# Patient Record
Sex: MALE | Race: WHITE | NOT HISPANIC OR LATINO | Employment: FULL TIME | URBAN - METROPOLITAN AREA
[De-identification: names, ages, dates, MRNs, and addresses within clinical notes are randomized per-mention and may not be internally consistent; named-entity substitution may affect disease eponyms.]

---

## 2020-05-21 ENCOUNTER — TRANSFERRED RECORDS (OUTPATIENT)
Dept: HEALTH INFORMATION MANAGEMENT | Facility: CLINIC | Age: 55
End: 2020-05-21

## 2020-07-01 ENCOUNTER — MEDICAL CORRESPONDENCE (OUTPATIENT)
Dept: HEALTH INFORMATION MANAGEMENT | Facility: CLINIC | Age: 55
End: 2020-07-01

## 2020-07-09 ENCOUNTER — REFERRAL (OUTPATIENT)
Dept: TRANSPLANT | Facility: CLINIC | Age: 55
End: 2020-07-09
Payer: COMMERCIAL

## 2020-07-09 DIAGNOSIS — I10 ESSENTIAL HYPERTENSION: ICD-10-CM

## 2020-07-09 DIAGNOSIS — N18.4 CHRONIC RENAL FAILURE, STAGE 4 (SEVERE) (H): ICD-10-CM

## 2020-07-09 DIAGNOSIS — N18.4 CHRONIC KIDNEY DISEASE, STAGE 4, SEVERELY DECREASED GFR (H): Primary | ICD-10-CM

## 2020-07-09 DIAGNOSIS — Z76.82 ORGAN TRANSPLANT CANDIDATE: ICD-10-CM

## 2020-07-09 DIAGNOSIS — N02.B9 IGA NEPHROPATHY: ICD-10-CM

## 2020-07-09 DIAGNOSIS — Z01.818 PRE-TRANSPLANT EVALUATION FOR KIDNEY TRANSPLANT: ICD-10-CM

## 2020-07-09 NOTE — LETTER
Request for Records from Referring Providers Office for Patients Referred to [facility] Solid Organ Transplant Program    July 28, 2020    Re: Dnoald Blanco   [address] [facility]                [place] [address]   1965    Requested Records     Task Due Responsible    Abdominal CT       No attached procedure          Abdominal MRI       No attached procedure          Abdominal Ultrasound       No attached procedure          Bilateral iliac artery ultrasound          Carotid duplex          Chest XR       No attached procedure          Chest/Abdomen/Pelvis CT       No attached procedure          Colonoscopy       No attached procedure          Dental          EKG       No attached procedure          Echo stress test       No attached procedure          Echocardiogram       No attached procedure          Gastric emptying study       No attached procedure          Heart Cath       No attached procedure          PFT       No attached procedure          Peripheral duplex/Lower extremity venous US       No attached procedure          Renal Ultrasound          Vaccinations up-to-date               Renal Biopsy Results        In addition to the above records, please include all lab results from the last 12 months.        Requested imaging may be electronically sent to the [employer] system via L[employer]HNOV DICOM connection. When unable to send imaging electronically, an exported DICOM CD may be sent. Please indicate when imaging has been sent electronically on your return cover sheet.    Requested pathology slides should be accompanied by the appropriate report from your institution.    When the patient is hand carrying requested records or the requested records are not at your facility, please indicate this information on a return cover sheet.    Please fax requested paper records to [phone] within 3-5 business days.     Please send all scans/slides to:   [facility]  Organ Transplant Office  [address] [facility], [place], [address] [address]    Please call our office at [phone] if you have any questions or concerns.

## 2020-07-09 NOTE — LETTER
August 12, 2020    Donald Blanco  [address] [place] [id number]    Dear Donald,    Thank you for your interest in the Transplant Center at [facility], [facility]. We look forward to being a part of your care team and assisting you through the transplant process.    As we discussed, your transplant coordinator is Renee Hampton (Kidney).  You may call your coordinator at any time with questions or concerns call [phone].    Please complete the following.    1. Fill out and return the enclosed forms    Authorization for Electronic Communication    Authorization to Discuss Protected Health Information    Authorization for Release of Protected Health Information    Authorization for Care Everywhere Release of Information    2. Sign up for:    SHERPANDIPITYt, access to your electronic medical record (see enclosed pamphlet)    [employer]FrameBlast, a transplant education website    You can use these tools to learn more about your transplant, communicate with your care team, and track your medical details      Sincerely,  Solid Organ Transplant  [facility], [facility]    cc: Sonu Russo (PCP); Chin Cornejo

## 2020-07-16 ENCOUNTER — TELEPHONE (OUTPATIENT)
Dept: TRANSPLANT | Facility: CLINIC | Age: 55
End: 2020-07-16

## 2020-07-16 NOTE — TELEPHONE ENCOUNTER
Provider Call: Transplant Provider  Facility Name: [facility]  Reason for Call: URIAH Haque calling to confirm that the referral sent on 7/8/2020 was received as patient has not gotten a call. Please call [facility] back at [phone] option 3 to speak with one of the RNs.   Callback needed? Yes    Return Call Needed  Same as documented in contacts section

## 2020-07-20 NOTE — TELEPHONE ENCOUNTER
Received a voicemail from URIAH Haque with patient's dialysis unit requesting an update on the referral.  Shabnam also left a message last week.  I returned her call and left her a message that we received the referral and have called the patient.  I let her know on the message we will call the patient again this week to start the referral process.  Will route to intake team

## 2020-07-21 NOTE — TELEPHONE ENCOUNTER
Patient Call: Voicemail  Date/Time: 7/21/20 at 9:44 am  Reason for call: returning call for intake

## 2020-07-22 NOTE — TELEPHONE ENCOUNTER
Patient Call: Voicemail  Date/Time: 7/22/2020 @ 11:37am  Reason for call: Patient left voicemail he was returning Sofy's call re: referral.

## 2020-07-24 NOTE — TELEPHONE ENCOUNTER
Patient Call: Jose  Date/Time: 7/24/2020-12pm  Reason for call: pt is looking for a return call from intake team please connect

## 2020-07-27 NOTE — TELEPHONE ENCOUNTER
Patient Call: Voicemail  Date/Time: 7/27/2020 @ 11:39am  Reason for call: Patient left voicemail he is trying to get in touch with someone about his referral. He and his referring provider have left several messages  id

## 2020-07-28 VITALS — BODY MASS INDEX: 39.99 KG/M2 | HEIGHT: 69 IN | WEIGHT: 270 LBS

## 2020-07-28 PROBLEM — M10.9 GOUT: Status: ACTIVE | Noted: 2020-05-21

## 2020-07-28 PROBLEM — N18.4 STAGE 4 CHRONIC KIDNEY DISEASE (H): Status: ACTIVE | Noted: 2020-05-21

## 2020-07-28 PROBLEM — E78.5 HYPERLIPIDEMIA: Status: ACTIVE | Noted: 2020-05-21

## 2020-07-28 PROBLEM — N02.B9 IGA NEPHROPATHY: Status: ACTIVE | Noted: 2020-05-21

## 2020-07-28 PROBLEM — I10 ESSENTIAL HYPERTENSION: Status: ACTIVE | Noted: 2020-05-21

## 2020-07-28 PROBLEM — R60.9 EDEMA: Status: ACTIVE | Noted: 2020-05-21

## 2020-07-28 PROBLEM — M72.2 PLANTAR FASCIITIS, LEFT: Status: ACTIVE | Noted: 2017-09-06

## 2020-07-28 PROBLEM — N25.81 HYPERPARATHYROIDISM DUE TO RENAL INSUFFICIENCY (H): Status: ACTIVE | Noted: 2020-05-21

## 2020-07-28 PROBLEM — M20.10 HALLUX VALGUS: Status: ACTIVE | Noted: 2017-09-06

## 2020-07-28 SDOH — HEALTH STABILITY: MENTAL HEALTH: HOW OFTEN DO YOU HAVE A DRINK CONTAINING ALCOHOL?: 2-3 TIMES A WEEK

## 2020-07-28 ASSESSMENT — MIFFLIN-ST. JEOR: SCORE: 2040.09

## 2020-07-28 NOTE — TELEPHONE ENCOUNTER
PCP: Dr. Sonu Russo    Referring Provider: Dr. Chin Cornejo   Referring Diagnosis: CKD Stage 4     Is patient under the age of 65? Yes  Is patient diabetic? No  Is patient on insulin? No  Was patient offered a pancreas transplant referral? No    Is patient in a group home/assisted living? No   Does patient have a guardian? No    Referral intake process completed.  Patient is aware that after financial approval is received, medical records will be requested.   Patient confirmed for a callback from transplant coordinator on 8/11/2020. (within 2 weeks)  Tentative evaluation date 9/3/2020. (within 4 weeks)    Confirmed coordinator will discuss evaluation process in more detail at the time of their call.   Patient is aware of the need to arrange age appropriate cancer screening, vaccinations, and dental care.  Reminded patient to complete questionnaire, complete medical records release, and review packet prior to evaluation visit .  Assessed patient for special needs (ie--wheelchair, assistance, guardian, and ):  No  Patient instructed to call [phone] with questions.     FRACISCO Adams, LPN   Solid Organ Transplant

## 2020-08-11 NOTE — TELEPHONE ENCOUNTER
Reviewed chart for purpose of pre kidney transplant evaluation planning. Pt has CKD from biopsy proven IgA nephropathy - need to request record. Other medical diagnoses include hypertension and gout. No mention of heart or lung related conditions. Last colonoscopy reportedly about 10 years ago. BMI is 39.87. All okay for proceeding with kidney transplant.     Contacted patient and introduced myself as their Transplant Coordinator, also introduced the role of the Transplant Coordinator in the transplant process. Patient's wife Sana was on the line as well during the entire call today.  Explained the purpose of this call including reviewing next steps and answering questions.  Patient and wife were both in agreement with proceeding with pre kidney transplant evaluation. Patient stating he has followed with Dr. Chin Cornejo for about 10 years because of his kidney disease. Pt stating he does not have any heart or lung problems but has worn CPAP every night x last 15 years. I reviewed that his current BMI of 39.87 is over our upper limit of 35 and he will likely be recommended to lose weight at his transplant surgeon appointment. Pt stating he is not currently engaged in any weight loss programs, offered and appointment with our Weight Loss Management Clinic and patient accepted this. Pt stating his last colonoscopy was when he was 38 years old. I instructed him to check with his primary care provider as to when it is recommended he repeat his colonoscopy. I did review that all health maintenance items need to be up to date to meet criteria for kidney transplant: dental work, PSA and colonoscopy.  Pt stating he has many siblings and people who want to donate a kidney to him. I reviewed when he Is seeing transplant nephrology and transplant surgeon he will be advised as to when to start having live donors register.   Confirmed Referring Provider, Dialysis Center, and Primary Care Physician. Notified patient of the  importance of continued communication with referring providers and primary care physicians.    Reviewed components of transplant evaluation process including necessary appointments, tests, and procedures.    Answered questions for patient regarding evaluation, provided my name and contact information and requested they call with any additional questions.    Determined that patient would like additional information regarding transplant by:     Drop Down choices: Mail, Email, MyChart, Phone Call   Encourage MyChart   Notified patients that they will hear from a Transplant  to schedule evaluation.  Sana does intend to attend pre kidney evaluation with patient. Both patient and Sana expressed very good understanding of all and was in good agreement with the plan.     Smart set orders into [employer] today for pre kidney transplant evaluation - routed to .

## 2020-08-18 NOTE — TELEPHONE ENCOUNTER
Called patient to schedule PKE clinic. Patient would prefer in person PKE and has save the date for Thursday 9/3.  Confirmed PKE in person for Thursday 9/3/20 and validated email on file. Encouraged patient to sign up for [employer]. Informed patient we would send information about appointments via MyChart or email and Transplant coordinator will call to review education.

## 2020-08-20 ENCOUNTER — TELEPHONE (OUTPATIENT)
Dept: TRANSPLANT | Facility: CLINIC | Age: 55
End: 2020-08-20

## 2020-08-20 NOTE — TELEPHONE ENCOUNTER
Patient Call: Voicemail  Date/Time: 8/20/20 / 8:59 am  Reason for call: Patient would like a call back from Renee Hampton

## 2020-08-20 NOTE — TELEPHONE ENCOUNTER
Spoke with pt who explained his PSA yesterday was 12.4 ( available in CE ) and he was recommended to see a urologist by his physician assistant, he is wondering about option of seeing a urologist here. I explained to patient I will check with urologists and get back to him.     Staff message to Dr. Price Ko and Dr. Henry Michael about seeing this patient.

## 2020-08-20 NOTE — TELEPHONE ENCOUNTER
Patient Call: General    Reason for call: Donald is trying to reach Renee.    Call back needed? Yes    Return Call Needed  Same as documented in contacts section  When to return call?: Greater than one day: Route standard priority

## 2020-08-25 NOTE — TELEPHONE ENCOUNTER
MEDICAL RECORDS REQUEST   [facility]  Urology Clinic  [address] [place] [address]  PHONE: [phone]  Fax: [phone]        FUTURE VISIT INFORMATION                                                   Donald Blanco, : 1965 scheduled for future visit at [facility]    APPOINTMENT INFORMATION:    Date: 20 7AM    Provider:  Nabil Vásquez MD    Reason for Visit/Diagnosis: Elevated PSA    REFERRAL INFORMATION:    Referring provider:  N/A    Specialty: N/A    Referring providers clinic:      Clinic contact number:  N/A    RECORDS REQUESTED FOR VISIT                                                     NOTES  STATUS/DETAILS   OFFICE NOTE from referring provider  yes   OFFICE NOTE from other specialist  no   DISCHARGE SUMMARY from hospital  no   DISCHARGE REPORT from the ER  no   OPERATIVE REPORT  no   MEDICATION LIST  no   LABS     URINALYSIS (UA)/ PSA  yes     PRE-VISIT CHECKLIST      Record collection complete Yes- [facility] recs in CE   Appointment appropriately scheduled           (right time/right provider) Yes   MyChart activation If no, please explain: In process   Questionnaire complete If no, please explain: In process     Completed by: Anna Meyer

## 2020-09-01 ENCOUNTER — PRE VISIT (OUTPATIENT)
Dept: UROLOGY | Facility: CLINIC | Age: 55
End: 2020-09-01

## 2020-09-01 NOTE — TELEPHONE ENCOUNTER
Chief Complaint : Consult     Hx/Sx: Elevated PSA    Records/Orders: PSA available    Pt Contacted: N/a    At Rooming: Video, patient has MyChart [phone]      Irving Ortega, EMT

## 2020-09-02 ENCOUNTER — TELEPHONE (OUTPATIENT)
Dept: TRANSPLANT | Facility: CLINIC | Age: 55
End: 2020-09-02

## 2020-09-02 NOTE — TELEPHONE ENCOUNTER
Called patient today and LM on his VM I was calling to see if he has any last minute questions about his eval tomorrow and to see if he has watched [facility] education.

## 2020-09-02 NOTE — PROGRESS NOTES
TRANSPLANT NEPHROLOGY RECIPIENT EVALUATION NOTE    Assessment and Plan:  # Kidney Transplant Evaluation: Patient is a good candidate overall. Benefits of a living donor transplant were discussed.    # CKD from IgA nephropathy: he has had a slow progression of his kidney disease over the past 10+ years. Most recent eGFR 18 ml/min. When ready, he would likely benefit from a kidney transplant.    # Cardiac Risk: he will need risk assessment.    # Elevated PSA: 12.4 in August 2020. Repeat PSA pending. Patient has already been referred to local urology.     # Obesity: current BMI 37. Discussed importance of weight loss for overall general health and to help reduce postoperative wound complications. Appreciate surgery input. He has intentionally lost approximately 22 lbs in the last 3 weeks with diet and exercise.     # Normal Hemoglobin: will get renal US to rule out mass.    # PAD screen: no imaging at this time per surgery.    # Health Maintenance: Colonoscopy: Not up to date and Dental: Up to date    Discussed the risks and benefits of a transplant, including the risk of surgery and immunosuppression medications.  Patient's overall evaluation will be discussed in the Transplant Program's regular meeting with a final recommendation on the patients suitability for transplant to be made at that time.  Patient was seen in conjunction with Dr. Woodrow Cr as part of a shared visit.    PHYSICIAN ATTESTATION AND EVALUATION  Patient was seen by myself, Dr. Woodrow Cr, in conjunction with Darleen Turner PA-C as part of a shared visit.    I personally reviewed the patient's past medical and surgical history, vital signs, medications and pertinent laboratory results and radiology findings.  Present and past medical history, along with significant physical exam findings were all reviewed with TOM.    Key findings:  Donald Blanco is a 55 year old year old male with CKD from IgA nephropathy, who presents for kidney  transplant evaluation.  Patient reports feeling good overall with some medical complaints.    Key management decisions made by me and discussed with TOM include the following, with full Assessment and Plan noted above by TOM:  # Kidney Transplant Evaluation: Patient is a good candidate overall. Benefits of a living donor transplant were discussed.    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  Preferably, a preemptive living donor kidney transplant.    # Cardiac Risk: Patient will require Cardiology evaluation prior to transplant.    # Elevated PSA: Recent PSA at over 12.  Patient has been referred to local Urologist.    # Obesity: Patient is above BMI guidelines and recommend weight loss.  Will defer to Transplant Surgery on weight loss goal.    # Relative Erythrocytosis: Patient with normal hemoglobin, which is unexpected with his degree of kidney dysfunction.  Will obtain a bilateral native kidney ultrasound to rule out renal mass.    Woodrow Cr MD    Evaluation:  Donald Blanco was seen in consultation at the request of Dr. Stuart Rust for evaluation as a potential kidney transplant recipient.    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    History of Present Illness:           Kidney Disease Hx: Was referred to nephrology 10+ years ago after found to have microscopic hematuria and proteinuria. IgA nephropathy diagnosed on kidney biopsy in 2009. He has had a very slow progression over the years and is not yet on dialysis. Most recent eGFR was 18 ml/min. No uremic symptoms.        Kidney Disease Dx: IgA nephropathy       Biopsy Proven: Yes         On Dialysis: No       Primary Nephrologist: Dr. Cornejo       H/o Kidney Stones: No       H/o Recurrent/Frequent UTI: No         Diabetic  Hx: None           Cardiac/Vascular Disease Risk Factors:        - No known history of cardiac disease or events         Viral Serology Status       CMV IgG Antibody: Unknown       EBV IgG Antibody: Unknown         Volume Status/Weight:        Volume status: Mildly hypervolemic       Weight:  BMI above guidelines, but appropriateness for transplant per surgeon       BMI: Body mass index is 37.13 kg/m .         Functional Capacity/Frailty:        He rides his bike 3.5 miles daily and goes on frequent walks with his wife. He is still working full time. No chest pain, SOB, or claudication symptoms.      Fatigue/Decreased Energy: [x] No [] Yes    Chest Pain or SOB with Exertion: [x] No [] Yes    Significant Weight Change: [x] No [] Yes    Nausea, Vomiting or Diarrhea: [x] No [] Yes    Fever, Sweats or Chills:  [x] No [] Yes    Leg Swelling [x] No [] Yes         History of Cancer: None    Other Significant Medical Issues:   - HTN, gout, SORAIDA on CPAP    Review of Systems:  A comprehensive review of systems was obtained and negative, except as noted in the HPI or PMH.    Past Medical History:   Medical record was reviewed and PMH was discussed with patient and noted below.  Past Medical History:   Diagnosis Date     CKD (chronic kidney disease) stage 4, GFR 15-29 ml/min (H)      Elevated PSA      Essential hypertension 5/21/2020     IgA nephropathy      Obesity        Past Social History:   Past Surgical History:   Procedure Laterality Date     BACK SURGERY      Back Surgey 20+ Years Ago      BIOPSY      [facility] 2010     COLONOSCOPY      10+ Years ago at [facility]      Personal history of bleeding or anesthesia problems: No    Family History:  Family History   Problem Relation Age of Onset     Kidney Disease Father      Hypertension Father        Personal History:   Social History     Socioeconomic History     Marital status:      Spouse name: Not on file     Number of children: Not on file      Years of education: Not on file     Highest education level: Not on file   Occupational History     Not on file   Social Needs     Financial resource strain: Not on file     Food insecurity     Worry: Not on file     Inability: Not on file     Transportation needs     Medical: Not on file     Non-medical: Not on file   Tobacco Use     Smoking status: Never Smoker     Smokeless tobacco: Never Used   Substance and Sexual Activity     Alcohol use: Yes     Frequency: 2-3 times a week     Drug use: Never     Sexual activity: Not on file   Lifestyle     Physical activity     Days per week: Not on file     Minutes per session: Not on file     Stress: Not on file   Relationships     Social connections     Talks on phone: Not on file     Gets together: Not on file     Attends Islam service: Not on file     Active member of club or organization: Not on file     Attends meetings of clubs or organizations: Not on file     Relationship status: Not on file     Intimate partner violence     Fear of current or ex partner: Not on file     Emotionally abused: Not on file     Physically abused: Not on file     Forced sexual activity: Not on file   Other Topics Concern     Parent/sibling w/ CABG, MI or angioplasty before 65F 55M? Not Asked   Social History Narrative     Not on file       Allergies:  No Known Allergies    Medications:  Current Outpatient Medications   Medication Sig     allopurinol (ZYLOPRIM) 100 MG tablet TAKE 2 TABLETS BY MOUTH EVERY DAY     amLODIPine (NORVASC) 10 MG tablet Take 10 mg by mouth     colchicine (COLCYRS) 0.6 MG tablet Take 0.6 mg by mouth daily prn     furosemide (LASIX) 20 MG tablet TAKE 1 TABLET BY MOUTH EVERY DAY     simvastatin (ZOCOR) 40 MG tablet Take 40 mg by mouth     aspirin (ASA) 81 MG EC tablet Take 81 mg by mouth     Vitamin D3 (VITAMIN D3) 25 mcg (1000 units) tablet Take 1 capsule by mouth     No current facility-administered medications for this visit.        Vitals:  BP (!) 153/88    "Pulse 97   Ht 1.778 m (5' 10\")   Wt 117.4 kg (258 lb 12.8 oz)   SpO2 94%   BMI 37.13 kg/m      Exam:  GENERAL APPEARANCE: alert and no distress  HENT: mouth without ulcers or lesions  LYMPHATICS: no cervical or supraclavicular nodes  RESP: lungs clear to auscultation - no rales, rhonchi or wheezes  CV: regular rhythm, normal rate, no rub, no murmur  EDEMA: 2+ LE edema bilaterally  ABDOMEN: soft, nondistended, nontender, bowel sounds normal  MS: extremities normal - no gross deformities noted, no evidence of inflammation in joints, no muscle tenderness  SKIN: no rash    Results:   No results found for this or any previous visit (from the past 336 hour(s)).    "

## 2020-09-03 ENCOUNTER — OFFICE VISIT (OUTPATIENT)
Dept: TRANSPLANT | Facility: CLINIC | Age: 55
End: 2020-09-03
Attending: INTERNAL MEDICINE
Payer: COMMERCIAL

## 2020-09-03 ENCOUNTER — RESULTS ONLY (OUTPATIENT)
Dept: OTHER | Facility: CLINIC | Age: 55
End: 2020-09-03

## 2020-09-03 ENCOUNTER — DOCUMENTATION ONLY (OUTPATIENT)
Dept: TRANSPLANT | Facility: CLINIC | Age: 55
End: 2020-09-03

## 2020-09-03 VITALS
DIASTOLIC BLOOD PRESSURE: 88 MMHG | WEIGHT: 258.8 LBS | SYSTOLIC BLOOD PRESSURE: 153 MMHG | BODY MASS INDEX: 37.05 KG/M2 | OXYGEN SATURATION: 94 % | HEIGHT: 70 IN | HEART RATE: 97 BPM

## 2020-09-03 DIAGNOSIS — N02.B9 IGA NEPHROPATHY: ICD-10-CM

## 2020-09-03 DIAGNOSIS — Z76.82 ORGAN TRANSPLANT CANDIDATE: ICD-10-CM

## 2020-09-03 DIAGNOSIS — I10 ESSENTIAL HYPERTENSION: ICD-10-CM

## 2020-09-03 DIAGNOSIS — Z01.818 PRE-TRANSPLANT EVALUATION FOR KIDNEY TRANSPLANT: ICD-10-CM

## 2020-09-03 DIAGNOSIS — N18.4 CHRONIC KIDNEY DISEASE, STAGE 4, SEVERELY DECREASED GFR (H): ICD-10-CM

## 2020-09-03 DIAGNOSIS — N18.4 CHRONIC RENAL FAILURE, STAGE 4 (SEVERE) (H): ICD-10-CM

## 2020-09-03 DIAGNOSIS — R97.20 ELEVATED PSA: ICD-10-CM

## 2020-09-03 DIAGNOSIS — N18.4 CKD (CHRONIC KIDNEY DISEASE) STAGE 4, GFR 15-29 ML/MIN (H): ICD-10-CM

## 2020-09-03 PROBLEM — R60.9 EDEMA: Status: RESOLVED | Noted: 2020-05-21 | Resolved: 2020-09-03

## 2020-09-03 PROBLEM — M20.10 HALLUX VALGUS: Status: RESOLVED | Noted: 2017-09-06 | Resolved: 2020-09-03

## 2020-09-03 PROBLEM — M72.2 PLANTAR FASCIITIS, LEFT: Status: RESOLVED | Noted: 2017-09-06 | Resolved: 2020-09-03

## 2020-09-03 LAB
ABO + RH BLD: NORMAL
ALBUMIN SERPL-MCNC: 3.6 G/DL (ref 3.4–5)
ALBUMIN UR-MCNC: >499 MG/DL
ALP SERPL-CCNC: 79 U/L (ref 40–150)
ALT SERPL W P-5'-P-CCNC: 24 U/L (ref 0–70)
AMORPH CRY #/AREA URNS HPF: ABNORMAL /HPF
ANION GAP SERPL CALCULATED.3IONS-SCNC: 9 MMOL/L (ref 3–14)
APPEARANCE UR: CLEAR
APTT PPP: 33 SEC (ref 22–37)
AST SERPL W P-5'-P-CCNC: 15 U/L (ref 0–45)
BACTERIA #/AREA URNS HPF: ABNORMAL /HPF
BASOPHILS # BLD AUTO: 0 10E9/L (ref 0–0.2)
BASOPHILS NFR BLD AUTO: 0.5 %
BILIRUB SERPL-MCNC: 0.5 MG/DL (ref 0.2–1.3)
BILIRUB UR QL STRIP: NEGATIVE
BLD GP AB SCN SERPL QL: NORMAL
BLOOD BANK CMNT PATIENT-IMP: NORMAL
BUN SERPL-MCNC: 65 MG/DL (ref 7–30)
CALCIUM SERPL-MCNC: 9.8 MG/DL (ref 8.5–10.1)
CHLORIDE SERPL-SCNC: 106 MMOL/L (ref 94–109)
CO2 SERPL-SCNC: 24 MMOL/L (ref 20–32)
COLOR UR AUTO: ABNORMAL
CREAT SERPL-MCNC: 6.13 MG/DL (ref 0.66–1.25)
DIFFERENTIAL METHOD BLD: NORMAL
EOSINOPHIL # BLD AUTO: 0.1 10E9/L (ref 0–0.7)
EOSINOPHIL NFR BLD AUTO: 1.3 %
ERYTHROCYTE [DISTWIDTH] IN BLOOD BY AUTOMATED COUNT: 13.3 % (ref 10–15)
GFR SERPL CREATININE-BSD FRML MDRD: 9 ML/MIN/{1.73_M2}
GLUCOSE SERPL-MCNC: 97 MG/DL (ref 70–99)
GLUCOSE UR STRIP-MCNC: NEGATIVE MG/DL
HCT VFR BLD AUTO: 44.2 % (ref 40–53)
HGB BLD-MCNC: 14.3 G/DL (ref 13.3–17.7)
HGB UR QL STRIP: ABNORMAL
IMM GRANULOCYTES # BLD: 0 10E9/L (ref 0–0.4)
IMM GRANULOCYTES NFR BLD: 0.4 %
INR PPP: 1.03 (ref 0.86–1.14)
KETONES UR STRIP-MCNC: NEGATIVE MG/DL
LEUKOCYTE ESTERASE UR QL STRIP: NEGATIVE
LYMPHOCYTES # BLD AUTO: 1.9 10E9/L (ref 0.8–5.3)
LYMPHOCYTES NFR BLD AUTO: 23.4 %
MCH RBC QN AUTO: 29.9 PG (ref 26.5–33)
MCHC RBC AUTO-ENTMCNC: 32.4 G/DL (ref 31.5–36.5)
MCV RBC AUTO: 92 FL (ref 78–100)
MONOCYTES # BLD AUTO: 0.4 10E9/L (ref 0–1.3)
MONOCYTES NFR BLD AUTO: 5.2 %
MUCOUS THREADS #/AREA URNS LPF: PRESENT /LPF
NEUTROPHILS # BLD AUTO: 5.5 10E9/L (ref 1.6–8.3)
NEUTROPHILS NFR BLD AUTO: 69.2 %
NITRATE UR QL: NEGATIVE
NRBC # BLD AUTO: 0 10*3/UL
NRBC BLD AUTO-RTO: 0 /100
PH UR STRIP: 5 PH (ref 5–7)
PLATELET # BLD AUTO: 224 10E9/L (ref 150–450)
POTASSIUM SERPL-SCNC: 3.7 MMOL/L (ref 3.4–5.3)
PROT SERPL-MCNC: 8.2 G/DL (ref 6.8–8.8)
PSA SERPL-ACNC: 13.5 UG/L (ref 0–4)
RBC # BLD AUTO: 4.79 10E12/L (ref 4.4–5.9)
RBC #/AREA URNS AUTO: 1 /HPF (ref 0–2)
SODIUM SERPL-SCNC: 139 MMOL/L (ref 133–144)
SOURCE: ABNORMAL
SP GR UR STRIP: 1.01 (ref 1–1.03)
SPECIMEN EXP DATE BLD: NORMAL
SPECIMEN EXP DATE BLD: NORMAL
SQUAMOUS #/AREA URNS AUTO: <1 /HPF (ref 0–1)
T PALLIDUM AB SER QL: NONREACTIVE
THROMBIN TIME: 18.4 SEC (ref 13–19)
UROBILINOGEN UR STRIP-MCNC: 0 MG/DL (ref 0–2)
WBC # BLD AUTO: 7.9 10E9/L (ref 4–11)
WBC #/AREA URNS AUTO: 2 /HPF (ref 0–5)

## 2020-09-03 PROCEDURE — 86644 CMV ANTIBODY: CPT | Performed by: PHYSICIAN ASSISTANT

## 2020-09-03 PROCEDURE — 86787 VARICELLA-ZOSTER ANTIBODY: CPT | Performed by: PHYSICIAN ASSISTANT

## 2020-09-03 PROCEDURE — 85730 THROMBOPLASTIN TIME PARTIAL: CPT | Performed by: PHYSICIAN ASSISTANT

## 2020-09-03 PROCEDURE — 81241 F5 GENE: CPT | Performed by: PHYSICIAN ASSISTANT

## 2020-09-03 PROCEDURE — 36415 COLL VENOUS BLD VENIPUNCTURE: CPT | Performed by: PHYSICIAN ASSISTANT

## 2020-09-03 PROCEDURE — 86706 HEP B SURFACE ANTIBODY: CPT | Performed by: PHYSICIAN ASSISTANT

## 2020-09-03 PROCEDURE — 85025 COMPLETE CBC W/AUTO DIFF WBC: CPT | Performed by: PHYSICIAN ASSISTANT

## 2020-09-03 PROCEDURE — 40000866 ZZHCL STATISTIC HIV 1/2 ANTIGEN/ANTIBODY PRETRANSPLANT ONLY: Performed by: PHYSICIAN ASSISTANT

## 2020-09-03 PROCEDURE — 85732 THROMBOPLASTIN TIME PARTIAL: CPT | Performed by: PHYSICIAN ASSISTANT

## 2020-09-03 PROCEDURE — 86780 TREPONEMA PALLIDUM: CPT | Performed by: PHYSICIAN ASSISTANT

## 2020-09-03 PROCEDURE — 86147 CARDIOLIPIN ANTIBODY EA IG: CPT | Performed by: PHYSICIAN ASSISTANT

## 2020-09-03 PROCEDURE — 81240 F2 GENE: CPT | Performed by: PHYSICIAN ASSISTANT

## 2020-09-03 PROCEDURE — 86900 BLOOD TYPING SEROLOGIC ABO: CPT | Performed by: PHYSICIAN ASSISTANT

## 2020-09-03 PROCEDURE — 86901 BLOOD TYPING SEROLOGIC RH(D): CPT | Performed by: PHYSICIAN ASSISTANT

## 2020-09-03 PROCEDURE — 85597 PHOSPHOLIPID PLTLT NEUTRALIZ: CPT | Performed by: PHYSICIAN ASSISTANT

## 2020-09-03 PROCEDURE — 86665 EPSTEIN-BARR CAPSID VCA: CPT | Performed by: PHYSICIAN ASSISTANT

## 2020-09-03 PROCEDURE — 86886 COOMBS TEST INDIRECT TITER: CPT | Performed by: PHYSICIAN ASSISTANT

## 2020-09-03 PROCEDURE — 86850 RBC ANTIBODY SCREEN: CPT | Performed by: PHYSICIAN ASSISTANT

## 2020-09-03 PROCEDURE — 86803 HEPATITIS C AB TEST: CPT | Performed by: PHYSICIAN ASSISTANT

## 2020-09-03 PROCEDURE — 86481 TB AG RESPONSE T-CELL SUSP: CPT | Performed by: PHYSICIAN ASSISTANT

## 2020-09-03 PROCEDURE — 85613 RUSSELL VIPER VENOM DILUTED: CPT | Performed by: PHYSICIAN ASSISTANT

## 2020-09-03 PROCEDURE — 86704 HEP B CORE ANTIBODY TOTAL: CPT | Performed by: PHYSICIAN ASSISTANT

## 2020-09-03 PROCEDURE — G0103 PSA SCREENING: HCPCS | Performed by: PHYSICIAN ASSISTANT

## 2020-09-03 PROCEDURE — 80053 COMPREHEN METABOLIC PANEL: CPT | Performed by: PHYSICIAN ASSISTANT

## 2020-09-03 PROCEDURE — 85670 THROMBIN TIME PLASMA: CPT | Performed by: PHYSICIAN ASSISTANT

## 2020-09-03 PROCEDURE — 81001 URINALYSIS AUTO W/SCOPE: CPT | Performed by: PHYSICIAN ASSISTANT

## 2020-09-03 PROCEDURE — 86905 BLOOD TYPING RBC ANTIGENS: CPT | Performed by: PHYSICIAN ASSISTANT

## 2020-09-03 PROCEDURE — 85610 PROTHROMBIN TIME: CPT | Performed by: PHYSICIAN ASSISTANT

## 2020-09-03 PROCEDURE — 87340 HEPATITIS B SURFACE AG IA: CPT | Performed by: PHYSICIAN ASSISTANT

## 2020-09-03 RX ORDER — AMLODIPINE BESYLATE 10 MG/1
10 TABLET ORAL EVERY MORNING
COMMUNITY
Start: 2020-05-21 | End: 2022-05-09

## 2020-09-03 RX ORDER — SIMVASTATIN 40 MG
40 TABLET ORAL EVERY MORNING
COMMUNITY
Start: 2020-05-05 | End: 2022-05-27

## 2020-09-03 RX ORDER — ALLOPURINOL 100 MG/1
200 TABLET ORAL DAILY
Status: ON HOLD | COMMUNITY
Start: 2010-09-02 | End: 2022-05-27

## 2020-09-03 RX ORDER — COLCHICINE 0.6 MG/1
0.6 TABLET ORAL DAILY
COMMUNITY
End: 2020-10-06

## 2020-09-03 RX ORDER — FUROSEMIDE 20 MG
20 TABLET ORAL EVERY MORNING
COMMUNITY
Start: 2010-09-02 | End: 2022-05-27

## 2020-09-03 RX ORDER — VITAMIN B COMPLEX
1 TABLET ORAL EVERY MORNING
COMMUNITY
End: 2023-03-20

## 2020-09-03 ASSESSMENT — MIFFLIN-ST. JEOR: SCORE: 2005.16

## 2020-09-03 NOTE — PROGRESS NOTES
"Kidney Transplant Referral - 7/9/2020  Donald Blanco attended the pre-transplant patient Evaluation clinic in person and met providers.   He and his wife aSna had watched  The [facility] website pre-transplant modules at home.   The following items were reviewed after the in patient appointment over the phone.    Content reviewed:    Living Donation and how to access that program    Paired exchange    Kidney Donor Profile Index (KDPI)    Waiting list issues (right to decline without penalty, high PHS risk donors, what to expect when called with an offer)    Hospital experience,  length of stay , need to stay locally post-discharge (2-4 weeks)    Surgical options (with pictures)                             Post-surgery lifting and driving restrictions    Post-transplant routines, frequency of lab work and clinic visits    Need to stay locally post-discharge (2-4 weeks)    Role of Transplant Coordinator    Participants were informed of the benefits of transplant as well as potential risks such as infection, cancer, and death.  The need for total adherence with immunosuppression medications and following transplant regimens was stressed.  The overall evaluation/approval/listing process was reviewed.   The patient was provided with the following documents:  What You Need to Know About a Kidney Transplant  Adult Kidney Transplant - A Guide for Patients  SRTR Data Sheet - Kidney  Brochure - Kidney Allocation  Brochure - Multiple Listing and Waiting Time Transfer  What Every Patient Needs to Know (UNOS)  UNOS Facts and Figures  Finding a Donor  [facility] - Quick Start Guide  KDPI Consent  Receipt of Information form    Donald Blanco knows about the KDPI and JOSLYN forms to sign with [employer]   Receipt of Information for Organ Transplant Recipient.\" He has Renee Hampton's phone number instructed to call with additional questions.      Summary I reviewed Darleen Turner's note with Kuldeep over the " phone in clinic.   Team s concerns/comments: IgA nephropathy may recur; Meets criteria for wait list with eGFR;     Candidacy category: Green     Action/Plan:    1. Donors may register with [facility] they got the web address today   2. May need cardiology appointment   3. Long discussion about elevated PSA and appts scheduled in Epic Plan 9/9 appt keep virtually with [facility].   He will keep his local urology appt on 9/14 and discuss if he needs to keep that after he sees Dr. Vásquez urologist here.   4. Recommended he not gain weight BMI 37. He understands, reports having lost 22#   5. He will arrange colonoscopy with his PCP    Expected Selection Meeting Discussion: 9/9/2020

## 2020-09-03 NOTE — PROGRESS NOTES
Outpatient MNT: Kidney Transplant Evaluation    Current BMI: 37.1 (HT 70 in,  lbs/117 kg)  BMI is outside recommendation of <35 for kidney transplant  Goal weight for kidney transplant <243 lbs (15 lb loss)    Frailty Test completed 9/3/20 (view flowsheets-->frailty score for further scoring breakdown)  Not Frail      Time Spent: 15 minutes  Visit Type: Initial  Referring Physician: Finger  Pt accompanied by: his wife, Sana     Medical dx associated with RD referral  - CKD     History of previous txp: none   Dialysis: no     Nutrition Assessment  Pt follows a lower sodium diet. He has reduced his portion sizes and has been more active the past few months to lose weight for transplant. Of note, wife looked at insurance ([employer]) re: weight loss programs and it did not seem to be covered with pt having kidney disease. Pt feels he will be able to achieve weight loss on his own, yet encouraged them if they would like to pursue weight loss clinic, to call insurance and see if there is any way for coverage.     Vitamins, Supplements, Pertinent Meds: vit D   Herbal Medicines/Supplements: none     Diet Recall  Breakfast None; long habit of his    Lunch LS ham or roast beef s/w with a small bag of veggie chips and fruit (apple or watermelon)    Dinner Taco salad with ground beef or turkey (low/no sodium homemade seasoning); grilled meat/fish/chicken with cauliflower mash/cheese for flavoring (seldom rice or pasta in summer), grilled green beans or salad    Snacks Occasional sweet, more often will have unsalted cashews    Beverages 40 oz apple or grape juice/day (eliminated milk and now drinks juice instead), water, arnold molina canned beverage    Alcohol 6-7 light beer/week    Dining out 2x/month      Physical Activity  Bikes most morning 3.5 miles (20 min)  Walks most days for 20 min      Anthropometrics  Height:   70 in   BMI:    37.1    Weight Status:Obesity Grade II BMI 35-39.9   Weight:  258 lbs            IBW  (lb): 166  % IBW: 155    Wt Hx: Pt reports + ARETHA, which he correlates with medication change. He is down ~12 lbs x 1 month.     Adj/dosing BW: 189 lbs/86 kg       Labs  K 4.2 (6/16)   PHOSPHORUS: no recent level on file     Malnutrition  % Intake: Decreased intake does not meet criteria for malnutrition - intentional  % Weight Loss: Weight loss does not meet criteria for malnutrition - intentional  Subcutaneous Fat Loss: Does not meet criteria   Muscle Loss: None  Fluid Accumulation/Edema: Moderate   Malnutrition Diagnosis: Patient does not meet two of the above criteria necessary for diagnosing malnutrition     Estimated Nutrition Needs  Energy  1720     (20 kcal/kg dosing BW for desired wt loss)       Protein  52-69    (0.6-0.8 g/kg for CKD)           Fluid  1 ml/kcal or per MD   Micronutrient   Na+: <2000 mg/day  K+: 8435-5369 mg/day  Phos: 800-1000 mg/day            Nutrition Diagnosis  Food and nutrition related knowledge deficit r/t pre kidney transplant eval AEB pt verbalized not hearing pre/post transplant diet guidelines.    Obesity r/t excessive energy intake and inadequate physical activity AEB BMI >30.    Nutrition Intervention  Nutrition education provided:  Discussed strategies for weight loss. Pt feels confident he will be able to lose weight for transplant. We discussed reducing juice intake and/or diluting juice with water or flavored water. Likely getting 500 kris/day in juice alone. Reviewed some strategies for healthful eating during fall and winter months and comfort foods.     Discussed sodium intake (low sodium foods and drinks, seasoning food without salt and tips for low sodium diet). Reviewed K level, which is wnl.     Reviewed post txp diet guidelines in brief (will review in further detail post txp):  (1) Review of proper food safety measures d/t immunosuppressant therapy post-op and increased risk for food-borne illness    (2) Avoid the following post txp d/t risk for rejection, unknown  effects on the organs, and/or potential interactions with immunosuppressants:  - Herbal, Chinese, holistic, chiropractic, natural, alternative medicines and supplements  - Detoxes and cleanses  - Weight loss pills  - Protein powders or other products with extracts or herbs (ie green tea extract)    (3) Med regimen and possible side effects    Patient Understanding: Pt verbalized understanding of education provided.  Expected Compliance: Good  Follow-Up Plans: PRN     Nutrition Goals  1. Weight loss to goal wt: < 243 lbs (BMI <35 or per MD)   2. Limit Na+ <2000mg/day  3. Pt to verbalize understanding of 3 aspects of post txp education provided    Provided pt with contact info.   Rossy Gaming, RD, LD, CCTD  Pgr [phone]

## 2020-09-03 NOTE — PROGRESS NOTES
Psychosocial Assessment For Kidney Transplantation  Patient Name/ Age: Donald Blanco 55 year old   Medical Record #: [id number]  Duration of Interview: 30 min  Process:   Face-to-Face Interview                (counseling < 50%)   Present at Appointment: Jayla and his wife Sana        : CLAUDIO Salinas Date:  September 3, 2020        Type of transplant: Kidney    Donor type:      Cadaver and siblings   Prior Transplants:    No Status of Transplant: N/A           Current Living Situation    Location:   [address] [place], [place] [id number]  With Whom: lives with their spouse Sana       Family/ Social Support:    Pat has two adult children: Sonu (Deloit) and Mayra (Amanda). He has 8 siblings, most live local. His parents are .    available, helpful   Committed Relationship: Jayla is  to Sana.     Stable/Supportive   Other Supports: Friends   available, helpful       Activities/ Functional Ability    Current Level: ambulatory, visually impaired (wears contacts) and independent with ADL's     Transportation drives self       Vocational/Employment/Financial     Employment   full time   Job Description   Pat is employed full time in . His wife is employed full time as well.      Income   Salary/wages and Spouse's Income   Insurance      At this time, patient can afford medication costs:  Yes  Private Insurance- [employer] through spouse employer       Medical Status    Current Mode of Treatment for ESRD None   Complications None       Behavioral    Tobacco Use No Chemical Dependency No   Pat denied tobacco use.  Pat reported he may drink 5-6 beers a week. Pat denied any current or history of substance use or chemical dependency treatment.      Psychiatric Impairment No  Pat denied any current or history of anxiety, depression, or other mental health concerns.     Reading Ability: Good  Education Level: Some College Recent Legal History No      Coping Style/Strategies: Have  a beer, get outside       Ability to Adhere to Complex Medical Regime: Yes     Adherence History: Pat reported he follows his physician's recommendations, takes his medications as prescribed, and attends his appointments as scheduled.         Education  _X_ Medicare  _X_ Rehabilitation  _X_ Donor issues  _X_ Community resources  _X_ Post discharge housing  _X_ Financial resources  _X_ Medical insurance options  _X_ Psych adjustment  _X_ Family adjustment  _X_ Health Care Directive Provided Education and Declined Completing    Psychosocial Risks of Transplant Reviewed and Discussed:  _X_ Increased stress related to emotional,            family, social, employment or financial           situation  _X_ Effect on work and/or disability benefits  _X_ Effect on future health and life           insurance  _X_ Transplant outcome expectations may           not be met  _X_ Mental Health Risks: anxiety,           depression, PTSD, guilt, grief and           chronic fatigue     Notable Items:   None noted.       Final Evaluation/Assessment   Patient seemed to process information well. Appeared well informed, motivated and able to follow post transplant requirements. Behavior was appropriate during interview. Has adequate income and insurance coverage. Adequate social support. No major contraindications noted for transplant.  At this time patient appears to understand the risks and benefits of transplant.      Recommendation  Acceptable    Selection Criteria Met:  Plan for support Yes   Chemical Dependence Yes   Smoking Yes   Mental Health Yes   Adequate Finances Yes    Signature: CLAUDIO Salinas [facility]   Title: Clinical

## 2020-09-03 NOTE — LETTER
9/3/2020         RE: Donald Blanco  [address] [facility]  [place] [address]        Dear Colleague,    Thank you for referring your patient, Donald Blanco, to the [facility] SOLID [facility]GAN TRANSPLANT. Please see a copy of my visit note below.    TRANSPLANT NEPHROLOGY RECIPIENT EVALUATION NOTE    Assessment and Plan:  # Kidney Transplant Evaluation: Patient is a good candidate overall. Benefits of a living donor transplant were discussed.    # CKD from IgA nephropathy: he has had a slow progression of his kidney disease over the past 10+ years. Most recent eGFR 18 ml/min. When ready, he would likely benefit from a kidney transplant.    # Cardiac Risk: he will need risk assessment.    # Elevated PSA: 12.4 in August 2020. Repeat PSA pending. Patient has already been referred to local urology.     # Obesity: current BMI 37. Discussed importance of weight loss for overall general health and to help reduce postoperative wound complications. Appreciate surgery input. He has intentionally lost approximately 22 lbs in the last 3 weeks with diet and exercise.     # Normal Hemoglobin: will get renal US to rule out mass.    # PAD screen: no imaging at this time per surgery.    # Health Maintenance: Colonoscopy: Not up to date and Dental: Up to date    Discussed the risks and benefits of a transplant, including the risk of surgery and immunosuppression medications.  Patient's overall evaluation will be discussed in the Transplant Program's regular meeting with a final recommendation on the patients suitability for transplant to be made at that time.  Patient was seen in conjunction with Dr. Woodrow Cr as part of a shared visit.    PHYSICIAN ATTESTATION AND EVALUATION  Patient was seen by myself, Dr. Woodrow Cr, in conjunction with Darleen Turner PA-C as part of a shared visit.    I personally reviewed the patient's past medical and surgical history, vital signs, medications and pertinent laboratory results and radiology  findings.  Present and past medical history, along with significant physical exam findings were all reviewed with TOM.    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  Patient reports feeling good overall with some medical complaints.    Key management decisions made by me and discussed with TOM include the following, with full Assessment and Plan noted above by TOM:  # Kidney Transplant Evaluation: Patient is a good candidate overall. Benefits of a living donor transplant were discussed.    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  Preferably, a preemptive living donor kidney transplant.    # Cardiac Risk: Patient will require Cardiology evaluation prior to transplant.    # Elevated PSA: Recent PSA at over 12.  Patient has been referred to local Urologist.    # Obesity: Patient is above BMI guidelines and recommend weight loss.  Will defer to Transplant Surgery on weight loss goal.    # Relative Erythrocytosis: Patient with normal hemoglobin, which is unexpected with his degree of kidney dysfunction.  Will obtain a bilateral native kidney ultrasound to rule out renal mass.    Woodrow Cr MD    Evaluation:  Donald Blanco was seen in consultation at the request of Dr. Stuart Rust for evaluation as a potential kidney transplant recipient.    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    History of Present Illness:           Kidney Disease Hx: Was referred to nephrology 10+ years ago after found to have microscopic hematuria and proteinuria. IgA nephropathy diagnosed on kidney biopsy in 2009. He has had a very slow progression over the years and is not yet on dialysis. Most recent eGFR was 18  ml/min. No uremic symptoms.        Kidney Disease Dx: IgA nephropathy       Biopsy Proven: Yes         On Dialysis: No       Primary Nephrologist: Dr. Cornejo       H/o Kidney Stones: No       H/o Recurrent/Frequent UTI: No         Diabetic Hx: None           Cardiac/Vascular Disease Risk Factors:        - No known history of cardiac disease or events         Viral Serology Status       CMV IgG Antibody: Unknown       EBV IgG Antibody: Unknown         Volume Status/Weight:        Volume status: Mildly hypervolemic       Weight:  BMI above guidelines, but appropriateness for transplant per surgeon       BMI: Body mass index is 37.13 kg/m .         Functional Capacity/Frailty:        He rides his bike 3.5 miles daily and goes on frequent walks with his wife. He is still working full time. No chest pain, SOB, or claudication symptoms.      Fatigue/Decreased Energy: [x] No [] Yes    Chest Pain or SOB with Exertion: [x] No [] Yes    Significant Weight Change: [x] No [] Yes    Nausea, Vomiting or Diarrhea: [x] No [] Yes    Fever, Sweats or Chills:  [x] No [] Yes    Leg Swelling [x] No [] Yes         History of Cancer: None    Other Significant Medical Issues:   - HTN, gout, SORAIDA on CPAP    Review of Systems:  A comprehensive review of systems was obtained and negative, except as noted in the HPI or PMH.    Past Medical History:   Medical record was reviewed and PMH was discussed with patient and noted below.  Past Medical History:   Diagnosis Date     CKD (chronic kidney disease) stage 4, GFR 15-29 ml/min (H)      Elevated PSA      Essential hypertension 5/21/2020     IgA nephropathy      Obesity        Past Social History:   Past Surgical History:   Procedure Laterality Date     BACK SURGERY      Back Surgey 20+ Years Ago      BIOPSY      [facility] 2010     COLONOSCOPY      10+ Years ago at [facility]      Personal history of bleeding or anesthesia problems: No    Family History:  Family History   Problem  Relation Age of Onset     Kidney Disease Father      Hypertension Father        Personal History:   Social History     Socioeconomic History     Marital status:      Spouse name: Not on file     Number of children: Not on file     Years of education: Not on file     Highest education level: Not on file   Occupational History     Not on file   Social Needs     Financial resource strain: Not on file     Food insecurity     Worry: Not on file     Inability: Not on file     Transportation needs     Medical: Not on file     Non-medical: Not on file   Tobacco Use     Smoking status: Never Smoker     Smokeless tobacco: Never Used   Substance and Sexual Activity     Alcohol use: Yes     Frequency: 2-3 times a week     Drug use: Never     Sexual activity: Not on file   Lifestyle     Physical activity     Days per week: Not on file     Minutes per session: Not on file     Stress: Not on file   Relationships     Social connections     Talks on phone: Not on file     Gets together: Not on file     Attends Cheondoism service: Not on file     Active member of club or organization: Not on file     Attends meetings of clubs or organizations: Not on file     Relationship status: Not on file     Intimate partner violence     Fear of current or ex partner: Not on file     Emotionally abused: Not on file     Physically abused: Not on file     Forced sexual activity: Not on file   Other Topics Concern     Parent/sibling w/ CABG, MI or angioplasty before 65F 55M? Not Asked   Social History Narrative     Not on file       Allergies:  No Known Allergies    Medications:  Current Outpatient Medications   Medication Sig     allopurinol (ZYLOPRIM) 100 MG tablet TAKE 2 TABLETS BY MOUTH EVERY DAY     amLODIPine (NORVASC) 10 MG tablet Take 10 mg by mouth     colchicine (COLCYRS) 0.6 MG tablet Take 0.6 mg by mouth daily prn     furosemide (LASIX) 20 MG tablet TAKE 1 TABLET BY MOUTH EVERY DAY     simvastatin (ZOCOR) 40 MG tablet Take 40 mg by  "mouth     aspirin (ASA) 81 MG EC tablet Take 81 mg by mouth     Vitamin D3 (VITAMIN D3) 25 mcg (1000 units) tablet Take 1 capsule by mouth     No current facility-administered medications for this visit.        Vitals:  BP (!) 153/88   Pulse 97   Ht 1.778 m (5' 10\")   Wt 117.4 kg (258 lb 12.8 oz)   SpO2 94%   BMI 37.13 kg/m      Exam:  GENERAL APPEARANCE: alert and no distress  HENT: mouth without ulcers or lesions  LYMPHATICS: no cervical or supraclavicular nodes  RESP: lungs clear to auscultation - no rales, rhonchi or wheezes  CV: regular rhythm, normal rate, no rub, no murmur  EDEMA: 2+ LE edema bilaterally  ABDOMEN: soft, nondistended, nontender, bowel sounds normal  MS: extremities normal - no gross deformities noted, no evidence of inflammation in joints, no muscle tenderness  SKIN: no rash    Results:   No results found for this or any previous visit (from the past 336 hour(s)).      Again, thank you for allowing me to participate in the care of your patient.        Sincerely,      Woodrow Cr MD    "

## 2020-09-04 ENCOUNTER — TELEPHONE (OUTPATIENT)
Dept: TRANSPLANT | Facility: CLINIC | Age: 55
End: 2020-09-04

## 2020-09-04 LAB
CARDIOLIPIN ANTIBODY IGG: <1.6 GPL-U/ML (ref 0–19.9)
CARDIOLIPIN ANTIBODY IGM: 2.3 MPL-U/ML (ref 0–19.9)
CMV IGG SERPL QL IA: 0.4 AI (ref 0–0.8)
EBV VCA IGG SER QL IA: >8 AI (ref 0–0.8)
GAMMA INTERFERON BACKGROUND BLD IA-ACNC: 0.02 IU/ML
HBV CORE AB SERPL QL IA: NONREACTIVE
HBV SURFACE AB SERPL IA-ACNC: 0.92 M[IU]/ML
HBV SURFACE AG SERPL QL IA: NONREACTIVE
HCV AB SERPL QL IA: NONREACTIVE
HIV 1+2 AB+HIV1 P24 AG SERPL QL IA: NONREACTIVE
LA PPP-IMP: NEGATIVE
M TB IFN-G CD4+ BCKGRND COR BLD-ACNC: 9.98 IU/ML
M TB TUBERC IFN-G BLD QL: NEGATIVE
MITOGEN IGNF BCKGRD COR BLD-ACNC: 0.01 IU/ML
MITOGEN IGNF BCKGRD COR BLD-ACNC: 0.02 IU/ML
VZV IGG SER QL IA: 3.6 AI (ref 0–0.8)

## 2020-09-04 NOTE — TELEPHONE ENCOUNTER
Spoke with nurse from Dr. Cornejo's Office just now who confirmed she received my message and can view lab results in Care Everywhere. Nurse plans to call patient and talk to him about how he is feeling and then she will speak with Dr. Cornejo this afternoon.  I also pointed out patient has an elevated PSA and is scheduled at our [facility] 09- with a urologist for an initial visit. Nurse expressed very good understanding of all and was in good agreement with the plan.

## 2020-09-04 NOTE — TELEPHONE ENCOUNTER
Returned a call today to patient who wanted to review his GFR and creatinine results from yesterday ( patient is active on My Chart ). Reviewed that his creatinine is 6.13 and GFR is 9.  Pt wanted me to compare these to his labs from 06/16/2020 which I did and confirmed with pt that his creatinine then was 3.48 and GFR was 18.  Pt wishing Dr. Chin Cornejo to be informed - I explained I will call Dr. Cornejo today with this. Pt also wanting to review his consents that he received in an email - reviewed with patient and will plan to sign with me over the phone when I call him after the Selection Committee next week. I explained to patient that I am calling Dr. Cornejo's clinic right now and instructed pt to call as well in about 1 hour from now to see if there are any recommendations.  Pt expressed very good understanding of all and was in good agreement with the plan.     Called Dr. Chin [facility]nejo[facility] today and reached voicemail for nurse, left a detailed message regarding about lab results.

## 2020-09-07 LAB — BLD GP AB SCN TITR SERPL: NORMAL {TITER}

## 2020-09-08 ENCOUNTER — TRANSFERRED RECORDS (OUTPATIENT)
Dept: HEALTH INFORMATION MANAGEMENT | Facility: CLINIC | Age: 55
End: 2020-09-08

## 2020-09-08 LAB
A* LOCUS: NORMAL
A*: NORMAL
ABTEST METHOD: NORMAL
B* LOCUS: NORMAL
B*: NORMAL
BW-1: NORMAL
C* LOCUS: NORMAL
C*: NORMAL
COPATH REPORT: NORMAL
DPA1* LOCUS NMDP: NORMAL
DPA1* NMDP: NORMAL
DPA1*: NORMAL
DPA1*LOCUS: NORMAL
DPB1* LOCUS NMDP: NORMAL
DPB1* NMDP: NORMAL
DPB1*: NORMAL
DPB1*LOCUS: NORMAL
DQA1*: NORMAL
DQA1*LOCUS: NORMAL
DQB1* LOCUS: NORMAL
DQB1*: NORMAL
DRB1* LOCUS: NORMAL
DRB3* LOCUS: NORMAL
DRSSO TEST METHOD: NORMAL

## 2020-09-09 ENCOUNTER — DOCUMENTATION ONLY (OUTPATIENT)
Dept: CARE COORDINATION | Facility: CLINIC | Age: 55
End: 2020-09-09

## 2020-09-09 ENCOUNTER — VIRTUAL VISIT (OUTPATIENT)
Dept: UROLOGY | Facility: CLINIC | Age: 55
End: 2020-09-09
Payer: COMMERCIAL

## 2020-09-09 DIAGNOSIS — N18.4 CKD (CHRONIC KIDNEY DISEASE) STAGE 4, GFR 15-29 ML/MIN (H): ICD-10-CM

## 2020-09-09 DIAGNOSIS — R97.20 ELEVATED PROSTATE SPECIFIC ANTIGEN (PSA): Primary | ICD-10-CM

## 2020-09-09 DIAGNOSIS — N02.B9 IGA NEPHROPATHY: ICD-10-CM

## 2020-09-09 NOTE — LETTER
"9/9/2020      RE: Donald Blanco  [address] [place] [id number]       Donald Blanco is a 55 year old male who is being evaluated via a billable video visit.      The patient has been notified of following:     \"This video visit will be conducted via a call between you and your physician/provider. We have found that certain health care needs can be provided without the need for an in-person physical exam.  This service lets us provide the care you need with a video conversation.  If a prescription is necessary we can send it directly to your pharmacy.  If lab work is needed we can place an order for that and you can then stop by our lab to have the test done at a later time.    Video visits are billed at different rates depending on your insurance coverage.  Please reach out to your insurance provider with any questions.    If during the course of the call the physician/provider feels a video visit is not appropriate, you will not be charged for this service.\"    Patient has given verbal consent for Video visit? Yes  How would you like to obtain your AVS? MyChart  If you are dropped from the video visit, the video invite should be resent to: Send to e-mail at: [email or web address]  Will anyone else be joining your video visit? No          Chief Complaint:    Elevated PSA (Prostate Specific Antigen)         Consult or Referral:     Mr. Donald Blanco is a 55 year old male evaluated at the request of Dr. Sewell.         History of Present Illness:    Donald Blanco is a very pleasant 55 year old male who presents with a history of Elevated PSA (Prostate Specific Antigen). Undergoing workup for kidney transplant. He has IgA nephropathy first noted 2009. Slow progression and not yet on dialysis. Most recent Cr 6.13. Reportedly had renal ultrasound done yesterday which is not available for review today.    He notes no significant urinary symptoms. No gross hematuria or dysuria. No family history of " prostate cancer.    PSA  9/3/2020 - 13.50  8/19/2020 - 12.40         Past Medical History:     Past Medical History:   Diagnosis Date     CKD (chronic kidney disease) stage 4, GFR 15-29 ml/min (H)      Elevated PSA      Essential hypertension 5/21/2020     IgA nephropathy      Obesity           Past Surgical History:     Past Surgical History:   Procedure Laterality Date     BACK SURGERY      Back Surgey 20+ Years Ago      BIOPSY      [facility] 2010     COLONOSCOPY      10+ Years ago at [facility]    Umbilical hernia repari         Medications     Current Outpatient Medications   Medication     allopurinol (ZYLOPRIM) 100 MG tablet     amLODIPine (NORVASC) 10 MG tablet     aspirin (ASA) 81 MG EC tablet     colchicine (COLCYRS) 0.6 MG tablet     furosemide (LASIX) 20 MG tablet     simvastatin (ZOCOR) 40 MG tablet     Vitamin D3 (VITAMIN D3) 25 mcg (1000 units) tablet     No current facility-administered medications for this visit.           Family History:     Family History   Problem Relation Age of Onset     Kidney Disease Father      Hypertension Father      No known family history of  malignancy.         Social History:     Social History     Socioeconomic History     Marital status:      Spouse name: Not on file     Number of children: Not on file     Years of education: Not on file     Highest education level: Not on file   Occupational History     Not on file   Social Needs     Financial resource strain: Not on file     Food insecurity     Worry: Not on file     Inability: Not on file     Transportation needs     Medical: Not on file     Non-medical: Not on file   Tobacco Use     Smoking status: Never Smoker     Smokeless tobacco: Never Used   Substance and Sexual Activity     Alcohol use: Yes     Frequency: 2-3 times a week     Drug use: Never     Sexual activity: Not on file   Lifestyle     Physical activity     Days per week: Not on file     Minutes per session: Not on file      Stress: Not on file   Relationships     Social connections     Talks on phone: Not on file     Gets together: Not on file     Attends Gnosticism service: Not on file     Active member of club or organization: Not on file     Attends meetings of clubs or organizations: Not on file     Relationship status: Not on file     Intimate partner violence     Fear of current or ex partner: Not on file     Emotionally abused: Not on file     Physically abused: Not on file     Forced sexual activity: Not on file   Other Topics Concern     Parent/sibling w/ CABG, MI or angioplasty before 65F 55M? Not Asked   Social History Narrative     Not on file     Works in [employer]         Allergies:   Patient has no known allergies.         Review of Systems:  From intake questionnaire     Skin: negative  Eyes: negative  Ears/Nose/Throat: negative  Respiratory: No shortness of breath, dyspnea on exertion, cough, or hemoptysis  Cardiovascular: No chest pain or palpitations  Gastrointestinal: negative; no nausea/vomiting, constipation or diarrhea  Genitourinary: as per HPI  Musculoskeletal: negative  Neurologic: negative  Psychiatric: negative  Hematologic/Lymphatic/Immunologic: negative  Endocrine: negative         Physical Exam:     Patient is a 55 year old  male   Vitals: There were no vitals taken for this visit.  Constitutional: There is no height or weight on file to calculate BMI.  Alert, no acute distress, oriented, conversant  Eyes: no scleral icterus; extraocular muscles intact  Respiratory: no respiratory distress, or pursed lip breathing  Musculoskeletal: normal range of motion  Skin: no notable lesions visible  Neuro: Alert, oriented, speech and mentation normal  Psych: affect and mood normal, alert and oriented to person, place and time      Labs and Pathology:    I reviewed all applicable laboratory and pathology data and went over findings with patient  Significant for   Lab Results   Component Value Date    CR 6.13 09/03/2020      PSA   Date Value Ref Range Status   09/03/2020 13.50 (H) 0 - 4 ug/L Final     Comment:     Assay Method:  Chemiluminescence using Siemens Vista analyzer       Outside and Past Medical records:    I spent 10 minutes reviewing outside and past medical records.         Assessment and Plan:     Assessment: 55 year old male undergoing workup for kidney transplant, secondary to IgA nephropathy. Found on workup to have elevated PSA to 12.4, and 13.5 on recheck. We had a long discussion about the utility of PSA screening.  We talked about the Pros and Cons.  We discussed the findings of the PLCO and EORTC studies.  We discussed the results of the [place] trial of treatment vs. observation in clinically detected prostate cancer as well as the results of the PIVOT trial in the context of screen-detected cancer.  We discussed the recommendations by the AUA, and USPSTF. We also discussed the significant (2-6%) and rising risk of biopsy associated sepsis.    We also discussed the emerging role of MRI in the diagnosis of prostate cancer and the potential for performing MRI-Ultrasound Fusion biopsy if suspicious lesions are noted.    At this point, given his rather high PSA and pending transplant workup, will plan for MRI and subsequent fusion biopsy. Risks and possible side effects of the prostate biopsy were discussed today.  Will need to adjust our typical antibiotic prophylaxis to account for his CKD.    Plan:  Schedule for MRI and Fusion TRUS biopsy, next available  Will need to adjust antibiotic dose for prophylaxis given CKD.    Orders  Orders Placed This Encounter   Procedures     MR Prostate wo & w Contrast     Video-Visit Details    Type of service:  Video Visit    Video Start Time: 8:05 AM  Video End Time: 8:34 AM    Originating Location (pt. Location): Home    Distant Location (provider location):  [facility] UROLOGY AND [facility] FOR PROSTATE AND UROLOGIC CANCERS    Platform used for Video Visit: Thierno Ferrer  MD Fahad  Urology  [facility]

## 2020-09-09 NOTE — PROGRESS NOTES
"Donald Blanco is a 55 year old male who is being evaluated via a billable video visit.      The patient has been notified of following:     \"This video visit will be conducted via a call between you and your physician/provider. We have found that certain health care needs can be provided without the need for an in-person physical exam.  This service lets us provide the care you need with a video conversation.  If a prescription is necessary we can send it directly to your pharmacy.  If lab work is needed we can place an order for that and you can then stop by our lab to have the test done at a later time.    Video visits are billed at different rates depending on your insurance coverage.  Please reach out to your insurance provider with any questions.    If during the course of the call the physician/provider feels a video visit is not appropriate, you will not be charged for this service.\"    Patient has given verbal consent for Video visit? Yes  How would you like to obtain your AVS? MyChart  If you are dropped from the video visit, the video invite should be resent to: Send to e-mail at: [email or web address]  Will anyone else be joining your video visit? No          Chief Complaint:    Elevated PSA (Prostate Specific Antigen)         Consult or Referral:     Mr. Donald Blanco is a 55 year old male evaluated at the request of Dr. Sewell.         History of Present Illness:    Donald Blanco is a very pleasant 55 year old male who presents with a history of Elevated PSA (Prostate Specific Antigen). Undergoing workup for kidney transplant. He has IgA nephropathy first noted 2009. Slow progression and not yet on dialysis. Most recent Cr 6.13. Reportedly had renal ultrasound done yesterday which is not available for review today.    He notes no significant urinary symptoms. No gross hematuria or dysuria. No family history of prostate cancer.    PSA  9/3/2020 - 13.50  8/19/2020 - 12.40         Past Medical " History:     Past Medical History:   Diagnosis Date     CKD (chronic kidney disease) stage 4, GFR 15-29 ml/min (H)      Elevated PSA      Essential hypertension 5/21/2020     IgA nephropathy      Obesity           Past Surgical History:     Past Surgical History:   Procedure Laterality Date     BACK SURGERY      Back Surgey 20+ Years Ago      BIOPSY      [facility] 2010     COLONOSCOPY      10+ Years ago at [facility]    Umbilical hernia repari         Medications     Current Outpatient Medications   Medication     allopurinol (ZYLOPRIM) 100 MG tablet     amLODIPine (NORVASC) 10 MG tablet     aspirin (ASA) 81 MG EC tablet     colchicine (COLCYRS) 0.6 MG tablet     furosemide (LASIX) 20 MG tablet     simvastatin (ZOCOR) 40 MG tablet     Vitamin D3 (VITAMIN D3) 25 mcg (1000 units) tablet     No current facility-administered medications for this visit.           Family History:     Family History   Problem Relation Age of Onset     Kidney Disease Father      Hypertension Father      No known family history of  malignancy.         Social History:     Social History     Socioeconomic History     Marital status:      Spouse name: Not on file     Number of children: Not on file     Years of education: Not on file     Highest education level: Not on file   Occupational History     Not on file   Social Needs     Financial resource strain: Not on file     Food insecurity     Worry: Not on file     Inability: Not on file     Transportation needs     Medical: Not on file     Non-medical: Not on file   Tobacco Use     Smoking status: Never Smoker     Smokeless tobacco: Never Used   Substance and Sexual Activity     Alcohol use: Yes     Frequency: 2-3 times a week     Drug use: Never     Sexual activity: Not on file   Lifestyle     Physical activity     Days per week: Not on file     Minutes per session: Not on file     Stress: Not on file   Relationships     Social connections     Talks on phone: Not on  file     Gets together: Not on file     Attends Zoroastrian service: Not on file     Active member of club or organization: Not on file     Attends meetings of clubs or organizations: Not on file     Relationship status: Not on file     Intimate partner violence     Fear of current or ex partner: Not on file     Emotionally abused: Not on file     Physically abused: Not on file     Forced sexual activity: Not on file   Other Topics Concern     Parent/sibling w/ CABG, MI or angioplasty before 65F 55M? Not Asked   Social History Narrative     Not on file     Works in sales         Allergies:   Patient has no known allergies.         Review of Systems:  From intake questionnaire     Skin: negative  Eyes: negative  Ears/Nose/Throat: negative  Respiratory: No shortness of breath, dyspnea on exertion, cough, or hemoptysis  Cardiovascular: No chest pain or palpitations  Gastrointestinal: negative; no nausea/vomiting, constipation or diarrhea  Genitourinary: as per HPI  Musculoskeletal: negative  Neurologic: negative  Psychiatric: negative  Hematologic/Lymphatic/Immunologic: negative  Endocrine: negative         Physical Exam:     Patient is a 55 year old  male   Vitals: There were no vitals taken for this visit.  Constitutional: There is no height or weight on file to calculate BMI.  Alert, no acute distress, oriented, conversant  Eyes: no scleral icterus; extraocular muscles intact  Respiratory: no respiratory distress, or pursed lip breathing  Musculoskeletal: normal range of motion  Skin: no notable lesions visible  Neuro: Alert, oriented, speech and mentation normal  Psych: affect and mood normal, alert and oriented to person, place and time      Labs and Pathology:    I reviewed all applicable laboratory and pathology data and went over findings with patient  Significant for   Lab Results   Component Value Date    CR 6.13 09/03/2020     PSA   Date Value Ref Range Status   09/03/2020 13.50 (H) 0 - 4 ug/L Final      Comment:     Assay Method:  Chemiluminescence using Siemens Vista analyzer       Outside and Past Medical records:    I spent 10 minutes reviewing outside and past medical records.         Assessment and Plan:     Assessment: 55 year old male undergoing workup for kidney transplant, secondary to IgA nephropathy. Found on workup to have elevated PSA to 12.4, and 13.5 on recheck. We had a long discussion about the utility of PSA screening.  We talked about the Pros and Cons.  We discussed the findings of the PLCO and EORTC studies.  We discussed the results of the [place] trial of treatment vs. observation in clinically detected prostate cancer as well as the results of the PIVOT trial in the context of screen-detected cancer.  We discussed the recommendations by the AUA, and USPSTF. We also discussed the significant (2-6%) and rising risk of biopsy associated sepsis.    We also discussed the emerging role of MRI in the diagnosis of prostate cancer and the potential for performing MRI-Ultrasound Fusion biopsy if suspicious lesions are noted.    At this point, given his rather high PSA and pending transplant workup, will plan for MRI and subsequent fusion biopsy. Risks and possible side effects of the prostate biopsy were discussed today.  Will need to adjust our typical antibiotic prophylaxis to account for his CKD.    Plan:  Schedule for MRI and Fusion TRUS biopsy, next available  Will need to adjust antibiotic dose for prophylaxis given CKD.    Orders  Orders Placed This Encounter   Procedures     MR Prostate wo & w Contrast     Video-Visit Details    Type of service:  Video Visit    Video Start Time: 8:05 AM  Video End Time: 8:34 AM    Originating Location (pt. Location): Home    Distant Location (provider location):  [facility] UROLOGY AND [facility] FOR PROSTATE AND UROLOGIC CANCERS    Platform used for Video Visit: Thierno Vásquez MD  Urology  [facility]

## 2020-09-10 ENCOUNTER — DOCUMENTATION ONLY (OUTPATIENT)
Dept: TRANSPLANT | Facility: CLINIC | Age: 55
End: 2020-09-10

## 2020-09-10 ENCOUNTER — TELEPHONE (OUTPATIENT)
Dept: TRANSPLANT | Facility: CLINIC | Age: 55
End: 2020-09-10

## 2020-09-10 NOTE — LETTER
09/10/20        Donald Blanco  [address] [facility]  [place] [address]        Dear Donald,    It was a pleasure to see you recently for consideration of kidney transplantation. Your pre-transplant evaluation results were reviewed at our Multidisciplinary Selection Committee on 09/10/2020. The Committee is requesting the following items are completed before determining your candidacy:    1. ***    For any questions, please contact the Transplant Office at [phone].      Sincerely,      Solid Organ Transplant  [facility]

## 2020-09-10 NOTE — PROGRESS NOTES
"Kidney Transplant Evaluation - 9/4/2020  Spoke with Donald Blanco who confirmed he has viewed the pre-transplant patient education on [facility].   Content reviewed:    Living Donation and how to access that program    Paired exchange     Kidney Donor Profile Index (KDPI)      Waiting list issues (right to decline without penalty, high PHS risk donors, what to expect when called with an offer)    Hospital experience,  length of stay , need to stay locally post-discharge (2-4 weeks)    Surgical options (with pictures)                             Post-surgery lifting and driving restrictions    Post-transplant routines, frequency of lab work and clinic visits    Need to stay locally post-discharge (2-4 weeks)    Role of Transplant Coordinator    Participants were informed of the benefits of transplant as well as potential risks such as infection, cancer, and death.  The need for total adherence with immunosuppression medications and following transplant regimens was stressed.  The overall evaluation/approval/listing process was reviewed.        The patient was provided with the following documents:  What You Need to Know About a Kidney Transplant  Adult Kidney Transplant - A Guide for Patients  SRTR Data Sheet - Kidney  Brochure - Kidney Allocation  Brochure - Multiple Listing and Waiting Time Transfer  What Every Patient Needs to Know (UNOS)  UNOS Facts and Figures  Finding a Donor  [facility] - Quick Start Guide  KDPI Consent  Receipt of Information form    Donald Blanco signed the  Receipt of Information for Organ Transplant Recipient and KDPI > 85% with a no response.\"         "

## 2020-09-10 NOTE — LETTER
09/10/20        Donald Blanco  [address] [place] [address]        Dear Donald,    It was a pleasure to see you recently for consideration of kidney transplantation. Your pre-transplant evaluation results were reviewed at our Multidisciplinary Selection Committee on 09/19/2020. The Committee is requesting the following items are completed before determining your candidacy:    1. EKG, echocardiogram, chest x-ray. Call [facility] to schedule these appointments.   2. Cardiologist appointment on 10/06/2020 at 7:30 am with Dr. Gee.   3. Continue to follow with urologist for evaluation of elevated PSA.   4. Continue weight loss to goal weight of <243 lbs (15 lb loss) to achieve BMI           ( Body Mass Index ) of 35. Please notify your coordinator when achieved so then an in person transplant surgeon appointment for approval to proceed with kidney transplant surgery.    5. Please arrange to have your renal ultrasound 09/09/2020 result faxed to our Office at [phone].   6. Health maintenance items need to remain up to date: colonoscopy and dental. Please attend your already scheduled colonoscopy and arrange to have results faxed to our Office at [phone] when available. Please make an appointment for a dental check up if you have not had one in the last 12 months.   7. Hepatitis B and pneumovax vaccinations need to be up to date. Please work with your primary care provider for this.     Upon successful completion of all the above items, your results will be reviewed at the Multidisciplinary Selection Committee for approval.  When approved, you will be eligible to change to ACTIVE status on the kidney transplant wait list or to proceed with a live donor kidney transplant in the event of an approved live donor.     Please have any potential live donors register now online with our Program to initiate their evaluations at [email or web address].  You will be notified by our Office in the  event of an approved live donor.     You will be eligible to be added onto the kidney transplant wait list on INACTIVE status as soon as the Receipt of Information and KDPI > 85% consents you have signed today are scanned into your patient chart here. I will call you as soon as they are scanned to notify you of listing on INACTIVE status.       For any questions, please contact myself at the Transplant Office at [phone].      Sincerely,      Renee Hampton RN BSN Transplant Coordinator   Solid Organ Transplant  [facility]    CC's: Chelsie CALDERON, Dr. Chin Cornejo

## 2020-09-11 ENCOUNTER — TELEPHONE (OUTPATIENT)
Dept: UROLOGY | Facility: CLINIC | Age: 55
End: 2020-09-11

## 2020-09-11 DIAGNOSIS — R97.20 ELEVATED PROSTATE SPECIFIC ANTIGEN (PSA): Primary | ICD-10-CM

## 2020-09-11 LAB
PROTOCOL CUTOFF: NORMAL
SA1 CELL: NORMAL
SA1 COMMENTS: NORMAL
SA1 HI RISK ABY: NORMAL
SA1 MOD RISK ABY: NORMAL
SA1 TEST METHOD: NORMAL
SA2 CELL: NORMAL
SA2 COMMENTS: NORMAL
SA2 HI RISK ABY UA: NORMAL
SA2 MOD RISK ABY: NORMAL
SA2 TEST METHOD: NORMAL
UNACCEPTABLE ANTIGEN: NORMAL
UNOS CPRA: 19

## 2020-09-11 NOTE — TELEPHONE ENCOUNTER
Hi all,      Please call patient to schedule his MRI guided biopsy.    Thanks, Rubin Mclaughlin MA

## 2020-09-11 NOTE — TELEPHONE ENCOUNTER
[facility] Call Center    Phone Message    May a detailed message be left on voicemail: yes     Reason for Call: Other: Per call from PT was informed by radiology scheduling to reach out to Dr Vásquez to update the MRI order with no dye. Please reach out to Radiology at [phone] if any questions about the orders. PT requested a call back to discuss prostate biopsy order as well and he can be reached at [phone].      Action Taken: Message routed to:  [facility] Urology    Travel Screening: Not Applicable

## 2020-09-11 NOTE — TELEPHONE ENCOUNTER
Radiology and the patient was notified that Dr. Nabil Vásquez placed the correct MRI order.     The Rad tech states that it will say the previous MRI due to the appointment being scheduled but she changed it in the patient's chart. Patient was notified.     Rubin Mclaughlin MA

## 2020-09-11 NOTE — TELEPHONE ENCOUNTER
Contacted patient to review outcome of selection committee meeting (See selection committee encounter).   Explained to patient that he/she needs to complete all components of the evaluation to be eligible for active status on the waiting list or to proceed with a live donor kidney transplant.   Reviewed next steps based on outcomes: Patient to continue to follow with urologist for elevated PSA, patient to have renal ultrasound 09/09/2020 result at [facility] faxed to our Office, pt to schedule cxr, ekg, echocardiogram at [facility], patient to attend appt with Dr. Gee on 10/06/2020, pt to attend colonoscopy appointment scheduled at [facility] in [place], continue weight loss - declined option for appt with Weight Loss Management at this time. Pt to have live donors register now with our Program to initiate their evaluations. Reviewed [facility] patient education today and patient signed consents as well for Receipt of Information and KDPI> 85% so can list INACTIVE when consents are scanned into Epic chart.   Patient will not be listed (evaluation is not complete), patient will receive:    - An Evaluation Summary Letter indicating what is needed to complete evaluation-discussed with patient if they would like to have testing done with [facility] or locally  Confirmed with patient that on successful completion of outstanding components, patient is eligible for active status and they will receive a follow-up call.   Confirmed that patient has contact information for additional questions or concerns. Pt expressed very good understanding of all and was in good agreement with the plan.   Generated Transplant Evaluation Summary Letter - electronically routed. Also mailed to patient regular mail per patient request.

## 2020-09-14 NOTE — TELEPHONE ENCOUNTER
RECORDS RECEIVED FROM:Internal   DATE RECEIVED: 10.6.20   NOTES STATUS DETAILS   OFFICE NOTE from referring provider    Internal 9.3.20 GORDON Turner   OFFICE NOTE from other cardiologist    N/A    DISCHARGE SUMMARY from hospital    N/A    DISCHARGE REPORT from the ER   N/A    OPERATIVE REPORT    N/A    MEDICATION LIST   Internal    LABS     BMP   Care Everywhere 5.16.20   CBC   Internal 9.3.20   CMP   Internal 9.3.20   Lipids   Care Everywhere 5.12.20   TSH   N/A    DIAGNOSTIC PROCEDURES     EKG   N/A    Monitor Reports   N/A    IMAGING (DISC & REPORT)      Echo   N/A    Stress Tests   N/A    Cath   N/A    MRI/MRA   N/A    CT/CTA   N/A

## 2020-09-15 NOTE — TELEPHONE ENCOUNTER
Spoke with patient and set up prostate bx on 10/7 at 7:30am. [facility]

## 2020-09-15 NOTE — TELEPHONE ENCOUNTER
[facility] Call Center    Phone Message    May a detailed message be left on voicemail: yes     Reason for Call: Other: Donald calling to schedule his MRI guided prostate biopsy.  He has his MRI scheduled for 9/24 and Dr. Vásquez told him that his Bx was going to be shortly after that.  First available biopsy appointment that the call center can schedule isn't until 11/18.  Donald stated that date was too far out.  Please call Donald back to discuss scheduling options    Action Taken: Message routed to:  [facility] ([facility]  Urology    Travel Screening: Not Applicable

## 2020-09-21 ENCOUNTER — ANCILLARY PROCEDURE (OUTPATIENT)
Dept: GENERAL RADIOLOGY | Facility: CLINIC | Age: 55
End: 2020-09-21
Attending: PHYSICIAN ASSISTANT
Payer: COMMERCIAL

## 2020-09-21 ENCOUNTER — ANCILLARY PROCEDURE (OUTPATIENT)
Dept: CARDIOLOGY | Facility: CLINIC | Age: 55
End: 2020-09-21
Attending: PHYSICIAN ASSISTANT
Payer: COMMERCIAL

## 2020-09-21 DIAGNOSIS — I10 ESSENTIAL HYPERTENSION: ICD-10-CM

## 2020-09-21 DIAGNOSIS — Z76.82 ORGAN TRANSPLANT CANDIDATE: ICD-10-CM

## 2020-09-21 DIAGNOSIS — N18.4 CHRONIC KIDNEY DISEASE, STAGE 4, SEVERELY DECREASED GFR (H): ICD-10-CM

## 2020-09-21 DIAGNOSIS — Z01.818 PRE-TRANSPLANT EVALUATION FOR KIDNEY TRANSPLANT: ICD-10-CM

## 2020-09-21 DIAGNOSIS — N02.B9 IGA NEPHROPATHY: ICD-10-CM

## 2020-09-21 DIAGNOSIS — N18.4 CHRONIC RENAL FAILURE, STAGE 4 (SEVERE) (H): ICD-10-CM

## 2020-09-21 PROCEDURE — 71046 X-RAY EXAM CHEST 2 VIEWS: CPT | Performed by: RADIOLOGY

## 2020-09-21 PROCEDURE — 93306 TTE W/DOPPLER COMPLETE: CPT | Performed by: INTERNAL MEDICINE

## 2020-09-24 ENCOUNTER — ANCILLARY PROCEDURE (OUTPATIENT)
Dept: MRI IMAGING | Facility: CLINIC | Age: 55
End: 2020-09-24
Attending: UROLOGY
Payer: COMMERCIAL

## 2020-09-24 ENCOUNTER — PRE VISIT (OUTPATIENT)
Dept: UROLOGY | Facility: CLINIC | Age: 55
End: 2020-09-24

## 2020-09-24 DIAGNOSIS — R97.20 ELEVATED PROSTATE SPECIFIC ANTIGEN (PSA): ICD-10-CM

## 2020-09-29 ENCOUNTER — TELEPHONE (OUTPATIENT)
Dept: UROLOGY | Facility: CLINIC | Age: 55
End: 2020-09-29

## 2020-09-29 ENCOUNTER — PRE VISIT (OUTPATIENT)
Dept: UROLOGY | Facility: CLINIC | Age: 55
End: 2020-09-29

## 2020-09-29 DIAGNOSIS — R97.20 ELEVATED PROSTATE SPECIFIC ANTIGEN (PSA): Primary | ICD-10-CM

## 2020-09-29 RX ORDER — CIPROFLOXACIN 500 MG/1
500 TABLET, FILM COATED ORAL 2 TIMES DAILY
Qty: 6 TABLET | Refills: 0 | Status: SHIPPED | OUTPATIENT
Start: 2020-10-06 | End: 2020-10-08

## 2020-09-29 RX ORDER — CIPROFLOXACIN 500 MG/1
500 TABLET, FILM COATED ORAL 2 TIMES DAILY
Qty: 6 TABLET | Refills: 0 | Status: SHIPPED | OUTPATIENT
Start: 2020-09-29 | End: 2020-09-29

## 2020-09-29 NOTE — TELEPHONE ENCOUNTER
Called, discussed Bx prep including discontinuing blood thinning medications, taking cipro 500mg BID starting the day before Bx, using a fleet enema 2 hours before Bx, and eating/drinking normally the day of. All of the patient's questions were answered and the patient stated their understanding. A [employer] message was sent to patient with written instructions.

## 2020-09-29 NOTE — TELEPHONE ENCOUNTER
Chief Complaint : Prostate Bx    Hx/Sx: Elevated PSA    Records/Orders: MRI Available    Pt Contacted: Called, discussed Bx prep including discontinuing blood thinning medications, taking cipro 500mg BID starting the day before Bx, using a fleet enema 2 hours before Bx, and eating/drinking normally the day of. All of the patient's questions were answered and the patient stated their understanding. A [employer] message was sent to patient with written instructions.        At Rooming: Tracey Leal, Targets : 2, Discuss Colonoscopy with Dr. Vásquez, Pt needs f/u appt    Irving Ortega, EMT

## 2020-10-02 ENCOUNTER — TELEPHONE (OUTPATIENT)
Dept: UROLOGY | Facility: CLINIC | Age: 55
End: 2020-10-02

## 2020-10-02 NOTE — TELEPHONE ENCOUNTER
Chief Complaint   Patient presents with     Medication Question     Cipro 500 mg   [facility] pharmacy in [place] was notified that they should dispense 6 tablets of Cipro 500 mg for patient to take one tablet BID starting the before,or and after his procedure. Pharmacist agreed with the plan.      Rubin Mclaughlin MA

## 2020-10-06 ENCOUNTER — PRE VISIT (OUTPATIENT)
Dept: CARDIOLOGY | Facility: CLINIC | Age: 55
End: 2020-10-06

## 2020-10-06 ENCOUNTER — OFFICE VISIT (OUTPATIENT)
Dept: CARDIOLOGY | Facility: CLINIC | Age: 55
End: 2020-10-06
Attending: INTERNAL MEDICINE
Payer: COMMERCIAL

## 2020-10-06 VITALS
OXYGEN SATURATION: 96 % | WEIGHT: 253 LBS | SYSTOLIC BLOOD PRESSURE: 136 MMHG | DIASTOLIC BLOOD PRESSURE: 80 MMHG | BODY MASS INDEX: 36.22 KG/M2 | HEART RATE: 96 BPM | HEIGHT: 70 IN

## 2020-10-06 DIAGNOSIS — Z01.810 PRE-OPERATIVE CARDIOVASCULAR EXAMINATION: ICD-10-CM

## 2020-10-06 DIAGNOSIS — E78.2 MIXED HYPERLIPIDEMIA: Primary | ICD-10-CM

## 2020-10-06 DIAGNOSIS — E66.01 CLASS 2 SEVERE OBESITY DUE TO EXCESS CALORIES WITH SERIOUS COMORBIDITY AND BODY MASS INDEX (BMI) OF 36.0 TO 36.9 IN ADULT (H): ICD-10-CM

## 2020-10-06 DIAGNOSIS — E66.812 CLASS 2 SEVERE OBESITY DUE TO EXCESS CALORIES WITH SERIOUS COMORBIDITY AND BODY MASS INDEX (BMI) OF 36.0 TO 36.9 IN ADULT (H): ICD-10-CM

## 2020-10-06 DIAGNOSIS — N18.4 CKD (CHRONIC KIDNEY DISEASE) STAGE 4, GFR 15-29 ML/MIN (H): ICD-10-CM

## 2020-10-06 DIAGNOSIS — I10 ESSENTIAL HYPERTENSION: ICD-10-CM

## 2020-10-06 PROCEDURE — 99204 OFFICE O/P NEW MOD 45 MIN: CPT | Performed by: INTERNAL MEDICINE

## 2020-10-06 ASSESSMENT — MIFFLIN-ST. JEOR: SCORE: 1978.85

## 2020-10-06 ASSESSMENT — PAIN SCALES - GENERAL: PAINLEVEL: NO PAIN (0)

## 2020-10-06 NOTE — LETTER
10/6/2020      RE: Donald Blanco  [address] [facility] [address]       Dear Colleague,    Thank you for the opportunity to participate in the care of your patient, Donald Blanco, at the [facility] HEART [facility]. Please see a copy of my visit note below.    I am delighted to see Donald Blanco in consultation.The primary encounter diagnosis was Mixed hyperlipidemia. Diagnoses of Essential hypertension, CKD (chronic kidney disease) stage 4, GFR 15-29 ml/min (H), Class 2 severe obesity due to excess calories with serious comorbidity and body mass index (BMI) of 36.0 to 36.9 in adult (H), and Pre-operative cardiovascular examination were also pertinent to this visit.   As you know, the patient is a 55 year old  male. He   has a past medical history of CKD (chronic kidney disease) stage 4, GFR 15-29 ml/min (H), Elevated PSA, Essential hypertension (5/21/2020), Hyperlipidemia, IgA nephropathy, and Obesity..    On this visit, the patient states that he has no chest pain and exercises regularly.  The patient denies chest pressure/discomfort, irregular heart beats, near-syncope, syncope, orthopnea, paroxysmal nocturnal dyspnea, lower extermity edema and shortness of breath.    The patient's cardiovascular risk factors include hypertension, high cholesterol, renal disease and positive family history.    The following portions of the patient's history were reviewed and updated as appropriate: allergies, current medications, past family history, past medical history, past social history, past surgical history, and the problem list.    PMH: The patient's past medical history includes:    Past Medical History:   Diagnosis Date     CKD (chronic kidney disease) stage 4, GFR 15-29 ml/min (H)      Elevated PSA      Essential hypertension 5/21/2020     Hyperlipidemia      IgA nephropathy      Obesity       Past Surgical History:   Procedure  Laterality Date     BACK SURGERY      Back Surgey 20+ Years Ago      BIOPSY      [facility] 2010     COLONOSCOPY      10+ Years ago at [facility]        The patient's medications as of the current encounter are:     Current Outpatient Medications   Medication Sig Dispense Refill     allopurinol (ZYLOPRIM) 100 MG tablet TAKE 2 TABLETS BY MOUTH EVERY DAY       amLODIPine (NORVASC) 10 MG tablet Take 10 mg by mouth       aspirin (ASA) 81 MG EC tablet Take 81 mg by mouth       ciprofloxacin (CIPRO) 500 MG tablet Take 1 tablet (500 mg) by mouth 2 times daily for 2 days 6 tablet 0     furosemide (LASIX) 20 MG tablet TAKE 1 TABLET BY MOUTH EVERY DAY       simvastatin (ZOCOR) 40 MG tablet Take 40 mg by mouth       Vitamin D3 (VITAMIN D3) 25 mcg (1000 units) tablet Take 1 capsule by mouth         Labs:     Orders Only on 09/03/2020   Component Date Value Ref Range Status     PSA 09/03/2020 13.50* 0 - 4 ug/L Final    Assay Method:  Chemiluminescence using Siemens Vista analyzer     Color Urine 09/03/2020 Straw   Final     Appearance Urine 09/03/2020 Clear   Final     Glucose Urine 09/03/2020 Negative  NEG^Negative mg/dL Final     Bilirubin Urine 09/03/2020 Negative  NEG^Negative Final     Ketones Urine 09/03/2020 Negative  NEG^Negative mg/dL Final     Specific Gravity Urine 09/03/2020 1.011  1.003 - 1.035 Final     Blood Urine 09/03/2020 Small* NEG^Negative Final     pH Urine 09/03/2020 5.0  5.0 - 7.0 pH Final     Protein Albumin Urine 09/03/2020 >499* NEG^Negative mg/dL Final     Urobilinogen mg/dL 09/03/2020 0.0  0.0 - 2.0 mg/dL Final     Nitrite Urine 09/03/2020 Negative  NEG^Negative Final     Leukocyte Esterase Urine 09/03/2020 Negative  NEG^Negative Final     Source 09/03/2020 Midstream Urine   Final     WBC Urine 09/03/2020 2  0 - 5 /HPF Final     RBC Urine 09/03/2020 1  0 - 2 /HPF Final     Bacteria Urine 09/03/2020 Few* NEG^Negative /HPF Final     Squamous Epithelial /HPF Urine 09/03/2020 <1  0 - 1 /HPF  Final     Mucous Urine 09/03/2020 Present* NEG^Negative /LPF Final     Amorphous Crystals 09/03/2020 Few* NEG^Negative /HPF Final     Treponema Antibodies 09/03/2020 Nonreactive  NR^Nonreactive Final    Comment: Methodology Change: Test performed on the [employer] by Treponema   pallidum Total Antibodies Assay as of 3.17.2020.       Varicella Zoster Virus Antibody IgG 09/03/2020 3.6* 0.0 - 0.8 AI Final    Comment: Positive, suggests prev. exposure and probable immunity  Antibody index (AI) values reflect qualitative changes in antibody   concentration that cannot be directly associated with clinical condition or   disease state.       HIV Antigen Antibody Combo Pretran* 09/03/2020 Nonreactive  NR^Nonreactive Final    HIV-1 p24 Ag & HIV-1/HIV-2 Ab Not Detected     Hepatitis C Antibody 09/03/2020 Nonreactive  NR^Nonreactive Final    Comment: Assay performance characteristics have not been established for newborns,   infants, and children       Hep B Surface Agn 09/03/2020 Nonreactive  NR^Nonreactive Final     Hepatitis B Surface Antibody 09/03/2020 0.92  <8.00 m[IU]/mL Final    Nonreactive, No antibody detected when the value is less than 8.00 m[IU]/mL.     Hepatitis B Core Tiffany 09/03/2020 Nonreactive  NR^Nonreactive Final     EBV Capsid Antibody IgG 09/03/2020 >8.0* 0.0 - 0.8 AI Final    Comment: Positive, suggests recent or past exposure  Antibody index (AI) values reflect qualitative changes in antibody   concentration that cannot be directly associated with clinical condition or   disease state.       CMV Antibody IgG 09/03/2020 0.4  0.0 - 0.8 AI Final    Comment: Negative  Antibody index (AI) values reflect qualitative changes in antibody   concentration that cannot be directly associated with clinical condition or   disease state.       SA1 Test Method 09/03/2020 SA FCS   Final     SA1 Cell 09/03/2020 Class I   Final     SA1 Hi Risk Tiffany 09/03/2020 None   Final     SA1 Mod Risk Tiffany 09/03/2020 None   Final      SA1 Comments 09/03/2020    Final                    Value: Test performed by modified procedure. Serum heat inactivated and tested   by a modified ([place]) protocol including fetal calf serum addition.   High-risk, mfi >3,000. Mod-risk, mfi 500-3,000.       SA2 Test Method 09/03/2020 SA FCS   Final     SA2 Cell 09/03/2020 Class II   Final     SA2 Hi Risk Tiffany 09/03/2020 None   Final     SA2 Mod Risk Tiffany 09/03/2020 DR:1   Final     SA2 Comments 09/03/2020    Final                    Value: Test performed by modified procedure. Serum heat inactivated and tested   by a modified ([place]) protocol including fetal calf serum addition.   High-risk, mfi >3,000. Mod-risk, mfi 500-3,000.       Protocol Cutoff 09/03/2020 Plan A, 500 mfi cumulative    Final     UNOS cPRA 09/03/2020 19   Final     Unacceptable Antigen 09/03/2020 DR:1   Final     ABTest Method 09/03/2020 SSOP   Final     A* locus 09/03/2020 A*24   Final     A* 09/03/2020 A*25   Final     B* locus 09/03/2020 B*08   Final     B* 09/03/2020 B*35   Final     C* locus 09/03/2020 C*04   Final     C* 09/03/2020 C*07   Final     Bw-1 09/03/2020 Bw*6   Final     Drsso Test Method 09/03/2020 SSOP   Final     DRB1* locus 09/03/2020 DRB1*13   Final     DRB3* locus 09/03/2020 DRB3*01   Final     DQB1* locus 09/03/2020 DQB1*03(07)   Final     DQB1* 09/03/2020 DQB1*06   Final     DQA1*locus 09/03/2020 DQA1*01   Final     DQA1* 09/03/2020 DQA1*05   Final     DPB1* 09/03/2020 DPB1*04:01   Final     DPB1* NMDP 09/03/2020 CDSJA   Final     DPB1*locus 09/03/2020 DPB1*11:01   Final     DPB1* locus NMDP 09/03/2020 CDSJB   Final     DPA1* 09/03/2020 DPA1*01:03   Final     DPA1* NMDP 09/03/2020 CCTKA   Final     DPA1*locus 09/03/2020 DPA1*02:01   Final     DPA1* locus NMDP 09/03/2020 CCTKB   Final     Thrombin Time 09/03/2020 18.4  13.0 - 19.0 sec Final     PTT 09/03/2020 33  22 - 37 sec Final     INR 09/03/2020 1.03  0.86 - 1.14 Final     Lupus Result 09/03/2020 Negative  NEG^Negative  Final    Comment: (Note)  COMMENTS:  The INR is normal.  APTT ratio is elevated.  Platelet Neutralization is negative.  APTT 1:2 Mix is normal.  DRVVT Screen ratio is normal.  Thrombin time is normal.  NEGATIVE TEST; A LUPUS ANTICOAGULANT WAS NOT DETECTED IN THIS  SPECIMEN WITHIN THE LIMITS OF THE TESTING REPERTOIRE.  If the clinical picture is strongly suggestive of an antiphospholipid   syndrome, recommend anticardiolipin and beta-2-glycoprotein (IgG and  IgM) antibody tests.  Platelet Neutralization and APTT 1:2 Mix are suggestive of factor  deficiency.   Recommend factors 8, 9, 11 and 12 (factors VIII, IX, XI, and XII)  levels if clinically indicated.  Shabnam Uribe M.D.  [phone]  9/4/2020                  APTT TEST:                 APTT Ratio =     1.25       Normal is less than 1.21       1:2 Mix  =      39       Reference Range: 31-45 sec                       Platelet Neutralization (seconds):                 PTT Buffer-PTT Platelet Lysate =          -4       Reference:                                              Negative: Less than or equal to 0                 Positive: Greater than or equal to 1                                  DILUTE RIAZ VIPER VENOM TEST:                 Screen Ratio =     0.98       Normal is less than 1.21            Cardiolipin Antibody IgG 09/03/2020 <1.6  0.0 - 19.9 GPL-U/mL Final    Negative     Cardiolipin Antibody IgM 09/03/2020 2.3  0.0 - 19.9 MPL-U/mL Final    Negative     Copath Report 09/03/2020    Final                    Value:Patient Name: JF ZULETA  MR#: [id number]  Specimen #: [id number]  Collected: 9/3/2020 11:31  Received: 9/4/2020 09:09  Reported: 9/8/2020 14:56  Ordering Phy(s): PAUL JONES    For improved result formatting, select 'View Enhanced Report Format' under   Linked Documents section.  _________________________________________    TEST(S) REQUESTED:  Factor 5 Leiden and Factor 2 by PCR    SPECIMEN  DESCRIPTION:  Blood    METHODOLOGY:   The regions of genomic DNA containing the J5908T Factor V   Leiden gene mutation (Factor V  Leiden) and the Factor 2(Prothrombin I08197M) gene mutation were   simultaneously amplified using the polymerase  chain reaction.  The amplified products were digested with restriction   endonuclease TaqI and products were  analyzed by gel electrophoresis.    RESULTS:    Factor V 1691G>A (Leiden)  RESULTS:  Mutation analyzed:     1691G>A  Factor V 1691G>A (Leiden)  Interpretation:      ABSENT  Factor V 1691G>A (Leiden) mutation  genotype:      G/G    FACTOR 2                          /PROTHROMBIN RESULTS:  Mutation analyzed:     77256B>A  Factor 2 Mutation Interpretation:      ABSENT  Factor 2 Mutation genotype:      G/G    INTERPRETATION:  The patient is negative for the Factor V 1691G>A (Leiden) and negative for   the Factor 2 mutation.    COMMENTS:  If a patient is the recipient of an allogeneic bone marrow transplant,   this test must be done on a  pre-transplant sample or buccal swab.  A previous allogeneic bone marrow   transplant will interfere with test  results.  Call the [facility]([phone]) for   instructions on sample collection for these  patients.    This test was developed and its performance characteristics determined by   [facility]. It has not been cleared or approved by the FDA.   The laboratory is regulated under CLIA  as qualified to perform high-complexity testing. This test is used for   clinical purposes. It should not be  regarded as investigational or for research.    A resident/                          fellow in an accredited training program was involved in the   selection of testing, review of  laboratory data, and/or interpretation of this case.  I, as the senior   physician, attest that I: (i) confirmed  appropriate testing, (ii) examined the relevant raw data for the   specimen(s); and (iii)  rendered or confirmed  the interpretation(s).    Electronically Signed Out By:  Nghia Espinoza M.D., PhD  [facility]    CPT Codes:  A: 02050-X7FLEE, 94560-L2REPY, -AANWQO(2)    TESTING LAB LOCATION:  [address] [address] [phone]    COLLECTION SITE:  Client[facility]  [facility]  [facility]       WBC 09/03/2020 7.9  4.0 - 11.0 10e9/L Final     RBC Count 09/03/2020 4.79  4.4 - 5.9 10e12/L Final     Hemoglobin 09/03/2020 14.3  13.3 - 17.7 g/dL Final     Hematocrit 09/03/2020 44.2  40.0 - 53.0 % Final     MCV 09/03/2020 92  78 - 100 fl Final     MCH 09/03/2020 29.9  26.5 - 33.0 pg Final     MCHC 09/03/2020 32.4  31.5 - 36.5 g/dL Final     RDW 09/03/2020 13.3  10.0 - 15.0 % Final     Platelet Count 09/03/2020 224  150 - 450 10e9/L Final     Diff Method 09/03/2020 Automated Method   Final     % Neutrophils 09/03/2020 69.2  % Final     % Lymphocytes 09/03/2020 23.4  % Final     % Monocytes 09/03/2020 5.2  % Final     % Eosinophils 09/03/2020 1.3  % Final     % Basophils 09/03/2020 0.5  % Final     % Immature Granulocytes 09/03/2020 0.4  % Final     Nucleated RBCs 09/03/2020 0  0 /100 Final     Absolute Neutrophil 09/03/2020 5.5  1.6 - 8.3 10e9/L Final     Absolute Lymphocytes 09/03/2020 1.9  0.8 - 5.3 10e9/L Final     Absolute Monocytes 09/03/2020 0.4  0.0 - 1.3 10e9/L Final     Absolute Eosinophils 09/03/2020 0.1  0.0 - 0.7 10e9/L Final     Absolute Basophils 09/03/2020 0.0  0.0 - 0.2 10e9/L Final     Abs Immature Granulocytes 09/03/2020 0.0  0 - 0.4 10e9/L Final     Absolute Nucleated RBC 09/03/2020 0.0   Final     MTB Quantiferon Result 09/03/2020 Negative  NEG^Negative Final    Comment: No interferon gamma response to M.tuberculosis antigens was detected.   Infection with M.tuberculosis is unlikely, however a single negative result   does not exclude infection. In patients at  high risk for infection, a second   test should be considered       TB1 Ag minus Nil 09/03/2020 0.02  IU/mL Final     TB2 Ag minus Nil 09/03/2020 0.01  IU/mL Final     Mitogen minus Nil 09/03/2020 9.98  IU/mL Final     NIL Result 09/03/2020 0.02  IU/mL Final     Sodium 09/03/2020 139  133 - 144 mmol/L Final     Potassium 09/03/2020 3.7  3.4 - 5.3 mmol/L Final     Chloride 09/03/2020 106  94 - 109 mmol/L Final     Carbon Dioxide 09/03/2020 24  20 - 32 mmol/L Final     Anion Gap 09/03/2020 9  3 - 14 mmol/L Final     Glucose 09/03/2020 97  70 - 99 mg/dL Final     Urea Nitrogen 09/03/2020 65* 7 - 30 mg/dL Final     Creatinine 09/03/2020 6.13* 0.66 - 1.25 mg/dL Final     GFR Estimate 09/03/2020 9* >60 mL/min/[1.73_m2] Final    Comment: Non  GFR Calc  Starting 12/18/2018, serum creatinine based estimated GFR (eGFR) will be   calculated using the Chronic Kidney Disease Epidemiology Collaboration   (CKD-EPI) equation.       GFR Estimate If Black 09/03/2020 11* >60 mL/min/[1.73_m2] Final    Comment:  GFR Calc  Starting 12/18/2018, serum creatinine based estimated GFR (eGFR) will be   calculated using the Chronic Kidney Disease Epidemiology Collaboration   (CKD-EPI) equation.       Calcium 09/03/2020 9.8  8.5 - 10.1 mg/dL Final     Bilirubin Total 09/03/2020 0.5  0.2 - 1.3 mg/dL Final     Albumin 09/03/2020 3.6  3.4 - 5.0 g/dL Final     Protein Total 09/03/2020 8.2  6.8 - 8.8 g/dL Final     Alkaline Phosphatase 09/03/2020 79  40 - 150 U/L Final     ALT 09/03/2020 24  0 - 70 U/L Final     AST 09/03/2020 15  0 - 45 U/L Final     Antigen Type 09/03/2020 A1 Negative    Final     Blood Bank Comment 09/03/2020    Final                    Value:Serologic tests for A1 subtype were negative, indicating the patient is a subgroup of A,   such as A2.  For clinical relevance of this finding, please page the Transfusion MD on   call.       Antibody Titer 09/03/2020 Anti B: IgM 4, IgG 4   Final     ABO  09/03/2020 A   Final     RH(D) 09/03/2020 Pos   Final     Antibody Screen 09/03/2020 Neg   Final     Test Valid Only At 09/03/2020 [facility]      Final     Specimen Expires 09/03/2020 09/06/2020   Final     ABO 09/03/2020 A   Final     RH(D) 09/03/2020 Pos   Final     Specimen Expires 09/03/2020 09/06/2020   Final       Allergies:  No Known Allergies    Family History:   Family History   Problem Relation Age of Onset     Kidney Disease Father      Hypertension Father      Cancer Mother      No Known Problems Brother      No Known Problems Sister      No Known Problems Son      No Known Problems Daughter      No Known Problems Brother      No Known Problems Brother      No Known Problems Brother      No Known Problems Sister      No Known Problems Sister      No Known Problems Sister        Psychosocial history:  reports that he has never smoked. He has never used smokeless tobacco. He reports current alcohol use. He reports that he does not use drugs.    Review of systems: negative for, palpitations, chest pain, exertional chest pain or pressure, paroxysmal nocturnal dyspnea, dyspnea on exertion, orthopnea, lower extremity edema, syncope or near-syncope and claudication    In addition,   General: No change in weight, sleep or appetite.  Normal energy.  No fever or chills  Eyes: Negative for vision changes or eye problems  ENT: No problems with ears, nose or throat.  No difficulty swallowing.  Resp: No coughing, wheezing or shortness of breath  GI: No nausea, vomiting,  heartburn, abdominal pain, diarrhea, constipation or change in bowel habits  : No urinary frequency or dysuria. Not on dialysis  Musculoskeletal: No significant muscle or joint pains  Neurologic: No headaches, numbness, tingling, weakness, problems with balance or coordination  Psychiatric: No problems with anxiety, depression or mental health  Heme/immune/allergy: No history of bleeding or clotting  "problems or anemia.    Endocrine: No history of thyroid disease, diabetes or other endocrine disorders  Skin: No rashes,worrisome lesions or skin problems  Vascular:  No claudication, lifestyle limiting or otherwise; no ischemic rest pain; no non-healing ulcers. No weakness, No loss of sensation          Physical examination  Vitals: /80 (BP Location: Right arm, Patient Position: Chair)   Pulse 96   Ht 1.778 m (5' 10\")   Wt 114.8 kg (253 lb)   SpO2 96%   BMI 36.30 kg/m    BMI= Body mass index is 36.3 kg/m .    In general, the patient is a pleasant male in no apparent distress.    HEENT: Normiocephalic and atraumatic.  PERRLA.  EOMI.  Sclerae white, not injected.  Nares clear.  Pharynx without erythema or exudate.  Dentition intact.    Neck: No adenopathy.  No thyromegaly. Carotids +2/2 bilaterally without bruits.  No jugular venous distension.   Heart:  The PMI is in the 5th ICS in the midclavicular line. There is no heave. Regular rate and rhythm. Normal S1, S2 splits physiologically. No murmur, rub, click, or gallop.    Lungs: Clear to asculation.  No ronchi, wheezes, rales.  No dullness to percussion.   Abdomen: Soft, nontender, nondistended. No organomegaly. No AAA.  No bruits.   Extremities: No clubbing, cyanosis, or edema. The pulses were intact bilaterally.   Neurological: The neurological examination reveal a patient who was oriented to person, place, and time.  The remainder of the examination was nonfocal.    Cardiac tests include:    Echo: no structural heart diease, EF normal  ECG: normal sinus, normal ECG    Assessment and Plan    1. HTN - on amlodpine  2. Hyperlipidemia - on statin  3. Renal disease - on lasix  4. Preop - will get dobutamine echo since back pain prevents stress echo    The patient is to return  PRN. The patient understood the treatment plan as outlined above.  There were no barriers to learning. Patient accompanied by wife.      Brandt Gee MD     Answers for HPI/ROS " submitted by the patient on 10/1/2020   General Symptoms: No  Skin Symptoms: No  HENT Symptoms: No  EYE SYMPTOMS: No  HEART SYMPTOMS: No  LUNG SYMPTOMS: No  INTESTINAL SYMPTOMS: No  URINARY SYMPTOMS: No  REPRODUCTIVE SYMPTOMS: No  SKELETAL SYMPTOMS: No  BLOOD SYMPTOMS: No  NERVOUS SYSTEM SYMPTOMS: No  MENTAL HEALTH SYMPTOMS: No        Please do not hesitate to contact me if you have any questions/concerns.     Sincerely,     Brandt Gee MD

## 2020-10-06 NOTE — PATIENT INSTRUCTIONS
"You were seen today in the Cardiovascular Clinic at the [facility].     Cardiology Providers you saw during your visit: Cmapos Gee MD     Diagnosis:   Encounter Diagnoses   Name Primary?     Mixed hyperlipidemia Yes     Essential hypertension      CKD (chronic kidney disease) stage 4, GFR 15-29 ml/min (H)      Class 2 severe obesity due to excess calories with serious comorbidity and body mass index (BMI) of 36.0 to 36.9 in adult (H)      Pre-operative cardiovascular examination         Results: Discussed with you today EKG      Orders:   Orders Placed This Encounter   Procedures     Dobutamine Stress Echocardiogram       Current Medication List  Current Outpatient Medications   Medication Sig Dispense Refill     allopurinol (ZYLOPRIM) 100 MG tablet TAKE 2 TABLETS BY MOUTH EVERY DAY       amLODIPine (NORVASC) 10 MG tablet Take 10 mg by mouth       aspirin (ASA) 81 MG EC tablet Take 81 mg by mouth       ciprofloxacin (CIPRO) 500 MG tablet Take 1 tablet (500 mg) by mouth 2 times daily for 2 days 6 tablet 0     furosemide (LASIX) 20 MG tablet TAKE 1 TABLET BY MOUTH EVERY DAY       simvastatin (ZOCOR) 40 MG tablet Take 40 mg by mouth       Vitamin D3 (VITAMIN D3) 25 mcg (1000 units) tablet Take 1 capsule by mouth           Medications Discontinued:  Medications Discontinued During This Encounter   Medication Reason     colchicine (COLCYRS) 0.6 MG tablet Stopped by Patient         Recommendations:   1. Patient to follow up PRN    2. Patient to have dobutamine echocardiogram     Please feel free to call me with any questions or concerns.       Monet Rice LPN     Questions: [phone].   First press #1 for [employer] for \"Medical Questions\" to reach us Cardiology Nurses.     [phone].   First press #1 for [employer] and then press #1     On Call Cardiologist for after hours or on weekends: [phone]   option #4 and ask to speak to the on-call Cardiologist.          If you " need a medication refill please contact your pharmacy.  Please allow 3 business days for your refill to be completed.  ________________________________________________________________________________________________________________________________    INSTRUCTIONS FOR DOBUTAMINE STRESS ECHO:     Nothing to eat or drink for 3 hours before test except for water.   No caffeine, tobacco, or alcohol for 12 hrs before test.   Stop your beta blocker two days before the test.        If you have further questions, please utilize [employer] to contact us.   If your question concerns the above instructions, contact:  Monet Rice LPN  Nurse Care Coordinator- [facility]  [phone]    If your question concerns the schedule/appointment times, contact:  [phone]

## 2020-10-06 NOTE — PROGRESS NOTES
I am delighted to see Donald Blanco in consultation.The primary encounter diagnosis was Mixed hyperlipidemia. Diagnoses of Essential hypertension, CKD (chronic kidney disease) stage 4, GFR 15-29 ml/min (H), Class 2 severe obesity due to excess calories with serious comorbidity and body mass index (BMI) of 36.0 to 36.9 in adult (H), and Pre-operative cardiovascular examination were also pertinent to this visit.   As you know, the patient is a 55 year old  male. He   has a past medical history of CKD (chronic kidney disease) stage 4, GFR 15-29 ml/min (H), Elevated PSA, Essential hypertension (5/21/2020), Hyperlipidemia, IgA nephropathy, and Obesity..    On this visit, the patient states that he has no chest pain and exercises regularly.  The patient denies chest pressure/discomfort, irregular heart beats, near-syncope, syncope, orthopnea, paroxysmal nocturnal dyspnea, lower extermity edema and shortness of breath.    The patient's cardiovascular risk factors include hypertension, high cholesterol, renal disease and positive family history.    The following portions of the patient's history were reviewed and updated as appropriate: allergies, current medications, past family history, past medical history, past social history, past surgical history, and the problem list.    PMH: The patient's past medical history includes:    Past Medical History:   Diagnosis Date     CKD (chronic kidney disease) stage 4, GFR 15-29 ml/min (H)      Elevated PSA      Essential hypertension 5/21/2020     Hyperlipidemia      IgA nephropathy      Obesity       Past Surgical History:   Procedure Laterality Date     BACK SURGERY      Back Surgey 20+ Years Ago      BIOPSY      [facility] 2010     COLONOSCOPY      10+ Years ago at [facility]        The patient's medications as of the current encounter are:     Current Outpatient Medications   Medication Sig Dispense Refill     allopurinol (ZYLOPRIM) 100 MG tablet TAKE 2  TABLETS BY MOUTH EVERY DAY       amLODIPine (NORVASC) 10 MG tablet Take 10 mg by mouth       aspirin (ASA) 81 MG EC tablet Take 81 mg by mouth       ciprofloxacin (CIPRO) 500 MG tablet Take 1 tablet (500 mg) by mouth 2 times daily for 2 days 6 tablet 0     furosemide (LASIX) 20 MG tablet TAKE 1 TABLET BY MOUTH EVERY DAY       simvastatin (ZOCOR) 40 MG tablet Take 40 mg by mouth       Vitamin D3 (VITAMIN D3) 25 mcg (1000 units) tablet Take 1 capsule by mouth         Labs:     Orders Only on 09/03/2020   Component Date Value Ref Range Status     PSA 09/03/2020 13.50* 0 - 4 ug/L Final    Assay Method:  Chemiluminescence using Siemens Vista analyzer     Color Urine 09/03/2020 Straw   Final     Appearance Urine 09/03/2020 Clear   Final     Glucose Urine 09/03/2020 Negative  NEG^Negative mg/dL Final     Bilirubin Urine 09/03/2020 Negative  NEG^Negative Final     Ketones Urine 09/03/2020 Negative  NEG^Negative mg/dL Final     Specific Gravity Urine 09/03/2020 1.011  1.003 - 1.035 Final     Blood Urine 09/03/2020 Small* NEG^Negative Final     pH Urine 09/03/2020 5.0  5.0 - 7.0 pH Final     Protein Albumin Urine 09/03/2020 >499* NEG^Negative mg/dL Final     Urobilinogen mg/dL 09/03/2020 0.0  0.0 - 2.0 mg/dL Final     Nitrite Urine 09/03/2020 Negative  NEG^Negative Final     Leukocyte Esterase Urine 09/03/2020 Negative  NEG^Negative Final     Source 09/03/2020 Midstream Urine   Final     WBC Urine 09/03/2020 2  0 - 5 /HPF Final     RBC Urine 09/03/2020 1  0 - 2 /HPF Final     Bacteria Urine 09/03/2020 Few* NEG^Negative /HPF Final     Squamous Epithelial /HPF Urine 09/03/2020 <1  0 - 1 /HPF Final     Mucous Urine 09/03/2020 Present* NEG^Negative /LPF Final     Amorphous Crystals 09/03/2020 Few* NEG^Negative /HPF Final     Treponema Antibodies 09/03/2020 Nonreactive  NR^Nonreactive Final    Comment: Methodology Change: Test performed on the [employer] XL by Treponema   pallidum Total Antibodies Assay as of 3.17.2020.        Varicella Zoster Virus Antibody IgG 09/03/2020 3.6* 0.0 - 0.8 AI Final    Comment: Positive, suggests prev. exposure and probable immunity  Antibody index (AI) values reflect qualitative changes in antibody   concentration that cannot be directly associated with clinical condition or   disease state.       HIV Antigen Antibody Combo Pretran* 09/03/2020 Nonreactive  NR^Nonreactive Final    HIV-1 p24 Ag & HIV-1/HIV-2 Ab Not Detected     Hepatitis C Antibody 09/03/2020 Nonreactive  NR^Nonreactive Final    Comment: Assay performance characteristics have not been established for newborns,   infants, and children       Hep B Surface Agn 09/03/2020 Nonreactive  NR^Nonreactive Final     Hepatitis B Surface Antibody 09/03/2020 0.92  <8.00 m[IU]/mL Final    Nonreactive, No antibody detected when the value is less than 8.00 m[IU]/mL.     Hepatitis B Core Tiffany 09/03/2020 Nonreactive  NR^Nonreactive Final     EBV Capsid Antibody IgG 09/03/2020 >8.0* 0.0 - 0.8 AI Final    Comment: Positive, suggests recent or past exposure  Antibody index (AI) values reflect qualitative changes in antibody   concentration that cannot be directly associated with clinical condition or   disease state.       CMV Antibody IgG 09/03/2020 0.4  0.0 - 0.8 AI Final    Comment: Negative  Antibody index (AI) values reflect qualitative changes in antibody   concentration that cannot be directly associated with clinical condition or   disease state.       SA1 Test Method 09/03/2020 SA FCS   Final     SA1 Cell 09/03/2020 Class I   Final     SA1 Hi Risk Tiffany 09/03/2020 None   Final     SA1 Mod Risk Tiffany 09/03/2020 None   Final     SA1 Comments 09/03/2020    Final                    Value: Test performed by modified procedure. Serum heat inactivated and tested   by a modified ([facility]) protocol including fetal calf serum addition.   High-risk, mfi >3,000. Mod-risk, mfi 500-3,000.       SA2 Test Method 09/03/2020 SA FCS   Final     SA2 Cell 09/03/2020 Class II    Final     SA2 Hi Risk Tiffany 09/03/2020 None   Final     SA2 Mod Risk Tiffany 09/03/2020 DR:1   Final     SA2 Comments 09/03/2020    Final                    Value: Test performed by modified procedure. Serum heat inactivated and tested   by a modified ([facility]) protocol including fetal calf serum addition.   High-risk, mfi >3,000. Mod-risk, mfi 500-3,000.       Protocol Cutoff 09/03/2020 Plan A, 500 mfi cumulative    Final     UNOS cPRA 09/03/2020 19   Final     Unacceptable Antigen 09/03/2020 DR:1   Final     ABTest Method 09/03/2020 SSOP   Final     A* locus 09/03/2020 A*24   Final     A* 09/03/2020 A*25   Final     B* locus 09/03/2020 B*08   Final     B* 09/03/2020 B*35   Final     C* locus 09/03/2020 C*04   Final     C* 09/03/2020 C*07   Final     Bw-1 09/03/2020 Bw*6   Final     Drsso Test Method 09/03/2020 SSOP   Final     DRB1* locus 09/03/2020 DRB1*13   Final     DRB3* locus 09/03/2020 DRB3*01   Final     DQB1* locus 09/03/2020 DQB1*03(07)   Final     DQB1* 09/03/2020 DQB1*06   Final     DQA1*locus 09/03/2020 DQA1*01   Final     DQA1* 09/03/2020 DQA1*05   Final     DPB1* 09/03/2020 DPB1*04:01   Final     DPB1* NMDP 09/03/2020 CDSJA   Final     DPB1*locus 09/03/2020 DPB1*11:01   Final     DPB1* locus NMDP 09/03/2020 CDSJB   Final     DPA1* 09/03/2020 DPA1*01:03   Final     DPA1* NMDP 09/03/2020 CCTKA   Final     DPA1*locus 09/03/2020 DPA1*02:01   Final     DPA1* locus NMDP 09/03/2020 CCTKB   Final     Thrombin Time 09/03/2020 18.4  13.0 - 19.0 sec Final     PTT 09/03/2020 33  22 - 37 sec Final     INR 09/03/2020 1.03  0.86 - 1.14 Final     Lupus Result 09/03/2020 Negative  NEG^Negative Final    Comment: (Note)  COMMENTS:  The INR is normal.  APTT ratio is elevated.  Platelet Neutralization is negative.  APTT 1:2 Mix is normal.  DRVVT Screen ratio is normal.  Thrombin time is normal.  NEGATIVE TEST; A LUPUS ANTICOAGULANT WAS NOT DETECTED IN THIS  SPECIMEN WITHIN THE LIMITS OF THE TESTING REPERTOIRE.  If the clinical  picture is strongly suggestive of an antiphospholipid   syndrome, recommend anticardiolipin and beta-2-glycoprotein (IgG and  IgM) antibody tests.  Platelet Neutralization and APTT 1:2 Mix are suggestive of factor  deficiency.   Recommend factors 8, 9, 11 and 12 (factors VIII, IX, XI, and XII)  levels if clinically indicated.  Shabnam Uribe M.D.  [phone]  9/4/2020                  APTT TEST:                 APTT Ratio =     1.25       Normal is less than 1.21       1:2 Mix  =      39       Reference Range: 31-45 sec                       Platelet Neutralization (seconds):                 PTT Buffer-PTT Platelet Lysate =          -4       Reference:                                              Negative: Less than or equal to 0                 Positive: Greater than or equal to 1                                  DILUTE RIAZ VIPER VENOM TEST:                 Screen Ratio =     0.98       Normal is less than 1.21            Cardiolipin Antibody IgG 09/03/2020 <1.6  0.0 - 19.9 GPL-U/mL Final    Negative     Cardiolipin Antibody IgM 09/03/2020 2.3  0.0 - 19.9 MPL-U/mL Final    Negative     Copath Report 09/03/2020    Final                    Value:Patient Name: JF ZULETA  MR#: [id number]  Specimen #: [id number]  Collected: 9/3/2020 11:31  Received: 9/4/2020 09:09  Reported: 9/8/2020 14:56  Ordering Phy(s): PAUL JONES    For improved result formatting, select 'View Enhanced Report Format' under   Linked Documents section.  _________________________________________    TEST(S) REQUESTED:  Factor 5 Leiden and Factor 2 by PCR    SPECIMEN DESCRIPTION:  Blood    METHODOLOGY:   The regions of genomic DNA containing the [id number] Factor V   Leiden gene mutation (Factor V  Leiden) and the Factor 2(Prothrombin [id number]) gene mutation were   simultaneously amplified using the polymerase  chain reaction.  The amplified products were digested with restriction   endonuclease TaqI and products were  analyzed by  gel electrophoresis.    RESULTS:    Factor V 1691G>A (Leiden)  RESULTS:  Mutation analyzed:     1691G>A  Factor V 1691G>A (Leiden)  Interpretation:      ABSENT  Factor V 1691G>A (Leiden) mutation  genotype:      G/G    FACTOR 2                          /PROTHROMBIN RESULTS:  Mutation analyzed:     23463W>A  Factor 2 Mutation Interpretation:      ABSENT  Factor 2 Mutation genotype:      G/G    INTERPRETATION:  The patient is negative for the Factor V 1691G>A (Leiden) and negative for   the Factor 2 mutation.    COMMENTS:  If a patient is the recipient of an allogeneic bone marrow transplant,   this test must be done on a  pre-transplant sample or buccal swab.  A previous allogeneic bone marrow   transplant will interfere with test  results.  Call the [facility]([phone]) for   instructions on sample collection for these  patients.    This test was developed and its performance characteristics determined by   [facility]. It has not been cleared or approved by the FDA.   The laboratory is regulated under CLIA  as qualified to perform high-complexity testing. This test is used for   clinical purposes. It should not be  regarded as investigational or for research.    A resident/                          fellow in an accredited training program was involved in the   selection of testing, review of  laboratory data, and/or interpretation of this case.  I, as the senior   physician, attest that I: (i) confirmed  appropriate testing, (ii) examined the relevant raw data for the   specimen(s); and (iii) rendered or confirmed  the interpretation(s).    Electronically Signed Out By:  Nghia Espinoza M.D., PhD  [facility]    CPT Codes:  A: 28182-L0FZZQ, 72520-V6LUQC, -TXGXNS(2)    TESTING LAB LOCATION:  [place] [id number] [facility] [address]  [address] [place] [id number] [phone]    COLLECTION SITE:  Client:   [facility]  Location:  [facility] (B)       WBC 09/03/2020 7.9  4.0 - 11.0 10e9/L Final     RBC Count 09/03/2020 4.79  4.4 - 5.9 10e12/L Final     Hemoglobin 09/03/2020 14.3  13.3 - 17.7 g/dL Final     Hematocrit 09/03/2020 44.2  40.0 - 53.0 % Final     MCV 09/03/2020 92  78 - 100 fl Final     MCH 09/03/2020 29.9  26.5 - 33.0 pg Final     MCHC 09/03/2020 32.4  31.5 - 36.5 g/dL Final     RDW 09/03/2020 13.3  10.0 - 15.0 % Final     Platelet Count 09/03/2020 224  150 - 450 10e9/L Final     Diff Method 09/03/2020 Automated Method   Final     % Neutrophils 09/03/2020 69.2  % Final     % Lymphocytes 09/03/2020 23.4  % Final     % Monocytes 09/03/2020 5.2  % Final     % Eosinophils 09/03/2020 1.3  % Final     % Basophils 09/03/2020 0.5  % Final     % Immature Granulocytes 09/03/2020 0.4  % Final     Nucleated RBCs 09/03/2020 0  0 /100 Final     Absolute Neutrophil 09/03/2020 5.5  1.6 - 8.3 10e9/L Final     Absolute Lymphocytes 09/03/2020 1.9  0.8 - 5.3 10e9/L Final     Absolute Monocytes 09/03/2020 0.4  0.0 - 1.3 10e9/L Final     Absolute Eosinophils 09/03/2020 0.1  0.0 - 0.7 10e9/L Final     Absolute Basophils 09/03/2020 0.0  0.0 - 0.2 10e9/L Final     Abs Immature Granulocytes 09/03/2020 0.0  0 - 0.4 10e9/L Final     Absolute Nucleated RBC 09/03/2020 0.0   Final     MTB Quantiferon Result 09/03/2020 Negative  NEG^Negative Final    Comment: No interferon gamma response to M.tuberculosis antigens was detected.   Infection with M.tuberculosis is unlikely, however a single negative result   does not exclude infection. In patients at high risk for infection, a second   test should be considered       TB1 Ag minus Nil 09/03/2020 0.02  IU/mL Final     TB2 Ag minus Nil 09/03/2020 0.01  IU/mL Final     Mitogen minus Nil 09/03/2020 9.98  IU/mL Final     NIL Result 09/03/2020 0.02  IU/mL Final     Sodium 09/03/2020 139  133 - 144 mmol/L Final     Potassium 09/03/2020 3.7  3.4 - 5.3 mmol/L Final      Chloride 09/03/2020 106  94 - 109 mmol/L Final     Carbon Dioxide 09/03/2020 24  20 - 32 mmol/L Final     Anion Gap 09/03/2020 9  3 - 14 mmol/L Final     Glucose 09/03/2020 97  70 - 99 mg/dL Final     Urea Nitrogen 09/03/2020 65* 7 - 30 mg/dL Final     Creatinine 09/03/2020 6.13* 0.66 - 1.25 mg/dL Final     GFR Estimate 09/03/2020 9* >60 mL/min/[1.73_m2] Final    Comment: Non  GFR Calc  Starting 12/18/2018, serum creatinine based estimated GFR (eGFR) will be   calculated using the Chronic Kidney Disease Epidemiology Collaboration   (CKD-EPI) equation.       GFR Estimate If Black 09/03/2020 11* >60 mL/min/[1.73_m2] Final    Comment:  GFR Calc  Starting 12/18/2018, serum creatinine based estimated GFR (eGFR) will be   calculated using the Chronic Kidney Disease Epidemiology Collaboration   (CKD-EPI) equation.       Calcium 09/03/2020 9.8  8.5 - 10.1 mg/dL Final     Bilirubin Total 09/03/2020 0.5  0.2 - 1.3 mg/dL Final     Albumin 09/03/2020 3.6  3.4 - 5.0 g/dL Final     Protein Total 09/03/2020 8.2  6.8 - 8.8 g/dL Final     Alkaline Phosphatase 09/03/2020 79  40 - 150 U/L Final     ALT 09/03/2020 24  0 - 70 U/L Final     AST 09/03/2020 15  0 - 45 U/L Final     Antigen Type 09/03/2020 A1 Negative    Final     Blood Bank Comment 09/03/2020    Final                    Value:Serologic tests for A1 subtype were negative, indicating the patient is a subgroup of A,   such as A2.  For clinical relevance of this finding, please page the Transfusion MD on   call.       Antibody Titer 09/03/2020 Anti B: IgM 4, IgG 4   Final     ABO 09/03/2020 A   Final     RH(D) 09/03/2020 Pos   Final     Antibody Screen 09/03/2020 Neg   Final     Test Valid Only At 09/03/2020 [facility]      Final     Specimen Expires 09/03/2020 09/06/2020   Final     ABO 09/03/2020 A   Final     RH(D) 09/03/2020 Pos   Final     Specimen Expires 09/03/2020 09/06/2020   Final        Allergies:  No Known Allergies    Family History:   Family History   Problem Relation Age of Onset     Kidney Disease Father      Hypertension Father      Cancer Mother      No Known Problems Brother      No Known Problems Sister      No Known Problems Son      No Known Problems Daughter      No Known Problems Brother      No Known Problems Brother      No Known Problems Brother      No Known Problems Sister      No Known Problems Sister      No Known Problems Sister        Psychosocial history:  reports that he has never smoked. He has never used smokeless tobacco. He reports current alcohol use. He reports that he does not use drugs.    Review of systems: negative for, palpitations, chest pain, exertional chest pain or pressure, paroxysmal nocturnal dyspnea, dyspnea on exertion, orthopnea, lower extremity edema, syncope or near-syncope and claudication    In addition,   General: No change in weight, sleep or appetite.  Normal energy.  No fever or chills  Eyes: Negative for vision changes or eye problems  ENT: No problems with ears, nose or throat.  No difficulty swallowing.  Resp: No coughing, wheezing or shortness of breath  GI: No nausea, vomiting,  heartburn, abdominal pain, diarrhea, constipation or change in bowel habits  : No urinary frequency or dysuria. Not on dialysis  Musculoskeletal: No significant muscle or joint pains  Neurologic: No headaches, numbness, tingling, weakness, problems with balance or coordination  Psychiatric: No problems with anxiety, depression or mental health  Heme/immune/allergy: No history of bleeding or clotting problems or anemia.    Endocrine: No history of thyroid disease, diabetes or other endocrine disorders  Skin: No rashes,worrisome lesions or skin problems  Vascular:  No claudication, lifestyle limiting or otherwise; no ischemic rest pain; no non-healing ulcers. No weakness, No loss of sensation          Physical examination  Vitals: /80 (BP Location: Right  "arm, Patient Position: Chair)   Pulse 96   Ht 1.778 m (5' 10\")   Wt 114.8 kg (253 lb)   SpO2 96%   BMI 36.30 kg/m    BMI= Body mass index is 36.3 kg/m .    In general, the patient is a pleasant male in no apparent distress.    HEENT: Normiocephalic and atraumatic.  PERRLA.  EOMI.  Sclerae white, not injected.  Nares clear.  Pharynx without erythema or exudate.  Dentition intact.    Neck: No adenopathy.  No thyromegaly. Carotids +2/2 bilaterally without bruits.  No jugular venous distension.   Heart:  The PMI is in the 5th ICS in the midclavicular line. There is no heave. Regular rate and rhythm. Normal S1, S2 splits physiologically. No murmur, rub, click, or gallop.    Lungs: Clear to asculation.  No ronchi, wheezes, rales.  No dullness to percussion.   Abdomen: Soft, nontender, nondistended. No organomegaly. No AAA.  No bruits.   Extremities: No clubbing, cyanosis, or edema. The pulses were intact bilaterally.   Neurological: The neurological examination reveal a patient who was oriented to person, place, and time.  The remainder of the examination was nonfocal.    Cardiac tests include:    Echo: no structural heart diease, EF normal  ECG: normal sinus, normal ECG    Assessment and Plan    1. HTN - on amlodpine  2. Hyperlipidemia - on statin  3. Renal disease - on lasix  4. Preop - will get dobutamine echo since back pain prevents stress echo    The patient is to return  PRN. The patient understood the treatment plan as outlined above.  There were no barriers to learning. Patient accompanied by wife.      Brandt Gee MD     Answers for HPI/ROS submitted by the patient on 10/1/2020   General Symptoms: No  Skin Symptoms: No  HENT Symptoms: No  EYE SYMPTOMS: No  HEART SYMPTOMS: No  LUNG SYMPTOMS: No  INTESTINAL SYMPTOMS: No  URINARY SYMPTOMS: No  REPRODUCTIVE SYMPTOMS: No  SKELETAL SYMPTOMS: No  BLOOD SYMPTOMS: No  NERVOUS SYSTEM SYMPTOMS: No  MENTAL HEALTH SYMPTOMS: No    Patient stress test shows normal " LV function and no evidence of ischemia.  Therefore, this patient falls into a low risk group for CV complications during surgery.

## 2020-10-07 ENCOUNTER — OFFICE VISIT (OUTPATIENT)
Dept: UROLOGY | Facility: CLINIC | Age: 55
End: 2020-10-07
Payer: COMMERCIAL

## 2020-10-07 DIAGNOSIS — R97.20 ELEVATED PROSTATE SPECIFIC ANTIGEN (PSA): Primary | ICD-10-CM

## 2020-10-07 PROCEDURE — 76872 US TRANSRECTAL: CPT | Performed by: UROLOGY

## 2020-10-07 PROCEDURE — 88305 TISSUE EXAM BY PATHOLOGIST: CPT | Mod: 26 | Performed by: PATHOLOGY

## 2020-10-07 PROCEDURE — 76999 ECHO EXAMINATION PROCEDURE: CPT | Performed by: UROLOGY

## 2020-10-07 PROCEDURE — 55700 PR BIOPSY OF PROSTATE,NEEDLE/PUNCH: CPT | Performed by: UROLOGY

## 2020-10-07 RX ORDER — GENTAMICIN 40 MG/ML
80 INJECTION, SOLUTION INTRAMUSCULAR; INTRAVENOUS ONCE
Status: COMPLETED | OUTPATIENT
Start: 2020-10-07 | End: 2020-10-07

## 2020-10-07 RX ORDER — LIDOCAINE HYDROCHLORIDE 10 MG/ML
10 INJECTION, SOLUTION EPIDURAL; INFILTRATION; INTRACAUDAL; PERINEURAL ONCE
Status: COMPLETED | OUTPATIENT
Start: 2020-10-07 | End: 2020-10-07

## 2020-10-07 RX ADMIN — GENTAMICIN 80 MG: 40 INJECTION, SOLUTION INTRAMUSCULAR; INTRAVENOUS at 08:42

## 2020-10-07 RX ADMIN — LIDOCAINE HYDROCHLORIDE 10 ML: 10 INJECTION, SOLUTION EPIDURAL; INFILTRATION; INTRACAUDAL; PERINEURAL at 08:42

## 2020-10-07 ASSESSMENT — PAIN SCALES - GENERAL: PAINLEVEL: MODERATE PAIN (5)

## 2020-10-07 NOTE — PATIENT INSTRUCTIONS
Please schedule a virtual appointment in one or two weeks with Dr. Vásquez to discuss results of your prostate biopsy.     It was a pleasure meeting with you today.  Thank you for allowing me and my team the privilege of caring for you today.  YOU are the reason we are here, and I truly hope we provided you with the excellent service you deserve.  Please let us know if there is anything else we can do for you so that we can be sure you are leaving completely satisfied with your care experience.          Please see the attached instructions regarding your Post Biopsy Care, and make sure you have scheduled or will schedule your Biopsy Follow Up.    It was a pleasure meeting with you today.  Thank you for allowing me and my team the privilege of caring for you today. YOU are the reason we are here, and I truly hope we provided you with the excellent service you deserve.  Please let us know if there is anything else we can do for you so that we can be sure you are leaving completely satisfied with your care experience.                [facility]  Precautions Following a Prostate Biopsy    There are four conditions that you should watch for after a prostate biopsy:    1. Excessive pain  2. Bleeding irregularities (passing numerous  dime sized  clots or if your urine looks like cranberry juice)  3. Fever of 100 degrees or more  4. If you are unable to urinate        If any of these occur, call the Urology Clinic during normal business hours (M-F, 8:00-4:30) at [phone].  If you experience a problem after normal business hours, call our 24-hour phone number at [phone] and ask for the Urology Resident on call to be paged.      If you experience any discomfort following the biopsy, you may take Tylenol.  DO NOT TAKE ASPIRIN unless specified by your physician.   If the discomfort becomes severe or uncontrolled by medication, contact the Urology Clinic or Urology Resident (after normal  business hours).      Do not be alarmed if you have some blood in your stool, in your urine, or ejaculate (semen).  This occurrence is normal and may last up to three (3) or four (4) days, usually intermittently.  Blood in the ejaculate (semen) may last several weeks, up to about a dozen ejaculations.  The blood in your ejaculate may appear as brown streaks, blood tinged, and immediately following a biopsy, it may appear bright red.      If you run a fever above 100 degrees, call the Urology Clinic or Urology Resident (after normal business hours) immediately.  If you are unable to reach your physician or the Resident on call, go to the nearest emergency room.  Explain that you have had a transrectal biopsy of your prostate and what problems you are experiencing.        You should attempt to urinate following your biopsy before you leave the clinic.  If you are unable to urinate four (4) to six (6) hours after you leave the clinic, you will need to contact the Urology Clinic or the Resident on call.  If you are unable to reach your physician or the Resident on call, go to the nearest emergency room.      If you have any questions or concerns after your biopsy, feel free to contact the Urology Clinic at [phone] during M-F, 8:00-5pm business hours.  If you need to speak with someone after normal business hours, call [phone] and ask for the Resident on call to be paged.

## 2020-10-07 NOTE — PROGRESS NOTES
PREPROCEDURE DIAGNOSIS: Elevated PSA  POSTPROCEDURE DIAGNOSIS: Elevated PSA.   PROCEDURE: Transrectal ultrasound sizing and transrectal ultrasound guided prostatic needle biopsy, including MRI fusion targeting  for Donald Blanco who is a 55 year old male. PSA 13.50.  SURGEON: Nabli Vásquez  ANESTHESIA: 5 mL of 1% periprostatic block bilaterally   We previously obtained an MRI of the prostate and identified all PIRADS 3-5 lesions for targeting    Target Lesion #1 PIRADS 5 - left mid gland peripheral zone  Target Lesion #2 PIRADS 5-  Left apex    DESCRIPTION OF PROCEDURE: The procedure, the outcome, the anesthesia, and the risks were discussed with the patient. Informed consent was obtained and signed and a timeout was completed prior to the procedure. Digital rectal examination was performed with the below findings noted. Anesthesia was administered as noted above and the transrectal ultrasound probe was inserted, sizing was performed, and the below findings were noted. 2 core biopsies were taken from each of the MRI targets, and 12 core biopsies were taken as described below. The probe was then withdrawn. Patient tolerated the procedure well.   FINDINGS: Digital rectal exam reveals normal prostate. Total volume is 34 mL. Hypoechoic lesion bilaterally. US images were obtained and then fused with the MRI images.  The fused images were then used to guide the biopsy of the targeted lesions with 2 cores taken of each lesion.  We then performed random biopsies.  12 cores were taken with 6 on each side, base, mid and apex.  PLAN: Will follow-up next week to review results.  Nabil Vásquez MD  Urology  [facility]

## 2020-10-07 NOTE — NURSING NOTE
Patient Active Problem List   Diagnosis     Essential hypertension     Gout     Hyperlipidemia     Hyperparathyroidism due to renal insufficiency (H)     IgA nephropathy     Obesity     Umbilical hernia     CKD (chronic kidney disease) stage 4, GFR 15-29 ml/min (H)     Elevated prostate specific antigen (PSA)     Pre-operative cardiovascular examination       No Known Allergies    Current Outpatient Medications   Medication Sig Dispense Refill     allopurinol (ZYLOPRIM) 100 MG tablet TAKE 2 TABLETS BY MOUTH EVERY DAY       amLODIPine (NORVASC) 10 MG tablet Take 10 mg by mouth       aspirin (ASA) 81 MG EC tablet Take 81 mg by mouth       ciprofloxacin (CIPRO) 500 MG tablet Take 1 tablet (500 mg) by mouth 2 times daily for 2 days 6 tablet 0     furosemide (LASIX) 20 MG tablet TAKE 1 TABLET BY MOUTH EVERY DAY       simvastatin (ZOCOR) 40 MG tablet Take 40 mg by mouth       Vitamin D3 (VITAMIN D3) 25 mcg (1000 units) tablet Take 1 capsule by mouth         Social History     Tobacco Use     Smoking status: Never Smoker     Smokeless tobacco: Never Used   Substance Use Topics     Alcohol use: Yes     Frequency: 2-3 times a week     Drug use: Never       Patient states he did all biopsy prep accordingly, including taking the antibiotics as directed, stopping blood thinners, and completing the enema shortly prior to his appointment.    Invasive Procedure Safety Checklist:    Procedure: Prostate Biopsy    Action: Complete sections and checkboxes as appropriate.    Pre-procedure:  1. Patient ID Verified with 2 identifiers (Jeanette and  or MRN) : YES    2. Procedure and site verified with patient/designee (when able) : YES    3. Accurate consent documentation in medical record : YES    4. H&P (or appropriate assessment) documented in medical record : N/A  H&P must be up to 30 days prior to procedure an updated within 24 hours of                 Procedure as applicable.     5. Relevant diagnostic and radiology test results  appropriately labeled and displayed as applicable : YES    6. Blood products, implants, devices, and/or special equipment available for the procedure as applicable : YES    7. Procedure site(s) marked with provider initials [Exclusions: none] : NO    8. Marking not required. Reason : Yes  Procedure does not require site marking    Time Out:     Time-Out performed immediately prior to starting procedure, including verbal and active participation of all team members addressing: YES    1. Correct patient identity.  2. Confirmed that the correct side and site are marked.  3. An accurate procedure to be done.  4. Agreement on the procedure to be done.  5. Correct patient position.  6. Relevant images and results are properly labeled and appropriately displayed.  7. The need to administer antibiotics or fluids for irrigation purposes during the procedure as applicable.  8. Safety precautions based on patient history or medication use.    During Procedure: Verification of correct person, site, and procedure occurs any time the responsibility for care of the patient is transferred to another member of the care team.    The following medication was given:     MEDICATION:  Lidocaine 1%  ROUTE: Provider Administered  SITE: Provider Administered  DOSE: 10mL  LOT #: [id number]  :Epoq  EXPIRATION DATE: 03/24  NDC#: [address]-56  Was there drug waste? Yes  Amount of drug waste (10mL): 20mL.  Reason for waste:  As per MD    Prior to injection, verified patient identity using patient's name and date of birth.  Due to injection administration, patient instructed to remain in clinic for 15 minutes  afterwards, and to report any adverse reaction to me immediately.    The following medication was given:     MEDICATION:  Gentamicin    ROUTE: IM  SITE: RUQ - Gluteus  DOSE: 80mg  LOT #: [id number]  : Epoq  EXPIRATION DATE: 08/21  NDC#: [address]-79  Was there drug waste? No    Prior to injection,  verified patient identity using patient's name and date of birth.  Due to injection administration, patient instructed to remain in clinic for 15 minutes  afterwards, and to report any adverse reaction to me immediately.    Drug Amount Wasted:  None.  Vial/Syringe: Single dose vial    Irving Ortega, EMT  October 7, 2020

## 2020-10-07 NOTE — LETTER
10/7/2020       RE: Donald Blanco  [address] [facility] [address]     Dear Colleague,    Thank you for referring your patient, Donald Blanco, to the [facility] [place] at [facility]. Please see a copy of my visit note below.    PREPROCEDURE DIAGNOSIS: Elevated PSA  POSTPROCEDURE DIAGNOSIS: Elevated PSA.   PROCEDURE: Transrectal ultrasound sizing and transrectal ultrasound guided prostatic needle biopsy, including MRI fusion targeting  for Donald Blanco who is a 55 year old male. PSA 13.50.  SURGEON: Nabil Vásquez  ANESTHESIA: 5 mL of 1% periprostatic block bilaterally   We previously obtained an MRI of the prostate and identified all PIRADS 3-5 lesions for targeting    Target Lesion #1 PIRADS 5 - left mid gland peripheral zone  Target Lesion #2 PIRADS 5-  Left apex    DESCRIPTION OF PROCEDURE: The procedure, the outcome, the anesthesia, and the risks were discussed with the patient. Informed consent was obtained and signed and a timeout was completed prior to the procedure. Digital rectal examination was performed with the below findings noted. Anesthesia was administered as noted above and the transrectal ultrasound probe was inserted, sizing was performed, and the below findings were noted. 2 core biopsies were taken from each of the MRI targets, and 12 core biopsies were taken as described below. The probe was then withdrawn. Patient tolerated the procedure well.   FINDINGS: Digital rectal exam reveals normal prostate. Total volume is 34 mL. Hypoechoic lesion bilaterally. US images were obtained and then fused with the MRI images.  The fused images were then used to guide the biopsy of the targeted lesions with 2 cores taken of each lesion.  We then performed random biopsies.  12 cores were taken with 6 on each side, base, mid and apex.  PLAN: Will follow-up next week to review results.  Nabil Vásquez MD  Urology  [facility]  [facility]

## 2020-10-09 ENCOUNTER — HOSPITAL ENCOUNTER (OUTPATIENT)
Dept: CARDIOLOGY | Facility: CLINIC | Age: 55
Discharge: HOME | End: 2020-10-09
Attending: INTERNAL MEDICINE | Admitting: INTERNAL MEDICINE
Payer: COMMERCIAL

## 2020-10-09 DIAGNOSIS — Z01.810 PRE-OPERATIVE CARDIOVASCULAR EXAMINATION: ICD-10-CM

## 2020-10-09 DIAGNOSIS — I10 ESSENTIAL HYPERTENSION: ICD-10-CM

## 2020-10-09 DIAGNOSIS — E78.2 MIXED HYPERLIPIDEMIA: ICD-10-CM

## 2020-10-09 DIAGNOSIS — E66.01 CLASS 2 SEVERE OBESITY DUE TO EXCESS CALORIES WITH SERIOUS COMORBIDITY AND BODY MASS INDEX (BMI) OF 36.0 TO 36.9 IN ADULT (H): ICD-10-CM

## 2020-10-09 DIAGNOSIS — N18.4 CKD (CHRONIC KIDNEY DISEASE) STAGE 4, GFR 15-29 ML/MIN (H): ICD-10-CM

## 2020-10-09 DIAGNOSIS — E66.812 CLASS 2 SEVERE OBESITY DUE TO EXCESS CALORIES WITH SERIOUS COMORBIDITY AND BODY MASS INDEX (BMI) OF 36.0 TO 36.9 IN ADULT (H): ICD-10-CM

## 2020-10-09 LAB — COPATH REPORT: NORMAL

## 2020-10-09 PROCEDURE — 250N000009 HC RX 250: Performed by: STUDENT IN AN ORGANIZED HEALTH CARE EDUCATION/TRAINING PROGRAM

## 2020-10-09 PROCEDURE — 255N000002 HC RX 255 OP 636: Performed by: STUDENT IN AN ORGANIZED HEALTH CARE EDUCATION/TRAINING PROGRAM

## 2020-10-09 PROCEDURE — 250N000011 HC RX IP 250 OP 636: Performed by: STUDENT IN AN ORGANIZED HEALTH CARE EDUCATION/TRAINING PROGRAM

## 2020-10-09 PROCEDURE — 93350 STRESS TTE ONLY: CPT | Mod: 26 | Performed by: STUDENT IN AN ORGANIZED HEALTH CARE EDUCATION/TRAINING PROGRAM

## 2020-10-09 PROCEDURE — 93325 DOPPLER ECHO COLOR FLOW MAPG: CPT | Mod: 26 | Performed by: STUDENT IN AN ORGANIZED HEALTH CARE EDUCATION/TRAINING PROGRAM

## 2020-10-09 PROCEDURE — 93018 CV STRESS TEST I&R ONLY: CPT | Mod: GC | Performed by: STUDENT IN AN ORGANIZED HEALTH CARE EDUCATION/TRAINING PROGRAM

## 2020-10-09 PROCEDURE — 93016 CV STRESS TEST SUPVJ ONLY: CPT | Mod: GC | Performed by: STUDENT IN AN ORGANIZED HEALTH CARE EDUCATION/TRAINING PROGRAM

## 2020-10-09 PROCEDURE — 93321 DOPPLER ECHO F-UP/LMTD STD: CPT | Mod: 26 | Performed by: STUDENT IN AN ORGANIZED HEALTH CARE EDUCATION/TRAINING PROGRAM

## 2020-10-09 RX ORDER — SODIUM CHLORIDE 9 MG/ML
INJECTION, SOLUTION INTRAVENOUS CONTINUOUS
Status: ACTIVE | OUTPATIENT
Start: 2020-10-09 | End: 2020-10-09

## 2020-10-09 RX ORDER — ATROPINE SULFATE 0.4 MG/ML
.2-2 AMPUL (ML) INJECTION
Status: DISCONTINUED | OUTPATIENT
Start: 2020-10-09 | End: 2020-10-10 | Stop reason: HOSPADM

## 2020-10-09 RX ORDER — DOBUTAMINE HYDROCHLORIDE 200 MG/100ML
10-50 INJECTION INTRAVENOUS CONTINUOUS
Status: ACTIVE | OUTPATIENT
Start: 2020-10-09 | End: 2020-10-09

## 2020-10-09 RX ORDER — METOPROLOL TARTRATE 1 MG/ML
1-20 INJECTION, SOLUTION INTRAVENOUS
Status: ACTIVE | OUTPATIENT
Start: 2020-10-09 | End: 2020-10-09

## 2020-10-09 RX ADMIN — METOPROLOL TARTRATE 4 MG: 5 INJECTION INTRAVENOUS at 08:36

## 2020-10-09 RX ADMIN — DOBUTAMINE HYDROCHLORIDE 10 MCG/KG/MIN: 200 INJECTION INTRAVENOUS at 08:26

## 2020-10-09 RX ADMIN — PERFLUTREN 6 ML: 6.52 INJECTION, SUSPENSION INTRAVENOUS at 08:37

## 2020-10-09 NOTE — PROGRESS NOTES
Pt here for dobutamine stress test. Test, meds and side effects reviewed with patient. Achieved target HR at 30 mcg/kg/min Dobutamine and given a total of 4mg IV Metoprolol to bring HR back to baseline. Post monitoring complete and VSS. Pt escorted out to the gold waiting room.

## 2020-10-13 ENCOUNTER — PRE VISIT (OUTPATIENT)
Dept: UROLOGY | Facility: CLINIC | Age: 55
End: 2020-10-13

## 2020-10-15 ENCOUNTER — VIRTUAL VISIT (OUTPATIENT)
Dept: UROLOGY | Facility: CLINIC | Age: 55
End: 2020-10-15
Payer: COMMERCIAL

## 2020-10-15 DIAGNOSIS — R97.20 ELEVATED PROSTATE SPECIFIC ANTIGEN (PSA): ICD-10-CM

## 2020-10-15 DIAGNOSIS — C61 PROSTATE CANCER (H): Primary | ICD-10-CM

## 2020-10-15 PROCEDURE — 99214 OFFICE O/P EST MOD 30 MIN: CPT | Mod: 95 | Performed by: UROLOGY

## 2020-10-15 RX ORDER — CEFAZOLIN SODIUM 2 G/50ML
2 SOLUTION INTRAVENOUS
Status: CANCELLED | OUTPATIENT
Start: 2020-10-15

## 2020-10-15 RX ORDER — HEPARIN SODIUM 5000 [USP'U]/.5ML
5000 INJECTION, SOLUTION INTRAVENOUS; SUBCUTANEOUS
Status: CANCELLED | OUTPATIENT
Start: 2020-10-15

## 2020-10-15 RX ORDER — CEFAZOLIN SODIUM 1 G/50ML
1 INJECTION, SOLUTION INTRAVENOUS SEE ADMIN INSTRUCTIONS
Status: CANCELLED | OUTPATIENT
Start: 2020-10-15

## 2020-10-15 ASSESSMENT — PAIN SCALES - GENERAL: PAINLEVEL: NO PAIN (0)

## 2020-10-15 NOTE — PROGRESS NOTES
"Video Visit Technology for this patient: [employer] Video Visit- Patient was left in waiting room   Donald Blanco is a 55 year old male who is being evaluated via a billable video visit.       The patient has been notified of following:      \"This video visit will be conducted via a call between you and your physician/provider. We have found that certain health care needs can be provided without the need for an in-person physical exam.  This service lets us provide the care you need with a video conversation.  If a prescription is necessary we can send it directly to your pharmacy.  If lab work is needed we can place an order for that and you can then stop by our lab to have the test done at a later time.     Video visits are billed at different rates depending on your insurance coverage.  Please reach out to your insurance provider with any questions.     If during the course of the call the physician/provider feels a video visit is not appropriate, you will not be charged for this service.\"     Patient has given verbal consent for Video visit? Yes  How would you like to obtain your AVS? MyChart  If you are dropped from the video visit, the video invite should be resent to: Send to e-mail at: [email or web address]    Will anyone else be joining your video visit? No      CHIEF COMPLAINT   It was my pleasure to see Donald Blanco who is a 55 year old male for follow-up of Prostate Cancer.      HPI   Donald Blanco is a very pleasant 55 year old male who presents with a history of Prostate Cancer. PSA identified while undergoing workup for kidney transplant. He has IgA nephropathy first noted 2009. Slow progression and not yet on dialysis.      He notes no significant urinary symptoms. No gross hematuria or dysuria. No family history of prostate cancer.     PSA  9/3/2020 - 13.50  8/19/2020 - 12.40    TRUS 10/7/2020  FINAL DIAGNOSIS:   A. Prostate biopsy, right base:   - Benign prostate tissue     B. Prostate " biopsy, right mid:   - Prostatic adenocarcinoma, acinar type   - [place] score 8 (4+4)   - Grade Group 4   - Extent: Involves 1/2 cores (1 mm, 5%)     C. Prostate biopsy, right apex:   - Benign prostate tissue     D. Prostate biopsy, left base:   - Prostatic adenocarcinoma, acinar type   - Clark score 7 (4+3)   - Grade Group 3   - Extent: Involves 2/2 cores (18 mm, 95% and 1 mm, 5%)     E. Prostate biopsy, left mid:   - Prostatic adenocarcinoma, acinar type   - Clark score 7 (4+3)   - Grade Group 3   - Extent: Involves 2/2 cores (12 mm, 85% and 12 mm, 75%)     F. Prostate biopsy, left apex:   - Prostatic adenocarcinoma, acinar type   - [place] score 7 (4+3)   - Grade Group 3   - Extent: Involves 2/2 cores (8 mm, 65% and 5 mm, 45%)     G. Prostate needle biopsy, target 1:   - Prostatic adenocarcinoma, acinar type   - Clark score 7 (4+3)   - Grade Group 3   - Extent: Involves 2/2 cores (8 mm, 85% and 12 mm, 90%)     H. Prostate needle biopsy, target 2:   - Prostatic adenocarcinoma, acinar type   - [place] score 8 (4+4)   - Grade Group 4   - Extent: Involves 2/2 cores (12 mm, 95% and 13 mm, 85%)     MRI Prostate   IMPRESSION:  1. Based on the most suspicious abnormality, this exam is characterized as PIRADS 5 - Clinically significant cancer is highly likely to be present.  The most suspicious abnormality is located at the left mid gland peripheral zone at the 4:00 position and there is  long segment capsular abutment indicating moderate suspicion of minimal extraprostatic extension.  2. No suspicious adenopathy or evidence of pelvic metastases.         Allergies:   Patient has no known allergies.         Review of Systems:  From intake questionnaire     Skin: negative  Eyes: negative  Ears/Nose/Throat: negative  Respiratory: No shortness of breath, dyspnea on exertion, cough, or hemoptysis  Cardiovascular: No chest pain or palpitations  Gastrointestinal: negative; no nausea/vomiting, constipation or  diarrhea  Genitourinary: as per HPI  Musculoskeletal: negative  Neurologic: negative  Psychiatric: negative  Hematologic/Lymphatic/Immunologic: negative  Endocrine: negative         Physical Exam:     Patient is a 55 year old  male   Vitals: There were no vitals taken for this visit.  Constitutional: There is no height or weight on file to calculate BMI.  Alert, no acute distress, oriented, conversant  Eyes: no scleral icterus; extraocular muscles intact  Respiratory: no respiratory distress, or pursed lip breathing  Musculoskeletal: normal range of motion  Skin: no notable lesions visible  Neuro: Alert, oriented, speech and mentation normal  Psych: affect and mood normal, alert and oriented to person, place and time         Assessment and Plan:     Assessment: 55 year old male with elevated to 13, now with finding of [place] 4+4=8 prostate cancer. We had an extensive discussion about the significance of localized, prostate cancer. We discussed the options for treatment of a localized prostate cancer including active surveillance, brachytherapy, external beam radiation therapy, and surgical removal. We discussed that based on his Clark score he would not be an ideal candidate for active surveillance. Furthermore, given his desire for transplant, active treatment of his malignancy is indicated.      We discussed the relative merits of robotic assisted radical prostatectomy. We also discussed the advantages and disadvantages and roles of open surgery vs. laparoscopic (and Da Arielle assisted) surgery. The anticipated post-operative course was explained, including an anticipated 1-2 day hospital stay. Catheter will remain in place 10-14 days.      We discussed that there was no clear evidence of advantage between surgery and radiation with regard to risk of recurrence. Regarding radiation, we discussed we discussed risks including but not limited to cancer recurrence, exacerbation of voiding symptoms or hematuria,  urinary retention, incontinence, stricture of the urinary tract, induction of second malignancy, and risks of rectal symptoms or bleeding.  We discussed fact that rectal symptoms may be more common with radiation than with other treatment modalities. With radiation therapy, we also discussed the difficulties in diagnosing recurrent disease at an early stage due to variability in PSA levels and the fact that most patients are not candidates for local salvage therapy when biochemical recurrence is declared.  We also discussed the significant morbidity of post-radiation local salvage therapy in terms of perioperative complications, erectile dysfunction and urinary incontinence. With surgery, we also discussed the potential advantage over radiation therapy in that biochemical recurrence can be detected at a relatively earlier stage and that salvage radiotherapy is successful in controlling recurrent disease in a substantial proportion of patients.  We also discussed hat salvage radiotherapy was associated with a considerably more favorable morbidity profile compared to local salvage therapies for recurrent disease post-radiation.     We discussed option for further discussion with radiation oncology, but patient is interested in surgery and would prefer to defer at this time.      The risks, benefits, alternatives, and personnel involved with robotic prostatectomy were discussed in detail. Specifically, we discussed that risks include but are not limited to anesthetic complications including stroke, MI, DVT/PE, as well as risk of bleeding requiring transfusion, bowel injury, infection, lymphocele, ureteral injury, nerve injury,urine leak and other potentially unforseen complications. Also discussed the risk of bladder neck contracture and other urinary symptoms after procedure. In addition, we discussed risk long-term of urinary incontinence and erectile dysfunction following the procedure. Finally, we discussed risk  for adverse pathologic features, including positive surgical margins and potential for post-surgical radiation, hormone ablation and long-term need for PSA monitoring.  All questions were answered in detail.  A written informed consent will be finalized on the morning of the procedure.    He states he would like to proceed with prostatectomy. Will start the scheduling process, but will also need CT and bone scan prior to surgery to rule out metastasis. We discussed this treatment process in the context of his kidney transplant workup and will keep his transplant coordinators updated as well.     Plan:  - CT, bone scan, contact transplant team  - Schedule robotic prostatectomy with bilateral pelvic lymphadenectomy    Orders  Orders Placed This Encounter   Procedures     CT Abdomen pelvis w/o contrast     NM Bone Scan Whole Body     PAC Visit Referral (For [facility] Only)     Video-Visit Details    Type of service:  Video Visit    Video Start Time: 12:50 PM  Video End Time: 1:20 PM    Originating Location (pt. Location): Home    Distant Location (provider location):  [facility] UROLOGY AND [facility] FOR PROSTATE AND UROLOGIC CANCERS    Platform used for Video Visit: Josh Vásquez MD  Urology  [facility]

## 2020-10-15 NOTE — LETTER
"10/15/2020      RE: Donald Blanco  [address] [place] [address]       Video Visit Technology for this patient: [employer] Mobile Video Visit- Patient was left in waiting room   Donald Blanco is a 55 year old male who is being evaluated via a billable video visit.       The patient has been notified of following:      \"This video visit will be conducted via a call between you and your physician/provider. We have found that certain health care needs can be provided without the need for an in-person physical exam.  This service lets us provide the care you need with a video conversation.  If a prescription is necessary we can send it directly to your pharmacy.  If lab work is needed we can place an order for that and you can then stop by our lab to have the test done at a later time.     Video visits are billed at different rates depending on your insurance coverage.  Please reach out to your insurance provider with any questions.     If during the course of the call the physician/provider feels a video visit is not appropriate, you will not be charged for this service.\"     Patient has given verbal consent for Video visit? Yes  How would you like to obtain your AVS? MyChart  If you are dropped from the video visit, the video invite should be resent to: Send to e-mail at: [email or web address]    Will anyone else be joining your video visit? No      CHIEF COMPLAINT   It was my pleasure to see Donald Blanco who is a 55 year old male for follow-up of Prostate Cancer.      HPI   Donald Blanco is a very pleasant 55 year old male who presents with a history of Prostate Cancer. PSA identified while undergoing workup for kidney transplant. He has IgA nephropathy first noted 2009. Slow progression and not yet on dialysis.      He notes no significant urinary symptoms. No gross hematuria or dysuria. No family history of prostate cancer.     PSA  9/3/2020 - 13.50  8/19/2020 - 12.40    TRUS 10/7/2020  FINAL DIAGNOSIS: "   A. Prostate biopsy, right base:   - Benign prostate tissue     B. Prostate biopsy, right mid:   - Prostatic adenocarcinoma, acinar type   - Haledon score 8 (4+4)   - Grade Group 4   - Extent: Involves 1/2 cores (1 mm, 5%)     C. Prostate biopsy, right apex:   - Benign prostate tissue     D. Prostate biopsy, left base:   - Prostatic adenocarcinoma, acinar type   - Haledon score 7 (4+3)   - Grade Group 3   - Extent: Involves 2/2 cores (18 mm, 95% and 1 mm, 5%)     E. Prostate biopsy, left mid:   - Prostatic adenocarcinoma, acinar type   - Haledon score 7 (4+3)   - Grade Group 3   - Extent: Involves 2/2 cores (12 mm, 85% and 12 mm, 75%)     F. Prostate biopsy, left apex:   - Prostatic adenocarcinoma, acinar type   - Haledon score 7 (4+3)   - Grade Group 3   - Extent: Involves 2/2 cores (8 mm, 65% and 5 mm, 45%)     G. Prostate needle biopsy, target 1:   - Prostatic adenocarcinoma, acinar type   - Haledon score 7 (4+3)   - Grade Group 3   - Extent: Involves 2/2 cores (8 mm, 85% and 12 mm, 90%)     H. Prostate needle biopsy, target 2:   - Prostatic adenocarcinoma, acinar type   - Clark score 8 (4+4)   - Grade Group 4   - Extent: Involves 2/2 cores (12 mm, 95% and 13 mm, 85%)     MRI Prostate   IMPRESSION:  1. Based on the most suspicious abnormality, this exam is characterized as PIRADS 5 - Clinically significant cancer is highly likely to be present.  The most suspicious abnormality is located at the left mid gland peripheral zone at the 4:00 position and there is  long segment capsular abutment indicating moderate suspicion of minimal extraprostatic extension.  2. No suspicious adenopathy or evidence of pelvic metastases.         Allergies:   Patient has no known allergies.         Review of Systems:  From intake questionnaire     Skin: negative  Eyes: negative  Ears/Nose/Throat: negative  Respiratory: No shortness of breath, dyspnea on exertion, cough, or hemoptysis  Cardiovascular: No chest pain or  palpitations  Gastrointestinal: negative; no nausea/vomiting, constipation or diarrhea  Genitourinary: as per HPI  Musculoskeletal: negative  Neurologic: negative  Psychiatric: negative  Hematologic/Lymphatic/Immunologic: negative  Endocrine: negative         Physical Exam:     Patient is a 55 year old  male   Vitals: There were no vitals taken for this visit.  Constitutional: There is no height or weight on file to calculate BMI.  Alert, no acute distress, oriented, conversant  Eyes: no scleral icterus; extraocular muscles intact  Respiratory: no respiratory distress, or pursed lip breathing  Musculoskeletal: normal range of motion  Skin: no notable lesions visible  Neuro: Alert, oriented, speech and mentation normal  Psych: affect and mood normal, alert and oriented to person, place and time         Assessment and Plan:     Assessment: 55 year old male with elevated to 13, now with finding of Pawnee 4+4=8 prostate cancer. We had an extensive discussion about the significance of localized, prostate cancer. We discussed the options for treatment of a localized prostate cancer including active surveillance, brachytherapy, external beam radiation therapy, and surgical removal. We discussed that based on his Pawnee score he would not be an ideal candidate for active surveillance. Furthermore, given his desire for transplant, active treatment of his malignancy is indicated.      We discussed the relative merits of robotic assisted radical prostatectomy. We also discussed the advantages and disadvantages and roles of open surgery vs. laparoscopic (and Da Arielle assisted) surgery. The anticipated post-operative course was explained, including an anticipated 1-2 day hospital stay. Catheter will remain in place 10-14 days.      We discussed that there was no clear evidence of advantage between surgery and radiation with regard to risk of recurrence. Regarding radiation, we discussed we discussed risks including but not  limited to cancer recurrence, exacerbation of voiding symptoms or hematuria, urinary retention, incontinence, stricture of the urinary tract, induction of second malignancy, and risks of rectal symptoms or bleeding.  We discussed fact that rectal symptoms may be more common with radiation than with other treatment modalities. With radiation therapy, we also discussed the difficulties in diagnosing recurrent disease at an early stage due to variability in PSA levels and the fact that most patients are not candidates for local salvage therapy when biochemical recurrence is declared.  We also discussed the significant morbidity of post-radiation local salvage therapy in terms of perioperative complications, erectile dysfunction and urinary incontinence. With surgery, we also discussed the potential advantage over radiation therapy in that biochemical recurrence can be detected at a relatively earlier stage and that salvage radiotherapy is successful in controlling recurrent disease in a substantial proportion of patients.  We also discussed hat salvage radiotherapy was associated with a considerably more favorable morbidity profile compared to local salvage therapies for recurrent disease post-radiation.     We discussed option for further discussion with radiation oncology, but patient is interested in surgery and would prefer to defer at this time.      The risks, benefits, alternatives, and personnel involved with robotic prostatectomy were discussed in detail. Specifically, we discussed that risks include but are not limited to anesthetic complications including stroke, MI, DVT/PE, as well as risk of bleeding requiring transfusion, bowel injury, infection, lymphocele, ureteral injury, nerve injury,urine leak and other potentially unforseen complications. Also discussed the risk of bladder neck contracture and other urinary symptoms after procedure. In addition, we discussed risk long-term of urinary incontinence  and erectile dysfunction following the procedure. Finally, we discussed risk for adverse pathologic features, including positive surgical margins and potential for post-surgical radiation, hormone ablation and long-term need for PSA monitoring.  All questions were answered in detail.  A written informed consent will be finalized on the morning of the procedure.    He states he would like to proceed with prostatectomy. Will start the scheduling process, but will also need CT and bone scan prior to surgery to rule out metastasis. We discussed this treatment process in the context of his kidney transplant workup and will keep his transplant coordinators updated as well.     Plan:  - CT, bone scan, contact transplant team  - Schedule robotic prostatectomy with bilateral pelvic lymphadenectomy    Orders  Orders Placed This Encounter   Procedures     CT Abdomen pelvis w/o contrast     NM Bone Scan Whole Body     PAC Visit Referral (For [facility] Only)     Video-Visit Details    Type of service:  Video Visit    Video Start Time: 12:50 PM  Video End Time: 1:20 PM    Originating Location (pt. Location): Home    Distant Location (provider location):  [facility] UROLOGY AND [facility] FOR PROSTATE AND UROLOGIC CANCERS    Platform used for Video Visit: Josh Vásquez MD  Urology  [facility]

## 2020-10-15 NOTE — PATIENT INSTRUCTIONS
Please get imaging and schedule surgery      It was a pleasure meeting with you today.  Thank you for allowing me and my team the privilege of caring for you today.  YOU are the reason we are here, and I truly hope we provided you with the excellent service you deserve.  Please let us know if there is anything else we can do for you so that we can be sure you are leaving completely satisfied with your care experience.

## 2020-10-25 DIAGNOSIS — Z11.59 ENCOUNTER FOR SCREENING FOR OTHER VIRAL DISEASES: Primary | ICD-10-CM

## 2020-10-26 ENCOUNTER — MYC MEDICAL ADVICE (OUTPATIENT)
Dept: UROLOGY | Facility: CLINIC | Age: 55
End: 2020-10-26

## 2020-10-27 ENCOUNTER — HOSPITAL ENCOUNTER (OUTPATIENT)
Dept: CT IMAGING | Facility: CLINIC | Age: 55
Discharge: HOME | End: 2020-10-27
Attending: UROLOGY | Admitting: UROLOGY
Payer: COMMERCIAL

## 2020-10-27 ENCOUNTER — HOSPITAL ENCOUNTER (OUTPATIENT)
Dept: NUCLEAR MEDICINE | Facility: CLINIC | Age: 55
Setting detail: NUCLEAR MEDICINE
End: 2020-10-27
Attending: UROLOGY
Payer: COMMERCIAL

## 2020-10-27 DIAGNOSIS — C61 PROSTATE CANCER (H): ICD-10-CM

## 2020-10-27 PROCEDURE — 78306 BONE IMAGING WHOLE BODY: CPT | Mod: 26

## 2020-10-27 PROCEDURE — 74176 CT ABD & PELVIS W/O CONTRAST: CPT | Mod: 26 | Performed by: RADIOLOGY

## 2020-10-27 PROCEDURE — 74176 CT ABD & PELVIS W/O CONTRAST: CPT

## 2020-10-27 PROCEDURE — 78306 BONE IMAGING WHOLE BODY: CPT

## 2020-10-27 PROCEDURE — 343N000001 HC RX 343: Performed by: UROLOGY

## 2020-10-27 PROCEDURE — A9503 TC99M MEDRONATE: HCPCS | Performed by: UROLOGY

## 2020-10-27 RX ORDER — TC 99M MEDRONATE 20 MG/10ML
25 INJECTION, POWDER, LYOPHILIZED, FOR SOLUTION INTRAVENOUS ONCE
Status: COMPLETED | OUTPATIENT
Start: 2020-10-27 | End: 2020-10-27

## 2020-10-27 RX ADMIN — TC 99M MEDRONATE 26.48 MCI.: 20 INJECTION, POWDER, LYOPHILIZED, FOR SOLUTION INTRAVENOUS at 08:20

## 2020-10-28 ENCOUNTER — PATIENT OUTREACH (OUTPATIENT)
Dept: UROLOGY | Facility: CLINIC | Age: 55
End: 2020-10-28

## 2020-10-29 ENCOUNTER — TELEPHONE (OUTPATIENT)
Dept: TRANSPLANT | Facility: CLINIC | Age: 55
End: 2020-10-29

## 2020-10-29 NOTE — TELEPHONE ENCOUNTER
Contacted patient to review outcome of selection committee meeting (See selection committee encounter).   Explained to patient that he/she needs to complete all components of the evaluation to be eligible for active status on the waiting list or to proceed with a live donor kidney transplant.   Reviewed next steps based on outcomes: will plan to add patient onto wait list INACTIVE status as soon as KDPI > 85% consent form is signed/surgeon consult note is available. Will plan to review outcome of prostatectomy surgery 12/14/2020 at Selection Committee to determine timing of proceeding with transplant. Will plan to have live donors continue their process at this time.   Patient will not be listed (patient is on dialysis and evaluation is not complete), patient will receive:    - An Evaluation Summary Letter indicating what is needed to complete evaluation-discussed with patient if they would like to have testing done with [facility] or locally  Confirmed with patient that on successful completion of outstanding components, patient is eligible for active status and they will receive a follow-up call.   Confirmed that patient has contact information for additional questions or concerns. Pt expressed very good understanding of all and was in good agreement with the plan.   Generated Transplant Evaluation Summary Letter today in Epic - electronically sent.

## 2020-10-29 NOTE — LETTER
10/29/20        Donald Blanco  [address] [place] [address]        Dear Donald,    It was a pleasure to see you recently for consideration of kidney transplantation. Your pre-transplant evaluation results were reviewed at our Multidisciplinary Selection Committee. The Committee is requesting the following items are completed before determining your candidacy:    1. Follow urology plan to treat your prostate cancer with prostatectomy 12/14/20.  2. Cardiology evaluation which you have already completed.   3. Weight Loss to Body Mass Index ( BMI ) of 35. Your goal weight is 243 pounds to equal a BMI of 35.   4. Renal ultrasound because of your elevated hemoglobin. I will let you know for sure if you still need to do this as you have completed both an abdominal MRI and CT scan already for urology.   5. Hepatitis B and pneumovax vaccinations need to be kept up to date.   6. Dental work needs to be kept up to date.     Upon completion of your prostatectomy, your results will be reviewed at the Multidisciplinary Selection Committee for determination of next steps in your pre kidney transplant evaluation.    It is okay to continue to have live donors in donor evaluation process at this time. Please provider this online registration site for them to complete which initiates the donor process - [email or web address]. You will be notified in the event of an approved live donor by your coordinator. You will be able to proceed with a live donor kidney transplant when you are approved to proceed by the Selection Committee.     For any questions, please contact myself at the Transplant Office at [phone] or at my Direct Number at [phone].      Sincerely,      Renee Hampton RN BSN Transplant Coordinator   Solid Organ Transplant  [facility], [facility]    CC's: Evelyn Farnsworth

## 2020-10-30 DIAGNOSIS — N18.6 ESRD (END STAGE RENAL DISEASE) (H): Primary | ICD-10-CM

## 2020-10-30 DIAGNOSIS — Z00.5 TRANSPLANT DONOR EVALUATION: Primary | ICD-10-CM

## 2020-10-30 NOTE — TELEPHONE ENCOUNTER
FUTURE VISIT INFORMATION      SURGERY INFORMATION:    Date: 20    Location: uu or    Surgeon:  Nabil Vásquez MD    Anesthesia Type:  general    Procedure: PROSTATECTOMY, ROBOT-ASSISTED, WITH PELVIC LYMPHADENECTOMY    Consult: virtual visit 10/15    RECORDS REQUESTED FROM:       Primary Care Provider: Evelyn Sewell PA-C- [facility]    Pertinent Medical History: Hypertension    Most recent EKG+ Tracin20    Most recent ECHO: 20    Most recent Cardiac Stress Test: 10/9/20

## 2020-11-04 DIAGNOSIS — N18.6 ESRD (END STAGE RENAL DISEASE) (H): Primary | ICD-10-CM

## 2020-11-05 ENCOUNTER — RESULTS ONLY (OUTPATIENT)
Dept: OTHER | Facility: CLINIC | Age: 55
End: 2020-11-05

## 2020-11-05 DIAGNOSIS — N18.6 ESRD (END STAGE RENAL DISEASE) (H): ICD-10-CM

## 2020-11-05 DIAGNOSIS — Z00.5 TRANSPLANT DONOR EVALUATION: ICD-10-CM

## 2020-11-06 LAB
COMMENT VXMB1: NORMAL
COMMENT VXMB1: NORMAL
COMMENT VXMT1: NORMAL
COMMENT VXMT1: NORMAL
CROSSMATCHDATEVXM: NORMAL
CROSSMATCHDATEVXM: NORMAL
DONOR VXM: NORMAL
DONOR VXM: NORMAL
DSA VXM B1: NORMAL
DSA VXM B1: NORMAL
DSA VXMT1: NORMAL
DSA VXMT1: NORMAL
RESULT VXM B1: NORMAL
RESULT VXM B1: NORMAL
RESULT VXM T1: NORMAL
RESULT VXM T1: NORMAL
SERUM DATE VXM B1: NORMAL
SERUM DATE VXM B1: NORMAL
SERUM DATE VXM T1: NORMAL
SERUM DATE VXM T1: NORMAL

## 2020-11-09 ENCOUNTER — RESULTS ONLY (OUTPATIENT)
Dept: OTHER | Facility: CLINIC | Age: 55
End: 2020-11-09

## 2020-11-09 DIAGNOSIS — N18.6 ESRD (END STAGE RENAL DISEASE) (H): ICD-10-CM

## 2020-11-10 LAB
COMMENT VXMB1: NORMAL
COMMENT VXMT1: NORMAL
CROSSMATCHDATEVXM: NORMAL
DONOR VXM: NORMAL
DSA VXM B1: NORMAL
DSA VXMT1: NORMAL
RESULT VXM B1: NORMAL
RESULT VXM T1: NORMAL
SERUM DATE VXM B1: NORMAL
SERUM DATE VXM T1: NORMAL

## 2020-11-19 ENCOUNTER — TELEPHONE (OUTPATIENT)
Dept: TRANSPLANT | Facility: CLINIC | Age: 55
End: 2020-11-19

## 2020-12-08 NOTE — TELEPHONE ENCOUNTER
Spoke with pt and his wife - answered questions about live donor evaluation and that the outcome of his prostetectomy will need review at the Selection Committee for candidacy. Both patient and wife expressed very good understanding of all and was in good agreement with the plan.

## 2020-12-10 ENCOUNTER — ANESTHESIA EVENT (OUTPATIENT)
Dept: SURGERY | Facility: CLINIC | Age: 55
End: 2020-12-10

## 2020-12-10 ENCOUNTER — OFFICE VISIT (OUTPATIENT)
Dept: SURGERY | Facility: CLINIC | Age: 55
End: 2020-12-10
Payer: COMMERCIAL

## 2020-12-10 ENCOUNTER — PRE VISIT (OUTPATIENT)
Dept: SURGERY | Facility: CLINIC | Age: 55
End: 2020-12-10

## 2020-12-10 ENCOUNTER — PATIENT OUTREACH (OUTPATIENT)
Dept: UROLOGY | Facility: CLINIC | Age: 55
End: 2020-12-10

## 2020-12-10 VITALS
SYSTOLIC BLOOD PRESSURE: 135 MMHG | HEART RATE: 80 BPM | RESPIRATION RATE: 16 BRPM | OXYGEN SATURATION: 97 % | TEMPERATURE: 97.9 F | HEIGHT: 70 IN | BODY MASS INDEX: 35.07 KG/M2 | DIASTOLIC BLOOD PRESSURE: 84 MMHG | WEIGHT: 245 LBS

## 2020-12-10 DIAGNOSIS — Z01.818 PREOP EXAMINATION: ICD-10-CM

## 2020-12-10 DIAGNOSIS — C61 PROSTATE CANCER (H): ICD-10-CM

## 2020-12-10 DIAGNOSIS — Z11.59 ENCOUNTER FOR SCREENING FOR OTHER VIRAL DISEASES: ICD-10-CM

## 2020-12-10 DIAGNOSIS — Z01.818 PREOP EXAMINATION: Primary | ICD-10-CM

## 2020-12-10 DIAGNOSIS — C61 PROSTATE CANCER (H): Primary | ICD-10-CM

## 2020-12-10 LAB
ALBUMIN UR-MCNC: >499 MG/DL
ANION GAP SERPL CALCULATED.3IONS-SCNC: 7 MMOL/L (ref 3–14)
APPEARANCE UR: CLEAR
BILIRUB UR QL STRIP: NEGATIVE
BUN SERPL-MCNC: 84 MG/DL (ref 7–30)
CALCIUM SERPL-MCNC: 9.6 MG/DL (ref 8.5–10.1)
CHLORIDE SERPL-SCNC: 110 MMOL/L (ref 94–109)
CO2 SERPL-SCNC: 23 MMOL/L (ref 20–32)
COLOR UR AUTO: ABNORMAL
CREAT SERPL-MCNC: 6.64 MG/DL (ref 0.66–1.25)
ERYTHROCYTE [DISTWIDTH] IN BLOOD BY AUTOMATED COUNT: 14 % (ref 10–15)
GFR SERPL CREATININE-BSD FRML MDRD: 8 ML/MIN/{1.73_M2}
GLUCOSE SERPL-MCNC: 94 MG/DL (ref 70–99)
GLUCOSE UR STRIP-MCNC: NEGATIVE MG/DL
HCT VFR BLD AUTO: 37 % (ref 40–53)
HGB BLD-MCNC: 11.7 G/DL (ref 13.3–17.7)
HGB UR QL STRIP: NEGATIVE
KETONES UR STRIP-MCNC: NEGATIVE MG/DL
LEUKOCYTE ESTERASE UR QL STRIP: NEGATIVE
MCH RBC QN AUTO: 29.6 PG (ref 26.5–33)
MCHC RBC AUTO-ENTMCNC: 31.6 G/DL (ref 31.5–36.5)
MCV RBC AUTO: 94 FL (ref 78–100)
MUCOUS THREADS #/AREA URNS LPF: PRESENT /LPF
NITRATE UR QL: NEGATIVE
PH UR STRIP: 5 PH (ref 5–7)
PLATELET # BLD AUTO: 197 10E9/L (ref 150–450)
POTASSIUM SERPL-SCNC: 4.2 MMOL/L (ref 3.4–5.3)
RBC # BLD AUTO: 3.95 10E12/L (ref 4.4–5.9)
RBC #/AREA URNS AUTO: 1 /HPF (ref 0–2)
SODIUM SERPL-SCNC: 140 MMOL/L (ref 133–144)
SOURCE: ABNORMAL
SP GR UR STRIP: 1.01 (ref 1–1.03)
UROBILINOGEN UR STRIP-MCNC: 0 MG/DL (ref 0–2)
WBC # BLD AUTO: 8.9 10E9/L (ref 4–11)
WBC #/AREA URNS AUTO: 2 /HPF (ref 0–5)

## 2020-12-10 PROCEDURE — 36415 COLL VENOUS BLD VENIPUNCTURE: CPT | Performed by: PATHOLOGY

## 2020-12-10 PROCEDURE — 86900 BLOOD TYPING SEROLOGIC ABO: CPT | Mod: 90 | Performed by: PATHOLOGY

## 2020-12-10 PROCEDURE — 80048 BASIC METABOLIC PNL TOTAL CA: CPT | Performed by: PATHOLOGY

## 2020-12-10 PROCEDURE — 86850 RBC ANTIBODY SCREEN: CPT | Mod: 90 | Performed by: PATHOLOGY

## 2020-12-10 PROCEDURE — 81001 URINALYSIS AUTO W/SCOPE: CPT | Performed by: PATHOLOGY

## 2020-12-10 PROCEDURE — 99203 OFFICE O/P NEW LOW 30 MIN: CPT | Performed by: PHYSICIAN ASSISTANT

## 2020-12-10 PROCEDURE — 86901 BLOOD TYPING SEROLOGIC RH(D): CPT | Mod: 90 | Performed by: PATHOLOGY

## 2020-12-10 PROCEDURE — U0003 INFECTIOUS AGENT DETECTION BY NUCLEIC ACID (DNA OR RNA); SEVERE ACUTE RESPIRATORY SYNDROME CORONAVIRUS 2 (SARS-COV-2) (CORONAVIRUS DISEASE [COVID-19]), AMPLIFIED PROBE TECHNIQUE, MAKING USE OF HIGH THROUGHPUT TECHNOLOGIES AS DESCRIBED BY CMS-2020-01-R: HCPCS | Mod: 90 | Performed by: PATHOLOGY

## 2020-12-10 PROCEDURE — 85027 COMPLETE CBC AUTOMATED: CPT | Performed by: PATHOLOGY

## 2020-12-10 PROCEDURE — 99000 SPECIMEN HANDLING OFFICE-LAB: CPT | Performed by: PATHOLOGY

## 2020-12-10 ASSESSMENT — LIFESTYLE VARIABLES: TOBACCO_USE: 0

## 2020-12-10 ASSESSMENT — MIFFLIN-ST. JEOR: SCORE: 1942.56

## 2020-12-10 ASSESSMENT — PAIN SCALES - GENERAL: PAINLEVEL: NO PAIN (0)

## 2020-12-10 NOTE — ANESTHESIA PREPROCEDURE EVALUATION
"Anesthesia Pre-Procedure Evaluation    Patient: Donald Blanco   MRN:     [id number] Gender:   male   Age:    55 year old :      1965        Preoperative Diagnosis: * No surgery found *        LABS:  CBC:   Lab Results   Component Value Date    WBC 7.9 2020    HGB 14.3 2020    HCT 44.2 2020     2020     BMP:   Lab Results   Component Value Date     2020    POTASSIUM 3.7 2020    CHLORIDE 106 2020    CO2 24 2020    BUN 65 (H) 2020    CR 6.13 (H) 2020    GLC 97 2020     COAGS:   Lab Results   Component Value Date    PTT 33 2020    INR 1.03 2020     POC: No results found for: BGM, HCG, HCGS  OTHER:   Lab Results   Component Value Date    YEE 9.8 2020    ALBUMIN 3.6 2020    PROTTOTAL 8.2 2020    ALT 24 2020    AST 15 2020    ALKPHOS 79 2020    BILITOTAL 0.5 2020        Preop Vitals    BP Readings from Last 3 Encounters:   10/06/20 136/80   20 (!) 153/88    Pulse Readings from Last 3 Encounters:   10/06/20 96   20 97      Resp Readings from Last 3 Encounters:   No data found for Resp    SpO2 Readings from Last 3 Encounters:   10/06/20 96%   20 94%      Temp Readings from Last 1 Encounters:   No data found for Temp    Ht Readings from Last 1 Encounters:   10/06/20 1.778 m (5' 10\")      Wt Readings from Last 1 Encounters:   10/06/20 114.8 kg (253 lb)    Estimated body mass index is 36.3 kg/m  as calculated from the following:    Height as of 10/6/20: 1.778 m (5' 10\").    Weight as of 10/6/20: 114.8 kg (253 lb).     LDA:        Past Medical History:   Diagnosis Date     CKD (chronic kidney disease) stage 4, GFR 15-29 ml/min (H)      Elevated PSA      Essential hypertension 2020     Hyperlipidemia      IgA nephropathy      Obesity      Prostate cancer (H) 10/15/2020      Past Surgical History:   Procedure Laterality Date     BACK SURGERY      Back Surgey 20+ " Years Ago      BIOPSY      [facility] 2010     COLONOSCOPY      10+ Years ago at [facility]       No Known Allergies     Anesthesia Evaluation     . Pt has had prior anesthetic.     No history of anesthetic complications          ROS/MED HX    ENT/Pulmonary:     (+)sleep apnea, uses CPAP , . .   (-) tobacco use and asthma   Neurologic:  - neg neurologic ROS     Cardiovascular:     (+) Dyslipidemia, hypertension----. : . . . :. . Previous cardiac testing Echodate:9/21/2020results:Interpretation Summary     Global and regional left ventricular function is normal with an EF of 60-65%.  Right ventricular function, chamber size, wall motion, and thickness are  normal.  The inferior vena cava is normal.  No pericardial effusion is present.  Previous study not available for comparison.Stress Testdate:10/9/2020 results:Interpretation Summary  Normal, low-risk dobutamine echocardiogram without evidence of ischemia.  87% of maximal predicted heart rate (MPHR) was achieved.  Normal biventricular size, thickness, and global systolic function at  baseline.  With low-dose dobutamine, LVEF augmented and LV cavity size decreased  appropriately.  With peak dobutamine, LVEF increased further to >70% and LV cavity size  decreased appropriately. Blood pressure was unremarkable.  No regional wall motion abnormalities at rest or with dobutamine.  The test was terminated due to the achievement of target heart rate.  No angina was elicited.  No ECG evidence of ischemia. Normal heart rate and blood pressure response to  dobutamine.  No significant valvular abnormalities are noted on screening Doppler exam.  The aortic root and visualized ascending aorta are normal.  No prior stress test for comparison.ECG reviewed date:9/21/2020 results:SR date: results:          METS/Exercise Tolerance: Comment: Does elliptical 20 minutes daily >4 METS   Hematologic:  - neg hematologic  ROS       Musculoskeletal: Comment: gout  (+) arthritis,   -       GI/Hepatic:  - neg GI/hepatic ROS       Renal/Genitourinary:     (+) chronic renal disease, type: ESRD, Pt does not require dialysis, Pt has no history of transplant, Other Renal/ Genitourinary, IgA nephropathy      Endo:     (+) Obesity, .   (-) Type I DM, Type II DM and thyroid disease   Psychiatric:  - neg psychiatric ROS       Infectious Disease:         Malignancy:   (+) Malignancy History of Prostate  Prostate CA Active status post,         Other:    (+) no H/O Chronic Pain,                       PHYSICAL EXAM:   Mental Status/Neuro: A/A/O; Age Appropriate   Airway: Facies: Thick Neck  Mallampati: I  Mouth/Opening: Full  TM distance: > 6 cm  Neck ROM: Full   Respiratory: Auscultation: CTAB     Resp. Rate: Normal     Resp. Effort: Normal      CV: Rhythm: Regular  Rate: Age appropriate  Heart: Normal Sounds  Edema: None   Comments:      Dental: Normal Dentition                JZG FV AN PLAN NO PONV RULE       PAC Discussion and Assessment    ASA Classification: 3  Case is suitable for: [place]  Anesthetic techniques and relevant risks discussed: GA  Invasive monitoring and risk discussed: No  Types:   Possibility and Risk of blood transfusion discussed: Yes  NPO instructions given:   Additional anesthetic preparation and risks discussed:   Needs early admission to pre-op area:   Other:     PAC Resident/NP Anesthesia Assessment:  Donald Blanco is a 55 year old male scheduled for PROSTATECTOMY, ROBOT-ASSISTED, WITH PELVIC LYMPHADENECTOMY on 12/14/2020 by Dr. Vásquez in treatment of prostate cancer, elevated.  PAC referral for risk assessment and optimization for anesthesia with comorbid conditions of dyslipidemia, hypertension, SORAIDA on CPAP, obesity, chronic renal insufficiency due to IgA nephropathy, gout:    Pre-operative considerations:  1.  Cardiac:  Functional status- METS >4.  Patient exercises on his home elliptical for 20 minutes daily.  Intermediate risk surgery with 6.6% (RCRI #) risk of major  adverse cardiac event.   --Hypertension, will take amlodipine day of surgery will hold Lasix.  --Dyslipidemia will take Zocor day of surgery.  Patient on ASA 81 mg.  Took his last dose today 12/10/2020.  He will hold his aspirin until after surgery.  2.  Pulm:  Airway feasible.  SORAIDA risk: Patient has known SORAIDA, on CPAP.  Never smoker. denies pulmonary symptoms.  3.  GI:  Risk of PONV score = 2.  If > 2, anti-emetic intervention recommended.  4.  Renal: History of IgA nephropathy diagnosed in 2009 with slow progression. Not on dialysis.  Now under work-up for kidney transplant and elevated PSA and prostate cancer found.  Procedure as above.  Creatinine of 6.10, 9/22/20, GFR 9, hemoglobin 13.2.  Recheck today.    VTE risk: 3%    Patient is optimized and is acceptable candidate for the proposed procedure.  No further diagnostic evaluation is needed.         **For further details of assessment, testing, and physical exam please see H and P completed on same date.          Shruthi Maier PA-C, [facility]      Reviewed and Signed by PAC Mid-Level Provider/Resident  Mid-Level Provider/Resident: Shruthi Maier  Date: 12/10/2020  Time:     Attending Anesthesiologist Anesthesia Assessment:        Anesthesiologist:   Date:   Time:   Pass/Fail:   Disposition:     PAC Pharmacist Assessment:        Pharmacist:   Date:   Time:    Shruthi Maier PA-C

## 2020-12-10 NOTE — PATIENT INSTRUCTIONS
Preparing for Your Surgery      Name:  Donald Blanco   MRN:  [id number]   :  1965   Today's Date:  12/10/2020       Arriving for surgery:  Surgery date:  20  Arrival time:  5:30 am    Restrictions due to COVID 19:  No visitors are allowed at this time.    Please come to:       [facility] [address]  [address] [place]  [address]       -    Please proceed to the Surgery Lounge on the 3rd floor. [phone]?     - ?If you are in need of directions, wheelchair or escort please stop at the Information Desk in the lobby.  Inform the information person that you are here for surgery; a wheelchair and escort will be provided to the Surgery Lounge .?     What can I eat or drink?  -  You may eat and drink normally for up to 8 hours before your surgery. (Until 11:30 am)  -  You may have clear liquids until 2 hours before surgery. (Until 5:30 am)    Examples of clear liquids:  Water  Clear broth  Juices (apple, white grape, white cranberry  and cider) without pulp  Noncarbonated, powder based beverages  (lemonade and Chad-Aid)  Sodas (Sprite, 7-Up, ginger ale and seltzer)  Coffee or tea (without milk or cream)  Gatorade    -  No Alcohol for at least 24 hours before surgery     Which medicines can I take?    **Hold Aspirin for 7 days before surgery - start holding today.**   Hold Multivitamins for 7 days before surgery.  Hold Supplements for 7 days before surgery.  Hold Ibuprofen (Advil, Motrin) for 1 day before surgery--unless otherwise directed by surgeon.  Hold Naproxen (Aleve) for 4 days before surgery.    -  DO NOT take these medications the day of surgery:  Furosemide (Lasix)    -  PLEASE TAKE these medications the day of surgery:  Allopurinol (Zyloprim)  Amlodipine (Norvasc)  Simvastatin (Zocor)      How do I prepare myself?  - Please take 2 showers before surgery using Scrubcare or Hibiclens soap.    Use this soap only from the neck to your toes.     Leave the  soap on your skin for one minute--then rinse thoroughly.      You may use your own shampoo and conditioner; no other hair products.   - Please remove all jewelry and body piercings.  - No lotions, deodorants or fragrance.  - Bring your ID and insurance card.    - All patients are required to have a Covid-19 test within 4 days of surgery/procedure.      -Patients will be contacted by the [facility] scheduling team within 1 week of surgery to make an appointment.      - Patients may call the Scheduling team at [phone] if they have not been scheduled within 4 days of  surgery.      ALL PATIENTS GOING HOME THE SAME DAY OF SURGERY ARE REQUIRED TO HAVE A RESPONSIBLE ADULT TO DRIVE AND BE IN ATTENDANCE WITH THEM FOR 24 HOURS FOLLOWING SURGERY     Questions or Concerns:    - For any questions regarding the day of surgery or your hospital stay, please contact the Pre Admission Nursing Office at [phone].       - If you have health changes between today and your surgery please call your surgeon.       For questions after surgery please call your surgeons office.

## 2020-12-10 NOTE — H&P
Pre-Operative H & P     CC:  Preoperative exam to assess for increased cardiopulmonary risk while undergoing surgery and anesthesia.    Date of Encounter: 12/10/2020  Primary Care Physician:  Evelyn Sewell  Associated diagnosis: Prostate cancer, elevated PSA    HPI  Donald Blanco is a 55 year old male who presents for pre-operative H & P in preparation for prostatectomy, robot-assisted, with pelvic lymphadenectomy with Dr. Vásquez on 12/14/2020 at [facility].  General anesthesia.    This is a 55-year-old male patient with past medical history significant for hypertension, dyslipidemia, SORAIDA on CPAP, obesity, chronic renal insufficiency due to IgA nephropathy, and gout.  Patient diagnosed with IgA nephropathy in 2009.  He has had slow progression and not needed dialysis.  He is under evaluation for renal transplant when elevated PSA and subsequent prostate cancer was found.  Above procedure is now planned.  Patient denies any known cardiac disease.  He denies any complications from prior anesthesia events.    History is obtained from the patient and chart review.    Past Medical History  Past Medical History:   Diagnosis Date     CKD (chronic kidney disease) stage 4, GFR 15-29 ml/min (H)      Elevated PSA      Essential hypertension 5/21/2020     Hyperlipidemia      IgA nephropathy      Obesity      Prostate cancer (H) 10/15/2020       Past Surgical History  Past Surgical History:   Procedure Laterality Date     BACK SURGERY      Back Surgey 20+ Years Ago      BIOPSY      [facility] 2010     COLONOSCOPY      10+ Years ago at [facility]        Hx of Blood transfusions/reactions: Denies    Hx of abnormal bleeding or anti-platelet use: ASA 81 mg    Menstrual history: No LMP for male patient.:     Steroid use in the last year: Denies    Personal or FH with difficulty with Anesthesia: Denies    Prior to Admission Medications  Current Outpatient  Medications   Medication Sig Dispense Refill     allopurinol (ZYLOPRIM) 100 MG tablet every morning        amLODIPine (NORVASC) 10 MG tablet Take 10 mg by mouth every morning        aspirin (ASA) 81 MG EC tablet Take 81 mg by mouth every morning        furosemide (LASIX) 20 MG tablet every morning        simvastatin (ZOCOR) 40 MG tablet Take 40 mg by mouth every morning        Vitamin D3 (VITAMIN D3) 25 mcg (1000 units) tablet Take 1 capsule by mouth every morning          Allergies  No Known Allergies    Social History  Social History     Socioeconomic History     Marital status:      Spouse name: Not on file     Number of children: Not on file     Years of education: Not on file     Highest education level: Not on file   Occupational History     Not on file   Social Needs     Financial resource strain: Not on file     Food insecurity     Worry: Not on file     Inability: Not on file     Transportation needs     Medical: Not on file     Non-medical: Not on file   Tobacco Use     Smoking status: Never Smoker     Smokeless tobacco: Never Used   Substance and Sexual Activity     Alcohol use: Yes     Frequency: 2-3 times a week     Drug use: Never     Sexual activity: Not on file   Lifestyle     Physical activity     Days per week: Not on file     Minutes per session: Not on file     Stress: Not on file   Relationships     Social connections     Talks on phone: Not on file     Gets together: Not on file     Attends Confucianism service: Not on file     Active member of club or organization: Not on file     Attends meetings of clubs or organizations: Not on file     Relationship status: Not on file     Intimate partner violence     Fear of current or ex partner: Not on file     Emotionally abused: Not on file     Physically abused: Not on file     Forced sexual activity: Not on file   Other Topics Concern     Parent/sibling w/ CABG, MI or angioplasty before 65F 55M? Not Asked   Social History Narrative     Not on  file       Family History  Family History   Problem Relation Age of Onset     Kidney Disease Father      Hypertension Father      Cancer Mother      No Known Problems Brother      No Known Problems Sister      No Known Problems Son      No Known Problems Daughter      No Known Problems Brother      No Known Problems Brother      No Known Problems Brother      No Known Problems Sister      No Known Problems Sister      No Known Problems Sister            Anesthesia Evaluation     . Pt has had prior anesthetic.     No history of anesthetic complications          ROS/MED HX    ENT/Pulmonary:     (+)sleep apnea, uses CPAP , . .   (-) tobacco use and asthma   Neurologic:  - neg neurologic ROS     Cardiovascular:     (+) Dyslipidemia, hypertension----. : . . . :. . Previous cardiac testing Echodate:9/21/2020results:Interpretation Summary     Global and regional left ventricular function is normal with an EF of 60-65%.  Right ventricular function, chamber size, wall motion, and thickness are  normal.  The inferior vena cava is normal.  No pericardial effusion is present.  Previous study not available for comparison.Stress Testdate:10/9/2020 results:Interpretation Summary  Normal, low-risk dobutamine echocardiogram without evidence of ischemia.  87% of maximal predicted heart rate (MPHR) was achieved.  Normal biventricular size, thickness, and global systolic function at  baseline.  With low-dose dobutamine, LVEF augmented and LV cavity size decreased  appropriately.  With peak dobutamine, LVEF increased further to >70% and LV cavity size  decreased appropriately. Blood pressure was unremarkable.  No regional wall motion abnormalities at rest or with dobutamine.  The test was terminated due to the achievement of target heart rate.  No angina was elicited.  No ECG evidence of ischemia. Normal heart rate and blood pressure response to  dobutamine.  No significant valvular abnormalities are noted on screening Doppler  "exam.  The aortic root and visualized ascending aorta are normal.  No prior stress test for comparison.ECG reviewed date:9/21/2020 results:SR date: results:          METS/Exercise Tolerance: Comment: Does elliptical 20 minutes daily >4 METS   Hematologic:  - neg hematologic  ROS       Musculoskeletal: Comment: gout  (+) arthritis,  -       GI/Hepatic:  - neg GI/hepatic ROS       Renal/Genitourinary:     (+) chronic renal disease, type: ESRD, Pt does not require dialysis, Pt has no history of transplant, Other Renal/ Genitourinary, IgA nephropathy      Endo:     (+) Obesity, .   (-) Type I DM, Type II DM and thyroid disease   Psychiatric:  - neg psychiatric ROS       Infectious Disease:         Malignancy:   (+) Malignancy History of Prostate  Prostate CA Active status post,         Other:    (+) no H/O Chronic Pain,           PHYSICAL EXAM:   Mental Status/Neuro: A/A/O; Age Appropriate   Airway: Facies: Thick Neck  Mallampati: I  Mouth/Opening: Full  TM distance: > 6 cm  Neck ROM: Full   Respiratory: Auscultation: CTAB     Resp. Rate: Normal     Resp. Effort: Normal      CV: Rhythm: Regular  Rate: Age appropriate  Heart: Normal Sounds  Edema: None   Comments:      Dental: Normal Dentition            The complete review of systems is negative other than noted in the HPI or here.   Temp: 97.9  F (36.6  C) Temp src: Oral BP: 135/84 Pulse: 80   Resp: 16 SpO2: 97 %         245 lbs 0 oz  5' 10\"[pt reported[   Body mass index is 35.15 kg/m .       Physical Exam  Constitutional: Awake, alert, cooperative, no apparent distress, and appears stated age.  Eyes: Pupils equal, round and reactive to light, extra ocular muscles intact, sclera clear, conjunctiva normal.  HENT: Normocephalic, oral pharynx with moist mucus membranes, good dentition. No goiter appreciated.   Respiratory: Clear to auscultation bilaterally, no crackles or wheezing.  Cardiovascular: Regular rate and rhythm, normal S1 and S2, and no murmur noted.  Carotids " +2, no bruits. No edema. Palpable pulses to radial  DP and PT arteries.   GI: Normal bowel sounds, soft, obese abdomen  Lymph/Hematologic: No cervical lymphadenopathy and no supraclavicular lymphadenopathy.  Genitourinary:  deferred  Skin: Warm and dry.  No rashes at anticipated surgical site.   Musculoskeletal: Full ROM of neck. There is no redness, warmth, or swelling of the joints. Gross motor strength is normal.    Neurologic: Awake, alert, oriented to name, place and time. Cranial nerves II-XII are grossly intact. Gait is normal.   Neuropsychiatric: Calm, cooperative. Normal affect.     Labs: (personally reviewed)  Component      Latest Ref Rng & Units 12/10/2020   WBC      4.0 - 11.0 10e9/L 8.9   RBC Count      4.4 - 5.9 10e12/L 3.95 (L)   Hemoglobin      13.3 - 17.7 g/dL 11.7 (L)   Hematocrit      40.0 - 53.0 % 37.0 (L)   MCV      78 - 100 fl 94   MCH      26.5 - 33.0 pg 29.6   MCHC      31.5 - 36.5 g/dL 31.6   RDW      10.0 - 15.0 % 14.0   Platelet Count      150 - 450 10e9/L 197     Component      Latest Ref Rng & Units 12/10/2020   Sodium      133 - 144 mmol/L 140   Potassium      3.4 - 5.3 mmol/L 4.2   Chloride      94 - 109 mmol/L 110 (H)   Carbon Dioxide      20 - 32 mmol/L 23   Anion Gap      3 - 14 mmol/L 7   Glucose      70 - 99 mg/dL 94   Urea Nitrogen      7 - 30 mg/dL 84 (H)   Creatinine      0.66 - 1.25 mg/dL 6.64 (H)   GFR Estimate      >60 mL/min/1.73:m2 8 (L)   GFR Estimate If Black      >60 mL/min/1.73:m2 10 (L)   Calcium      8.5 - 10.1 mg/dL 9.6       EKG: Personally reviewed: 9/21/20  SR    Cardiac echo: 9/21/20  Interpretation Summary     Global and regional left ventricular function is normal with an EF of 60-65%.  Right ventricular function, chamber size, wall motion, and thickness are  normal.  The inferior vena cava is normal.  No pericardial effusion is present.  Previous study not available for comparison    Stress test: 10/9/2020  Interpretation Summary  Normal, low-risk dobutamine  echocardiogram without evidence of ischemia.  87% of maximal predicted heart rate (MPHR) was achieved.  Normal biventricular size, thickness, and global systolic function at  baseline.  With low-dose dobutamine, LVEF augmented and LV cavity size decreased  appropriately.  With peak dobutamine, LVEF increased further to >70% and LV cavity size  decreased appropriately. Blood pressure was unremarkable.  No regional wall motion abnormalities at rest or with dobutamine.  The test was terminated due to the achievement of target heart rate.  No angina was elicited.  No ECG evidence of ischemia. Normal heart rate and blood pressure response to  dobutamine.  No significant valvular abnormalities are noted on screening Doppler exam.  The aortic root and visualized ascending aorta are normal.  No prior stress test for comparison        Outside records reviewed from: care everywhere    ASSESSMENT and PLAN  Donald Blanco is a 55 year old male scheduled for PROSTATECTOMY, ROBOT-ASSISTED, WITH PELVIC LYMPHADENECTOMY on 12/14/2020 by Dr. Vásuqez in treatment of prostate cancer, elevated.  PAC referral for risk assessment and optimization for anesthesia with comorbid conditions of dyslipidemia, hypertension, SORAIDA on CPAP, obesity, chronic renal insufficiency due to IgA nephropathy, gout:    Pre-operative considerations:  1.  Cardiac:  Functional status- METS >4.  Patient exercises on his home elliptical for 20 minutes daily.  Intermediate risk surgery with 6.6% (RCRI #) risk of major adverse cardiac event.   --Hypertension, will take amlodipine day of surgery will hold Lasix.  --Dyslipidemia will take Zocor day of surgery.  Patient on ASA 81 mg.  Took his last dose today 12/10/2020.  He will hold his aspirin until after surgery.  2.  Pulm:  Airway feasible.  SORAIDA risk: Patient has known SORAIDA, on CPAP.  Never smoker. denies pulmonary symptoms.  3.  GI:  Risk of PONV score = 2.  If > 2, anti-emetic intervention recommended.  4.  Renal:  History of IgA nephropathy diagnosed in 2009 with slow progression. Not on dialysis.  Now under work-up for kidney transplant and elevated PSA and prostate cancer found.  Procedure as above.  Creatinine of 6.10, 9/22/20, GFR 9, hemoglobin 13.2.  Recheck today.    VTE risk: 3%    Patient is optimized and is acceptable candidate for the proposed procedure.  No further diagnostic evaluation is needed.       Shruthi Maier PA-C  Preoperative Assessment Center  [facility]  Clinic and Surgery Center  Phone: [phone]  Fax: [phone]

## 2020-12-11 ENCOUNTER — ANESTHESIA EVENT (OUTPATIENT)
Dept: SURGERY | Facility: CLINIC | Age: 55
End: 2020-12-11
Payer: COMMERCIAL

## 2020-12-11 LAB
SARS-COV-2 RNA SPEC QL NAA+PROBE: NOT DETECTED
SPECIMEN SOURCE: NORMAL

## 2020-12-11 NOTE — ANESTHESIA PREPROCEDURE EVALUATION
"Anesthesia Pre-Procedure Evaluation    Patient: Donald Blanco   MRN:     [id number] Gender:   male   Age:    55 year old :      1965        Preoperative Diagnosis: Prostate cancer (H) [C61]  Elevated prostate specific antigen (PSA) [R97.20]   Procedure(s):  PROSTATECTOMY, ROBOT-ASSISTED, WITH PELVIC LYMPHADENECTOMY     LABS:  CBC:   Lab Results   Component Value Date    WBC 8.9 12/10/2020    WBC 7.9 2020    HGB 11.7 (L) 12/10/2020    HGB 14.3 2020    HCT 37.0 (L) 12/10/2020    HCT 44.2 2020     12/10/2020     2020     BMP:   Lab Results   Component Value Date     12/10/2020     2020    POTASSIUM 4.2 12/10/2020    POTASSIUM 3.7 2020    CHLORIDE 110 (H) 12/10/2020    CHLORIDE 106 2020    CO2 23 12/10/2020    CO2 24 2020    BUN 84 (H) 12/10/2020    BUN 65 (H) 2020    CR 6.64 (H) 12/10/2020    CR 6.13 (H) 2020    GLC 94 12/10/2020    GLC 97 2020     COAGS:   Lab Results   Component Value Date    PTT 33 2020    INR 1.03 2020     POC: No results found for: BGM, HCG, HCGS  OTHER:   Lab Results   Component Value Date    YEE 9.6 12/10/2020    ALBUMIN 3.6 2020    PROTTOTAL 8.2 2020    ALT 24 2020    AST 15 2020    ALKPHOS 79 2020    BILITOTAL 0.5 2020        Preop Vitals    BP Readings from Last 3 Encounters:   12/10/20 135/84   10/06/20 136/80   20 (!) 153/88    Pulse Readings from Last 3 Encounters:   12/10/20 80   10/06/20 96   20 97      Resp Readings from Last 3 Encounters:   12/10/20 16    SpO2 Readings from Last 3 Encounters:   12/10/20 97%   10/06/20 96%   20 94%      Temp Readings from Last 1 Encounters:   12/10/20 36.6  C (97.9  F) (Oral)    Ht Readings from Last 1 Encounters:   12/10/20 1.778 m (5' 10\")      Wt Readings from Last 1 Encounters:   12/10/20 111.1 kg (245 lb)    Estimated body mass index is 35.15 kg/m  as calculated from the " "following:    Height as of 12/10/20: 1.778 m (5' 10\").    Weight as of 12/10/20: 111.1 kg (245 lb).     LDA:        Past Medical History:   Diagnosis Date     CKD (chronic kidney disease) stage 4, GFR 15-29 ml/min (H)      Elevated PSA      Essential hypertension 5/21/2020     Hyperlipidemia      IgA nephropathy      Obesity      Prostate cancer (H) 10/15/2020      Past Surgical History:   Procedure Laterality Date     BACK SURGERY      Back Surgey 20+ Years Ago      BIOPSY      [facility] 2010     COLONOSCOPY      10+ Years ago at [facility]       No Known Allergies     Current Outpatient Medications   Medication Sig Dispense Refill     allopurinol (ZYLOPRIM) 100 MG tablet every morning        amLODIPine (NORVASC) 10 MG tablet Take 10 mg by mouth every morning        aspirin (ASA) 81 MG EC tablet Take 81 mg by mouth every morning        furosemide (LASIX) 20 MG tablet every morning        simvastatin (ZOCOR) 40 MG tablet Take 40 mg by mouth every morning        Vitamin D3 (VITAMIN D3) 25 mcg (1000 units) tablet Take 1 capsule by mouth every morning           Anesthesia Evaluation     . Pt has had prior anesthetic.            ROS/MED HX    ENT/Pulmonary:     (+)sleep apnea, uses CPAP , . .    Neurologic:  - neg neurologic ROS     Cardiovascular:     (+) Dyslipidemia, hypertension----. : . . . :. . Previous cardiac testing Echodate:9/21/2020results:Interpretation Summary     Global and regional left ventricular function is normal with an EF of 60-65%.  Right ventricular function, chamber size, wall motion, and thickness are  normal.  The inferior vena cava is normal.  No pericardial effusion is present.  Previous study not available for comparison.Stress Testdate:10/9/2020 results:Interpretation Summary  Normal, low-risk dobutamine echocardiogram without evidence of ischemia.  87% of maximal predicted heart rate (MPHR) was achieved.  Normal biventricular size, thickness, and global systolic function " at  baseline.  With low-dose dobutamine, LVEF augmented and LV cavity size decreased  appropriately.  With peak dobutamine, LVEF increased further to >70% and LV cavity size  decreased appropriately. Blood pressure was unremarkable.  No regional wall motion abnormalities at rest or with dobutamine.  The test was terminated due to the achievement of target heart rate.  No angina was elicited.  No ECG evidence of ischemia. Normal heart rate and blood pressure response to  dobutamine.  No significant valvular abnormalities are noted on screening Doppler exam.  The aortic root and visualized ascending aorta are normal.  No prior stress test for comparison.ECG reviewed date:9/21/2020 results:SR date: results:          METS/Exercise Tolerance: Comment: Does elliptical 20 minutes daily >4 METS   Hematologic:  - neg hematologic  ROS       Musculoskeletal: Comment: gout  (+) arthritis,  -       GI/Hepatic:  - neg GI/hepatic ROS       Renal/Genitourinary:     (+) chronic renal disease, type: ESRD, Pt does not require dialysis, Pt has no history of transplant, Other Renal/ Genitourinary, IgA nephropathy      Endo:     (+) Obesity, .      Psychiatric:  - neg psychiatric ROS       Infectious Disease:         Malignancy:   (+) Malignancy History of Prostate  Prostate CA Active status post,         Other:    (+) no H/O Chronic Pain,                      [facility] AN PHYSICAL EXAM    Assessment:   ASA SCORE: 2    H&P: History and physical reviewed and following examination; no interval change.   Smoking Status:  Non-Smoker/Unknown   NPO Status: NPO Appropriate     Plan:   Anes. Type:  General   Pre-Medication: None   Induction:  IV (Standard)   Airway: ETT; Oral   Access/Monitoring: PIV; 2nd PIV   Maintenance: Balanced     Postop Plan:   Postop Pain: Opioids  Postop Sedation/Airway: Not planned  Disposition: Outpatient     PONV Management:   Adult Risk Factors:, Non-Smoker, Postop Opioids   Prevention: Ondansetron       Comments for  Plan/Consent:  55 year old male, severe CKD (stage 5), HTN, with prostate cancer presenting for robotic prostatectomy.  - GETA  - 2 IVs  - type and screen (starting Hgb ~11)                 Praneeth Oliveros III, MD

## 2020-12-13 ENCOUNTER — HEALTH MAINTENANCE LETTER (OUTPATIENT)
Age: 55
End: 2020-12-13

## 2020-12-14 ENCOUNTER — ANESTHESIA (OUTPATIENT)
Dept: SURGERY | Facility: CLINIC | Age: 55
End: 2020-12-14
Payer: COMMERCIAL

## 2020-12-14 ENCOUNTER — HOSPITAL ENCOUNTER (OUTPATIENT)
Facility: CLINIC | Age: 55
Discharge: HOME | End: 2020-12-15
Attending: UROLOGY | Admitting: UROLOGY
Payer: COMMERCIAL

## 2020-12-14 DIAGNOSIS — C61 PROSTATE CANCER (H): ICD-10-CM

## 2020-12-14 DIAGNOSIS — R97.20 ELEVATED PROSTATE SPECIFIC ANTIGEN (PSA): ICD-10-CM

## 2020-12-14 LAB
ABO + RH BLD: NORMAL
ABO + RH BLD: NORMAL
ANION GAP SERPL CALCULATED.3IONS-SCNC: 8 MMOL/L (ref 3–14)
BLD GP AB SCN SERPL QL: NORMAL
BLOOD BANK CMNT PATIENT-IMP: NORMAL
BLOOD BANK CMNT PATIENT-IMP: NORMAL
BUN SERPL-MCNC: 75 MG/DL (ref 7–30)
CALCIUM SERPL-MCNC: 9.1 MG/DL (ref 8.5–10.1)
CHLORIDE SERPL-SCNC: 108 MMOL/L (ref 94–109)
CO2 SERPL-SCNC: 22 MMOL/L (ref 20–32)
CREAT SERPL-MCNC: 6.27 MG/DL (ref 0.66–1.25)
ERYTHROCYTE [DISTWIDTH] IN BLOOD BY AUTOMATED COUNT: 14.1 % (ref 10–15)
GFR SERPL CREATININE-BSD FRML MDRD: 9 ML/MIN/{1.73_M2}
GLUCOSE BLDC GLUCOMTR-MCNC: 93 MG/DL (ref 70–99)
GLUCOSE SERPL-MCNC: 160 MG/DL (ref 70–99)
HCT VFR BLD AUTO: 32.3 % (ref 40–53)
HGB BLD-MCNC: 10.6 G/DL (ref 13.3–17.7)
MCH RBC QN AUTO: 30.4 PG (ref 26.5–33)
MCHC RBC AUTO-ENTMCNC: 32.8 G/DL (ref 31.5–36.5)
MCV RBC AUTO: 93 FL (ref 78–100)
PLATELET # BLD AUTO: 220 10E9/L (ref 150–450)
POTASSIUM SERPL-SCNC: 4.2 MMOL/L (ref 3.4–5.3)
RBC # BLD AUTO: 3.49 10E12/L (ref 4.4–5.9)
SODIUM SERPL-SCNC: 140 MMOL/L (ref 133–144)
SPECIMEN EXP DATE BLD: NORMAL
WBC # BLD AUTO: 22.8 10E9/L (ref 4–11)

## 2020-12-14 PROCEDURE — 250N000013 HC RX MED GY IP 250 OP 250 PS 637: Performed by: STUDENT IN AN ORGANIZED HEALTH CARE EDUCATION/TRAINING PROGRAM

## 2020-12-14 PROCEDURE — 250N000003 HC SEVOFLURANE, EA 15 MIN: Performed by: UROLOGY

## 2020-12-14 PROCEDURE — 370N000001 HC ANESTHESIA TECHNICAL FEE, 1ST 30 MIN: Performed by: UROLOGY

## 2020-12-14 PROCEDURE — 88307 TISSUE EXAM BY PATHOLOGIST: CPT | Mod: 26 | Performed by: PATHOLOGY

## 2020-12-14 PROCEDURE — 761N000004 HC RECOVERY PHASE 1 LEVEL 2 EA ADDTL HR: Performed by: UROLOGY

## 2020-12-14 PROCEDURE — 258N000003 HC RX IP 258 OP 636: Performed by: STUDENT IN AN ORGANIZED HEALTH CARE EDUCATION/TRAINING PROGRAM

## 2020-12-14 PROCEDURE — 85027 COMPLETE CBC AUTOMATED: CPT | Performed by: STUDENT IN AN ORGANIZED HEALTH CARE EDUCATION/TRAINING PROGRAM

## 2020-12-14 PROCEDURE — 250N000011 HC RX IP 250 OP 636: Performed by: UROLOGY

## 2020-12-14 PROCEDURE — 250N000011 HC RX IP 250 OP 636: Performed by: STUDENT IN AN ORGANIZED HEALTH CARE EDUCATION/TRAINING PROGRAM

## 2020-12-14 PROCEDURE — 360N000070 HC SURGERY LEVEL 8 1ST 30 MIN - UMMC: Performed by: UROLOGY

## 2020-12-14 PROCEDURE — 88309 TISSUE EXAM BY PATHOLOGIST: CPT | Mod: TC | Performed by: UROLOGY

## 2020-12-14 PROCEDURE — 761N000003 HC RECOVERY PHASE 1 LEVEL 2 FIRST HR: Performed by: UROLOGY

## 2020-12-14 PROCEDURE — 999N001017 HC STATISTIC GLUCOSE BY METER IP

## 2020-12-14 PROCEDURE — 999N000138 HC STATISTIC PRE-PROCEDURE ASSESSMENT I: Performed by: UROLOGY

## 2020-12-14 PROCEDURE — 370N000002 HC ANESTHESIA TECHNICAL FEE, EACH ADDTL 15 MIN: Performed by: UROLOGY

## 2020-12-14 PROCEDURE — 272N000001 HC OR GENERAL SUPPLY STERILE: Performed by: UROLOGY

## 2020-12-14 PROCEDURE — 88309 TISSUE EXAM BY PATHOLOGIST: CPT | Mod: 26 | Performed by: PATHOLOGY

## 2020-12-14 PROCEDURE — 250N000011 HC RX IP 250 OP 636: Performed by: ANESTHESIOLOGY

## 2020-12-14 PROCEDURE — 88307 TISSUE EXAM BY PATHOLOGIST: CPT | Mod: TC | Performed by: UROLOGY

## 2020-12-14 PROCEDURE — 360N000071 HC SURGERY LEVEL 8 EA 15 ADDTL MIN UMMC: Performed by: UROLOGY

## 2020-12-14 PROCEDURE — 250N000013 HC RX MED GY IP 250 OP 250 PS 637: Performed by: ANESTHESIOLOGY

## 2020-12-14 PROCEDURE — 80048 BASIC METABOLIC PNL TOTAL CA: CPT | Performed by: STUDENT IN AN ORGANIZED HEALTH CARE EDUCATION/TRAINING PROGRAM

## 2020-12-14 PROCEDURE — 99222 1ST HOSP IP/OBS MODERATE 55: CPT | Mod: GC | Performed by: INTERNAL MEDICINE

## 2020-12-14 PROCEDURE — 250N000009 HC RX 250: Performed by: STUDENT IN AN ORGANIZED HEALTH CARE EDUCATION/TRAINING PROGRAM

## 2020-12-14 PROCEDURE — 36415 COLL VENOUS BLD VENIPUNCTURE: CPT | Performed by: STUDENT IN AN ORGANIZED HEALTH CARE EDUCATION/TRAINING PROGRAM

## 2020-12-14 RX ORDER — BISACODYL 10 MG
10 SUPPOSITORY, RECTAL RECTAL DAILY PRN
Status: DISCONTINUED | OUTPATIENT
Start: 2020-12-14 | End: 2020-12-15

## 2020-12-14 RX ORDER — ALBUTEROL SULFATE 0.83 MG/ML
2.5 SOLUTION RESPIRATORY (INHALATION) EVERY 4 HOURS PRN
Status: DISCONTINUED | OUTPATIENT
Start: 2020-12-14 | End: 2020-12-14 | Stop reason: HOSPADM

## 2020-12-14 RX ORDER — CEFAZOLIN SODIUM 2 G/100ML
2 INJECTION, SOLUTION INTRAVENOUS
Status: COMPLETED | OUTPATIENT
Start: 2020-12-14 | End: 2020-12-14

## 2020-12-14 RX ORDER — ACETAMINOPHEN 325 MG/1
975 TABLET ORAL ONCE
Status: DISCONTINUED | OUTPATIENT
Start: 2020-12-14 | End: 2020-12-14 | Stop reason: HOSPADM

## 2020-12-14 RX ORDER — SODIUM CHLORIDE, SODIUM LACTATE, POTASSIUM CHLORIDE, CALCIUM CHLORIDE 600; 310; 30; 20 MG/100ML; MG/100ML; MG/100ML; MG/100ML
INJECTION, SOLUTION INTRAVENOUS CONTINUOUS PRN
Status: DISCONTINUED | OUTPATIENT
Start: 2020-12-14 | End: 2020-12-14

## 2020-12-14 RX ORDER — GABAPENTIN 300 MG/1
300 CAPSULE ORAL ONCE
Status: COMPLETED | OUTPATIENT
Start: 2020-12-14 | End: 2020-12-14

## 2020-12-14 RX ORDER — ONDANSETRON 2 MG/ML
INJECTION INTRAMUSCULAR; INTRAVENOUS PRN
Status: DISCONTINUED | OUTPATIENT
Start: 2020-12-14 | End: 2020-12-14

## 2020-12-14 RX ORDER — NALOXONE HYDROCHLORIDE 0.4 MG/ML
0.4 INJECTION, SOLUTION INTRAMUSCULAR; INTRAVENOUS; SUBCUTANEOUS
Status: ACTIVE | OUTPATIENT
Start: 2020-12-14 | End: 2020-12-15

## 2020-12-14 RX ORDER — POLYETHYLENE GLYCOL 3350 17 G/17G
17 POWDER, FOR SOLUTION ORAL DAILY PRN
Status: DISCONTINUED | OUTPATIENT
Start: 2020-12-15 | End: 2020-12-15 | Stop reason: HOSPADM

## 2020-12-14 RX ORDER — SODIUM CHLORIDE 9 MG/ML
INJECTION, SOLUTION INTRAVENOUS CONTINUOUS
Status: DISCONTINUED | OUTPATIENT
Start: 2020-12-14 | End: 2020-12-15

## 2020-12-14 RX ORDER — ONDANSETRON 4 MG/1
4 TABLET, ORALLY DISINTEGRATING ORAL EVERY 30 MIN PRN
Status: DISCONTINUED | OUTPATIENT
Start: 2020-12-14 | End: 2020-12-14 | Stop reason: HOSPADM

## 2020-12-14 RX ORDER — CEFAZOLIN SODIUM 1 G/3ML
1 INJECTION, POWDER, FOR SOLUTION INTRAMUSCULAR; INTRAVENOUS SEE ADMIN INSTRUCTIONS
Status: DISCONTINUED | OUTPATIENT
Start: 2020-12-14 | End: 2020-12-14 | Stop reason: HOSPADM

## 2020-12-14 RX ORDER — ONDANSETRON 4 MG/1
4 TABLET, ORALLY DISINTEGRATING ORAL EVERY 6 HOURS PRN
Status: DISCONTINUED | OUTPATIENT
Start: 2020-12-14 | End: 2020-12-15 | Stop reason: HOSPADM

## 2020-12-14 RX ORDER — ASPIRIN 81 MG/1
81 TABLET, CHEWABLE ORAL EVERY MORNING
Status: DISCONTINUED | OUTPATIENT
Start: 2020-12-15 | End: 2020-12-15 | Stop reason: HOSPADM

## 2020-12-14 RX ORDER — NALOXONE HYDROCHLORIDE 0.4 MG/ML
0.2 INJECTION, SOLUTION INTRAMUSCULAR; INTRAVENOUS; SUBCUTANEOUS
Status: ACTIVE | OUTPATIENT
Start: 2020-12-14 | End: 2020-12-15

## 2020-12-14 RX ORDER — PROPOFOL 10 MG/ML
INJECTION, EMULSION INTRAVENOUS PRN
Status: DISCONTINUED | OUTPATIENT
Start: 2020-12-14 | End: 2020-12-14

## 2020-12-14 RX ORDER — FENTANYL CITRATE 50 UG/ML
INJECTION, SOLUTION INTRAMUSCULAR; INTRAVENOUS PRN
Status: DISCONTINUED | OUTPATIENT
Start: 2020-12-14 | End: 2020-12-14

## 2020-12-14 RX ORDER — OXYCODONE HYDROCHLORIDE 5 MG/1
5 TABLET ORAL EVERY 4 HOURS PRN
Status: DISCONTINUED | OUTPATIENT
Start: 2020-12-14 | End: 2020-12-14

## 2020-12-14 RX ORDER — ACETAMINOPHEN 325 MG/1
975 TABLET ORAL EVERY 6 HOURS
Status: DISCONTINUED | OUTPATIENT
Start: 2020-12-14 | End: 2020-12-15 | Stop reason: HOSPADM

## 2020-12-14 RX ORDER — SENNOSIDES 8.6 MG
8.6 TABLET ORAL 2 TIMES DAILY PRN
Status: DISCONTINUED | OUTPATIENT
Start: 2020-12-14 | End: 2020-12-15 | Stop reason: HOSPADM

## 2020-12-14 RX ORDER — HYDROMORPHONE HYDROCHLORIDE 1 MG/ML
.3-.5 INJECTION, SOLUTION INTRAMUSCULAR; INTRAVENOUS; SUBCUTANEOUS EVERY 5 MIN PRN
Status: DISCONTINUED | OUTPATIENT
Start: 2020-12-14 | End: 2020-12-14 | Stop reason: HOSPADM

## 2020-12-14 RX ORDER — ONDANSETRON 2 MG/ML
4 INJECTION INTRAMUSCULAR; INTRAVENOUS EVERY 30 MIN PRN
Status: DISCONTINUED | OUTPATIENT
Start: 2020-12-14 | End: 2020-12-14 | Stop reason: HOSPADM

## 2020-12-14 RX ORDER — LIDOCAINE 40 MG/G
CREAM TOPICAL
Status: DISCONTINUED | OUTPATIENT
Start: 2020-12-14 | End: 2020-12-15 | Stop reason: HOSPADM

## 2020-12-14 RX ORDER — SODIUM CHLORIDE 9 MG/ML
INJECTION, SOLUTION INTRAVENOUS CONTINUOUS
Status: CANCELLED | OUTPATIENT
Start: 2020-12-14

## 2020-12-14 RX ORDER — ACETAMINOPHEN 500 MG
1000 TABLET ORAL ONCE
Status: COMPLETED | OUTPATIENT
Start: 2020-12-14 | End: 2020-12-14

## 2020-12-14 RX ORDER — HEPARIN SODIUM 5000 [USP'U]/.5ML
5000 INJECTION, SOLUTION INTRAVENOUS; SUBCUTANEOUS
Status: COMPLETED | OUTPATIENT
Start: 2020-12-14 | End: 2020-12-14

## 2020-12-14 RX ORDER — HYDRALAZINE HYDROCHLORIDE 20 MG/ML
2.5-5 INJECTION INTRAMUSCULAR; INTRAVENOUS EVERY 10 MIN PRN
Status: DISCONTINUED | OUTPATIENT
Start: 2020-12-14 | End: 2020-12-14 | Stop reason: HOSPADM

## 2020-12-14 RX ORDER — LABETALOL HYDROCHLORIDE 5 MG/ML
10 INJECTION, SOLUTION INTRAVENOUS
Status: DISCONTINUED | OUTPATIENT
Start: 2020-12-14 | End: 2020-12-14 | Stop reason: HOSPADM

## 2020-12-14 RX ORDER — OXYCODONE HYDROCHLORIDE 5 MG/1
5-10 TABLET ORAL
Status: DISCONTINUED | OUTPATIENT
Start: 2020-12-14 | End: 2020-12-15 | Stop reason: HOSPADM

## 2020-12-14 RX ORDER — ONDANSETRON 2 MG/ML
4 INJECTION INTRAMUSCULAR; INTRAVENOUS EVERY 6 HOURS PRN
Status: DISCONTINUED | OUTPATIENT
Start: 2020-12-14 | End: 2020-12-15 | Stop reason: HOSPADM

## 2020-12-14 RX ORDER — LIDOCAINE 40 MG/G
CREAM TOPICAL
Status: CANCELLED | OUTPATIENT
Start: 2020-12-14

## 2020-12-14 RX ORDER — SODIUM CHLORIDE, SODIUM LACTATE, POTASSIUM CHLORIDE, CALCIUM CHLORIDE 600; 310; 30; 20 MG/100ML; MG/100ML; MG/100ML; MG/100ML
INJECTION, SOLUTION INTRAVENOUS CONTINUOUS
Status: DISCONTINUED | OUTPATIENT
Start: 2020-12-14 | End: 2020-12-14 | Stop reason: HOSPADM

## 2020-12-14 RX ORDER — KETAMINE HYDROCHLORIDE 10 MG/ML
INJECTION INTRAMUSCULAR; INTRAVENOUS PRN
Status: DISCONTINUED | OUTPATIENT
Start: 2020-12-14 | End: 2020-12-14

## 2020-12-14 RX ORDER — PROCHLORPERAZINE MALEATE 5 MG
10 TABLET ORAL EVERY 6 HOURS PRN
Status: DISCONTINUED | OUTPATIENT
Start: 2020-12-14 | End: 2020-12-15 | Stop reason: HOSPADM

## 2020-12-14 RX ORDER — FENTANYL CITRATE 50 UG/ML
25-50 INJECTION, SOLUTION INTRAMUSCULAR; INTRAVENOUS
Status: DISCONTINUED | OUTPATIENT
Start: 2020-12-14 | End: 2020-12-14 | Stop reason: HOSPADM

## 2020-12-14 RX ADMIN — SUGAMMADEX 200 MG: 100 INJECTION, SOLUTION INTRAVENOUS at 12:03

## 2020-12-14 RX ADMIN — Medication 1 G: at 12:00

## 2020-12-14 RX ADMIN — GABAPENTIN 300 MG: 300 CAPSULE ORAL at 07:01

## 2020-12-14 RX ADMIN — Medication 30 MG: at 10:58

## 2020-12-14 RX ADMIN — FENTANYL CITRATE 100 MCG: 50 INJECTION, SOLUTION INTRAMUSCULAR; INTRAVENOUS at 07:44

## 2020-12-14 RX ADMIN — SIMETHICONE 40 MG: 63.3; 3.7 SOLUTION/ DROPS ORAL at 22:10

## 2020-12-14 RX ADMIN — ROCURONIUM BROMIDE 70 MG: 10 INJECTION INTRAVENOUS at 07:44

## 2020-12-14 RX ADMIN — ACETAMINOPHEN 1000 MG: 500 TABLET ORAL at 06:59

## 2020-12-14 RX ADMIN — ACETAMINOPHEN 975 MG: 325 TABLET, FILM COATED ORAL at 22:04

## 2020-12-14 RX ADMIN — ROCURONIUM BROMIDE 30 MG: 10 INJECTION INTRAVENOUS at 08:18

## 2020-12-14 RX ADMIN — MIDAZOLAM 2 MG: 1 INJECTION INTRAMUSCULAR; INTRAVENOUS at 07:37

## 2020-12-14 RX ADMIN — FENTANYL CITRATE 50 MCG: 50 INJECTION, SOLUTION INTRAMUSCULAR; INTRAVENOUS at 08:29

## 2020-12-14 RX ADMIN — ACETAMINOPHEN 975 MG: 325 TABLET, FILM COATED ORAL at 15:55

## 2020-12-14 RX ADMIN — HEPARIN SODIUM 5000 UNITS: 5000 INJECTION, SOLUTION INTRAVENOUS; SUBCUTANEOUS at 06:59

## 2020-12-14 RX ADMIN — ONDANSETRON 4 MG: 2 INJECTION INTRAMUSCULAR; INTRAVENOUS at 12:33

## 2020-12-14 RX ADMIN — ONDANSETRON 4 MG: 2 INJECTION INTRAMUSCULAR; INTRAVENOUS at 11:29

## 2020-12-14 RX ADMIN — OXYCODONE HYDROCHLORIDE 10 MG: 5 TABLET ORAL at 19:26

## 2020-12-14 RX ADMIN — Medication 2 G: at 08:00

## 2020-12-14 RX ADMIN — HYDROMORPHONE HYDROCHLORIDE 0.3 MG: 1 INJECTION, SOLUTION INTRAMUSCULAR; INTRAVENOUS; SUBCUTANEOUS at 14:46

## 2020-12-14 RX ADMIN — PHENYLEPHRINE HYDROCHLORIDE 100 MCG: 10 INJECTION INTRAVENOUS at 11:46

## 2020-12-14 RX ADMIN — PROPOFOL 200 MG: 10 INJECTION, EMULSION INTRAVENOUS at 07:44

## 2020-12-14 RX ADMIN — ROCURONIUM BROMIDE 10 MG: 10 INJECTION INTRAVENOUS at 09:49

## 2020-12-14 RX ADMIN — HYDROMORPHONE HYDROCHLORIDE 0.5 MG: 1 INJECTION, SOLUTION INTRAMUSCULAR; INTRAVENOUS; SUBCUTANEOUS at 11:41

## 2020-12-14 RX ADMIN — ROCURONIUM BROMIDE 10 MG: 10 INJECTION INTRAVENOUS at 10:49

## 2020-12-14 RX ADMIN — FENTANYL CITRATE 50 MCG: 50 INJECTION, SOLUTION INTRAMUSCULAR; INTRAVENOUS at 08:32

## 2020-12-14 RX ADMIN — SODIUM CHLORIDE, POTASSIUM CHLORIDE, SODIUM LACTATE AND CALCIUM CHLORIDE: 600; 310; 30; 20 INJECTION, SOLUTION INTRAVENOUS at 07:37

## 2020-12-14 RX ADMIN — OXYCODONE HYDROCHLORIDE 5 MG: 5 TABLET ORAL at 22:50

## 2020-12-14 RX ADMIN — OXYCODONE HYDROCHLORIDE 5 MG: 5 TABLET ORAL at 15:55

## 2020-12-14 RX ADMIN — HYDROMORPHONE HYDROCHLORIDE 0.5 MG: 1 INJECTION, SOLUTION INTRAMUSCULAR; INTRAVENOUS; SUBCUTANEOUS at 08:30

## 2020-12-14 RX ADMIN — SODIUM CHLORIDE: 9 INJECTION, SOLUTION INTRAVENOUS at 14:35

## 2020-12-14 RX ADMIN — FENTANYL CITRATE 100 MCG: 50 INJECTION, SOLUTION INTRAMUSCULAR; INTRAVENOUS at 08:20

## 2020-12-14 ASSESSMENT — MIFFLIN-ST. JEOR
SCORE: 1946.25
SCORE: 1909.25

## 2020-12-14 NOTE — PROGRESS NOTES
Patient arrived to  at 1500 s/p prostatectomy and lymphadenectomy, A&O x4 and ambulated from cart to bed. Patient reported some dizziness but quickly resolved after he laid down in bed. 4 lap sites on abdomen with liquid bandage, gardiner and BAUDILIO intact. Capno on with 1 L O2, patient reports he wears CPAP at night. Waiting for belongings to be sent up from PACU. Report given to on coming nurse(s),

## 2020-12-14 NOTE — ANESTHESIA PROCEDURE NOTES
Airway   Date/Time: 12/14/2020 7:47 AM   Patient location during procedure: OR    Staff -   Anesthesiologist:  Nilson Martines MD  Resident/Fellow: Praneeth Oliveros III, MD  Performed By: resident    Consent for Airway   Urgency: elective    Indications and Patient Condition  Indications for airway management: twin-procedural  Mask difficulty assessment: 1 - vent by mask    Final Airway Details  Final airway type: endotracheal airway  Successful airway:ETT - single  Endotracheal Airway Details   ETT size (mm): 8.0  Cuffed: yes  Successful intubation technique: video laryngoscopy  Grade View of Cords: 1  Adjucts: stylet  Measured from: gums/teeth  Secured at (cm): 24  Secured with: pink tape  Bite block used: None    Post intubation assessment   Placement verified by: capnometry, equal breath sounds and chest rise   Number of attempts at approach: 1  Number of other approaches attempted: 0  Secured with:pink tape  Ease of procedure: easy  Dentition: IntactAdditional Comments  Routine intubation, no DL attempt.  Easy with videoscope

## 2020-12-14 NOTE — ANESTHESIA POSTPROCEDURE EVALUATION
Anesthesia POST Procedure Evaluation    Patient: Donald Blanco   MRN:     [id number] Gender:   male   Age:    55 year old :      1965        Preoperative Diagnosis: Prostate cancer (H) [C61]  Elevated prostate specific antigen (PSA) [R97.20]   Procedure(s):  PROSTATECTOMY, ROBOT-ASSISTED, WITH PELVIC LYMPHADENECTOMY   Postop Comments: No value filed.     Anesthesia Type: General          Postop Pain Control: Uneventful            Sign Out: Well controlled pain   PONV: No   Neuro/Psych: Uneventful            Sign Out: Acceptable/Baseline neuro status   Airway/Respiratory: Uneventful            Sign Out: Acceptable/Baseline resp. status   CV/Hemodynamics: Uneventful            Sign Out: Acceptable CV status   Other NRE: NONE   DID A NON-ROUTINE EVENT OCCUR? No         Last Anesthesia Record Vitals:  CRNA VITALS  2020 1146 - 2020 1246      2020             Resp Rate (observed):  (!) 6          Last PACU Vitals:  Vitals Value Taken Time   /65 20 1244   Temp 36.1  C (96.98  F) 20 1246   Pulse 78 20 1246   Resp 14 20 1230   SpO2 96 % 20 1246   Temp src     NIBP     Pulse     SpO2     Resp     Temp     Ht Rate     Temp 2     Vitals shown include unvalidated device data.      Electronically Signed By: Nilson Martines MD, 2020, 12:48 PM

## 2020-12-14 NOTE — OP NOTE
OPERATIVE REPORT  DATE OF PROCEDURE: 12/14/2020  PRE-PROCEDURE DIAGNOSIS: Prostate cancer.   POST-PROCEDURE DIAGNOSIS: Prostate cancer.   PROCEDURES PERFORMED:   1) Robotic-assisted laparoscopic radical prostatectomy  2) Bilateral pelvic lymphadenectomy  SURGEON: Nabil Vásquez MD - Present and scrubbed for the entire procedure  ASSISTANT: Nabil Chan MD; Kelvin Louie MD  ANESTHESIA: General with endotracheal intubation.   INDICATIONS FOR PROCEDURE: Donald Blanco is a 55 year-old gentleman with a history of [place] 4+4 = 8 adenocarcinoma of the prostate. He has been counseled regarding treatment options and presents to undergo surgery.   Findings: Prostate and seminal vesicles removed without complication. Lymph nodes grossly negative. Water-tight anastomosis.  Specimens:    ID Type Source Tests Collected by Time Destination   A : prostate and seminal vesicles Tissue Prostate SURGICAL PATHOLOGY EXAM Nabil Vásquez MD 12/14/2020 10:57 AM    B : bilateral pelvic lymph nodes Tissue Lymph Node SURGICAL PATHOLOGY EXAM Nabil Vásquez MD 12/14/2020 10:59 AM      DESCRIPTION OF PROCEDURE: After fully informed voluntary consent was obtained, the patient was brought into the operating room, properly identified and placed in a supine position on the table. General anesthesia was induced, endotracheal intubation performed, IV Ancef administered. The patient was then positioned appropriately on the table and all pressure points were padded and he was secured to the table with tape. Once the positioning was performed, we proceeded with shaving, prepping and draping the abdomen in the usual sterile fashion. Flores was placed in the bladder in a sterile manner on the field. A transverse midline incision about 2 cm above the umbilicus was made and a Veress needle introduced into the abdomen through this incision. Once the abdomen was accessed, it was confirmed using a saline drop test and the  abdomen was insufflated. Once the abdomen was insufflated, additional robotic ports, 2 on the left and 1 on the right, were placed. A 12mm right-sided assistant port lateral to the robotic ports. There was some omentum adhered to the anterior abdominal wall near the umbilicus and these adhesions were divided laparoscopically. Robot was then docked and surgery was commenced.   The sigmoid was adherent to the lateral pelvic sidewall and these adhesions were taken down sharply. A transverse incision was made posterior to the seminal vesicles. Denonvilliers' fascia was incised the rectum was mobilized way from the posterior prostate. The vas deferens on both sides was identified and divided with electrocautery and the seminal vesicles were carefully dissected out with the vasculature being controlled using Lapro clips. Once seminal vesicle and vas deferens were dissected all the way down to the prostate, attention was turned to bringing the bladder down off the anterior wall of the abdomen. Incisions were made lateral the medial umbilical ligaments and carried all the way up to the umbilicus and the urachus was divided horizontally. The bladder was mobilized away from the anterior abdominal wall and the the pelvic sidewalls on both sides. Endopelvic fascia was opened on each side and lateral prostate was dissected away from the levator musculature. An 0-Vicryl suture was placed to ligate the dorsal venous complex. The complex was notable for it's large size, limiting mobility of the prostate anteriorly.   The anterior bladder neck was then incised at the base of the prostate and dissection was carried through the anterior urethra to identify the Flores catheter. The Flores was then pulled out through this opening in the urethra to give upward traction on the prostate. Posterior bladder neck was then divided. There was no median lobe. Attention was then turned to the prostatic pedicles. A partial bilateral nerve-sparing  procedure was performed by sharply mobilizing the neurovascular bundles away from the posterior-lateral aspect of the prostate. Given appearance on MRI, dissection was carried wider given suspicion for extracapsular extension. The prostatic pedicles were then divided using clips on both sides all the way to the apex. The dorsal venous complex was then divided with cautery. The anterior urethra and apex of prostate were dissected free. Urethra was divided and prostate was free. It was placed in an endocatch bag.  Prostatic fossa was carefully inspected for hemostasis. Pelvic lymph node dissection was performed removing all fannie tissue between the external iliac vein, the pelvic floor, inguinal ligament up to the bifurcation of the common iliac vessels on both sides. Vesicourethral anastomosis was then performed in a standard fashion using two 3-0 V-lock sutures, the tail ends of which had been tied together. The first suture was taken at the 5 o'clock position through the bladder neck and taken through the posterior urethral plate and up the left side of the urethra and bladder to the 11 o'clock position. We then brought the right sided anastomotic suture through the urethra and up the right side of the urethra and bladder to the midline. The sutures were then tied down. A final Flores catheter was then placed and the bladder irrigated.  The anastomosis was tested with saline and found it to be leak free. An EndoCatch bag was used to retrieve the specimen and a 19 Brien drain was placed in the pelvis. Robot was undocked and ports removed. Midline camera port incision was enlarged in a transverse fashion and the specimen retrieved. The fascia was reapproximated using interrupted figure-of-eight 0 Vicryl sutures on UR-6 needle. The skin of all the incisions was closed using running subcuticular closure with 4-0 Monocryl. Skin glue was placed.      The patient tolerated the procedure well. There were no complications.  Blood loss was 300 mL. All sponge, needle and instrument counts were correct and doubly verified prior to closure. I was present and involved in the entire procedure.      Nabil Vásquez MD  Urology  [facility]

## 2020-12-14 NOTE — BRIEF OP NOTE
[facility]     Brief Operative Note    Pre-operative diagnosis: Prostate cancer (H) [C61]  Elevated prostate specific antigen (PSA) [R97.20]  Post-operative diagnosis Same as pre-operative diagnosis    Procedure: Procedure(s):  PROSTATECTOMY, ROBOT-ASSISTED, WITH PELVIC LYMPHADENECTOMY  Surgeon: Surgeon(s) and Role:     * Nabil Vásquez MD - Primary     * Nabil Chan MD - Resident - Assisting     * Kelvin Louie MD - Resident - Assisting  Anesthesia: General   Estimated blood loss: 200 ml  Drains: 19-Fr BAUDILIO drain, 20-Fr gardiner catheter   Specimens:   ID Type Source Tests Collected by Time Destination   A : prostate and seminal vesicles Tissue Prostate SURGICAL PATHOLOGY EXAM Nabil Vásquez MD 12/14/2020 10:57 AM    B : bilateral pelvic lymph nodes Tissue Lymph Node SURGICAL PATHOLOGY EXAM Nabil Vásquez MD 12/14/2020 10:59 AM      Findings:   See full operative report .  Complications: None.  Implants: * No implants in log *

## 2020-12-14 NOTE — ANESTHESIA CARE TRANSFER NOTE
Patient: Donald Blanco    Procedure(s):  PROSTATECTOMY, ROBOT-ASSISTED, WITH PELVIC LYMPHADENECTOMY    Diagnosis: Prostate cancer (H) [C61]  Elevated prostate specific antigen (PSA) [R97.20]  Diagnosis Additional Information: No value filed.    Anesthesia Type:   General     Note:  Airway :Face Mask  Patient transferred to:PACU  Handoff Report: Identifed the Patient, Identified the Reponsible Provider, Reviewed the pertinent medical history, Discussed the surgical course, Reviewed Intra-OP anesthesia mangement and issues during anesthesia, Set expectations for post-procedure period and Allowed opportunity for questions and acknowledgement of understanding      Vitals: (Last set prior to Anesthesia Care Transfer)    CRNA VITALS  12/14/2020 1146 - 12/14/2020 1220      12/14/2020             Resp Rate (observed):  (!) 6                Electronically Signed By: Praneeth Oliveros III, MD  December 14, 2020  12:20 PM

## 2020-12-14 NOTE — CONSULTS
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  Nephrology team has been consulted for CKD management and also to assist with anemia of chronic disease.    Prostate cancer   S/p robotic assisted laparoscopic radical prostatectomy with bilateral pelvic lymphadenectomy, 12/14/2020.  Management per primary team.      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  Diagnosed to have prostate cancer recently during evaluation for transplant.  Review of his records show that he has list of living donors who have been evaluated.  Hence AV fistula creation was not pursued.  No indication for RRT at this time   Monitor daily labs , I/O, daily weight   HD Access - none       Anemia: Hemoglobin 10.6.  Baseline 11.7.No recent iron panel - will order .  I discussed patient's case with Transplant nephrology team Attending staff Dr Erick Thomas.   Since patient is getting ready for transplant in the future, we should try to avoid blood transfusion if possible to minimize antibody development . Of course if he has life threatening bleeding or rapid  Drop in Hemoglobin , then there is no contraindication for transfusion . Pretransfusion prophylaxis  With cellcept is a consideration but currently risk of infection including that of COVID19  Infection risk in high and   Risks of cellcept use outweighs benefit . Hence we  Do not advise pretransfusion cellcept . This was  discussed with primary team      Volume status:  HTN: Not a candidate for ACE inhibition due to advanced renal disease currently    Electrolytes: No acute issues    Acid-base: No acute issues    BMD  Secondary hyperparathyroidism:  ( 9/20) . Vit D 47 ( 4/2019)     Gout: Continue allopurinol 200 mg daily.    Recommendations were communicated to primary team via note and plan discussed with Primary team   Patient seen and discussed with Dr Del Meza MD, FACP  Nephrology Fellow   [facility]   Pager [phone]      REASON FOR CONSULT: Management of CKD stage V, not on hemodialysis.  Follows with Dr Cornejo , [facility] [place]       HISTORY OF PRESENT ILLNESS:  Admitting provider and nursing notes reviewed  Donald Blanco is a 55 year old male patient with history of CKD stage 4  2/2 to IgA nephropathy ( KIDNEY, RIGHT, NEEDLE BIOPSY October 2009  : IgA nephropathy with sclerosing glomerulonephritis. Marked interstitial fibrosis and moderate arteriolosclerosis) .Other comorbodities include secondary hyperparathyroidism due to renal insufficiency, HTN, anemia of chronic disease, gout.    Patient  is currently undergoing evaluation for kidney transplant, was found to have prostate cancer on evaluation and underwent prostate surgery today.  Nephrology team has been consulted for CKD management and also to assist with anemia of chronic disease.    Postoperatively patient is hemodynamically stable.  Labs show stable electrolytes.  No acute acid-base issues.  Vitals are stable.    Most recent COVID-19 PCR test dated 12/10/2020: Negative.    He denies having any chest pain or shortness of breath.  No abdominal pain.  Still makes urine, no gross hematuria or any voiding difficulty.      PAST MEDICAL HISTORY:  Reviewed with patient on 12/14/2020     Past Medical History:   Diagnosis Date     CKD (chronic kidney disease) stage 4, GFR 15-29 ml/min (H)      Elevated PSA      Essential  hypertension 5/21/2020     Hyperlipidemia      IgA nephropathy      Obesity      Prostate cancer (H) 10/15/2020       Past Surgical History:   Procedure Laterality Date     BACK SURGERY      Back Surgey 20+ Years Ago      BIOPSY      [facility] 2010     COLONOSCOPY      10+ Years ago at [facility]         MEDICATIONS:  PTA Meds  Prior to Admission medications    Medication Sig Last Dose Taking? Auth Provider   allopurinol (ZYLOPRIM) 100 MG tablet every morning  12/14/2020 at 0500 Yes Reported, Patient   amLODIPine (NORVASC) 10 MG tablet Take 10 mg by mouth every morning  12/14/2020 at 0500 Yes Reported, Patient   aspirin (ASA) 81 MG EC tablet Take 81 mg by mouth every morning  Past Week at Unknown time Yes Reported, Patient   furosemide (LASIX) 20 MG tablet every morning  12/13/2020 at 2000 Yes Reported, Patient   simvastatin (ZOCOR) 40 MG tablet Take 40 mg by mouth every morning  12/14/2020 at 0500 Yes Reported, Patient   Vitamin D3 (VITAMIN D3) 25 mcg (1000 units) tablet Take 1 capsule by mouth every morning  12/13/2020 at 2000 Yes Reported, Patient      Current Meds    acetaminophen  975 mg Oral Q6H     [START ON 12/15/2020] aspirin  81 mg Oral QAM     sodium chloride (PF)  3 mL Intracatheter Q8H     Infusion Meds    sodium chloride 75 mL/hr at 12/14/20 1435       ALLERGIES:    No Known Allergies    REVIEW OF SYSTEMS:  A comprehensive of systems was negative except as noted above.    SOCIAL HISTORY:   Social History     Socioeconomic History     Marital status:      Spouse name: Not on file     Number of children: Not on file     Years of education: Not on file     Highest education level: Not on file   Occupational History     Not on file   Social Needs     Financial resource strain: Not on file     Food insecurity     Worry: Not on file     Inability: Not on file     Transportation needs     Medical: Not on file     Non-medical: Not on file   Tobacco Use     Smoking status: Never Smoker      "Smokeless tobacco: Never Used   Substance and Sexual Activity     Alcohol use: Yes     Frequency: 2-3 times a week     Drug use: Never     Sexual activity: Not on file   Lifestyle     Physical activity     Days per week: Not on file     Minutes per session: Not on file     Stress: Not on file   Relationships     Social connections     Talks on phone: Not on file     Gets together: Not on file     Attends Mu-ism service: Not on file     Active member of club or organization: Not on file     Attends meetings of clubs or organizations: Not on file     Relationship status: Not on file     Intimate partner violence     Fear of current or ex partner: Not on file     Emotionally abused: Not on file     Physically abused: Not on file     Forced sexual activity: Not on file   Other Topics Concern     Parent/sibling w/ CABG, MI or angioplasty before 65F 55M? Not Asked   Social History Narrative     Not on file     Reviewed with patient     FAMILY MEDICAL HISTORY:   Family History   Problem Relation Age of Onset     Kidney Disease Father      Hypertension Father      Cancer Mother      No Known Problems Brother      No Known Problems Sister      No Known Problems Son      No Known Problems Daughter      No Known Problems Brother      No Known Problems Brother      No Known Problems Brother      No Known Problems Sister      No Known Problems Sister      No Known Problems Sister      Reviewed with patient     PHYSICAL EXAM:   Temp  Av.4  F (36.3  C)  Min: 97.2  F (36.2  C)  Max: 98.4  F (36.9  C)      Pulse  Av.2  Min: 81  Max: 95 Resp  Av.3  Min: 14  Max: 20  SpO2  Av.1 %  Min: 94 %  Max: 100 %       BP (!) 147/76   Pulse 92   Temp 97.5  F (36.4  C)   Resp 16   Ht 1.778 m (5' 10\")   Wt 107.8 kg (237 lb 10.5 oz)   SpO2 99%   BMI 34.10 kg/m     Date 20 0700 - 12/15/20 0659   Shift 2022-5089 3824-2975 7525-5677 24 Hour Total   INTAKE   I.V. 700   700   Shift Total(mL/kg) 700(6.49)   700(6.49) "   OUTPUT   Urine 350   350   Drains 25   25   Blood 300   300   Shift Total(mL/kg) 675(6.26)   675(6.26)   Weight (kg) 107.8 107.8 107.8 107.8      Admit Weight: 107.8 kg (237 lb 10.5 oz)     GENERAL APPEARANCE:no distress,  awake  EYES: no scleral icterus, pupils equal  Endo: no goiter, no moon facies  Lymphatics: no cervical or supraclavicular LAD  Pulmonary: lungs clear to auscultation with equal breath sounds bilaterally, no clubbing  CV: regular rhythm, normal rate, no rub   - JVD none   - Edema none  GI: soft, nontender, normal bowel sounds  MS: no evidence of inflammation in joints, no muscle tenderness  : has  gardiner  SKIN: no rash, warm, dry, no cyanosis  NEURO: face symmetric,no asterixis     LABS:   CMP  Recent Labs   Lab 12/14/20  1244 12/10/20  1333    140   POTASSIUM 4.2 4.2   CHLORIDE 108 110*   CO2 22 23   ANIONGAP 8 7   * 94   BUN 75* 84*   CR 6.27* 6.64*   GFRESTIMATED 9* 8*   GFRESTBLACK 10* 10*   YEE 9.1 9.6     CBC  Recent Labs   Lab 12/14/20  1244 12/10/20  1333   HGB 10.6* 11.7*   WBC 22.8* 8.9   RBC 3.49* 3.95*   HCT 32.3* 37.0*   MCV 93 94   MCH 30.4 29.6   MCHC 32.8 31.6   RDW 14.1 14.0    197     INRNo lab results found in last 7 days.  ABGNo lab results found in last 7 days.   URINE STUDIES  Recent Labs   Lab Test 12/10/20  1338 09/03/20  1142   COLOR Straw Straw   APPEARANCE Clear Clear   URINEGLC Negative Negative   URINEBILI Negative Negative   URINEKETONE Negative Negative   SG 1.011 1.011   UBLD Negative Small*   URINEPH 5.0 5.0   PROTEIN >499* >499*   NITRITE Negative Negative   LEUKEST Negative Negative   RBCU 1 1   WBCU 2 2     No lab results found.  PTH  No lab results found.  IRON STUDIES  No lab results found.    IMAGING:  All imaging studies reviewed by me.     Derrick Ayala MD

## 2020-12-15 VITALS
DIASTOLIC BLOOD PRESSURE: 74 MMHG | WEIGHT: 245.81 LBS | OXYGEN SATURATION: 92 % | HEIGHT: 70 IN | SYSTOLIC BLOOD PRESSURE: 144 MMHG | HEART RATE: 82 BPM | RESPIRATION RATE: 22 BRPM | BODY MASS INDEX: 35.19 KG/M2 | TEMPERATURE: 97.3 F

## 2020-12-15 LAB
ANION GAP SERPL CALCULATED.3IONS-SCNC: 8 MMOL/L (ref 3–14)
BUN SERPL-MCNC: 70 MG/DL (ref 7–30)
CALCIUM SERPL-MCNC: 8.6 MG/DL (ref 8.5–10.1)
CHLORIDE SERPL-SCNC: 110 MMOL/L (ref 94–109)
CO2 SERPL-SCNC: 22 MMOL/L (ref 20–32)
CREAT FLD-MCNC: 6.3 MG/DL
CREAT SERPL-MCNC: 6.13 MG/DL (ref 0.66–1.25)
ERYTHROCYTE [DISTWIDTH] IN BLOOD BY AUTOMATED COUNT: 14.1 % (ref 10–15)
GFR SERPL CREATININE-BSD FRML MDRD: 9 ML/MIN/{1.73_M2}
GLUCOSE BLDC GLUCOMTR-MCNC: 102 MG/DL (ref 70–99)
GLUCOSE SERPL-MCNC: 106 MG/DL (ref 70–99)
HCT VFR BLD AUTO: 27.6 % (ref 40–53)
HGB BLD-MCNC: 8.9 G/DL (ref 13.3–17.7)
HGB BLD-MCNC: 9.2 G/DL (ref 13.3–17.7)
MCH RBC QN AUTO: 30.2 PG (ref 26.5–33)
MCHC RBC AUTO-ENTMCNC: 32.2 G/DL (ref 31.5–36.5)
MCV RBC AUTO: 94 FL (ref 78–100)
PLATELET # BLD AUTO: 170 10E9/L (ref 150–450)
POTASSIUM SERPL-SCNC: 3.9 MMOL/L (ref 3.4–5.3)
RBC # BLD AUTO: 2.95 10E12/L (ref 4.4–5.9)
SODIUM SERPL-SCNC: 140 MMOL/L (ref 133–144)
SPECIMEN SOURCE FLD: NORMAL
WBC # BLD AUTO: 10.6 10E9/L (ref 4–11)

## 2020-12-15 PROCEDURE — 250N000013 HC RX MED GY IP 250 OP 250 PS 637: Performed by: STUDENT IN AN ORGANIZED HEALTH CARE EDUCATION/TRAINING PROGRAM

## 2020-12-15 PROCEDURE — 85027 COMPLETE CBC AUTOMATED: CPT | Performed by: STUDENT IN AN ORGANIZED HEALTH CARE EDUCATION/TRAINING PROGRAM

## 2020-12-15 PROCEDURE — 80048 BASIC METABOLIC PNL TOTAL CA: CPT | Performed by: STUDENT IN AN ORGANIZED HEALTH CARE EDUCATION/TRAINING PROGRAM

## 2020-12-15 PROCEDURE — 258N000003 HC RX IP 258 OP 636: Performed by: STUDENT IN AN ORGANIZED HEALTH CARE EDUCATION/TRAINING PROGRAM

## 2020-12-15 PROCEDURE — 85018 HEMOGLOBIN: CPT | Performed by: STUDENT IN AN ORGANIZED HEALTH CARE EDUCATION/TRAINING PROGRAM

## 2020-12-15 PROCEDURE — 82570 ASSAY OF URINE CREATININE: CPT | Performed by: STUDENT IN AN ORGANIZED HEALTH CARE EDUCATION/TRAINING PROGRAM

## 2020-12-15 PROCEDURE — 36415 COLL VENOUS BLD VENIPUNCTURE: CPT | Performed by: STUDENT IN AN ORGANIZED HEALTH CARE EDUCATION/TRAINING PROGRAM

## 2020-12-15 PROCEDURE — 999N001017 HC STATISTIC GLUCOSE BY METER IP

## 2020-12-15 RX ORDER — OXYCODONE HYDROCHLORIDE 5 MG/1
5 TABLET ORAL EVERY 6 HOURS PRN
Qty: 12 TABLET | Refills: 0 | Status: SHIPPED | OUTPATIENT
Start: 2020-12-15 | End: 2020-12-15

## 2020-12-15 RX ORDER — ALLOPURINOL 100 MG/1
100 TABLET ORAL DAILY
Status: DISCONTINUED | OUTPATIENT
Start: 2020-12-15 | End: 2020-12-15 | Stop reason: HOSPADM

## 2020-12-15 RX ORDER — SENNOSIDES 8.6 MG
1 TABLET ORAL 2 TIMES DAILY PRN
Qty: 30 TABLET | Refills: 0 | Status: SHIPPED | OUTPATIENT
Start: 2020-12-15 | End: 2020-12-30

## 2020-12-15 RX ORDER — OXYCODONE HYDROCHLORIDE 5 MG/1
5 TABLET ORAL EVERY 6 HOURS PRN
Qty: 12 TABLET | Refills: 0 | Status: SHIPPED | OUTPATIENT
Start: 2020-12-15 | End: 2021-04-16

## 2020-12-15 RX ORDER — CIPROFLOXACIN 500 MG/1
500 TABLET, FILM COATED ORAL
Qty: 1 TABLET | Refills: 0 | Status: SHIPPED | OUTPATIENT
Start: 2020-12-15 | End: 2021-04-16

## 2020-12-15 RX ORDER — BACITRACIN ZINC 500 [USP'U]/G
OINTMENT TOPICAL 2 TIMES DAILY
Qty: 30 G | Refills: 0 | Status: SHIPPED | OUTPATIENT
Start: 2020-12-15 | End: 2021-04-16

## 2020-12-15 RX ORDER — AMLODIPINE BESYLATE 10 MG/1
10 TABLET ORAL EVERY MORNING
Status: DISCONTINUED | OUTPATIENT
Start: 2020-12-15 | End: 2020-12-15 | Stop reason: HOSPADM

## 2020-12-15 RX ORDER — FUROSEMIDE 20 MG
20 TABLET ORAL EVERY MORNING
Status: DISCONTINUED | OUTPATIENT
Start: 2020-12-15 | End: 2020-12-15 | Stop reason: HOSPADM

## 2020-12-15 RX ORDER — SIMVASTATIN 40 MG
40 TABLET ORAL EVERY MORNING
Status: DISCONTINUED | OUTPATIENT
Start: 2020-12-15 | End: 2020-12-15 | Stop reason: HOSPADM

## 2020-12-15 RX ADMIN — OXYCODONE HYDROCHLORIDE 5 MG: 5 TABLET ORAL at 06:25

## 2020-12-15 RX ADMIN — ACETAMINOPHEN 975 MG: 325 TABLET, FILM COATED ORAL at 09:04

## 2020-12-15 RX ADMIN — ASPIRIN 81 MG CHEWABLE TABLET 81 MG: 81 TABLET CHEWABLE at 09:04

## 2020-12-15 RX ADMIN — AMLODIPINE BESYLATE 10 MG: 10 TABLET ORAL at 10:39

## 2020-12-15 RX ADMIN — OXYCODONE HYDROCHLORIDE 5 MG: 5 TABLET ORAL at 09:37

## 2020-12-15 RX ADMIN — SODIUM CHLORIDE: 9 INJECTION, SOLUTION INTRAVENOUS at 02:29

## 2020-12-15 RX ADMIN — ALLOPURINOL 100 MG: 100 TABLET ORAL at 10:38

## 2020-12-15 RX ADMIN — ACETAMINOPHEN 975 MG: 325 TABLET, FILM COATED ORAL at 15:52

## 2020-12-15 RX ADMIN — FUROSEMIDE 20 MG: 20 TABLET ORAL at 10:38

## 2020-12-15 RX ADMIN — SIMVASTATIN 40 MG: 40 TABLET, FILM COATED ORAL at 10:39

## 2020-12-15 RX ADMIN — OXYCODONE HYDROCHLORIDE 5 MG: 5 TABLET ORAL at 02:41

## 2020-12-15 RX ADMIN — OXYCODONE HYDROCHLORIDE 5 MG: 5 TABLET ORAL at 12:44

## 2020-12-15 RX ADMIN — OXYCODONE HYDROCHLORIDE 5 MG: 5 TABLET ORAL at 00:16

## 2020-12-15 RX ADMIN — ACETAMINOPHEN 975 MG: 325 TABLET, FILM COATED ORAL at 04:07

## 2020-12-15 RX ADMIN — OXYCODONE HYDROCHLORIDE 5 MG: 5 TABLET ORAL at 15:52

## 2020-12-15 NOTE — PLAN OF CARE
Activity: SBA, ambulated in hallway with staff.  Neuros: A&O x4, neuros intact. Denies numbness and tingling.  Cardiac: WDL, denies cardiac chest pain.  Respiratory: WDL, stable on RA, denies SOB.  GI/: Flores catheter with adequate urine output, no BM today, reports some flatus.  Diet: Regular diet, appears to be tolerating.  Skin/Incisions: 4 abdominal site, ERLINDA with liquid bandage. No new deficits noted.  Lines/Drains: R PIV SL, BAUDILIO removed this afternoon.  Labs: Reviewed.  Pain/nausea:  Abdominal pain relieved with 5 mg oxycodone q 3 hours and scheduled tylenol. Denies nausea.  New changes this shift:  No acute changes.  Plan: Discharge home this afternoon. Follow POC.

## 2020-12-15 NOTE — PROVIDER NOTIFICATION
"Paged urology MD:   7B Rm 19 Donald Blanco  Pt is restless w/ mild shivers reporting \"fullness\" stating \"I need to have a BM\"       Awaiting a response    Addendum: MD ordered PRN bisacodyl suppository and PRN simethicone liquid.  "

## 2020-12-15 NOTE — PROGRESS NOTES
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  Nephrology team has been consulted for CKD management and also to assist with anemia of chronic disease.    Prostate cancer   S/p robotic assisted laparoscopic radical prostatectomy with bilateral pelvic lymphadenectomy, 12/14/2020.  Management per primary team.      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 Now ~10  Not on hemodialysis  Being evaluated for transplant.  Diagnosed to have prostate cancer recently during evaluation for transplant.  Review of his records show that he has list of living donors who have been evaluated.  Hence AV fistula creation was not pursued.  No flako  HD Access - none     No indication for for RRT at this time   Monitor daily labs , I/O, daily weight   Once patient is discharged , he should be referred back to his primary Nephrologist within 1-2 weeks       Anemia: Hemoglobin 10.6 --> 8.9--> 9.2  Baseline 11.7.No recent iron panel - will order .  I discussed patient's case with Transplant nephrology team Attending staff Dr Erick Thomas on 12/5/20.   Since patient is getting ready for transplant in the future, we should try to avoid blood transfusion if possible to minimize antibody development . Of course if he has life threatening bleeding or rapid  Drop in Hemoglobin , then there is no contraindication for transfusion . Pretransfusion prophylaxis  With cellcept is  a consideration but currently risk of infection including that of COVID19  Infection risk in high and   Risks of cellcept use outweighs benefit . Hence we  Do not advise pretransfusion cellcept . This was discussed with primary team      Volume status: euvoolemic  HTN: Not a candidate for ACE inhibition due to advanced renal disease currently.   On Amlodipine 10 mg daily, Lasix 20 mg daily    Electrolytes: No acute issues    Acid-base: No acute issues    BMD  Secondary hyperparathyroidism:  ( 9/20) . Vit D 47 ( 4/2019)   Gout: Continue allopurinol 200 mg daily.    Recommendations were communicated to primary team via note and plan discussed with Primary team   Patient seen and discussed with Dr Del Meza MD, FACP  Nephrology Fellow   [facility]   Pager [phone]        INTERVAL HISTORY   Adequate UOP  Surgical pain well controlled    Past Medical History:   Diagnosis Date     CKD (chronic kidney disease) stage 4, GFR 15-29 ml/min (H)      Elevated PSA      Essential hypertension 5/21/2020     Hyperlipidemia      IgA nephropathy      Obesity      Prostate cancer (H) 10/15/2020       Past Surgical History:   Procedure Laterality Date     BACK SURGERY      Back Surgey 20+ Years Ago      BIOPSY      [facility] 2010     COLONOSCOPY      10+ Years ago at [facility]      DAVINCI PROSTATECTOMY, LYMPHADENECTOMY N/A 12/14/2020    Procedure: PROSTATECTOMY, ROBOT-ASSISTED, WITH PELVIC LYMPHADENECTOMY;  Surgeon: Nabil Vásquez MD;  Location: [facility]        MEDICATIONS:  PTA Meds  Prior to Admission medications    Medication Sig Last Dose Taking? Auth Provider   allopurinol (ZYLOPRIM) 100 MG tablet every morning  12/14/2020 at 0500 Yes Reported, Patient   amLODIPine (NORVASC) 10 MG tablet Take 10 mg by mouth every morning  12/14/2020 at 0500 Yes Reported, Patient   aspirin (ASA) 81 MG EC tablet Take 81 mg by mouth every morning  Past Week at Unknown time Yes Reported,  Patient   furosemide (LASIX) 20 MG tablet every morning  12/13/2020 at 2000 Yes Reported, Patient   simvastatin (ZOCOR) 40 MG tablet Take 40 mg by mouth every morning  12/14/2020 at 0500 Yes Reported, Patient   Vitamin D3 (VITAMIN D3) 25 mcg (1000 units) tablet Take 1 capsule by mouth every morning  12/13/2020 at 2000 Yes Reported, Patient      Current Meds    acetaminophen  975 mg Oral Q6H     allopurinol  100 mg Oral Daily     amLODIPine  10 mg Oral QAM     aspirin  81 mg Oral QAM     furosemide  20 mg Oral QAM     simvastatin  40 mg Oral QAM     sodium chloride (PF)  3 mL Intracatheter Q8H     Infusion Meds      ALLERGIES:    No Known Allergies    REVIEW OF SYSTEMS:  A comprehensive of systems was negative except as noted above.    SOCIAL HISTORY:   Social History     Socioeconomic History     Marital status:      Spouse name: Not on file     Number of children: Not on file     Years of education: Not on file     Highest education level: Not on file   Occupational History     Not on file   Social Needs     Financial resource strain: Not on file     Food insecurity     Worry: Not on file     Inability: Not on file     Transportation needs     Medical: Not on file     Non-medical: Not on file   Tobacco Use     Smoking status: Never Smoker     Smokeless tobacco: Never Used   Substance and Sexual Activity     Alcohol use: Yes     Frequency: 2-3 times a week     Drug use: Never     Sexual activity: Not on file   Lifestyle     Physical activity     Days per week: Not on file     Minutes per session: Not on file     Stress: Not on file   Relationships     Social connections     Talks on phone: Not on file     Gets together: Not on file     Attends Yazidi service: Not on file     Active member of club or organization: Not on file     Attends meetings of clubs or organizations: Not on file     Relationship status: Not on file     Intimate partner violence     Fear of current or ex partner: Not on file      "Emotionally abused: Not on file     Physically abused: Not on file     Forced sexual activity: Not on file   Other Topics Concern     Parent/sibling w/ CABG, MI or angioplasty before 65F 55M? Not Asked   Social History Narrative     Not on file     Reviewed with patient     FAMILY MEDICAL HISTORY:   Family History   Problem Relation Age of Onset     Kidney Disease Father      Hypertension Father      Cancer Mother      No Known Problems Brother      No Known Problems Sister      No Known Problems Son      No Known Problems Daughter      No Known Problems Brother      No Known Problems Brother      No Known Problems Brother      No Known Problems Sister      No Known Problems Sister      No Known Problems Sister      Reviewed with patient     PHYSICAL EXAM:   Temp  Av.4  F (36.3  C)  Min: 97.2  F (36.2  C)  Max: 98.4  F (36.9  C)      Pulse  Av.2  Min: 81  Max: 95 Resp  Av.3  Min: 14  Max: 20  SpO2  Av.1 %  Min: 94 %  Max: 100 %       BP (!) 144/74   Pulse 82   Temp 97.3  F (36.3  C) (Oral)   Resp 22   Ht 1.778 m (5' 10\")   Wt 111.5 kg (245 lb 13 oz)   SpO2 92%   BMI 35.27 kg/m     Date 20 0700 - 12/15/20 0659   Shift 0974-1732 8261-3071 3020-3631 24 Hour Total   INTAKE   I.V. 700   700   Shift Total(mL/kg) 700(6.49)   700(6.49)   OUTPUT   Urine 350   350   Drains 25   25   Blood 300   300   Shift Total(mL/kg) 675(6.26)   675(6.26)   Weight (kg) 107.8 107.8 107.8 107.8      Admit Weight: 107.8 kg (237 lb 10.5 oz)     GENERAL APPEARANCE:no distress,  awake  EYES: no scleral icterus, pupils equal  Endo: no goiter, no moon facies  Lymphatics: no cervical or supraclavicular LAD  Pulmonary: lungs clear to auscultation with equal breath sounds bilaterally, no clubbing  CV: regular rhythm, normal rate, no rub   - JVD none   - Edema none  GI: soft, nontender, normal bowel sounds  MS: no evidence of inflammation in joints, no muscle tenderness  : has  gardiner  SKIN: no rash, warm, dry, no " cyanosis  NEURO: face symmetric,no asterixis     LABS:   CMP  Recent Labs   Lab 12/15/20  0618 12/14/20  1244 12/10/20  1333    140 140   POTASSIUM 3.9 4.2 4.2   CHLORIDE 110* 108 110*   CO2 22 22 23   ANIONGAP 8 8 7   * 160* 94   BUN 70* 75* 84*   CR 6.13* 6.27* 6.64*   GFRESTIMATED 9* 9* 8*   GFRESTBLACK 11* 10* 10*   YEE 8.6 9.1 9.6     CBC  Recent Labs   Lab 12/15/20  1124 12/15/20  0618 12/14/20  1244 12/10/20  1333   HGB 9.2* 8.9* 10.6* 11.7*   WBC  --  10.6 22.8* 8.9   RBC  --  2.95* 3.49* 3.95*   HCT  --  27.6* 32.3* 37.0*   MCV  --  94 93 94   MCH  --  30.2 30.4 29.6   MCHC  --  32.2 32.8 31.6   RDW  --  14.1 14.1 14.0   PLT  --  170 220 197     INRNo lab results found in last 7 days.  ABGNo lab results found in last 7 days.   URINE STUDIES  Recent Labs   Lab Test 12/10/20  1338 09/03/20  1142   COLOR Straw Straw   APPEARANCE Clear Clear   URINEGLC Negative Negative   URINEBILI Negative Negative   URINEKETONE Negative Negative   SG 1.011 1.011   UBLD Negative Small*   URINEPH 5.0 5.0   PROTEIN >499* >499*   NITRITE Negative Negative   LEUKEST Negative Negative   RBCU 1 1   WBCU 2 2     No lab results found.  PTH  No lab results found.  IRON STUDIES  No lab results found.    IMAGING:  All imaging studies reviewed by me.     Derrick Ayala MD

## 2020-12-15 NOTE — PLAN OF CARE
Admitting Diagnosis: Prostate Cancer  Status: POD 1 s/p prostatectomy   Neuro: A&Ox4, denies numbness and tingling  GI/: Flores cathether in place, adequate output. Pt complains of abdominal discomfort, feeling of fullness. LBM: 12/13/20. +BS and flatus.   Resp: Sating mid 90's on RA, utilizing home CPAP   VS: VSS  Incisions/LDA: Urethral catheter. BAUDILIO to bulb suction, creatinine sample collected and sent. Lap sites x4, ERLINDA.   Skin: No deficits  IV Access: L PIV SL, R PIV infusing MIVF  Labs: Reviewed  Pain: Pain managed with prn oxy and scheduled tylenol  Diet: Fulls  Activity: Repositioning independently, up with stand by assist  Plan:  Continue to monitor and follow plan of care.

## 2020-12-15 NOTE — PROGRESS NOTES
"Urology Daily Progress Note    24 hour events/Subjective:     - No acute events overnight   - Felt dizzy yesterday, now okay   - No chest pain, SOB, fevers, chills   - No nausea or vomiting   - Not yet OOB    O:  Vitals: /75 (BP Location: Left arm)   Pulse 89   Temp 97.8  F (36.6  C) (Oral)   Resp 16   Ht 1.778 m (5' 10\")   Wt 111.5 kg (245 lb 13 oz)   SpO2 96%   BMI 35.27 kg/m      General: Alert, interactive, in NAD  Resp: Non-labored breathing on NC  Abdomen: Soft, appropriately tender, non distended, incisions c/d/i  Ext: Warm and well perfused   Gardiner: Clear yellow   Drain: Serosanguinous    I/O  UOP 1375/775  Drain 140/NR    Labs  Pending this AM    Heme:  Recent Labs   Lab 12/14/20  1244 12/10/20  1333   WBC 22.8* 8.9   HGB 10.6* 11.7*    197     Chem:  Recent Labs   Lab 12/14/20  1244 12/10/20  1333    140   POTASSIUM 4.2 4.2   CR 6.27* 6.64*       Assessment/Plan  55 year old male w/IgA Nephropathy, gout, HTN, CKD now POD#1  s/p RALP w/BPLND     Note - plan changes for the day are in BOLD  NEURO Pain controlled on APAP, PRN Oxy   CV HDS. ASA   PULM Aggressive pulmonary toilet and I/S.   FEN/GI IVF: NS @75  Diet: FLD  Bowel regimen: Dulcolax, Simethicone, Miralax, Senokot    Continue indwelling gardiner catheter.  Transplant nephrology consult, appreciate recs.   HEME Hgb as above. Avoid transfusions as patient being evaluated for transplant.   ID Afebrile, no leukocytosis.    Antibiotics: Completed periop antibiotics    ENDO No issues   ACTIVITY Up as tolerated    PPx SCDs, ambulation   HOME RX ON HOLD Allopurinol, amlodipine, lasix, statin   DISPO Possible home today    PT/OT recommendations: N/A     To be discussed with Dr. Vásquez    --    Kelvin Louie MD  Urology Resident        "

## 2020-12-16 ENCOUNTER — PRE VISIT (OUTPATIENT)
Dept: UROLOGY | Facility: CLINIC | Age: 55
End: 2020-12-16

## 2020-12-16 NOTE — TELEPHONE ENCOUNTER
Chief Complaint : Post op - RALP BLPND    Hx/Sx: Prostate cancer    Records/Orders: Available    Pt Contacted: Called, moved appt up to 12/23    At Rooming: TOV/Flores removal    Irving Ortega, EMT

## 2020-12-16 NOTE — PROGRESS NOTES
Patient was given discharge education by day shift nurse. PIV was removed, belongings were packed and sent with patient. The patient picked up discharge medications from the discharge pharmacy. Patient discharged at 4:15 pm.    Statement Selected

## 2020-12-16 NOTE — DISCHARGE SUMMARY
[facility]    Patient: Donald Blanco    MRN: [id number]   : 1965         Date of Admission:  2020   Date of Discharge::  12/15/2020  4:33 PM  Admitting Physician:  Nabil Vásquez MD  Discharge Physician:  Price Nieto MD MS           Admission Diagnoses:   Prostate cancer (H) [C61]  Elevated prostate specific antigen (PSA) [R97.20]  Past Medical History:   Diagnosis Date     CKD (chronic kidney disease) stage 4, GFR 15-29 ml/min (H)      Elevated PSA      Essential hypertension 2020     Hyperlipidemia      IgA nephropathy      Obesity      Prostate cancer (H) 10/15/2020           Discharge Diagnosis:   Prostate cancer (H) [C61]  Elevated prostate specific antigen (PSA) [R97.20]  Past Medical History:   Diagnosis Date     CKD (chronic kidney disease) stage 4, GFR 15-29 ml/min (H)      Elevated PSA      Essential hypertension 2020     Hyperlipidemia      IgA nephropathy      Obesity      Prostate cancer (H) 10/15/2020          Procedures:   Procedure(s): HC LAPAROSCOPY, SURGICAL; W/ BILAT TOTAL PELVIC LYMPHADENECTOMY [97676] (PROSTATECTOMY, ROBOT-ASSISTED, WITH PELVIC LYMPHADENECTOMY)  C LAPAROSCOPY, SURGICAL PROSTATECTOMY, RETROPUBIC RADICAL, W/NERVE SPARING [35515]               Medications Prior to Admission:     No medications prior to admission.           Discharge Medications:     Discharge Medication List as of 12/15/2020  2:26 PM      START taking these medications    Details   bacitracin 500 UNIT/GM external ointment Apply topically 2 times dailyDisp-30 g, [id number]      ciprofloxacin (CIPRO) 500 MG tablet Take 1 tablet (500 mg) by mouth once as needed (Prior to catheter removal), Disp-1 tablet, R-0, E-Prescribe      sennosides (SENOKOT) 8.6 MG tablet Take 1 tablet by mouth 2 times daily as needed for constipation, Disp-30 tablet, R-0, E-Prescribe         CONTINUE these medications which have CHANGED    Details   oxyCODONE (ROXICODONE) 5 MG  tablet Take 1 tablet (5 mg) by mouth every 6 hours as needed for pain, Disp-12 tablet, R-0, Local Print         CONTINUE these medications which have NOT CHANGED    Details   allopurinol (ZYLOPRIM) 100 MG tablet every morning , Historical      amLODIPine (NORVASC) 10 MG tablet Take 10 mg by mouth every morning , Historical      aspirin (ASA) 81 MG EC tablet Take 81 mg by mouth every morning , Historical      furosemide (LASIX) 20 MG tablet every morning , Historical      simvastatin (ZOCOR) 40 MG tablet Take 40 mg by mouth every morning , Historical      Vitamin D3 (VITAMIN D3) 25 mcg (1000 units) tablet Take 1 capsule by mouth every morning , Historical                 Consultations:   Consultation during this admission received:   NEPHROLOGY GENERAL ADULT IP CONSULT          Brief History of Illness:   Reason for admission requiring a surgical or invasive procedure:   Prostate cancer (H) [C61]  Elevated prostate specific antigen (PSA) [R97.20]   The patient underwent the following procedure(s):   See above   There were no immediate complications during this procedure.    Please refer to the full operative summary for details.           Hospital Course:   The patient's hospital course was unremarkable.  Donald Blanco recovered as anticipated and experienced no post-operative complications. Nephrology was consulted post-operatively for assistance with management of his known CKD V as he is currently undergoing workup for living donor kidney transplant. No interventions were recommended given his stable electrolytes and desire to avoid transfusion to avoid antibody development in preparation for eventual transplant.     On POD #1 he was ambulating without assistance, tolerating the discharge diet, had pain controlled with PO medications to go home with, and was requiring no IV medications or fluids. He was discharged to home with appropriate contact information, follow-up and instructions as seen below in the  discharge paperwork.         Discharge Labs:     Lab Results   Component Value Date    PSA 13.50 09/03/2020     Recent Labs   Lab 12/15/20  1124 12/15/20  0618 12/14/20  1244 12/10/20  1333   WBC  --  10.6 22.8* 8.9   HGB 9.2* 8.9* 10.6* 11.7*   PLT  --  170 220 197       Recent Labs   Lab 12/15/20  0618 12/14/20  1244 12/10/20  1333    140 140   POTASSIUM 3.9 4.2 4.2   CHLORIDE 110* 108 110*   CO2 22 22 23   BUN 70* 75* 84*   CR 6.13* 6.27* 6.64*   * 160* 94   YEE 8.6 9.1 9.6       Recent Labs   Lab 12/10/20  1338   COLOR Straw   APPEARANCE Clear   URINEGLC Negative   URINEBILI Negative   URINEKETONE Negative   SG 1.011   URINEPH 5.0   PROTEIN >499*   NITRITE Negative   LEUKEST Negative   RBCU 1   WBCU 2     No results found for this or any previous visit.    Results for orders placed or performed during the hospital encounter of 12/14/20   Glucose by meter     Status: None   Result Value Ref Range    Glucose 93 70 - 99 mg/dL   CBC with platelets     Status: Abnormal   Result Value Ref Range    WBC 22.8 (H) 4.0 - 11.0 10e9/L    RBC Count 3.49 (L) 4.4 - 5.9 10e12/L    Hemoglobin 10.6 (L) 13.3 - 17.7 g/dL    Hematocrit 32.3 (L) 40.0 - 53.0 %    MCV 93 78 - 100 fl    MCH 30.4 26.5 - 33.0 pg    MCHC 32.8 31.5 - 36.5 g/dL    RDW 14.1 10.0 - 15.0 %    Platelet Count 220 150 - 450 10e9/L   Basic metabolic panel     Status: Abnormal   Result Value Ref Range    Sodium 140 133 - 144 mmol/L    Potassium 4.2 3.4 - 5.3 mmol/L    Chloride 108 94 - 109 mmol/L    Carbon Dioxide 22 20 - 32 mmol/L    Anion Gap 8 3 - 14 mmol/L    Glucose 160 (H) 70 - 99 mg/dL    Urea Nitrogen 75 (H) 7 - 30 mg/dL    Creatinine 6.27 (H) 0.66 - 1.25 mg/dL    GFR Estimate 9 (L) >60 mL/min/[1.73_m2]    GFR Estimate If Black 10 (L) >60 mL/min/[1.73_m2]    Calcium 9.1 8.5 - 10.1 mg/dL   Basic metabolic panel     Status: Abnormal   Result Value Ref Range    Sodium 140 133 - 144 mmol/L    Potassium 3.9 3.4 - 5.3 mmol/L    Chloride 110 (H) 94 -  109 mmol/L    Carbon Dioxide 22 20 - 32 mmol/L    Anion Gap 8 3 - 14 mmol/L    Glucose 106 (H) 70 - 99 mg/dL    Urea Nitrogen 70 (H) 7 - 30 mg/dL    Creatinine 6.13 (H) 0.66 - 1.25 mg/dL    GFR Estimate 9 (L) >60 mL/min/[1.73_m2]    GFR Estimate If Black 11 (L) >60 mL/min/[1.73_m2]    Calcium 8.6 8.5 - 10.1 mg/dL   CBC with platelets     Status: Abnormal   Result Value Ref Range    WBC 10.6 4.0 - 11.0 10e9/L    RBC Count 2.95 (L) 4.4 - 5.9 10e12/L    Hemoglobin 8.9 (L) 13.3 - 17.7 g/dL    Hematocrit 27.6 (L) 40.0 - 53.0 %    MCV 94 78 - 100 fl    MCH 30.2 26.5 - 33.0 pg    MCHC 32.2 31.5 - 36.5 g/dL    RDW 14.1 10.0 - 15.0 %    Platelet Count 170 150 - 450 10e9/L   Glucose by meter     Status: Abnormal   Result Value Ref Range    Glucose 102 (H) 70 - 99 mg/dL   Hemoglobin     Status: Abnormal   Result Value Ref Range    Hemoglobin 9.2 (L) 13.3 - 17.7 g/dL   Nephrology General Adult IP Consult: IgA nephropathy with CKD V, not on dialysis, undergoing transplant eval --> now POD#0 prostatectomy for prostate ca; Consultant may enter orders: Yes; Requesting provider? Attending physician; Name: shahana chavez.     Status: None ()    Narrative    Jamie Meza MD     12/14/2020 10:45 PM    Nephrology Initial Consult  December 14, 2020      Donald Blanco MRN:[id number] YOB: 1965  Date of Admission:12/14/2020  Primary care provider: Evelyn Sewell  Requesting physician: Nabil Chavez*    ASSESSMENT AND RECOMMENDATIONS:     55-year-old male patient with history of CKD stage V secondary to   IgA nephropathy.,  HTN, gout, anemia of chronic disease,   secondary hyperparathyroidism who is currently undergoing   evaluation for kidney transplant, was found to have prostate   cancer on evaluation and underwent prostate surgery today.    Nephrology team has been consulted for CKD management and also to   assist with anemia of chronic disease.    Prostate cancer   S/p robotic assisted  laparoscopic radical prostatectomy with   bilateral pelvic lymphadenectomy, 12/14/2020.  Management per primary team.      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  Diagnosed to have prostate cancer recently during evaluation for   transplant.  Review of his records show that he has list of living donors who   have been evaluated.  Hence AV fistula creation was not pursued.  No indication for RRT at this time   Monitor daily labs , I/O, daily weight   HD Access - none       Anemia: Hemoglobin 10.6.  Baseline 11.7.No recent iron panel -   will order .  I discussed patient's case with Transplant nephrology team   Attending staff Dr Erick Thomas.   Since patient is getting ready for   transplant in the future, we should try to avoid blood   transfusion if possible to minimize antibody development . Of   course if he has life threatening bleeding or rapid  Drop in   Hemoglobin , then there is no contraindication for transfusion .   Pretransfusion prophylaxis  With cellcept is a consideration but   currently risk of infection including that of COVID19  Infection   risk in high and   Risks of cellcept use outweighs benefit .   Hence we  Do not advise pretransfusion cellcept . This was   discussed with primary team      Volume status:  HTN: Not a candidate for ACE inhibition due to advanced renal   disease currently    Electrolytes: No acute issues    Acid-base: No acute issues    BMD  Secondary hyperparathyroidism:  ( 9/20) . Vit D 47 (   4/2019)     Gout: Continue allopurinol 200 mg daily.    Recommendations were communicated to primary team via note and   plan discussed with Primary team   Patient seen and discussed with Dr Del Meza MD, FACP  Nephrology Fellow   [facility]   Pager [phone]      REASON FOR CONSULT: Management of CKD stage V, not on   hemodialysis.  Follows with Dr Cornejo , [facility] [place]       HISTORY  OF PRESENT ILLNESS:  Admitting provider and nursing notes reviewed  Donald Blanco is a 55 year old male patient with history of   CKD stage 4  2/2 to IgA nephropathy ( KIDNEY, RIGHT, NEEDLE   BIOPSY October 2009  : IgA nephropathy with sclerosing   glomerulonephritis. Marked interstitial fibrosis and moderate   arteriolosclerosis) .Other comorbodities include secondary   hyperparathyroidism due to renal insufficiency, HTN, anemia of   chronic disease, gout.    Patient  is currently undergoing evaluation for kidney   transplant, was found to have prostate cancer on evaluation and   underwent prostate surgery today.  Nephrology team has been   consulted for CKD management and also to assist with anemia of   chronic disease.    Postoperatively patient is hemodynamically stable.  Labs show   stable electrolytes.  No acute acid-base issues.  Vitals are   stable.    Most recent COVID-19 PCR test dated 12/10/2020: Negative.    He denies having any chest pain or shortness of breath.  No   abdominal pain.  Still makes urine, no gross hematuria or any   voiding difficulty.      PAST MEDICAL HISTORY:  Reviewed with patient on 12/14/2020     Past Medical History:   Diagnosis Date     CKD (chronic kidney disease) stage 4, GFR 15-29 ml/min (H)      Elevated PSA      Essential hypertension 5/21/2020     Hyperlipidemia      IgA nephropathy      Obesity      Prostate cancer (H) 10/15/2020       Past Surgical History:   Procedure Laterality Date     BACK SURGERY      Back Surgey 20+ Years Ago      BIOPSY      [facility] 2010     COLONOSCOPY      10+ Years ago at [facility]         MEDICATIONS:  PTA Meds  Prior to Admission medications    Medication Sig Last Dose Taking? Auth Provider   allopurinol (ZYLOPRIM) 100 MG tablet every morning  12/14/2020 at   0500 Yes Reported, Patient   amLODIPine (NORVASC) 10 MG tablet Take 10 mg by mouth every   morning  12/14/2020 at 0500 Yes Reported, Patient   aspirin (ASA) 81 MG EC  tablet Take 81 mg by mouth every morning    Past Week at Unknown time Yes Reported, Patient   furosemide (LASIX) 20 MG tablet every morning  12/13/2020 at 2000   Yes Reported, Patient   simvastatin (ZOCOR) 40 MG tablet Take 40 mg by mouth every   morning  12/14/2020 at 0500 Yes Reported, Patient   Vitamin D3 (VITAMIN D3) 25 mcg (1000 units) tablet Take 1 capsule   by mouth every morning  12/13/2020 at 2000 Yes Reported, Patient      Current Meds    acetaminophen  975 mg Oral Q6H     [START ON 12/15/2020] aspirin  81 mg Oral QAM     sodium chloride (PF)  3 mL Intracatheter Q8H     Infusion Meds    sodium chloride 75 mL/hr at 12/14/20 1435       ALLERGIES:    No Known Allergies    REVIEW OF SYSTEMS:  A comprehensive of systems was negative except as noted above.    SOCIAL HISTORY:   Social History     Socioeconomic History     Marital status:      Spouse name: Not on file     Number of children: Not on file     Years of education: Not on file     Highest education level: Not on file   Occupational History     Not on file   Social Needs     Financial resource strain: Not on file     Food insecurity     Worry: Not on file     Inability: Not on file     Transportation needs     Medical: Not on file     Non-medical: Not on file   Tobacco Use     Smoking status: Never Smoker     Smokeless tobacco: Never Used   Substance and Sexual Activity     Alcohol use: Yes     Frequency: 2-3 times a week     Drug use: Never     Sexual activity: Not on file   Lifestyle     Physical activity     Days per week: Not on file     Minutes per session: Not on file     Stress: Not on file   Relationships     Social connections     Talks on phone: Not on file     Gets together: Not on file     Attends [employer] service: Not on file     Active member of club or organization: Not on file     Attends meetings of clubs or organizations: Not on file     Relationship status: Not on file     Intimate partner violence     Fear of current or ex  "partner: Not on file     Emotionally abused: Not on file     Physically abused: Not on file     Forced sexual activity: Not on file   Other Topics Concern     Parent/sibling w/ CABG, MI or angioplasty before 65F 55M? Not   Asked   Social History Narrative     Not on file     Reviewed with patient     FAMILY MEDICAL HISTORY:   Family History   Problem Relation Age of Onset     Kidney Disease Father      Hypertension Father      Cancer Mother      No Known Problems Brother      No Known Problems Sister      No Known Problems Son      No Known Problems Daughter      No Known Problems Brother      No Known Problems Brother      No Known Problems Brother      No Known Problems Sister      No Known Problems Sister      No Known Problems Sister      Reviewed with patient     PHYSICAL EXAM:   Temp  Av.4  F (36.3  C)  Min: 97.2  F (36.2  C)  Max: 98.4    F (36.9  C)      Pulse  Av.2  Min: 81  Max: 95 Resp  Av.3  Min: 14  Max:   20  SpO2  Av.1 %  Min: 94 %  Max: 100 %       BP (!) 147/76   Pulse 92   Temp 97.5  F (36.4  C)   Resp 16     Ht 1.778 m (5' 10\")   Wt 107.8 kg (237 lb 10.5 oz)   SpO2 99%     BMI 34.10 kg/m     Date 20 0700 - 12/15/20 0659   Shift 9257-2637 6045-9855 5764-0610 24 Hour Total   INTAKE   I.V. 700   700   Shift Total(mL/kg) 700(6.49)   700(6.49)   OUTPUT   Urine 350   350   Drains 25   25   Blood 300   300   Shift Total(mL/kg) 675(6.26)   675(6.26)   Weight (kg) 107.8 107.8 107.8 107.8      Admit Weight: 107.8 kg (237 lb 10.5 oz)     GENERAL APPEARANCE:no distress,  awake  EYES: no scleral icterus, pupils equal  Endo: no goiter, no moon facies  Lymphatics: no cervical or supraclavicular LAD  Pulmonary: lungs clear to auscultation with equal breath sounds   bilaterally, no clubbing  CV: regular rhythm, normal rate, no rub   - JVD none   - Edema none  GI: soft, nontender, normal bowel sounds  MS: no evidence of inflammation in joints, no muscle tenderness  : has  " gardiner  SKIN: no rash, warm, dry, no cyanosis  NEURO: face symmetric,no asterixis     LABS:   CMP  Recent Labs   Lab 12/14/20  1244 12/10/20  1333    140   POTASSIUM 4.2 4.2   CHLORIDE 108 110*   CO2 22 23   ANIONGAP 8 7   * 94   BUN 75* 84*   CR 6.27* 6.64*   GFRESTIMATED 9* 8*   GFRESTBLACK 10* 10*   YEE 9.1 9.6     CBC  Recent Labs   Lab 12/14/20  1244 12/10/20  1333   HGB 10.6* 11.7*   WBC 22.8* 8.9   RBC 3.49* 3.95*   HCT 32.3* 37.0*   MCV 93 94   MCH 30.4 29.6   MCHC 32.8 31.6   RDW 14.1 14.0    197     INRNo lab results found in last 7 days.  ABGNo lab results found in last 7 days.   URINE STUDIES  Recent Labs   Lab Test 12/10/20  1338 09/03/20  1142   COLOR Straw Straw   APPEARANCE Clear Clear   URINEGLC Negative Negative   URINEBILI Negative Negative   URINEKETONE Negative Negative   SG 1.011 1.011   UBLD Negative Small*   URINEPH 5.0 5.0   PROTEIN >499* >499*   NITRITE Negative Negative   LEUKEST Negative Negative   RBCU 1 1   WBCU 2 2     No lab results found.  PTH  No lab results found.  IRON STUDIES  No lab results found.    IMAGING:  All imaging studies reviewed by me.     Derrick Ayala MD     Creatinine fluid     Status: None   Result Value Ref Range    Creatinine Fluid Source Abdominal     Creatinine Fluid 6.3 mg/dL            Discharge Instructions and Follow-Up:    Diet:  - Regular    Activity:   - No strenuous exercise for 6 weeks.   - No lifting, pushing, pulling more than 15 pounds for 6 weeks.   - Do not strain with bowel movements.   - Do not drive until you can press the brake pedal quickly and fully without pain.   - Do not operate a motor vehicle while taking narcotic pain medications.     Medications:     1) PAIN: Oxycodone is a narcotic medication that is prescribed for pain.  Narcotics will cause sleepiness and constipation and can become addictive, therefore it is best to stop or reduce them as soon as you can.  Any left over narcotics should be disposed of with an  Authorized  for unneeded medications.  Contact your [facility] s or [facility] s household trash and recycling service to learn about medication disposal options and guidelines for your area.  If you decide to store this medication at home it should be kept in a locked cabinet to prevent access to children or visitors.   - To reduce your narcotic use, take Tylenol (acetaminophen 625mg) every 4-6 hours as directed.  Do not take more than 4,000mg of Tylenol (acetaminophen/ APAP) from all sources in any 24 hour period since this can cause liver damage.  Do not take more than 2400mg of ibuprofen in any 24 hour period since this can cause kidney damage.   - Never drive, operate machinery or drink alcoholic beverages while you are taking narcotic pain medications.      2) CONSTIPATION: Surgery, pain medications and bladder spasm medications can make you constipated.  Take pericolace, or other over the counter solutions such as prune juice, miralax, fiber products, senna, and dulcolax to prevent constipation. If you are taking these but still have not had a bowel movement in 3 days, start over-the-counter Milk of Magnesia taken twice daily until you have a good result.  Call the office with any concerns.     Incisions:   - You may shower and get incisions wet starting 48 hrs after surgery.  - Do not scrub incisions or submerge wounds in a bath, pool, hot tub, etc. for 2 weeks.   - Remove wound dressing 48 hours after surgery.   - If purple dermabond glue was used, avoid applying any lotions or ointments.   - If steri-strips were used, they will fall off on their own.   - Leave incision open to air.  Cover with gauze only if needed for comfort or to protect clothing from drainage.     Tubes / drains:  - Flores catheter to remain in place until 10-14 days after surgery    Follow-Up:   - Schedule an appointment to be seen by your primary care provider within 7-10 days of discharge for a postoperative checkup.   -  "Follow up in urology clinic for gardiner catheter removal in 10-14 days  - Call or return sooner than your regularly scheduled visit if you develop any of the following:  Fever (greater than 101.3F), uncontrolled pain, uncontrolled nausea or vomiting, concerns about bowel function, as well as increased redness, swelling, drainage from your wound or any concerns about urinating or urinary catheter drainage.      Here are some phone numbers where you can reach us:  - Monday through Friday, 8am to 4:30pm - as long as it is not a holiday, IT IS BEST to call the Urology Clinic Triage Line at: [phone] with any non-emergent urology concerns.    - If it is a night, weekend, or holiday call the [facility] number at [phone] and ask the  to page the \"urology resident on call\".  Typically, the on-call provider should return your call within 30 minutes.  Please page the on-call provider again if you haven't been contacted as expected.  Rarely, the on-call provider will be unable to promptly return a call due to a hospital emergency.  If you have paged twice and are still not contacted, ask the hospital  to page the \"urology CHIEF-RESIDENT on call\".  - FOR EMERGENCIES, always call 911 or go to the Emergency Department         Discharge Disposition:   Discharged to home      Attestation: I have reviewed today's vital signs, notes, medications, labs and imaging.    Price Nieto MD MS  Urology Resident, PGY-2  December 16, 2020     Nabil Vásquez MD  Urology  [facility]      "

## 2020-12-18 LAB — COPATH REPORT: NORMAL

## 2020-12-23 ENCOUNTER — OFFICE VISIT (OUTPATIENT)
Dept: UROLOGY | Facility: CLINIC | Age: 55
End: 2020-12-23
Payer: COMMERCIAL

## 2020-12-23 VITALS — SYSTOLIC BLOOD PRESSURE: 150 MMHG | DIASTOLIC BLOOD PRESSURE: 77 MMHG | HEART RATE: 90 BPM

## 2020-12-23 DIAGNOSIS — C61 PROSTATE CANCER (H): Primary | ICD-10-CM

## 2020-12-23 PROCEDURE — 99N1120: Performed by: UROLOGY

## 2020-12-23 PROCEDURE — 99024 POSTOP FOLLOW-UP VISIT: CPT | Performed by: UROLOGY

## 2020-12-23 ASSESSMENT — PAIN SCALES - GENERAL: PAINLEVEL: NO PAIN (0)

## 2020-12-23 NOTE — PROGRESS NOTES
CHIEF COMPLAINT   It was my pleasure to see Donald Blanco who is a 55 year old male for follow-up of Prostate Cancer.      HPI   Donald Blanco is a very pleasant 55 year old male who presents with a history of Prostate Cancer.  [place] 4+3 disease, stage pT3b with RALP 12/14/2020 with focal positive margin at site of NORBERTO. He has recovered without complication.     RALP 12/14/2020  TUMOR     Histologic Type:         - Acinar adenocarcinoma     Histologic Grade       Grade Group and Clark Score:           - Grade group 3 ([place] Score 4 + 3 = 7)         Percentage of Pattern 4: 60%     Extraprostatic Extension (EPE):         - Present, nonfocal       Location of Extraprostatic Extension:           - Left posterior     Urinary Bladder Neck Invasion:         - Not identified     Seminal Vesicle Invasion:         - Present         - Left     Treatment Effect:         - No known presurgical therapy     Lymphovascular Invasion:         - Not Identified     Perineural Invasion:         - Present     MARGINS     Margins:         - Involved by invasive carcinoma         - Limited (< 3 mm)       Linear Length of Positive Margin(s) (Millimeters): 2 mm       Focality:           - Unifocal       Location of Positive Margin(s):           - Left posterior       Margin Positivity in Area of Extraprostatic Extension (EPE):           - Present         Location(s): Left seminal vesicle       Clark Pattern at Positive Margin(s):           - Pattern 4     LYMPH NODES     Number of Lymph Nodes Involved:         - 0     Number of Lymph Nodes Examined:         8     PATHOLOGIC STAGE CLASSIFICATION (PTNM, AJCC 8TH EDITION)     Primary Tumor (pT):         - pT3b     Regional Lymph Nodes (pN):         - pN0     PHYSICAL EXAM  Patient is a 55 year old  male   Vitals: Blood pressure (!) 150/77, pulse 90.  General Appearance Adult: There is no height or weight on file to calculate BMI.  Alert, no acute distress, oriented  Lungs: no  respiratory distress, or pursed lip breathing  Heart: No obvious jugular venous distension present  Abdomen: soft, nontender, no organomegaly or masses; incisions healing well  Back: no CVAT  Musculoskeltal: extremities normal, no peripheral edema  Skin: no suspicious lesions or rashes  Neuro: Alert, oriented, speech and mentation normal  Psych: affect and mood normal  Gait: Normal  : Flores with clear urine    ASSESSMENT and PLAN  55 year old male with stage pT3b, [place] 4+3=7 prostate cancer, s/p RALP completed 12/14/2020. Focal positive margins at site of NORBERTO. SVI noted as well. Recovering well. Flores removed today. Discussed expectations for recovery and ongoing activity restrictions. We discussed his pathology in detail today. We discussed the importance of ongoing PSA surveillance and risk of recurrence. We discussed the potential implications of his pathology in the context of upcoming transplant evaluation. We discussed the potential role for adjuvant vs salvage radiotherapy. We also discussed the role for genomic evaluation of his cancer and will plan for Decipher. Will coordinate with his transplant team, as he is eager to proceed with workup, given his ongoing renal failure.     - Decipher   - Pelvic floor PT referral  - Follow-up 3 months with PSA    Nabil Vásuqez MD  Urology  [facility]

## 2020-12-23 NOTE — NURSING NOTE
Chief Complaint   Patient presents with     RECHECK     Post op - RALP BLPND       Blood pressure (!) 150/77, pulse 90. There is no height or weight on file to calculate BMI.    Patient Active Problem List   Diagnosis     Essential hypertension     Gout     Hyperlipidemia     Hyperparathyroidism due to renal insufficiency (H)     IgA nephropathy     Obesity     Umbilical hernia     CKD (chronic kidney disease) stage 4, GFR 15-29 ml/min (H)     Elevated prostate specific antigen (PSA)     Pre-operative cardiovascular examination     Prostate cancer (H)       No Known Allergies    Current Outpatient Medications   Medication Sig Dispense Refill     allopurinol (ZYLOPRIM) 100 MG tablet every morning        amLODIPine (NORVASC) 10 MG tablet Take 10 mg by mouth every morning        aspirin (ASA) 81 MG EC tablet Take 81 mg by mouth every morning        bacitracin 500 UNIT/GM external ointment Apply topically 2 times daily 30 g 0     ciprofloxacin (CIPRO) 500 MG tablet Take 1 tablet (500 mg) by mouth once as needed (Prior to catheter removal) 1 tablet 0     furosemide (LASIX) 20 MG tablet every morning        oxyCODONE (ROXICODONE) 5 MG tablet Take 1 tablet (5 mg) by mouth every 6 hours as needed for pain 12 tablet 0     sennosides (SENOKOT) 8.6 MG tablet Take 1 tablet by mouth 2 times daily as needed for constipation 30 tablet 0     simvastatin (ZOCOR) 40 MG tablet Take 40 mg by mouth every morning        Vitamin D3 (VITAMIN D3) 25 mcg (1000 units) tablet Take 1 capsule by mouth every morning          Social History     Tobacco Use     Smoking status: Never Smoker     Smokeless tobacco: Never Used   Substance Use Topics     Alcohol use: Yes     Frequency: 2-3 times a week     Drug use: Never         This service provided today was under the direct supervision of Dr. Nabil Vásquez, who was available if needed.    Donald Blanco presented for catheter removal.  Approximately 12 mL water was removed from the balloon before  a 20Fr  catheter was removed without difficulty.    Cipro 500 given per protocol: No, patient had already taken cipro at home.   The following medication was given:       Patient did tolerate procedure well.    Teaching done with patient verbally as where to call or go if pain, fever, or unable to urinate post catheter removal.      Irving Ortega, EMT  12/23/2020  8:32 AM

## 2020-12-23 NOTE — PATIENT INSTRUCTIONS
Please schedule a virtual visit with Dr. Vásquez in 3 months with a PSA prior to appointment. This can be done at a lab convenient to you. Please also schedule an appointment with Physical therapy.     It was a pleasure meeting with you today.  Thank you for allowing me and my team the privilege of caring for you today.  YOU are the reason we are here, and I truly hope we provided you with the excellent service you deserve.  Please let us know if there is anything else we can do for you so that we can be sure you are leaving completely satisfied with your care experience.

## 2020-12-23 NOTE — LETTER
12/23/2020      RE: Donald Blanco  [address] [facility] [address]         CHIEF COMPLAINT   It was my pleasure to see Donald Blanco who is a 55 year old male for follow-up of Prostate Cancer.      HPI   Donald Blanco is a very pleasant 55 year old male who presents with a history of Prostate Cancer.  [place] 4+3 disease, stage pT3b with RALP 12/14/2020 with focal positive margin at site of NORBERTO. He has recovered without complication.     RALP 12/14/2020  TUMOR     Histologic Type:         - Acinar adenocarcinoma     Histologic Grade       Grade Group and Clark Score:           - Grade group 3 ([place] Score 4 + 3 = 7)         Percentage of Pattern 4: 60%     Extraprostatic Extension (EPE):         - Present, nonfocal       Location of Extraprostatic Extension:           - Left posterior     Urinary Bladder Neck Invasion:         - Not identified     Seminal Vesicle Invasion:         - Present         - Left     Treatment Effect:         - No known presurgical therapy     Lymphovascular Invasion:         - Not Identified     Perineural Invasion:         - Present     MARGINS     Margins:         - Involved by invasive carcinoma         - Limited (< 3 mm)       Linear Length of Positive Margin(s) (Millimeters): 2 mm       Focality:           - Unifocal       Location of Positive Margin(s):           - Left posterior       Margin Positivity in Area of Extraprostatic Extension (EPE):           - Present         Location(s): Left seminal vesicle       Madison Pattern at Positive Margin(s):           - Pattern 4     LYMPH NODES     Number of Lymph Nodes Involved:         - 0     Number of Lymph Nodes Examined:         8     PATHOLOGIC STAGE CLASSIFICATION (PTNM, AJCC 8TH EDITION)     Primary Tumor (pT):         - pT3b     Regional Lymph Nodes (pN):         - pN0     PHYSICAL EXAM  Patient is a 55 year old  male   Vitals: Blood pressure (!) 150/77, pulse 90.  General Appearance Adult: There is no height or  weight on file to calculate BMI.  Alert, no acute distress, oriented  Lungs: no respiratory distress, or pursed lip breathing  Heart: No obvious jugular venous distension present  Abdomen: soft, nontender, no organomegaly or masses; incisions healing well  Back: no CVAT  Musculoskeltal: extremities normal, no peripheral edema  Skin: no suspicious lesions or rashes  Neuro: Alert, oriented, speech and mentation normal  Psych: affect and mood normal  Gait: Normal  : Flores with clear urine    ASSESSMENT and PLAN  55 year old male with stage pT3b, [place] 4+3=7 prostate cancer, s/p RALP completed 12/14/2020. Focal positive margins at site of NORBERTO. SVI noted as well. Recovering well. Flores removed today. Discussed expectations for recovery and ongoing activity restrictions. We discussed his pathology in detail today. We discussed the importance of ongoing PSA surveillance and risk of recurrence. We discussed the potential implications of his pathology in the context of upcoming transplant evaluation. We discussed the potential role for adjuvant vs salvage radiotherapy. We also discussed the role for genomic evaluation of his cancer and will plan for Decipher. Will coordinate with his transplant team, as he is eager to proceed with workup, given his ongoing renal failure.     - Decipher   - Pelvic floor PT referral  - Follow-up 3 months with PSA    Nabil Vásquez MD  Urology  [facility]

## 2021-01-04 ENCOUNTER — THERAPY VISIT (OUTPATIENT)
Dept: PHYSICAL THERAPY | Facility: CLINIC | Age: 56
End: 2021-01-04
Attending: UROLOGY
Payer: COMMERCIAL

## 2021-01-04 DIAGNOSIS — M99.05 SOMATIC DYSFUNCTION OF PELVIS REGION: ICD-10-CM

## 2021-01-04 DIAGNOSIS — R32 URINARY INCONTINENCE: ICD-10-CM

## 2021-01-04 DIAGNOSIS — Z90.79 S/P PROSTATECTOMY: ICD-10-CM

## 2021-01-04 PROCEDURE — 97110 THERAPEUTIC EXERCISES: CPT | Mod: GP | Performed by: PHYSICAL THERAPIST

## 2021-01-04 PROCEDURE — 97535 SELF CARE MNGMENT TRAINING: CPT | Mod: GP | Performed by: PHYSICAL THERAPIST

## 2021-01-04 PROCEDURE — 97161 PT EVAL LOW COMPLEX 20 MIN: CPT | Mod: GP | Performed by: PHYSICAL THERAPIST

## 2021-01-04 NOTE — PROGRESS NOTES
[facility] Initial Evaluation  Subjective:  The history is provided by the patient. No  was used.   Patient Health History  Donald Blanco being seen for Physical therapy.     Date of Onset: 12/23/20 MD order for PT.   Problem occurred: Prostatectomy on 12/14/20 with catheter removed on 12/23/20 . He reports incontinence , using 3-4 pads in a 24 hr period. He has an urge to urinate , steady flow, voiding every 2 hrs. He gets up to void 2-3 times per night. Patient has chronic kidney disease and will have a kidney transplant in near future(nothing scheduled at this time)     Pain is reported as 5/10 (5/10 LBP, no pelvic or abdominal pain) on pain scale.  General health as reported by patient is good.  Pertinent medical history includes: cancer, high blood pressure, kidney disease and overweight.   Red flags:  None as reported by patient.     Surgeries include:  Cancer surgery, orthopedic surgery and other (15 yrs ago LB surgery).    Current medications:  High blood pressure medication.    Current occupation is sales.   Primary job tasks include:  Computer work.                  Therapist Generated HPI Evaluation         Type of problem:  Incontinence, pelvic dysfunction and other (prostatectomy).    This is a new condition.  Condition occurred with:  After surgery.    Patient reports pain:  Lower lumbar spine.  Pain is described as aching and is intermittent.  Pain is the same all the time.  Since onset symptoms are gradually improving.  Symptoms are exacerbated by other (bending, sudden mvmts)  and relieved by nothing.                              Objective:  System                                 Pelvic Dysfunction Evaluation:    Bladder/Pelvic Problems:    Storage Problem:  Stress incontinence and nocturia  Emptying Problem:  Postvoid dribbling          Abdominal Wall:  Abdominal wall pelvic: fair TrA activation in supine with verbal cues.      Scar Mobility:  Incisions  healing well, no point tenderness        External Assessment:  External assessment pelvic: strong PFMC observed with 10 sec. hold, repeatable.  Skin Condition:  Normal      Tissue Symmetry:  Normal    Muscle Contraction/Perineal Mobility:  Elevation and urogential triangle descent    Additional History:        Caffeine Consumption:  8 oz                     General     ROS    Assessment/Plan:    Patient is a 55 year old male with pelvic complaints.    Patient has the following significant findings with corresponding treatment plan.                Diagnosis 1:  Urinary incontinence, S/P prostatectomy   Decreased strength - therapeutic exercise, therapeutic activities and home program  Impaired muscle performance - biofeedback, neuro re-education and home program  Decreased function - therapeutic activities and home program  Impaired posture - neuro re-education, therapeutic activities and home program    Therapy Evaluation Codes:   1) History comprised of:   Personal factors that impact the plan of care:      None.    Comorbidity factors that impact the plan of care are:      kidney disease.     Medications impacting care: High blood pressure.  2) Examination of Body Systems comprised of:   Body structures and functions that impact the plan of care:      Pelvis.   Activity limitations that impact the plan of care are:      Frequency and Urinary incontinence.  3) Clinical presentation characteristics are:   Stable/Uncomplicated.  4) Decision-Making    Low complexity using standardized patient assessment instrument and/or measureable assessment of functional outcome.  Cumulative Therapy Evaluation is: Low complexity.    Previous and current functional limitations:  (See Goal Flow Sheet for this information)    Short term and Long term goals: (See Goal Flow Sheet for this information)     Communication ability:  Patient appears to be able to clearly communicate and understand verbal and written communication and follow  directions correctly.  Treatment Explanation - The following has been discussed with the patient:   RX ordered/plan of care  Anticipated outcomes  Possible risks and side effects  This patient would benefit from PT intervention to resume normal activities.   Rehab potential is good.    Frequency:  1 X week, once daily  Duration:  for 2 weeks then 2x month for 2 months  Discharge Plan:  Achieve all LTG.  Independent in home treatment program.  Reach maximal therapeutic benefit.    Please refer to the daily flowsheet for treatment today, total treatment time and time spent performing 1:1 timed codes.

## 2021-01-12 ENCOUNTER — TELEPHONE (OUTPATIENT)
Dept: TRANSPLANT | Facility: CLINIC | Age: 56
End: 2021-01-12

## 2021-01-12 ENCOUNTER — THERAPY VISIT (OUTPATIENT)
Dept: PHYSICAL THERAPY | Facility: CLINIC | Age: 56
End: 2021-01-12
Payer: COMMERCIAL

## 2021-01-12 DIAGNOSIS — M99.05 SOMATIC DYSFUNCTION OF PELVIS REGION: ICD-10-CM

## 2021-01-12 DIAGNOSIS — Z90.79 S/P PROSTATECTOMY: ICD-10-CM

## 2021-01-12 DIAGNOSIS — R32 URINARY INCONTINENCE: ICD-10-CM

## 2021-01-12 DIAGNOSIS — Z76.82 AWAITING ORGAN TRANSPLANT: Primary | ICD-10-CM

## 2021-01-12 PROCEDURE — 97530 THERAPEUTIC ACTIVITIES: CPT | Mod: GP | Performed by: PHYSICAL THERAPIST

## 2021-01-12 PROCEDURE — 97110 THERAPEUTIC EXERCISES: CPT | Mod: GP | Performed by: PHYSICAL THERAPIST

## 2021-01-12 NOTE — LETTER
01/21/21        Donald Blanco  [address] [facility]  [place] [address]        Dear Donald,    Dr. Woodrow Cr is recommending that your kidney transplant evaluation status be reviewed after the below appointment has been completed.     1. Dr. Nabil Vásquez on 04/01/2021 at 7:00 am, check in at 6:45 am.     You are already listed on the kidney transplant waitlist as of 01/12/2021, please see my separate letter regarding the details of this. You have been placed on INACTIVE status due to needing the above item completed. I will fax a COVID wait time modification form to [employer] as well to adjust your time back.     Per our routine Office workflow, you will now be transferred to the kidney waitlist coordinator team. Your new coordinator is Katie KRUSE assisted by Sara CARRANZA. Either can be reached by calling our Main Office Number at [phone].     Please continue to have live donors proceed with registering with our Center to initiate their evaluations at [email or web address].  You will be notified in the event of an approved live donor.     Sincerely,      Renee Hampton RN BSN Transplant Coordinator   Solid Organ Transplant  [facility]    CC's: Evelyn CALDERON, Dr. Chin Cornejo

## 2021-01-12 NOTE — LETTER
2021    Donald GAONA Blanco  [address] [facility]  [place] [address]      Dear Mr. Blanco,    This letter is sent to confirm that you are a candidate in the kidney transplant program at the [facility].  You were placed on the kidney inactive waitlist on 2021.  This means you will accumulate waiting time but not receive  donor calls. You have been placed on INACTIVE status due to an incomplete evaluation, please see my separate letter regarding the details of this.       Items we will need from you:      We will receive approval from your insurance company for the transplant procedure.  It is critical that you notify us if there is any change in your insurance.  It is also important that you familiarize yourself with the details of your specific insurance policy.  Our patient  is available to assist you if you should have any questions regarding your coverage.      An ALA or PRA blood sample may needs to be sent here every 3 months to match you with  donors or any potential living donors when you are on ACTIVE status. You do not need to send in PRA samples now because you are on INACTIVE status. You will be instructed in the future when you should start sending in samples. For your future use in the event you are using an outside lab, special mailing boxes (called mailers) will be sent to you directly from the Outreach Department.  You should take the physician order form and the  to your home laboratory when it is time to for this testing to be done.  Additional mailers can be obtained by calling the Transplant Office and asking to speak to a kidney .      During this waiting period, we may request additional periodic laboratory tests with your primary physician.  It will be your responsibility to remind your physician to forward your results to the Transplant Office.      We need to be kept informed of any  changes in your medical condition such as:    o changes in your medications,   o significant changes in your health  o significant infections (such as pneumonia or abscesses)  o blood transfusions  o any condition which requires hospitalization  o any surgery      Remember to complete any routine cancer screening tests required before your transplant.  This includes colonoscopy; prostrate screening for men, and mammogram and gynecologic testing for women, as well as dental work.  Your primary care clinic can assist you with arranging for these exams.  Remind your caregivers to forward copies of the records and final reports.    We want you to know that our program has physician and surgeon coverage 24 hours a day, 365 days a year. If this coverage changes or there are substantial program changes, you will be notified in writing by letter.     Attached is a letter from the [employer] for Organ [employer]aring (UNOS). It describes the services and information offered to patients by UNOS and the Organ Procurement and Transplantation Network.    We appreciate having had the opportunity to participate in your care.  If you have questions, please feel free to call the Transplant Office at [phone] or [phone].      Sincerely,       Kidney Transplant Program    Enclosures: ALA/PRA Physician Order, Telephone Contact List, Travel Resources, UNOS Letter, Waitlist Information Update and While You Are Waiting  CC:  Evelyn CALDERON, Dr. Chin Cornejo                                 The Organ Procurement and Transplantation Network  Toll-free patient services line:     Your resource for organ transplant information    If you have a question regarding your own medical care, you always should call your transplant hospital first. However, for general organ transplant-related information, you can call the Organ Procurement and Transplantation Network (OPTN) toll-free patient services line at [phone]- [phone]. Anyone, including  potential transplant candidates, candidates, recipients, family members, friends, living donors, and donor family members, can call this number to:          Talk about organ donation, living donation, the transplant process, the donation process, and transplant policies.    Get a free patient information kit with helpful booklets, waiting list and transplant information, and a list of all transplant hospitals.    Ask questions about the OPTN website (https://optn.transplant.hrsa.gov/), the [employer] for Organ Sharing s (UNOS) website (https://unos.org/), or the UNOS website for living donors and transplant recipients. (https://www.transplantliving.org/).    Learn how the OPTN can help you.    Talk about any concerns that you may have with a transplant hospital.    The [facility] transplant system, the OPTN, is managed under federal contract by the [employer] Organ Sharing (UNOS), which is a non-profit charitable organization. The OPTN helps create and define organ sharing policies that make the best use of donated organs. This process continuously evaluating new advances and discoveries so policies can be adapted to best serve patients waiting for transplants. To do so, the OPTN works closely with transplant professionals, transplant patients, transplant candidates, donor families, living donors, and the public. All transplant programs and organ procurement organizations throughout the country are OPTN members and are obligated to follow the policies the OPTN creates for allocating organs.    The OPTN also is responsible for:      Providing educational material for patients, the public, and professionals.    Raising awareness of the need for donated organs and tissue.    Coordinating organ procurement, matching, and placement.    Collecting information about every organ transplant and donation that occurs in the [place].    Remember, you should contact your transplant hospital directly if you have  questions or concerns about your own medical care including medical records, work-up progress, and test results.    We are not your transplant hospital, and our staff will not be able to answer questions about your case, so please keep your transplant hospital s phone number handy.    However, while you research your transplant needs and learn as much as you can about transplantation and donation, we welcome your call to our toll-free patient services line at [phone].          Updated 4/1/2019

## 2021-01-12 NOTE — LETTER
PHYSICIAN ORDER   ALA/PRA BLOOD    DATE & TIME ISSUED: 2021 8:08 PM  PATIENT NAME: Donald Blanco   : 1965     [facility] MR#  [id number]     DIAGNOSIS/ICD-10 CODE: Awaiting Organ transplant [Z76.82}   EXPIRES: (1 YEAR AFTER DATE ISSUED)  EVERY 12 weeks / 3 months   1. Please draw 20ml of blood in red top (plain) tube for Antileukocyte Antibody (ALA or PRA).   2. Label tubes with the patient s name, complete lab slip.         3. Mailers, lab slips with instructions are sent to patient separately.      4. Call the Outreach Lab at [phone] to reorder mailers.       5. Mail blood to (this address is also on the mailers):    IMMUNOLOGY LABORATORY  [facility]  Room [address]  [address]  [address]        Prince Yu MD  Surgical Director, Kidney Transplantation

## 2021-01-13 NOTE — TELEPHONE ENCOUNTER
Contacted patient to review outcome of selection committee meeting (See selection committee encounter).   Explained to patient that he/she needs to complete all components of the evaluation to be eligible for active status on the waiting list or to proceed with a live donor kidney transplant.   Reviewed next steps based on outcomes: will review outcome from Dr. Vásquez when known. Will add patient onto the kidney transplant wait list on INACTIVE status. Continue to work up live donors.   Patient will be listed as inactive (is not on dialysis and evaluation is not complete)-will receive:   -An Evaluation Summary Letter indicating what is needed to complete evaluation-discussed with patient if they would like to have testing done with  [facility] or locally   -A listing letter indicating inactive status   -A PRA Order   -PRA Mailers  Confirmed with patient that on successful completion of outstanding components, patient is eligible for active status and they will receive a follow-up call.   Confirmed that patient has contact information for additional questions or concerns.  Patient expressed very good understanding of all and was in good agreement with the plan.    Listed patient today onto the kidney transplant waitlist on INACTIVE status due to an incomplete evaluation. Pt is not yet on dialysis. Pt does qualify for wait time with a GFR of 9 done on 09/03/2020. Generated listing letter, PRA order and Transplant Eval Summary Letter in Epic - electronically routed to all.

## 2021-01-21 ENCOUNTER — TELEPHONE (OUTPATIENT)
Dept: TRANSPLANT | Facility: CLINIC | Age: 56
End: 2021-01-21

## 2021-01-21 NOTE — TELEPHONE ENCOUNTER
Called patient today and spoke with both himself and his wife. I explained that Dr. Cr recommends we wait until we know the outcome of his RTC appt with Dr. Vásquez on 04/01/2021 before determining the timing of when to proceed with transplant. I reviewed he is listed now on INACTIVE status due to his prostate cancer history. I explained he will now be transferred to [facility] KRUSE on the waitlist coordinator team assisted by Sara CARRANZA. I explained he does not yet have an approved live donor. Pt and wife expressed very good understanding of all and were in good agreement with the plan.

## 2021-01-22 ENCOUNTER — DOCUMENTATION ONLY (OUTPATIENT)
Dept: TRANSPLANT | Facility: CLINIC | Age: 56
End: 2021-01-22

## 2021-01-22 NOTE — PROGRESS NOTES
Covid Modification form, Selection Committee note faxed to [facility] today 1/21/2020 [phone]  COVID modification wait time form to [facility] with Committee date of 09/09/2020 used 9/3/2020 Lab eGFR 9 meets criteria prior to Selection Committee.     ANNY Hampton     MODIFICATION OF KIDNEY WAITING TIME INITIATION FOR NON-DIALYSIS CANDIDATES      CANDIDATE NAME  Donald Blanco  WAITING LIST ORGAN      Kidney  CANDIDATE HIC #/SSN  XXX TRANSPLANT PROGRAM NAME/CODE  MNUM___Requested listing/waiting time start date:  09/09/2020      Brief Explanation for request (include additional documentation as necessary): Patient completed a multi-virtual evaluation utilizing video visiting technology to assess candidacy for transplant and was approved by the selection committee to be listed as soon as it was safe for the candidate to complete on site testing including lab work, imaging, and appropriate consults. Due to the risk of exposure to COVID-19, the team planned to defer on-site testing until it is safe for ESRD patient to enter the health care facility.  At this time we are requesting the candidate s wait time be modified to the date indication above.      To apply for a kidney waiting time modification for candidates affected by the COVID-19 public health emergency, the candidate s transplant program must submit an application to the OPTN with all of the following information:      1. DONE Documentation confirming the transplant program s decision to register the candidate at the requested listing/waiting time start date above.   2. DONE Documentation explaining why the circumstances of the COVID-19 public health emergency prevented the candidate from beginning to accrue waiting time.   3. For candidates registered at age 18 years or older, documentation confirming candidate s measured or calculated creatinine clearance or GFR was less than or equal to 20ml/min.   Date criteria was met (must be prior to the requested  listing/waiting time start date): 9/3/2020 eGFR 9 ml/min   TRANSPLANT PHYSICIAN/SURGEON SIGNATURE DATE     1/21/2021    TRANSPLANT PHYSICIAN/SURGEON NAME (Please print or type)   Stuart Rust MD     Transplant Program Contact Name Renee Wills BSN   Transplant Program Contact Email [email or web address]  Transplant Program Contact Phone Number [phone] option 4   **The OPTN will notify the transplant program involved by secure email of the completed modification of waiting time**     PLEASE FAX TO [employer] AT [phone]

## 2021-01-28 ENCOUNTER — THERAPY VISIT (OUTPATIENT)
Dept: PHYSICAL THERAPY | Facility: CLINIC | Age: 56
End: 2021-01-28
Payer: COMMERCIAL

## 2021-01-28 DIAGNOSIS — Z90.79 S/P PROSTATECTOMY: ICD-10-CM

## 2021-01-28 DIAGNOSIS — R32 URINARY INCONTINENCE: ICD-10-CM

## 2021-01-28 DIAGNOSIS — M99.05 SOMATIC DYSFUNCTION OF PELVIS REGION: ICD-10-CM

## 2021-01-28 LAB — LAB SCANNED RESULT: NORMAL

## 2021-01-28 PROCEDURE — 97110 THERAPEUTIC EXERCISES: CPT | Mod: GP | Performed by: PHYSICAL THERAPIST

## 2021-01-28 PROCEDURE — 97530 THERAPEUTIC ACTIVITIES: CPT | Mod: GP | Performed by: PHYSICAL THERAPIST

## 2021-02-16 ENCOUNTER — THERAPY VISIT (OUTPATIENT)
Dept: PHYSICAL THERAPY | Facility: CLINIC | Age: 56
End: 2021-02-16
Payer: COMMERCIAL

## 2021-02-16 DIAGNOSIS — R32 URINARY INCONTINENCE: ICD-10-CM

## 2021-02-16 DIAGNOSIS — M99.05 SOMATIC DYSFUNCTION OF PELVIS REGION: ICD-10-CM

## 2021-02-16 DIAGNOSIS — Z90.79 S/P PROSTATECTOMY: ICD-10-CM

## 2021-02-16 PROCEDURE — 97530 THERAPEUTIC ACTIVITIES: CPT | Mod: GP | Performed by: PHYSICAL THERAPIST

## 2021-02-16 PROCEDURE — 97110 THERAPEUTIC EXERCISES: CPT | Mod: GP | Performed by: PHYSICAL THERAPIST

## 2021-03-03 ENCOUNTER — TELEPHONE (OUTPATIENT)
Dept: TRANSPLANT | Facility: CLINIC | Age: 56
End: 2021-03-03

## 2021-03-03 NOTE — TELEPHONE ENCOUNTER
Called patient today and LM on VM that his wait time start date on [facility] listing is now on 09/09/2020, call me with questions.

## 2021-03-04 ENCOUNTER — DOCUMENTATION ONLY (OUTPATIENT)
Dept: TRANSPLANT | Facility: CLINIC | Age: 56
End: 2021-03-04

## 2021-03-04 ENCOUNTER — TELEPHONE (OUTPATIENT)
Dept: TRANSPLANT | Facility: CLINIC | Age: 56
End: 2021-03-04

## 2021-03-04 NOTE — TELEPHONE ENCOUNTER
Called pt to introduce myself as WL coordinator. Let him know that we will be following up with urology visit on 4/1. Encouraged pt to stay up on health maintenance and to let us know if insurance changes, contact info updates. Gave pt direct information and encouraged to reach out with any questions/concerns.

## 2021-03-16 ENCOUNTER — PRE VISIT (OUTPATIENT)
Dept: UROLOGY | Facility: CLINIC | Age: 56
End: 2021-03-16

## 2021-03-16 PROBLEM — Z90.79 S/P PROSTATECTOMY: Status: RESOLVED | Noted: 2021-01-04 | Resolved: 2021-03-16

## 2021-03-16 PROBLEM — M99.05 SOMATIC DYSFUNCTION OF PELVIS REGION: Status: RESOLVED | Noted: 2021-01-04 | Resolved: 2021-03-16

## 2021-03-16 PROBLEM — R32 URINARY INCONTINENCE: Status: RESOLVED | Noted: 2021-01-04 | Resolved: 2021-03-16

## 2021-03-16 NOTE — TELEPHONE ENCOUNTER
Visit Type : PSA check     Records/Orders: PSA    Pt Contacted: YES talk to patient about PSA he is getting it closer to home will fax it to clinic     At Rooming: video

## 2021-03-16 NOTE — PROGRESS NOTES
"Subjective:  HPI  Physical Exam                    Objective:  System    Physical Exam    General     ROS    Assessment/Plan:    DISCHARGE REPORT    Progress reporting period is from 1/4/21 to 2/16/21.       SUBJECTIVE  Subjective changes noted by patient:  .  Subjective: I'm using 1 pad in daytime- mild dampness end of day. Voiding every 2 hrs in daytime,1-2 x night.I'm doing kegels at least 5x daily, other exercises about 1x daily. I have a strong urine stream, driking at least 50-60 oz water daily.    Current pain level is NA  .     Previous pain level was  NA  .   Changes in function:  Yes (See Goal flowsheet attached for changes in current functional level)  Adverse reaction to treatment or activity: None    OBJECTIVE  Changes noted in objective findings:  Yes,   Objective: abdominal scars well healed non tender, review HEP with emphasis to kegels (10\" holds, quick flicks) 5x daily, instruct in neutral body mechanics with bending and lifting to avoid leakage , rev of core/glut strengthening ex.     ASSESSMENT/PLAN  Updated problem list and treatment plan: Diagnosis 1:  Urinary incontinence, S/P prostatectomy    STG/LTGs have been met or progress has been made towards goals:  Yes (See Goal flow sheet completed today.)  Assessment of Progress: The patient has not returned to therapy. Current status is unknown.  Self Management Plans:  Patient has been instructed in a home treatment program.  Patient  has been instructed in self management of symptoms.    Donald continues to require the following intervention to meet STG and LTG's:  PT    Recommendations:  DC    Please refer to the daily flowsheet for treatment today, total treatment time and time spent performing 1:1 timed codes.          "

## 2021-03-17 ENCOUNTER — IMMUNIZATION (OUTPATIENT)
Dept: PEDIATRICS | Facility: CLINIC | Age: 56
End: 2021-03-17
Payer: COMMERCIAL

## 2021-03-17 PROCEDURE — 0001A PR COVID VAC PFIZER DIL RECON 30 MCG/0.3 ML IM: CPT

## 2021-03-17 PROCEDURE — 91300 PR COVID VAC PFIZER DIL RECON 30 MCG/0.3 ML IM: CPT

## 2021-04-01 ENCOUNTER — VIRTUAL VISIT (OUTPATIENT)
Dept: UROLOGY | Facility: CLINIC | Age: 56
End: 2021-04-01
Payer: COMMERCIAL

## 2021-04-01 ENCOUNTER — TEAM CONFERENCE (OUTPATIENT)
Dept: TRANSPLANT | Facility: CLINIC | Age: 56
End: 2021-04-01

## 2021-04-01 DIAGNOSIS — C61 PROSTATE CANCER (H): Primary | ICD-10-CM

## 2021-04-01 PROCEDURE — 99214 OFFICE O/P EST MOD 30 MIN: CPT | Mod: 95 | Performed by: UROLOGY

## 2021-04-01 RX ORDER — SEVELAMER CARBONATE 800 MG/1
2400 TABLET, FILM COATED ORAL
Status: ON HOLD | COMMUNITY
Start: 2021-01-19 | End: 2022-05-27

## 2021-04-01 ASSESSMENT — PAIN SCALES - GENERAL: PAINLEVEL: NO PAIN (0)

## 2021-04-01 NOTE — PROGRESS NOTES
Donald Blanco is a 56 year old male who is being evaluated via a billable video visit.      How would you like to obtain your AVS? MyChart  If the video visit is dropped, the invitation should be resent by: Send to e-mail at: [email or web address]  Will anyone else be joining your video visit? No    Video Start Time: 7:09 AM    CHIEF COMPLAINT   It was my pleasure to see Donald Blanco who is a 56 year old male for follow-up of Prostate Cancer.    HPI  Donald Blanco is a very pleasant 56 year old male who presents with a history of Prostate Cancer. [place] 4+3 disease, stage pT3b with RALP 12/14/2020 with focal positive margin at site of NORBERTO. He has recovered without complication. Noting good improvement with regard to his continence, but still using pads.    Decipher score 0.81. High Risk. 30% risk of 5 year metastasis    PSA  3/25/2021 - <0.1     RALP 12/14/2020  TUMOR     Histologic Type:         - Acinar adenocarcinoma     Histologic Grade       Grade Group and Clark Score:           - Grade group 3 ([place]ark Score 4 + 3 = 7)         Percentage of Pattern 4: 60%     Extraprostatic Extension (EPE):         - Present, nonfocal       Location of Extraprostatic Extension:           - Left posterior     Urinary Bladder Neck Invasion:         - Not identified     Seminal Vesicle Invasion:         - Present         - Left     MARGINS     Margins:         - Involved by invasive carcinoma         - Limited (< 3 mm)       Linear Length of Positive Margin(s) (Millimeters): 2 mm       Focality:           - Unifocal       Location of Positive Margin(s):           - Left posterior       Margin Positivity in Area of Extraprostatic Extension (EPE):           - Present         Location(s): Left seminal vesicle       [place] Pattern at Positive Margin(s):           - Pattern 4     LYMPH NODES     Number of Lymph Nodes Involved:         - 0     Number of Lymph Nodes Examined:         8     PATHOLOGIC STAGE CLASSIFICATION  (PTNM, AJCC 8TH EDITION)     Primary Tumor (pT):         - pT3b     Regional Lymph Nodes (pN):         - pN0          Allergies:   Patient has no known allergies.         Review of Systems:  From intake questionnaire     Skin: negative  Eyes: negative  Ears/Nose/Throat: negative  Respiratory: No shortness of breath, dyspnea on exertion, cough, or hemoptysis  Cardiovascular: No chest pain or palpitations  Gastrointestinal: negative; no nausea/vomiting, constipation or diarrhea  Genitourinary: as per HPI  Musculoskeletal: negative  Neurologic: negative  Psychiatric: negative  Hematologic/Lymphatic/Immunologic: negative  Endocrine: negative         Physical Exam:     Vitals:  No vitals were obtained today due to virtual visit.    Physical Exam   GENERAL: Healthy, alert and no distress  EYES: Eyes grossly normal to inspection.  No discharge or erythema, or obvious scleral/conjunctival abnormalities.  RESP: No audible wheeze, cough, or visible cyanosis.  No visible retractions or increased work of breathing.    SKIN: Visible skin clear. No significant rash, abnormal pigmentation or lesions.  NEURO: Cranial nerves grossly intact.  Mentation and speech appropriate for age.  PSYCH: Mentation appears normal, affect normal/bright, judgement and insight intact, normal speech and appearance well-groomed.      Outside and Past Medical records:    Review of the result(s) of each unique test - PSA, pathology, genomic testing         Assessment and Plan:     56 year old male with stage pT3b, [place] 4+3=7 prostate cancer, s/p RALP completed 12/14/2020. Focal positive margins at site of NORBERTO. SVI noted as well. High-risk by decipher. PSA is undetectable, but was done at an outside lab that only goes down to 0.1.  Overall he is making good progress with his continence, but this is still improving. His renal function is such that he has not started dialysis, and he hopes to avoid this. He is eager to proceed with kidney transplant. We  discussed his pathology today, along with his Decipher test and risk of biochemical recurrence. In this context, we discussed the notion of adjuvant and salvage radiotherapy. With the pending trasnplant, coupled with adverse pathologic features and his Decipher score, adjuvant radiation would be reasonable to consider. As such, will plan referral to radiation oncology to discuss further. Given his continence is still recovering, I would advocate waiting longer prior to radiation, but will arrange for consultation.     Will coordinate with his transplant team, as he is eager to proceed with transplant, given his ongoing renal failure.     - radiation oncology referral  - discuss with ransplant team  - Follow-up 3 months with PSA    Orders  Orders Placed This Encounter   Procedures     PSA tumor marker     Basic metabolic panel     Video-Visit Details    Type of service:  Video Visit    Video End Time:7:39 AM    Originating Location (pt. Location): Home    Distant Location (provider location):  [facility] UROLOGY AND [facility] FOR PROSTATE AND UROLOGIC CANCERS    Platform used for Video Visit: [employer]    Greater than 30 minutes spent on the date of the encounter including direct interaction with the patient, performing chart review, history and exam, documentation and further activities as noted above.    Nabil Vásquez MD  Urology  [facility]

## 2021-04-01 NOTE — TELEPHONE ENCOUNTER
ATTENDEES: CELSA COHEN MD, KIET COLEMAN RN, JERSON CONTE RN, SINA REHMAN RN      DISCUSSION:  Reviewed 10/27/2020 CT a/p     DECISION: Dr Cohen determined vessels suitable for transplant

## 2021-04-01 NOTE — LETTER
4/1/2021      RE: Donald Blanco  [id number] [place] [address]       Donald Blanco is a 56 year old male who is being evaluated via a billable video visit.      How would you like to obtain your AVS? MyChart  If the video visit is dropped, the invitation should be resent by: Send to e-mail at: [email or web address]  Will anyone else be joining your video visit? No    Video Start Time: 7:09 AM    CHIEF COMPLAINT   It was my pleasure to see Donald Blanco who is a 56 year old male for follow-up of Prostate Cancer.    HPI  Donald Blanco is a very pleasant 56 year old male who presents with a history of Prostate Cancer. [place] 4+3 disease, stage pT3b with RALP 12/14/2020 with focal positive margin at site of NORBERTO. He has recovered without complication. Noting good improvement with regard to his continence, but still using pads.    Decipher score 0.81. High Risk. 30% risk of 5 year metastasis    PSA  3/25/2021 - <0.1     RALP 12/14/2020  TUMOR     Histologic Type:         - Acinar adenocarcinoma     Histologic Grade       Grade Group and Clark Score:           - Grade group 3 ([place] Score 4 + 3 = 7)         Percentage of Pattern 4: 60%     Extraprostatic Extension (EPE):         - Present, nonfocal       Location of Extraprostatic Extension:           - Left posterior     Urinary Bladder Neck Invasion:         - Not identified     Seminal Vesicle Invasion:         - Present         - Left     MARGINS     Margins:         - Involved by invasive carcinoma         - Limited (< 3 mm)       Linear Length of Positive Margin(s) (Millimeters): 2 mm       Focality:           - Unifocal       Location of Positive Margin(s):           - Left posterior       Margin Positivity in Area of Extraprostatic Extension (EPE):           - Present         Location(s): Left seminal vesicle       Clark Pattern at Positive Margin(s):           - Pattern 4     LYMPH NODES     Number of Lymph Nodes Involved:         - 0      Number of Lymph Nodes Examined:         8     PATHOLOGIC STAGE CLASSIFICATION (PTNM, AJCC 8TH EDITION)     Primary Tumor (pT):         - pT3b     Regional Lymph Nodes (pN):         - pN0          Allergies:   Patient has no known allergies.         Review of Systems:  From intake questionnaire     Skin: negative  Eyes: negative  Ears/Nose/Throat: negative  Respiratory: No shortness of breath, dyspnea on exertion, cough, or hemoptysis  Cardiovascular: No chest pain or palpitations  Gastrointestinal: negative; no nausea/vomiting, constipation or diarrhea  Genitourinary: as per HPI  Musculoskeletal: negative  Neurologic: negative  Psychiatric: negative  Hematologic/Lymphatic/Immunologic: negative  Endocrine: negative         Physical Exam:     Vitals:  No vitals were obtained today due to virtual visit.    Physical Exam   GENERAL: Healthy, alert and no distress  EYES: Eyes grossly normal to inspection.  No discharge or erythema, or obvious scleral/conjunctival abnormalities.  RESP: No audible wheeze, cough, or visible cyanosis.  No visible retractions or increased work of breathing.    SKIN: Visible skin clear. No significant rash, abnormal pigmentation or lesions.  NEURO: Cranial nerves grossly intact.  Mentation and speech appropriate for age.  PSYCH: Mentation appears normal, affect normal/bright, judgement and insight intact, normal speech and appearance well-groomed.      Outside and Past Medical records:    Review of the result(s) of each unique test - PSA, pathology, genomic testing         Assessment and Plan:     56 year old male with stage pT3b, [place] 4+3=7 prostate cancer, s/p RALP completed 12/14/2020. Focal positive margins at site of NORBERTO. SVI noted as well. High-risk by decipher. PSA is undetectable, but was done at an outside lab that only goes down to 0.1.  Overall he is making good progress with his continence, but this is still improving. His renal function is such that he has not started dialysis,  and he hopes to avoid this. He is eager to proceed with kidney transplant. We discussed his pathology today, along with his Decipher test and risk of biochemical recurrence. In this context, we discussed the notion of adjuvant and salvage radiotherapy. With the pending trasnplant, coupled with adverse pathologic features and his Decipher score, adjuvant radiation would be reasonable to consider. As such, will plan referral to radiation oncology to discuss further. Given his continence is still recovering, I would advocate waiting longer prior to radiation, but will arrange for consultation.     Will coordinate with his transplant team, as he is eager to proceed with transplant, given his ongoing renal failure.     - radiation oncology referral  - discuss with ransplant team  - Follow-up 3 months with PSA    Orders  Orders Placed This Encounter   Procedures     PSA tumor marker     Basic metabolic panel     Video-Visit Details    Type of service:  Video Visit    Video End Time:7:39 AM    Originating Location (pt. Location): Home    Distant Location (provider location):  [facility] UROLOGY AND [facility] FOR PROSTATE AND UROLOGIC CANCERS    Platform used for Video Visit: [employer]    Greater than 30 minutes spent on the date of the encounter including direct interaction with the patient, performing chart review, history and exam, documentation and further activities as noted above.    Nabil Vásquez MD  Urology  [facility]

## 2021-04-01 NOTE — PATIENT INSTRUCTIONS
Please follow up in 3 month with a PSA before   Please make appointment with radiation oncology     It was a pleasure meeting with you today.  Thank you for allowing me and my team the privilege of caring for you today.  YOU are the reason we are here, and I truly hope we provided you with the excellent service you deserve.  Please let us know if there is anything else we can do for you so that we can be sure you are leaving completely satisfied with your care experience.

## 2021-04-07 ENCOUNTER — IMMUNIZATION (OUTPATIENT)
Dept: PEDIATRICS | Facility: CLINIC | Age: 56
End: 2021-04-07
Attending: INTERNAL MEDICINE
Payer: COMMERCIAL

## 2021-04-07 PROCEDURE — 91300 PR COVID VAC PFIZER DIL RECON 30 MCG/0.3 ML IM: CPT

## 2021-04-07 PROCEDURE — 0002A PR COVID VAC PFIZER DIL RECON 30 MCG/0.3 ML IM: CPT

## 2021-04-09 NOTE — TELEPHONE ENCOUNTER
10/27/2020 ct a/p reviewed again with Gopi and dr moralez. Determined that the 9mm retrocaval lymph node should be cleared with Dr Vásquez to see if it is something to be concerned about. Will reach out to Dr Vásquez in Urology. It was also determined that pt should continue radiation and cancer treatment prior to pursuing transplant.

## 2021-04-12 NOTE — TELEPHONE ENCOUNTER
"\"No worries about the lymph node. This was among the packet removed during surgery and his nodes were benign. Furthermore, his PSA post-op is undetectable, suggesting this was a benign lymph node.\"  Per Dr Vásquez on 4/12/21.   "

## 2021-04-15 NOTE — PROGRESS NOTES
RADIATION ONCOLOGY CONSULTATION  [facility] PHYSICIANS    DATE:  4/16/2021     PATIENT NAME: Donald Blanco  MEDICAL RECORD NUMBER: [id number]    REFERRING PHYSICIAN:  Dr. Vásquez    REASON FOR CONSULTATION: Consideration of  postoperative adjuvant radiotherapy for management of adenocarcinoma of the prostate.    Staging:  Pathologic  T3b N0 M0 Margin positive Seminal vesicle invasion(12/14/2020)  Clark's score:   Clark's score: 4+3=7  PSA:     Date Value   03/25/2021 <0.1   09/03/2020 13.50      DECIPHER: 0.81    PSA doubling time:       HISTORY OF PRESENT ILLNESS: The patient is a 56 year old  man who was found to have an elevated PSA with a prostate biopsy confirmation of adenocarcinoma of the prostate. The pre-op PSA was 13.5(9/3/2020).    The patient underwent RALP on 12/14/2020. The pathology([id number], [facility] ) showed acinar adenocarcinoma, [place] 4+3=7 with left SV invasion. A 2 mm margin was involved at left posterior location along with NORBERTO. There was 0/8 LN involvement.    The DECIPHER score of the surgical specimen showed 0.81(high risk).    Staging Bone scan and CT scan of ABd/pelvis (10/27/2020) showed no evidence of metastasis    The patient initially required 5 pads per day after surgery.  However there has been a significant improvement in urinary incontinence.  He currently requires a pad every other day and he believes it still improving.  His current AUA symptom score is 6 out of 35 and Keysha score of 1 out of 25.  He has normal bowel habits.    The patient was seen today to discuss possible adjuvant radiotherapy.  Although the patient has undetectable PSA after surgery, radiotherapy is considered in light of future need for renal transplant.    PMH:   Past Medical History:   Diagnosis Date     CKD (chronic kidney disease) stage 4, GFR 15-29 ml/min (H)      Elevated PSA      Essential hypertension 5/21/2020     Hyperlipidemia      IgA nephropathy      Obesity      Prostate  cancer (H) 10/15/2020       PSH:  Past Surgical History:   Procedure Laterality Date     BACK SURGERY      Back Surgey 20+ Years Ago      BIOPSY      [facility] 2010     COLONOSCOPY      10+ Years ago at [facility]      DAVINCI PROSTATECTOMY, LYMPHADENECTOMY N/A 12/14/2020    Procedure: PROSTATECTOMY, ROBOT-ASSISTED, WITH PELVIC LYMPHADENECTOMY;  Surgeon: Nabil Vásquez MD;  Location:  OR       MEDICATIONS:  Current Outpatient Medications   Medication Sig Dispense Refill     allopurinol (ZYLOPRIM) 100 MG tablet every morning        amLODIPine (NORVASC) 10 MG tablet Take 10 mg by mouth every morning        aspirin (ASA) 81 MG EC tablet Take 81 mg by mouth every morning        bacitracin 500 UNIT/GM external ointment Apply topically 2 times daily 30 g 0     ciprofloxacin (CIPRO) 500 MG tablet Take 1 tablet (500 mg) by mouth once as needed (Prior to catheter removal) 1 tablet 0     furosemide (LASIX) 20 MG tablet every morning        oxyCODONE (ROXICODONE) 5 MG tablet Take 1 tablet (5 mg) by mouth every 6 hours as needed for pain 12 tablet 0     sevelamer carbonate (RENVELA) 800 MG tablet        simvastatin (ZOCOR) 40 MG tablet Take 40 mg by mouth every morning        Vitamin D3 (VITAMIN D3) 25 mcg (1000 units) tablet Take 1 capsule by mouth every morning          ALLERGY:  No Known Allergies    FAMILY HISTORY:  Family History   Problem Relation Age of Onset     Kidney Disease Father      Hypertension Father      Cancer Mother      No Known Problems Brother      No Known Problems Sister      No Known Problems Son      No Known Problems Daughter      No Known Problems Brother      No Known Problems Brother      No Known Problems Brother      No Known Problems Sister      No Known Problems Sister      No Known Problems Sister        SOCIAL HISTORY  Social History     Tobacco Use     Smoking status: Never Smoker     Smokeless tobacco: Never Used   Substance Use Topics     Alcohol use: Yes      Frequency: 2-3 times a week        ROS: 10 point ROS reviewed as reported on the nursing assessment note.      PHYSICAL EXAMINATION:  VS: Vitals: BP (!) 154/82 (BP Location: Left arm, Patient Position: Sitting, Cuff Size: Adult Regular)   Pulse 97   Temp 97.9  F (36.6  C) (Oral)   Resp 16   Wt 109.5 kg (241 lb 6.4 oz)   SpO2 96%   BMI 34.64 kg/m      ABDOMEN:  Soft and non tender without mass.        IMPRESSION:  Adenocarcinoma of prostate.  Undetectable PSA after surgery but had pathologic pT3b N0 M0, R1, with evidence of seminal vesicle invasion.  Patient is approximately 4 months post surgery.    RECOMMENDATION: The patient has increasing creatinine level which may require dialysis in the near future.  The patient is expected to be on the renal transplant list.    The patient has a poor risk features including pathologic evidence of involved margins, extracapsular extension, and the seminal vesicle invasion.  In addition, the patient exhibits high decipher score predictive of recurrent prostate cancer.    Although we would advocate awaiting for PSA evidence of recurrence, it is more urgent to consider adjuvant radiotherapy in light of potential kidney transplant.  The rising PSA may preclude transplant and potential salvage radiotherapy after transplant may be problematic due to the location of the transplanted kidney.    Therefore, I recommend a course of adjuvant radiotherapy without androgen deprivation.    I discussed with the patient the unique situation that he is in due to the need for kidney transplant.  I discussed the rationale for adjuvant radiotherapy as well as alternative treatments including waiting for PSA recurrence.    After a lengthy discussion, patient wishes to proceed with adjuvant treatment. I answered all questions. I also discussed alternative therapies.     I explained the benefits of radiotherapy as well as related toxicities. I described the early and late toxicities associated  with radiotherapy. The toxicities are itemized on the consent form for prostate radiotherapy.    The current plan is to schedule a simulation in 2 weeks with the start of the radiotherapy to coincide  with plateauing of the urinary incontinence of recovery.    LI Alexander M.D.  Department of Radiation Oncology  [facility]

## 2021-04-16 ENCOUNTER — OFFICE VISIT (OUTPATIENT)
Dept: RADIATION ONCOLOGY | Facility: CLINIC | Age: 56
End: 2021-04-16
Attending: UROLOGY
Payer: COMMERCIAL

## 2021-04-16 VITALS
RESPIRATION RATE: 16 BRPM | SYSTOLIC BLOOD PRESSURE: 154 MMHG | WEIGHT: 241.4 LBS | OXYGEN SATURATION: 96 % | TEMPERATURE: 97.9 F | DIASTOLIC BLOOD PRESSURE: 82 MMHG | BODY MASS INDEX: 34.64 KG/M2 | HEART RATE: 97 BPM

## 2021-04-16 DIAGNOSIS — C61 MALIGNANT NEOPLASM OF PROSTATE (H): Primary | ICD-10-CM

## 2021-04-16 PROCEDURE — 77263 THER RADIOLOGY TX PLNG CPLX: CPT | Performed by: RADIOLOGY

## 2021-04-16 PROCEDURE — 99205 OFFICE O/P NEW HI 60 MIN: CPT | Performed by: RADIOLOGY

## 2021-04-16 ASSESSMENT — PAIN SCALES - GENERAL: PAINLEVEL: NO PAIN (0)

## 2021-04-16 NOTE — PATIENT INSTRUCTIONS
Please contact [facility] RN with questions or concerns following today's appointment: [phone].    Thank you!

## 2021-04-16 NOTE — LETTER
4/16/2021         RE: Donald Blanco  [address] [facility] [address]        Dear Colleague,    Thank you for referring your patient, Donald Blanco, to the [facility] RADIATION ONCOLOGY [place]. Please see a copy of my visit note below.      RADIATION ONCOLOGY CONSULTATION  [facility] PHYSICIANS    DATE:  4/16/2021     PATIENT NAME: Donald Blanco  MEDICAL RECORD NUMBER: [id number]    REFERRING PHYSICIAN:  Dr. Vásquez    REASON FOR CONSULTATION: Consideration of  postoperative adjuvant radiotherapy for management of adenocarcinoma of the prostate.    Staging:  Pathologic  T3b N0 M0 Margin positive Seminal vesicle invasion(12/14/2020)  Clint's score:   Clint's score: 4+3=7  PSA:     Date Value   03/25/2021 <0.1   09/03/2020 13.50      DECIPHER: 0.81    PSA doubling time:       HISTORY OF PRESENT ILLNESS: The patient is a 56 year old  man who was found to have an elevated PSA with a prostate biopsy confirmation of adenocarcinoma of the prostate. The pre-op PSA was 13.5(9/3/2020).    The patient underwent RALP on 12/14/2020. The pathology([id number], [facility] ) showed acinar adenocarcinoma, [place] 4+3=7 with left SV invasion. A 2 mm margin was involved at left posterior location along with NORBERTO. There was 0/8 LN involvement.    The DECIPHER score of the surgical specimen showed 0.81(high risk).    Staging Bone scan and CT scan of ABd/pelvis (10/27/2020) showed no evidence of metastasis    The patient initially required 5 pads per day after surgery.  However there has been a significant improvement in urinary incontinence.  He currently requires a pad every other day and he believes it still improving.  His current AUA symptom score is 6 out of 35 and Keysha score of 1 out of 25.  He has normal bowel habits.    The patient was seen today to discuss possible adjuvant radiotherapy.  Although the patient has undetectable PSA after surgery, radiotherapy is considered in light of future  need for renal transplant.    PMH:   Past Medical History:   Diagnosis Date     CKD (chronic kidney disease) stage 4, GFR 15-29 ml/min (H)      Elevated PSA      Essential hypertension 5/21/2020     Hyperlipidemia      IgA nephropathy      Obesity      Prostate cancer (H) 10/15/2020       PSH:  Past Surgical History:   Procedure Laterality Date     BACK SURGERY      Back Surgey 20+ Years Ago      BIOPSY      [facility] 2010     COLONOSCOPY      10+ Years ago at [facility]      DAVINCI PROSTATECTOMY, LYMPHADENECTOMY N/A 12/14/2020    Procedure: PROSTATECTOMY, ROBOT-ASSISTED, WITH PELVIC LYMPHADENECTOMY;  Surgeon: Nabil Vásquez MD;  Location:  OR       MEDICATIONS:  Current Outpatient Medications   Medication Sig Dispense Refill     allopurinol (ZYLOPRIM) 100 MG tablet every morning        amLODIPine (NORVASC) 10 MG tablet Take 10 mg by mouth every morning        aspirin (ASA) 81 MG EC tablet Take 81 mg by mouth every morning        bacitracin 500 UNIT/GM external ointment Apply topically 2 times daily 30 g 0     ciprofloxacin (CIPRO) 500 MG tablet Take 1 tablet (500 mg) by mouth once as needed (Prior to catheter removal) 1 tablet 0     furosemide (LASIX) 20 MG tablet every morning        oxyCODONE (ROXICODONE) 5 MG tablet Take 1 tablet (5 mg) by mouth every 6 hours as needed for pain 12 tablet 0     sevelamer carbonate (RENVELA) 800 MG tablet        simvastatin (ZOCOR) 40 MG tablet Take 40 mg by mouth every morning        Vitamin D3 (VITAMIN D3) 25 mcg (1000 units) tablet Take 1 capsule by mouth every morning          ALLERGY:  No Known Allergies    FAMILY HISTORY:  Family History   Problem Relation Age of Onset     Kidney Disease Father      Hypertension Father      Cancer Mother      No Known Problems Brother      No Known Problems Sister      No Known Problems Son      No Known Problems Daughter      No Known Problems Brother      No Known Problems Brother      No Known Problems  Brother      No Known Problems Sister      No Known Problems Sister      No Known Problems Sister        SOCIAL HISTORY  Social History     Tobacco Use     Smoking status: Never Smoker     Smokeless tobacco: Never Used   Substance Use Topics     Alcohol use: Yes     Frequency: 2-3 times a week        ROS: 10 point ROS reviewed as reported on the nursing assessment note.      PHYSICAL EXAMINATION:  VS: Vitals: BP (!) 154/82 (BP Location: Left arm, Patient Position: Sitting, Cuff Size: Adult Regular)   Pulse 97   Temp 97.9  F (36.6  C) (Oral)   Resp 16   Wt 109.5 kg (241 lb 6.4 oz)   SpO2 96%   BMI 34.64 kg/m      ABDOMEN:  Soft and non tender without mass.        IMPRESSION:  Adenocarcinoma of prostate.  Undetectable PSA after surgery but had pathologic pT3b N0 M0, R1, with evidence of seminal vesicle invasion.  Patient is approximately 4 months post surgery.    RECOMMENDATION: The patient has increasing creatinine level which may require dialysis in the near future.  The patient is expected to be on the renal transplant list.    The patient has a poor risk features including pathologic evidence of involved margins, extracapsular extension, and the seminal vesicle invasion.  In addition, the patient exhibits high decipher score predictive of recurrent prostate cancer.    Although we would advocate awaiting for PSA evidence of recurrence, it is more urgent to consider adjuvant radiotherapy in light of potential kidney transplant.  The rising PSA may preclude transplant and potential salvage radiotherapy after transplant may be problematic due to the location of the transplanted kidney.    Therefore, I recommend a course of adjuvant radiotherapy without androgen deprivation.    I discussed with the patient the unique situation that he is in due to the need for kidney transplant.  I discussed the rationale for adjuvant radiotherapy as well as alternative treatments including waiting for PSA recurrence.    After a  lengthy discussion, patient wishes to proceed with adjuvant treatment. I answered all questions. I also discussed alternative therapies.     I explained the benefits of radiotherapy as well as related toxicities. I described the early and late toxicities associated with radiotherapy. The toxicities are itemized on the consent form for prostate radiotherapy.    The current plan is to schedule a simulation in 2 weeks with the start of the radiotherapy to coincide  with plateauing of the urinary incontinence of recovery.    LI Alexander M.D.  Department of Radiation Oncology  [facility]    REASON FOR APPOINTMENT   Type of Cancer: Prostate [place] 3+4=7  Location: Prostate  Date of Symptom Onset: 10/15/2020 elevated PSA found during transplant work up    TREATMENT TO-DATE FOR THIS CANCER  Surgery ? Robotic assisted prostatectomy with pelvic lymphadenectomy   Chemotherapy ? no   Other Treatments for this Cancer ? no    PERSONAL HISTORY OF CANCER   Previous Cancer ? no   Prior Radiation ? no   Prior Chemotherapy ? no   Prior Hormonal Therapy ? no     RECENT IMAGING STUDIES  CT scan (date: 10/27/2020, location: [facility])  Bone scan (date: 10/27/2020, location: [facility])    REFERRALS NEEDED  no    VITALS  BP (!) 154/82 (BP Location: Left arm, Patient Position: Sitting, Cuff Size: Adult Regular)   Pulse 97   Temp 97.9  F (36.6  C) (Oral)   Resp 16   Wt 109.5 kg (241 lb 6.4 oz)   SpO2 96%   BMI 34.64 kg/m      PACEMAKER/IMPLANTED CARDIAC DEVICE no    PAIN  Denies    PSYCHOSOCIAL  Marital Status:   Patient lives in home with spouse.  Number of children: unknown  Working status: full time  Do you feel safe in your home? Yes    REVIEW OF SYSTEMS  Skin: negative  Eyes: positive for contacts  Ears/Nose/Throat: negative  Respiratory: No shortness of breath, dyspnea on exertion, cough, or hemoptysis  Cardiovascular: negative  Gastrointestinal:  negative  Genitourinary: positive for nocturia x 2  Musculoskeletal: negative  Neurologic: negative  Psychiatric: negative  Hematologic/Lymphatic/Immunologic: negative  Endocrine: negative    Radiation Oncology Patient Teaching    Current Concern: Prostate cancer    Person involved with teaching: Patient and Wife  Patient asked Questions: Yes  Patient was cooperative: Yes  Patient was receptive (willing to accept information given): Yes    Education Assessment  Comprehension ability: High  Knowledge level: Medium  Factors affecting teaching: None    Educational Topics Discussed  Side effects    Response To Teaching  Verbalizes understanding    Do you have an advanced directive or living will? no  Are you DNR/DNI? No    Consent to Communicate Complete- 9/24/2020  Fall Risk Assessment Complete- Yes 4/16/2021    Lazara Arroyo RN Rad Onc            Again, thank you for allowing me to participate in the care of your patient.        Sincerely,        Delmar Alexander MD

## 2021-04-16 NOTE — PROGRESS NOTES
REASON FOR APPOINTMENT   Type of Cancer: Prostate [place] 3+4=7  Location: Prostate  Date of Symptom Onset: 10/15/2020 elevated PSA found during transplant work up    TREATMENT TO-DATE FOR THIS CANCER  Surgery ? Robotic assisted prostatectomy with pelvic lymphadenectomy   Chemotherapy ? no   Other Treatments for this Cancer ? no    PERSONAL HISTORY OF CANCER   Previous Cancer ? no   Prior Radiation ? no   Prior Chemotherapy ? no   Prior Hormonal Therapy ? no     RECENT IMAGING STUDIES  CT scan (date: 10/27/2020, location: [facility])  Bone scan (date: 10/27/2020, location: [facility])    REFERRALS NEEDED  no    VITALS  BP (!) 154/82 (BP Location: Left arm, Patient Position: Sitting, Cuff Size: Adult Regular)   Pulse 97   Temp 97.9  F (36.6  C) (Oral)   Resp 16   Wt 109.5 kg (241 lb 6.4 oz)   SpO2 96%   BMI 34.64 kg/m      PACEMAKER/IMPLANTED CARDIAC DEVICE no    PAIN  Denies    PSYCHOSOCIAL  Marital Status:   Patient lives in home with spouse.  Number of children: unknown  Working status: full time  Do you feel safe in your home? Yes    REVIEW OF SYSTEMS  Skin: negative  Eyes: positive for contacts  Ears/Nose/Throat: negative  Respiratory: No shortness of breath, dyspnea on exertion, cough, or hemoptysis  Cardiovascular: negative  Gastrointestinal: negative  Genitourinary: positive for nocturia x 2  Musculoskeletal: negative  Neurologic: negative  Psychiatric: negative  Hematologic/Lymphatic/Immunologic: negative  Endocrine: negative    Radiation Oncology Patient Teaching    Current Concern: Prostate cancer    Person involved with teaching: Patient and Wife  Patient asked Questions: Yes  Patient was cooperative: Yes  Patient was receptive (willing to accept information given): Yes    Education Assessment  Comprehension ability: High  Knowledge level: Medium  Factors affecting teaching: None    Educational Topics Discussed  Side effects    Response To  Teaching  Verbalizes understanding    Do you have an advanced directive or living will? no  Are you DNR/DNI? No    Consent to Communicate Complete- 9/24/2020  Fall Risk Assessment Complete- Yes 4/16/2021    Lazara Arroyo RN Rad Onc

## 2021-04-19 ENCOUNTER — PATIENT OUTREACH (OUTPATIENT)
Dept: RADIATION ONCOLOGY | Facility: CLINIC | Age: 56
End: 2021-04-19

## 2021-04-19 ENCOUNTER — TELEPHONE (OUTPATIENT)
Dept: TRANSPLANT | Facility: CLINIC | Age: 56
End: 2021-04-19

## 2021-04-19 NOTE — TELEPHONE ENCOUNTER
Pt called in with his wife wondering about transplant and what the process would be after his radiation. Informed him that once he finishes radiation and sees Dr Vásquez we will re-assess at committee to determine if he needs waiting time or if he is ok for active status consideration. They are also concerned about how insurance is going to pay for his radiation, will reach out to PFR but also encouraged them to reach out to Dr Alexander's office with questions. Pt states he has a donor that has been through whole eval and ready to go. Explained that once he gets urology clearance we will let the donor team know so they can work up any donors. Pt verbalized understanding of information and has no further questions. Encouraged to reach out if questions arise.

## 2021-04-19 NOTE — TELEPHONE ENCOUNTER
Patient was seen for radiation oncology consultation with Dr. Alexander on Friday, 4/16/2021 at [facility], recommendations for patient to return to clinic in two weeks for CT Simulation for radiation treatment planning, consent signed for treatment on 4/16/2021.  This RN contacted patient, reviewed above information and patient confirmed plan to proceed with radiation treatment.  CT Simulation for radiation treatment planning scheduled for Friday, 4/30/2021 at 1330.  Reviewed emptying bladder at 1200 and then between 1200 and 1230 drinking two to three cups of water to have a comfortably full bladder for radiation planning.  Patient was instructed not to empty bladder after filling until instructed to do so in clinic.  Informed patient that if he were to have a bowel movement or unable to hold bladder and empties bladder, he should restart drinking two to three cups of water as soon as possible.  Patient reports he generally has a daily bowel movement, time of day intermittently inconsistent, reviewed daily bowel movement for empty rectum for radiation treatment planning and daily radiation treatments.  Reviewed use of Metamucil or Citrucel daily either in powder form or capsule starting today, 4/19/2021 and continuing to take one time daily throughout course of radiation treatment.  Patient verbalized understanding of all information and had no questions at this time.  This RN provided direct contact information and patient was encouraged to contact clinic with questions or concerns.    Mariela Coburn, RN BSN OCN CBCN

## 2021-04-30 ENCOUNTER — OFFICE VISIT (OUTPATIENT)
Dept: RADIATION ONCOLOGY | Facility: CLINIC | Age: 56
End: 2021-04-30
Payer: COMMERCIAL

## 2021-04-30 DIAGNOSIS — C61 MALIGNANT NEOPLASM OF PROSTATE (H): Primary | ICD-10-CM

## 2021-04-30 PROCEDURE — 77334 RADIATION TREATMENT AID(S): CPT | Performed by: RADIOLOGY

## 2021-04-30 NOTE — LETTER
4/30/2021         RE: Donald Blanco  [address] [facility]  [place] [address]        Dear Colleague,    Thank you for referring your patient, Donald Blanco, to the [facility] RADIATION ONCOLOGY [place]. Please see a copy of my visit note below.    Simulation Note    Date: 4/30/2021     Patient: Donald Blanco    Diagnosis: Malignant neoplasm of prostate (H)    Type of Simulation:  Adjuvant     Patient Position: Supine    Patient Immobilization: Custom:  Vac-Loc    Simulation Aid(s): Urethrogram    Image Acquisition: Conventional CT simulation without 4D    Total Dose Planned: 6600 cGy      Dose/fraction:200 cGy    Energy of machine:  10 MV           Type of Radiotherapy Technique: IMRT(Intensity Modulated Radiotherapy)      Continuing Physcis/Dosimetry  and Dose calculations are ordered.  Simple simulations will be done prior to new start and changes in fields.  Weekly on treat visit.      LI Alexander M.D.  Department of Radiation Oncology  [facility]

## 2021-04-30 NOTE — PROGRESS NOTES
Simulation Note    Date: 4/30/2021     Patient: Donald Blanco    Diagnosis: Malignant neoplasm of prostate (H)    Type of Simulation:  Adjuvant     Patient Position: Supine    Patient Immobilization: Custom:  Vac-Loc    Simulation Aid(s): Urethrogram    Image Acquisition: Conventional CT simulation without 4D    Total Dose Planned: 6600 cGy      Dose/fraction:200 cGy    Energy of machine:  10 MV           Type of Radiotherapy Technique: IMRT(Intensity Modulated Radiotherapy)      Continuing Physcis/Dosimetry  and Dose calculations are ordered.  Simple simulations will be done prior to new start and changes in fields.  Weekly on treat visit.      LI Alexander M.D.  Department of Radiation Oncology  [facility]

## 2021-05-04 DIAGNOSIS — C61 MALIGNANT NEOPLASM OF PROSTATE (H): ICD-10-CM

## 2021-05-04 DIAGNOSIS — C61 MALIGNANT NEOPLASM OF PROSTATE (H): Primary | ICD-10-CM

## 2021-05-04 LAB
SARS-COV-2 RNA RESP QL NAA+PROBE: NORMAL
SPECIMEN SOURCE: NORMAL

## 2021-05-04 PROCEDURE — U0005 INFEC AGEN DETEC AMPLI PROBE: HCPCS | Performed by: RADIOLOGY

## 2021-05-04 PROCEDURE — U0003 INFECTIOUS AGENT DETECTION BY NUCLEIC ACID (DNA OR RNA); SEVERE ACUTE RESPIRATORY SYNDROME CORONAVIRUS 2 (SARS-COV-2) (CORONAVIRUS DISEASE [COVID-19]), AMPLIFIED PROBE TECHNIQUE, MAKING USE OF HIGH THROUGHPUT TECHNOLOGIES AS DESCRIBED BY CMS-2020-01-R: HCPCS | Performed by: RADIOLOGY

## 2021-05-05 ENCOUNTER — APPOINTMENT (OUTPATIENT)
Dept: RADIATION ONCOLOGY | Facility: CLINIC | Age: 56
End: 2021-05-05
Payer: COMMERCIAL

## 2021-05-05 LAB
LABORATORY COMMENT REPORT: NORMAL
SARS-COV-2 RNA RESP QL NAA+PROBE: NEGATIVE
SPECIMEN SOURCE: NORMAL

## 2021-05-05 PROCEDURE — 77300 RADIATION THERAPY DOSE PLAN: CPT | Performed by: RADIOLOGY

## 2021-05-05 PROCEDURE — 77301 RADIOTHERAPY DOSE PLAN IMRT: CPT | Performed by: RADIOLOGY

## 2021-05-05 PROCEDURE — 77338 DESIGN MLC DEVICE FOR IMRT: CPT | Performed by: RADIOLOGY

## 2021-05-06 ENCOUNTER — OFFICE VISIT (OUTPATIENT)
Dept: RADIATION ONCOLOGY | Facility: CLINIC | Age: 56
End: 2021-05-06
Payer: COMMERCIAL

## 2021-05-06 DIAGNOSIS — C61 PROSTATE CANCER (H): Primary | ICD-10-CM

## 2021-05-06 PROCEDURE — 99207 PR NO CHARGE LOS: CPT | Performed by: RADIOLOGY

## 2021-05-06 PROCEDURE — 77014 PR CT GUIDE FOR PLACEMENT RADIATION THERAPY FIELDS: CPT | Mod: TC | Performed by: RADIOLOGY

## 2021-05-06 NOTE — LETTER
5/6/2021         RE: Donald Blanco  [address] [facility] [address]        Dear Colleague,    Thank you for referring your patient, Donald Blanco, to the [facility] RADIATION ONCOLOGY [place]. Please see a copy of my visit note below.    simulation performed      Again, thank you for allowing me to participate in the care of your patient.        Sincerely,        Sylvia Sotelo MD

## 2021-05-10 ENCOUNTER — APPOINTMENT (OUTPATIENT)
Dept: RADIATION ONCOLOGY | Facility: CLINIC | Age: 56
End: 2021-05-10
Payer: COMMERCIAL

## 2021-05-10 PROCEDURE — 77014 PR CT GUIDE FOR PLACEMENT RADIATION THERAPY FIELDS: CPT | Mod: TC | Performed by: RADIOLOGY

## 2021-05-10 PROCEDURE — 77385 PR IMRT TREATMENT DELIVERY, SIMPLE: CPT | Performed by: RADIOLOGY

## 2021-05-11 ENCOUNTER — APPOINTMENT (OUTPATIENT)
Dept: RADIATION ONCOLOGY | Facility: CLINIC | Age: 56
End: 2021-05-11
Payer: COMMERCIAL

## 2021-05-11 PROCEDURE — 77385 PR IMRT TREATMENT DELIVERY, SIMPLE: CPT | Performed by: RADIOLOGY

## 2021-05-11 PROCEDURE — 77014 PR CT GUIDE FOR PLACEMENT RADIATION THERAPY FIELDS: CPT | Mod: TC | Performed by: RADIOLOGY

## 2021-05-12 ENCOUNTER — APPOINTMENT (OUTPATIENT)
Dept: RADIATION ONCOLOGY | Facility: CLINIC | Age: 56
End: 2021-05-12
Payer: COMMERCIAL

## 2021-05-12 PROCEDURE — 77385 PR IMRT TREATMENT DELIVERY, SIMPLE: CPT | Performed by: RADIOLOGY

## 2021-05-12 PROCEDURE — 77014 PR CT GUIDE FOR PLACEMENT RADIATION THERAPY FIELDS: CPT | Mod: TC | Performed by: RADIOLOGY

## 2021-05-13 ENCOUNTER — APPOINTMENT (OUTPATIENT)
Dept: RADIATION ONCOLOGY | Facility: CLINIC | Age: 56
End: 2021-05-13
Payer: COMMERCIAL

## 2021-05-13 PROCEDURE — 77385 PR IMRT TREATMENT DELIVERY, SIMPLE: CPT | Performed by: RADIOLOGY

## 2021-05-13 PROCEDURE — 77014 PR CT GUIDE FOR PLACEMENT RADIATION THERAPY FIELDS: CPT | Mod: TC | Performed by: RADIOLOGY

## 2021-05-14 ENCOUNTER — APPOINTMENT (OUTPATIENT)
Dept: RADIATION ONCOLOGY | Facility: CLINIC | Age: 56
End: 2021-05-14
Payer: COMMERCIAL

## 2021-05-14 ENCOUNTER — OFFICE VISIT (OUTPATIENT)
Dept: RADIATION ONCOLOGY | Facility: CLINIC | Age: 56
End: 2021-05-14
Payer: COMMERCIAL

## 2021-05-14 VITALS
WEIGHT: 245.6 LBS | TEMPERATURE: 98 F | SYSTOLIC BLOOD PRESSURE: 147 MMHG | DIASTOLIC BLOOD PRESSURE: 78 MMHG | HEART RATE: 87 BPM | BODY MASS INDEX: 35.24 KG/M2 | RESPIRATION RATE: 16 BRPM

## 2021-05-14 DIAGNOSIS — E66.01 MORBID OBESITY (H): ICD-10-CM

## 2021-05-14 DIAGNOSIS — C61 MALIGNANT NEOPLASM OF PROSTATE (H): Primary | ICD-10-CM

## 2021-05-14 PROCEDURE — 77014 PR CT GUIDE FOR PLACEMENT RADIATION THERAPY FIELDS: CPT | Performed by: RADIOLOGY

## 2021-05-14 PROCEDURE — 77385 PR IMRT TREATMENT DELIVERY, SIMPLE: CPT | Performed by: RADIOLOGY

## 2021-05-14 PROCEDURE — 99207 PR DROP WITH A PROCEDURE: CPT | Performed by: RADIOLOGY

## 2021-05-14 PROCEDURE — 77427 RADIATION TX MANAGEMENT X5: CPT | Performed by: RADIOLOGY

## 2021-05-14 PROCEDURE — 77336 RADIATION PHYSICS CONSULT: CPT | Performed by: RADIOLOGY

## 2021-05-14 ASSESSMENT — PAIN SCALES - GENERAL: PAINLEVEL: NO PAIN (0)

## 2021-05-14 NOTE — PROGRESS NOTES
WEEKLY MANAGEMENT NOTE  Radiation Oncology          Patient Name: Donald Blanco  MRN: [id number]       Pelvis   1000/ 6600 cGy   5/33         DAILY DOSE:     200  cGy/ day,  5 times/week      DISEASE UNDER TREATMENT: Adenocarcinoma of Prostate  Stage pT3b N0 M0, [place] score=7(4+3)  , PSA <0.1(3/25/2021 post op), 13.5 pre op. Decipher score 0.81(high risk)    SUBJECTIVE:    CTC V5.0 Toxicity Criteria  Fatigue: Grade 0: No toxicity  Pain Score: 0 /10     :   Urinary Frequency/Urgency: Grade 0: No change from baseline  Urinary Incontinence: Grade 0: No change from baseline  Dysuria:Grade 0: No change from baseline  Nocturia: 2  Comment:    GI:  Diarrhea:Grade 0  No change over baseline  Proctitis: Grade 0 No symptom  Comment:      OBJECTIVE: There were no vitals taken for this visit.  Wt Readings from Last 2 Encounters:   05/14/21 111.4 kg (245 lb 9.6 oz)   04/16/21 109.5 kg (241 lb 6.4 oz)         IMPRESSION: The patient is tolerating the treatment.  The patient set up, dose, and cone beam CT images were reviewed.      PLAN: Continue radiotherapy    PAIN MANAGEMENT PLAN: The patient does not require pain management    LI Alexander M.D.  Department of Radiation Oncology  [facility]

## 2021-05-14 NOTE — PATIENT INSTRUCTIONS
Please contact [facility] RN with questions or concerns following today's appointment: [phone].    Thank you!

## 2021-05-14 NOTE — LETTER
5/14/2021         RE: Donald Blanco  [address] [facility] [address]        Dear Colleague,    Thank you for referring your patient, Donald Blanco, to the [facility] RADIATION ONCOLOGY [place]. Please see a copy of my visit note below.    WEEKLY MANAGEMENT NOTE  Radiation Oncology          Patient Name: Donald Blanco  MRN: [id number]       Pelvis   1000/ 6600 cGy   5/33         DAILY DOSE:     200  cGy/ day,  5 times/week      DISEASE UNDER TREATMENT: Adenocarcinoma of Prostate  Stage pT3b N0 M0, Clark score=7(4+3)  , PSA <0.1(3/25/2021 post op), 13.5 pre op. Decipher score 0.81(high risk)    SUBJECTIVE:    CTC V5.0 Toxicity Criteria  Fatigue: Grade 0: No toxicity  Pain Score: 0 /10     :   Urinary Frequency/Urgency: Grade 0: No change from baseline  Urinary Incontinence: Grade 0: No change from baseline  Dysuria:Grade 0: No change from baseline  Nocturia: 2  Comment:    GI:  Diarrhea:Grade 0  No change over baseline  Proctitis: Grade 0 No symptom  Comment:      OBJECTIVE: There were no vitals taken for this visit.  Wt Readings from Last 2 Encounters:   05/14/21 111.4 kg (245 lb 9.6 oz)   04/16/21 109.5 kg (241 lb 6.4 oz)         IMPRESSION: The patient is tolerating the treatment.  The patient set up, dose, and cone beam CT images were reviewed.      PLAN: Continue radiotherapy    PAIN MANAGEMENT PLAN: The patient does not require pain management    LI Alexander M.D.  Department of Radiation Oncology  [facility]      Again, thank you for allowing me to participate in the care of your patient.        Sincerely,        Delmar Alexander MD

## 2021-05-14 NOTE — LETTER
5/14/2021      RE: Donald Blanco  [address] [facility]  [place] [address]       WEEKLY MANAGEMENT NOTE  Radiation Oncology          Patient Name: Donald Blanco  MRN: [id number]       Pelvis   1000/ 6600 cGy   5/33         DAILY DOSE:     200  cGy/ day,  5 times/week      DISEASE UNDER TREATMENT: Adenocarcinoma of Prostate  Stage pT3b N0 M0, Boss score=7(4+3)  , PSA <0.1(3/25/2021 post op), 13.5 pre op. Decipher score 0.81(high risk)    SUBJECTIVE:    CTC V5.0 Toxicity Criteria  Fatigue: Grade 0: No toxicity  Pain Score: 0 /10     :   Urinary Frequency/Urgency: Grade 0: No change from baseline  Urinary Incontinence: Grade 0: No change from baseline  Dysuria:Grade 0: No change from baseline  Nocturia: 2  Comment:    GI:  Diarrhea:Grade 0  No change over baseline  Proctitis: Grade 0 No symptom  Comment:      OBJECTIVE: There were no vitals taken for this visit.  Wt Readings from Last 2 Encounters:   05/14/21 111.4 kg (245 lb 9.6 oz)   04/16/21 109.5 kg (241 lb 6.4 oz)         IMPRESSION: The patient is tolerating the treatment.  The patient set up, dose, and cone beam CT images were reviewed.      PLAN: Continue radiotherapy    PAIN MANAGEMENT PLAN: The patient does not require pain management    LI Alexander M.D.  Department of Radiation Oncology  [facility]      Delmar Alexander MD

## 2021-05-17 ENCOUNTER — APPOINTMENT (OUTPATIENT)
Dept: RADIATION ONCOLOGY | Facility: CLINIC | Age: 56
End: 2021-05-17
Payer: COMMERCIAL

## 2021-05-17 PROCEDURE — 77385 PR IMRT TREATMENT DELIVERY, SIMPLE: CPT | Performed by: RADIOLOGY

## 2021-05-17 PROCEDURE — 77014 PR CT GUIDE FOR PLACEMENT RADIATION THERAPY FIELDS: CPT | Mod: TC | Performed by: RADIOLOGY

## 2021-05-18 ENCOUNTER — APPOINTMENT (OUTPATIENT)
Dept: RADIATION ONCOLOGY | Facility: CLINIC | Age: 56
End: 2021-05-18
Payer: COMMERCIAL

## 2021-05-18 PROCEDURE — 77014 PR CT GUIDE FOR PLACEMENT RADIATION THERAPY FIELDS: CPT | Mod: TC | Performed by: RADIOLOGY

## 2021-05-18 PROCEDURE — 77385 PR IMRT TREATMENT DELIVERY, SIMPLE: CPT | Performed by: RADIOLOGY

## 2021-05-19 ENCOUNTER — APPOINTMENT (OUTPATIENT)
Dept: RADIATION ONCOLOGY | Facility: CLINIC | Age: 56
End: 2021-05-19
Payer: COMMERCIAL

## 2021-05-19 PROCEDURE — 77385 PR IMRT TREATMENT DELIVERY, SIMPLE: CPT | Performed by: STUDENT IN AN ORGANIZED HEALTH CARE EDUCATION/TRAINING PROGRAM

## 2021-05-19 PROCEDURE — 77014 PR CT GUIDE FOR PLACEMENT RADIATION THERAPY FIELDS: CPT | Performed by: STUDENT IN AN ORGANIZED HEALTH CARE EDUCATION/TRAINING PROGRAM

## 2021-05-20 ENCOUNTER — APPOINTMENT (OUTPATIENT)
Dept: RADIATION ONCOLOGY | Facility: CLINIC | Age: 56
End: 2021-05-20
Payer: COMMERCIAL

## 2021-05-20 PROCEDURE — 77014 PR CT GUIDE FOR PLACEMENT RADIATION THERAPY FIELDS: CPT | Performed by: RADIOLOGY

## 2021-05-20 PROCEDURE — 77385 PR IMRT TREATMENT DELIVERY, SIMPLE: CPT | Performed by: RADIOLOGY

## 2021-05-21 ENCOUNTER — OFFICE VISIT (OUTPATIENT)
Dept: RADIATION ONCOLOGY | Facility: CLINIC | Age: 56
End: 2021-05-21
Payer: COMMERCIAL

## 2021-05-21 ENCOUNTER — APPOINTMENT (OUTPATIENT)
Dept: RADIATION ONCOLOGY | Facility: CLINIC | Age: 56
End: 2021-05-21
Payer: COMMERCIAL

## 2021-05-21 VITALS
BODY MASS INDEX: 34.74 KG/M2 | WEIGHT: 242.1 LBS | OXYGEN SATURATION: 97 % | HEART RATE: 83 BPM | TEMPERATURE: 97.9 F | RESPIRATION RATE: 16 BRPM | SYSTOLIC BLOOD PRESSURE: 146 MMHG | DIASTOLIC BLOOD PRESSURE: 79 MMHG

## 2021-05-21 DIAGNOSIS — C61 MALIGNANT NEOPLASM OF PROSTATE (H): Primary | ICD-10-CM

## 2021-05-21 PROCEDURE — 77336 RADIATION PHYSICS CONSULT: CPT | Performed by: RADIOLOGY

## 2021-05-21 PROCEDURE — 77427 RADIATION TX MANAGEMENT X5: CPT | Performed by: RADIOLOGY

## 2021-05-21 PROCEDURE — 99207 PR DROP WITH A PROCEDURE: CPT | Performed by: RADIOLOGY

## 2021-05-21 PROCEDURE — 77385 PR IMRT TREATMENT DELIVERY, SIMPLE: CPT | Performed by: RADIOLOGY

## 2021-05-21 PROCEDURE — 77014 PR CT GUIDE FOR PLACEMENT RADIATION THERAPY FIELDS: CPT | Performed by: RADIOLOGY

## 2021-05-21 RX ORDER — CALCITRIOL 0.25 UG/1
0.75 CAPSULE, LIQUID FILLED ORAL
COMMUNITY
Start: 2021-04-23 | End: 2022-05-27

## 2021-05-21 ASSESSMENT — PAIN SCALES - GENERAL: PAINLEVEL: NO PAIN (0)

## 2021-05-21 NOTE — PROGRESS NOTES
WEEKLY MANAGEMENT NOTE  [place]  Radiation Oncology  5/21/21           Patient Name: Donald Blanco  MRN: [id number]  DISEASE UNDER TREATMENT: Adenocarcinoma of Prostate  Stage pT3b N0 M0, [place] score=7(4+3)  , PSA <0.1(3/25/2021 post op), 13.5 pre op.   Decipher score 0.81(high risk)     Pelvis   2000/ 6600 cGy   10/33         DAILY DOSE:     200  cGy/ day,  5 times/week    SUBJECTIVE:  Doing well.  No bowel or bladder complaints       CTC V5.0 Toxicity Criteria  Fatigue: Grade 0: No toxicity  Pain Score: 0 /10     :   Urinary Frequency/Urgency: Grade 0: No change from baseline  Urinary Incontinence: Grade 0: No change from baseline  Dysuria:Grade 0: No change from baseline  Nocturia: 2  Comment:    GI:  Diarrhea:Grade 0  No change over baseline  Proctitis: Grade 0 No symptom  Comment:      OBJECTIVE:  BP (!) 146/79 (BP Location: Left arm, Patient Position: Sitting, Cuff Size: Adult Large)   Pulse 83   Temp 97.9  F (36.6  C) (Oral)   Resp 16   Wt 109.8 kg (242 lb 1.6 oz)   SpO2 97%   BMI 34.74 kg/m        IMPRESSION: The patient is tolerating the treatment.    The patient set up, dose, and cone beam CT images were reviewed.      PLAN: Continue radiotherapy    PAIN MANAGEMENT PLAN: The patient does not require pain management    CELSA Sotelo MD    Department of Radiation Oncology  [facility]

## 2021-05-21 NOTE — PATIENT INSTRUCTIONS
Please contact [facility] RN with questions or concerns following today's appointment: [phone].    Thank you!

## 2021-05-21 NOTE — LETTER
5/21/2021         RE: Donald Blanco  [address] [facility] [address]        Dear Colleague,    Thank you for referring your patient, Donald Blanco, to the [facility] RADIATION ONCOLOGY [place]. Please see a copy of my visit note below.    WEEKLY MANAGEMENT NOTE  [place]  Radiation Oncology  5/21/21           Patient Name: Donald Blanco  MRN: [id number]  DISEASE UNDER TREATMENT: Adenocarcinoma of Prostate  Stage pT3b N0 M0, Clark score=7(4+3)  , PSA <0.1(3/25/2021 post op), 13.5 pre op.   Decipher score 0.81(high risk)     Pelvis   2000/ 6600 cGy   10/33         DAILY DOSE:     200  cGy/ day,  5 times/week    SUBJECTIVE:  Doing well.  No bowel or bladder complaints       CTC V5.0 Toxicity Criteria  Fatigue: Grade 0: No toxicity  Pain Score: 0 /10     :   Urinary Frequency/Urgency: Grade 0: No change from baseline  Urinary Incontinence: Grade 0: No change from baseline  Dysuria:Grade 0: No change from baseline  Nocturia: 2  Comment:    GI:  Diarrhea:Grade 0  No change over baseline  Proctitis: Grade 0 No symptom  Comment:      OBJECTIVE:  BP (!) 146/79 (BP Location: Left arm, Patient Position: Sitting, Cuff Size: Adult Large)   Pulse 83   Temp 97.9  F (36.6  C) (Oral)   Resp 16   Wt 109.8 kg (242 lb 1.6 oz)   SpO2 97%   BMI 34.74 kg/m        IMPRESSION: The patient is tolerating the treatment.    The patient set up, dose, and cone beam CT images were reviewed.      PLAN: Continue radiotherapy    PAIN MANAGEMENT PLAN: The patient does not require pain management    CELSA Sotelo MD    Department of Radiation Oncology  [facility]      Again, thank you for allowing me to participate in the care of your patient.        Sincerely,        Sylvia Sotelo MD

## 2021-05-24 ENCOUNTER — APPOINTMENT (OUTPATIENT)
Dept: RADIATION ONCOLOGY | Facility: CLINIC | Age: 56
End: 2021-05-24
Payer: COMMERCIAL

## 2021-05-24 PROCEDURE — 77014 PR CT GUIDE FOR PLACEMENT RADIATION THERAPY FIELDS: CPT | Mod: TC | Performed by: RADIOLOGY

## 2021-05-24 PROCEDURE — 77385 PR IMRT TREATMENT DELIVERY, SIMPLE: CPT | Performed by: RADIOLOGY

## 2021-05-26 ENCOUNTER — APPOINTMENT (OUTPATIENT)
Dept: RADIATION ONCOLOGY | Facility: CLINIC | Age: 56
End: 2021-05-26
Payer: COMMERCIAL

## 2021-05-26 PROCEDURE — 77385 PR IMRT TREATMENT DELIVERY, SIMPLE: CPT | Performed by: RADIOLOGY

## 2021-05-26 PROCEDURE — 77014 PR CT GUIDE FOR PLACEMENT RADIATION THERAPY FIELDS: CPT | Mod: TC | Performed by: RADIOLOGY

## 2021-05-27 ENCOUNTER — APPOINTMENT (OUTPATIENT)
Dept: RADIATION ONCOLOGY | Facility: CLINIC | Age: 56
End: 2021-05-27
Payer: COMMERCIAL

## 2021-05-27 PROCEDURE — 77014 PR CT GUIDE FOR PLACEMENT RADIATION THERAPY FIELDS: CPT | Mod: TC | Performed by: RADIOLOGY

## 2021-05-27 PROCEDURE — 77385 PR IMRT TREATMENT DELIVERY, SIMPLE: CPT | Performed by: RADIOLOGY

## 2021-05-28 ENCOUNTER — APPOINTMENT (OUTPATIENT)
Dept: RADIATION ONCOLOGY | Facility: CLINIC | Age: 56
End: 2021-05-28
Payer: COMMERCIAL

## 2021-05-28 ENCOUNTER — OFFICE VISIT (OUTPATIENT)
Dept: RADIATION ONCOLOGY | Facility: CLINIC | Age: 56
End: 2021-05-28
Payer: COMMERCIAL

## 2021-05-28 VITALS
DIASTOLIC BLOOD PRESSURE: 77 MMHG | OXYGEN SATURATION: 97 % | BODY MASS INDEX: 34.62 KG/M2 | RESPIRATION RATE: 16 BRPM | HEART RATE: 95 BPM | WEIGHT: 241.3 LBS | SYSTOLIC BLOOD PRESSURE: 165 MMHG | TEMPERATURE: 97.6 F

## 2021-05-28 DIAGNOSIS — C61 MALIGNANT NEOPLASM OF PROSTATE (H): Primary | ICD-10-CM

## 2021-05-28 PROCEDURE — 77014 PR CT GUIDE FOR PLACEMENT RADIATION THERAPY FIELDS: CPT | Performed by: RADIOLOGY

## 2021-05-28 PROCEDURE — 99207 PR DROP WITH A PROCEDURE: CPT | Performed by: RADIOLOGY

## 2021-05-28 PROCEDURE — 77385 PR IMRT TREATMENT DELIVERY, SIMPLE: CPT | Performed by: RADIOLOGY

## 2021-05-28 ASSESSMENT — PAIN SCALES - GENERAL: PAINLEVEL: NO PAIN (0)

## 2021-05-28 NOTE — PATIENT INSTRUCTIONS
Please contact [facility] RN with questions or concerns following today's appointment: [phone].    Thank you!

## 2021-05-28 NOTE — PROGRESS NOTES
WEEKLY MANAGEMENT NOTE  Radiation Oncology          Patient Name: Donald Blanco  MRN: [id number]       Pelvis  2800/ 6600 cGy   14/33         DAILY DOSE:     200  cGy/ day,  5 times/week      DISEASE UNDER TREATMENT: Adenocarcinoma of Prostate  Stage pT3b N0 M0, [place] score=7(4+3)  , PSA <0.1(3/25/2021 post op), 13.5 pre op. Decipher score 0.81(high risk)    SUBJECTIVE:    CTC V5.0 Toxicity Criteria  Fatigue: Grade 0: No toxicity  Pain Score: 0 /10     :   Urinary Frequency/Urgency: Grade 0: No change from baseline  Urinary Incontinence: Grade 0: No change from baseline  Dysuria:Grade 0: No change from baseline  Nocturia: 2  Comment:    GI:  Diarrhea:Grade 0  No change over baseline  Proctitis: Grade 0 No symptom  Comment:      OBJECTIVE: BP (!) 165/77 (BP Location: Left arm, Patient Position: Sitting, Cuff Size: Adult Large)   Pulse 95   Temp 97.6  F (36.4  C) (Oral)   Resp 16   Wt 109.5 kg (241 lb 4.8 oz)   SpO2 97%   BMI 34.62 kg/m      Wt Readings from Last 2 Encounters:   05/28/21 109.5 kg (241 lb 4.8 oz)   05/21/21 109.8 kg (242 lb 1.6 oz)         IMPRESSION: The patient is tolerating the treatment.  The patient set up, dose, and cone beam CT images were reviewed.      PLAN: Continue radiotherapy    PAIN MANAGEMENT PLAN: The patient does not require pain management    LI Alexander M.D.  Department of Radiation Oncology  [facility]

## 2021-05-28 NOTE — LETTER
5/28/2021         RE: Donald Blanco  [address] [facility] [address]        Dear Colleague,    Thank you for referring your patient, Donald Blanco, to the [facility] RADIATION ONCOLOGY [place]. Please see a copy of my visit note below.    WEEKLY MANAGEMENT NOTE  Radiation Oncology          Patient Name: Donald Blanco  MRN: [id number]       Pelvis  2800/ 6600 cGy   14/33         DAILY DOSE:     200  cGy/ day,  5 times/week      DISEASE UNDER TREATMENT: Adenocarcinoma of Prostate  Stage pT3b N0 M0, Clark score=7(4+3)  , PSA <0.1(3/25/2021 post op), 13.5 pre op. Decipher score 0.81(high risk)    SUBJECTIVE:    CTC V5.0 Toxicity Criteria  Fatigue: Grade 0: No toxicity  Pain Score: 0 /10     :   Urinary Frequency/Urgency: Grade 0: No change from baseline  Urinary Incontinence: Grade 0: No change from baseline  Dysuria:Grade 0: No change from baseline  Nocturia: 2  Comment:    GI:  Diarrhea:Grade 0  No change over baseline  Proctitis: Grade 0 No symptom  Comment:      OBJECTIVE: BP (!) 165/77 (BP Location: Left arm, Patient Position: Sitting, Cuff Size: Adult Large)   Pulse 95   Temp 97.6  F (36.4  C) (Oral)   Resp 16   Wt 109.5 kg (241 lb 4.8 oz)   SpO2 97%   BMI 34.62 kg/m      Wt Readings from Last 2 Encounters:   05/28/21 109.5 kg (241 lb 4.8 oz)   05/21/21 109.8 kg (242 lb 1.6 oz)         IMPRESSION: The patient is tolerating the treatment.  The patient set up, dose, and cone beam CT images were reviewed.      PLAN: Continue radiotherapy    PAIN MANAGEMENT PLAN: The patient does not require pain management    LI Alexander M.D.  Department of Radiation Oncology  [facility]

## 2021-06-01 ENCOUNTER — APPOINTMENT (OUTPATIENT)
Dept: RADIATION ONCOLOGY | Facility: CLINIC | Age: 56
End: 2021-06-01
Payer: COMMERCIAL

## 2021-06-01 PROCEDURE — 77427 RADIATION TX MANAGEMENT X5: CPT | Performed by: RADIOLOGY

## 2021-06-01 PROCEDURE — 77336 RADIATION PHYSICS CONSULT: CPT | Performed by: RADIOLOGY

## 2021-06-01 PROCEDURE — 77014 PR CT GUIDE FOR PLACEMENT RADIATION THERAPY FIELDS: CPT | Performed by: RADIOLOGY

## 2021-06-01 PROCEDURE — 77385 PR IMRT TREATMENT DELIVERY, SIMPLE: CPT | Performed by: RADIOLOGY

## 2021-06-02 ENCOUNTER — APPOINTMENT (OUTPATIENT)
Dept: RADIATION ONCOLOGY | Facility: CLINIC | Age: 56
End: 2021-06-02
Payer: COMMERCIAL

## 2021-06-02 PROCEDURE — 77014 PR CT GUIDE FOR PLACEMENT RADIATION THERAPY FIELDS: CPT | Mod: TC | Performed by: RADIOLOGY

## 2021-06-02 PROCEDURE — 77385 PR IMRT TREATMENT DELIVERY, SIMPLE: CPT | Performed by: RADIOLOGY

## 2021-06-03 ENCOUNTER — APPOINTMENT (OUTPATIENT)
Dept: RADIATION ONCOLOGY | Facility: CLINIC | Age: 56
End: 2021-06-03
Payer: COMMERCIAL

## 2021-06-03 PROCEDURE — 77385 PR IMRT TREATMENT DELIVERY, SIMPLE: CPT | Performed by: RADIOLOGY

## 2021-06-03 PROCEDURE — 77014 PR CT GUIDE FOR PLACEMENT RADIATION THERAPY FIELDS: CPT | Mod: TC | Performed by: RADIOLOGY

## 2021-06-04 ENCOUNTER — APPOINTMENT (OUTPATIENT)
Dept: RADIATION ONCOLOGY | Facility: CLINIC | Age: 56
End: 2021-06-04
Payer: COMMERCIAL

## 2021-06-04 ENCOUNTER — OFFICE VISIT (OUTPATIENT)
Dept: RADIATION ONCOLOGY | Facility: CLINIC | Age: 56
End: 2021-06-04
Payer: COMMERCIAL

## 2021-06-04 VITALS
HEART RATE: 87 BPM | SYSTOLIC BLOOD PRESSURE: 147 MMHG | RESPIRATION RATE: 16 BRPM | BODY MASS INDEX: 34.74 KG/M2 | DIASTOLIC BLOOD PRESSURE: 65 MMHG | TEMPERATURE: 98.3 F | WEIGHT: 242.1 LBS | OXYGEN SATURATION: 96 %

## 2021-06-04 DIAGNOSIS — C61 MALIGNANT NEOPLASM OF PROSTATE (H): Primary | ICD-10-CM

## 2021-06-04 PROCEDURE — 77385 PR IMRT TREATMENT DELIVERY, SIMPLE: CPT | Performed by: RADIOLOGY

## 2021-06-04 PROCEDURE — 77014 PR CT GUIDE FOR PLACEMENT RADIATION THERAPY FIELDS: CPT | Mod: TC | Performed by: RADIOLOGY

## 2021-06-04 PROCEDURE — 99207 PR DROP WITH A PROCEDURE: CPT | Performed by: RADIOLOGY

## 2021-06-04 ASSESSMENT — PAIN SCALES - GENERAL: PAINLEVEL: NO PAIN (0)

## 2021-06-04 NOTE — PROGRESS NOTES
WEEKLY MANAGEMENT NOTE  Radiation Oncology          Patient Name: Donald Blanco  MRN: [id number]       Pelvis  3600/ 6600 cGy   18/33         DAILY DOSE:     200  cGy/ day,  5 times/week      DISEASE UNDER TREATMENT: Adenocarcinoma of Prostate  Stage pT3b N0 M0, [place] score=7(4+3)  , PSA <0.1(3/25/2021 post op), 13.5 pre op. Decipher score 0.81(high risk)    SUBJECTIVE:    CTC V5.0 Toxicity Criteria  Fatigue: Grade 0: No toxicity  Pain Score: 0 /10     :   Urinary Frequency/Urgency: Grade 0: No change from baseline  Urinary Incontinence: Grade 0: No change from baseline  Dysuria:Grade 0: No change from baseline  Nocturia: 3 ( baseline 3-4)  Comment:    GI:  Diarrhea:Grade 0  No change over baseline  Proctitis: Grade 0 No symptom  Comment:      OBJECTIVE: BP (!) 147/65 (BP Location: Left arm, Patient Position: Sitting, Cuff Size: Adult Regular)   Pulse 87   Temp 98.3  F (36.8  C) (Oral)   Resp 16   Wt 109.8 kg (242 lb 1.6 oz)   SpO2 96%   BMI 34.74 kg/m      Wt Readings from Last 2 Encounters:   06/04/21 109.8 kg (242 lb 1.6 oz)   05/28/21 109.5 kg (241 lb 4.8 oz)         IMPRESSION: The patient is tolerating the treatment.  The patient set up, dose, and cone beam CT images were reviewed.      PLAN: Continue radiotherapy    PAIN MANAGEMENT PLAN: The patient does not require pain management    LI Alexander M.D.  Department of Radiation Oncology  [facility]

## 2021-06-04 NOTE — LETTER
6/4/2021         RE: Donald Blanco  [address] [facility] [address]        Dear Colleague,    Thank you for referring your patient, Donald Blanco, to the [facility] RADIATION ONCOLOGY [place]. Please see a copy of my visit note below.    WEEKLY MANAGEMENT NOTE  Radiation Oncology          Patient Name: Donald Blanco  MRN: [id number]       Pelvis  3600/ 6600 cGy   18/33         DAILY DOSE:     200  cGy/ day,  5 times/week      DISEASE UNDER TREATMENT: Adenocarcinoma of Prostate  Stage pT3b N0 M0, Clark score=7(4+3)  , PSA <0.1(3/25/2021 post op), 13.5 pre op. Decipher score 0.81(high risk)    SUBJECTIVE:    CTC V5.0 Toxicity Criteria  Fatigue: Grade 0: No toxicity  Pain Score: 0 /10     :   Urinary Frequency/Urgency: Grade 0: No change from baseline  Urinary Incontinence: Grade 0: No change from baseline  Dysuria:Grade 0: No change from baseline  Nocturia: 3 ( baseline 3-4)  Comment:    GI:  Diarrhea:Grade 0  No change over baseline  Proctitis: Grade 0 No symptom  Comment:      OBJECTIVE: BP (!) 147/65 (BP Location: Left arm, Patient Position: Sitting, Cuff Size: Adult Regular)   Pulse 87   Temp 98.3  F (36.8  C) (Oral)   Resp 16   Wt 109.8 kg (242 lb 1.6 oz)   SpO2 96%   BMI 34.74 kg/m      Wt Readings from Last 2 Encounters:   06/04/21 109.8 kg (242 lb 1.6 oz)   05/28/21 109.5 kg (241 lb 4.8 oz)         IMPRESSION: The patient is tolerating the treatment.  The patient set up, dose, and cone beam CT images were reviewed.      PLAN: Continue radiotherapy    PAIN MANAGEMENT PLAN: The patient does not require pain management    LI Alexander M.D.  Department of Radiation Oncology  [facility]

## 2021-06-04 NOTE — PATIENT INSTRUCTIONS
Please contact [facility] RN with questions or concerns following today's appointment: [phone].    Thank you!

## 2021-06-07 ENCOUNTER — APPOINTMENT (OUTPATIENT)
Dept: RADIATION ONCOLOGY | Facility: CLINIC | Age: 56
End: 2021-06-07
Payer: COMMERCIAL

## 2021-06-07 PROCEDURE — 77014 PR CT GUIDE FOR PLACEMENT RADIATION THERAPY FIELDS: CPT | Performed by: RADIOLOGY

## 2021-06-07 PROCEDURE — 77385 PR IMRT TREATMENT DELIVERY, SIMPLE: CPT | Performed by: RADIOLOGY

## 2021-06-08 ENCOUNTER — APPOINTMENT (OUTPATIENT)
Dept: RADIATION ONCOLOGY | Facility: CLINIC | Age: 56
End: 2021-06-08
Payer: COMMERCIAL

## 2021-06-08 PROCEDURE — 77336 RADIATION PHYSICS CONSULT: CPT | Performed by: RADIOLOGY

## 2021-06-08 PROCEDURE — 77014 PR CT GUIDE FOR PLACEMENT RADIATION THERAPY FIELDS: CPT | Performed by: RADIOLOGY

## 2021-06-08 PROCEDURE — 77385 PR IMRT TREATMENT DELIVERY, SIMPLE: CPT | Performed by: RADIOLOGY

## 2021-06-08 PROCEDURE — 77427 RADIATION TX MANAGEMENT X5: CPT | Performed by: RADIOLOGY

## 2021-06-09 ENCOUNTER — APPOINTMENT (OUTPATIENT)
Dept: RADIATION ONCOLOGY | Facility: CLINIC | Age: 56
End: 2021-06-09
Payer: COMMERCIAL

## 2021-06-09 PROCEDURE — 77385 PR IMRT TREATMENT DELIVERY, SIMPLE: CPT | Performed by: RADIOLOGY

## 2021-06-09 PROCEDURE — 77014 PR CT GUIDE FOR PLACEMENT RADIATION THERAPY FIELDS: CPT | Performed by: RADIOLOGY

## 2021-06-10 ENCOUNTER — APPOINTMENT (OUTPATIENT)
Dept: RADIATION ONCOLOGY | Facility: CLINIC | Age: 56
End: 2021-06-10
Payer: COMMERCIAL

## 2021-06-10 PROCEDURE — 77385 PR IMRT TREATMENT DELIVERY, SIMPLE: CPT | Performed by: RADIOLOGY

## 2021-06-10 PROCEDURE — 77014 PR CT GUIDE FOR PLACEMENT RADIATION THERAPY FIELDS: CPT | Mod: TC | Performed by: RADIOLOGY

## 2021-06-11 ENCOUNTER — OFFICE VISIT (OUTPATIENT)
Dept: RADIATION ONCOLOGY | Facility: CLINIC | Age: 56
End: 2021-06-11
Payer: COMMERCIAL

## 2021-06-11 ENCOUNTER — APPOINTMENT (OUTPATIENT)
Dept: RADIATION ONCOLOGY | Facility: CLINIC | Age: 56
End: 2021-06-11
Payer: COMMERCIAL

## 2021-06-11 VITALS
TEMPERATURE: 98 F | BODY MASS INDEX: 34.59 KG/M2 | RESPIRATION RATE: 18 BRPM | SYSTOLIC BLOOD PRESSURE: 144 MMHG | WEIGHT: 241.1 LBS | HEART RATE: 79 BPM | DIASTOLIC BLOOD PRESSURE: 79 MMHG | OXYGEN SATURATION: 98 %

## 2021-06-11 DIAGNOSIS — C61 MALIGNANT NEOPLASM OF PROSTATE (H): Primary | ICD-10-CM

## 2021-06-11 PROCEDURE — 77014 PR CT GUIDE FOR PLACEMENT RADIATION THERAPY FIELDS: CPT | Performed by: RADIOLOGY

## 2021-06-11 PROCEDURE — 99207 PR DROP WITH A PROCEDURE: CPT | Performed by: RADIOLOGY

## 2021-06-11 PROCEDURE — 77385 PR IMRT TREATMENT DELIVERY, SIMPLE: CPT | Performed by: RADIOLOGY

## 2021-06-11 ASSESSMENT — PAIN SCALES - GENERAL: PAINLEVEL: NO PAIN (0)

## 2021-06-11 NOTE — PROGRESS NOTES
WEEKLY MANAGEMENT NOTE  Radiation Oncology          Patient Name: Donald Blanco  MRN: [id number]       Pelvis  4600/ 6600 cGy  23/33         DAILY DOSE:     200  cGy/ day,  5 times/week      DISEASE UNDER TREATMENT: Adenocarcinoma of Prostate  Stage pT3b N0 M0, [place] score=7(4+3)  , PSA <0.1(3/25/2021 post op), 13.5 pre op. Decipher score 0.81(high risk)    SUBJECTIVE:    CTC V5.0 Toxicity Criteria  Fatigue: Grade 0: No toxicity  Pain Score: 0 /10     :   Urinary Frequency/Urgency: Grade 0: No change from baseline  Urinary Incontinence: Grade 0: No change from baseline  Dysuria:Grade 0: No change from baseline  Nocturia: 3 ( baseline 3-4)  Comment:    GI:  Diarrhea:Grade 0  No change over baseline  Proctitis: Grade 0 No symptom  Comment:      OBJECTIVE: BP (!) 144/79 (BP Location: Left arm, Patient Position: Chair, Cuff Size: Adult Large)   Pulse 79   Temp 98  F (36.7  C) (Oral)   Resp 18   Wt 109.4 kg (241 lb 1.6 oz)   SpO2 98%   BMI 34.59 kg/m      Wt Readings from Last 2 Encounters:   06/11/21 109.4 kg (241 lb 1.6 oz)   06/04/21 109.8 kg (242 lb 1.6 oz)         IMPRESSION: The patient is tolerating the treatment.  The patient set up, dose, and cone beam CT images were reviewed.      PLAN: Continue radiotherapy    PAIN MANAGEMENT PLAN: The patient does not require pain management    LI Alexander M.D.  Department of Radiation Oncology  [facility]

## 2021-06-11 NOTE — LETTER
6/11/2021         RE: Donald Blanco  [address] [facility] [address]        Dear Colleague,    Thank you for referring your patient, Donald Blanco, to the [facility] RADIATION ONCOLOGY [place]. Please see a copy of my visit note below.    WEEKLY MANAGEMENT NOTE  Radiation Oncology          Patient Name: Donald Blanco  MRN: [id number]       Pelvis  4600/ 6600 cGy  23/33         DAILY DOSE:     200  cGy/ day,  5 times/week      DISEASE UNDER TREATMENT: Adenocarcinoma of Prostate  Stage pT3b N0 M0, Clark score=7(4+3)  , PSA <0.1(3/25/2021 post op), 13.5 pre op. Decipher score 0.81(high risk)    SUBJECTIVE:    CTC V5.0 Toxicity Criteria  Fatigue: Grade 0: No toxicity  Pain Score: 0 /10     :   Urinary Frequency/Urgency: Grade 0: No change from baseline  Urinary Incontinence: Grade 0: No change from baseline  Dysuria:Grade 0: No change from baseline  Nocturia: 3 ( baseline 3-4)  Comment:    GI:  Diarrhea:Grade 0  No change over baseline  Proctitis: Grade 0 No symptom  Comment:      OBJECTIVE: BP (!) 144/79 (BP Location: Left arm, Patient Position: Chair, Cuff Size: Adult Large)   Pulse 79   Temp 98  F (36.7  C) (Oral)   Resp 18   Wt 109.4 kg (241 lb 1.6 oz)   SpO2 98%   BMI 34.59 kg/m      Wt Readings from Last 2 Encounters:   06/11/21 109.4 kg (241 lb 1.6 oz)   06/04/21 109.8 kg (242 lb 1.6 oz)         IMPRESSION: The patient is tolerating the treatment.  The patient set up, dose, and cone beam CT images were reviewed.      PLAN: Continue radiotherapy    PAIN MANAGEMENT PLAN: The patient does not require pain management    LI Alexander M.D.  Department of Radiation Oncology  [facility]

## 2021-06-11 NOTE — PATIENT INSTRUCTIONS
Please contact [facility] RN with questions or concerns following today's appointment: [phone].    Thank you!

## 2021-06-14 ENCOUNTER — APPOINTMENT (OUTPATIENT)
Dept: RADIATION ONCOLOGY | Facility: CLINIC | Age: 56
End: 2021-06-14
Payer: COMMERCIAL

## 2021-06-14 PROCEDURE — 77385 PR IMRT TREATMENT DELIVERY, SIMPLE: CPT | Performed by: RADIOLOGY

## 2021-06-14 PROCEDURE — 77014 PR CT GUIDE FOR PLACEMENT RADIATION THERAPY FIELDS: CPT | Mod: TC | Performed by: RADIOLOGY

## 2021-06-15 ENCOUNTER — APPOINTMENT (OUTPATIENT)
Dept: RADIATION ONCOLOGY | Facility: CLINIC | Age: 56
End: 2021-06-15
Payer: COMMERCIAL

## 2021-06-15 PROCEDURE — 77385 PR IMRT TREATMENT DELIVERY, SIMPLE: CPT | Performed by: RADIOLOGY

## 2021-06-15 PROCEDURE — 77336 RADIATION PHYSICS CONSULT: CPT | Performed by: RADIOLOGY

## 2021-06-15 PROCEDURE — 77014 PR CT GUIDE FOR PLACEMENT RADIATION THERAPY FIELDS: CPT | Mod: TC | Performed by: RADIOLOGY

## 2021-06-15 PROCEDURE — 77427 RADIATION TX MANAGEMENT X5: CPT | Performed by: RADIOLOGY

## 2021-06-16 ENCOUNTER — APPOINTMENT (OUTPATIENT)
Dept: RADIATION ONCOLOGY | Facility: CLINIC | Age: 56
End: 2021-06-16
Payer: COMMERCIAL

## 2021-06-16 PROCEDURE — 77385 PR IMRT TREATMENT DELIVERY, SIMPLE: CPT | Performed by: RADIOLOGY

## 2021-06-16 PROCEDURE — 77014 PR CT GUIDE FOR PLACEMENT RADIATION THERAPY FIELDS: CPT | Mod: TC | Performed by: RADIOLOGY

## 2021-06-17 ENCOUNTER — APPOINTMENT (OUTPATIENT)
Dept: RADIATION ONCOLOGY | Facility: CLINIC | Age: 56
End: 2021-06-17
Payer: COMMERCIAL

## 2021-06-17 PROCEDURE — 77014 PR CT GUIDE FOR PLACEMENT RADIATION THERAPY FIELDS: CPT | Mod: TC | Performed by: RADIOLOGY

## 2021-06-17 PROCEDURE — 77385 PR IMRT TREATMENT DELIVERY, SIMPLE: CPT | Performed by: RADIOLOGY

## 2021-06-18 ENCOUNTER — OFFICE VISIT (OUTPATIENT)
Dept: RADIATION ONCOLOGY | Facility: CLINIC | Age: 56
End: 2021-06-18
Payer: COMMERCIAL

## 2021-06-18 ENCOUNTER — APPOINTMENT (OUTPATIENT)
Dept: RADIATION ONCOLOGY | Facility: CLINIC | Age: 56
End: 2021-06-18
Payer: COMMERCIAL

## 2021-06-18 VITALS
OXYGEN SATURATION: 96 % | TEMPERATURE: 98.3 F | WEIGHT: 245 LBS | SYSTOLIC BLOOD PRESSURE: 154 MMHG | DIASTOLIC BLOOD PRESSURE: 81 MMHG | BODY MASS INDEX: 35.15 KG/M2 | HEART RATE: 79 BPM | RESPIRATION RATE: 18 BRPM

## 2021-06-18 DIAGNOSIS — C61 MALIGNANT NEOPLASM OF PROSTATE (H): Primary | ICD-10-CM

## 2021-06-18 PROCEDURE — 77014 PR CT GUIDE FOR PLACEMENT RADIATION THERAPY FIELDS: CPT | Mod: TC | Performed by: RADIOLOGY

## 2021-06-18 PROCEDURE — 99207 PR DROP WITH A PROCEDURE: CPT | Performed by: RADIOLOGY

## 2021-06-18 PROCEDURE — 77385 PR IMRT TREATMENT DELIVERY, SIMPLE: CPT | Performed by: RADIOLOGY

## 2021-06-18 ASSESSMENT — PAIN SCALES - GENERAL: PAINLEVEL: NO PAIN (0)

## 2021-06-18 NOTE — PROGRESS NOTES
WEEKLY MANAGEMENT NOTE  Radiation Oncology  [place]  6/18/21         Patient Name: Donald Blanco  MRN: [id number]       Pelvis 5600 of planned  6600 28 of 33          DAILY DOSE:     200  cGy/ day,  5 times/week      DISEASE UNDER TREATMENT: Adenocarcinoma of Prostate  Stage pT3b N0 M0, Clark score=7(4+3)  , PSA <0.1(3/25/2021 post op), 13.5 pre op. Decipher score 0.81(high risk)    SUBJECTIVE:    CTC V5.0 Toxicity Criteria  Fatigue: Grade 0: No toxicity  Pain Score: 0 /10     :   Urinary Frequency/Urgency: Grade 0: No change from baseline  Urinary Incontinence: Grade 0: No change from baseline  Dysuria:Grade 0: No change from baseline  Nocturia: 3 ( baseline 3-4)  Comment:    GI:  Diarrhea:Grade 0  No change over baseline  Proctitis: Grade 0 No symptom  Comment:      OBJECTIVE:     IMPRESSION: The patient is tolerating the treatment.  The patient set up, dose, and cone beam CT images were reviewed.      PLAN: Continue radiotherapy    PAIN MANAGEMENT PLAN: lila Sotelo MD   Department of Radiation Oncology  [facility]

## 2021-06-18 NOTE — LETTER
6/18/2021         RE: Donald Blanco  [address] [facility] [address]        Dear Colleague,    Thank you for referring your patient, Donald Blanco, to the [facility] RADIATION ONCOLOGY [place]. Please see a copy of my visit note below.    WEEKLY MANAGEMENT NOTE  Radiation Oncology  [facility]  6/18/21         Patient Name: Donald Blanco  MRN: [id number]       Pelvis 5600 of planned  6600 28 of 33          DAILY DOSE:     200  cGy/ day,  5 times/week      DISEASE UNDER TREATMENT: Adenocarcinoma of Prostate  Stage pT3b N0 M0, [place] score=7(4+3)  , PSA <0.1(3/25/2021 post op), 13.5 pre op. Decipher score 0.81(high risk)    SUBJECTIVE:    CTC V5.0 Toxicity Criteria  Fatigue: Grade 0: No toxicity  Pain Score: 0 /10     :   Urinary Frequency/Urgency: Grade 0: No change from baseline  Urinary Incontinence: Grade 0: No change from baseline  Dysuria:Grade 0: No change from baseline  Nocturia: 3 ( baseline 3-4)  Comment:    GI:  Diarrhea:Grade 0  No change over baseline  Proctitis: Grade 0 No symptom  Comment:      OBJECTIVE:     IMPRESSION: The patient is tolerating the treatment.  The patient set up, dose, and cone beam CT images were reviewed.      PLAN: Continue radiotherapy    PAIN MANAGEMENT PLAN: lila Sotelo MD   Department of Radiation Oncology  [facility]      Again, thank you for allowing me to participate in the care of your patient.        Sincerely,        Sylvia Sotelo MD

## 2021-06-18 NOTE — PATIENT INSTRUCTIONS
Please contact [facility] RN with questions or concerns following today's appointment: [phone].    Thank you!

## 2021-06-21 ENCOUNTER — APPOINTMENT (OUTPATIENT)
Dept: RADIATION ONCOLOGY | Facility: CLINIC | Age: 56
End: 2021-06-21
Payer: COMMERCIAL

## 2021-06-21 PROCEDURE — 77385 PR IMRT TREATMENT DELIVERY, SIMPLE: CPT | Performed by: RADIOLOGY

## 2021-06-21 PROCEDURE — 77014 PR CT GUIDE FOR PLACEMENT RADIATION THERAPY FIELDS: CPT | Mod: TC | Performed by: RADIOLOGY

## 2021-06-22 ENCOUNTER — APPOINTMENT (OUTPATIENT)
Dept: RADIATION ONCOLOGY | Facility: CLINIC | Age: 56
End: 2021-06-22
Payer: COMMERCIAL

## 2021-06-22 PROCEDURE — 77336 RADIATION PHYSICS CONSULT: CPT | Performed by: RADIOLOGY

## 2021-06-22 PROCEDURE — 77385 PR IMRT TREATMENT DELIVERY, SIMPLE: CPT | Performed by: RADIOLOGY

## 2021-06-22 PROCEDURE — 77014 PR CT GUIDE FOR PLACEMENT RADIATION THERAPY FIELDS: CPT | Mod: TC | Performed by: RADIOLOGY

## 2021-06-22 PROCEDURE — 77427 RADIATION TX MANAGEMENT X5: CPT | Performed by: RADIOLOGY

## 2021-06-23 ENCOUNTER — APPOINTMENT (OUTPATIENT)
Dept: RADIATION ONCOLOGY | Facility: CLINIC | Age: 56
End: 2021-06-23
Payer: COMMERCIAL

## 2021-06-23 PROCEDURE — 77014 PR CT GUIDE FOR PLACEMENT RADIATION THERAPY FIELDS: CPT | Mod: TC | Performed by: RADIOLOGY

## 2021-06-23 PROCEDURE — 77385 PR IMRT TREATMENT DELIVERY, SIMPLE: CPT | Performed by: RADIOLOGY

## 2021-06-24 ENCOUNTER — APPOINTMENT (OUTPATIENT)
Dept: RADIATION ONCOLOGY | Facility: CLINIC | Age: 56
End: 2021-06-24
Payer: COMMERCIAL

## 2021-06-24 PROCEDURE — 77014 PR CT GUIDE FOR PLACEMENT RADIATION THERAPY FIELDS: CPT | Mod: TC | Performed by: RADIOLOGY

## 2021-06-24 PROCEDURE — 77385 PR IMRT TREATMENT DELIVERY, SIMPLE: CPT | Performed by: RADIOLOGY

## 2021-06-25 ENCOUNTER — OFFICE VISIT (OUTPATIENT)
Dept: RADIATION ONCOLOGY | Facility: CLINIC | Age: 56
End: 2021-06-25
Payer: COMMERCIAL

## 2021-06-25 ENCOUNTER — APPOINTMENT (OUTPATIENT)
Dept: RADIATION ONCOLOGY | Facility: CLINIC | Age: 56
End: 2021-06-25
Payer: COMMERCIAL

## 2021-06-25 VITALS
HEART RATE: 84 BPM | BODY MASS INDEX: 35.2 KG/M2 | WEIGHT: 245.3 LBS | DIASTOLIC BLOOD PRESSURE: 80 MMHG | TEMPERATURE: 97.9 F | RESPIRATION RATE: 18 BRPM | OXYGEN SATURATION: 97 % | SYSTOLIC BLOOD PRESSURE: 146 MMHG

## 2021-06-25 DIAGNOSIS — C61 MALIGNANT NEOPLASM OF PROSTATE (H): Primary | ICD-10-CM

## 2021-06-25 PROCEDURE — 77336 RADIATION PHYSICS CONSULT: CPT | Performed by: RADIOLOGY

## 2021-06-25 PROCEDURE — 99207 PR DROP WITH A PROCEDURE: CPT | Performed by: RADIOLOGY

## 2021-06-25 PROCEDURE — 77385 PR IMRT TREATMENT DELIVERY, SIMPLE: CPT | Performed by: RADIOLOGY

## 2021-06-25 PROCEDURE — 77427 RADIATION TX MANAGEMENT X5: CPT | Performed by: RADIOLOGY

## 2021-06-25 PROCEDURE — 77014 PR CT GUIDE FOR PLACEMENT RADIATION THERAPY FIELDS: CPT | Mod: TC | Performed by: RADIOLOGY

## 2021-06-25 ASSESSMENT — PAIN SCALES - GENERAL: PAINLEVEL: NO PAIN (0)

## 2021-06-25 NOTE — PATIENT INSTRUCTIONS
Please contact [facility] RN with questions or concerns following today's appointment: [phone].    Thank you!

## 2021-06-25 NOTE — PROGRESS NOTES
[facility]YS[facility]  SPECIALIZING IN BREAKTHROUGHS  Radiation Oncology    On Treatment Visit Note      Donald Blanco      Date: 2021   MRN: [id number]   : 1965  Diagnosis: prostate cancer  Adenocarcinoma of Prostate  Stage pT3b N0 M0, [place] score=7(4+3)  , PSA <0.1(3/25/2021 post op), 13.5 pre op. Decipher score 0.81(high risk)       Treatment Summary to Date  Treatment Site: pelvis Current Dose: 6600/6600 cGy    Tolerated therapy well.  All questions and concerns addressed.    SUBJECTIVE:  CTC V5.0 Toxicity Criteria  Fatigue: Grade 0: No toxicity  Pain Score: 0 /10      :   Urinary Frequency/Urgency: Grade 0: No change from baseline  Urinary Incontinence: Grade 0: No change from baseline  Dysuria:Grade 0: No change from baseline  Nocturia: 3 ( baseline 3-4)  Comment:     GI:  Diarrhea:Grade 0  No change over baseline  Proctitis: Grade 0 No symptom  Comment:     OBJECTIVE:   BP (!) 144/79 (BP Location: Left arm, Patient Position: Chair, Cuff Size: Adult Large)   Pulse 79   Temp 98  F (36.7  C) (Oral)   Resp 18   Wt 109.4 kg (241 lb 1.6 oz)   SpO2 98%   BMI 34.59 kg/m     No new physical finding    Mosaiq chart and setup information reviewed  Treatment image checked.    Medication Review  Med list reviewed with patient?: Yes  Med list printed and given: Offered and declined    Educational Topic Discussed  Education Instructions: reviewed  Kegel pelvic floor exercise recommended    Plan: To come back in 2 month with reapeat PSA to see Dr. Alexander.      Omari Lamas MD  Radiation Oncology

## 2021-06-25 NOTE — LETTER
2021         RE: Donald Blanco  [address]   [place] [address]        Dear Colleague,    Thank you for referring your patient, Donald Blanco, to the [facility] RADIATION ONCOLOGY [place]. Please see a copy of my visit note below.    [facility] PHYSICIANS  SPECIALIZING IN BREAKTHROUGHS  Radiation Oncology    On Treatment Visit Note      Donald Blanco      Date: 2021   MRN: [id number]   : 1965  Diagnosis: prostate cancer  Adenocarcinoma of Prostate  Stage pT3b N0 M0, Clark score=7(4+3)  , PSA <0.1(3/25/2021 post op), 13.5 pre op. Decipher score 0.81(high risk)       Treatment Summary to Date  Treatment Site: pelvis Current Dose: 6600/6600 cGy    Tolerated therapy well.  All questions and concerns addressed.    SUBJECTIVE:  CTC V5.0 Toxicity Criteria  Fatigue: Grade 0: No toxicity  Pain Score: 0 /10      :   Urinary Frequency/Urgency: Grade 0: No change from baseline  Urinary Incontinence: Grade 0: No change from baseline  Dysuria:Grade 0: No change from baseline  Nocturia: 3 ( baseline 3-4)  Comment:     GI:  Diarrhea:Grade 0  No change over baseline  Proctitis: Grade 0 No symptom  Comment:     OBJECTIVE:   BP (!) 144/79 (BP Location: Left arm, Patient Position: Chair, Cuff Size: Adult Large)   Pulse 79   Temp 98  F (36.7  C) (Oral)   Resp 18   Wt 109.4 kg (241 lb 1.6 oz)   SpO2 98%   BMI 34.59 kg/m     No new physical finding    Mosaiq chart and setup information reviewed  Treatment image checked.    Medication Review  Med list reviewed with patient?: Yes  Med list printed and given: Offered and declined    Educational Topic Discussed  Education Instructions: reviewed  Kegel pelvic floor exercise recommended    Plan: To come back in 2 month with reapeat PSA to see Dr. Alexander.      Omari Lamas MD  Radiation Oncology      Again, thank you for allowing me to participate in the care of your patient.        Sincerely,        Omari Lamas MD

## 2021-06-29 ENCOUNTER — ONCOLOGY VISIT (OUTPATIENT)
Dept: RADIATION ONCOLOGY | Facility: CLINIC | Age: 56
End: 2021-06-29

## 2021-06-30 NOTE — PROCEDURES
Radiation Oncology:  [facility] [address] [place] [address]        Radiotherapy Treatment Summary        Date of Report:  2021             PATIENT: JF ZULETA  MEDICAL RECORD NO: [id number]  : 1965     DIAGNOSIS: C61 Malignant neoplasm of prostate     INTENT OF RADIOTHERAPY: Adjuvant     PATHOLOGY:  Adenocarcinoma                                  STAGE:      Details of the treatments summarized below are found in records kept in the Department of Radiation Oncology @ [facility].     Treatment Summary:  Radiation Oncology - Course: 1 Protocol:   Treatment Site Current Dose Modality From To Elapsed Days Fx.  1 Pelvis  6,600 cGy 10 X  5/10/2021  2021  46 33          Total Dose (cGy): 6600      Dose per fraction (cGy): 200      Radiation Modality:   IMRT-Fixed Beams     COMMENTS:   Adenocarcinoma of Prostate  Stage pT3b N0 M0, Lincoln score=7(4+3)  , PSA <0.1(3/25/2021 post op), 13.5   pre op. Decipher score 0.81(high risk)     The patient completed Adjuvant radiotherapy for prostate cancer. His maximum CTC v5.0 toxicities   grades were:         GI   Urinary frequency : 0    Diarrhea: 0   Urinary incontinence:  0   Proctitis: 0   Dysuria: 0   Nocturia: 3     FOLLOW UP PLAN:   Follow up in 2 months with a PSA  Staff Physician : LI Alexander M.D.     Physicist: Sana Hughes MS  CC:   Nabil Vásquez MD

## 2021-07-01 ENCOUNTER — PRE VISIT (OUTPATIENT)
Dept: UROLOGY | Facility: CLINIC | Age: 56
End: 2021-07-01

## 2021-08-02 ENCOUNTER — PRE VISIT (OUTPATIENT)
Dept: UROLOGY | Facility: CLINIC | Age: 56
End: 2021-08-02

## 2021-08-02 NOTE — TELEPHONE ENCOUNTER
Reason for visit: PSA follow up     Relevant information: Prostate cancer    Records/imaging/labs/orders: PSA lab 8/9    Pt called: N/A    At Rooming: Video

## 2021-08-09 ENCOUNTER — TELEPHONE (OUTPATIENT)
Dept: TRANSPLANT | Facility: CLINIC | Age: 56
End: 2021-08-09

## 2021-08-09 ENCOUNTER — LAB (OUTPATIENT)
Dept: LAB | Facility: CLINIC | Age: 56
End: 2021-08-09
Payer: COMMERCIAL

## 2021-08-09 DIAGNOSIS — C61 PROSTATE CANCER (H): ICD-10-CM

## 2021-08-09 LAB
ANION GAP SERPL CALCULATED.3IONS-SCNC: 10 MMOL/L (ref 3–14)
BUN SERPL-MCNC: 102 MG/DL (ref 7–30)
CALCIUM SERPL-MCNC: 9.5 MG/DL (ref 8.5–10.1)
CHLORIDE BLD-SCNC: 107 MMOL/L (ref 94–109)
CO2 SERPL-SCNC: 22 MMOL/L (ref 20–32)
CREAT SERPL-MCNC: 11.7 MG/DL (ref 0.66–1.25)
GFR SERPL CREATININE-BSD FRML MDRD: 4 ML/MIN/1.73M2
GLUCOSE BLD-MCNC: 98 MG/DL (ref 70–99)
POTASSIUM BLD-SCNC: 4.8 MMOL/L (ref 3.4–5.3)
PSA SERPL-MCNC: 0.03 UG/L (ref 0–4)
SODIUM SERPL-SCNC: 139 MMOL/L (ref 133–144)

## 2021-08-09 PROCEDURE — 80048 BASIC METABOLIC PNL TOTAL CA: CPT

## 2021-08-09 PROCEDURE — 36415 COLL VENOUS BLD VENIPUNCTURE: CPT

## 2021-08-09 PROCEDURE — 84153 ASSAY OF PSA TOTAL: CPT

## 2021-08-09 NOTE — TELEPHONE ENCOUNTER
DATE:  8/9/2021   TIME OF RECEIPT FROM LAB:  6679  LAB TEST:  Cr,BUN  LAB VALUE:  Cr=11.70,ELN=692  RESULTS GIVEN WITH READ-BACK TO:Katie Ko  TIME LAB VALUE REPORTED TO PROVIDER:   1241

## 2021-08-11 ENCOUNTER — VIRTUAL VISIT (OUTPATIENT)
Dept: UROLOGY | Facility: CLINIC | Age: 56
End: 2021-08-11
Payer: COMMERCIAL

## 2021-08-11 DIAGNOSIS — C61 PROSTATE CANCER (H): Primary | ICD-10-CM

## 2021-08-11 PROCEDURE — 99213 OFFICE O/P EST LOW 20 MIN: CPT | Mod: 95 | Performed by: UROLOGY

## 2021-08-11 NOTE — LETTER
8/11/2021      RE: Donald Blanco  [address] [address]       Donald Blanco is a 56 year old male who is being evaluated via a billable video visit.      How would you like to obtain your AVS? MyChart  If the video visit is dropped, the invitation should be resent by: Text to cell phone: [phone]  Will anyone else be joining your video visit? No    Video Start Time: 3:56 PM    CHIEF COMPLAINT   It was my pleasure to see Donald Blanco who is a 56 year old male for follow-up of Prostate Cancer.      HPI  Donald Blanco is a very pleasant 56 year old male who presents with a history of Prostate Cancer. Clark 4+3 disease, stage pT3b with RALP 12/14/2020 with focal positive margin at site of NORBERTO. He has recovered without complication. Noting good improvement with regard to his continence. Tolerated adjuvant radiation without complication.     Decipher score 0.81. High Risk. 30% risk of 5 year metastasis     PSA  8/9/2021 - 0.03  3/25/2021 - <0.1    RALP 12/14/2020  TUMOR     Histologic Type:         - Acinar adenocarcinoma     Histologic Grade       Grade Group and Martha Score:           - Grade group 3 (Martha Score 4 + 3 = 7)         Percentage of Pattern 4: 60%     Extraprostatic Extension (EPE):         - Present, nonfocal       Location of Extraprostatic Extension:           - Left posterior     Urinary Bladder Neck Invasion:         - Not identified     Seminal Vesicle Invasion:         - Present         - Left     MARGINS     Margins:         - Involved by invasive carcinoma         - Limited (< 3 mm)       Linear Length of Positive Margin(s) (Millimeters): 2 mm       Focality:           - Unifocal       Location of Positive Margin(s):           - Left posterior       Margin Positivity in Area of Extraprostatic Extension (EPE):           - Present         Location(s): Left seminal vesicle       Clark Pattern at Positive Margin(s):           - Pattern 4     LYMPH NODES     Number  of Lymph Nodes Involved:         - 0     Number of Lymph Nodes Examined:         8     PATHOLOGIC STAGE CLASSIFICATION (PTNM, AJCC 8TH EDITION)     Primary Tumor (pT):         - pT3b     Regional Lymph Nodes (pN):         - pN0          Allergies:   Patient has no known allergies.         Review of Systems:  From intake questionnaire     Skin: negative  Eyes: negative  Ears/Nose/Throat: negative  Respiratory: No shortness of breath, dyspnea on exertion, cough, or hemoptysis  Cardiovascular: No chest pain or palpitations  Gastrointestinal: negative; no nausea/vomiting, constipation or diarrhea  Genitourinary: as per HPI  Musculoskeletal: negative  Neurologic: negative  Psychiatric: negative  Hematologic/Lymphatic/Immunologic: negative  Endocrine: negative         Physical Exam:     Vitals:  No vitals were obtained today due to virtual visit.    Physical Exam   GENERAL: Healthy, alert and no distress  EYES: Eyes grossly normal to inspection.  No discharge or erythema, or obvious scleral/conjunctival abnormalities.  RESP: No audible wheeze, cough, or visible cyanosis.  No visible retractions or increased work of breathing.    SKIN: Visible skin clear. No significant rash, abnormal pigmentation or lesions.  NEURO: Cranial nerves grossly intact.  Mentation and speech appropriate for age.  PSYCH: Mentation appears normal, affect normal/bright, judgement and insight intact, normal speech and appearance well-groomed.      Outside and Past Medical records:    Review of the result(s) of each unique test - PSA, pathology          Assessment and Plan:     56 year old male with stage pT3b, Clark 4+3=7 prostate cancer, s/p RALP completed 12/14/2020. Focal positive margins at site of NORBERTO. SVI noted as well. High-risk by Decipher. Completed adjuvant radiotherapy with Dr. Alexander. PSA 0.03 prior to today's visit. We discussed this in detail today. At this point, while his PSA remains very low, it has not gone to undetectable. We  discussed that while no further treatment is planned at this time, it is too soon post-radiation to assess the possibility of a recurrence. As such, will plan recheck PSA in 3 months. He again expresses his eagerness to proceed with renal transplant. Will defer to transplant team regarding approach to this in the context of his prostate cancer. From a urologic perspective, there is no further treatment planned at this point for prostate cancer, and intent is to proceed with observation. As such, it would be reasonable to proceed with transplant from a urologic perspective.     - Follow-up 3 months with PSA    Orders  Orders Placed This Encounter   Procedures     PSA tumor marker     Video-Visit Details    Type of service:  Video Visit    Video End Time:4:12 PM    Originating Location (pt. Location): Home    Distant Location (provider location):  [facility] UROLOGY AND [facility] FOR PROSTATE AND UROLOGIC CANCERS    Platform used for Video Visit: [employer]    20 minutes spent on the date of the encounter including direct interaction with the patient, performing chart review, history and exam, documentation and further activities as noted above.    Nabil Vásquez MD  Urology  [facility]

## 2021-08-11 NOTE — PROGRESS NOTES
Donald Blanco is a 56 year old male who is being evaluated via a billable video visit.      How would you like to obtain your AVS? [employer]  If the video visit is dropped, the invitation should be resent by: Text to cell phone: [phone]  Will anyone else be joining your video visit? No    Video Start Time: 3:56 PM    CHIEF COMPLAINT   It was my pleasure to see Donald Blanco who is a 56 year old male for follow-up of Prostate Cancer.      HPI  Donald Blanco is a very pleasant 56 year old male who presents with a history of Prostate Cancer. [place]isco 4+3 disease, stage pT3b with RALP 12/14/2020 with focal positive margin at site of NORBERTO. He has recovered without complication. Noting good improvement with regard to his continence. Tolerated adjuvant radiation without complication.     Decipher score 0.81. High Risk. 30% risk of 5 year metastasis     PSA  8/9/2021 - 0.03  3/25/2021 - <0.1    RALP 12/14/2020  TUMOR     Histologic Type:         - Acinar adenocarcinoma     Histologic Grade       Grade Group and Clark Score:           - Grade group 3 ([place]isco Score 4 + 3 = 7)         Percentage of Pattern 4: 60%     Extraprostatic Extension (EPE):         - Present, nonfocal       Location of Extraprostatic Extension:           - Left posterior     Urinary Bladder Neck Invasion:         - Not identified     Seminal Vesicle Invasion:         - Present         - Left     MARGINS     Margins:         - Involved by invasive carcinoma         - Limited (< 3 mm)       Linear Length of Positive Margin(s) (Millimeters): 2 mm       Focality:           - Unifocal       Location of Positive Margin(s):           - Left posterior       Margin Positivity in Area of Extraprostatic Extension (EPE):           - Present         Location(s): Left seminal vesicle       [place]isco Pattern at Positive Margin(s):           - Pattern 4     LYMPH NODES     Number of Lymph Nodes Involved:         - 0     Number of Lymph Nodes Examined:          8     PATHOLOGIC STAGE CLASSIFICATION (PTNM, AJCC 8TH EDITION)     Primary Tumor (pT):         - pT3b     Regional Lymph Nodes (pN):         - pN0          Allergies:   Patient has no known allergies.         Review of Systems:  From intake questionnaire     Skin: negative  Eyes: negative  Ears/Nose/Throat: negative  Respiratory: No shortness of breath, dyspnea on exertion, cough, or hemoptysis  Cardiovascular: No chest pain or palpitations  Gastrointestinal: negative; no nausea/vomiting, constipation or diarrhea  Genitourinary: as per HPI  Musculoskeletal: negative  Neurologic: negative  Psychiatric: negative  Hematologic/Lymphatic/Immunologic: negative  Endocrine: negative         Physical Exam:     Vitals:  No vitals were obtained today due to virtual visit.    Physical Exam   GENERAL: Healthy, alert and no distress  EYES: Eyes grossly normal to inspection.  No discharge or erythema, or obvious scleral/conjunctival abnormalities.  RESP: No audible wheeze, cough, or visible cyanosis.  No visible retractions or increased work of breathing.    SKIN: Visible skin clear. No significant rash, abnormal pigmentation or lesions.  NEURO: Cranial nerves grossly intact.  Mentation and speech appropriate for age.  PSYCH: Mentation appears normal, affect normal/bright, judgement and insight intact, normal speech and appearance well-groomed.      Outside and Past Medical records:    Review of the result(s) of each unique test - PSA, pathology          Assessment and Plan:     56 year old male with stage pT3b, Clark 4+3=7 prostate cancer, s/p RALP completed 12/14/2020. Focal positive margins at site of NORBERTO. SVI noted as well. High-risk by Decipher. Completed adjuvant radiotherapy with Dr. Alexander. PSA 0.03 prior to today's visit. We discussed this in detail today. At this point, while his PSA remains very low, it has not gone to undetectable. We discussed that while no further treatment is planned at this time, it is too soon  post-radiation to assess the possibility of a recurrence. As such, will plan recheck PSA in 3 months. He again expresses his eagerness to proceed with renal transplant. Will defer to transplant team regarding approach to this in the context of his prostate cancer. From a urologic perspective, there is no further treatment planned at this point for prostate cancer, and intent is to proceed with observation. As such, it would be reasonable to proceed with transplant from a urologic perspective.     - Follow-up 3 months with PSA    Orders  Orders Placed This Encounter   Procedures     PSA tumor marker     Video-Visit Details    Type of service:  Video Visit    Video End Time:4:12 PM    Originating Location (pt. Location): Home    Distant Location (provider location):  [facility] UROLOGY AND [facility] FOR PROSTATE AND UROLOGIC CANCERS    Platform used for Video Visit: [employer]    20 minutes spent on the date of the encounter including direct interaction with the patient, performing chart review, history and exam, documentation and further activities as noted above.    Nabil Vásquez MD  Urology  [facility]       No

## 2021-08-12 NOTE — PATIENT INSTRUCTIONS
Please follow up with Dr. Vásquez in three months with a PSA beforehand.     It was a pleasure meeting with you today.  Thank you for allowing me and my team the privilege of caring for you today.  YOU are the reason we are here, and I truly hope we provided you with the excellent service you deserve.  Please let us know if there is anything else we can do for you so that we can be sure you are leaving completely satisfied with your care experience.

## 2021-08-13 ENCOUNTER — OFFICE VISIT (OUTPATIENT)
Dept: RADIATION ONCOLOGY | Facility: CLINIC | Age: 56
End: 2021-08-13
Payer: COMMERCIAL

## 2021-08-13 VITALS
TEMPERATURE: 98 F | WEIGHT: 241.2 LBS | RESPIRATION RATE: 16 BRPM | SYSTOLIC BLOOD PRESSURE: 145 MMHG | BODY MASS INDEX: 34.61 KG/M2 | DIASTOLIC BLOOD PRESSURE: 76 MMHG | HEART RATE: 89 BPM | OXYGEN SATURATION: 96 %

## 2021-08-13 DIAGNOSIS — C61 MALIGNANT NEOPLASM OF PROSTATE (H): Primary | ICD-10-CM

## 2021-08-13 PROCEDURE — 99024 POSTOP FOLLOW-UP VISIT: CPT | Performed by: RADIOLOGY

## 2021-08-13 RX ORDER — ROPINIROLE 0.25 MG/1
0.25 TABLET, FILM COATED ORAL AT BEDTIME
Status: ON HOLD | COMMUNITY
Start: 2021-08-12 | End: 2022-05-18

## 2021-08-13 ASSESSMENT — PAIN SCALES - GENERAL: PAINLEVEL: NO PAIN (0)

## 2021-08-13 NOTE — NURSING NOTE
FOLLOW-UP VISIT    Patient Name: Donald Blanco      : 1965     Age: 56 year old        ______________________________________________________________________________     Chief Complaint   Patient presents with     Radiation Therapy     Return appointment with Dr. Alexander     BP (!) 145/76 (BP Location: Left arm, Patient Position: Sitting, Cuff Size: Adult Large)   Pulse 89   Temp 98  F (36.7  C) (Oral)   Resp 16   Wt 109.4 kg (241 lb 3.2 oz)   SpO2 96%   BMI 34.61 kg/m       Date Radiation Completed: 6,600 cGy to pelvis completed on 2021    Pain  Denies    Meds  Current Med List Reviewed: Yes  Medication Note:     AUA: AUA Score: 9 (21 1500)  JUDY: JUDY Score: 1 (21 1500)    PSA   Date Value Ref Range Status   2020 13.50 (H) 0 - 4 ug/L Final     Comment:     Assay Method:  Chemiluminescence using Siemens Vista analyzer     PSA Tumor Marker   Date Value Ref Range Status   2021 0.03 0.00 - 4.00 ug/L Final       Bowel: Normal    Bladder: nocturia and frequency  Nocturia: 3 - Once every 3 hours    Energy Level: normal    Appointments:   Urologist: Dr. Vásquez   2021 next visit with PSA in 3 months     Other Notes: Follow up with Rad Onc per Dr. Alexander.    Lazara Arroyo RN

## 2021-08-13 NOTE — PROGRESS NOTES
RADIATION ONCOLOGY FOLLOW UP NOTE      DATE OF FOLLOW UP: 8/13/2021     PATIENT NAME: Donald Blanco    DISEASE TREATED:     Adenocarcinoma of Prostate  Stage pT3b N0 M0, Clark score=7(4+3)  , PSA <0.1(3/25/2021 post op), 13.5 pre op. Decipher score 0.81(high risk)         INTERVAL SINCE COMPLETION OF RADIOTHERAPY:  2 months (completed on 6/25/2021).     TYPE OF RADIOTHERAPY GIVEN:   6600 cGy in 33 Fractions       SUBJECTIVE: The patient is a 56-year-old man who has a history of prostate cancer Vancouver's 4+3 equal 7 pathologic stage T3b N0 M0 who underwent robotic assisted laparoscopic prostatectomy on December 14, 2020.  Patient had a focally positive margin at the site of extracapsular extension.  Patient had undetectable PSA (less than 0.1) on March 25, 2021 postoperatively.  Although the PSA was less than 0.1 with a preoperative PSA of 13.5, patient had high decipher score of 0.81.  In addition, patient was considered for renal transplant.    The patient received adjuvant radiotherapy completing on June 25, 2021 at [facility].  Patient received 6600 cGy at 200 cGy per fraction using IMRT to treat the prostate bed including seminal vesicle remnant.    Patient returned for a 2 months follow-up after completing adjuvant radiotherapy.  Patient is well but notices nocturia that has increased to 2-3 times per night.  Bowel habits are normal.  His AUA symptom score is 9 out of 35 with laura score of 1/25.      PSA   08/09/2021 0.03       IMPRESSION OF DISEASE STATUS: The patient has a detectable PSA of 0.03 using a different assay when compared to preadjuvant radiotherapy which had detectable limit of 0.1.    RECOMMENDATION: I discussed with the patient the significance of 0.03 PSA on a different assay after receiving adjuvant radiotherapy.  The patient likely had higher PSA, less than 0.1 prior to adjuvant radiotherapy.  It is uncertain the significance of this number and I agree that the patient's  PSA should be repeated in 3 months using the same assay.    The patient scheduled for a follow up with Dr. Vásquez.    LI Alexander M.D.  Department of Radiation Oncology  [facility]  [phone]

## 2021-08-13 NOTE — PATIENT INSTRUCTIONS
Please contact [facility] RN with questions or concerns following today's appointment: [phone].    Thank you!

## 2021-08-13 NOTE — LETTER
8/13/2021         RE: Donald Blanco  [address]  [place] [address]        Dear Colleague,    Thank you for referring your patient, Donald Blanco, to the [facility] RADIATION ONCOLOGY [place]. Please see a copy of my visit note below.    RADIATION ONCOLOGY FOLLOW UP NOTE      DATE OF FOLLOW UP: 8/13/2021     PATIENT NAME: Donald Blanco    DISEASE TREATED:     Adenocarcinoma of Prostate  Stage pT3b N0 M0, Seymour score=7(4+3)  , PSA <0.1(3/25/2021 post op), 13.5 pre op. Decipher score 0.81(high risk)         INTERVAL SINCE COMPLETION OF RADIOTHERAPY:  2 months (completed on 6/25/2021).     TYPE OF RADIOTHERAPY GIVEN:   6600 cGy in 33 Fractions       SUBJECTIVE: The patient is a 56-year-old man who has a history of prostate cancer Seymour's 4+3 equal 7 pathologic stage T3b N0 M0 who underwent robotic assisted laparoscopic prostatectomy on December 14, 2020.  Patient had a focally positive margin at the site of extracapsular extension.  Patient had undetectable PSA (less than 0.1) on March 25, 2021 postoperatively.  Although the PSA was less than 0.1 with a preoperative PSA of 13.5, patient had high decipher score of 0.81.  In addition, patient was considered for renal transplant.    The patient received adjuvant radiotherapy completing on June 25, 2021 at [facility].  Patient received 6600 cGy at 200 cGy per fraction using IMRT to treat the prostate bed including seminal vesicle remnant.    Patient returned for a 2 months follow-up after completing adjuvant radiotherapy.  Patient is well but notices nocturia that has increased to 2-3 times per night.  Bowel habits are normal.  His AUA symptom score is 9 out of 35 with laura score of 1/25.      PSA   08/09/2021 0.03       IMPRESSION OF DISEASE STATUS: The patient has a detectable PSA of 0.03 using a different assay when compared to preadjuvant radiotherapy which had detectable limit of 0.1.    RECOMMENDATION: I discussed with  the patient the significance of 0.03 PSA on a different assay after receiving adjuvant radiotherapy.  The patient likely had higher PSA, less than 0.1 prior to adjuvant radiotherapy.  It is uncertain the significance of this number and I agree that the patient's PSA should be repeated in 3 months using the same assay.    The patient scheduled for a follow up with Dr. Vásquez.    LI Alexander M.D.  Department of Radiation Oncology  [facility]  [phone]

## 2021-08-17 ENCOUNTER — TELEPHONE (OUTPATIENT)
Dept: TRANSPLANT | Facility: CLINIC | Age: 56
End: 2021-08-17

## 2021-08-17 NOTE — TELEPHONE ENCOUNTER
Called pt to touch base after urology appts last week. Pt states that he has completed his radiation therapy and they would like to see him moving forward with observation and PSA in 3 months. Will review with committee tomorrow and follow up with pt. Pt verbalized understanding of information and has no further questions. Encouraged to reach out if questions arise.

## 2021-08-18 ENCOUNTER — TELEPHONE (OUTPATIENT)
Dept: TRANSPLANT | Facility: CLINIC | Age: 56
End: 2021-08-18

## 2021-08-18 ENCOUNTER — COMMITTEE REVIEW (OUTPATIENT)
Dept: TRANSPLANT | Facility: CLINIC | Age: 56
End: 2021-08-18

## 2021-08-18 NOTE — TELEPHONE ENCOUNTER
Called pt regarding committee's decision today. Committee determined that pt should wait until his 3 month PSA recheck, determine what that value is and then be brought back to committee to discuss moving forward with active status/living donor. Pt states that he figured this would happen and is agreeable to plan. Will check back after his 11/2021 PSA. Pt verbalized understanding of information and has no further questions. Encouraged to reach out if questions arise.

## 2021-08-18 NOTE — COMMITTEE REVIEW
Abdominal Committee Review Note     Evaluation Date: 9/4/2020  Committee Review Date: 8/18/2021    Organ being evaluated for: Kidney    Transplant Phase: Waitlist  Transplant Status: Inactive    Transplant Coordinator: Katie Ko  Transplant Surgeon:   Stuart Rust    Referring Physician: Chin Cornejo    Primary Diagnosis: IgA Nephropathy  Secondary Diagnosis:     Committee Review Members:  Nephrology Marcelo Jones, APRN CNP, Donte Duff MD   Nurse Josi Conroy RN, Katie Ko RN   Nutrition Rossy Gaming, RAMON   Pharmacist Austin Strickland [facility]    - Clinical Sandi Leonardo, MSJIMENEZ, Jenn Steel, [facility]   Transplant Bertha Zuniga RN, Stuart Rust MD, Katie Sanders, MARIO, Renee Hampton RN, Howard Flores MD, Woodrow Cr MD, Britney Hunter RN       Committee Review Decision: Remain Inactive    Committee Chair Howard Flores MD verbally attested to the committee's decision.    Committee Discussion Details: Reviewed prostate CA history and urology visit with Dr Vásquez and Dr Alexander from 8/11 and 8/13/21. Dr Vásquez noted that it is too soon post radiation to assess the possibility of recurrence. Pt's PSA was 0.03 on 8/9/21. Urology's plan is for observation moving forward with PSA in 3 months for surveillance.      Committee determined that pt should wait until his 3 month PSA recheck, determine what that value is and then be brought back to committee to discuss moving forward with active status/living donor.

## 2021-09-17 ENCOUNTER — TELEPHONE (OUTPATIENT)
Dept: TRANSPLANT | Facility: CLINIC | Age: 56
End: 2021-09-17

## 2021-09-17 NOTE — TELEPHONE ENCOUNTER
Pt wife called in stating that patient tested positive for COVID on 9/8/21 and had a mild cough. Now he has a high fever and is heading to the emergency room. Will follow up with pt after admission. Pt verbalized understanding of information and has no further questions. Encouraged to reach out if questions arise.

## 2021-09-23 ENCOUNTER — DOCUMENTATION ONLY (OUTPATIENT)
Dept: TRANSPLANT | Facility: CLINIC | Age: 56
End: 2021-09-23

## 2021-09-23 ENCOUNTER — TELEPHONE (OUTPATIENT)
Dept: TRANSPLANT | Facility: CLINIC | Age: 56
End: 2021-09-23

## 2021-09-23 NOTE — TELEPHONE ENCOUNTER
Pt called in and states he started dialysis at the [facility] with Dr Villa. Will request [id number] form.

## 2021-09-26 ENCOUNTER — HEALTH MAINTENANCE LETTER (OUTPATIENT)
Age: 56
End: 2021-09-26

## 2021-10-07 ENCOUNTER — TELEPHONE (OUTPATIENT)
Dept: TRANSPLANT | Facility: CLINIC | Age: 56
End: 2021-10-07

## 2021-10-07 DIAGNOSIS — Z76.82 ORGAN TRANSPLANT CANDIDATE: ICD-10-CM

## 2021-10-07 DIAGNOSIS — N18.6 ESRD (END STAGE RENAL DISEASE) (H): ICD-10-CM

## 2021-10-07 DIAGNOSIS — Z01.810 PRE-OPERATIVE CARDIOVASCULAR EXAMINATION: ICD-10-CM

## 2021-10-07 NOTE — TELEPHONE ENCOUNTER
Pt called in wondering since he's on diaylsis that it changes his status on the list. Let him know that we are still waiting on urology clearance. Will get him set up with cardiology and a COVID test as we will need two negative tests. Also will order an in person surgeon visit after his Urology visit for final clearance pending PSA/urology clearance. Will follow up with him after these appts. Pt verbalized understanding of information and has no further questions. Encouraged to reach out if questions arise.       Spoke with patient who provided verbal consent for Eplet test kit to be sent. Patient understands that a consent form will also be sent via [employer]

## 2021-10-28 ENCOUNTER — PRE VISIT (OUTPATIENT)
Dept: UROLOGY | Facility: CLINIC | Age: 56
End: 2021-10-28

## 2021-10-28 NOTE — TELEPHONE ENCOUNTER
Reason for visit: PSA follow up     Relevant information: Prostate cancer; s/p RALP 12/2020    Records/imaging/labs/orders: PSA on 11/18    Pt called: N/A    At Rooming: Video

## 2021-10-29 NOTE — PROGRESS NOTES
Referring provider:Marcelo Jones APRN CNP      HPI: Mr. 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 He is currently not on active list. He was recently diagnosed with prostate cancer during evaluation for transplant.  He has received radiation treatment.  He had Covid infection a month ago and he has been on hemodialysis since then.  He has subcutaneous catheter for hemodialysis.    Patient was seen a year ago in cardiology and had dobutamine stress echocardiogram which was normal.  Baseline echocardiogram a year ago is also unremarkable.  Patient reports no chest pain, shortness of breath, lower extremity edema, dizziness, palpitations, or syncope.  He feels tired after dialysis and with some activity.    Lifetime non-smoker.  Hypertension is well controlled with amlodipine 10 mg.  No history of diabetes.  Hyperlipidemia is well controlled.  Patient has history of premature CAD.  His father had MI in his 40s.  I reviewed patient's EKG 9/21/2020 which shows sinus rhythm otherwise unremarkable.  Medications, personal, family, and social history reviewed with patient and revised.    PAST MEDICAL HISTORY:  Past Medical History:   Diagnosis Date     CKD (chronic kidney disease) stage 4, GFR 15-29 ml/min (H)      Elevated PSA      Essential hypertension 05/21/2020     Hyperlipidemia      IgA nephropathy      Obesity      Prostate cancer (H) 10/15/2020     S/P radiation therapy     6,600 cGy to pelvis completed on 6/25/2021 - [facility]       CURRENT MEDICATIONS:  Current Outpatient Medications   Medication Sig Dispense Refill     allopurinol (ZYLOPRIM) 100 MG tablet every morning        amLODIPine (NORVASC) 10 MG tablet  Take 10 mg by mouth every morning        aspirin (ASA) 81 MG EC tablet Take 81 mg by mouth every morning        calcitRIOL (ROCALTROL) 0.25 MCG capsule Take 0.25 mcg by mouth daily       furosemide (LASIX) 20 MG tablet every morning        Melatonin 2.5 MG CHEW Take 20 mg by mouth At Bedtime       rOPINIRole (REQUIP) 0.25 MG tablet Take 0.25 mg by mouth At Bedtime Take 0.25 mg one hour prior to sleep. If not effective after 3 days, increase dose to 0.5 mg. May increase increments of 0.25 mg every 3 days if needed, not exceeding a total of 2 mg at bedtime.       sevelamer carbonate (RENVELA) 800 MG tablet Take 800 mg by mouth 2 times daily        simvastatin (ZOCOR) 40 MG tablet Take 40 mg by mouth every morning        Vitamin D3 (VITAMIN D3) 25 mcg (1000 units) tablet Take 1 capsule by mouth every morning          PAST SURGICAL HISTORY:  Past Surgical History:   Procedure Laterality Date     BACK SURGERY      Back Surgey 20+ Years Ago      BIOPSY      [facility] 2010     COLONOSCOPY      10+ Years ago at [facility]      DAVINCI PROSTATECTOMY, LYMPHADENECTOMY N/A 12/14/2020    Procedure: PROSTATECTOMY, ROBOT-ASSISTED, WITH PELVIC LYMPHADENECTOMY;  Surgeon: Nabil Vásquez MD;  Location:  OR       ALLERGIES:   No Known Allergies    FAMILY HISTORY:  Family History   Problem Relation Age of Onset     Kidney Disease Father      Hypertension Father      Cancer Mother      No Known Problems Brother      No Known Problems Sister      No Known Problems Son      No Known Problems Daughter      No Known Problems Brother      No Known Problems Brother      No Known Problems Brother      No Known Problems Sister      No Known Problems Sister      No Known Problems Sister          SOCIAL HISTORY:  Social History     Tobacco Use     Smoking status: Never Smoker     Smokeless tobacco: Never Used   Substance Use Topics     Alcohol use: Yes     Drug use: Never       ROS:   Constitutional: No fever,  "chills, or sweats. Weight stable.   ENT: No visual disturbance, ear ache, epistaxis, sore throat.   Cardiovascular: As per HPI.   Respiratory: No cough, hemoptysis.    GI: No nausea, vomiting, hematemesis, melena, or hematochezia.   : No hematuria.   Integument: Negative.   Hematologic:  No easy bruising, no easy bleeding.  Neuro: Negative.   Musculoskeletal: No myalgia.    Exam:  /77 (BP Location: Right arm, Patient Position: Chair, Cuff Size: Adult Regular)   Pulse 92   Ht 1.738 m (5' 8.43\")   Wt 107.6 kg (237 lb 4.8 oz)   SpO2 97%   BMI 35.63 kg/m    GENERAL APPEARANCE: alert and no distress  HEENT: no icterus, no central cyanosis  LYMPH/NECK: no adenopathy, no asymmetry, JVP not elevated, no carotid bruits.  RESPIRATORY: lungs clear to auscultation - no rales, rhonchi or wheezes, no use of accessory muscles, no retractions, respirations are unlabored, normal respiratory rate  CARDIOVASCULAR: regular rhythm, normal S1, S2, no S3 or S4 and no murmur, click or rub, precordium quiet with normal PMI.  GI: soft, non tender  EXTREMITIES: peripheral pulses normal, no edema  NEURO: alert, normal speech,and affect  VASC: Radial, dorsalis pedis and posterior tibialis pulses are normal in volumes and symmetric bilaterally.   SKIN: no ecchymoses, no rashes     I have reviewed the labs and personally reviewed the imaging below and made my comment in the assessment and plan.    Labs:  CBC RESULTS:   Lab Results   Component Value Date    WBC 10.6 12/15/2020    RBC 2.95 (L) 12/15/2020    HGB 9.2 (L) 12/15/2020    HCT 27.6 (L) 12/15/2020    MCV 94 12/15/2020    MCH 30.2 12/15/2020    MCHC 32.2 12/15/2020    RDW 14.1 12/15/2020     12/15/2020       BMP RESULTS:  Lab Results   Component Value Date     08/09/2021     12/15/2020    POTASSIUM 4.8 08/09/2021    POTASSIUM 3.9 12/15/2020    CHLORIDE 107 08/09/2021    CHLORIDE 110 (H) 12/15/2020    CO2 22 08/09/2021    CO2 22 12/15/2020    ANIONGAP 10 " 08/09/2021    ANIONGAP 8 12/15/2020    GLC 98 08/09/2021     (H) 12/15/2020     (HH) 08/09/2021    BUN 70 (H) 12/15/2020    CR 11.70 (HH) 08/09/2021    CR 6.13 (H) 12/15/2020    GFRESTIMATED 4 (L) 08/09/2021    GFRESTIMATED 9 (L) 12/15/2020    GFRESTBLACK 11 (L) 12/15/2020    YEE 9.5 08/09/2021    YEE 8.6 12/15/2020            INR RESULTS:  Lab Results   Component Value Date    INR 1.03 09/03/2020     Dobutamine Stress Echocardiogram 10/09/2020  Normal, low-risk dobutamine echocardiogram without evidence of ischemia.  87% of maximal predicted heart rate (MPHR) was achieved.  Normal biventricular size, thickness, and global systolic function at  baseline.  With low-dose dobutamine, LVEF augmented and LV cavity size decreased  appropriately.  With peak dobutamine, LVEF increased further to >70% and LV cavity size  decreased appropriately. Blood pressure was unremarkable.  No regional wall motion abnormalities at rest or with dobutamine.  The test was terminated due to the achievement of target heart rate.  No angina was elicited.  No ECG evidence of ischemia. Normal heart rate and blood pressure response to  dobutamine.  No significant valvular abnormalities are noted on screening Doppler exam.  The aortic root and visualized ascending aorta are normal.  No prior stress test for comparison.    Echocardiogram 9/21/2020  Global and regional left ventricular function is normal with an EF of 60-65%.  Right ventricular function, chamber size, wall motion, and thickness arenormal.  The inferior vena cava is normal.  No pericardial effusion is present.  Previous study not available for comparison.    EKG 9/21/2020 sinus rhythm otherwise normal.    Assessment and Plan:     #End-stage renal disease on dialysis over the last 1 month  #Pre-kidney transplant cardiovascular evaluation  -Patient is currently asymptomatic from cardiac standpoint.  He reports tiredness after physical exertion which is new since Covid  infection a month ago.  Physical exam is benign today.  -Recommend CT coronary angiogram  -Repeat transthoracic echocardiogram    #Hypertension  -Well-controlled with amlodipine 10 mg and furosemide 20 mg    No medication changes today.    Return to clinic as needed    Total time spent today for this visit is 47 minutes including precharting, face-to-face clinic visit, review of labs/imaging and medical documentation.    Please donot hesitate to contact me if you have any questions or concerns. Again, thank you for allowing me to participate in the care of your patient.    Donis CHAVEZ MD  [facility] Division of Cardiology  Pager [phone]    Addendum note 11/16/2021:    Echocardiogram 11/15/2021  Global and regional left ventricular function is normal with an EF of 60-65%.  Right ventricular function, chamber size, wall motion, and thickness are  normal.  The inferior vena cava is normal.  No pericardial effusion is present.    CT coronary angiogram 11/15/2021  IMPRESSION:  1.  Mild non-obstructive CAD.  2.  Total Agatston score 138 placing the patient in the 53rd  percentile when compared to age and gender matched control group.    I reviewed both echocardiogram and CT coronary angiogram.  Echocardiogram shows normal biventricular function.  CT coronary angiogram showed no obstructive CAD.      Patient can proceed to planned kidney transplant.

## 2021-11-01 ENCOUNTER — LAB (OUTPATIENT)
Dept: LAB | Facility: CLINIC | Age: 56
End: 2021-11-01
Payer: COMMERCIAL

## 2021-11-01 ENCOUNTER — OFFICE VISIT (OUTPATIENT)
Dept: CARDIOLOGY | Facility: CLINIC | Age: 56
End: 2021-11-01
Attending: NURSE PRACTITIONER
Payer: COMMERCIAL

## 2021-11-01 VITALS
DIASTOLIC BLOOD PRESSURE: 77 MMHG | BODY MASS INDEX: 35.97 KG/M2 | OXYGEN SATURATION: 97 % | HEIGHT: 68 IN | HEART RATE: 92 BPM | SYSTOLIC BLOOD PRESSURE: 135 MMHG | WEIGHT: 237.3 LBS

## 2021-11-01 DIAGNOSIS — Z76.82 ORGAN TRANSPLANT CANDIDATE: ICD-10-CM

## 2021-11-01 DIAGNOSIS — Z01.810 PRE-OPERATIVE CARDIOVASCULAR EXAMINATION: Primary | ICD-10-CM

## 2021-11-01 DIAGNOSIS — N18.6 ESRD (END STAGE RENAL DISEASE) (H): ICD-10-CM

## 2021-11-01 DIAGNOSIS — Z01.810 PRE-OPERATIVE CARDIOVASCULAR EXAMINATION: ICD-10-CM

## 2021-11-01 DIAGNOSIS — I10 HYPERTENSION, WELL CONTROLLED: ICD-10-CM

## 2021-11-01 LAB — SARS-COV-2 RNA RESP QL NAA+PROBE: NEGATIVE

## 2021-11-01 PROCEDURE — U0005 INFEC AGEN DETEC AMPLI PROBE: HCPCS | Mod: 90 | Performed by: PATHOLOGY

## 2021-11-01 PROCEDURE — G0463 HOSPITAL OUTPT CLINIC VISIT: HCPCS

## 2021-11-01 PROCEDURE — U0003 INFECTIOUS AGENT DETECTION BY NUCLEIC ACID (DNA OR RNA); SEVERE ACUTE RESPIRATORY SYNDROME CORONAVIRUS 2 (SARS-COV-2) (CORONAVIRUS DISEASE [COVID-19]), AMPLIFIED PROBE TECHNIQUE, MAKING USE OF HIGH THROUGHPUT TECHNOLOGIES AS DESCRIBED BY CMS-2020-01-R: HCPCS | Mod: 90 | Performed by: PATHOLOGY

## 2021-11-01 PROCEDURE — 99215 OFFICE O/P EST HI 40 MIN: CPT | Performed by: INTERNAL MEDICINE

## 2021-11-01 RX ORDER — COLCHICINE 0.6 MG/1
0.6 CAPSULE ORAL PRN
Status: ON HOLD | COMMUNITY
Start: 2021-10-09 | End: 2022-05-27

## 2021-11-01 RX ORDER — NEPHROCAP 1 MG
1 CAP ORAL DAILY
COMMUNITY
Start: 2021-10-09 | End: 2022-05-28

## 2021-11-01 ASSESSMENT — MIFFLIN-ST. JEOR: SCORE: 1877.64

## 2021-11-01 ASSESSMENT — PAIN SCALES - GENERAL: PAINLEVEL: NO PAIN (0)

## 2021-11-01 NOTE — LETTER
11/1/2021      RE: Donald Blanco  [id number] [facility]  [place] [address]       Dear Colleague,    Thank you for the opportunity to participate in the care of your patient, Donald Blanco, at the [facility] [place] at [facility]. Please see a copy of my visit note below.    Referring provider:Marcelo Jones APRN CNP      HPI: Mr. 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 He is currently not on active list. He was recently diagnosed with prostate cancer during evaluation for transplant.  He has received radiation treatment.  He had Covid infection a month ago and he has been on hemodialysis since then.  He has subcutaneous catheter for hemodialysis.    Patient was seen a year ago in cardiology and had dobutamine stress echocardiogram which was normal.  Baseline echocardiogram a year ago is also unremarkable.  Patient reports no chest pain, shortness of breath, lower extremity edema, dizziness, palpitations, or syncope.  He feels tired after dialysis and with some activity.    Lifetime non-smoker.  Hypertension is well controlled with amlodipine 10 mg.  No history of diabetes.  Hyperlipidemia is well controlled.  Patient has history of premature CAD.  His father had MI in his 40s.  I reviewed patient's EKG 9/21/2020 which shows sinus rhythm otherwise unremarkable.  Medications, personal, family, and social history reviewed with patient and revised.    PAST MEDICAL HISTORY:  Past Medical History:   Diagnosis Date     CKD (chronic kidney disease) stage 4, GFR 15-29 ml/min (H)      Elevated PSA      Essential hypertension 05/21/2020     Hyperlipidemia      IgA nephropathy       Obesity      Prostate cancer (H) 10/15/2020     S/P radiation therapy     6,600 cGy to pelvis completed on 6/25/2021 [facility]       CURRENT MEDICATIONS:  Current Outpatient Medications   Medication Sig Dispense Refill     allopurinol (ZYLOPRIM) 100 MG tablet every morning        amLODIPine (NORVASC) 10 MG tablet Take 10 mg by mouth every morning        aspirin (ASA) 81 MG EC tablet Take 81 mg by mouth every morning        calcitRIOL (ROCALTROL) 0.25 MCG capsule Take 0.25 mcg by mouth daily       furosemide (LASIX) 20 MG tablet every morning        Melatonin 2.5 MG CHEW Take 20 mg by mouth At Bedtime       rOPINIRole (REQUIP) 0.25 MG tablet Take 0.25 mg by mouth At Bedtime Take 0.25 mg one hour prior to sleep. If not effective after 3 days, increase dose to 0.5 mg. May increase increments of 0.25 mg every 3 days if needed, not exceeding a total of 2 mg at bedtime.       sevelamer carbonate (RENVELA) 800 MG tablet Take 800 mg by mouth 2 times daily        simvastatin (ZOCOR) 40 MG tablet Take 40 mg by mouth every morning        Vitamin D3 (VITAMIN D3) 25 mcg (1000 units) tablet Take 1 capsule by mouth every morning          PAST SURGICAL HISTORY:  Past Surgical History:   Procedure Laterality Date     BACK SURGERY      Back Surgey 20+ Years Ago      BIOPSY      [facility] 2010     COLONOSCOPY      10+ Years ago at [facility]      [facility]    Procedure: PROSTATECTOMY, ROBOT-ASSISTED, WITH PELVIC LYMPHADENECTOMY;  Surgeon: Nabil Vásquez MD;  Location:  OR       ALLERGIES:   No Known Allergies    FAMILY HISTORY:  Family History   Problem Relation Age of Onset     Kidney Disease Father      Hypertension Father      Cancer Mother      No Known Problems Brother      No Known Problems Sister      No Known Problems Son      No Known Problems Daughter      No Known Problems Brother      No Known Problems Brother      No  "Known Problems Brother      No Known Problems Sister      No Known Problems Sister      No Known Problems Sister          SOCIAL HISTORY:  Social History     Tobacco Use     Smoking status: Never Smoker     Smokeless tobacco: Never Used   Substance Use Topics     Alcohol use: Yes     Drug use: Never       ROS:   Constitutional: No fever, chills, or sweats. Weight stable.   ENT: No visual disturbance, ear ache, epistaxis, sore throat.   Cardiovascular: As per HPI.   Respiratory: No cough, hemoptysis.    GI: No nausea, vomiting, hematemesis, melena, or hematochezia.   : No hematuria.   Integument: Negative.   Hematologic:  No easy bruising, no easy bleeding.  Neuro: Negative.   Musculoskeletal: No myalgia.    Exam:  /77 (BP Location: Right arm, Patient Position: Chair, Cuff Size: Adult Regular)   Pulse 92   Ht 1.738 m (5' 8.43\")   Wt 107.6 kg (237 lb 4.8 oz)   SpO2 97%   BMI 35.63 kg/m    GENERAL APPEARANCE: alert and no distress  HEENT: no icterus, no central cyanosis  LYMPH/NECK: no adenopathy, no asymmetry, JVP not elevated, no carotid bruits.  RESPIRATORY: lungs clear to auscultation - no rales, rhonchi or wheezes, no use of accessory muscles, no retractions, respirations are unlabored, normal respiratory rate  CARDIOVASCULAR: regular rhythm, normal S1, S2, no S3 or S4 and no murmur, click or rub, precordium quiet with normal PMI.  GI: soft, non tender  EXTREMITIES: peripheral pulses normal, no edema  NEURO: alert, normal speech,and affect  VASC: Radial, dorsalis pedis and posterior tibialis pulses are normal in volumes and symmetric bilaterally.   SKIN: no ecchymoses, no rashes     I have reviewed the labs and personally reviewed the imaging below and made my comment in the assessment and plan.    Labs:  CBC RESULTS:   Lab Results   Component Value Date    WBC 10.6 12/15/2020    RBC 2.95 (L) 12/15/2020    HGB 9.2 (L) 12/15/2020    HCT 27.6 (L) 12/15/2020    MCV 94 12/15/2020    MCH 30.2 12/15/2020    " MCHC 32.2 12/15/2020    RDW 14.1 12/15/2020     12/15/2020       BMP RESULTS:  Lab Results   Component Value Date     08/09/2021     12/15/2020    POTASSIUM 4.8 08/09/2021    POTASSIUM 3.9 12/15/2020    CHLORIDE 107 08/09/2021    CHLORIDE 110 (H) 12/15/2020    CO2 22 08/09/2021    CO2 22 12/15/2020    ANIONGAP 10 08/09/2021    ANIONGAP 8 12/15/2020    GLC 98 08/09/2021     (H) 12/15/2020     (HH) 08/09/2021    BUN 70 (H) 12/15/2020    CR 11.70 (HH) 08/09/2021    CR 6.13 (H) 12/15/2020    GFRESTIMATED 4 (L) 08/09/2021    GFRESTIMATED 9 (L) 12/15/2020    GFRESTBLACK 11 (L) 12/15/2020    YEE 9.5 08/09/2021    YEE 8.6 12/15/2020            INR RESULTS:  Lab Results   Component Value Date    INR 1.03 09/03/2020     Dobutamine Stress Echocardiogram 10/09/2020  Normal, low-risk dobutamine echocardiogram without evidence of ischemia.  87% of maximal predicted heart rate (MPHR) was achieved.  Normal biventricular size, thickness, and global systolic function at  baseline.  With low-dose dobutamine, LVEF augmented and LV cavity size decreased  appropriately.  With peak dobutamine, LVEF increased further to >70% and LV cavity size  decreased appropriately. Blood pressure was unremarkable.  No regional wall motion abnormalities at rest or with dobutamine.  The test was terminated due to the achievement of target heart rate.  No angina was elicited.  No ECG evidence of ischemia. Normal heart rate and blood pressure response to  dobutamine.  No significant valvular abnormalities are noted on screening Doppler exam.  The aortic root and visualized ascending aorta are normal.  No prior stress test for comparison.    Echocardiogram 9/21/2020  Global and regional left ventricular function is normal with an EF of 60-65%.  Right ventricular function, chamber size, wall motion, and thickness arenormal.  The inferior vena cava is normal.  No pericardial effusion is present.  Previous study not available  for comparison.    EKG 9/21/2020 sinus rhythm otherwise normal.    Assessment and Plan:     #End-stage renal disease on dialysis over the last 1 month  #Pre-kidney transplant cardiovascular evaluation  -Patient is currently asymptomatic from cardiac standpoint.  He reports tiredness after physical exertion which is new since Covid infection a month ago.  Physical exam is benign today.  -Recommend CT coronary angiogram  -Repeat transthoracic echocardiogram    #Hypertension  -Well-controlled with amlodipine 10 mg and furosemide 20 mg    No medication changes today.    Return to clinic as needed    Total time spent today for this visit is 47 minutes including precharting, face-to-face clinic visit, review of labs/imaging and medical documentation.    Please donot hesitate to contact me if you have any questions or concerns. Again, thank you for allowing me to participate in the care of your patient.    Donis CHAVEZ MD  [facility] Division of Cardiology  Pager [phone]    Addendum note 11/16/2021:    Echocardiogram 11/15/2021  Global and regional left ventricular function is normal with an EF of 60-65%.  Right ventricular function, chamber size, wall motion, and thickness are  normal.  The inferior vena cava is normal.  No pericardial effusion is present.    CT coronary angiogram 11/15/2021  IMPRESSION:  1.  Mild non-obstructive CAD.  2.  Total Agatston score 138 placing the patient in the 53rd  percentile when compared to age and gender matched control group.    I reviewed both echocardiogram and CT coronary angiogram.  Echocardiogram shows normal biventricular function.  CT coronary angiogram showed no obstructive CAD.      Patient can proceed to planned kidney transplant.            Please do not hesitate to contact me if you have any questions/concerns.     Sincerely,     Donis Chavez MD

## 2021-11-01 NOTE — PATIENT INSTRUCTIONS
Complete an echocardiogram (ultrasound of heart) and CTA Angioogram.    CT Coronary Angiogram  Follow these instructions:    Nothing to eat or drink except water 3 hours prior.  No caffeine, smoking, or strenuous activity before test.    You will be receiving contrast. The day before the test drink extra water and also on the day of the test.    HOLD these medications:   1. oral diabetic medications the morning of and wait 48 hours before  re-starting metformin.     2. Diuretic the day of.   3. NSAIDS (ibuprofen, naproxen) day of.

## 2021-11-01 NOTE — NURSING NOTE
Chief Complaint   Patient presents with     New Patient     kidney transplant eval      Vitals were taken and medications reconciled.    Campos Rodriguez, EMT  7:43 AM

## 2021-11-15 ENCOUNTER — HOSPITAL ENCOUNTER (OUTPATIENT)
Dept: CARDIOLOGY | Facility: CLINIC | Age: 56
End: 2021-11-15
Attending: INTERNAL MEDICINE
Payer: COMMERCIAL

## 2021-11-15 ENCOUNTER — HOSPITAL ENCOUNTER (OUTPATIENT)
Dept: CT IMAGING | Facility: CLINIC | Age: 56
End: 2021-11-15
Attending: INTERNAL MEDICINE
Payer: COMMERCIAL

## 2021-11-15 VITALS — DIASTOLIC BLOOD PRESSURE: 65 MMHG | RESPIRATION RATE: 16 BRPM | SYSTOLIC BLOOD PRESSURE: 110 MMHG | HEART RATE: 63 BPM

## 2021-11-15 DIAGNOSIS — Z01.810 PRE-OPERATIVE CARDIOVASCULAR EXAMINATION: ICD-10-CM

## 2021-11-15 DIAGNOSIS — C61 PROSTATE CANCER (H): ICD-10-CM

## 2021-11-15 LAB
LVEF ECHO: NORMAL
PSA SERPL-MCNC: 0.04 UG/L (ref 0–4)

## 2021-11-15 PROCEDURE — 250N000013 HC RX MED GY IP 250 OP 250 PS 637: Performed by: INTERNAL MEDICINE

## 2021-11-15 PROCEDURE — 75574 CT ANGIO HRT W/3D IMAGE: CPT | Mod: 26 | Performed by: INTERNAL MEDICINE

## 2021-11-15 PROCEDURE — 75574 CT ANGIO HRT W/3D IMAGE: CPT

## 2021-11-15 PROCEDURE — 93306 TTE W/DOPPLER COMPLETE: CPT | Mod: 26 | Performed by: INTERNAL MEDICINE

## 2021-11-15 PROCEDURE — 36415 COLL VENOUS BLD VENIPUNCTURE: CPT

## 2021-11-15 PROCEDURE — 93306 TTE W/DOPPLER COMPLETE: CPT

## 2021-11-15 PROCEDURE — 250N000011 HC RX IP 250 OP 636: Performed by: INTERNAL MEDICINE

## 2021-11-15 PROCEDURE — 84153 ASSAY OF PSA TOTAL: CPT

## 2021-11-15 RX ORDER — METOPROLOL TARTRATE 1 MG/ML
5-15 INJECTION, SOLUTION INTRAVENOUS
Status: DISCONTINUED | OUTPATIENT
Start: 2021-11-15 | End: 2021-11-16 | Stop reason: HOSPADM

## 2021-11-15 RX ORDER — METHYLPREDNISOLONE SODIUM SUCCINATE 125 MG/2ML
125 INJECTION, POWDER, LYOPHILIZED, FOR SOLUTION INTRAMUSCULAR; INTRAVENOUS
Status: DISCONTINUED | OUTPATIENT
Start: 2021-11-15 | End: 2021-11-16 | Stop reason: HOSPADM

## 2021-11-15 RX ORDER — METOPROLOL TARTRATE 25 MG/1
25-100 TABLET, FILM COATED ORAL
Status: COMPLETED | OUTPATIENT
Start: 2021-11-15 | End: 2021-11-15

## 2021-11-15 RX ORDER — IVABRADINE 5 MG/1
5-15 TABLET, FILM COATED ORAL
Status: COMPLETED | OUTPATIENT
Start: 2021-11-15 | End: 2021-11-15

## 2021-11-15 RX ORDER — IOPAMIDOL 755 MG/ML
120 INJECTION, SOLUTION INTRAVASCULAR ONCE
Status: DISCONTINUED | OUTPATIENT
Start: 2021-11-15 | End: 2021-11-15

## 2021-11-15 RX ORDER — NITROGLYCERIN 0.4 MG/1
.4-.8 TABLET SUBLINGUAL
Status: DISCONTINUED | OUTPATIENT
Start: 2021-11-15 | End: 2021-11-16 | Stop reason: HOSPADM

## 2021-11-15 RX ORDER — DIPHENHYDRAMINE HYDROCHLORIDE 50 MG/ML
25-50 INJECTION INTRAMUSCULAR; INTRAVENOUS
Status: DISCONTINUED | OUTPATIENT
Start: 2021-11-15 | End: 2021-11-16 | Stop reason: HOSPADM

## 2021-11-15 RX ORDER — ACYCLOVIR 200 MG/1
0-1 CAPSULE ORAL
Status: DISCONTINUED | OUTPATIENT
Start: 2021-11-15 | End: 2021-11-16 | Stop reason: HOSPADM

## 2021-11-15 RX ORDER — DIPHENHYDRAMINE HCL 25 MG
25 CAPSULE ORAL
Status: DISCONTINUED | OUTPATIENT
Start: 2021-11-15 | End: 2021-11-16 | Stop reason: HOSPADM

## 2021-11-15 RX ORDER — ONDANSETRON 2 MG/ML
4 INJECTION INTRAMUSCULAR; INTRAVENOUS
Status: DISCONTINUED | OUTPATIENT
Start: 2021-11-15 | End: 2021-11-16 | Stop reason: HOSPADM

## 2021-11-15 RX ORDER — IOPAMIDOL 755 MG/ML
120 INJECTION, SOLUTION INTRAVASCULAR ONCE
Status: COMPLETED | OUTPATIENT
Start: 2021-11-15 | End: 2021-11-15

## 2021-11-15 RX ADMIN — IVABRADINE 10 MG: 5 TABLET, FILM COATED ORAL at 11:16

## 2021-11-15 RX ADMIN — NITROGLYCERIN 0.8 MG: 0.4 TABLET SUBLINGUAL at 13:27

## 2021-11-15 RX ADMIN — METOPROLOL TARTRATE 50 MG: 50 TABLET, FILM COATED ORAL at 11:16

## 2021-11-15 RX ADMIN — IOPAMIDOL 120 ML: 755 INJECTION, SOLUTION INTRAVENOUS at 13:25

## 2021-11-15 RX ADMIN — METOPROLOL TARTRATE 50 MG: 50 TABLET, FILM COATED ORAL at 12:12

## 2021-11-15 RX ADMIN — IVABRADINE 5 MG: 5 TABLET, FILM COATED ORAL at 12:12

## 2021-11-15 NOTE — PROGRESS NOTES
Pt arrived for Coronary CT angiogram. Test, meds and side effects reviewed with pt.  Resting HR  76 bpm. Given 100 mg PO Metoprolol + 15 mg PO Ivabradine per verbal order. Administered 0.8 mg SL nitro on CTA table per order. CTA completed.  Patient tolerated procedure well and denies symptoms of allergic reaction.  Post monitoring completed and VSS.  D/C instructions reviewed with pt whom verbalized understanding of need to increase PO fluids today. D/C to gold waiting room accompanied by staff.

## 2021-11-23 ENCOUNTER — TELEPHONE (OUTPATIENT)
Dept: TRANSPLANT | Facility: CLINIC | Age: 56
End: 2021-11-23

## 2021-11-23 ENCOUNTER — VIRTUAL VISIT (OUTPATIENT)
Dept: UROLOGY | Facility: CLINIC | Age: 56
End: 2021-11-23
Payer: COMMERCIAL

## 2021-11-23 VITALS — BODY MASS INDEX: 34.22 KG/M2 | WEIGHT: 239 LBS | HEIGHT: 70 IN

## 2021-11-23 DIAGNOSIS — C61 PROSTATE CANCER (H): Primary | ICD-10-CM

## 2021-11-23 PROCEDURE — 99213 OFFICE O/P EST LOW 20 MIN: CPT | Mod: 95 | Performed by: UROLOGY

## 2021-11-23 ASSESSMENT — PAIN SCALES - GENERAL: PAINLEVEL: NO PAIN (0)

## 2021-11-23 ASSESSMENT — MIFFLIN-ST. JEOR: SCORE: 1910.35

## 2021-11-23 NOTE — LETTER
11/23/2021      RE: Donald Blanco  [address] [facility] [address]       Donald Blanco is a 56 year old male who is being evaluated via a billable video visit.      How would you like to obtain your AVS? MyChart  If the video visit is dropped, the invitation should be resent by: Text to cell phone: [phone]  Will anyone else be joining your video visit? No    Video Start Time: 1:58 PM    CHIEF COMPLAINT   It was my pleasure to see Donald Blanco who is a 56 year old male for follow-up of Prostate Cancer.      HPI  Donald Blanco is a very pleasant 56 year old male who presents with a history of Prostate Cancer. [place] 4+3 disease, stage pT3b with RALP 12/14/2020 with focal positive margin at site of NORBERTO. He has recovered without complication. Noting good improvement with regard to his continence. Tolerated adjuvant radiation without complication. PSA stable. Eager to proceed with transplant.     PSA  11/15/2021 - 0.04  8/9/2021 - 0.03  3/25/2021 - <0.1    RALP 12/14/2020  TUMOR     Histologic Type:         - Acinar adenocarcinoma     Histologic Grade       Grade Group and [place] Score:           - Grade group 3 ([place] Score 4 + 3 = 7)         Percentage of Pattern 4: 60%     Extraprostatic Extension (EPE):         - Present, nonfocal       Location of Extraprostatic Extension:           - Left posterior     Urinary Bladder Neck Invasion:         - Not identified     Seminal Vesicle Invasion:         - Present         - Left     MARGINS     Margins:         - Involved by invasive carcinoma         - Limited (< 3 mm)       Linear Length of Positive Margin(s) (Millimeters): 2 mm       Focality:           - Unifocal       Location of Positive Margin(s):           - Left posterior       Margin Positivity in Area of Extraprostatic Extension (EPE):           - Present         Location(s): Left seminal vesicle       Clark Pattern at Positive Margin(s):           - Pattern 4     LYMPH NODES     Number  of Lymph Nodes Involved:         - 0     Number of Lymph Nodes Examined:         8     PATHOLOGIC STAGE CLASSIFICATION (PTNM, AJCC 8TH EDITION)     Primary Tumor (pT):         - pT3b     Regional Lymph Nodes (pN):         - pN0          Allergies:   Patient has no known allergies.         Review of Systems:  From intake questionnaire     Skin: negative  Eyes: negative  Ears/Nose/Throat: negative  Respiratory: No shortness of breath, dyspnea on exertion, cough, or hemoptysis  Cardiovascular: No chest pain or palpitations  Gastrointestinal: negative; no nausea/vomiting, constipation or diarrhea  Genitourinary: as per HPI  Musculoskeletal: negative  Neurologic: negative  Psychiatric: negative  Hematologic/Lymphatic/Immunologic: negative  Endocrine: negative         Physical Exam:     Vitals:  No vitals were obtained today due to virtual visit.    Physical Exam   GENERAL: Healthy, alert and no distress  EYES: Eyes grossly normal to inspection.  No discharge or erythema, or obvious scleral/conjunctival abnormalities.  RESP: No audible wheeze, cough, or visible cyanosis.  No visible retractions or increased work of breathing.    SKIN: Visible skin clear. No significant rash, abnormal pigmentation or lesions.  NEURO: Cranial nerves grossly intact.  Mentation and speech appropriate for age.  PSYCH: Mentation appears normal, affect normal/bright, judgement and insight intact, normal speech and appearance well-groomed.       Outside and Past Medical records:                                                                      Review of the result(s) of each unique test - PSA, pathology          Assessment and Plan:     56 year old male with stage pT3b, [place] 4+3=7 prostate cancer, s/p RALP completed 12/14/2020. Focal positive margins at site of NORBERTO. SVI noted as well. High-risk by Decipher. Completed adjuvant radiotherapy with Dr. Alexander. Did not receive hormones. PSA detectable, but stable. From a urologic perspective, there  is no further treatment planned at this point for prostate cancer, and intent is to proceed with observation. As such, it would be reasonable to proceed with transplant from a urologic perspective.     - Follow-up 6 months with PSA    Orders  Orders Placed This Encounter   Procedures     PSA tumor marker     Video-Visit Details    Type of service:  Video Visit    Video End Time:2:18 PM    Originating Location (pt. Location): Home    Distant Location (provider location):  [facility] UROLOGY AND [facility] FOR PROSTATE AND UROLOGIC CANCERS    Platform used for Video Visit: [employer]    20 minutes spent on the date of the encounter including direct interaction with the patient, performing chart review, history and exam, documentation and further activities as noted above.    Nabil Vásquez MD  Urology  [facility]

## 2021-11-23 NOTE — NURSING NOTE
"Chief Complaint   Patient presents with     Follow Up     PSA       Height 1.778 m (5' 10\"), weight 108.4 kg (239 lb). Body mass index is 34.29 kg/m .    Patient Active Problem List   Diagnosis     Essential hypertension     Gout     Hyperlipidemia     Hyperparathyroidism due to renal insufficiency (H)     IgA nephropathy     Obesity     Umbilical hernia     CKD (chronic kidney disease) stage 4, GFR 15-29 ml/min (H)     Pre-operative cardiovascular examination     Prostate cancer (H)     Morbid obesity (H)       No Known Allergies    Current Outpatient Medications   Medication Sig Dispense Refill     allopurinol (ZYLOPRIM) 100 MG tablet every morning        amLODIPine (NORVASC) 10 MG tablet Take 10 mg by mouth every morning        aspirin (ASA) 81 MG EC tablet Take 81 mg by mouth every morning        B Complex-C-Folic Acid (VIRT-CAPS) 1 MG CAPS        calcitRIOL (ROCALTROL) 0.25 MCG capsule Take 0.25 mcg by mouth daily       colchicine (MITIGARE) 0.6 MG capsule        furosemide (LASIX) 20 MG tablet every morning        Melatonin 2.5 MG CHEW Take 20 mg by mouth At Bedtime       rOPINIRole (REQUIP) 0.25 MG tablet Take 0.25 mg by mouth At Bedtime Take 0.25 mg one hour prior to sleep. If not effective after 3 days, increase dose to 0.5 mg. May increase increments of 0.25 mg every 3 days if needed, not exceeding a total of 2 mg at bedtime.       sevelamer carbonate (RENVELA) 800 MG tablet Take 800 mg by mouth 2 times daily        simvastatin (ZOCOR) 40 MG tablet Take 40 mg by mouth every morning        Vitamin D3 (VITAMIN D3) 25 mcg (1000 units) tablet Take 1 capsule by mouth every morning          Social History     Tobacco Use     Smoking status: Never Smoker     Smokeless tobacco: Never Used   Substance Use Topics     Alcohol use: Yes     Drug use: Never       Manju Smith, EMT  11/23/2021  1:39 PM  "

## 2021-11-23 NOTE — TELEPHONE ENCOUNTER
Pt called stating he got clearance from urology with PSA of 0.04. Pt also was cleared by cards in 10/2021. Pt is anxious to move forward with his living donor. Will need to see surgeon in person prior to active listing along with committee review. Will inform donor team that patient is potentially going to be active next week. Will follow up with pt after surgeon appt on 11/29.  Pt verbalized understanding of information and has no further questions. Encouraged to reach out if questions arise.

## 2021-11-23 NOTE — PROGRESS NOTES
Donald Blanco is a 56 year old male who is being evaluated via a billable video visit.      How would you like to obtain your AVS? MyChart  If the video visit is dropped, the invitation should be resent by: Text to cell phone: [phone]  Will anyone else be joining your video visit? No    Video Start Time: 1:58 PM    CHIEF COMPLAINT   It was my pleasure to see Donald Blanco who is a 56 year old male for follow-up of Prostate Cancer.      HPI  Donald Blanco is a very pleasant 56 year old male who presents with a history of Prostate Cancer. [place] 4+3 disease, stage pT3b with RALP 12/14/2020 with focal positive margin at site of NORBERTO. He has recovered without complication. Noting good improvement with regard to his continence. Tolerated adjuvant radiation without complication. PSA stable. Eager to proceed with transplant.     PSA  11/15/2021 - 0.04  8/9/2021 - 0.03  3/25/2021 - <0.1    RALP 12/14/2020  TUMOR     Histologic Type:         - Acinar adenocarcinoma     Histologic Grade       Grade Group and [place] Score:           - Grade group 3 ([place] Score 4 + 3 = 7)         Percentage of Pattern 4: 60%     Extraprostatic Extension (EPE):         - Present, nonfocal       Location of Extraprostatic Extension:           - Left posterior     Urinary Bladder Neck Invasion:         - Not identified     Seminal Vesicle Invasion:         - Present         - Left     MARGINS     Margins:         - Involved by invasive carcinoma         - Limited (< 3 mm)       Linear Length of Positive Margin(s) (Millimeters): 2 mm       Focality:           - Unifocal       Location of Positive Margin(s):           - Left posterior       Margin Positivity in Area of Extraprostatic Extension (EPE):           - Present         Location(s): Left seminal vesicle       [place] Pattern at Positive Margin(s):           - Pattern 4     LYMPH NODES     Number of Lymph Nodes Involved:         - 0     Number of Lymph Nodes Examined:          8     PATHOLOGIC STAGE CLASSIFICATION (PTNM, AJCC 8TH EDITION)     Primary Tumor (pT):         - pT3b     Regional Lymph Nodes (pN):         - pN0          Allergies:   Patient has no known allergies.         Review of Systems:  From intake questionnaire     Skin: negative  Eyes: negative  Ears/Nose/Throat: negative  Respiratory: No shortness of breath, dyspnea on exertion, cough, or hemoptysis  Cardiovascular: No chest pain or palpitations  Gastrointestinal: negative; no nausea/vomiting, constipation or diarrhea  Genitourinary: as per HPI  Musculoskeletal: negative  Neurologic: negative  Psychiatric: negative  Hematologic/Lymphatic/Immunologic: negative  Endocrine: negative         Physical Exam:     Vitals:  No vitals were obtained today due to virtual visit.    Physical Exam   GENERAL: Healthy, alert and no distress  EYES: Eyes grossly normal to inspection.  No discharge or erythema, or obvious scleral/conjunctival abnormalities.  RESP: No audible wheeze, cough, or visible cyanosis.  No visible retractions or increased work of breathing.    SKIN: Visible skin clear. No significant rash, abnormal pigmentation or lesions.  NEURO: Cranial nerves grossly intact.  Mentation and speech appropriate for age.  PSYCH: Mentation appears normal, affect normal/bright, judgement and insight intact, normal speech and appearance well-groomed.       Outside and Past Medical records:                                                                      Review of the result(s) of each unique test - PSA, pathology          Assessment and Plan:     56 year old male with stage pT3b, [place] 4+3=7 prostate cancer, s/p RALP completed 12/14/2020. Focal positive margins at site of NORBERTO. SVI noted as well. High-risk by Decipher. Completed adjuvant radiotherapy with Dr. Alexander. Did not receive hormones. PSA detectable, but stable. From a urologic perspective, there is no further treatment planned at this point for prostate cancer, and intent  is to proceed with observation. As such, it would be reasonable to proceed with transplant from a urologic perspective.     - Follow-up 6 months with PSA    Orders  Orders Placed This Encounter   Procedures     PSA tumor marker     Video-Visit Details    Type of service:  Video Visit    Video End Time:2:18 PM    Originating Location (pt. Location): Home    Distant Location (provider location):  [facility] UROLOGY AND [facility] FOR PROSTATE AND UROLOGIC CANCERS    Platform used for Video Visit: [employer]    20 minutes spent on the date of the encounter including direct interaction with the patient, performing chart review, history and exam, documentation and further activities as noted above.    Nabil Vásquez MD  Urology  [facility]

## 2021-11-24 ENCOUNTER — TELEPHONE (OUTPATIENT)
Dept: ONCOLOGY | Facility: CLINIC | Age: 56
End: 2021-11-24
Payer: COMMERCIAL

## 2021-11-29 ENCOUNTER — OFFICE VISIT (OUTPATIENT)
Dept: TRANSPLANT | Facility: CLINIC | Age: 56
End: 2021-11-29
Attending: NURSE PRACTITIONER
Payer: COMMERCIAL

## 2021-11-29 ENCOUNTER — DOCUMENTATION ONLY (OUTPATIENT)
Dept: TRANSPLANT | Facility: CLINIC | Age: 56
End: 2021-11-29
Payer: COMMERCIAL

## 2021-11-29 VITALS
BODY MASS INDEX: 36.85 KG/M2 | WEIGHT: 248.8 LBS | OXYGEN SATURATION: 95 % | HEIGHT: 69 IN | HEART RATE: 95 BPM | SYSTOLIC BLOOD PRESSURE: 159 MMHG | DIASTOLIC BLOOD PRESSURE: 70 MMHG

## 2021-11-29 DIAGNOSIS — N18.6 ESRD (END STAGE RENAL DISEASE) (H): ICD-10-CM

## 2021-11-29 DIAGNOSIS — Z01.810 PRE-OPERATIVE CARDIOVASCULAR EXAMINATION: ICD-10-CM

## 2021-11-29 DIAGNOSIS — Z76.82 ORGAN TRANSPLANT CANDIDATE: ICD-10-CM

## 2021-11-29 PROCEDURE — 99214 OFFICE O/P EST MOD 30 MIN: CPT | Mod: GC | Performed by: SURGERY

## 2021-11-29 ASSESSMENT — MIFFLIN-ST. JEOR: SCORE: 1930.99

## 2021-11-29 NOTE — PROGRESS NOTES
"Saw pt in clinic per request of Dr Flores. Goal for weight loss via waist circumference. Largest waist circumference measured at 48\". Goal for 3-4\" loss. Pt reports losing appetite and weight with getting covid in September, then ended up on dialysis. Has regained ~15 lbs d/t feeling better, not exercising as regularly.     This summer he was riding bike outside regularly. They also have an elliptical at his home. He stopped biking regularly this summer d/t going through radiation, but now able to do exercise again.      am  BF- 1 pkt oatmeal made w/ water + 2 pieces toast with PB and jam or blueberry thins with CC  L- turkey s/w (or tuna, cooked chicken, PBJ) + LS chips + apple  D- grilled protein (salmon, chicken, pork) + mashed cauliflower, salad  Sn- popcorn, red licorice, cinnamon bears, granola bars  Fernanda- water, 36 oz juice/day  No alcohol  Dining out 2x/month     Interventions:  - Restart exercise routine  - Consider protein at BF 50% of the time- eggs, yogurt w/ fruit, protein bar or shake; consider oatmeal or toast not both  - Monitor snacking and juice consumption   - Will check in with pt again when he is back in clinic in ~3 months per Dr Flores     "

## 2021-11-29 NOTE — PROGRESS NOTES
Transplant Surgery Consult Note     Medical record number: [id number]  YOB: 1965,   Consult requested  for evaluation of kidney transplant candidacy.    Assessment and Recommendations:Mr. Blanco appears to be a good candidate for kidney transplantation and has a good understanding of the risks and benefits of this approach to the management of renal failure. The following issues should be addressed prior to finalizing his transplant candidacy:     Transplant order: Mr. Blanco has End stage renal failure due to IgA nephropathy whose condition is not expected to resolve, is expected to progress, and is expected to continue to develop related comorbid conditions.  Recommend he be considered as a candidate for living kidney transplant.  - Cardiology consult for cardiac risk stratification to be ordered: No. Recent ECHO stress which was negative for any stress induced ischemia; CTA coronary angio without any non-occlusive CAD. Clinically no symptoms with activities.   - CT abdomen and pelvis without contrast to be ordered for assessment of vascular targets: Yes. Last CT A/P 10/2020 with good vasculature.   - Dietician consult ordered: Yes. Patient has a BMI 37 (Wt 248lbs). Recommend for target BMI 35 (target Wt 230lbs). Recommend for waist size reduction (current waist 43in, target less than 40 in)   - Social work consult ordered: Yes  - Recipient suitable to move forward with work up of living donors:  Yes   - History of prostate cancer: stage 3B (positive margin), s/p robotic resection and radiation 10/2020: Had been followed by Dr. Vásquez of Urology. Last discussion 11/2021 patient was noted to have stable PSA with recommendation to follow up in 6 months. We discussed the patient with Dr. Vásquez. Per Dr. Vásquez, patient does have moderate risk of recurrence; however, it will be in 10-15 years. We discussed with patient about the elevated risks of recurrence after transplant due to immunosuppression. We  recommend to follow up in 3 months with a PSA check (this allows more time for weight loss through nutrition and exercises). If PSA is stable and does not have any clinical signs of recurrence, we will proceed with living kidney transplant.       Output by Drain (mL) 11/27/21 0700 - 11/27/21 1459 11/27/21 1500 - 11/27/21 2259 11/27/21 2300 - 11/28/21 0659 11/28/21 0700 - 11/28/21 1459 11/28/21 1500 - 11/28/21 2259 11/28/21 2300 - 11/29/21 0659 11/29/21 0700 - 11/29/21 1459 11/29/21 1500 - 11/29/21 1723   Patient has no LDAs of requested type attached.    Nii VILLASENOR  Tx Fellow    I have reviewed history, examined patient and discussed plan with the fellow/resident/TOM.  I concur with the findings in this note.    Risks of the surgical procedure including but not limited to the rare risk of mortality discussed in detail. Patient verbalized good understanding and had several pertinent questions which were answered satisfactorily.     Immunosuppressive regimen, management and long term risks discussed in detail.     Total time: 40 min  Counseling time: 30 min                 ---------------------------------------------------------------------------------------------------    HPI: Mr. Blanco has End stage renal failure due to IgA nephropathy. The patient is non-diabetic.       The patient is on dialysis. Dialysed from the right chest HD line. Started since September 2021   Has potential kidney donors:  Yes . Multiple workups. His niece has been evaluated for paired exchanged. ABO O. Patient's ABO is A, PRA 17.   History of prostate cancer. Stage 3B path (positive margin). S/p robotic resection and radiation.   Current BMI 37. Heaviest at 290lbs. Current 248 lbs. Current waist (iliac creast 40in).   The patient has the following pertinent history:       No    Yes  Dialysis:    []      [] via:       Blood Transfusion                  []      []  Number of units:   Most recently:  Pregnancy:    []      [] Number:        Previous Transplant:  []      [] Details:    Cancer    []      [] Comment:   Kidney stones   []      [] Comment:      Recurrent infections  []      []  Type:                  Bladder dysfunction  []      [] Cause:    Claudication   []      [] Distance:    Previous Amputation  []      [] Cause:     Chronic anticoagulation  []      [] Indication:   Lutheran  []      []      Past Medical History:   Diagnosis Date     CKD (chronic kidney disease) stage 4, GFR 15-29 ml/min (H)      Elevated PSA      Essential hypertension 05/21/2020     Hyperlipidemia      IgA nephropathy      Obesity      Prostate cancer (H) 10/15/2020     S/P radiation therapy     6,600 cGy to pelvis completed on 6/25/2021 [facility]     Past Surgical History:   Procedure Laterality Date     BACK SURGERY      Back Surgey 20+ Years Ago      BIOPSY      [facility] 2010     COLONOSCOPY      10+ Years ago at [facility]      DAVINCI PROSTATECTOMY, LYMPHADENECTOMY N/A 12/14/2020    Procedure: PROSTATECTOMY, ROBOT-ASSISTED, WITH PELVIC LYMPHADENECTOMY;  Surgeon: Nabil Vásquez MD;  Location:  OR     Family History   Problem Relation Age of Onset     Kidney Disease Father      Hypertension Father      Cancer Mother      No Known Problems Brother      No Known Problems Sister      No Known Problems Son      No Known Problems Daughter      No Known Problems Brother      No Known Problems Brother      No Known Problems Brother      No Known Problems Sister      No Known Problems Sister      No Known Problems Sister      Social History     Socioeconomic History     Marital status:      Spouse name: Not on file     Number of children: Not on file     Years of education: Not on file     Highest education level: Not on file   Occupational History     Not on file   Tobacco Use     Smoking status: Never Smoker     Smokeless tobacco: Never Used   Substance and Sexual Activity     Alcohol use: Yes      Drug use: Never     Sexual activity: Not on file   Other Topics Concern     Parent/sibling w/ CABG, MI or angioplasty before 65F 55M? Not Asked   Social History Narrative     Not on file     Social Determinants of Health     Financial Resource Strain: Not on file   Food Insecurity: Not on file   Transportation Needs: Not on file   Physical Activity: Not on file   Stress: Not on file   Social Connections: Not on file   Intimate Partner Violence: Not on file   Housing Stability: Not on file       ROS:   CONSTITUTIONAL:  No fevers or chills  EYES: negative for icterus  ENT:  negative for hearing loss, tinnitus and sore throat  RESPIRATORY:  negative for cough, sputum, dyspnea  CARDIOVASCULAR:  negative for chest pain None  GASTROINTESTINAL:  negative for nausea, vomiting, diarrhea or constipation  GENITOURINARY:  negative for incontinence, dysuria, bladder emptying problems  HEME:  No easy bruising  INTEGUMENT:  negative for rash and pruritus  NEURO:  Negative for headache, seizure disorder  Allergies:   No Known Allergies  Medications:  Prescription Medications as of 11/29/2021       Rx Number Disp Refills Start End Last Dispensed Date Next Fill Date Owning Pharmacy    allopurinol (ZYLOPRIM) 100 MG tablet    9/2/2010        Sig: every morning     Class: Historical    amLODIPine (NORVASC) 10 MG tablet    5/21/2020        Sig: Take 10 mg by mouth every morning     Class: Historical    Route: Oral    aspirin (ASA) 81 MG EC tablet            Sig: Take 81 mg by mouth every morning     Class: Historical    Route: Oral    B Complex-C-Folic Acid (VIRT-CAPS) 1 MG CAPS    10/9/2021        Class: Historical    Route: Oral    colchicine (MITIGARE) 0.6 MG capsule    10/9/2021        Class: Historical    Route: Oral    furosemide (LASIX) 20 MG tablet    9/2/2010        Sig: every morning     Class: Historical    Melatonin 2.5 MG CHEW            Sig: Take 20 mg by mouth At Bedtime    Class: Historical    Route: Oral    rOPINIRole  "(REQUIP) 0.25 MG tablet    8/12/2021        Sig: Take 0.25 mg by mouth At Bedtime Take 0.25 mg one hour prior to sleep. If not effective after 3 days, increase dose to 0.5 mg. May increase increments of 0.25 mg every 3 days if needed, not exceeding a total of 2 mg at bedtime.    Class: Historical    Route: Oral    sevelamer carbonate (RENVELA) 800 MG tablet    1/19/2021    [id number] - [place], [place] - [address] [facility]    Sig: Take 800 mg by mouth 2 times daily     Class: Historical    Route: Oral    Vitamin D3 (VITAMIN D3) 25 mcg (1000 units) tablet            Sig: Take 1 capsule by mouth every morning     Class: Historical    Route: Oral    calcitRIOL (ROCALTROL) 0.25 MCG capsule    4/23/2021 11/1/2021       Sig: Take 0.25 mcg by mouth daily    Class: Historical    Route: Oral    simvastatin (ZOCOR) 40 MG tablet    5/5/2020 11/1/2021       Sig: Take 40 mg by mouth every morning     Class: Historical    Route: Oral        Exam:     BP (!) 159/70   Pulse 95   Ht 1.74 m (5' 8.5\")   Wt 112.9 kg (248 lb 12.8 oz)   SpO2 95%   BMI 37.28 kg/m    Appearance: in no apparent distress.   Skin: normal  Eyes:  no redness or discharge.  Sclera anicteric  Head and Neck: Normal, no rashes or jaundice  Respiratory: easy respirations, no audible wheezing.  Abdomen: obese abdomen with protruded lower part of the abdomen  Extremeties: Edema, none  Neuro: without deficit   Psychiatric: Normal mood and affect    Diagnostics:   Recent Results (from the past 672 hour(s))   PSA tumor marker    Collection Time: 11/15/21 11:26 AM   Result Value Ref Range    PSA Tumor Marker 0.04 0.00 - 4.00 ug/L   Echocardiogram Complete    Collection Time: 11/15/21 12:14 PM   Result Value Ref Range    LVEF  60-65%      UNOS cPRA   Date Value Ref Range Status   09/03/2020 19  Final       "

## 2021-11-29 NOTE — NURSING NOTE
"Chief Complaint   Patient presents with     RECHECK     Wait list     Blood pressure (!) 159/70, pulse 95, height 1.74 m (5' 8.5\"), weight 112.9 kg (248 lb 12.8 oz), SpO2 95 %.    Cyn Peñaloza on 11/29/2021 at 12:59 PM    "

## 2021-11-29 NOTE — LETTER
11/29/2021     RE: Donald Blanco  [address] [facility]  [place] [address]        Dear Colleague,    Thank you for referring your patient, Donald Blanco, to the [facility]. Please see a copy of my visit note below.    Transplant Surgery Consult Note     Medical record number: [id number]  YOB: 1965,   Consult requested  for evaluation of kidney transplant candidacy.    Assessment and Recommendations:Mr. Blanco appears to be a good candidate for kidney transplantation and has a good understanding of the risks and benefits of this approach to the management of renal failure. The following issues should be addressed prior to finalizing his transplant candidacy:     Transplant order: Mr. Blanco has End stage renal failure due to IgA nephropathy whose condition is not expected to resolve, is expected to progress, and is expected to continue to develop related comorbid conditions.  Recommend he be considered as a candidate for living kidney transplant.  - Cardiology consult for cardiac risk stratification to be ordered: No. Recent ECHO stress which was negative for any stress induced ischemia; CTA coronary angio without any non-occlusive CAD. Clinically no symptoms with activities.   - CT abdomen and pelvis without contrast to be ordered for assessment of vascular targets: Yes. Last CT A/P 10/2020 with good vasculature.   - Dietician consult ordered: Yes. Patient has a BMI 37 (Wt 248lbs). Recommend for target BMI 35 (target Wt 230lbs). Recommend for waist size reduction (current waist 43in, target less than 40 in)   - Social work consult ordered: Yes  - Recipient suitable to move forward with work up of living donors:  Yes   - History of prostate cancer: stage 3B (positive margin), s/p robotic resection and radiation 10/2020: Had been followed by Dr. Vásquez of Urology. Last discussion 11/2021 patient was noted to have stable PSA with recommendation to follow up in 6 months. We  discussed the patient with Dr. Vásquez. Per Dr. Vásquez, patient does have moderate risk of recurrence; however, it will be in 10-15 years. We discussed with patient about the elevated risks of recurrence after transplant due to immunosuppression. We recommend to follow up in 3 months with a PSA check (this allows more time for weight loss through nutrition and exercises). If PSA is stable and does not have any clinical signs of recurrence, we will proceed with living kidney transplant.       Output by Drain (mL) 11/27/21 0700 - 11/27/21 1459 11/27/21 1500 - 11/27/21 2259 11/27/21 2300 - 11/28/21 0659 11/28/21 0700 - 11/28/21 1459 11/28/21 1500 - 11/28/21 2259 11/28/21 2300 - 11/29/21 0659 11/29/21 0700 - 11/29/21 1459 11/29/21 1500 - 11/29/21 1723   Patient has no LDAs of requested type attached.    Nii VILLASENOR  Tx Fellow    I have reviewed history, examined patient and discussed plan with the fellow/resident/TOM.  I concur with the findings in this note.    Risks of the surgical procedure including but not limited to the rare risk of mortality discussed in detail. Patient verbalized good understanding and had several pertinent questions which were answered satisfactorily.     Immunosuppressive regimen, management and long term risks discussed in detail.     Total time: 40 min  Counseling time: 30 min                 ---------------------------------------------------------------------------------------------------    HPI: Mr. Blanco has End stage renal failure due to IgA nephropathy. The patient is non-diabetic.       The patient is on dialysis. Dialysed from the right chest HD line. Started since September 2021   Has potential kidney donors:  Yes . Multiple workups. His niece has been evaluated for paired exchanged. ABO O. Patient's ABO is A, PRA 17.   History of prostate cancer. Stage 3B path (positive margin). S/p robotic resection and radiation.   Current BMI 37. Heaviest at 290lbs. Current 248 lbs. Current  waist (iliac creast 40in).   The patient has the following pertinent history:       No    Yes  Dialysis:    []      [] via:       Blood Transfusion                  []      []  Number of units:   Most recently:  Pregnancy:    []      [] Number:       Previous Transplant:  []      [] Details:    Cancer    []      [] Comment:   Kidney stones   []      [] Comment:      Recurrent infections  []      []  Type:                  Bladder dysfunction  []      [] Cause:    Claudication   []      [] Distance:    Previous Amputation  []      [] Cause:     Chronic anticoagulation  []      [] Indication:   Druze  []      []      Past Medical History:   Diagnosis Date     CKD (chronic kidney disease) stage 4, GFR 15-29 ml/min (H)      Elevated PSA      Essential hypertension 05/21/2020     Hyperlipidemia      IgA nephropathy      Obesity      Prostate cancer (H) 10/15/2020     S/P radiation therapy     6,600 cGy to pelvis completed on 6/25/2021 [facility]     Past Surgical History:   Procedure Laterality Date     BACK SURGERY      Back Surgey 20+ Years Ago      BIOPSY      [facility] 2010     COLONOSCOPY      10+ Years ago at [facility]      DAVINCI PROSTATECTOMY, LYMPHADENECTOMY N/A 12/14/2020    Procedure: PROSTATECTOMY, ROBOT-ASSISTED, WITH PELVIC LYMPHADENECTOMY;  Surgeon: Nabil Vásquez MD;  Location:  OR     Family History   Problem Relation Age of Onset     Kidney Disease Father      Hypertension Father      Cancer Mother      No Known Problems Brother      No Known Problems Sister      No Known Problems Son      No Known Problems Daughter      No Known Problems Brother      No Known Problems Brother      No Known Problems Brother      No Known Problems Sister      No Known Problems Sister      No Known Problems Sister      Social History     Socioeconomic History     Marital status:      Spouse name: Not on file     Number of children: Not on file      Years of education: Not on file     Highest education level: Not on file   Occupational History     Not on file   Tobacco Use     Smoking status: Never Smoker     Smokeless tobacco: Never Used   Substance and Sexual Activity     Alcohol use: Yes     Drug use: Never     Sexual activity: Not on file   Other Topics Concern     Parent/sibling w/ CABG, MI or angioplasty before 65F 55M? Not Asked   Social History Narrative     Not on file     Social Determinants of Health     Financial Resource Strain: Not on file   Food Insecurity: Not on file   Transportation Needs: Not on file   Physical Activity: Not on file   Stress: Not on file   Social Connections: Not on file   Intimate Partner Violence: Not on file   Housing Stability: Not on file       ROS:   CONSTITUTIONAL:  No fevers or chills  EYES: negative for icterus  ENT:  negative for hearing loss, tinnitus and sore throat  RESPIRATORY:  negative for cough, sputum, dyspnea  CARDIOVASCULAR:  negative for chest pain None  GASTROINTESTINAL:  negative for nausea, vomiting, diarrhea or constipation  GENITOURINARY:  negative for incontinence, dysuria, bladder emptying problems  HEME:  No easy bruising  INTEGUMENT:  negative for rash and pruritus  NEURO:  Negative for headache, seizure disorder  Allergies:   No Known Allergies  Medications:  Prescription Medications as of 11/29/2021       Rx Number Disp Refills Start End Last Dispensed Date Next Fill Date Owning Pharmacy    allopurinol (ZYLOPRIM) 100 MG tablet    9/2/2010        Sig: every morning     Class: Historical    amLODIPine (NORVASC) 10 MG tablet    5/21/2020        Sig: Take 10 mg by mouth every morning     Class: Historical    Route: Oral    aspirin (ASA) 81 MG EC tablet            Sig: Take 81 mg by mouth every morning     Class: Historical    Route: Oral    B Complex-C-Folic Acid (VIRT-CAPS) 1 MG CAPS    10/9/2021        Class: Historical    Route: Oral    colchicine (MITIGARE) 0.6 MG capsule    10/9/2021         "Class: Historical    Route: Oral    furosemide (LASIX) 20 MG tablet    9/2/2010        Sig: every morning     Class: Historical    Melatonin 2.5 MG CHEW            Sig: Take 20 mg by mouth At Bedtime    Class: Historical    Route: Oral    rOPINIRole (REQUIP) 0.25 MG tablet    8/12/2021        Sig: Take 0.25 mg by mouth At Bedtime Take 0.25 mg one hour prior to sleep. If not effective after 3 days, increase dose to 0.5 mg. May increase increments of 0.25 mg every 3 days if needed, not exceeding a total of 2 mg at bedtime.    Class: Historical    Route: Oral    sevelamer carbonate (RENVELA) 800 MG tablet    1/19/2021    [id number] - [place], [place] - [address] [facility]    Sig: Take 800 mg by mouth 2 times daily     Class: Historical    Route: Oral    Vitamin D3 (VITAMIN D3) 25 mcg (1000 units) tablet            Sig: Take 1 capsule by mouth every morning     Class: Historical    Route: Oral    calcitRIOL (ROCALTROL) 0.25 MCG capsule    4/23/2021 11/1/2021       Sig: Take 0.25 mcg by mouth daily    Class: Historical    Route: Oral    simvastatin (ZOCOR) 40 MG tablet    5/5/2020 11/1/2021       Sig: Take 40 mg by mouth every morning     Class: Historical    Route: Oral        Exam:     BP (!) 159/70   Pulse 95   Ht 1.74 m (5' 8.5\")   Wt 112.9 kg (248 lb 12.8 oz)   SpO2 95%   BMI 37.28 kg/m    Appearance: in no apparent distress.   Skin: normal  Eyes:  no redness or discharge.  Sclera anicteric  Head and Neck: Normal, no rashes or jaundice  Respiratory: easy respirations, no audible wheezing.  Abdomen: obese abdomen with protruded lower part of the abdomen  Extremeties: Edema, none  Neuro: without deficit   Psychiatric: Normal mood and affect    Diagnostics:   Recent Results (from the past 672 hour(s))   PSA tumor marker    Collection Time: 11/15/21 11:26 AM   Result Value Ref Range    PSA Tumor Marker 0.04 0.00 - 4.00 ug/L   Echocardiogram Complete    Collection " Time: 11/15/21 12:14 PM   Result Value Ref Range    LVEF  60-65%      UNOS cPRA   Date Value Ref Range Status   09/03/2020 19  Final     Again, thank you for allowing me to participate in the care of your patient.      Sincerely,    Howard Flores MD

## 2021-11-30 ENCOUNTER — TELEPHONE (OUTPATIENT)
Dept: TRANSPLANT | Facility: CLINIC | Age: 56
End: 2021-11-30
Payer: COMMERCIAL

## 2021-11-30 NOTE — TELEPHONE ENCOUNTER
Called pt to follow up on surgeon visit. Pt states that he is frustrated but understands why he has to wait for another PSA check. Pt also states that at dialysis he weighed 238lbs. Pt has a goal weight of 230lbs. Will get the surgeon visit and PSA check ordered in January so he can be seen in February for follow up. Pt verbalized understanding of information and has no further questions. Encouraged to reach out if questions arise.

## 2022-01-10 DIAGNOSIS — Z12.5 PROSTATE CANCER SCREENING: ICD-10-CM

## 2022-01-10 DIAGNOSIS — N18.6 ESRD (END STAGE RENAL DISEASE) (H): ICD-10-CM

## 2022-01-10 DIAGNOSIS — Z76.82 ORGAN TRANSPLANT CANDIDATE: ICD-10-CM

## 2022-01-16 ENCOUNTER — HEALTH MAINTENANCE LETTER (OUTPATIENT)
Age: 57
End: 2022-01-16

## 2022-01-21 ENCOUNTER — VIRTUAL VISIT (OUTPATIENT)
Dept: RADIATION ONCOLOGY | Facility: CLINIC | Age: 57
End: 2022-01-21
Payer: COMMERCIAL

## 2022-01-21 DIAGNOSIS — C61 PROSTATE CANCER (H): Primary | ICD-10-CM

## 2022-01-21 NOTE — PROGRESS NOTES
Survivorship Visit      DATE OF FOLLOW UP: 1/21/22    PATIENT NAME: Donald Blanco    DISEASE TREATED:     Adenocarcinoma of Prostate  Stage pT3b N0 M0, Clark score=7(4+3)  , PSA <0.1(3/25/2021 post op), 13.5 pre op. Decipher score 0.81(high risk)         INTERVAL SINCE COMPLETION OF RADIOTHERAPY:  7 months (completed on 6/25/2021).     TYPE OF RADIOTHERAPY GIVEN:   6600 cGy in 33 Fractions       SUBJECTIVE: The patient is a 56-year-old man who has a history of prostate cancer Clark's 4+3 equal 7 pathologic stage T3b N0 M0 who underwent robotic assisted laparoscopic prostatectomy on December 14, 2020.  Patient had a focally positive margin at the site of extracapsular extension.  Patient had undetectable PSA (less than 0.1) on March 25, 2021 postoperatively.  Although the PSA was less than 0.1 with a preoperative PSA of 13.5, patient had high decipher score of 0.81.  In addition, patient was considered for renal transplant.    The patient received adjuvant radiotherapy completing on June 25, 2021 at [facility].  Patient received 6600 cGy at 200 cGy per fraction using IMRT to treat the prostate bed including seminal vesicle remnant.    Patient is doing very well and denies any significant side effects related to radiation.  He does have sexual health concerns since surgery.  He is awaiting a kidney transplant currently.  He will have another PSA in February and if that is stable, he will proceed to transplant.      PSA   11/15/2021 0.04   08/09/2021 0.03       IMPRESSION OF DISEASE STATUS: The patient has a detectable PSA of 0.04 currently which is stable.    RECOMMENDATION: Repeat PSA planned in February.      1.  Healthy lifestyle:  -Physical activity for a minimum of 150 minutes/week with a goal of 300 minutes/week.  -Healthy diet including lots of fruits and vegetables.  -Maintain healthy weight.  -Do not use tobacco products.  -Be sure to get adequate amount of sleep  -Consume alcohol rarely  or not at all  -See primary care provider yearly    2. Preventative Health  -Stay up-to-date on vaccinations  -Covid vaccine recommended  -Stay up to date with colonoscopy    3.  Psychological and physical health  -Cancer and cancer treatment can be stressful for many patients.  Counseling can be very helpful.  There are medications that can also be helpful.  -Self care is advised.  Take time for yourself and do things that make you happy.  -Cardiovascular disease is a leading cause of death in cancer survivors.  High blood pressure, high cholesterol, androgen therapy, diabetes and tobacco use can all increase the risk.  -Cognitive function can be affected by androgen therapy.  Increase in organizational skills including taking notes, using planners or alarms can all be beneficial.    4.  Cancer related fatigue  -Fatigue is common during treatment and can often last months to in rare cases years.  Cancer rehab can be beneficial.  Maintain adequate exercise.  Setting priorities and pacing yourself can be useful.    4.  Sexual function  -Surgery, radiation and androgen therapy (Lupron) can have significant effects in sexual function.  -Treatment such as Viagra can be helpful for some.  Referral to urology or sexual health clinic can be beneficial.  He will pursue after transplant.    5.  Monitoring  -PSA recommended every 6 months for 5 years and then yearly    6. Androgen therapy-He did not have.    7.  Radiation proctitis:  -Keep stools soft and avoid constipation or hard stools  -Referral to colorectal specialist if bleeding happens/continues    Phone time 15 min Total time including survivorship care plan 45 minutes    Shannan Leblanc NP  Radiation Oncology  [facility]    Department of Radiation Oncology  [facility]  [phone]

## 2022-01-24 NOTE — TELEPHONE ENCOUNTER
Patient seen in anticoagulation clinic. Reviewed past INR and dose with patient.  Patient denies any missed doses of warfarin.  He saw urgent care on Saturday and was prescribed an additional antibiotic - Bactrim DS x 7 days.  He finished up his Keflex today.  Bactrim will be done by the end of the week.  Diet and activity consistent.  Reviewed INR goal.  INR above range, as expected d/t multiple antibiotics.  Denies S/Sx of bleeding or increased bruising. Will hold today's dose of warfarin (1/24), take a decreased dose of 7.5 mg 1/25, 1/26, & 1/27, then resume regular dosing.  Recheck about 2 weeks after completion of final antibiotic.  Next INR coordinated with 2/9 physical therapy appointment.  Reminded patient to call office with any changes.  INR and dose per Dr BRANDON Huizar's protocol.  Onsite provider Dr Bon Black.   Reason for visit: Follow up w/ PSA     Relevant information: Prostate cancer    Records/imaging/labs/orders: Emailed clinic coordinator about scheduling PSA and BMP    Pt called: N/A    At Rooming: Video

## 2022-02-21 ENCOUNTER — LAB (OUTPATIENT)
Dept: LAB | Facility: CLINIC | Age: 57
End: 2022-02-21
Payer: COMMERCIAL

## 2022-02-21 DIAGNOSIS — N18.6 ESRD (END STAGE RENAL DISEASE) (H): ICD-10-CM

## 2022-02-21 LAB — PSA SERPL-MCNC: 0.06 UG/L (ref 0–4)

## 2022-02-21 PROCEDURE — 36415 COLL VENOUS BLD VENIPUNCTURE: CPT

## 2022-02-21 PROCEDURE — 84153 ASSAY OF PSA TOTAL: CPT

## 2022-02-28 ENCOUNTER — OFFICE VISIT (OUTPATIENT)
Dept: TRANSPLANT | Facility: CLINIC | Age: 57
End: 2022-02-28
Attending: SURGERY
Payer: COMMERCIAL

## 2022-02-28 VITALS
HEART RATE: 106 BPM | WEIGHT: 227.1 LBS | DIASTOLIC BLOOD PRESSURE: 67 MMHG | OXYGEN SATURATION: 96 % | BODY MASS INDEX: 34.03 KG/M2 | SYSTOLIC BLOOD PRESSURE: 97 MMHG

## 2022-02-28 DIAGNOSIS — N18.6 ESRD (END STAGE RENAL DISEASE) (H): ICD-10-CM

## 2022-02-28 DIAGNOSIS — Z12.5 PROSTATE CANCER SCREENING: ICD-10-CM

## 2022-02-28 DIAGNOSIS — Z76.82 ORGAN TRANSPLANT CANDIDATE: ICD-10-CM

## 2022-02-28 PROCEDURE — 99214 OFFICE O/P EST MOD 30 MIN: CPT | Mod: GC | Performed by: SURGERY

## 2022-02-28 RX ORDER — CINACALCET 30 MG/1
60 TABLET, FILM COATED ORAL
COMMUNITY
Start: 2021-12-28 | End: 2022-05-27

## 2022-02-28 NOTE — PROGRESS NOTES
Transplant Surgery Consult Note     Medical record number: [id number]  YOB: 1965,   Consult requested  for evaluation of kidney transplant candidacy.    Assessment and Recommendations: Mr. Blanco appears to be a good candidate for kidney transplantation and has a good understanding of the risks and benefits of this approach to the management of renal failure. The following issues should be addressed prior to finalizing his transplant candidacy:     Transplant order: Mr. Blanco has End stage renal failure due to IgA nephropathy whose condition is not expected to resolve, is expected to progress, and is expected to continue to develop related comorbid conditions.  Recommend he be considered as a candidate for living kidney transplant.  - Cardiology evaluated 11/1/2021. Recent ECHO stress which was negative for any stress induced ischemia; CTA coronary angio without any non-occlusive CAD. Clinically no symptoms with activities.   - CT abdomen and pelvis without contrast to be ordered for assessment of vascular targets: Yes. Last CT A/P 10/2020 with good vasculature.   - Patient has a BMI 34, weight 227lb (down from BMI 37). Recommended for target BMI 35 (target Wt 230lbs). Recommend for waist size reduction (target less than 40 in)   - Social work consult ordered: Yes  - Recipient suitable to move forward with work up of living donors:  Yes   - History of prostate cancer: stage 3B (positive margin), s/p robotic resection and radiation 10/2020: Had been followed by Dr. Vásquez of Urology. Last seen today, PSA stable at 0.04, recommended follow-up 6 months.. Patient previously discussed with Dr. Vásquez. Per Dr. Vásquez, patient does have moderate risk of recurrence; however, it will be in 10-15 years. We discussed with patient about the elevated risks of recurrence after transplant due to immunosuppression. Per urology and from our standpoint, it is reasonable to proceed with kidney transplant.     The  majority of our visit was spent in counselling, discussing the medical and surgical risks of kidney transplantation. We discussed approximate wait time and how that is influenced by issues such as blood type and sensitization (PRA) and access to a living donor. I contrasted potential waiting time for living vs  donor kidneys from  normal (0-85%) or higher (%) kidney donor profile index (KDPI) donors and their associated outcomes. I would not recommend this individual to consider kidneys from high KDPI donors. The reason for this decision is best summarized as: multiple potential living donors. Potential surgical complications of kidney transplantation include bleeding, superficial or deep wound complications (infection, hernia, lymphocele), ureteral anastomotic failure (leak or stenosis), graft thrombosis, need for reoperation and other issues such as cardiac complications, pneumonia, deep venous thrombosis, pulmonary embolism, post transplant diabetes and death. The potential for recurrent disease or need for retransplantation was also addressed. We discussed the possible need for ureteral stent (and subsequent removal), and the utility of protocol biopsy and laboratory studies to evaluate for rejection or recurrent disease. We discussed the risk of graft rejection, our center's average graft and patient survival rates, immunosuppression protocols, as well as the potential opportunity to participate in clinical trials.  We also discussed the average length of stay, recovery process, and posttransplant lab and monitoring protocol.  I emphasized the need for strict immunosuppression medication adherence and the potential for complications of immunosuppression such as skin cancer or lymphoma, as well as a very low but not zero risk of donor-derived disease transmission risks (infection, cancer). Mr. Blanco asked good questions and his candidacy will be reviewed at our Multidisciplinary Selection  Committee. Thank you for the opportunity to participate in Mr. Blanco's care.    Shelly Castellano MD  PGY 1    I have reviewed history, examined patient and discussed plan with the fellow/resident/TOM.  I concur with the findings in this note.       Risks of the surgical procedure including but not limited to the rare risk of mortality discussed in detail. Patient verbalized good understanding and had several pertinent questions which were answered satisfactorily.     Immunosuppressive regimen, management and long term risks discussed in detail.           Total time: 30 minutes  Counselling time: 25 minutes    .    ---------------------------------------------------------------------------------------------------    HPI: Mr. Blanco has End stage renal failure due to IgA nephropathy. The patient is non-diabetic.     Recently he has been having spell of dizziness upon standing associated with hypotension to 70s. Worked up by nephrologist, who recommended stopping amlodipine and only using ropinirole for symptoms of restless leg.     The patient is on dialysis. Dialysis Tuesday, Thursday, Saturday via right HD catheter since September 2021.  Has potential kidney donors: Yes, his niece, Traci Ko, is a match but is small stature compared to patient, she is willing to undergo paired exchange., ABO O. Patient's ABO is A, PRA 17.    Interested in participation in paired exchange if a donor is willing: Yes     The patient has the following pertinent history:       No    Yes  Dialysis:    []      [x] via: HD catheter, R chest   Blood Transfusion                  [x]      []  Number of units:   Most recently:  Pregnancy:    [x]      [] Number:       Previous Transplant:  [x]      [] Details:    Cancer    []      [x] Comment:  H/o prostate cancer, 3B (+ margin) s/p resection and radiation  Kidney stones   [x]      [] Comment:      Recurrent infections  [x]      []  Type:                  Bladder dysfunction  [x]       [] Cause:    Claudication   [x]      [] Distance:    Previous Amputation  [x]      [] Cause:     Chronic anticoagulation  [x]      [] Indication:   Alevism  [x]      []      Past Medical History:   Diagnosis Date     CKD (chronic kidney disease) stage 4, GFR 15-29 ml/min (H)      Elevated PSA      Essential hypertension 05/21/2020     Hyperlipidemia      IgA nephropathy      Obesity      Prostate cancer (H) 10/15/2020     S/P radiation therapy     6,600 cGy to pelvis completed on 6/25/2021 [facility]     Past Surgical History:   Procedure Laterality Date     BACK SURGERY      Back Surgey 20+ Years Ago      BIOPSY      [facility] 2010     COLONOSCOPY      10+ Years ago at [facility]      DAVINCI PROSTATECTOMY, LYMPHADENECTOMY N/A 12/14/2020    Procedure: PROSTATECTOMY, ROBOT-ASSISTED, WITH PELVIC LYMPHADENECTOMY;  Surgeon: Nabil Vásquez MD;  Location:  OR     Family History   Problem Relation Age of Onset     Kidney Disease Father      Hypertension Father      Cancer Mother      No Known Problems Brother      No Known Problems Sister      No Known Problems Son      No Known Problems Daughter      No Known Problems Brother      No Known Problems Brother      No Known Problems Brother      No Known Problems Sister      No Known Problems Sister      No Known Problems Sister      Social History     Socioeconomic History     Marital status:      Spouse name: Not on file     Number of children: Not on file     Years of education: Not on file     Highest education level: Not on file   Occupational History     Not on file   Tobacco Use     Smoking status: Never Smoker     Smokeless tobacco: Never Used   Substance and Sexual Activity     Alcohol use: Yes     Drug use: Never     Sexual activity: Not on file   Other Topics Concern     Parent/sibling w/ CABG, MI or angioplasty before 65F 55M? Not Asked   Social History Narrative     Not on file     Social  Determinants of Health     Financial Resource Strain: Not on file   Food Insecurity: Not on file   Transportation Needs: Not on file   Physical Activity: Not on file   Stress: Not on file   Social Connections: Not on file   Intimate Partner Violence: Not on file   Housing Stability: Not on file       ROS:   CONSTITUTIONAL:  No fevers or chills  EYES: negative for icterus  ENT:  negative for hearing loss, tinnitus and sore throat  RESPIRATORY:  negative for cough, sputum, dyspnea  CARDIOVASCULAR:  negative for chest pain    GASTROINTESTINAL:  negative for nausea, vomiting, diarrhea or constipation  GENITOURINARY:  negative for incontinence, dysuria, bladder emptying problems  HEME:  No easy bruising  INTEGUMENT:  negative for rash and pruritus  NEURO:  Negative for headache, seizure disorder  Allergies:   No Known Allergies  Medications:  Prescription Medications as of 2/28/2022       Rx Number Disp Refills Start End Last Dispensed Date Next Fill Date Owning Pharmacy    allopurinol (ZYLOPRIM) 100 MG tablet    9/2/2010        Sig: every morning     Class: Historical    aspirin (ASA) 81 MG EC tablet            Sig: Take 81 mg by mouth every morning     Class: Historical    Route: Oral    B Complex-C-Folic Acid (VIRT-CAPS) 1 MG CAPS    10/9/2021        Class: Historical    Route: Oral    Cinacalcet HCl (SENSIPAR PO)    12/28/2021    [id number] - [place], [place] - [address] [facility]    Sig: Take 60 mg by mouth    Class: Historical    Route: Oral    colchicine (MITIGARE) 0.6 MG capsule    10/9/2021        Class: Historical    Route: Oral    furosemide (LASIX) 20 MG tablet    9/2/2010        Sig: every morning     Class: Historical    Melatonin 2.5 MG CHEW            Sig: Take 20 mg by mouth At Bedtime    Class: Historical    Route: Oral    Methoxy PEG-Epoetin Beta (MIRCERA IJ)    2/22/2022 2/21/2023   [id number] [place], [place] - [address]  AT [facility]    Si mcg by Intravenous Push route    Class: Historical    Route: Intravenous Push    rOPINIRole (REQUIP) 0.25 MG tablet    2021        Sig: Take 0.25 mg by mouth At Bedtime Take 0.25 mg one hour prior to sleep. If not effective after 3 days, increase dose to 0.5 mg. May increase increments of 0.25 mg every 3 days if needed, not exceeding a total of 2 mg at bedtime.    Class: Historical    Route: Oral    sevelamer carbonate (RENVELA) 800 MG tablet    2021    [id number] - [place], [place] - [address] [facility] AT [facility]    Sig: Take 800 mg by mouth 2 times daily     Class: Historical    Route: Oral    simvastatin (ZOCOR) 40 MG tablet    2020       Sig: Take 40 mg by mouth every morning     Class: Historical    Route: Oral    Vitamin D3 (VITAMIN D3) 25 mcg (1000 units) tablet            Sig: Take 1 capsule by mouth every morning     Class: Historical    Route: Oral    amLODIPine (NORVASC) 10 MG tablet    2020        Sig: Take 10 mg by mouth every morning     Class: Historical    Route: Oral    calcitRIOL (ROCALTROL) 0.25 MCG capsule    2021       Sig: Take 0.25 mcg by mouth daily    Class: Historical    Route: Oral        Exam:     BP 97/67   Pulse 106   Wt 103 kg (227 lb 1.6 oz)   SpO2 96%   BMI 34.03 kg/m    Appearance: in no apparent distress.   Skin: normal  Eyes:  no redness or discharge.  Sclera anicteric  Head and Neck: Normal, no rashes or jaundice  Respiratory: easy respirations, no audible wheezing.  Abdomen: obese and protuberant, No distention, Surgical scars consistent with history and no tenderness to palpation   Extremeties: Edema, none  Neuro: alert and oriented   Psychiatric: Normal mood and affect    Diagnostics:   Recent Results (from the past 672 hour(s))   PSA, tumor marker    Collection Time: 22 12:02 PM   Result Value Ref Range    PSA Tumor Marker 0.06 0.00 - 4.00 ug/L      UNOS cPRA   Date Value Ref Range Status   09/03/2020 19  Final

## 2022-02-28 NOTE — LETTER
2/28/2022     RE: Donald Blanco  [address] [facility]  [place] [address]    Dear Colleague,    Thank you for referring your patient, Donald Blanco, to the [facility]. Please see a copy of my visit note below.    Transplant Surgery Consult Note     Medical record number: [id number]  YOB: 1965,   Consult requested  for evaluation of kidney transplant candidacy.    Assessment and Recommendations: Mr. Blanco appears to be a good candidate for kidney transplantation and has a good understanding of the risks and benefits of this approach to the management of renal failure. The following issues should be addressed prior to finalizing his transplant candidacy:     Transplant order: Mr. Blanco has End stage renal failure due to IgA nephropathy whose condition is not expected to resolve, is expected to progress, and is expected to continue to develop related comorbid conditions.  Recommend he be considered as a candidate for living kidney transplant.  - Cardiology evaluated 11/1/2021. Recent ECHO stress which was negative for any stress induced ischemia; CTA coronary angio without any non-occlusive CAD. Clinically no symptoms with activities.   - CT abdomen and pelvis without contrast to be ordered for assessment of vascular targets: Yes. Last CT A/P 10/2020 with good vasculature.   - Patient has a BMI 34, weight 227lb (down from BMI 37). Recommended for target BMI 35 (target Wt 230lbs). Recommend for waist size reduction (target less than 40 in)   - Social work consult ordered: Yes  - Recipient suitable to move forward with work up of living donors:  Yes   - History of prostate cancer: stage 3B (positive margin), s/p robotic resection and radiation 10/2020: Had been followed by Dr. Vásquez of Urology. Last seen today, PSA stable at 0.04, recommended follow-up 6 months.. Patient previously discussed with Dr. Vásquez. Per Dr. Vásquez, patient does have moderate risk of recurrence;  however, it will be in 10-15 years. We discussed with patient about the elevated risks of recurrence after transplant due to immunosuppression. Per urology and from our standpoint, it is reasonable to proceed with kidney transplant.     The majority of our visit was spent in counselling, discussing the medical and surgical risks of kidney transplantation. We discussed approximate wait time and how that is influenced by issues such as blood type and sensitization (PRA) and access to a living donor. I contrasted potential waiting time for living vs  donor kidneys from  normal (0-85%) or higher (%) kidney donor profile index (KDPI) donors and their associated outcomes. I would not recommend this individual to consider kidneys from high KDPI donors. The reason for this decision is best summarized as: multiple potential living donors. Potential surgical complications of kidney transplantation include bleeding, superficial or deep wound complications (infection, hernia, lymphocele), ureteral anastomotic failure (leak or stenosis), graft thrombosis, need for reoperation and other issues such as cardiac complications, pneumonia, deep venous thrombosis, pulmonary embolism, post transplant diabetes and death. The potential for recurrent disease or need for retransplantation was also addressed. We discussed the possible need for ureteral stent (and subsequent removal), and the utility of protocol biopsy and laboratory studies to evaluate for rejection or recurrent disease. We discussed the risk of graft rejection, our center's average graft and patient survival rates, immunosuppression protocols, as well as the potential opportunity to participate in clinical trials.  We also discussed the average length of stay, recovery process, and posttransplant lab and monitoring protocol.  I emphasized the need for strict immunosuppression medication adherence and the potential for complications of immunosuppression such as  skin cancer or lymphoma, as well as a very low but not zero risk of donor-derived disease transmission risks (infection, cancer). Mr. Blanco asked good questions and his candidacy will be reviewed at our Multidisciplinary Selection Committee. Thank you for the opportunity to participate in Mr. Blanco's care.    Shelly Castellano MD  PGY 1    I have reviewed history, examined patient and discussed plan with the fellow/resident/TOM.  I concur with the findings in this note.       Risks of the surgical procedure including but not limited to the rare risk of mortality discussed in detail. Patient verbalized good understanding and had several pertinent questions which were answered satisfactorily.     Immunosuppressive regimen, management and long term risks discussed in detail.           Total time: 30 minutes  Counselling time: 25 minutes    .    ---------------------------------------------------------------------------------------------------    HPI: Mr. Blanco has End stage renal failure due to IgA nephropathy. The patient is non-diabetic.     Recently he has been having spell of dizziness upon standing associated with hypotension to 70s. Worked up by nephrologist, who recommended stopping amlodipine and only using ropinirole for symptoms of restless leg.     The patient is on dialysis. Dialysis Tuesday, Thursday, Saturday via right HD catheter since September 2021.  Has potential kidney donors: Yes, his niece, Traci Ko, is a match but is small stature compared to patient, she is willing to undergo paired exchange., ABO O. Patient's ABO is A, PRA 17.    Interested in participation in paired exchange if a donor is willing: Yes     The patient has the following pertinent history:       No    Yes  Dialysis:    []      [x] via: HD catheter, R chest   Blood Transfusion                  [x]      []  Number of units:   Most recently:  Pregnancy:    [x]      [] Number:       Previous Transplant:  [x]      [] Details:     Cancer    []      [x] Comment:  H/o prostate cancer, 3B (+ margin) s/p resection and radiation  Kidney stones   [x]      [] Comment:      Recurrent infections  [x]      []  Type:                  Bladder dysfunction  [x]      [] Cause:    Claudication   [x]      [] Distance:    Previous Amputation  [x]      [] Cause:     Chronic anticoagulation  [x]      [] Indication:   Temple  [x]      []      Past Medical History:   Diagnosis Date     CKD (chronic kidney disease) stage 4, GFR 15-29 ml/min (H)      Elevated PSA      Essential hypertension 05/21/2020     Hyperlipidemia      IgA nephropathy      Obesity      Prostate cancer (H) 10/15/2020     S/P radiation therapy     6,600 cGy to pelvis completed on 6/25/2021 [facility]     Past Surgical History:   Procedure Laterality Date     BACK SURGERY      Back Surgey 20+ Years Ago      BIOPSY      [facility] 2010     COLONOSCOPY      10+ Years ago at [facility]      DAVINCI PROSTATECTOMY, LYMPHADENECTOMY N/A 12/14/2020    Procedure: PROSTATECTOMY, ROBOT-ASSISTED, WITH PELVIC LYMPHADENECTOMY;  Surgeon: Nabil Vásquez MD;  Location:  OR     Family History   Problem Relation Age of Onset     Kidney Disease Father      Hypertension Father      Cancer Mother      No Known Problems Brother      No Known Problems Sister      No Known Problems Son      No Known Problems Daughter      No Known Problems Brother      No Known Problems Brother      No Known Problems Brother      No Known Problems Sister      No Known Problems Sister      No Known Problems Sister      Social History     Socioeconomic History     Marital status:      Spouse name: Not on file     Number of children: Not on file     Years of education: Not on file     Highest education level: Not on file   Occupational History     Not on file   Tobacco Use     Smoking status: Never Smoker     Smokeless tobacco: Never Used   Substance and Sexual Activity      Alcohol use: Yes     Drug use: Never     Sexual activity: Not on file   Other Topics Concern     Parent/sibling w/ CABG, MI or angioplasty before 65F 55M? Not Asked   Social History Narrative     Not on file     Social Determinants of Health     Financial Resource Strain: Not on file   Food Insecurity: Not on file   Transportation Needs: Not on file   Physical Activity: Not on file   Stress: Not on file   Social Connections: Not on file   Intimate Partner Violence: Not on file   Housing Stability: Not on file       ROS:   CONSTITUTIONAL:  No fevers or chills  EYES: negative for icterus  ENT:  negative for hearing loss, tinnitus and sore throat  RESPIRATORY:  negative for cough, sputum, dyspnea  CARDIOVASCULAR:  negative for chest pain    GASTROINTESTINAL:  negative for nausea, vomiting, diarrhea or constipation  GENITOURINARY:  negative for incontinence, dysuria, bladder emptying problems  HEME:  No easy bruising  INTEGUMENT:  negative for rash and pruritus  NEURO:  Negative for headache, seizure disorder  Allergies:   No Known Allergies  Medications:  Prescription Medications as of 2/28/2022       Rx Number Disp Refills Start End Last Dispensed Date Next Fill Date Owning Pharmacy    allopurinol (ZYLOPRIM) 100 MG tablet    9/2/2010        Sig: every morning     Class: Historical    aspirin (ASA) 81 MG EC tablet            Sig: Take 81 mg by mouth every morning     Class: Historical    Route: Oral    B Complex-C-Folic Acid (VIRT-CAPS) 1 MG CAPS    10/9/2021        Class: Historical    Route: Oral    Cinacalcet HCl (SENSIPAR PO)    12/28/2021    [id number] - [place], [place] - [address] [facility]    Sig: Take 60 mg by mouth    Class: Historical    Route: Oral    colchicine (MITIGARE) 0.6 MG capsule    10/9/2021        Class: Historical    Route: Oral    furosemide (LASIX) 20 MG tablet    9/2/2010        Sig: every morning     Class: Historical    Melatonin 2.5 MG  CHEW            Sig: Take 20 mg by mouth At Bedtime    Class: Historical    Route: Oral    Methoxy PEG-Epoetin Beta (MIRCERA IJ)    2022   [facility][id number] - [place], [place] - [address] [facility]    Si mcg by Intravenous Push route    Class: Historical    Route: Intravenous Push    rOPINIRole (REQUIP) 0.25 MG tablet    2021        Sig: Take 0.25 mg by mouth At Bedtime Take 0.25 mg one hour prior to sleep. If not effective after 3 days, increase dose to 0.5 mg. May increase increments of 0.25 mg every 3 days if needed, not exceeding a total of 2 mg at bedtime.    Class: Historical    Route: Oral    sevelamer carbonate (RENVELA) 800 MG tablet    2021    [facility][id number] - [place], [place] - [address] [facility] [address] [facility]    Sig: Take 800 mg by mouth 2 times daily     Class: Historical    Route: Oral    simvastatin (ZOCOR) 40 MG tablet    2020       Sig: Take 40 mg by mouth every morning     Class: Historical    Route: Oral    Vitamin D3 (VITAMIN D3) 25 mcg (1000 units) tablet            Sig: Take 1 capsule by mouth every morning     Class: Historical    Route: Oral    amLODIPine (NORVASC) 10 MG tablet    2020        Sig: Take 10 mg by mouth every morning     Class: Historical    Route: Oral    calcitRIOL (ROCALTROL) 0.25 MCG capsule    2021       Sig: Take 0.25 mcg by mouth daily    Class: Historical    Route: Oral        Exam:     BP 97/67   Pulse 106   Wt 103 kg (227 lb 1.6 oz)   SpO2 96%   BMI 34.03 kg/m    Appearance: in no apparent distress.   Skin: normal  Eyes:  no redness or discharge.  Sclera anicteric  Head and Neck: Normal, no rashes or jaundice  Respiratory: easy respirations, no audible wheezing.  Abdomen: obese and protuberant, No distention, Surgical scars consistent with history and no tenderness to palpation   Extremeties: Edema, none  Neuro: alert and  oriented   Psychiatric: Normal mood and affect    Diagnostics:   Recent Results (from the past 672 hour(s))   PSA, tumor marker    Collection Time: 02/21/22 12:02 PM   Result Value Ref Range    PSA Tumor Marker 0.06 0.00 - 4.00 ug/L     UN cPRA   Date Value Ref Range Status   09/03/2020 19  Final     Again, thank you for allowing me to participate in the care of your patient.      Sincerely,    Howard Flores MD

## 2022-02-28 NOTE — NURSING NOTE
Chief Complaint   Patient presents with     RECHECK     wait list   Blood pressure 97/67, pulse 106, weight 103 kg (227 lb 1.6 oz), SpO2 96 %.    Cyn Peñaloza on 2/28/2022 at 1:12 PM

## 2022-03-01 ENCOUNTER — LAB (OUTPATIENT)
Dept: LAB | Facility: CLINIC | Age: 57
End: 2022-03-01
Payer: COMMERCIAL

## 2022-03-01 DIAGNOSIS — Z76.82 ORGAN TRANSPLANT CANDIDATE: ICD-10-CM

## 2022-03-01 DIAGNOSIS — N18.6 ESRD (END STAGE RENAL DISEASE) (H): ICD-10-CM

## 2022-03-01 DIAGNOSIS — Z12.5 PROSTATE CANCER SCREENING: ICD-10-CM

## 2022-03-01 LAB — PSA SERPL-MCNC: 0.05 UG/L (ref 0–4)

## 2022-03-01 PROCEDURE — 86825 HLA X-MATH NON-CYTOTOXIC: CPT

## 2022-03-01 PROCEDURE — 86832 HLA CLASS I HIGH DEFIN QUAL: CPT

## 2022-03-01 PROCEDURE — 36415 COLL VENOUS BLD VENIPUNCTURE: CPT

## 2022-03-01 PROCEDURE — G0103 PSA SCREENING: HCPCS

## 2022-03-01 PROCEDURE — 86833 HLA CLASS II HIGH DEFIN QUAL: CPT

## 2022-03-02 ENCOUNTER — TELEPHONE (OUTPATIENT)
Dept: TRANSPLANT | Facility: CLINIC | Age: 57
End: 2022-03-02
Payer: COMMERCIAL

## 2022-03-02 ENCOUNTER — COMMITTEE REVIEW (OUTPATIENT)
Dept: TRANSPLANT | Facility: CLINIC | Age: 57
End: 2022-03-02
Payer: COMMERCIAL

## 2022-03-02 LAB
PROTOCOL CUTOFF: NORMAL
SA 1 CELL: NORMAL
SA 1 TEST METHOD: NORMAL
SA 2 CELL: NORMAL
SA 2 TEST METHOD: NORMAL
SA1 HI RISK ABY: NORMAL
SA1 MOD RISK ABY: NORMAL
SA2 HI RISK ABY: NORMAL
SA2 MOD RISK ABY: NORMAL
UNACCEPTABLE ANTIGENS: NORMAL
UNOS CPRA: 19
ZZZSA 1  COMMENTS: NORMAL
ZZZSA 2 COMMENTS: NORMAL

## 2022-03-02 NOTE — COMMITTEE REVIEW
Abdominal Committee Review Note     Evaluation Date: 2020  Committee Review Date: 3/2/2022    Organ being evaluated for: Kidney    Transplant Phase: Waitlist  Transplant Status: Inactive    Transplant Coordinator: Katie Ko  Transplant Surgeon:   Stuart Rust    Referring Physician: Chin Cornejo    Primary Diagnosis: IgA Nephropathy  Secondary Diagnosis:     Committee Review Members:  Nephrology Tucker Mckenna MD   Nutrition Rossy Gaming, RD   Pharmacy Austin Strickland [facility]    - Clinical Jennmaury Steel, [facility]   Transplant Darleen Turner PA-C, Bertha Zuniga, RN, Josi Conroy, RN, Marcella Souza, RN, Katie Sanders, MARIO, Neal Montalvo, RN, Renee Hampton, URIAH, Carolyn Anderson, RN   Transplant Surgery Gopi Cohen MD       Transplant Eligibility: Irreversible chronic kidney disease treated w/dialysis or expected need for dialysis    Committee Review Decision: Make Active    Committee Chair Gopi Cohen MD verbally attested to the committee's decision.    Committee Discussion Details: Reviewed the followin. Cards- cleared by cards 2021 with echo stress that was negative for ischemia as well as CTA coronary angio without any non-occlusive CAD      2. Elevated PSA- hx prostate CA- stage 3B s/p robotic resection and radiation 10/2020- follows with Fahad- PSA yesterday was 0.04- pt has a moderate risk of recurrence however it will be in 10-15 years- per urology and now surgery he is ok to proceed with transplant      3. Obesity- current BMI is 34, pt cannot gain weight but acceptable at current BMI     4. Pt does have an approved donor- they will be going into PEP     Committee determined pt is cleared for active status on the kidney transplant list

## 2022-03-02 NOTE — TELEPHONE ENCOUNTER
Called pt regarding committee this afternoon, pt was approved for active status and will require prior authorization for insurance. Will follow up with pt once prior auth is acquired. Pt verbalized understanding of information and has no further questions. Encouraged to reach out if questions arise.

## 2022-03-09 ENCOUNTER — TELEPHONE (OUTPATIENT)
Dept: TRANSPLANT | Facility: CLINIC | Age: 57
End: 2022-03-09
Payer: COMMERCIAL

## 2022-03-09 NOTE — TELEPHONE ENCOUNTER
Called patient to inform them of status change to ACTIVE  on 3/9/22. Status change letter and PRA orders to be mailed. Patient is in agreement with listing ACTIVE status.      Discussed:   - Pt is eligible for  donor offers and pt can receive calls 24 hours a day, 7 days a week, and on call staff need to be able to reach pt or one of emergency contacts within 30 minutes or they might skip over to next recipient on list.   -Please make sure your phone is charged at all times and your voicemail is not full   -Your transplant on call coordinator will give you directions about when and where you will need to come to the hospital for transplant  -The transplant coordinator will call you to discuss your current health status, the offer, and plans to move forward.  Some organ offer calls will require you to come to the hospital immediately; some may not be as time sensitive.  It may be possible for you to come to the hospital and, for various reasons, the transplant could be cancelled.  This is often called a  dry run .  - Reviewed the right to accept or decline offer, without penalty  - Expected length of surgical procedure is about 3-4 hours, expected inpatient length of stay post transplant is 3-4 days, and potential to stay locally post transplant. Offered resources for lodging in the area  - Verified contact information as documented in Epic. Confirmed emergency contact information  -Instructed patient about importance of having PRAs drawn every 3 months via: ([facility]). Encouraged them to set reminder in their preferred calendar to stay on top of their quarterly labs  -Reminded pt to stay up on health maintenance and to call with any updates on their health status, insurance or contact information.   -Reminded pt to inform RNCC as soon as possible if they test positive for COVID.     -Provided name and contact information for additional questions or concerns.  Pt expressed excellent understanding of all  and was in good agreement with the plan.

## 2022-03-10 ENCOUNTER — DOCUMENTATION ONLY (OUTPATIENT)
Dept: TRANSPLANT | Facility: CLINIC | Age: 57
End: 2022-03-10

## 2022-04-20 ENCOUNTER — TELEPHONE (OUTPATIENT)
Dept: TRANSPLANT | Facility: CLINIC | Age: 57
End: 2022-04-20
Payer: COMMERCIAL

## 2022-04-20 NOTE — TELEPHONE ENCOUNTER
Called pt to let him know that he does have an approved donor and they are ready for active status in the paired exchange program. Will let pt know when match offer comes through for him. Pt verbalized understanding of information and has no further questions. Encouraged to reach out if questions arise.

## 2022-04-26 ENCOUNTER — ORGAN (OUTPATIENT)
Dept: TRANSPLANT | Facility: CLINIC | Age: 57
End: 2022-04-26
Payer: COMMERCIAL

## 2022-04-27 ENCOUNTER — DOCUMENTATION ONLY (OUTPATIENT)
Dept: TRANSPLANT | Facility: CLINIC | Age: 57
End: 2022-04-27

## 2022-04-28 ENCOUNTER — TELEPHONE (OUTPATIENT)
Dept: TRANSPLANT | Facility: CLINIC | Age: 57
End: 2022-04-28
Payer: COMMERCIAL

## 2022-04-28 DIAGNOSIS — N18.6 ESRD (END STAGE RENAL DISEASE) (H): Primary | ICD-10-CM

## 2022-04-28 DIAGNOSIS — N18.6 ESRD (END STAGE RENAL DISEASE) (H): ICD-10-CM

## 2022-04-28 DIAGNOSIS — R73.03 PRE-DIABETES: ICD-10-CM

## 2022-04-28 DIAGNOSIS — Z76.82 AWAITING ORGAN TRANSPLANT: Primary | ICD-10-CM

## 2022-04-28 NOTE — TELEPHONE ENCOUNTER
Called pt regarding potential [facility] PEP offer for 5/17/22. Explained that this offer is tentative and subject to change, but pt is willing to be recipient for case. Donor information: Gender: Male Age: 51 Virtual XM results: negative provided to pt. Donor coming from [place]. Pt agreeable to plan. Will follow up with pt as new information arises about upcoming [facility].

## 2022-04-29 LAB
CELL TYPE ALLO: NORMAL
CHANNELSHIFTALLOB1: -71
CHANNELSHIFTALLOB2: -81
CHANNELSHIFTALLOT1: -67
CHANNELSHIFTALLOT2: -84
CROSSMATCHDATEALLO: NORMAL
DONOR ALLO: NORMAL
DONORCELLDATE ALLO: NORMAL
POS CUT OFF ALLO B: >93
POS CUT OFF ALLO T: >79
RESULT ALLO B1: NORMAL
RESULT ALLO B2: NORMAL
RESULT ALLO T1: NORMAL
RESULT ALLO T2: NORMAL
SERUM DATE ALLO B1: NORMAL
SERUM DATE ALLO B2: NORMAL
SERUM DATE ALLO T1: NORMAL
SERUM DATE ALLO T2: NORMAL
TESTMETHODALLO: NORMAL
TREATMENT ALLO B1: NORMAL
TREATMENT ALLO B2: NORMAL
TREATMENT ALLO T1: NORMAL
TREATMENT ALLO T2: NORMAL
ZZZCOMMENT ALLOB2: NORMAL

## 2022-05-03 ENCOUNTER — TELEPHONE (OUTPATIENT)
Dept: TRANSPLANT | Facility: CLINIC | Age: 57
End: 2022-05-03
Payer: COMMERCIAL

## 2022-05-03 NOTE — TELEPHONE ENCOUNTER
Coordinator called pt to go over instructions for upcoming transplant on 5/17/22.    Pt will have pre-op appointments, initial COVID testing and final crossmatch on 5/9/22, and will have CXR and final COVID screen on 5/16/22. Recommended 14 day pre-op self isolation and COVID precautions reviewed.      Reviewed OR check in time at 1230 and OR start time of 1500. Explained that times are subject to change and will keep them updated with changes.    Reviewed general components of inpatient stay and post-transplant routines, including frequent outpatient appointments x1-2 weeks. Reviewed no driving x 2 weeks and lifting restrictions >10lbs x 6 weeks.    Confirmed PFR has been notified of transplant.    Nephrologist and dialysis were notified of pre-op appointments and transplant dates.      Vaccinations including recommendation for no vaccinations for 6 weeks prior to transplant reviewed. Pt has nothad any vaccinations within the past 6 weeks.     Reviewed that pt has no upcoming procedures, no recent blood transitions, no medications changes, denies dialysis changes.     Pt has received COVID vaccine on 3/17/21 and 4/7/2021    Medication list reviewed, pt is not on steroids, blood thinners or hormone replacements.      Current visitor policy reviewed.     Pt verbalized understanding and denied questions.

## 2022-05-09 ENCOUNTER — ANCILLARY PROCEDURE (OUTPATIENT)
Dept: GENERAL RADIOLOGY | Facility: CLINIC | Age: 57
End: 2022-05-09
Attending: SURGERY
Payer: COMMERCIAL

## 2022-05-09 ENCOUNTER — LAB (OUTPATIENT)
Dept: LAB | Facility: CLINIC | Age: 57
End: 2022-05-09
Attending: SURGERY
Payer: COMMERCIAL

## 2022-05-09 ENCOUNTER — OFFICE VISIT (OUTPATIENT)
Dept: TRANSPLANT | Facility: CLINIC | Age: 57
End: 2022-05-09
Attending: SURGERY
Payer: COMMERCIAL

## 2022-05-09 ENCOUNTER — OFFICE VISIT (OUTPATIENT)
Dept: DERMATOLOGY | Facility: CLINIC | Age: 57
End: 2022-05-09
Payer: COMMERCIAL

## 2022-05-09 VITALS
SYSTOLIC BLOOD PRESSURE: 127 MMHG | BODY MASS INDEX: 34.13 KG/M2 | DIASTOLIC BLOOD PRESSURE: 76 MMHG | OXYGEN SATURATION: 96 % | WEIGHT: 227.8 LBS | HEART RATE: 76 BPM

## 2022-05-09 DIAGNOSIS — N18.6 ESRD (END STAGE RENAL DISEASE) (H): ICD-10-CM

## 2022-05-09 DIAGNOSIS — R73.03 PRE-DIABETES: ICD-10-CM

## 2022-05-09 DIAGNOSIS — L82.1 SEBORRHEIC KERATOSIS: Primary | ICD-10-CM

## 2022-05-09 DIAGNOSIS — Z76.82 AWAITING ORGAN TRANSPLANT: ICD-10-CM

## 2022-05-09 DIAGNOSIS — D22.9 MULTIPLE BENIGN NEVI: ICD-10-CM

## 2022-05-09 DIAGNOSIS — L98.9 SKIN LESIONS: Primary | ICD-10-CM

## 2022-05-09 DIAGNOSIS — Z76.82 ORGAN TRANSPLANT CANDIDATE: Primary | ICD-10-CM

## 2022-05-09 LAB
ABO/RH(D): NORMAL
ALBUMIN SERPL-MCNC: 4 G/DL (ref 3.4–5)
ALBUMIN UR-MCNC: 300 MG/DL
ALP SERPL-CCNC: 99 U/L (ref 40–150)
ALT SERPL W P-5'-P-CCNC: 34 U/L (ref 0–70)
ANION GAP SERPL CALCULATED.3IONS-SCNC: 12 MMOL/L (ref 3–14)
ANTIBODY SCREEN: NEGATIVE
APPEARANCE UR: CLEAR
AST SERPL W P-5'-P-CCNC: 16 U/L (ref 0–45)
BACTERIA #/AREA URNS HPF: ABNORMAL /HPF
BILIRUB SERPL-MCNC: 0.4 MG/DL (ref 0.2–1.3)
BILIRUB UR QL STRIP: NEGATIVE
BUN SERPL-MCNC: 73 MG/DL (ref 7–30)
CALCIUM SERPL-MCNC: 8.6 MG/DL (ref 8.5–10.1)
CHLORIDE BLD-SCNC: 97 MMOL/L (ref 94–109)
CMV IGG SERPL IA-ACNC: <0.2 U/ML
CMV IGG SERPL IA-ACNC: NORMAL
CMV IGM SERPL IA-ACNC: <8 AU/ML
CMV IGM SERPL IA-ACNC: NEGATIVE
CO2 SERPL-SCNC: 26 MMOL/L (ref 20–32)
COLOR UR AUTO: YELLOW
CREAT SERPL-MCNC: 13.2 MG/DL (ref 0.66–1.25)
DEPRECATED CALCIDIOL+CALCIFEROL SERPL-MC: 74 UG/L (ref 20–75)
EBV VCA IGG SER IA-ACNC: 545 U/ML
EBV VCA IGG SER IA-ACNC: POSITIVE
EBV VCA IGM SER IA-ACNC: <10 U/ML
EBV VCA IGM SER IA-ACNC: NORMAL
ERYTHROCYTE [DISTWIDTH] IN BLOOD BY AUTOMATED COUNT: 13.1 % (ref 10–15)
FERRITIN SERPL-MCNC: 1002 NG/ML (ref 26–388)
GFR SERPL CREATININE-BSD FRML MDRD: 4 ML/MIN/1.73M2
GLUCOSE BLD-MCNC: 87 MG/DL (ref 70–99)
GLUCOSE UR STRIP-MCNC: NEGATIVE MG/DL
HBA1C MFR BLD: 5 % (ref 0–5.6)
HCT VFR BLD AUTO: 40.6 % (ref 40–53)
HGB BLD-MCNC: 13.1 G/DL (ref 13.3–17.7)
HGB UR QL STRIP: ABNORMAL
INR PPP: 0.98 (ref 0.85–1.15)
IRON SATN MFR SERPL: 24 % (ref 15–46)
IRON SERPL-MCNC: 79 UG/DL (ref 35–180)
KETONES UR STRIP-MCNC: NEGATIVE MG/DL
LEUKOCYTE ESTERASE UR QL STRIP: ABNORMAL
MCH RBC QN AUTO: 32.3 PG (ref 26.5–33)
MCHC RBC AUTO-ENTMCNC: 32.3 G/DL (ref 31.5–36.5)
MCV RBC AUTO: 100 FL (ref 78–100)
NITRATE UR QL: NEGATIVE
PH UR STRIP: 6 [PH] (ref 5–7)
PLATELET # BLD AUTO: 194 10E3/UL (ref 150–450)
POTASSIUM BLD-SCNC: 5.6 MMOL/L (ref 3.4–5.3)
PROT SERPL-MCNC: 8.1 G/DL (ref 6.8–8.8)
PTH-INTACT SERPL-MCNC: 450 PG/ML (ref 15–65)
RBC # BLD AUTO: 4.05 10E6/UL (ref 4.4–5.9)
RBC URINE: 0 /HPF
SARS-COV-2 RNA RESP QL NAA+PROBE: NEGATIVE
SODIUM SERPL-SCNC: 135 MMOL/L (ref 133–144)
SP GR UR STRIP: 1.01 (ref 1–1.03)
SPECIMEN EXPIRATION DATE: NORMAL
TIBC SERPL-MCNC: 334 UG/DL (ref 240–430)
UROBILINOGEN UR STRIP-MCNC: NORMAL MG/DL
WBC # BLD AUTO: 7.7 10E3/UL (ref 4–11)
WBC URINE: 6 /HPF

## 2022-05-09 PROCEDURE — 99000 SPECIMEN HANDLING OFFICE-LAB: CPT | Performed by: PATHOLOGY

## 2022-05-09 PROCEDURE — 36415 COLL VENOUS BLD VENIPUNCTURE: CPT | Performed by: PATHOLOGY

## 2022-05-09 PROCEDURE — 86665 EPSTEIN-BARR CAPSID VCA: CPT | Mod: 90 | Performed by: PATHOLOGY

## 2022-05-09 PROCEDURE — 86644 CMV ANTIBODY: CPT | Mod: 90 | Performed by: PATHOLOGY

## 2022-05-09 PROCEDURE — 82728 ASSAY OF FERRITIN: CPT | Performed by: PATHOLOGY

## 2022-05-09 PROCEDURE — 86833 HLA CLASS II HIGH DEFIN QUAL: CPT | Performed by: PATHOLOGY

## 2022-05-09 PROCEDURE — 83036 HEMOGLOBIN GLYCOSYLATED A1C: CPT | Performed by: PATHOLOGY

## 2022-05-09 PROCEDURE — 80053 COMPREHEN METABOLIC PANEL: CPT | Performed by: PATHOLOGY

## 2022-05-09 PROCEDURE — 83550 IRON BINDING TEST: CPT | Performed by: PATHOLOGY

## 2022-05-09 PROCEDURE — 82306 VITAMIN D 25 HYDROXY: CPT | Mod: 90 | Performed by: PATHOLOGY

## 2022-05-09 PROCEDURE — 87086 URINE CULTURE/COLONY COUNT: CPT | Mod: 90 | Performed by: PATHOLOGY

## 2022-05-09 PROCEDURE — 86645 CMV ANTIBODY IGM: CPT | Mod: 90 | Performed by: PATHOLOGY

## 2022-05-09 PROCEDURE — 71046 X-RAY EXAM CHEST 2 VIEWS: CPT | Mod: GC | Performed by: RADIOLOGY

## 2022-05-09 PROCEDURE — 83970 ASSAY OF PARATHORMONE: CPT | Performed by: PATHOLOGY

## 2022-05-09 PROCEDURE — 85610 PROTHROMBIN TIME: CPT | Performed by: PATHOLOGY

## 2022-05-09 PROCEDURE — 85027 COMPLETE CBC AUTOMATED: CPT | Performed by: PATHOLOGY

## 2022-05-09 PROCEDURE — 99203 OFFICE O/P NEW LOW 30 MIN: CPT | Mod: GC | Performed by: DERMATOLOGY

## 2022-05-09 PROCEDURE — 86850 RBC ANTIBODY SCREEN: CPT | Mod: 90 | Performed by: PATHOLOGY

## 2022-05-09 PROCEDURE — 86825 HLA X-MATH NON-CYTOTOXIC: CPT | Performed by: PATHOLOGY

## 2022-05-09 PROCEDURE — 86900 BLOOD TYPING SEROLOGIC ABO: CPT | Mod: 90 | Performed by: PATHOLOGY

## 2022-05-09 PROCEDURE — 86901 BLOOD TYPING SEROLOGIC RH(D): CPT | Mod: 90 | Performed by: PATHOLOGY

## 2022-05-09 PROCEDURE — U0003 INFECTIOUS AGENT DETECTION BY NUCLEIC ACID (DNA OR RNA); SEVERE ACUTE RESPIRATORY SYNDROME CORONAVIRUS 2 (SARS-COV-2) (CORONAVIRUS DISEASE [COVID-19]), AMPLIFIED PROBE TECHNIQUE, MAKING USE OF HIGH THROUGHPUT TECHNOLOGIES AS DESCRIBED BY CMS-2020-01-R: HCPCS | Mod: 90 | Performed by: PATHOLOGY

## 2022-05-09 PROCEDURE — 86832 HLA CLASS I HIGH DEFIN QUAL: CPT | Performed by: PATHOLOGY

## 2022-05-09 PROCEDURE — 81001 URINALYSIS AUTO W/SCOPE: CPT | Performed by: PATHOLOGY

## 2022-05-09 PROCEDURE — 99215 OFFICE O/P EST HI 40 MIN: CPT | Performed by: SURGERY

## 2022-05-09 PROCEDURE — U0005 INFEC AGEN DETEC AMPLI PROBE: HCPCS | Mod: 90 | Performed by: PATHOLOGY

## 2022-05-09 RX ORDER — ESZOPICLONE 2 MG/1
1 TABLET, FILM COATED ORAL AT BEDTIME
COMMUNITY
Start: 2022-04-25 | End: 2022-05-29

## 2022-05-09 ASSESSMENT — PAIN SCALES - GENERAL
PAINLEVEL: NO PAIN (0)
PAINLEVEL: NO PAIN (0)

## 2022-05-09 NOTE — LETTER
5/9/2022     RE: Donald Blanco  [address] [facility]  [place] [address]    Dear Colleague,    Thank you for referring your patient, Donald Blanco, to the [facility]. Please see a copy of my visit note below.    See surgeon note      Again, thank you for allowing me to participate in the care of your patient.        Sincerely,        Katie Sanders NP

## 2022-05-09 NOTE — PROGRESS NOTES
Transplant Surgery H&P:                           HPI:      Mr. Blanco is a 57 year old male who comes to clinic today for preop prior to planned living donor kidney transplantation. The patient was previously reviewed by the multidisciplinary selection committee and found to be medically and psychosocially appropriate for kidney transplantation. Mr. Blanco has End stage renal failure due to IgA nephropathy.     The patient is on dialysis.      If on dialysis, modality: HD, via  CVC  Dialysis days: Tuesday, Thursday, Saturday                 YES  NO   Chronic anticoagulation  [x]      [] Indication:  Baby asa  Recurrent infections  []      [x]  Type:                  Bladder dysfunction  [x]      [] Cause: anuric   Claudication   []      [x] Distance:    Previous Amputation  []      [x] Cause:       Health events since transplant evaluation: None    Special considerations:  PONV: no  Sikhism: No    MEDICAL HISTORY:      Patient Active Problem List    Diagnosis Date Noted     Morbid obesity (H) 05/14/2021     Priority: Medium     Prostate cancer (H) 10/15/2020     Priority: Medium     Pre-operative cardiovascular examination 10/06/2020     Priority: Medium     CKD (chronic kidney disease) stage 4, GFR 15-29 ml/min (H)      Priority: Medium     Essential hypertension 05/21/2020     Priority: Medium     Gout 05/21/2020     Priority: Medium     Hyperlipidemia 05/21/2020     Priority: Medium     Hyperparathyroidism due to renal insufficiency (H) 05/21/2020     Priority: Medium     IgA nephropathy 05/21/2020     Priority: Medium     Obesity 11/30/2010     Priority: Medium     Umbilical hernia 08/12/2008     Priority: Medium      Past Medical History:   Diagnosis Date     CKD (chronic kidney disease) stage 4, GFR 15-29 ml/min (H)      Elevated PSA      Essential hypertension 05/21/2020     Hyperlipidemia      IgA nephropathy      Obesity      Prostate cancer (H) 10/15/2020     S/P radiation therapy     6,600 cGy  to pelvis completed on 6/25/2021 [facility]     Past Surgical History:   Procedure Laterality Date     BACK SURGERY      Back Surgey 20+ Years Ago      BIOPSY      [facility] 2010     COLONOSCOPY      10+ Years ago at [facility]      DAVINCI PROSTATECTOMY, LYMPHADENECTOMY N/A 12/14/2020    Procedure: PROSTATECTOMY, ROBOT-ASSISTED, WITH PELVIC LYMPHADENECTOMY;  Surgeon: Nabil Vásquez MD;  Location: [facility] OR     Current Outpatient Medications   Medication Sig Dispense Refill     allopurinol (ZYLOPRIM) 100 MG tablet every morning        amLODIPine (NORVASC) 10 MG tablet Take 10 mg by mouth every morning        aspirin (ASA) 81 MG EC tablet Take 81 mg by mouth every morning        B Complex-C-Folic Acid (VIRT-CAPS) 1 MG CAPS        calcitRIOL (ROCALTROL) 0.25 MCG capsule Take 0.25 mcg by mouth daily       Cinacalcet HCl (SENSIPAR PO) Take 60 mg by mouth       colchicine (MITIGARE) 0.6 MG capsule        furosemide (LASIX) 20 MG tablet every morning        Melatonin 2.5 MG CHEW Take 20 mg by mouth At Bedtime       Methoxy PEG-Epoetin Beta (MIRCERA IJ) 30 mcg by Intravenous Push route       rOPINIRole (REQUIP) 0.25 MG tablet Take 0.25 mg by mouth At Bedtime Take 0.25 mg one hour prior to sleep. If not effective after 3 days, increase dose to 0.5 mg. May increase increments of 0.25 mg every 3 days if needed, not exceeding a total of 2 mg at bedtime.       sevelamer carbonate (RENVELA) 800 MG tablet Take 800 mg by mouth 2 times daily        simvastatin (ZOCOR) 40 MG tablet Take 40 mg by mouth every morning        Vitamin D3 (VITAMIN D3) 25 mcg (1000 units) tablet Take 1 capsule by mouth every morning        OTC products: None, except as noted above  No Known Allergies   Social History     Tobacco Use     Smoking status: Never Smoker     Smokeless tobacco: Never Used   Substance Use Topics     Alcohol use: Yes     OR  Social History     Socioeconomic History     Marital status:       Spouse name: Not on file     Number of children: Not on file     Years of education: Not on file     Highest education level: Not on file   Occupational History     Not on file   Tobacco Use     Smoking status: Never Smoker     Smokeless tobacco: Never Used   Substance and Sexual Activity     Alcohol use: Yes     Drug use: Never     Sexual activity: Not on file   Other Topics Concern     Parent/sibling w/ CABG, MI or angioplasty before 65F 55M? Not Asked   Social History Narrative     Not on file     Social Determinants of Health     Financial Resource Strain: Not on file   Food Insecurity: Not on file   Transportation Needs: Not on file   Physical Activity: Not on file   Stress: Not on file   Social Connections: Not on file   Intimate Partner Violence: Not on file   Housing Stability: Not on file     History   Drug Use Unknown     Family History   Problem Relation Age of Onset     Kidney Disease Father      Hypertension Father      Cancer Mother      No Known Problems Brother      No Known Problems Sister      No Known Problems Son      No Known Problems Daughter      No Known Problems Brother      No Known Problems Brother      No Known Problems Brother      No Known Problems Sister      No Known Problems Sister      No Known Problems Sister        REVIEW OF SYSTEMS:        CONSTITUTIONAL: NEGATIVE for fever, chills, change in weight  INTEGUMENTARY/SKIN: NEGATIVE for worrisome rashes, moles or lesions  EYES: NEGATIVE for vision changes or irritation  ENT/MOUTH: NEGATIVE for ear, mouth and throat problems  RESP: NEGATIVE for significant cough or SOB  CV: NEGATIVE for chest pain, palpitations or peripheral edema  GI: NEGATIVE for nausea, abdominal pain, heartburn, or change in bowel habits  : NEGATIVE for frequency, dysuria, or hematuria  MUSCULOSKELETAL: NEGATIVE for significant arthralgias or myalgia  NEURO: NEGATIVE for weakness, dizziness or paresthesias  ENDOCRINE: NEGATIVE for temperature  intolerance, skin/hair changes  HEME: NEGATIVE for bleeding problems  PSYCHIATRIC: NEGATIVE for changes in mood or affect    EXAM:                            GENERAL APPEARANCE: healthy, alert and no distress     EYES: EOMI,  PERRL     HENT: ear canals and TM's normal and nose and mouth without ulcers or lesions     NECK: no adenopathy, no asymmetry, masses, or scars and thyroid normal to palpation     RESP: lungs clear to auscultation - no rales, rhonchi or wheezes     CV: regular rates and rhythm, normal S1 S2, no S3 or S4 and no murmur, click or rub     ABDOMEN:  soft, nontender, no HSM or masses and bowel sounds normal     MS: extremities normal- no gross deformities noted, no evidence of inflammation in joints, FROM in all extremities.     SKIN: mole on forearm.  Multiple lesions noted      NEURO: Normal strength and tone, sensory exam grossly normal, mentation intact and speech normal     PSYCH: mentation appears normal. and affect normal/bright     LYMPHATICS: No cervical adenopathy      DIAGNOSTICS:                EKG: Sinus rhythm   Left axis deviation   Abnormal ECG   When compared with ECG of 21-SEP-2020 15:03,   No significant change was found   Chest XRay  Labs Resulted Today:   Results for orders placed or performed in visit on 05/09/22   EKG 12-lead complete w/read - Clinics     Status: None (Preliminary result)   Result Value Ref Range    Systolic Blood Pressure  mmHg    Diastolic Blood Pressure  mmHg    Ventricular Rate 73 BPM    Atrial Rate 73 BPM    SC Interval 160 ms    QRS Duration 86 ms     ms    QTc 440 ms    P Axis 11 degrees    R AXIS -35 degrees    T Axis 36 degrees    Interpretation ECG       Sinus rhythm  Left axis deviation  Abnormal ECG  When compared with ECG of 21-SEP-2020 15:03,  No significant change was found     Results for orders placed or performed in visit on 05/09/22   CBC with platelets     Status: Abnormal   Result Value Ref Range    WBC Count 7.7 4.0 - 11.0 10e3/uL     RBC Count 4.05 (L) 4.40 - 5.90 10e6/uL    Hemoglobin 13.1 (L) 13.3 - 17.7 g/dL    Hematocrit 40.6 40.0 - 53.0 %     78 - 100 fL    MCH 32.3 26.5 - 33.0 pg    MCHC 32.3 31.5 - 36.5 g/dL    RDW 13.1 10.0 - 15.0 %    Platelet Count 194 150 - 450 10e3/uL   Comprehensive metabolic panel     Status: Abnormal   Result Value Ref Range    Sodium 135 133 - 144 mmol/L    Potassium 5.6 (H) 3.4 - 5.3 mmol/L    Chloride 97 94 - 109 mmol/L    Carbon Dioxide (CO2) 26 20 - 32 mmol/L    Anion Gap 12 3 - 14 mmol/L    Urea Nitrogen 73 (H) 7 - 30 mg/dL    Creatinine 13.20 (H) 0.66 - 1.25 mg/dL    Calcium 8.6 8.5 - 10.1 mg/dL    Glucose 87 70 - 99 mg/dL    Alkaline Phosphatase 99 40 - 150 U/L    AST 16 0 - 45 U/L    ALT 34 0 - 70 U/L    Protein Total 8.1 6.8 - 8.8 g/dL    Albumin 4.0 3.4 - 5.0 g/dL    Bilirubin Total 0.4 0.2 - 1.3 mg/dL    GFR Estimate 4 (L) >60 mL/min/1.73m2   Ferritin     Status: Abnormal   Result Value Ref Range    Ferritin 1,002 (H) 26 - 388 ng/mL   Hemoglobin A1c     Status: Normal   Result Value Ref Range    Hemoglobin A1C 5.0 0.0 - 5.6 %   INR     Status: Normal   Result Value Ref Range    INR 0.98 0.85 - 1.15   Iron and iron binding capacity     Status: Normal   Result Value Ref Range    Iron 79 35 - 180 ug/dL    Iron Binding Capacity 334 240 - 430 ug/dL    Iron Sat Index 24 15 - 46 %   Parathyroid Hormone Intact     Status: Abnormal   Result Value Ref Range    Parathyroid Hormone Intact 450 (H) 15 - 65 pg/mL    Narrative    This result was obtained with the Roche Elecsys PTH STAT assay.   This reference range differs from PTH assays used in other [facility] laboratories.   Routine UA with microscopic     Status: Abnormal   Result Value Ref Range    Color Urine Yellow Colorless, Straw, Light Yellow, Yellow    Appearance Urine Clear Clear    Glucose Urine Negative Negative mg/dL    Bilirubin Urine Negative Negative    Ketones Urine Negative Negative mg/dL    Specific Gravity Urine 1.015 1.003 - 1.035     Blood Urine Moderate (A) Negative    pH Urine 6.0 5.0 - 7.0    Protein Albumin Urine 300  (A) Negative mg/dL    Urobilinogen Urine Normal Normal, 2.0 mg/dL    Nitrite Urine Negative Negative    Leukocyte Esterase Urine Small (A) Negative    Bacteria Urine Few (A) None Seen /HPF    RBC Urine 0 <=2 /HPF    WBC Urine 6 (H) <=5 /HPF   ABO/Rh type and screen     Status: None (In process)    Narrative    The following orders were created for panel order ABO/Rh type and screen.  Procedure                               Abnormality         Status                     ---------                               -----------         ------                     Adult Type and Screen[[id number]]                            In process                   Please view results for these tests on the individual orders.   HLA Final Crossmatch Recipient     Status: None (In process)    Narrative    The following orders were created for panel order HLA Final Crossmatch Recipient.  Procedure                               Abnormality         Status                     ---------                               -----------         ------                     HLA Final Crossmatch Rec...[[id number]]                      In process                   Please view results for these tests on the individual orders.     Labs Drawn and in Process:   Unresulted Labs Ordered in the Past 30 Days of this Admission     Date and Time Order Name Status Description    5/9/2022  9:36 AM TYPE AND SCREEN, ADULT In process     5/9/2022  9:36 AM COVID-19 VIRUS (CORONAVIRUS) BY PCR In process     5/9/2022  9:36 AM VITAMIN D DEFICIENCY SCREENING In process     5/9/2022  9:36 AM URINE CULTURE In process     5/9/2022  9:36 AM EBV CAPSID ANTIBODY IGM In process     5/9/2022  9:36 AM EBV CAPSID ANTIBODY IGG In process     5/9/2022  9:36 AM CMV ANTIBODY IGM In process     5/9/2022  9:36 AM CMV ANTIBODY IGG In process         Recent Labs   Lab Test 05/09/22  1000 05/09/22  0959  08/09/21  1150 12/15/20  1124 12/15/20  0618 12/10/20  1333 09/03/20  1131   HGB 13.1*  --   --  9.2* 8.9*   < > 14.3     --   --   --  170   < > 224   INR  --  0.98  --   --   --   --  1.03   NA  --   --  139  --  140   < > 139   POTASSIUM  --   --  4.8  --  3.9   < > 3.7   CR  --   --  11.70*  --  6.13*   < > 6.13*    < > = values in this interval not displayed.     UNOS cPRA   Date Value Ref Range Status   09/03/2020 19  Final     A    ASSESSMENT:                 Mr. Blanco is a 57 year old male who is appropriate for elective living donor kidney transplantation with the following pertinent issues:    1. Labs reviewed. Findings requiring additional evaluation before surgery: No  2. EKG (5/9/2022): Sinus rhythm   Left axis deviation   Abnormal ECG   When compared with ECG of 21-SEP-2020 15:03,   No significant change was found   3. ECHO (11/15/2021); Interpretation Summary  Global and regional left ventricular function is normal with an EF of 60-65%.  Right ventricular function, chamber size, wall motion, and thickness are  normal.  The inferior vena cava is normal.  No pericardial effusion is present.  4. ABO= A  5. Paired Exchange case: Yes  6. Outstanding issues: Final ABO, cross match, and COVID-19 test, chest x-ray  7. Gout:  Has not had a flare for many years.    8. Cardiac:  Had a CTA.  Cleared by cards  9. History of prostate ca:  We discussed the patient with Dr. Vásquez. Per Dr. Vásquez, patient does have moderate risk of recurrence; however, it will be in 10-15 years. We discussed with patient about the elevated risks of recurrence after transplant due to immunosuppression. We recommend to follow up in 3 months with a PSA check (this allows more time for weight loss through nutrition and exercises). If PSA is stable and does not have any clinical signs of recurrence, we will proceed with living kidney transplant.        Should have dialysis Monday.  Start holding ASA    Needs derm clearance as she  has never been seen and has skin lesions.      PLAN:                 1. Consent: Not Done  2. Outstanding issues: Final ABO, cross match, and COVID-19 test, chest x-ray, derm appt     Signed Electronically by: Katie Sanders NP    I have reviewed history, examined patient and discussed plan with the fellow/resident/TOM.  I concur with the findings in this note.    Seen by Derm and cleared    Risks of the surgical procedure including but not limited to the rare risk of mortality discussed in detail. Patient verbalized good understanding and had several pertinent questions which were answered satisfactorily.     Immunosuppressive regimen, management and long term risks discussed in detail.    Total time: 40 min  Counseling time: 30 min

## 2022-05-09 NOTE — LETTER
Date:May 10, 2022      Provider requested that no letter be sent. Do not send.       [facility]

## 2022-05-09 NOTE — LETTER
5/9/2022       RE: Donald Blanco  [address]   [place] [address]     Dear Colleague,    Thank you for referring your patient, Donald Blanco, to the [facility] [place] at [facility]. Please see a copy of my visit note below.    [facility] Dermatology Note  Encounter Date: May 9, 2022  Office Visit     Dermatology Problem List:  1. SK, L forearm  2. ESRD 2/2 IgA nephropathy   - pending renal transplantation     ____________________________________________    Assessment & Plan:     #  Flat SK, L forearm  - Benign etiology discussed with patient. No further treatment necessary      # Multiple clinically benign nevi  - ABCDEs: Counseled ABCDEs of melanoma: Asymmetry, Border (irregularity), Color (not uniform, changes in color), Diameter (greater than 6 mm which is about the size of a pencil eraser), and Evolving (any changes in preexisting moles).  - NTD: No further management at this time.    # Upcoming renal transplant for IgA nephropathy  - Recommended annual FBSE after his transplant due to increased risk for NMSC    Procedures Performed:   None    Follow-up: 1 year(s) in-person, or earlier for new or changing lesions    Staff and Resident:     Lachelle Rojas MD  PGY-4 Dermatology Resident   I, Izzy Hager MD, saw this patient with the resident and agree with the resident s findings and plan of care as documented in the resident s note.      ____________________________________________    CC: Derm Problem (Donald is here for spot of concern R arm, denies hx of family hx skin cancer )    HPI:  Mr. Donald Blanco is a(n) 57 year old male who presents today as a new patient for evaluation of brown lesion on his  L forearm added acutely to clinic at the request of the transplant surgeon.  He states that the lesion has been present for quite a few years and has been stable in size. However, he has had  extensive sun exposure and does not regularly wear sunscreen. He denies any personal or family history of skin cancer.  He is due for transplant in 8 days.     Patient is otherwise feeling well, without additional skin concerns.    Labs Reviewed:  N/A    Physical Exam:  Vitals: There were no vitals taken for this visit.  SKIN: Focused examination of head and forearms was performed.  - On the L forearm, there is a 0.4 cm brown thin papule with comedone-like openings and pseudo-pigment network. No atypical pigment on dermoscopy  - Multiple regular brown pigmented macules and papules are identified on the face and scalp. .   - No other lesions of concern on areas examined.     Medications:  Current Outpatient Medications   Medication     allopurinol (ZYLOPRIM) 100 MG tablet     aspirin (ASA) 81 MG EC tablet     B Complex-C-Folic Acid (VIRT-CAPS) 1 MG CAPS     Cinacalcet HCl (SENSIPAR PO)     colchicine (MITIGARE) 0.6 MG capsule     eszopiclone (LUNESTA) 2 MG tablet     furosemide (LASIX) 20 MG tablet     Melatonin 2.5 MG CHEW     Methoxy PEG-Epoetin Beta (MIRCERA IJ)     rOPINIRole (REQUIP) 0.25 MG tablet     sevelamer carbonate (RENVELA) 800 MG tablet     Vitamin D3 (CHOLECALCIFEROL) 25 mcg (1000 units) tablet     calcitRIOL (ROCALTROL) 0.25 MCG capsule     simvastatin (ZOCOR) 40 MG tablet     No current facility-administered medications for this visit.      Past Medical History:   Patient Active Problem List   Diagnosis     Essential hypertension     Gout     Hyperlipidemia     Hyperparathyroidism due to renal insufficiency (H)     IgA nephropathy     Obesity     Umbilical hernia     CKD (chronic kidney disease) stage 4, GFR 15-29 ml/min (H)     Pre-operative cardiovascular examination     Prostate cancer (H)     Morbid obesity (H)     Past Medical History:   Diagnosis Date     CKD (chronic kidney disease) stage 4, GFR 15-29 ml/min (H)      Elevated PSA      Essential hypertension 05/21/2020     Hyperlipidemia       IgA nephropathy      Obesity      Prostate cancer (H) 10/15/2020     S/P radiation therapy     6,600 cGy to pelvis completed on 6/25/2021 [facility] No referring provider defined for this encounter. on close of this encounter.      Again, thank you for allowing me to participate in the care of your patient.      Sincerely,    Izzy Hager MD

## 2022-05-09 NOTE — NURSING NOTE
Dermatology Rooming Note    Donald Blanco's goals for this visit include:   Chief Complaint   Patient presents with     Derm Problem     Donald is here for spot of concern R arm, denies hx of family hx skin cancer      Shannon Huber, EMT

## 2022-05-09 NOTE — LETTER
5/9/2022     RE: Donald Blanco  [address] [facility]  [place] [address]    Dear Colleague,    Thank you for referring your patient, Donald Blanco, to the [facility]. Please see a copy of my visit note below.    Transplant Surgery H&P:                           HPI:      Mr. Blanco is a 57 year old male who comes to clinic today for preop prior to planned living donor kidney transplantation. The patient was previously reviewed by the multidisciplinary selection committee and found to be medically and psychosocially appropriate for kidney transplantation. Mr. Blanco has End stage renal failure due to IgA nephropathy.     The patient is on dialysis.      If on dialysis, modality: HD, via  CVC  Dialysis days: Tuesday, Thursday, Saturday                 YES  NO   Chronic anticoagulation  [x]      [] Indication:  Baby asa  Recurrent infections  []      [x]  Type:                  Bladder dysfunction  [x]      [] Cause: anuric   Claudication   []      [x] Distance:    Previous Amputation  []      [x] Cause:       Health events since transplant evaluation: None    Special considerations:  PONV: no  Yazidi: No    MEDICAL HISTORY:      Patient Active Problem List    Diagnosis Date Noted     Morbid obesity (H) 05/14/2021     Priority: Medium     Prostate cancer (H) 10/15/2020     Priority: Medium     Pre-operative cardiovascular examination 10/06/2020     Priority: Medium     CKD (chronic kidney disease) stage 4, GFR 15-29 ml/min (H)      Priority: Medium     Essential hypertension 05/21/2020     Priority: Medium     Gout 05/21/2020     Priority: Medium     Hyperlipidemia 05/21/2020     Priority: Medium     Hyperparathyroidism due to renal insufficiency (H) 05/21/2020     Priority: Medium     IgA nephropathy 05/21/2020     Priority: Medium     Obesity 11/30/2010     Priority: Medium     Umbilical hernia 08/12/2008     Priority: Medium      Past Medical History:   Diagnosis Date     CKD  (chronic kidney disease) stage 4, GFR 15-29 ml/min (H)      Elevated PSA      Essential hypertension 05/21/2020     Hyperlipidemia      IgA nephropathy      Obesity      Prostate cancer (H) 10/15/2020     S/P radiation therapy     6,600 cGy to pelvis completed on 6/25/2021 [facility]     Past Surgical History:   Procedure Laterality Date     BACK SURGERY      Back Surgey 20+ Years Ago      BIOPSY      [facility] 2010     COLONOSCOPY      10+ Years ago at [facility]      DAVINCI PROSTATECTOMY, LYMPHADENECTOMY N/A 12/14/2020    Procedure: PROSTATECTOMY, ROBOT-ASSISTED, WITH PELVIC LYMPHADENECTOMY;  Surgeon: Nabil Vásquez MD;  Location:  OR     Current Outpatient Medications   Medication Sig Dispense Refill     allopurinol (ZYLOPRIM) 100 MG tablet every morning        amLODIPine (NORVASC) 10 MG tablet Take 10 mg by mouth every morning        aspirin (ASA) 81 MG EC tablet Take 81 mg by mouth every morning        B Complex-C-Folic Acid (VIRT-CAPS) 1 MG CAPS        calcitRIOL (ROCALTROL) 0.25 MCG capsule Take 0.25 mcg by mouth daily       Cinacalcet HCl (SENSIPAR PO) Take 60 mg by mouth       colchicine (MITIGARE) 0.6 MG capsule        furosemide (LASIX) 20 MG tablet every morning        Melatonin 2.5 MG CHEW Take 20 mg by mouth At Bedtime       Methoxy PEG-Epoetin Beta (MIRCERA IJ) 30 mcg by Intravenous Push route       rOPINIRole (REQUIP) 0.25 MG tablet Take 0.25 mg by mouth At Bedtime Take 0.25 mg one hour prior to sleep. If not effective after 3 days, increase dose to 0.5 mg. May increase increments of 0.25 mg every 3 days if needed, not exceeding a total of 2 mg at bedtime.       sevelamer carbonate (RENVELA) 800 MG tablet Take 800 mg by mouth 2 times daily        simvastatin (ZOCOR) 40 MG tablet Take 40 mg by mouth every morning        Vitamin D3 (VITAMIN D3) 25 mcg (1000 units) tablet Take 1 capsule by mouth every morning        OTC products: None, except as  noted above  No Known Allergies   Social History     Tobacco Use     Smoking status: Never Smoker     Smokeless tobacco: Never Used   Substance Use Topics     Alcohol use: Yes     OR  Social History     Socioeconomic History     Marital status:      Spouse name: Not on file     Number of children: Not on file     Years of education: Not on file     Highest education level: Not on file   Occupational History     Not on file   Tobacco Use     Smoking status: Never Smoker     Smokeless tobacco: Never Used   Substance and Sexual Activity     Alcohol use: Yes     Drug use: Never     Sexual activity: Not on file   Other Topics Concern     Parent/sibling w/ CABG, MI or angioplasty before 65F 55M? Not Asked   Social History Narrative     Not on file     Social Determinants of Health     Financial Resource Strain: Not on file   Food Insecurity: Not on file   Transportation Needs: Not on file   Physical Activity: Not on file   Stress: Not on file   Social Connections: Not on file   Intimate Partner Violence: Not on file   Housing Stability: Not on file     History   Drug Use Unknown     Family History   Problem Relation Age of Onset     Kidney Disease Father      Hypertension Father      Cancer Mother      No Known Problems Brother      No Known Problems Sister      No Known Problems Son      No Known Problems Daughter      No Known Problems Brother      No Known Problems Brother      No Known Problems Brother      No Known Problems Sister      No Known Problems Sister      No Known Problems Sister        REVIEW OF SYSTEMS:        CONSTITUTIONAL: NEGATIVE for fever, chills, change in weight  INTEGUMENTARY/SKIN: NEGATIVE for worrisome rashes, moles or lesions  EYES: NEGATIVE for vision changes or irritation  ENT/MOUTH: NEGATIVE for ear, mouth and throat problems  RESP: NEGATIVE for significant cough or SOB  CV: NEGATIVE for chest pain, palpitations or peripheral edema  GI: NEGATIVE for nausea, abdominal pain, heartburn,  or change in bowel habits  : NEGATIVE for frequency, dysuria, or hematuria  MUSCULOSKELETAL: NEGATIVE for significant arthralgias or myalgia  NEURO: NEGATIVE for weakness, dizziness or paresthesias  ENDOCRINE: NEGATIVE for temperature intolerance, skin/hair changes  HEME: NEGATIVE for bleeding problems  PSYCHIATRIC: NEGATIVE for changes in mood or affect    EXAM:                            GENERAL APPEARANCE: healthy, alert and no distress     EYES: EOMI,  PERRL     HENT: ear canals and TM's normal and nose and mouth without ulcers or lesions     NECK: no adenopathy, no asymmetry, masses, or scars and thyroid normal to palpation     RESP: lungs clear to auscultation - no rales, rhonchi or wheezes     CV: regular rates and rhythm, normal S1 S2, no S3 or S4 and no murmur, click or rub     ABDOMEN:  soft, nontender, no HSM or masses and bowel sounds normal     MS: extremities normal- no gross deformities noted, no evidence of inflammation in joints, FROM in all extremities.     SKIN: mole on forearm.  Multiple lesions noted      NEURO: Normal strength and tone, sensory exam grossly normal, mentation intact and speech normal     PSYCH: mentation appears normal. and affect normal/bright     LYMPHATICS: No cervical adenopathy      DIAGNOSTICS:                EKG: Sinus rhythm   Left axis deviation   Abnormal ECG   When compared with ECG of 21-SEP-2020 15:03,   No significant change was found   Chest XRay  Labs Resulted Today:   Results for orders placed or performed in visit on 05/09/22   EKG 12-lead complete w/read - Clinics     Status: None (Preliminary result)   Result Value Ref Range    Systolic Blood Pressure  mmHg    Diastolic Blood Pressure  mmHg    Ventricular Rate 73 BPM    Atrial Rate 73 BPM    KS Interval 160 ms    QRS Duration 86 ms     ms    QTc 440 ms    P Axis 11 degrees    R AXIS -35 degrees    T Axis 36 degrees    Interpretation ECG       Sinus rhythm  Left axis deviation  Abnormal ECG  When  compared with ECG of 21-SEP-2020 15:03,  No significant change was found     Results for orders placed or performed in visit on 05/09/22   CBC with platelets     Status: Abnormal   Result Value Ref Range    WBC Count 7.7 4.0 - 11.0 10e3/uL    RBC Count 4.05 (L) 4.40 - 5.90 10e6/uL    Hemoglobin 13.1 (L) 13.3 - 17.7 g/dL    Hematocrit 40.6 40.0 - 53.0 %     78 - 100 fL    MCH 32.3 26.5 - 33.0 pg    MCHC 32.3 31.5 - 36.5 g/dL    RDW 13.1 10.0 - 15.0 %    Platelet Count 194 150 - 450 10e3/uL   Comprehensive metabolic panel     Status: Abnormal   Result Value Ref Range    Sodium 135 133 - 144 mmol/L    Potassium 5.6 (H) 3.4 - 5.3 mmol/L    Chloride 97 94 - 109 mmol/L    Carbon Dioxide (CO2) 26 20 - 32 mmol/L    Anion Gap 12 3 - 14 mmol/L    Urea Nitrogen 73 (H) 7 - 30 mg/dL    Creatinine 13.20 (H) 0.66 - 1.25 mg/dL    Calcium 8.6 8.5 - 10.1 mg/dL    Glucose 87 70 - 99 mg/dL    Alkaline Phosphatase 99 40 - 150 U/L    AST 16 0 - 45 U/L    ALT 34 0 - 70 U/L    Protein Total 8.1 6.8 - 8.8 g/dL    Albumin 4.0 3.4 - 5.0 g/dL    Bilirubin Total 0.4 0.2 - 1.3 mg/dL    GFR Estimate 4 (L) >60 mL/min/1.73m2   Ferritin     Status: Abnormal   Result Value Ref Range    Ferritin 1,002 (H) 26 - 388 ng/mL   Hemoglobin A1c     Status: Normal   Result Value Ref Range    Hemoglobin A1C 5.0 0.0 - 5.6 %   INR     Status: Normal   Result Value Ref Range    INR 0.98 0.85 - 1.15   Iron and iron binding capacity     Status: Normal   Result Value Ref Range    Iron 79 35 - 180 ug/dL    Iron Binding Capacity 334 240 - 430 ug/dL    Iron Sat Index 24 15 - 46 %   Parathyroid Hormone Intact     Status: Abnormal   Result Value Ref Range    Parathyroid Hormone Intact 450 (H) 15 - 65 pg/mL    Narrative    This result was obtained with the Roche Elecsys PTH STAT assay.   This reference range differs from PTH assays used in other [facility] laboratories.   Routine UA with microscopic     Status: Abnormal   Result Value Ref Range    Color Urine  Yellow Colorless, Straw, Light Yellow, Yellow    Appearance Urine Clear Clear    Glucose Urine Negative Negative mg/dL    Bilirubin Urine Negative Negative    Ketones Urine Negative Negative mg/dL    Specific Gravity Urine 1.015 1.003 - 1.035    Blood Urine Moderate (A) Negative    pH Urine 6.0 5.0 - 7.0    Protein Albumin Urine 300  (A) Negative mg/dL    Urobilinogen Urine Normal Normal, 2.0 mg/dL    Nitrite Urine Negative Negative    Leukocyte Esterase Urine Small (A) Negative    Bacteria Urine Few (A) None Seen /HPF    RBC Urine 0 <=2 /HPF    WBC Urine 6 (H) <=5 /HPF   ABO/Rh type and screen     Status: None (In process)    Narrative    The following orders were created for panel order ABO/Rh type and screen.  Procedure                               Abnormality         Status                     ---------                               -----------         ------                     Adult Type and Screen[[id number]]                            In process                   Please view results for these tests on the individual orders.   HLA Final Crossmatch Recipient     Status: None (In process)    Narrative    The following orders were created for panel order HLA Final Crossmatch Recipient.  Procedure                               Abnormality         Status                     ---------                               -----------         ------                     HLA Final Crossmatch Rec...[[id number]]                      In process                   Please view results for these tests on the individual orders.     Labs Drawn and in Process:   Unresulted Labs Ordered in the Past 30 Days of this Admission     Date and Time Order Name Status Description    5/9/2022  9:36 AM TYPE AND SCREEN, ADULT In process     5/9/2022  9:36 AM COVID-19 VIRUS (CORONAVIRUS) BY PCR In process     5/9/2022  9:36 AM VITAMIN D DEFICIENCY SCREENING In process     5/9/2022  9:36 AM URINE CULTURE In process     5/9/2022  9:36 AM EBV CAPSID  ANTIBODY IGM In process     5/9/2022  9:36 AM EBV CAPSID ANTIBODY IGG In process     5/9/2022  9:36 AM CMV ANTIBODY IGM In process     5/9/2022  9:36 AM CMV ANTIBODY IGG In process         Recent Labs   Lab Test 05/09/22  1000 05/09/22  0959 08/09/21  1150 12/15/20  1124 12/15/20  0618 12/10/20  1333 09/03/20  1131   HGB 13.1*  --   --  9.2* 8.9*   < > 14.3     --   --   --  170   < > 224   INR  --  0.98  --   --   --   --  1.03   NA  --   --  139  --  140   < > 139   POTASSIUM  --   --  4.8  --  3.9   < > 3.7   CR  --   --  11.70*  --  6.13*   < > 6.13*    < > = values in this interval not displayed.     UNOS cPRA   Date Value Ref Range Status   09/03/2020 19  Final     A    ASSESSMENT:                 Mr. Blanco is a 57 year old male who is appropriate for elective living donor kidney transplantation with the following pertinent issues:    1. Labs reviewed. Findings requiring additional evaluation before surgery: No  2. EKG (5/9/2022): Sinus rhythm   Left axis deviation   Abnormal ECG   When compared with ECG of 21-SEP-2020 15:03,   No significant change was found   3. ECHO (11/15/2021); Interpretation Summary  Global and regional left ventricular function is normal with an EF of 60-65%.  Right ventricular function, chamber size, wall motion, and thickness are  normal.  The inferior vena cava is normal.  No pericardial effusion is present.  4. ABO= A  5. Paired Exchange case: Yes  6. Outstanding issues: Final ABO, cross match, and COVID-19 test, chest x-ray  7. Gout:  Has not had a flare for many years.    8. Cardiac:  Had a CTA.  Cleared by cards  9. History of prostate ca:  We discussed the patient with Dr. Vásquez. Per Dr. Vásquez, patient does have moderate risk of recurrence; however, it will be in 10-15 years. We discussed with patient about the elevated risks of recurrence after transplant due to immunosuppression. We recommend to follow up in 3 months with a PSA check (this allows more time for weight  loss through nutrition and exercises). If PSA is stable and does not have any clinical signs of recurrence, we will proceed with living kidney transplant.        Should have dialysis Monday.  Start holding ASA    Needs derm clearance as she has never been seen and has skin lesions.      PLAN:                 1. Consent: Not Done  2. Outstanding issues: Final ABO, cross match, and COVID-19 test, chest x-ray, derm appt     Signed Electronically by: Katie Sanders NP    I have reviewed history, examined patient and discussed plan with the fellow/resident/TOM.  I concur with the findings in this note.    Seen by Derm and cleared    Risks of the surgical procedure including but not limited to the rare risk of mortality discussed in detail. Patient verbalized good understanding and had several pertinent questions which were answered satisfactorily.     Immunosuppressive regimen, management and long term risks discussed in detail.    Total time: 40 min  Counseling time: 30 min                     Again, thank you for allowing me to participate in the care of your patient.        Sincerely,        Howard Flores MD

## 2022-05-10 DIAGNOSIS — Z76.82 ORGAN TRANSPLANT CANDIDATE: ICD-10-CM

## 2022-05-10 DIAGNOSIS — N18.6 ESRD (END STAGE RENAL DISEASE) (H): ICD-10-CM

## 2022-05-10 LAB
ATRIAL RATE - MUSE: 73 BPM
BACTERIA UR CULT: NORMAL
DIASTOLIC BLOOD PRESSURE - MUSE: NORMAL MMHG
INTERPRETATION ECG - MUSE: NORMAL
P AXIS - MUSE: 11 DEGREES
PR INTERVAL - MUSE: 160 MS
QRS DURATION - MUSE: 86 MS
QT - MUSE: 400 MS
QTC - MUSE: 440 MS
R AXIS - MUSE: -35 DEGREES
SYSTOLIC BLOOD PRESSURE - MUSE: NORMAL MMHG
T AXIS - MUSE: 36 DEGREES
VENTRICULAR RATE- MUSE: 73 BPM

## 2022-05-10 NOTE — PROGRESS NOTES
[facility] Dermatology Note  Encounter Date: May 9, 2022  Office Visit     Dermatology Problem List:  1. SK, L forearm  2. ESRD 2/2 IgA nephropathy   - pending renal transplantation     ____________________________________________    Assessment & Plan:     #  Flat SK, L forearm  - Benign etiology discussed with patient. No further treatment necessary      # Multiple clinically benign nevi  - ABCDEs: Counseled ABCDEs of melanoma: Asymmetry, Border (irregularity), Color (not uniform, changes in color), Diameter (greater than 6 mm which is about the size of a pencil eraser), and Evolving (any changes in preexisting moles).  - NTD: No further management at this time.    # Upcoming renal transplant for IgA nephropathy  - Recommended annual FBSE after his transplant due to increased risk for NMSC    Procedures Performed:   None    Follow-up: 1 year(s) in-person, or earlier for new or changing lesions    Staff and Resident:     Lachelle Rojas MD  PGY-4 Dermatology Resident   I, Izzy Hager MD, saw this patient with the resident and agree with the resident s findings and plan of care as documented in the resident s note.      ____________________________________________    CC: Derm Problem (Donald is here for spot of concern R arm, denies hx of family hx skin cancer )    HPI:  Mr. Donald Blanco is a(n) 57 year old male who presents today as a new patient for evaluation of brown lesion on his  L forearm added acutely to clinic at the request of the transplant surgeon.  He states that the lesion has been present for quite a few years and has been stable in size. However, he has had extensive sun exposure and does not regularly wear sunscreen. He denies any personal or family history of skin cancer.  He is due for transplant in 8 days.     Patient is otherwise feeling well, without additional skin concerns.    Labs Reviewed:  N/A    Physical Exam:  Vitals: There were no vitals taken for this  visit.  SKIN: Focused examination of head and forearms was performed.  - On the L forearm, there is a 0.4 cm brown thin papule with comedone-like openings and pseudo-pigment network. No atypical pigment on dermoscopy  - Multiple regular brown pigmented macules and papules are identified on the face and scalp. .   - No other lesions of concern on areas examined.     Medications:  Current Outpatient Medications   Medication     allopurinol (ZYLOPRIM) 100 MG tablet     aspirin (ASA) 81 MG EC tablet     B Complex-C-Folic Acid (VIRT-CAPS) 1 MG CAPS     Cinacalcet HCl (SENSIPAR PO)     colchicine (MITIGARE) 0.6 MG capsule     eszopiclone (LUNESTA) 2 MG tablet     furosemide (LASIX) 20 MG tablet     Melatonin 2.5 MG CHEW     Methoxy PEG-Epoetin Beta (MIRCERA IJ)     rOPINIRole (REQUIP) 0.25 MG tablet     sevelamer carbonate (RENVELA) 800 MG tablet     Vitamin D3 (CHOLECALCIFEROL) 25 mcg (1000 units) tablet     calcitRIOL (ROCALTROL) 0.25 MCG capsule     simvastatin (ZOCOR) 40 MG tablet     No current facility-administered medications for this visit.      Past Medical History:   Patient Active Problem List   Diagnosis     Essential hypertension     Gout     Hyperlipidemia     Hyperparathyroidism due to renal insufficiency (H)     IgA nephropathy     Obesity     Umbilical hernia     CKD (chronic kidney disease) stage 4, GFR 15-29 ml/min (H)     Pre-operative cardiovascular examination     Prostate cancer (H)     Morbid obesity (H)     Past Medical History:   Diagnosis Date     CKD (chronic kidney disease) stage 4, GFR 15-29 ml/min (H)      Elevated PSA      Essential hypertension 05/21/2020     Hyperlipidemia      IgA nephropathy      Obesity      Prostate cancer (H) 10/15/2020     S/P radiation therapy     6,600 cGy to pelvis completed on 6/25/2021 [facility] No referring provider defined for this encounter. on close of this encounter.

## 2022-05-13 LAB
CELL TYPE ALLO: NORMAL
CELL TYPE AUTO: NORMAL
CHANNELSHIFTALLOB1: -31
CHANNELSHIFTALLOT1: -23
CHANNELSHIFTAUTOB1: -85
CHANNELSHIFTAUTOT1: -39
CROSSMATCHDATEALLO: NORMAL
CROSSMATCHDATEAUTO: NORMAL
DONOR ALLO: NORMAL
DONOR AUTO: NORMAL
DONORCELLDATE ALLO: NORMAL
DONORCELLDATE AUTO: NORMAL
POS CUT OFF ALLO B: >93
POS CUT OFF ALLO T: >79
POS CUT OFF AUTO B: >93
POS CUT OFF AUTO T: >79
RESULT ALLO B1: NORMAL
RESULT ALLO T1: NORMAL
RESULT AUTO B1: NORMAL
RESULT AUTO T1: NORMAL
SERUM DATE ALLO B1: NORMAL
SERUM DATE ALLO T1: NORMAL
SERUM DATE AUTO B1: NORMAL
SERUM DATE AUTO T1: NORMAL
TESTMETHODALLO: NORMAL
TESTMETHODAUTO: NORMAL
TREATMENT ALLO B1: NORMAL
TREATMENT ALLO T1: NORMAL
TREATMENT AUTO B1: NORMAL
TREATMENT AUTO T1: NORMAL
ZZZCOMMENT ALLOB1: NORMAL

## 2022-05-16 ENCOUNTER — TELEPHONE (OUTPATIENT)
Dept: TRANSPLANT | Facility: CLINIC | Age: 57
End: 2022-05-16

## 2022-05-16 ENCOUNTER — LAB (OUTPATIENT)
Dept: LAB | Facility: CLINIC | Age: 57
End: 2022-05-16
Attending: SURGERY
Payer: COMMERCIAL

## 2022-05-16 ENCOUNTER — ANESTHESIA EVENT (OUTPATIENT)
Dept: SURGERY | Facility: CLINIC | Age: 57
DRG: 650 | End: 2022-05-16
Payer: COMMERCIAL

## 2022-05-16 DIAGNOSIS — R73.03 PRE-DIABETES: ICD-10-CM

## 2022-05-16 DIAGNOSIS — N18.6 ESRD (END STAGE RENAL DISEASE) (H): ICD-10-CM

## 2022-05-16 DIAGNOSIS — Z76.82 ORGAN TRANSPLANT CANDIDATE: ICD-10-CM

## 2022-05-16 DIAGNOSIS — Z76.82 AWAITING ORGAN TRANSPLANT: ICD-10-CM

## 2022-05-16 LAB — SARS-COV-2 RNA RESP QL NAA+PROBE: NEGATIVE

## 2022-05-16 PROCEDURE — U0005 INFEC AGEN DETEC AMPLI PROBE: HCPCS | Mod: 90 | Performed by: PATHOLOGY

## 2022-05-16 PROCEDURE — 99000 SPECIMEN HANDLING OFFICE-LAB: CPT | Performed by: PATHOLOGY

## 2022-05-16 PROCEDURE — U0003 INFECTIOUS AGENT DETECTION BY NUCLEIC ACID (DNA OR RNA); SEVERE ACUTE RESPIRATORY SYNDROME CORONAVIRUS 2 (SARS-COV-2) (CORONAVIRUS DISEASE [COVID-19]), AMPLIFIED PROBE TECHNIQUE, MAKING USE OF HIGH THROUGHPUT TECHNOLOGIES AS DESCRIBED BY CMS-2020-01-R: HCPCS | Mod: 90 | Performed by: PATHOLOGY

## 2022-05-16 NOTE — TELEPHONE ENCOUNTER
Waitlist Wrap Up Call    LDKT scheduled for tomorrow 5/16/22  Reminded pt that check in time is at 1230    Final XM reviewed Yes    COVID STATUS: Negative Date Reviewed: 5/16/22    CHEST XRAY REVIEWED: YES. Date-5/16/22.    PREOP APPTS REVIEWED: Yes    Questions/concerns addressed with pt. Explained that pt will have post coordinator assigned to them post transplant. Reminded pt that if they would like to send letter to donor to contact [facility] when ready. Pt verbalized understanding of information and has no further questions. Encouraged to reach out if questions arise.

## 2022-05-16 NOTE — TELEPHONE ENCOUNTER
Called pt to check in on Aspirin. Pt did take his aspirin this morning, RNCC clarified with pt to not take the aspirin tomorrow morning. Pt verbalized understanding of information and has no further questions. Encouraged to reach out if questions arise.

## 2022-05-17 ENCOUNTER — APPOINTMENT (OUTPATIENT)
Dept: GENERAL RADIOLOGY | Facility: CLINIC | Age: 57
DRG: 650 | End: 2022-05-17
Attending: SURGERY
Payer: COMMERCIAL

## 2022-05-17 ENCOUNTER — HOSPITAL ENCOUNTER (INPATIENT)
Facility: CLINIC | Age: 57
LOS: 10 days | Discharge: HOME | DRG: 650 | End: 2022-05-27
Attending: SURGERY | Admitting: SURGERY
Payer: COMMERCIAL

## 2022-05-17 ENCOUNTER — DOCUMENTATION ONLY (OUTPATIENT)
Dept: TRANSPLANT | Facility: CLINIC | Age: 57
End: 2022-05-17

## 2022-05-17 ENCOUNTER — ANESTHESIA (OUTPATIENT)
Dept: SURGERY | Facility: CLINIC | Age: 57
DRG: 650 | End: 2022-05-17
Payer: COMMERCIAL

## 2022-05-17 ENCOUNTER — TRANSFERRED RECORDS (OUTPATIENT)
Dept: HEALTH INFORMATION MANAGEMENT | Facility: CLINIC | Age: 57
End: 2022-05-17
Payer: COMMERCIAL

## 2022-05-17 ENCOUNTER — APPOINTMENT (OUTPATIENT)
Dept: ULTRASOUND IMAGING | Facility: CLINIC | Age: 57
DRG: 650 | End: 2022-05-17
Attending: STUDENT IN AN ORGANIZED HEALTH CARE EDUCATION/TRAINING PROGRAM
Payer: COMMERCIAL

## 2022-05-17 DIAGNOSIS — Z76.82 AWAITING ORGAN TRANSPLANT: ICD-10-CM

## 2022-05-17 DIAGNOSIS — R73.03 PRE-DIABETES: ICD-10-CM

## 2022-05-17 DIAGNOSIS — Z94.0 KIDNEY REPLACED BY TRANSPLANT: Chronic | ICD-10-CM

## 2022-05-17 DIAGNOSIS — N18.6 ESRD (END STAGE RENAL DISEASE) (H): ICD-10-CM

## 2022-05-17 DIAGNOSIS — N18.4 CKD (CHRONIC KIDNEY DISEASE) STAGE 4, GFR 15-29 ML/MIN (H): Primary | ICD-10-CM

## 2022-05-17 LAB
ANION GAP SERPL CALCULATED.3IONS-SCNC: 9 MMOL/L (ref 3–14)
BLD PROD TYP BPU: NORMAL
BLOOD COMPONENT TYPE: NORMAL
BUN SERPL-MCNC: 48 MG/DL (ref 7–30)
CALCIUM SERPL-MCNC: 8.6 MG/DL (ref 8.5–10.1)
CHLORIDE BLD-SCNC: 95 MMOL/L (ref 94–109)
CO2 SERPL-SCNC: 28 MMOL/L (ref 20–32)
CODING SYSTEM: NORMAL
CREAT SERPL-MCNC: 8.48 MG/DL (ref 0.66–1.25)
CROSSMATCH: NORMAL
CROSSMATCH: NORMAL
ERYTHROCYTE [DISTWIDTH] IN BLOOD BY AUTOMATED COUNT: 13 % (ref 10–15)
GFR SERPL CREATININE-BSD FRML MDRD: 7 ML/MIN/1.73M2
GLUCOSE BLD-MCNC: 122 MG/DL (ref 70–99)
GLUCOSE BLDC GLUCOMTR-MCNC: 100 MG/DL (ref 70–99)
HCT VFR BLD AUTO: 34.4 % (ref 40–53)
HGB BLD-MCNC: 11.2 G/DL (ref 13.3–17.7)
HGB BLD-MCNC: 11.3 G/DL (ref 13.3–17.7)
HOLD SPECIMEN: NORMAL
ISSUE DATE AND TIME: NORMAL
ISSUE DATE AND TIME: NORMAL
MAGNESIUM SERPL-MCNC: 2.2 MG/DL (ref 1.6–2.3)
MCH RBC QN AUTO: 32.8 PG (ref 26.5–33)
MCHC RBC AUTO-ENTMCNC: 32.8 G/DL (ref 31.5–36.5)
MCV RBC AUTO: 100 FL (ref 78–100)
PHOSPHATE SERPL-MCNC: 3.3 MG/DL (ref 2.5–4.5)
PLATELET # BLD AUTO: 180 10E3/UL (ref 150–450)
POTASSIUM BLD-SCNC: 4.7 MMOL/L (ref 3.4–5.3)
POTASSIUM BLD-SCNC: 5.6 MMOL/L (ref 3.4–5.3)
RADIOLOGIST FLAGS: ABNORMAL
RBC # BLD AUTO: 3.44 10E6/UL (ref 4.4–5.9)
SODIUM SERPL-SCNC: 132 MMOL/L (ref 133–144)
UNIT ABO/RH: NORMAL
UNIT NUMBER: NORMAL
UNIT STATUS: NORMAL
UNIT TYPE ISBT: 5100
UNIT TYPE ISBT: 5100
UNIT TYPE ISBT: 6200
UNIT TYPE ISBT: 6200
WBC # BLD AUTO: 12.1 10E3/UL (ref 4–11)

## 2022-05-17 PROCEDURE — 87389 HIV-1 AG W/HIV-1&-2 AB AG IA: CPT | Performed by: NURSE PRACTITIONER

## 2022-05-17 PROCEDURE — 83735 ASSAY OF MAGNESIUM: CPT | Performed by: STUDENT IN AN ORGANIZED HEALTH CARE EDUCATION/TRAINING PROGRAM

## 2022-05-17 PROCEDURE — 250N000009 HC RX 250

## 2022-05-17 PROCEDURE — 71045 X-RAY EXAM CHEST 1 VIEW: CPT | Mod: 26 | Performed by: RADIOLOGY

## 2022-05-17 PROCEDURE — 36415 COLL VENOUS BLD VENIPUNCTURE: CPT | Performed by: ANESTHESIOLOGY

## 2022-05-17 PROCEDURE — 250N000011 HC RX IP 250 OP 636: Performed by: NURSE ANESTHETIST, CERTIFIED REGISTERED

## 2022-05-17 PROCEDURE — 84132 ASSAY OF SERUM POTASSIUM: CPT | Performed by: STUDENT IN AN ORGANIZED HEALTH CARE EDUCATION/TRAINING PROGRAM

## 2022-05-17 PROCEDURE — 85018 HEMOGLOBIN: CPT | Performed by: STUDENT IN AN ORGANIZED HEALTH CARE EDUCATION/TRAINING PROGRAM

## 2022-05-17 PROCEDURE — 76776 US EXAM K TRANSPL W/DOPPLER: CPT

## 2022-05-17 PROCEDURE — 82330 ASSAY OF CALCIUM: CPT

## 2022-05-17 PROCEDURE — 250N000009 HC RX 250: Performed by: SURGERY

## 2022-05-17 PROCEDURE — 999N000141 HC STATISTIC PRE-PROCEDURE NURSING ASSESSMENT: Performed by: SURGERY

## 2022-05-17 PROCEDURE — 370N000017 HC ANESTHESIA TECHNICAL FEE, PER MIN: Performed by: SURGERY

## 2022-05-17 PROCEDURE — 258N000003 HC RX IP 258 OP 636: Performed by: SURGERY

## 2022-05-17 PROCEDURE — 84132 ASSAY OF SERUM POTASSIUM: CPT | Performed by: ANESTHESIOLOGY

## 2022-05-17 PROCEDURE — 811N000001 HC ACQUISITION KIDNEY LIVING DONOR

## 2022-05-17 PROCEDURE — 258N000003 HC RX IP 258 OP 636: Performed by: NURSE PRACTITIONER

## 2022-05-17 PROCEDURE — 710N000011 HC RECOVERY PHASE 1, LEVEL 3, PER MIN: Performed by: SURGERY

## 2022-05-17 PROCEDURE — 36415 COLL VENOUS BLD VENIPUNCTURE: CPT | Performed by: NURSE PRACTITIONER

## 2022-05-17 PROCEDURE — 250N000011 HC RX IP 250 OP 636: Performed by: STUDENT IN AN ORGANIZED HEALTH CARE EDUCATION/TRAINING PROGRAM

## 2022-05-17 PROCEDURE — 86790 VIRUS ANTIBODY NOS: CPT | Performed by: NURSE PRACTITIONER

## 2022-05-17 PROCEDURE — 250N000011 HC RX IP 250 OP 636

## 2022-05-17 PROCEDURE — 250N000025 HC SEVOFLURANE, PER MIN: Performed by: SURGERY

## 2022-05-17 PROCEDURE — 258N000003 HC RX IP 258 OP 636: Performed by: NURSE ANESTHETIST, CERTIFIED REGISTERED

## 2022-05-17 PROCEDURE — 50360 RNL ALTRNSPLJ W/O RCP NFRCT: CPT | Mod: LT | Performed by: SURGERY

## 2022-05-17 PROCEDURE — 74018 RADEX ABDOMEN 1 VIEW: CPT | Mod: 26 | Performed by: RADIOLOGY

## 2022-05-17 PROCEDURE — 86803 HEPATITIS C AB TEST: CPT | Performed by: NURSE PRACTITIONER

## 2022-05-17 PROCEDURE — 86706 HEP B SURFACE ANTIBODY: CPT | Performed by: NURSE PRACTITIONER

## 2022-05-17 PROCEDURE — 86704 HEP B CORE ANTIBODY TOTAL: CPT | Performed by: NURSE PRACTITIONER

## 2022-05-17 PROCEDURE — 85027 COMPLETE CBC AUTOMATED: CPT | Performed by: STUDENT IN AN ORGANIZED HEALTH CARE EDUCATION/TRAINING PROGRAM

## 2022-05-17 PROCEDURE — 36592 COLLECT BLOOD FROM PICC: CPT | Performed by: STUDENT IN AN ORGANIZED HEALTH CARE EDUCATION/TRAINING PROGRAM

## 2022-05-17 PROCEDURE — 360N000077 HC SURGERY LEVEL 4, PER MIN: Performed by: SURGERY

## 2022-05-17 PROCEDURE — 999N000063 XR ABDOMEN PORT 1 VIEWS

## 2022-05-17 PROCEDURE — 272N000001 HC OR GENERAL SUPPLY STERILE: Performed by: SURGERY

## 2022-05-17 PROCEDURE — 50325 PREP DONOR RENAL GRAFT: CPT | Mod: LT | Performed by: SURGERY

## 2022-05-17 PROCEDURE — 87535 HIV-1 PROBE&REVERSE TRNSCRPJ: CPT | Performed by: NURSE PRACTITIONER

## 2022-05-17 PROCEDURE — 120N000003 HC R&B IMCU UMMC

## 2022-05-17 PROCEDURE — P9037 PLATE PHERES LEUKOREDU IRRAD: HCPCS

## 2022-05-17 PROCEDURE — 250N000009 HC RX 250: Performed by: NURSE ANESTHETIST, CERTIFIED REGISTERED

## 2022-05-17 PROCEDURE — 76776 US EXAM K TRANSPL W/DOPPLER: CPT | Mod: 26 | Performed by: RADIOLOGY

## 2022-05-17 PROCEDURE — 999N000063 XR CHEST PORT 1 VIEW

## 2022-05-17 PROCEDURE — 0TY10Z0 TRANSPLANTATION OF LEFT KIDNEY, ALLOGENEIC, OPEN APPROACH: ICD-10-PCS | Performed by: STUDENT IN AN ORGANIZED HEALTH CARE EDUCATION/TRAINING PROGRAM

## 2022-05-17 PROCEDURE — 250N000011 HC RX IP 250 OP 636: Performed by: ANESTHESIOLOGY

## 2022-05-17 PROCEDURE — 250N000009 HC RX 250: Performed by: NURSE PRACTITIONER

## 2022-05-17 PROCEDURE — 250N000011 HC RX IP 250 OP 636: Performed by: SURGERY

## 2022-05-17 PROCEDURE — 84100 ASSAY OF PHOSPHORUS: CPT | Performed by: STUDENT IN AN ORGANIZED HEALTH CARE EDUCATION/TRAINING PROGRAM

## 2022-05-17 PROCEDURE — 250N000013 HC RX MED GY IP 250 OP 250 PS 637: Performed by: STUDENT IN AN ORGANIZED HEALTH CARE EDUCATION/TRAINING PROGRAM

## 2022-05-17 PROCEDURE — 87340 HEPATITIS B SURFACE AG IA: CPT | Performed by: NURSE PRACTITIONER

## 2022-05-17 PROCEDURE — 250N000011 HC RX IP 250 OP 636: Performed by: NURSE PRACTITIONER

## 2022-05-17 RX ORDER — POLYETHYLENE GLYCOL 3350 17 G/17G
17 POWDER, FOR SOLUTION ORAL DAILY
Status: DISCONTINUED | OUTPATIENT
Start: 2022-05-18 | End: 2022-05-21

## 2022-05-17 RX ORDER — MANNITOL 20 G/100ML
INJECTION, SOLUTION INTRAVENOUS PRN
Status: DISCONTINUED | OUTPATIENT
Start: 2022-05-17 | End: 2022-05-17

## 2022-05-17 RX ORDER — MYCOPHENOLATE MOFETIL 250 MG/1
750 CAPSULE ORAL
Status: DISCONTINUED | OUTPATIENT
Start: 2022-05-18 | End: 2022-05-27 | Stop reason: HOSPADM

## 2022-05-17 RX ORDER — FENTANYL CITRATE 50 UG/ML
INJECTION, SOLUTION INTRAMUSCULAR; INTRAVENOUS PRN
Status: DISCONTINUED | OUTPATIENT
Start: 2022-05-17 | End: 2022-05-17

## 2022-05-17 RX ORDER — VALGANCICLOVIR 450 MG/1
450 TABLET, FILM COATED ORAL
Status: DISCONTINUED | OUTPATIENT
Start: 2022-05-19 | End: 2022-05-20

## 2022-05-17 RX ORDER — ONDANSETRON 2 MG/ML
4 INJECTION INTRAMUSCULAR; INTRAVENOUS EVERY 30 MIN PRN
Status: DISCONTINUED | OUTPATIENT
Start: 2022-05-17 | End: 2022-05-17 | Stop reason: HOSPADM

## 2022-05-17 RX ORDER — SODIUM CHLORIDE, SODIUM GLUCONATE, SODIUM ACETATE, POTASSIUM CHLORIDE AND MAGNESIUM CHLORIDE 526; 502; 368; 37; 30 MG/100ML; MG/100ML; MG/100ML; MG/100ML; MG/100ML
INJECTION, SOLUTION INTRAVENOUS CONTINUOUS PRN
Status: DISCONTINUED | OUTPATIENT
Start: 2022-05-17 | End: 2022-05-17

## 2022-05-17 RX ORDER — FENTANYL CITRATE 50 UG/ML
25 INJECTION, SOLUTION INTRAMUSCULAR; INTRAVENOUS EVERY 5 MIN PRN
Status: DISCONTINUED | OUTPATIENT
Start: 2022-05-17 | End: 2022-05-17 | Stop reason: HOSPADM

## 2022-05-17 RX ORDER — AMOXICILLIN 250 MG
1 CAPSULE ORAL 2 TIMES DAILY
Status: DISCONTINUED | OUTPATIENT
Start: 2022-05-17 | End: 2022-05-21

## 2022-05-17 RX ORDER — OXYCODONE HYDROCHLORIDE 5 MG/1
5 TABLET ORAL EVERY 4 HOURS PRN
Status: DISCONTINUED | OUTPATIENT
Start: 2022-05-17 | End: 2022-05-17 | Stop reason: HOSPADM

## 2022-05-17 RX ORDER — PROPOFOL 10 MG/ML
INJECTION, EMULSION INTRAVENOUS PRN
Status: DISCONTINUED | OUTPATIENT
Start: 2022-05-17 | End: 2022-05-17

## 2022-05-17 RX ORDER — SULFAMETHOXAZOLE AND TRIMETHOPRIM 400; 80 MG/1; MG/1
1 TABLET ORAL
Status: DISCONTINUED | OUTPATIENT
Start: 2022-05-18 | End: 2022-05-27 | Stop reason: HOSPADM

## 2022-05-17 RX ORDER — MAGNESIUM OXIDE 400 MG/1
400 TABLET ORAL
Status: DISCONTINUED | OUTPATIENT
Start: 2022-05-19 | End: 2022-05-27 | Stop reason: HOSPADM

## 2022-05-17 RX ORDER — SODIUM CHLORIDE 450 MG/100ML
INJECTION, SOLUTION INTRAVENOUS CONTINUOUS PRN
Status: DISCONTINUED | OUTPATIENT
Start: 2022-05-17 | End: 2022-05-18

## 2022-05-17 RX ORDER — FUROSEMIDE 10 MG/ML
INJECTION INTRAMUSCULAR; INTRAVENOUS PRN
Status: DISCONTINUED | OUTPATIENT
Start: 2022-05-17 | End: 2022-05-17

## 2022-05-17 RX ORDER — NALOXONE HYDROCHLORIDE 0.4 MG/ML
0.2 INJECTION, SOLUTION INTRAMUSCULAR; INTRAVENOUS; SUBCUTANEOUS
Status: DISCONTINUED | OUTPATIENT
Start: 2022-05-17 | End: 2022-05-27 | Stop reason: HOSPADM

## 2022-05-17 RX ORDER — SODIUM CHLORIDE, SODIUM LACTATE, POTASSIUM CHLORIDE, CALCIUM CHLORIDE 600; 310; 30; 20 MG/100ML; MG/100ML; MG/100ML; MG/100ML
INJECTION, SOLUTION INTRAVENOUS CONTINUOUS PRN
Status: DISCONTINUED | OUTPATIENT
Start: 2022-05-17 | End: 2022-05-17

## 2022-05-17 RX ORDER — DEXTROSE, SODIUM CHLORIDE, SODIUM LACTATE, POTASSIUM CHLORIDE, AND CALCIUM CHLORIDE 5; .6; .31; .03; .02 G/100ML; G/100ML; G/100ML; G/100ML; G/100ML
INJECTION, SOLUTION INTRAVENOUS CONTINUOUS
Status: DISCONTINUED | OUTPATIENT
Start: 2022-05-17 | End: 2022-05-19

## 2022-05-17 RX ORDER — CEFUROXIME SODIUM 1.5 G/16ML
1.5 INJECTION, POWDER, FOR SOLUTION INTRAVENOUS SEE ADMIN INSTRUCTIONS
Status: DISCONTINUED | OUTPATIENT
Start: 2022-05-17 | End: 2022-05-17 | Stop reason: HOSPADM

## 2022-05-17 RX ORDER — LIDOCAINE 40 MG/G
CREAM TOPICAL
Status: DISCONTINUED | OUTPATIENT
Start: 2022-05-17 | End: 2022-05-17 | Stop reason: HOSPADM

## 2022-05-17 RX ORDER — HYDROMORPHONE HCL IN WATER/PF 6 MG/30 ML
0.4 PATIENT CONTROLLED ANALGESIA SYRINGE INTRAVENOUS
Status: DISCONTINUED | OUTPATIENT
Start: 2022-05-17 | End: 2022-05-18

## 2022-05-17 RX ORDER — NALOXONE HYDROCHLORIDE 0.4 MG/ML
0.4 INJECTION, SOLUTION INTRAMUSCULAR; INTRAVENOUS; SUBCUTANEOUS
Status: DISCONTINUED | OUTPATIENT
Start: 2022-05-17 | End: 2022-05-27 | Stop reason: HOSPADM

## 2022-05-17 RX ORDER — HEPARIN SODIUM 1000 [USP'U]/ML
INJECTION, SOLUTION INTRAVENOUS; SUBCUTANEOUS PRN
Status: DISCONTINUED | OUTPATIENT
Start: 2022-05-17 | End: 2022-05-17

## 2022-05-17 RX ORDER — HYDROMORPHONE HCL IN WATER/PF 6 MG/30 ML
0.2 PATIENT CONTROLLED ANALGESIA SYRINGE INTRAVENOUS
Status: DISCONTINUED | OUTPATIENT
Start: 2022-05-17 | End: 2022-05-18

## 2022-05-17 RX ORDER — TACROLIMUS 1 MG/1
3 CAPSULE ORAL
Status: DISCONTINUED | OUTPATIENT
Start: 2022-05-18 | End: 2022-05-20

## 2022-05-17 RX ORDER — ONDANSETRON 2 MG/ML
4 INJECTION INTRAMUSCULAR; INTRAVENOUS EVERY 6 HOURS PRN
Status: DISCONTINUED | OUTPATIENT
Start: 2022-05-17 | End: 2022-05-27 | Stop reason: HOSPADM

## 2022-05-17 RX ORDER — CEFUROXIME SODIUM 1.5 G/16ML
1.5 INJECTION, POWDER, FOR SOLUTION INTRAVENOUS ONCE
Status: COMPLETED | OUTPATIENT
Start: 2022-05-17 | End: 2022-05-17

## 2022-05-17 RX ORDER — FENTANYL CITRATE 50 UG/ML
25 INJECTION, SOLUTION INTRAMUSCULAR; INTRAVENOUS
Status: DISCONTINUED | OUTPATIENT
Start: 2022-05-17 | End: 2022-05-17 | Stop reason: HOSPADM

## 2022-05-17 RX ORDER — ONDANSETRON 4 MG/1
4 TABLET, ORALLY DISINTEGRATING ORAL EVERY 6 HOURS PRN
Status: DISCONTINUED | OUTPATIENT
Start: 2022-05-17 | End: 2022-05-27 | Stop reason: HOSPADM

## 2022-05-17 RX ORDER — PROCHLORPERAZINE MALEATE 10 MG
10 TABLET ORAL EVERY 6 HOURS PRN
Status: DISCONTINUED | OUTPATIENT
Start: 2022-05-17 | End: 2022-05-27 | Stop reason: HOSPADM

## 2022-05-17 RX ORDER — ONDANSETRON 4 MG/1
4 TABLET, ORALLY DISINTEGRATING ORAL EVERY 30 MIN PRN
Status: DISCONTINUED | OUTPATIENT
Start: 2022-05-17 | End: 2022-05-17 | Stop reason: HOSPADM

## 2022-05-17 RX ORDER — FUROSEMIDE 10 MG/ML
60 INJECTION INTRAMUSCULAR; INTRAVENOUS ONCE
Status: COMPLETED | OUTPATIENT
Start: 2022-05-17 | End: 2022-05-17

## 2022-05-17 RX ORDER — SODIUM CHLORIDE, SODIUM LACTATE, POTASSIUM CHLORIDE, CALCIUM CHLORIDE 600; 310; 30; 20 MG/100ML; MG/100ML; MG/100ML; MG/100ML
INJECTION, SOLUTION INTRAVENOUS CONTINUOUS
Status: DISCONTINUED | OUTPATIENT
Start: 2022-05-17 | End: 2022-05-17 | Stop reason: HOSPADM

## 2022-05-17 RX ORDER — MEPERIDINE HYDROCHLORIDE 25 MG/ML
12.5 INJECTION INTRAMUSCULAR; INTRAVENOUS; SUBCUTANEOUS
Status: DISCONTINUED | OUTPATIENT
Start: 2022-05-17 | End: 2022-05-17 | Stop reason: HOSPADM

## 2022-05-17 RX ORDER — DOPAMINE HYDROCHLORIDE 160 MG/100ML
3 INJECTION, SOLUTION INTRAVENOUS CONTINUOUS
Status: DISCONTINUED | OUTPATIENT
Start: 2022-05-17 | End: 2022-05-18

## 2022-05-17 RX ORDER — OXYCODONE HYDROCHLORIDE 10 MG/1
10 TABLET ORAL EVERY 4 HOURS PRN
Status: DISCONTINUED | OUTPATIENT
Start: 2022-05-17 | End: 2022-05-18

## 2022-05-17 RX ORDER — SODIUM CHLORIDE 9 MG/ML
INJECTION, SOLUTION INTRAVENOUS CONTINUOUS
Status: DISCONTINUED | OUTPATIENT
Start: 2022-05-17 | End: 2022-05-17 | Stop reason: HOSPADM

## 2022-05-17 RX ORDER — HYDROMORPHONE HYDROCHLORIDE 1 MG/ML
0.2 INJECTION, SOLUTION INTRAMUSCULAR; INTRAVENOUS; SUBCUTANEOUS EVERY 5 MIN PRN
Status: DISCONTINUED | OUTPATIENT
Start: 2022-05-17 | End: 2022-05-17 | Stop reason: HOSPADM

## 2022-05-17 RX ORDER — ACETAMINOPHEN 325 MG/1
975 TABLET ORAL EVERY 8 HOURS
Status: COMPLETED | OUTPATIENT
Start: 2022-05-17 | End: 2022-05-20

## 2022-05-17 RX ORDER — ATORVASTATIN CALCIUM 10 MG/1
10 TABLET, FILM COATED ORAL DAILY
Status: DISCONTINUED | OUTPATIENT
Start: 2022-05-18 | End: 2022-05-27 | Stop reason: HOSPADM

## 2022-05-17 RX ORDER — OXYCODONE HYDROCHLORIDE 5 MG/1
5 TABLET ORAL EVERY 4 HOURS PRN
Status: DISCONTINUED | OUTPATIENT
Start: 2022-05-17 | End: 2022-05-18

## 2022-05-17 RX ORDER — BISACODYL 10 MG
10 SUPPOSITORY, RECTAL RECTAL DAILY PRN
Status: DISCONTINUED | OUTPATIENT
Start: 2022-05-17 | End: 2022-05-27 | Stop reason: HOSPADM

## 2022-05-17 RX ORDER — SODIUM CHLORIDE 9 MG/ML
1000 INJECTION, SOLUTION INTRAVENOUS CONTINUOUS PRN
Status: DISCONTINUED | OUTPATIENT
Start: 2022-05-17 | End: 2022-05-18

## 2022-05-17 RX ORDER — VERAPAMIL HYDROCHLORIDE 2.5 MG/ML
INJECTION, SOLUTION INTRAVENOUS PRN
Status: DISCONTINUED | OUTPATIENT
Start: 2022-05-17 | End: 2022-05-17 | Stop reason: HOSPADM

## 2022-05-17 RX ORDER — DOPAMINE HYDROCHLORIDE 160 MG/100ML
INJECTION, SOLUTION INTRAVENOUS CONTINUOUS PRN
Status: DISCONTINUED | OUTPATIENT
Start: 2022-05-17 | End: 2022-05-17

## 2022-05-17 RX ORDER — ACETAMINOPHEN 325 MG/1
650 TABLET ORAL EVERY 4 HOURS PRN
Status: DISCONTINUED | OUTPATIENT
Start: 2022-05-20 | End: 2022-05-21

## 2022-05-17 RX ORDER — ONDANSETRON 2 MG/ML
INJECTION INTRAMUSCULAR; INTRAVENOUS PRN
Status: DISCONTINUED | OUTPATIENT
Start: 2022-05-17 | End: 2022-05-17

## 2022-05-17 RX ADMIN — FENTANYL CITRATE 100 MCG: 50 INJECTION, SOLUTION INTRAMUSCULAR; INTRAVENOUS at 15:06

## 2022-05-17 RX ADMIN — MANNITOL 50 G: 20 INJECTION, SOLUTION INTRAVENOUS at 18:54

## 2022-05-17 RX ADMIN — FENTANYL CITRATE 50 MCG: 50 INJECTION, SOLUTION INTRAMUSCULAR; INTRAVENOUS at 19:55

## 2022-05-17 RX ADMIN — ROCURONIUM BROMIDE 10 MG: 50 INJECTION, SOLUTION INTRAVENOUS at 19:01

## 2022-05-17 RX ADMIN — DOCUSATE SODIUM 50 MG AND SENNOSIDES 8.6 MG 1 TABLET: 8.6; 5 TABLET, FILM COATED ORAL at 23:44

## 2022-05-17 RX ADMIN — PROPOFOL 20 MG: 10 INJECTION, EMULSION INTRAVENOUS at 19:55

## 2022-05-17 RX ADMIN — FENTANYL CITRATE 100 MCG: 50 INJECTION, SOLUTION INTRAMUSCULAR; INTRAVENOUS at 16:11

## 2022-05-17 RX ADMIN — SUGAMMADEX 200 MG: 100 INJECTION, SOLUTION INTRAVENOUS at 19:47

## 2022-05-17 RX ADMIN — HYDROMORPHONE HYDROCHLORIDE 0.2 MG: 1 INJECTION, SOLUTION INTRAMUSCULAR; INTRAVENOUS; SUBCUTANEOUS at 21:18

## 2022-05-17 RX ADMIN — FUROSEMIDE 60 MG: 10 INJECTION, SOLUTION INTRAVENOUS at 18:55

## 2022-05-17 RX ADMIN — MYCOPHENOLATE MOFETIL 1000 MG: 500 INJECTION, POWDER, LYOPHILIZED, FOR SOLUTION INTRAVENOUS at 16:03

## 2022-05-17 RX ADMIN — PROPOFOL 50 MG: 10 INJECTION, EMULSION INTRAVENOUS at 19:51

## 2022-05-17 RX ADMIN — HYDROMORPHONE HYDROCHLORIDE 0.2 MG: 1 INJECTION, SOLUTION INTRAMUSCULAR; INTRAVENOUS; SUBCUTANEOUS at 21:12

## 2022-05-17 RX ADMIN — FENTANYL CITRATE 25 MCG: 50 INJECTION, SOLUTION INTRAMUSCULAR; INTRAVENOUS at 20:48

## 2022-05-17 RX ADMIN — HYDROMORPHONE HYDROCHLORIDE 0.2 MG: 0.2 INJECTION, SOLUTION INTRAMUSCULAR; INTRAVENOUS; SUBCUTANEOUS at 23:43

## 2022-05-17 RX ADMIN — ROCURONIUM BROMIDE 20 MG: 50 INJECTION, SOLUTION INTRAVENOUS at 16:29

## 2022-05-17 RX ADMIN — ROCURONIUM BROMIDE 10 MG: 50 INJECTION, SOLUTION INTRAVENOUS at 18:02

## 2022-05-17 RX ADMIN — ROCURONIUM BROMIDE 10 MG: 50 INJECTION, SOLUTION INTRAVENOUS at 18:33

## 2022-05-17 RX ADMIN — HYDROMORPHONE HYDROCHLORIDE 0.2 MG: 1 INJECTION, SOLUTION INTRAMUSCULAR; INTRAVENOUS; SUBCUTANEOUS at 21:32

## 2022-05-17 RX ADMIN — ROCURONIUM BROMIDE 20 MG: 50 INJECTION, SOLUTION INTRAVENOUS at 17:00

## 2022-05-17 RX ADMIN — ROCURONIUM BROMIDE 80 MG: 50 INJECTION, SOLUTION INTRAVENOUS at 15:18

## 2022-05-17 RX ADMIN — SODIUM CHLORIDE, SODIUM LACTATE, POTASSIUM CHLORIDE, CALCIUM CHLORIDE AND DEXTROSE MONOHYDRATE: 5; 600; 310; 30; 20 INJECTION, SOLUTION INTRAVENOUS at 21:11

## 2022-05-17 RX ADMIN — SODIUM CHLORIDE 500 MG: 9 INJECTION, SOLUTION INTRAVENOUS at 15:27

## 2022-05-17 RX ADMIN — SODIUM CHLORIDE, POTASSIUM CHLORIDE, SODIUM LACTATE AND CALCIUM CHLORIDE: 600; 310; 30; 20 INJECTION, SOLUTION INTRAVENOUS at 17:13

## 2022-05-17 RX ADMIN — SODIUM CHLORIDE, SODIUM GLUCONATE, SODIUM ACETATE, POTASSIUM CHLORIDE AND MAGNESIUM CHLORIDE: 526; 502; 368; 37; 30 INJECTION, SOLUTION INTRAVENOUS at 15:37

## 2022-05-17 RX ADMIN — DOPAMINE HYDROCHLORIDE 3 MCG/KG/MIN: 160 INJECTION, SOLUTION INTRAVENOUS at 18:08

## 2022-05-17 RX ADMIN — MIDAZOLAM 2 MG: 1 INJECTION INTRAMUSCULAR; INTRAVENOUS at 14:50

## 2022-05-17 RX ADMIN — PHENYLEPHRINE HYDROCHLORIDE 100 MCG: 10 INJECTION INTRAVENOUS at 16:15

## 2022-05-17 RX ADMIN — FUROSEMIDE 60 MG: 10 INJECTION, SOLUTION INTRAVENOUS at 20:55

## 2022-05-17 RX ADMIN — CEFUROXIME 1.5 G: 1.5 INJECTION, POWDER, FOR SOLUTION INTRAVENOUS at 15:30

## 2022-05-17 RX ADMIN — FENTANYL CITRATE 25 MCG: 50 INJECTION, SOLUTION INTRAMUSCULAR; INTRAVENOUS at 21:01

## 2022-05-17 RX ADMIN — PROPOFOL 200 MG: 10 INJECTION, EMULSION INTRAVENOUS at 15:18

## 2022-05-17 RX ADMIN — HEPARIN SODIUM 2000 UNITS: 1000 INJECTION INTRAVENOUS; SUBCUTANEOUS at 17:48

## 2022-05-17 RX ADMIN — FENTANYL CITRATE 25 MCG: 50 INJECTION, SOLUTION INTRAMUSCULAR; INTRAVENOUS at 21:06

## 2022-05-17 RX ADMIN — ONDANSETRON 4 MG: 2 INJECTION INTRAMUSCULAR; INTRAVENOUS at 19:47

## 2022-05-17 RX ADMIN — SODIUM CHLORIDE, POTASSIUM CHLORIDE, SODIUM LACTATE AND CALCIUM CHLORIDE: 600; 310; 30; 20 INJECTION, SOLUTION INTRAVENOUS at 14:50

## 2022-05-17 RX ADMIN — SODIUM CHLORIDE 20 MG: 9 INJECTION, SOLUTION INTRAVENOUS at 16:02

## 2022-05-17 RX ADMIN — FENTANYL CITRATE 25 MCG: 50 INJECTION, SOLUTION INTRAMUSCULAR; INTRAVENOUS at 20:54

## 2022-05-17 RX ADMIN — PROPOFOL 20 MG: 10 INJECTION, EMULSION INTRAVENOUS at 20:14

## 2022-05-17 RX ADMIN — FENTANYL CITRATE 50 MCG: 50 INJECTION, SOLUTION INTRAMUSCULAR; INTRAVENOUS at 19:17

## 2022-05-17 RX ADMIN — ACETAMINOPHEN 975 MG: 325 TABLET ORAL at 23:44

## 2022-05-17 ASSESSMENT — ACTIVITIES OF DAILY LIVING (ADL)
ADLS_ACUITY_SCORE: 20
ADLS_ACUITY_SCORE: 20
ADLS_ACUITY_SCORE: 35
ADLS_ACUITY_SCORE: 20

## 2022-05-17 NOTE — LETTER
[facility] [address] [place] [id number]  [phone]    FACSIMILE TRANSMITTAL SHEET    TO:[employer]       FROM: Serenity BARLOW at [facility]   PHONE: [phone]  DATE: 05/24/22      _____URGENT __X___REVIEW  _____PLEASE COMMENT__X__PLEASE REPLY    NOTES/COMMENTS:   New referral for PT/OT/RN services   Thanks!                                      IF YOU DID NOT RECEIVE THE CORRECT NUMBER OF PAGES OR THE FAX DID NOT COME THROUGH CLEARLY, PLEASE CALL THE SENDER     CONFIDENTIALITY STATEMENT: Confidential information that may accompany this transmission contains protected health information under state and federal law and is legally privileged. This information is intended only for the use of the individual or entity named above and may be used only for carrying out treatment, payment or other healthcare operations. The recipient or person responsible for delivering this information is prohibited by law from disclosing this information without proper authorization to any other party, unless required to do so by law or regulation. If you are not the intended recipient, you are hereby notified that any review, dissemination, distribution, or copying of this message is strictly prohibited. If you have received this communication in error, please destroy the materials and contact us immediately by calling the number listed above. No response indicates that the information was received by the appropriate authorized party

## 2022-05-17 NOTE — PROGRESS NOTES
Time of notification: 2:24 PM  Provider notified: Dr. Salvador  Patient status: 3C preop      Temp:  [98.2  F (36.8  C)] 98.2  F (36.8  C)  Pulse:  [104] 104  Resp:  [16] 16  BP: (97)/(73) 97/73  SpO2:  [96 %] 96 %      Paged provider to sign surgical consent - missing provider signature.     Xin Hinton RN on 5/17/2022 at 2:27 PM

## 2022-05-17 NOTE — ANESTHESIA PROCEDURE NOTES
Airway       Patient location during procedure: OR       Procedure Start/Stop Times: 5/17/2022 3:22 PM  Staff -        Anesthesiologist:  Jaime Hester MD       CRNA: Brittany Abraham APRN CRNA       Performed By: CRNAIndications and Patient Condition       Indications for airway management: twin-procedural       Induction type:intravenous       Mask difficulty assessment: 2 - vent by mask + OA or adjuvant +/- NMBA    Final Airway Details       Final airway type: endotracheal airway       Successful airway: ETT - single  Endotracheal Airway Details        ETT size (mm): 7.0       Cuffed: yes       Successful intubation technique: direct laryngoscopy       DL Blade Type: Martínez 2       Grade View of Cords: 1       Adjucts: stylet       Position: Right       Measured from: lips       Secured at (cm): 22       Bite block used: None    Post intubation assessment        Placement verified by: capnometry, equal breath sounds and chest rise        Number of attempts at approach: 1       Number of other approaches attempted: 0       Secured with: pink tape       Ease of procedure: easy       Dentition: Intact and Unchanged    Medication(s) Administered   Medication Administration Time: 5/17/2022 3:22 PM

## 2022-05-17 NOTE — ANESTHESIA PROCEDURE NOTES
Central Line/PA Catheter Placement    Pre-Procedure   Staff -        Anesthesiologist:  Jaime Hester MD       Performed By: anesthesiologist       Location: OR       Pre-Anesthestic Checklist: patient identified, IV checked, site marked, risks and benefits discussed, informed consent, monitors and equipment checked, pre-op evaluation and at physician/surgeon's request  Timeout:       Correct Patient: Yes        Correct Procedure: Yes        Correct Site: Yes        Correct Position: Yes        Correct Laterality: Yes   Line Placement:   This line was placed Post Induction starting at 5/17/2022 3:30 PM and ending at 5/17/2022 3:47 PM    Procedure   Procedure: central line and elective       Laterality: right       Insertion Site: internal jugular.       Patient Position: Trendelenburg  Sterile Prep        All elements of maximal sterile barrier technique followed       Patient Prep/Sterile Barriers: draped, hand hygiene, gloves , hat , mask , draped, gown, sterile gel and probe cover       Skin prep: Chloraprep  Insertion/Injection        Technique: ultrasound guided and Seldinger Technique        1. Ultrasound was used to evaluate the access site.       2. Vein evaluated via ultrasound for patency/adequacy.       3. Using real-time ultrasound the needle/catheter was observed entering the artery/vein.       Type: CVC       Catheter Size: 7 Fr       Catheter Length: 20       Number of Lumens: triple lumen  Narrative         Secured by: suture       Tegaderm and Biopatch dressing used.       Complications: None apparent,        blood aspirated from all lumens,        All lumens flushed: Yes       Verification method: Placement to be verified post-op

## 2022-05-17 NOTE — ANESTHESIA PREPROCEDURE EVALUATION
Anesthesia Pre-Procedure Evaluation    Patient: Donald Blanco   MRN: [id number] : 1965        Procedure : Procedure(s):  Kidney Transplant Non-Directed Recipient          Past Medical History:   Diagnosis Date     CKD (chronic kidney disease) stage 4, GFR 15-29 ml/min (H)      Elevated PSA      Essential hypertension 2020     Hyperlipidemia      IgA nephropathy      Obesity      Prostate cancer (H) 10/15/2020     S/P radiation therapy     6,600 cGy to pelvis completed on 2021 [facility]      Past Surgical History:   Procedure Laterality Date     BACK SURGERY      Back Surgey 20+ Years Ago      BIOPSY      [facility]      COLONOSCOPY      10+ Years ago at [facility]      DAVINCI PROSTATECTOMY, LYMPHADENECTOMY N/A 2020    Procedure: PROSTATECTOMY, ROBOT-ASSISTED, WITH PELVIC LYMPHADENECTOMY;  Surgeon: Nabil Vásquez MD;  Location:  OR      No Known Allergies   Social History     Tobacco Use     Smoking status: Never Smoker     Smokeless tobacco: Never Used   Substance Use Topics     Alcohol use: Yes     Comment: rare use      Wt Readings from Last 1 Encounters:   22 103.4 kg (227 lb 15.3 oz)        Anesthesia Evaluation   Pt has had prior anesthetic.     No history of anesthetic complications       ROS/MED HX  ENT/Pulmonary:  - neg pulmonary ROS     Neurologic:  - neg neurologic ROS     Cardiovascular: Comment: Nonobstructive CAD    TTE 2021:  LVEF 60-65%  RV normal    Stress 10/2020: negative    Procedure: CTA ANGIOGRAM CORONARY ARTERY   Examination Date: 11/15/2021 1:48 PM   Indication:56 years Male  Pre-operative cardiovascular examination   Ordering Provider: RAMON CHAVEZ  Overall quality of the study: Good.      PROCEDURE: ECG gated multi-slice computed tomography of the heart   with and without intravenous contrast  (Isovue 370, 120 mL, wasted 0  mL) was  performed on a Siemens Dual Source Flash scanner without  incident.  Medical therapy was used to optimize heart rate (Metoprolol  100 mg Oral/ Metoprolol 0 mg IV/ Ivabradine 15 mg Oral). Sublingual  Nitrostat 0.8 mg was given prior to scanning. Coronary artery calcium  score was performed using the Flash scanner protocol. CTA was  performed in the spiral mode at a heart rate of 63 bpm with 120 kVp.  Images were reconstructed and analyzed on a 500px workstation.  Scan protocol was optimized to minimize radiation exposure. The total  radiation exposure including calcium score was calculated to be 854  DLP, and 12 mSv.                                                                        IMPRESSION:  1.  Mild non-obstructive CAD.  2.  Total Agatston score 138 placing the patient in the 53rd  percentile when compared to age and gender matched control group.  3.  Please review Radiology report for incidental noncardiac findings  that will follow separately.     FINDINGS:     CORONARY CALCIUM SCORE     Total Agatston calcium score: 138   Left main: 0  Left anterior descendin  Left circumflex: 0  Right coronary artery: 14.6   This places the patient in the 53rd  percentile when compared to age  and gender matched control group.     CORONARY ANGIOGRAPHY     DOMINANCE: Right dominant system.   Normal coronary origins and course.     LEFT MAIN:   The left main arises normally from the left coronary cusp and is  widely patent without any detectable stenosis.      LEFT ANTERIOR DESCENDING:   Large type III vessel (wraps around the apex) which supplies a large  D1 and moderate-sized D2.  Proximal LAD: Minimal (<25%) stenosis composed of calcified plaque.  First diagonal: Mild (25-49%) stenosis composed of calcified plaque.  The remainder of the left anterior descending and its major diagonal  branches are patent with minimal luminal irregularities.     LEFT CIRCUMFLEX:   Moderate-sized vessel which supplies a small OM1 and diminutive OM2,  OM3, and LPLAs.  The left circumflex and its  major marginal branches are patent with  minimal luminal irregularities     RIGHT CORONARY ARTERY:   The right coronary artery is a moderate size artery.     Proximal RCA: Minimal (<25%) stenosis composed of mixed plaque.  Mid RCA: Minimal (<25%) stenosis composed of calcified plaque.  The remainder of the right coronary and the posterior descending  artery are patent with minimal luminal irregularities.        ADDITIONAL FINDINGS:   The proximal ascending aorta is normal in size.   Atherosclerotic calcifications are present in the aortic arch and  descending aorta.   Central venous catheter is visualized in superior vena cava.   Normal pulmonary venous anatomy with all four pulmonary veins draining  into the left atrium.    The left atrial appendage is free of thrombus.  There is no left ventricular mass or thrombus.   Normal pericardial thickness. There is no pericardial effusion.      (+) Dyslipidemia hypertension-----    METS/Exercise Tolerance:     Hematologic:       Musculoskeletal:       GI/Hepatic:       Renal/Genitourinary:     (+) renal disease (IgA nephropathy on HD TThSa, aneuric; last dialyzed yesterday),     Endo:       Psychiatric/Substance Use:       Infectious Disease:       Malignancy: Comment: Prostate cancer s/p radiation and prostatectomy      Other:            Physical Exam    Airway        Mallampati: II   TM distance: > 3 FB   Neck ROM: full   Mouth opening: > 3 cm    Respiratory Devices and Support         Dental     Comment: Maxillary incisor coronal composite fillings        Cardiovascular          Rhythm and rate: regular and normal     Pulmonary           breath sounds clear to auscultation           OUTSIDE LABS:  CBC:   Lab Results   Component Value Date    WBC 7.7 05/09/2022    WBC 10.6 12/15/2020    HGB 13.1 (L) 05/09/2022    HGB 9.2 (L) 12/15/2020    HCT 40.6 05/09/2022    HCT 27.6 (L) 12/15/2020     05/09/2022     12/15/2020     BMP:   Lab Results   Component Value  Date     05/09/2022     08/09/2021    POTASSIUM 4.7 05/17/2022    POTASSIUM 5.6 (H) 05/09/2022    CHLORIDE 97 05/09/2022    CHLORIDE 107 08/09/2021    CO2 26 05/09/2022    CO2 22 08/09/2021    BUN 73 (H) 05/09/2022     (HH) 08/09/2021    CR 13.20 (H) 05/09/2022    CR 11.70 (HH) 08/09/2021     (H) 05/17/2022    GLC 87 05/09/2022     COAGS:   Lab Results   Component Value Date    PTT 33 09/03/2020    INR 0.98 05/09/2022     POC:   Lab Results   Component Value Date     (H) 12/15/2020     HEPATIC:   Lab Results   Component Value Date    ALBUMIN 4.0 05/09/2022    PROTTOTAL 8.1 05/09/2022    ALT 34 05/09/2022    AST 16 05/09/2022    ALKPHOS 99 05/09/2022    BILITOTAL 0.4 05/09/2022     OTHER:   Lab Results   Component Value Date    A1C 5.0 05/09/2022    YEE 8.6 05/09/2022       Anesthesia Plan    ASA Status:  3   NPO Status:  NPO Appropriate    Anesthesia Type: General.     - Airway: ETT   Induction: Intravenous.      Techniques and Equipment:     - Lines/Monitors: 2nd IV, Central Line     Consents    Anesthesia Plan(s) and associated risks, benefits, and realistic alternatives discussed. Questions answered and patient/representative(s) expressed understanding.    - Discussed:     - Discussed with:  Patient      - Extended Intubation/Ventilatory Support Discussed: No.      - Patient is DNR/DNI Status: No    Use of blood products discussed: Yes.     - Discussed with: Patient.     - Consented: consented to blood products            Reason for refusal: other.     Postoperative Care            Comments:    Other Comments: Discussed possible arterial line placement as needed            Jaime Hester MD

## 2022-05-18 ENCOUNTER — APPOINTMENT (OUTPATIENT)
Dept: ULTRASOUND IMAGING | Facility: CLINIC | Age: 57
DRG: 650 | End: 2022-05-18
Attending: NURSE PRACTITIONER
Payer: COMMERCIAL

## 2022-05-18 ENCOUNTER — DOCUMENTATION ONLY (OUTPATIENT)
Dept: TRANSPLANT | Facility: CLINIC | Age: 57
End: 2022-05-18
Payer: COMMERCIAL

## 2022-05-18 ENCOUNTER — APPOINTMENT (OUTPATIENT)
Dept: ULTRASOUND IMAGING | Facility: CLINIC | Age: 57
DRG: 650 | End: 2022-05-18
Attending: SURGERY
Payer: COMMERCIAL

## 2022-05-18 LAB
ANION GAP SERPL CALCULATED.3IONS-SCNC: 10 MMOL/L (ref 3–14)
ANION GAP SERPL CALCULATED.3IONS-SCNC: 14 MMOL/L (ref 3–14)
BASE EXCESS BLDV CALC-SCNC: 1.1 MMOL/L (ref -8.1–1.9)
BASOPHILS # BLD AUTO: 0 10E3/UL (ref 0–0.2)
BASOPHILS NFR BLD AUTO: 0 %
BUN SERPL-MCNC: 52 MG/DL (ref 7–30)
BUN SERPL-MCNC: 62 MG/DL (ref 7–30)
CA-I BLD-MCNC: 4 MG/DL (ref 4.4–5.2)
CALCIUM SERPL-MCNC: 8.8 MG/DL (ref 8.5–10.1)
CALCIUM SERPL-MCNC: 9.3 MG/DL (ref 8.5–10.1)
CHLORIDE BLD-SCNC: 93 MMOL/L (ref 94–109)
CHLORIDE BLD-SCNC: 95 MMOL/L (ref 94–109)
CMV DNA SPEC NAA+PROBE-ACNC: NOT DETECTED IU/ML
CO2 SERPL-SCNC: 26 MMOL/L (ref 20–32)
CO2 SERPL-SCNC: 27 MMOL/L (ref 20–32)
CREAT FLD-MCNC: 10.1 MG/DL
CREAT SERPL-MCNC: 9.2 MG/DL (ref 0.66–1.25)
CREAT SERPL-MCNC: 9.89 MG/DL (ref 0.66–1.25)
CREATININE BODY FLUID SOURCE: NORMAL
DONOR IDENTIFICATION: NORMAL
DSA COMMENTS: NORMAL
DSA PRESENT: NO
DSA TEST METHOD: NORMAL
EOSINOPHIL # BLD AUTO: 0 10E3/UL (ref 0–0.7)
EOSINOPHIL NFR BLD AUTO: 0 %
ERYTHROCYTE [DISTWIDTH] IN BLOOD BY AUTOMATED COUNT: 13 % (ref 10–15)
ERYTHROCYTE [DISTWIDTH] IN BLOOD BY AUTOMATED COUNT: 13.2 % (ref 10–15)
GFR SERPL CREATININE-BSD FRML MDRD: 6 ML/MIN/1.73M2
GFR SERPL CREATININE-BSD FRML MDRD: 6 ML/MIN/1.73M2
GLUCOSE BLD-MCNC: 168 MG/DL (ref 70–99)
GLUCOSE BLD-MCNC: 192 MG/DL (ref 70–99)
GLUCOSE BLD-MCNC: 96 MG/DL (ref 70–99)
GLUCOSE BLDC GLUCOMTR-MCNC: 141 MG/DL (ref 70–99)
GLUCOSE BLDC GLUCOMTR-MCNC: 176 MG/DL (ref 70–99)
GLUCOSE BLDC GLUCOMTR-MCNC: 190 MG/DL (ref 70–99)
GLUCOSE BLDC GLUCOMTR-MCNC: 198 MG/DL (ref 70–99)
GLUCOSE BLDC GLUCOMTR-MCNC: 219 MG/DL (ref 70–99)
HBV CORE AB SERPL QL IA: NONREACTIVE
HBV SURFACE AB SERPL IA-ACNC: 143.28 M[IU]/ML
HBV SURFACE AG SERPL QL IA: NONREACTIVE
HCO3 BLDV-SCNC: 25 MMOL/L (ref 21–28)
HCT VFR BLD AUTO: 32.5 % (ref 40–53)
HCT VFR BLD AUTO: 34.1 % (ref 40–53)
HCV AB SERPL QL IA: NONREACTIVE
HGB BLD-MCNC: 10.2 G/DL (ref 13.3–17.7)
HGB BLD-MCNC: 10.2 G/DL (ref 13.3–17.7)
HGB BLD-MCNC: 10.5 G/DL (ref 13.3–17.7)
HGB BLD-MCNC: 10.9 G/DL (ref 13.3–17.7)
HGB BLD-MCNC: 9.1 G/DL (ref 13.3–17.7)
HIV 1+2 AB+HIV1 P24 AG SERPL QL IA: NONREACTIVE
IMM GRANULOCYTES # BLD: 0 10E3/UL
IMM GRANULOCYTES NFR BLD: 0 %
LACTATE BLD-SCNC: 2.1 MMOL/L
LACTATE SERPL-SCNC: 2.3 MMOL/L (ref 0.7–2)
LYMPHOCYTES # BLD AUTO: 0.5 10E3/UL (ref 0.8–5.3)
LYMPHOCYTES NFR BLD AUTO: 4 %
MAGNESIUM SERPL-MCNC: 2.3 MG/DL (ref 1.6–2.3)
MAGNESIUM SERPL-MCNC: 2.4 MG/DL (ref 1.6–2.3)
MCH RBC QN AUTO: 33 PG (ref 26.5–33)
MCH RBC QN AUTO: 33 PG (ref 26.5–33)
MCHC RBC AUTO-ENTMCNC: 31.5 G/DL (ref 31.5–36.5)
MCHC RBC AUTO-ENTMCNC: 32.3 G/DL (ref 31.5–36.5)
MCV RBC AUTO: 102 FL (ref 78–100)
MCV RBC AUTO: 102 FL (ref 78–100)
MONOCYTES # BLD AUTO: 0.6 10E3/UL (ref 0–1.3)
MONOCYTES NFR BLD AUTO: 5 %
NEUTROPHILS # BLD AUTO: 10.8 10E3/UL (ref 1.6–8.3)
NEUTROPHILS NFR BLD AUTO: 91 %
NRBC # BLD AUTO: 0 10E3/UL
NRBC BLD AUTO-RTO: 0 /100
O2/TOTAL GAS SETTING VFR VENT: 42 %
ORGAN: NORMAL
PCO2 BLDV: 35 MM HG (ref 40–50)
PH BLDV: 7.46 [PH] (ref 7.32–7.43)
PHOSPHATE SERPL-MCNC: 4 MG/DL (ref 2.5–4.5)
PHOSPHATE SERPL-MCNC: 5.6 MG/DL (ref 2.5–4.5)
PLATELET # BLD AUTO: 172 10E3/UL (ref 150–450)
PLATELET # BLD AUTO: 196 10E3/UL (ref 150–450)
PO2 BLDV: 40 MM HG (ref 25–47)
POTASSIUM BLD-SCNC: 4.4 MMOL/L (ref 3.5–5)
POTASSIUM BLD-SCNC: 4.9 MMOL/L (ref 3.4–5.3)
POTASSIUM BLD-SCNC: 4.9 MMOL/L (ref 3.4–5.3)
POTASSIUM BLD-SCNC: 5.5 MMOL/L (ref 3.4–5.3)
POTASSIUM BLD-SCNC: 5.7 MMOL/L (ref 3.4–5.3)
POTASSIUM BLD-SCNC: 5.8 MMOL/L (ref 3.4–5.3)
POTASSIUM BLD-SCNC: 5.9 MMOL/L (ref 3.4–5.3)
RBC # BLD AUTO: 3.18 10E6/UL (ref 4.4–5.9)
RBC # BLD AUTO: 3.33 10E6/UL (ref 4.4–5.9)
SODIUM BLD-SCNC: 137 MMOL/L (ref 133–144)
SODIUM SERPL-SCNC: 132 MMOL/L (ref 133–144)
SODIUM SERPL-SCNC: 133 MMOL/L (ref 133–144)
WBC # BLD AUTO: 12 10E3/UL (ref 4–11)
WBC # BLD AUTO: 14.9 10E3/UL (ref 4–11)

## 2022-05-18 PROCEDURE — 250N000011 HC RX IP 250 OP 636: Performed by: STUDENT IN AN ORGANIZED HEALTH CARE EDUCATION/TRAINING PROGRAM

## 2022-05-18 PROCEDURE — 82570 ASSAY OF URINE CREATININE: CPT | Performed by: SURGERY

## 2022-05-18 PROCEDURE — 250N000012 HC RX MED GY IP 250 OP 636 PS 637: Performed by: STUDENT IN AN ORGANIZED HEALTH CARE EDUCATION/TRAINING PROGRAM

## 2022-05-18 PROCEDURE — 250N000011 HC RX IP 250 OP 636

## 2022-05-18 PROCEDURE — 85025 COMPLETE CBC W/AUTO DIFF WBC: CPT | Performed by: STUDENT IN AN ORGANIZED HEALTH CARE EDUCATION/TRAINING PROGRAM

## 2022-05-18 PROCEDURE — 85018 HEMOGLOBIN: CPT | Performed by: STUDENT IN AN ORGANIZED HEALTH CARE EDUCATION/TRAINING PROGRAM

## 2022-05-18 PROCEDURE — 258N000003 HC RX IP 258 OP 636: Performed by: NURSE PRACTITIONER

## 2022-05-18 PROCEDURE — 76776 US EXAM K TRANSPL W/DOPPLER: CPT | Mod: 26 | Performed by: RADIOLOGY

## 2022-05-18 PROCEDURE — 76776 US EXAM K TRANSPL W/DOPPLER: CPT | Mod: 77

## 2022-05-18 PROCEDURE — 84100 ASSAY OF PHOSPHORUS: CPT | Performed by: STUDENT IN AN ORGANIZED HEALTH CARE EDUCATION/TRAINING PROGRAM

## 2022-05-18 PROCEDURE — 82310 ASSAY OF CALCIUM: CPT

## 2022-05-18 PROCEDURE — 84132 ASSAY OF SERUM POTASSIUM: CPT | Performed by: NURSE PRACTITIONER

## 2022-05-18 PROCEDURE — 36415 COLL VENOUS BLD VENIPUNCTURE: CPT | Performed by: NURSE PRACTITIONER

## 2022-05-18 PROCEDURE — 85014 HEMATOCRIT: CPT

## 2022-05-18 PROCEDURE — 84132 ASSAY OF SERUM POTASSIUM: CPT | Performed by: STUDENT IN AN ORGANIZED HEALTH CARE EDUCATION/TRAINING PROGRAM

## 2022-05-18 PROCEDURE — 120N000003 HC R&B IMCU UMMC

## 2022-05-18 PROCEDURE — 250N000011 HC RX IP 250 OP 636: Performed by: NURSE PRACTITIONER

## 2022-05-18 PROCEDURE — 250N000013 HC RX MED GY IP 250 OP 250 PS 637

## 2022-05-18 PROCEDURE — 83735 ASSAY OF MAGNESIUM: CPT | Performed by: STUDENT IN AN ORGANIZED HEALTH CARE EDUCATION/TRAINING PROGRAM

## 2022-05-18 PROCEDURE — 85018 HEMOGLOBIN: CPT | Performed by: NURSE PRACTITIONER

## 2022-05-18 PROCEDURE — 36592 COLLECT BLOOD FROM PICC: CPT | Performed by: STUDENT IN AN ORGANIZED HEALTH CARE EDUCATION/TRAINING PROGRAM

## 2022-05-18 PROCEDURE — 36415 COLL VENOUS BLD VENIPUNCTURE: CPT

## 2022-05-18 PROCEDURE — 99223 1ST HOSP IP/OBS HIGH 75: CPT | Mod: 24 | Performed by: INTERNAL MEDICINE

## 2022-05-18 PROCEDURE — 83735 ASSAY OF MAGNESIUM: CPT

## 2022-05-18 PROCEDURE — 80048 BASIC METABOLIC PNL TOTAL CA: CPT | Performed by: STUDENT IN AN ORGANIZED HEALTH CARE EDUCATION/TRAINING PROGRAM

## 2022-05-18 PROCEDURE — 5A09457 ASSISTANCE WITH RESPIRATORY VENTILATION, 24-96 CONSECUTIVE HOURS, CONTINUOUS POSITIVE AIRWAY PRESSURE: ICD-10-PCS | Performed by: INTERNAL MEDICINE

## 2022-05-18 PROCEDURE — 84100 ASSAY OF PHOSPHORUS: CPT

## 2022-05-18 PROCEDURE — 83605 ASSAY OF LACTIC ACID: CPT

## 2022-05-18 PROCEDURE — 76776 US EXAM K TRANSPL W/DOPPLER: CPT

## 2022-05-18 PROCEDURE — 76776 US EXAM K TRANSPL W/DOPPLER: CPT | Mod: 76

## 2022-05-18 PROCEDURE — 258N000001 HC RX 258

## 2022-05-18 PROCEDURE — 250N000013 HC RX MED GY IP 250 OP 250 PS 637: Performed by: STUDENT IN AN ORGANIZED HEALTH CARE EDUCATION/TRAINING PROGRAM

## 2022-05-18 PROCEDURE — 250N000013 HC RX MED GY IP 250 OP 250 PS 637: Performed by: NURSE PRACTITIONER

## 2022-05-18 PROCEDURE — 36592 COLLECT BLOOD FROM PICC: CPT | Performed by: NURSE PRACTITIONER

## 2022-05-18 PROCEDURE — 250N000009 HC RX 250

## 2022-05-18 RX ORDER — HYDROMORPHONE HYDROCHLORIDE 2 MG/1
2-4 TABLET ORAL
Status: DISCONTINUED | OUTPATIENT
Start: 2022-05-18 | End: 2022-05-19

## 2022-05-18 RX ORDER — METHOCARBAMOL 500 MG/1
500 TABLET, FILM COATED ORAL 4 TIMES DAILY PRN
Status: DISCONTINUED | OUTPATIENT
Start: 2022-05-18 | End: 2022-05-27 | Stop reason: HOSPADM

## 2022-05-18 RX ORDER — PREDNISONE 10 MG/1
10 TABLET ORAL DAILY
Status: DISCONTINUED | OUTPATIENT
Start: 2022-06-07 | End: 2022-05-23

## 2022-05-18 RX ORDER — DEXTROSE MONOHYDRATE 25 G/50ML
25-50 INJECTION, SOLUTION INTRAVENOUS
Status: DISCONTINUED | OUTPATIENT
Start: 2022-05-18 | End: 2022-05-20

## 2022-05-18 RX ORDER — NICOTINE POLACRILEX 4 MG
15-30 LOZENGE BUCCAL
Status: DISCONTINUED | OUTPATIENT
Start: 2022-05-18 | End: 2022-05-20

## 2022-05-18 RX ORDER — PREDNISONE 5 MG/1
5 TABLET ORAL DAILY
Status: DISCONTINUED | OUTPATIENT
Start: 2022-06-14 | End: 2022-05-23

## 2022-05-18 RX ORDER — CALCIUM GLUCONATE 94 MG/ML
1 INJECTION, SOLUTION INTRAVENOUS ONCE
Status: COMPLETED | OUTPATIENT
Start: 2022-05-18 | End: 2022-05-18

## 2022-05-18 RX ORDER — FUROSEMIDE 10 MG/ML
60 INJECTION INTRAMUSCULAR; INTRAVENOUS ONCE
Status: COMPLETED | OUTPATIENT
Start: 2022-05-18 | End: 2022-05-18

## 2022-05-18 RX ORDER — LIDOCAINE 4 G/G
2 PATCH TOPICAL
Status: DISCONTINUED | OUTPATIENT
Start: 2022-05-18 | End: 2022-05-27 | Stop reason: HOSPADM

## 2022-05-18 RX ORDER — HYDROMORPHONE HCL IN WATER/PF 6 MG/30 ML
0.2 PATIENT CONTROLLED ANALGESIA SYRINGE INTRAVENOUS EVERY 4 HOURS PRN
Status: DISCONTINUED | OUTPATIENT
Start: 2022-05-18 | End: 2022-05-18

## 2022-05-18 RX ORDER — HYDROMORPHONE HCL IN WATER/PF 6 MG/30 ML
0.2 PATIENT CONTROLLED ANALGESIA SYRINGE INTRAVENOUS EVERY 4 HOURS PRN
Status: ACTIVE | OUTPATIENT
Start: 2022-05-18 | End: 2022-05-18

## 2022-05-18 RX ORDER — PREDNISONE 20 MG/1
40 TABLET ORAL DAILY
Status: COMPLETED | OUTPATIENT
Start: 2022-05-22 | End: 2022-05-23

## 2022-05-18 RX ORDER — DEXTROSE MONOHYDRATE 25 G/50ML
25 INJECTION, SOLUTION INTRAVENOUS ONCE
Status: COMPLETED | OUTPATIENT
Start: 2022-05-18 | End: 2022-05-18

## 2022-05-18 RX ORDER — METHYLPREDNISOLONE SODIUM SUCCINATE 125 MG/2ML
100 INJECTION, POWDER, LYOPHILIZED, FOR SOLUTION INTRAMUSCULAR; INTRAVENOUS ONCE
Status: COMPLETED | OUTPATIENT
Start: 2022-05-19 | End: 2022-05-19

## 2022-05-18 RX ORDER — PREDNISONE 20 MG/1
20 TABLET ORAL DAILY
Status: DISCONTINUED | OUTPATIENT
Start: 2022-05-24 | End: 2022-05-23

## 2022-05-18 RX ORDER — HEPARIN SODIUM 10000 [USP'U]/100ML
500 INJECTION, SOLUTION INTRAVENOUS CONTINUOUS
Status: DISCONTINUED | OUTPATIENT
Start: 2022-05-18 | End: 2022-05-21

## 2022-05-18 RX ORDER — FUROSEMIDE 10 MG/ML
80 INJECTION INTRAMUSCULAR; INTRAVENOUS ONCE
Status: COMPLETED | OUTPATIENT
Start: 2022-05-18 | End: 2022-05-18

## 2022-05-18 RX ADMIN — SODIUM CHLORIDE, SODIUM LACTATE, POTASSIUM CHLORIDE, CALCIUM CHLORIDE AND DEXTROSE MONOHYDRATE: 5; 600; 310; 30; 20 INJECTION, SOLUTION INTRAVENOUS at 06:13

## 2022-05-18 RX ADMIN — HYDROMORPHONE HYDROCHLORIDE 0.4 MG: 0.2 INJECTION, SOLUTION INTRAMUSCULAR; INTRAVENOUS; SUBCUTANEOUS at 08:29

## 2022-05-18 RX ADMIN — MYCOPHENOLATE MOFETIL 750 MG: 250 CAPSULE ORAL at 07:53

## 2022-05-18 RX ADMIN — ATORVASTATIN CALCIUM 10 MG: 10 TABLET, FILM COATED ORAL at 07:53

## 2022-05-18 RX ADMIN — TACROLIMUS 3 MG: 1 CAPSULE ORAL at 07:53

## 2022-05-18 RX ADMIN — DEXTROSE MONOHYDRATE 25 G: 25 INJECTION, SOLUTION INTRAVENOUS at 02:25

## 2022-05-18 RX ADMIN — DEXTROSE 300 ML: 10 SOLUTION INTRAVENOUS at 02:17

## 2022-05-18 RX ADMIN — SODIUM CHLORIDE, SODIUM LACTATE, POTASSIUM CHLORIDE, CALCIUM CHLORIDE AND DEXTROSE MONOHYDRATE: 5; 600; 310; 30; 20 INJECTION, SOLUTION INTRAVENOUS at 15:44

## 2022-05-18 RX ADMIN — HYDROMORPHONE HYDROCHLORIDE 0.4 MG: 0.2 INJECTION, SOLUTION INTRAMUSCULAR; INTRAVENOUS; SUBCUTANEOUS at 01:55

## 2022-05-18 RX ADMIN — DOCUSATE SODIUM 50 MG AND SENNOSIDES 8.6 MG 1 TABLET: 8.6; 5 TABLET, FILM COATED ORAL at 07:53

## 2022-05-18 RX ADMIN — MYCOPHENOLATE MOFETIL 750 MG: 250 CAPSULE ORAL at 18:29

## 2022-05-18 RX ADMIN — HYDROMORPHONE HYDROCHLORIDE 0.2 MG: 0.2 INJECTION, SOLUTION INTRAMUSCULAR; INTRAVENOUS; SUBCUTANEOUS at 13:10

## 2022-05-18 RX ADMIN — HUMAN INSULIN 10 UNITS: 100 INJECTION, SOLUTION SUBCUTANEOUS at 02:34

## 2022-05-18 RX ADMIN — HEPARIN SODIUM AND DEXTROSE 200 UNITS/HR: 10000; 5 INJECTION INTRAVENOUS at 10:19

## 2022-05-18 RX ADMIN — OXYCODONE HYDROCHLORIDE 10 MG: 10 TABLET ORAL at 12:03

## 2022-05-18 RX ADMIN — OXYCODONE HYDROCHLORIDE 10 MG: 10 TABLET ORAL at 08:03

## 2022-05-18 RX ADMIN — POLYETHYLENE GLYCOL 3350 17 G: 17 POWDER, FOR SOLUTION ORAL at 13:10

## 2022-05-18 RX ADMIN — ACETAMINOPHEN 975 MG: 325 TABLET ORAL at 15:41

## 2022-05-18 RX ADMIN — TACROLIMUS 3 MG: 1 CAPSULE ORAL at 18:29

## 2022-05-18 RX ADMIN — FUROSEMIDE 60 MG: 10 INJECTION, SOLUTION INTRAVENOUS at 22:50

## 2022-05-18 RX ADMIN — SODIUM CHLORIDE 250 MG: 9 INJECTION, SOLUTION INTRAVENOUS at 15:46

## 2022-05-18 RX ADMIN — CALCIUM GLUCONATE 1 G: 98 INJECTION, SOLUTION INTRAVENOUS at 01:59

## 2022-05-18 RX ADMIN — HYDROMORPHONE HYDROCHLORIDE 0.4 MG: 0.2 INJECTION, SOLUTION INTRAMUSCULAR; INTRAVENOUS; SUBCUTANEOUS at 04:36

## 2022-05-18 RX ADMIN — DOCUSATE SODIUM 50 MG AND SENNOSIDES 8.6 MG 1 TABLET: 8.6; 5 TABLET, FILM COATED ORAL at 21:46

## 2022-05-18 RX ADMIN — SODIUM BICARBONATE 50 MEQ: 84 INJECTION INTRAVENOUS at 02:40

## 2022-05-18 RX ADMIN — OXYCODONE HYDROCHLORIDE 10 MG: 10 TABLET ORAL at 03:02

## 2022-05-18 RX ADMIN — ALTEPLASE 2 MG: 2.2 INJECTION, POWDER, LYOPHILIZED, FOR SOLUTION INTRAVENOUS at 21:58

## 2022-05-18 RX ADMIN — ACETAMINOPHEN 975 MG: 325 TABLET ORAL at 06:30

## 2022-05-18 RX ADMIN — HYDROMORPHONE HYDROCHLORIDE 0.4 MG: 0.2 INJECTION, SOLUTION INTRAMUSCULAR; INTRAVENOUS; SUBCUTANEOUS at 10:36

## 2022-05-18 RX ADMIN — HYDROMORPHONE HYDROCHLORIDE 4 MG: 2 TABLET ORAL at 15:42

## 2022-05-18 RX ADMIN — HYDROMORPHONE HYDROCHLORIDE 0.4 MG: 0.2 INJECTION, SOLUTION INTRAMUSCULAR; INTRAVENOUS; SUBCUTANEOUS at 06:28

## 2022-05-18 RX ADMIN — SULFAMETHOXAZOLE AND TRIMETHOPRIM 1 TABLET: 400; 80 TABLET ORAL at 08:02

## 2022-05-18 RX ADMIN — HYDROMORPHONE HYDROCHLORIDE 4 MG: 2 TABLET ORAL at 18:32

## 2022-05-18 RX ADMIN — FUROSEMIDE 80 MG: 10 INJECTION, SOLUTION INTRAVENOUS at 09:57

## 2022-05-18 RX ADMIN — HYDROMORPHONE HYDROCHLORIDE 4 MG: 2 TABLET ORAL at 21:46

## 2022-05-18 RX ADMIN — LIDOCAINE 2 PATCH: 560 PATCH PERCUTANEOUS; TOPICAL; TRANSDERMAL at 12:02

## 2022-05-18 ASSESSMENT — ACTIVITIES OF DAILY LIVING (ADL)
ADLS_ACUITY_SCORE: 26
ADLS_ACUITY_SCORE: 26
DEPENDENT_IADLS:: INDEPENDENT
ADLS_ACUITY_SCORE: 26
ADLS_ACUITY_SCORE: 20
ADLS_ACUITY_SCORE: 26
ADLS_ACUITY_SCORE: 23
ADLS_ACUITY_SCORE: 26

## 2022-05-18 NOTE — BRIEF OP NOTE
[facility]    Brief Operative Note    Pre-operative diagnosis: Awaiting organ transplant [Z76.82]  Pre-diabetes [R73.03]  ESRD (end stage renal disease) (H) [N18.6]  Post-operative diagnosis Same as pre-operative diagnosis    Procedure: Procedure(s):  Kidney Transplant Directed Recipient  Surgeon: Surgeon(s) and Role:     * Howard Flores MD - Primary     * Jordon Marin MD - Fellow - Assisting     * Sandeep Hernandez MD - Fellow - Assisting  Anesthesia: General   Estimated Blood Loss: 150ml    Drains: Sha-Sierra  Specimens: * No specimens in log *  Findings:   living donor kidney transplant; single artery, single vein, single ureter, NO stent; BAUDILIO drain.  Complications: None.  Implants: * No implants in log *

## 2022-05-18 NOTE — ANESTHESIA CARE TRANSFER NOTE
Patient: Donald Blanco    Procedure: Procedure(s):  Kidney Transplant Directed Recipient       Diagnosis: Awaiting organ transplant [Z76.82]  Pre-diabetes [R73.03]  ESRD (end stage renal disease) (H) [N18.6]  Diagnosis Additional Information: No value filed.    Anesthesia Type:   General     Note:    Oropharynx: oropharynx clear of all foreign objects and spontaneously breathing  Level of Consciousness: awake  Oxygen Supplementation: face mask  Level of Supplemental Oxygen (L/min / FiO2): 6  Independent Airway: airway patency satisfactory and stable  Dentition: dentition unchanged  Vital Signs Stable: post-procedure vital signs reviewed and stable  Report to RN Given: handoff report given  Patient transferred to: PACU    Handoff Report: Identifed the Patient, Identified the Reponsible Provider, Reviewed the pertinent medical history, Discussed the surgical course, Reviewed Intra-OP anesthesia mangement and issues during anesthesia, Set expectations for post-procedure period and Allowed opportunity for questions and acknowledgement of understanding      Vitals:  Vitals Value Taken Time   /75 05/17/22 2030   Temp     Pulse 105 05/17/22 2039   Resp 6 05/17/22 2039   SpO2 96 % 05/17/22 2039   Vitals shown include unvalidated device data.    Electronically Signed By: Babar Preciado MD  May 17, 2022  8:39 PM

## 2022-05-18 NOTE — PROGRESS NOTES
Handoff information     Type of transplant: LDKT  Date of transplant: 5/17/22  Direct/non-direct/PEP- PEP   Transplant history: TX Naive  Outstanding items for patient: None  Pertinent history: ESRD from IGA nephropathy, prostate CA stage 3B s/p robotic resection and radiation 10/2020- followed by urology and cleared, PAD  Barriers to post transplant care: None

## 2022-05-18 NOTE — PROGRESS NOTES
Patient removed from [facility]OS waitlist after living donor left kidney transplant. UNOS ID [id number]    Donor Has Risk Criteria for Transmission of HIV/HCV/HBV: no  Recipient Notified of Risk Criteria: lila Andino RN

## 2022-05-18 NOTE — PROVIDER NOTIFICATION
Time of notification: 5:10pm  Provider notified: Dr. Ferro  Patient status: Urine output trending down, 1500 = 50cc, 1600 = 35cc, 1700 = 25cc  Temp:  [97.6  F (36.4  C)-98.6  F (37  C)] 97.6  F (36.4  C)  Pulse:  [] 78  Resp:  [8-19] 16  BP: ()/(64-84) 105/67  SpO2:  [91 %-100 %] 94 %  Orders received: No new orders

## 2022-05-18 NOTE — PLAN OF CARE
Neuro: A&Ox4. Pleasant and cooperative.   Cardiac: SR on monitor,  SBPs 110s-130s overnight. Afebrile, CVPs 10-12    Respiratory: Sating >95% on 1L NC, attempted to wean offf= but pt dropped into upper 80s. Capno WNL  GI/: Pink tinged urine via gardiner, UOP ~75ml/hr, dropped to 50ml this AM at 0500, team paged.   Diet/appetite: Tolerating clear liquid diet, taking pills whole  Activity:  Assist of 1 and GB+Walker  Pain: PRN dilaudid and PRN oxy given for pain with relief.  Skin: R sided incision with primapore dressing, some drainage present and marked. Preventative mepi in place on coccxy  LDA's: R sided triple lumen internal jugular (Blue lumen IVF, Brown lumen transduced and White lumen SL), R sided BAUDILIO drain, gardiner, PIV x2, R sided tunneled HD line    Plan: K last night 5.9, Shifted K and recheck 2 hrs later at 0400 5.5, team paged this AM with new result. Continue with POC. Notify primary team with changes.

## 2022-05-18 NOTE — PHARMACY-ADMISSION MEDICATION HISTORY
Admission Medication History Completed by Pharmacy    See [facility] Admission Navigator for allergy information, preferred outpatient pharmacy, prior to admission medications and immunization status.     Medication History Sources:     Patient Donald Blanco    [facility]    [place]     Changes made to PTA medication list (reason):    Added: None    Deleted: Melatonin, Mircera, Ropinirole     Changed:   o Revela from 800 mg BID to 2400 mg TID with meals   o Furosemide every morning to 20 mg once daily   o Allopurinol to 200 mg daily per fill hx  o Added directions to B complex-C-folic acid  o Calcitriol from 0.25 mcg daily to 0.75 mcg three times weekly with dialysis    Additional Information:    Confirmed patient is not taking amlodipine despite recent fill hx, stopped taking at the same time as ropinirole (4 to 5 weeks ago)    Prior to Admission medications    Medication Sig Last Dose Taking? Auth Provider   allopurinol (ZYLOPRIM) 100 MG tablet Take 200 mg by mouth daily 5/16/2022 at 0430 Yes Reported, Patient   aspirin (ASA) 81 MG EC tablet Take 81 mg by mouth every morning  5/16/2022 Yes Reported, Patient   B Complex-C-Folic Acid (VIRT-CAPS) 1 MG CAPS Take 1 tablet by mouth daily 5/16/2022 at 1800 Yes Reported, Patient   calcitRIOL (ROCALTROL) 0.25 MCG capsule Take 0.75 mcg by mouth three times a week During dialysis  Yes Reported, Patient   cinacalcet (SENSIPAR) 30 MG tablet Take 60 mg by mouth daily (with dinner) 5/16/2022 at 1800 Yes Reported, Patient   colchicine (MITIGARE) 0.6 MG capsule Take 0.6 mg by mouth as needed (Rarely uses) More than a month at Unknown time Yes Reported, Patient   eszopiclone (LUNESTA) 2 MG tablet Take 1 tablet by mouth At Bedtime 5/16/2022 at 2000 Yes Reported, Patient   furosemide (LASIX) 20 MG tablet Take 20 mg by mouth every morning 5/16/2022 at 0430 Yes Reported, Patient   sevelamer carbonate (RENVELA) 800 MG tablet Take 2,400 mg by mouth 3 times daily (with meals) 5/16/2022 at  1800 Yes Reported, Patient   simvastatin (ZOCOR) 40 MG tablet Take 40 mg by mouth every morning  5/16/2022 at 0800 Yes Reported, Patient   Vitamin D3 (CHOLECALCIFEROL) 25 mcg (1000 units) tablet Take 1 capsule by mouth every morning  5/16/2022 at 0430 Yes Reported, Patient       Date completed: 05/18/22    Medication history completed by: PURVI MCGEE [facility]

## 2022-05-18 NOTE — PHARMACY-TRANSPLANT NOTE
"Adult Kidney Transplant Post Operative Note    57 year old male s/p living donor kidney transplant on 5/17/2022 for ESRD 2/2 IgA nephropathy.      Planned immunosuppression regimen per kidney transplant protocol:    INDUCTION:   PRA 19 (low risk protocol for rejection)   --Addendum: 5/19 moving to \"intermediate\" risk protocol in light of slow graft function/possible DGF    Basiliximab 20 mg IV on POD#0 (5/17) and POD#4 (5/21)   Methylprednisolone 500 intra-op 5/17, 250 mg POD#1 5/18, 100 mg POD#2 5/19   Prednisone taper per protocol: Prednisone 60 mg daily X 2 days (5/20-5/21), 40 mg daily X 2 days (5/22-5/23), 20 mg daily X 7 days (5/24-5/30), 15 mg daily X 7 days (5/31-6/6), 10 mg daily X 7 days (6/7-6/13), then 5 mg daily X 7 days (6/14-6/20)  --Thymo 2 mg/kg X1 5/19 (already received steroid dose this AM, will not repeat)    MAINTENANCE:    Mycophenolate 750 mg BID  Tacrolimus with goal trough levels of 8-10 mcg/L for first 6 months post-transplant     Opportunistic pathogen prophylaxis includes:   CMV: valganciclovir (X3 months)  PJP: trimethoprim/sulfamethoxazole (indefinitely)     Donor: CMV (-), EBV (+)  Recipient: CMV (-), EBV (+)    Patient is not enrolled in medication study.    Pharmacy will monitor for medication interactions and immunosuppression levels in conjunction with the team. Medication therapy needs for discharge planning will continue to be addressed throughout the current admission via multidisciplinary rounds and order review.  Pharmacy will make recommendations as appropriate.    Allison Chaves, PharmD, PGY1 Pharmacy Resident  "

## 2022-05-18 NOTE — CONSULTS
[facility]  Transplant Nephrology Consult  Date of Admission:  5/17/2022  Today's Date: 05/18/2022  Requesting physician: Howard Flores MD    Recommendations:  Monitor vascular flow closely as per Transplant surgery teams recommendations  Please call Nephrology on call if K> 5.9 as he may need RRT then . ( patient consented to RRT , if the need arises )     Assessment & Plan    Donald Blanco is a 57 year old male with history of IgA nephropathy, resultant ESRD, on HD 3 times/week. (Access: Right IJ TDC).At baseline trace urine output.Underwent LDKT on 5/17/22, without ureteral stent.Imported kidney with vasospasm in OR.Other comorbidities include HTN, and prostate cancer s/p prostatectomy and radiation.    # LDKT: without ureteral stent.Imported kidney with vasospasm in OR    Slow graft function . Persistent elevated velocity at the transplant renal vein anastomosis . UOP increasing  Currently 45 - 50 ml/hr .On D5LR at 100 ml/hr.  Also got Lasix IV . Follow Tx surgery recommendations .    - Baseline Creatinine: ~  TBD   - Proteinuria: Not checked post transplant   - Date DSA Last Checked: May/2022      Latest DSA:  DSA pending   - BK Viremia: Not checked post transplant   - Kidney Tx Biopsy: No                   # HD ACCESS: R internal jugular TDC .     # Patient may needs Renal Replacement therapy ( RRT ) should he land up having DGF/renal failure.Indications of Renal replacement therapy ( RRT) , its possible complications including but not limited to infections, bleeding, fatal arrhythmias ,death explained to patient and he  verbalized understanding and agreeable to proceed with RRT if the need arises. Patient signed written consent which was placed in his chart      # Immunosuppression: Tacrolimus immediate release (goal 8-10) and Mycophenolate mofetil (dose 750 mg every 12 hours)               - Induction: Intermediate . PRA 19   - Changes: No    # Infection  Prophylaxis:   - PJP: Sulfa/TMP (Bactrim) - currently held due to hyperkalemia   - CMV: Valganciclovir (Valcyte). No discordance D-/R-    # Hypertension: Controlled;  Goal BP: < 150/90   - Volume status: Euvolemic  EDW ~    - Changes: No    # Elevated Blood Glucose: Glucose generally running ~ 170 -200    - Management as per primary team.    # Anemia in Chronic Renal Disease: Hgb: Stable      YUDI: No   - Iron studies: Unknown at this time, but checked with dialysis    # Mineral Bone Disorder:   - Secondary renal hyperparathyroidism; PTH level: Unknown at this time, but checked with dialysis        On treatment: None  - Vitamin D; level: Unknown at this time, but checked with dialysis        On supplement: No  - Calcium; level: Normal        On supplement: No  - Phosphorus; level: Normal        On supplement: No    # Electrolytes:   - Potassium; level: High        On supplement: No  - Magnesium; level: Normal        On supplement: No  - Bicarbonate; level: Normal        On supplement: No  - Sodium; level: Normal    # Transplant History:  Etiology of Kidney Failure: IgA nephropathy  Tx: LDKT  Transplant: 5/17/2022 (Kidney)  Crossmatch at time of Tx: negative  Significant changes in immunosuppression: None  Significant transplant-related complications: None    Recommendations were communicated to the primary team verbally.    Seen and discussed with Dr. Clarisa Ayala MD  Pager: [phone]    Physician Attestation   I, Tucker Mckenna MD, personally examined and evaluated this patient.  I discussed the patient with the resident/fellow and care team, and agree with the assessment and plan of care as documented in the note on 05/18/22 .    LDKT, low immunologic risk with slow graft function due to vasospasm, venous issues. Following closely for RRT needs. Given prostate Ca history, moderate risk for recurrence, would be hesitant unless clear indication to scale up induction.     I personally reviewed vital signs,  medications, labs, and imaging.    Tucker Mckenna MD  Date of Service (when I saw the patient): 05/18/22          REASON FOR CONSULT   LDKT    History of Present Illness   Donald Blanco is a 57 year old male with history of IgA nephropathy, resultant ESRD, on HD 3 times/week. (Access: Right IJ TDC).  At baseline trace urine output.  Underwent LDKT on 5/17/22, without ureteral stent.Imported kidney with vasospasm in OR  Other comorbidities include : HTN, and prostate cancer s/p prostatectomy and radiation.    Urine output has intermittently declined but responsive to Lasix.  Ultrasound showing Slow graft function . Persistent elevated velocity at the transplant renal vein anastomosis.  Concern for slow graft function  PRA 19  Intermediate induction  CMV R-/R-  EBV D+/R+  Crossmatch negative at time of transplant  DSA pending    Patient feels fine otherwise except for some mild pain at operative site.  No chest pain or shortness of breath.  No nausea vomiting.  No fever.  No confusion.  No focal weakness.  Not passing any flatus yet.      Review of Systems    The 10 point Review of Systems is negative other than noted in the HPI or here.     Past Medical History    I have reviewed this patient's medical history and updated it with pertinent information if needed.   Past Medical History:   Diagnosis Date    CKD (chronic kidney disease) stage 4, GFR 15-29 ml/min (H)     Elevated PSA     Essential hypertension 05/21/2020    Hyperlipidemia     IgA nephropathy     Obesity     Prostate cancer (H) 10/15/2020    S/P radiation therapy     6,600 cGy to pelvis completed on 6/25/2021 - [facility]       Past Surgical History   I have reviewed this patient's surgical history and updated it with pertinent information if needed.  Past Surgical History:   Procedure Laterality Date    BACK SURGERY      Back Surgey 20+ Years Ago     BIOPSY      [facility] 2010    COLONOSCOPY      10+ Years ago at [facility]  [facility] PROSTATECTOMY, LYMPHADENECTOMY N/A 12/14/2020    Procedure: PROSTATECTOMY, ROBOT-ASSISTED, WITH PELVIC LYMPHADENECTOMY;  Surgeon: Nabil Vásquez MD;  Location:  OR       Family History   I have reviewed this patient's family history and updated it with pertinent information if needed.   Family History   Problem Relation Age of Onset    Kidney Disease Father     Hypertension Father     Cancer Mother     No Known Problems Brother     No Known Problems Sister     No Known Problems Son     No Known Problems Daughter     No Known Problems Brother     No Known Problems Brother     No Known Problems Brother     No Known Problems Sister     No Known Problems Sister     No Known Problems Sister        Social History   I have reviewed this patient's social history and updated it with pertinent information if needed. Donald Blanco  reports that he has never smoked. He has never used smokeless tobacco. He reports current alcohol use. He reports that he does not use drugs.    Allergies   No Known Allergies  Prior to Admission Medications    acetaminophen  975 mg Oral Q8H    atorvastatin  10 mg Oral Daily    lidocaine  2 patch Transdermal Q24H    lidocaine   Transdermal Q8H    [START ON 5/19/2022] magnesium oxide  400 mg Oral Daily with lunch    methylPREDNISolone  250 mg Intravenous Once    [START ON 5/19/2022] methylPREDNISolone  100 mg Intravenous Once    mycophenolate  750 mg Oral BID IS    polyethylene glycol  17 g Oral Daily    [START ON 5/20/2022] predniSONE  60 mg Oral Daily    Followed by    [START ON 5/22/2022] predniSONE  40 mg Oral Daily    Followed by    [START ON 5/24/2022] predniSONE  20 mg Oral Daily    Followed by    [START ON 5/31/2022] predniSONE  15 mg Oral Daily    Followed by    [START ON 6/7/2022] predniSONE  10 mg Oral Daily    Followed by    [START ON 6/14/2022] predniSONE  5 mg Oral Daily    senna-docusate  1 tablet Oral BID    sodium chloride (PF)  10 mL  "Intracatheter Q8H    [Held by provider] sulfamethoxazole-trimethoprim  1 tablet Oral Once per day on     tacrolimus  3 mg Oral BID IS    [START ON 2022] valGANciclovir  450 mg Oral Once per day on       dextrose 5% lactated ringers 100 mL/hr at 22 1406    heparin 200 Units/hr (22 1019)       Physical Exam   Temp  Av.1  F (36.7  C)  Min: 97.7  F (36.5  C)  Max: 98.6  F (37  C)      Pulse  Av.1  Min: 63  Max: 105 Resp  Avg: 15.8  Min: 8  Max: 19  SpO2  Av.3 %  Min: 94 %  Max: 100 %    CVP (mmHg): 15 mmHgBP 117/68   Pulse 75   Temp 98  F (36.7  C) (Oral)   Resp 10   Ht 1.778 m (5' 10\")   Wt 108.2 kg (238 lb 8.6 oz)   SpO2 97%   BMI 34.23 kg/m     Date 22 0700 - 22 0659   Shift 1534-7899 7849-2992 1409-9110 24 Hour Total   INTAKE   I.V. 1081.67   1081.67   Shift Total(mL/kg) 1081.67(10)   1081.67(10)   OUTPUT   Urine 335 50  385   Drains 165   165   Shift Total(mL/kg) 500(4.62) 50(0.46)  550(5.08)   Weight (kg) 108.2 108.2 108.2 108.2      Admit Weight: 103.4 kg (227 lb 15.3 oz)     GENERAL APPEARANCE: alert and no distress  HENT: mouth without ulcers or lesions  LYMPHATICS: no cervical or supraclavicular nodes  RESP: lungs clear to auscultation - no rales, rhonchi or wheezes  CV: regular rhythm, normal rate, no rub, no murmur  EDEMA: no LE edema bilaterally  ABDOMEN: soft, nondistended, nontender, bowel sounds normal  MS: extremities normal - no gross deformities noted, no evidence of inflammation in joints, no muscle tenderness  SKIN: no rash    Data   CMP  Recent Labs   Lab 22  1306 22  0904 22  0558 22  0436 22  0421 22  0358 22  0216 22  2326 226 22  1748   NA  --   --   --   --  132*  --   --   --  132* 137   POTASSIUM 5.7* 5.8*  --   --  5.5*  5.5*  --   --  5.9* 5.6* 4.4   CHLORIDE  --   --   --   --  95  --   --   --  95  --    CO2  --   --   --   --  27  --   --   --  28  --  "   ANIONGAP  --   --   --   --  10  --   --   --  9  --    GLC  --   --  176* 190* 192* 198*   < >  --  122* 96   BUN  --   --   --   --  52*  --   --   --  48*  --    CR  --   --   --   --  9.20*  --   --   --  8.48*  --    GFRESTIMATED  --   --   --   --  6*  --   --   --  7*  --    YEE  --   --   --   --  8.8  --   --   --  8.6  --    MAG  --   --   --   --  2.4*  --   --   --  2.2  --    PHOS  --   --   --   --  4.0  --   --   --  3.3  --     < > = values in this interval not displayed.     CBC  Recent Labs   Lab 05/18/22  1306 05/18/22  0904 05/18/22  0421 05/17/22  2326 05/17/22  2046   HGB 10.2* 10.9* 10.5* 11.2* 11.3*   WBC  --   --  12.0*  --  12.1*   RBC  --   --  3.18*  --  3.44*   HCT  --   --  32.5*  --  34.4*   MCV  --   --  102*  --  100   MCH  --   --  33.0  --  32.8   MCHC  --   --  32.3  --  32.8   RDW  --   --  13.0  --  13.0   PLT  --   --  172  --  180     INRNo lab results found in last 7 days.  ABG  Recent Labs   Lab 05/17/22  1748   O2PER 42.0      Urine Studies  Recent Labs   Lab Test 05/09/22  0957 12/10/20  1338 09/03/20  1142   COLOR Yellow Straw Straw   APPEARANCE Clear Clear Clear   URINEGLC Negative Negative Negative   URINEBILI Negative Negative Negative   URINEKETONE Negative Negative Negative   SG 1.015 1.011 1.011   UBLD Moderate* Negative Small*   URINEPH 6.0 5.0 5.0   PROTEIN 300 * >499* >499*   NITRITE Negative Negative Negative   LEUKEST Small* Negative Negative   RBCU 0 1 1   WBCU 6* 2 2     No lab results found.  PTH  Recent Labs   Lab Test 05/09/22  0959   PTHI 450*     Iron Studies  Recent Labs   Lab Test 05/09/22 0959   IRON 79      IRONSAT 24   LANCE 1,002*       IMAGING:  All imaging studies reviewed by me.

## 2022-05-18 NOTE — DISCHARGE INSTRUCTIONS
Diet recommendations post-transplant: High protein diet x 8 weeks.  Heart healthy dietary habits long term (low saturated/trans fat, low sodium). Practice food safety precautions. See nutrition handout and food safety booklet for more information.        Please establish care with a primary care provider. Contact information for the [facility] noted below:     [facility]  [address] , [place], [place], [address]  Get Directions  [phone]

## 2022-05-18 NOTE — CONSULTS
Transplant Admission Psychosocial Assessment    Patient Name: Donald Blanco  : 1965  Age: 57 year old  MRN: [id number]  Completed assessment with: patient and spouse, Sana    Patient underwent kidney transplanton .  Met with patient to update psychosocial assessment and provide brief education about SW role while inpatient, as well as expectations/requirements and follow up needs post-transplant. SW also provided education about need for compliance with transplant medications, and explained ESRD Medicare benefits and medication coverage under Medicare part B.  Medicare 2728 forms completed and signed by patient.  Presenting Information   Living Situation: lives with spouse, Niharika in [facility] house in [place]  If not local, plans for short term stay:  n/a  Previous Functional Status: was working 3 days per week on non-dialysis days.  Independent in cares.    Cultural/Language/Spiritual Considerations: n/a    Support System  Primary Support Person spouse Sana  Other support:  2 adult children Sonu lives in [facility] in [facility]  Plan for support in immediate post-transplant period: spouse available as needed.  Can take sometime off work and works from home.      Health Care Directive  Decision Maker: patient  Alternate Decision Maker: spouse  Health Care Directive: Provided education    Mental Health/Coping:   History of Mental Health: no mental health hx.  Denies current depression or anxiety.    History of Chemical Health: at evaluation was drinking 5-6 beers per week.  Decreased significantly to lose weight.    Current status: would like to occasionally have a beer.  Encouraged him to discuss with transplant staff their recommendations.    Coping: coping well.  Support from wife and family important  Services Needed/Recommended: none at this time.      Financial   Income: wages  Impact of transplant on income: no concer   Insurance and medication coverage: [employer] through spouse's  employer. Have reached max out of pocket for this year.  Co-pays appear to be 0.  Financial concerns: deny concerns.   Resources needed: none identified.      Assessment, recommendations and discharge plan:  Patient doing well post kidney transplant.  Well supported by spouse and daughter at bedside. No immediate social service needs identified.  Provided them with a hand out regarding insurance and medicare coordination of benefits.  Verbalized good understanding. SW will continue to follow for support, counseling, education and resources.      Regis Sloan [facility]  Transplant   Phone: [phone] Pager: [phone]

## 2022-05-18 NOTE — PROGRESS NOTES
Transplant Surgery  Inpatient Daily Progress Note  05/18/2022    Assessment & Plan: 56yo with history of ESRD secondary to IgA nephropathy, dialysis via a tunneled catheter, HTN, and prostate cancer s/p prostatectomy and radiation. S/p living donor kidney transplant without stent on 5/17/22. Imported kidney with vasospasm in OR.    Graft function: POD #1. Slow graft function. Cr 8.5->9.2, low UOP, hyperkalemia. Persistently elevated RV velocities >200cm/s on US.  Immunosuppression management: PRA 19  -Basiliximab 5/17 & 5/21  -Steroid taper per low risk induction protocol  -Tacro level goal 8-10  -MMF 750mg BID  Complexity of management: High. Contributing factors:  induction    Neuro:  Acute post-op pain: Oxycodone almost completely ineffective. Switch to PO dilaudid today and stop IV dilaudid this evening. Continue scheduled APAP. Add lidoderm and Robaxin.  Hematology:   Anemia: Borderline macrocytic. Hgb 10.9, stable.  Renal vein prophylaxis: Heparin 200u/hr.  Cardiorespiratory:   Hx HTN: BP stable. Monitor.  GI/Nutrition:   Diet: Low potassium.  Bowel regimen: Senna  Endocrine: No acute issues.   Fluid/Electrolytes:   Volume: Stop UOP IVF replacement  Hyperkalemia: K 5.8. Shifted overnight. Lasix x1 today. Monitor q4H. Low K diet.  : Flores to remain due to new surgical anastomosis x3d.  Infectious disease: Afebrile  Prophylaxis: DVT, fall, GI, viral (Valcyte), pneumocystis (Bactrim, hold until K normalizes)  Disposition: 6B until potassium stable    Medical Decision Making: Medium  Subsequent visit 03318 (moderate level decision making)    TOM/Fellow/Resident Provider: Roslyn Colon NP [id number]    Faculty: Boris Huang M.D.  _________________________________________________________________  Transplant History:   5/17/2022 (Kidney), Postoperative day: 1     Interval History: History is obtained from the patient  Overnight events: Moderate incisional pain, no nausea.    ROS:   A 10-point review of systems was  "negative except as noted above.    Meds:   acetaminophen  975 mg Oral Q8H    atorvastatin  10 mg Oral Daily    lidocaine  2 patch Transdermal Q24H    lidocaine   Transdermal Q8H    [START ON 5/19/2022] magnesium oxide  400 mg Oral Daily with lunch    mycophenolate  750 mg Oral BID IS    polyethylene glycol  17 g Oral Daily    senna-docusate  1 tablet Oral BID    sodium chloride (PF)  10 mL Intracatheter Q8H    sulfamethoxazole-trimethoprim  1 tablet Oral Once per day on Mon Wed Fri    tacrolimus  3 mg Oral BID IS    [START ON 5/19/2022] valGANciclovir  450 mg Oral Once per day on Mon Thu       Physical Exam:     Admit Weight: 103.4 kg (227 lb 15.3 oz)    Current vitals:   /65   Pulse 66   Temp 98  F (36.7  C) (Oral)   Resp 17   Ht 1.778 m (5' 10\")   Wt 108.2 kg (238 lb 8.6 oz)   SpO2 97%   BMI 34.23 kg/m      Vital sign ranges:    Temp:  [97.7  F (36.5  C)-98.6  F (37  C)] 98  F (36.7  C)  Pulse:  [] 66  Resp:  [8-19] 17  BP: ()/(64-84) 116/65  SpO2:  [94 %-100 %] 97 %    General Appearance: in no apparent distress.   Skin: Warm, perfused  Heart: NSR  Lungs: Unlabored  Abdomen: The abdomen is rounded, BAUDILIO serosang, incision covered  : gardiner is present.  Urine is trace hematuria.  Extremities: edema: trace gen, strength 5/5  Neurologic: alert and oriented x4. Tremor absent..     Data:   CMP  Recent Labs   Lab 05/18/22  0904 05/18/22  0558 05/18/22  0436 05/18/22  0421 05/17/22  2326 05/17/22  2046 05/17/22  1748 05/17/22  1339   NA  --   --   --  132*  --  132* 137  --    POTASSIUM 5.8*  --   --  5.5*  5.5*   < > 5.6* 4.4  --    CHLORIDE  --   --   --  95  --  95  --   --    CO2  --   --   --  27  --  28  --   --    GLC  --  176* 190* 192*   < > 122* 96   < >   BUN  --   --   --  52*  --  48*  --   --    CR  --   --   --  9.20*  --  8.48*  --   --    GFRESTIMATED  --   --   --  6*  --  7*  --   --    YEE  --   --   --  8.8  --  8.6  --   --    ICAW  --   --   --   --   --   --  4.0*  --  "   MAG  --   --   --  2.4*  --  2.2  --   --    PHOS  --   --   --  4.0  --  3.3  --   --     < > = values in this interval not displayed.     CBC  Recent Labs   Lab 05/18/22  0904 05/18/22  0421 05/17/22  2326 05/17/22  2046   HGB 10.9* 10.5*   < > 11.3*   WBC  --  12.0*  --  12.1*   PLT  --  172  --  180    < > = values in this interval not displayed.     Attestation:    The patient has been seen  with transplant team and evaluated by me.   Vital signs, labs, medications and orders were reviewed.   When obtained, diagnostic images were reviewed by me and interpreted as above.    The care plan was discussed with the multidisciplinary team and I agree with the findings and plan in this note, with any differences recorded in blue.    Immunosuppressive medication management was reviewed and adjusted as reflected in the note and orders.     .

## 2022-05-18 NOTE — OR NURSING
Dr Salvador notified that labs have been resulted. Patient will remain NPO for additional US study later tonight. OK to transfer to floor.

## 2022-05-18 NOTE — PROGRESS NOTES
Care Management Initial Consult    General Information  Assessment completed with: Patient, Spouse, Sana  Type of CM/SW Visit: Initial Assessment  Primary Care Provider verified and updated as needed: No   Readmission within the last 30 days: no previous admission in last 30 days   Reason for Consult: decision-making  Advance Care Planning: No ACP documents on file.      Communication Assessment  Patient's communication style: spoken language (English or Bilingual)    Hearing Difficulty or Deaf: no   Wear Glasses or Blind: yes    Cognitive  Cognitive/Neuro/Behavioral: WDL      Living Environment:   People in home: spouseSana  Current living Arrangements: house      Able to return to prior arrangements: yes    Family/Social Support:  Care provided by: self, spouse/significant other  Provides care for: no one, unable/limited ability to care for self  Marital Status:   Support System: WifeSana       Description of Support System: Supportive, Involved      Current Resources:   Patient receiving home care services: No  Community Resources: None  Equipment currently used at home: none  Supplies currently used at home: None    Employment/Financial:  Employment Status: employed full-time     Financial Concerns: No concerns identified   Referral to Financial Worker: No    Functional Status:  Prior to admission patient needed assistance: Independent with all ADLs at baseline.      Mental Health Status:  Mental Health Status: No Current Concerns       Chemical Dependency Status:  Chemical Dependency Status: No Current Concerns          Values/Beliefs:  Spiritual, Cultural Beliefs, Latter day Practices, Values that affect care: no         Additional Information:  Care management assessment completed at the bedside. Patient lives locally w/his spouse, Sana. Patient is independent at baseline, confirms that his spouse will be his primary caregiver at discharge. Patient notes that he does not have a PCP, has been seeing  his nephrologist, Dr. Cornejo. Writer sent communication to the primary team to inquire if the transplant surgeon would be willing to follow for home care orders. Writer discussed outpatient follow up and ongoing transplant labs. Home care discussed, patient agreeable, no preference to home care agency. Referral sent to [facility] at this time, awaiting response. Writer offered to assist with arranging an appointment to establish care with a primary care provider, patient politely declined and requested contact information for the [facility]alth  [facility], contact information added to patient's discharge instructions. Care coordination will continue to follow for discharge planning.     [id number] Addendum:  Per discussion with the transplant NP, Roslyn, patient will establish care w/a nephrologist within [facility] at discharge, recommend that home care contact the transplant CC for ongoing orders, as the patient will likely not have one specific nephrologist who will follow.     Rachell Beckham, RNCC, BSN    [facility]    [address]  [address] [place] [id number]    [email or web address]    Office: [phone] Pager: [phone]    To contact the weekend RNCC  Ophelia (0800 - 1630) Saturday and Sunday    Units: 4A, 4C, 4E, 5A and 5B- Pager 1: [phone]    Units: 6A, 6B, 6C, 6D- Pager 2: [phone]    Units: 7A, 7B, 7C, 7D, and 5C-Pager 3: [phone]

## 2022-05-18 NOTE — ANESTHESIA POSTPROCEDURE EVALUATION
Patient: Donald Blanco    Procedure: Procedure(s):  Kidney Transplant Directed Recipient       Anesthesia Type:  General    Note:  Disposition: Inpatient   Postop Pain Control: Uneventful            Sign Out: Well controlled pain   PONV:    Neuro/Psych: Uneventful            Sign Out: Acceptable/Baseline neuro status   Airway/Respiratory: Uneventful            Sign Out: Acceptable/Baseline resp. status   CV/Hemodynamics: Uneventful            Sign Out: Acceptable CV status; No obvious hypovolemia; No obvious fluid overload   Other NRE: NONE   DID A NON-ROUTINE EVENT OCCUR?            Last vitals:  Vitals Value Taken Time   /72 05/17/22 2200   Temp 36.8  C (98.2  F) 05/17/22 2200   Pulse 90 05/17/22 2203   Resp 11 05/17/22 2203   SpO2 95 % 05/17/22 2205   Vitals shown include unvalidated device data.    Electronically Signed By: Ankush Howell MD  May 17, 2022  11:33 PM

## 2022-05-18 NOTE — PROGRESS NOTES
Admission          5/17/2022 2300  -----------------------------------------------------------  Reason for admission: Living Donor Kidney Transplant,  Admitted from: PACU/Home  Via: Bed and 2 RNs  Belongings: Placed in closet; valuables sent home with family  Admission Profile: complete  Teaching: orientation to unit and call light- call light within reach, call don't fall, use of console, meal times, when to call for the RN, and enforced importance of safety   Access: PIV x2 and R triple lumen IJ  Telemetry:Placed on pt  Ht./Wt.: complete  Code Status verified on armband: yes  2 RN Skin Assessment Completed By: Jillian WISE  Med Rec completed: yes  Bed surface reassessed with algorithm and charted: yes  New bed surface ordered: no  Suction/Ambu bag/Flowmeter at bedside: yes  Is patient having diarrhea upon admission- if YES fill out testing algorithm : no    C. Diff Testing Algorithm (MUST be marked YES)   1. 3 or more loose stools in 24 hrs. [] Yes [] No       Additional symptoms:(At least ONE must be marked yes)   1. Abdominal pain/discomfort [] Yes [] No   2. Fever at least 38C (100.4 F) [] Yes [] No   3. Elevated WBC(>11,000) [] Yes [] No       Exclusion Criteria:  (MUST be marked YES)   1. Off laxatives for at least 48 hrs. [] Yes [] No       Pt status:    Temp:  [97.7  F (36.5  C)-98.6  F (37  C)] 98.6  F (37  C)  Pulse:  [] 75  Resp:  [8-19] 18  BP: ()/(72-84) 126/84  SpO2:  [94 %-100 %] 97 %

## 2022-05-18 NOTE — PROGRESS NOTES
"CLINICAL NUTRITION SERVICES - ASSESSMENT NOTE     Nutrition Prescription    RECOMMENDATIONS FOR MDs/PROVIDERS TO ORDER:  Minimize diet restrictions as able pending labs    Malnutrition Status:    Does not meet malnutrition criteria    Recommendations already ordered by Registered Dietitian (RD):  -- Initial post-SOT diet instruction completed 5/18/22   -- High Kcal/high protein diet instructions added to discharge summary tab 5/18/22    Future/Additional Recommendations:  -Monitor PO intake/adequacy, supplement/snack preferences if needed   -Monitor renal lab trends, lytes   -Follow-up education prior to discharge     REASON FOR ASSESSMENT  Donald Blanco is a 57 year old male seen by the dietitian for MD Order- Assess & Educate post-op SOT    CLINICAL HISTORY  Hx of HTN, hyperlipidemia, and ESRD 2/2 IgA nephropathy admitted for LDKT.     NUTRITION HISTORY  Per review of chart/outpatient RD notes:  -Weight loss consultation in November 2021 per waist circumference of 48\". RD encouraged balanced meals and exercise.   -Had initial pre-SOT visit with RD in September 2020. At that time reported following a low Na diet at home.     Per interview today:   - Good appetite and intake PTA. Had lost ~60 lbs per pt self-report in order to qualify for transplant. Still follows a low sodium diet and also watches his potassium intake. Stopped drinking milk due to need to restrict potassium. - Is hopeful for diet liberalization post-txp but currently too early to assess what lab trends will look like (POD 1)  - Was very interested in discussing diet recommendations today but will need further education prior to discharge for clarification on if he needs to continue watching potassium at discharge.    CURRENT NUTRITION ORDERS  Diet: 3 gm K+ diet   Intake/Tolerance: N/A (diet order just advanced)    LABS    K+ 5.7 (H)     BUN 52 (H)    Creatinine 9.2 (H)     Phos 4 (WNL)     Glucose trend: 141-219 mg/dL (past 6 " "readings)    MEDICATIONS    Lipitor    Mycophenolate    Miralax, Senna    Tacrolimus    IVF - D5W in LR @ 100 mL/hr (provides 120 gm dex, 408 Kcals)    ANTHROPOMETRICS  Height: 177.8 cm (5' 10\")  Most Recent Weight: 108.2 kg (238 lb 8.6 oz)    IBW: 75.5 kg (143% IBW)  BMI: Obesity Grade I BMI 30-34.9    Weight History:     EDW ~103 kg  Wt Readings from Last 10 Encounters:   05/18/22 108.2 kg (238 lb 8.6 oz)   05/09/22 103.3 kg (227 lb 12.8 oz)   02/28/22 103 kg (227 lb 1.6 oz)   11/29/21 112.9 kg (248 lb 12.8 oz)   11/23/21 108.4 kg (239 lb)   11/01/21 107.6 kg (237 lb 4.8 oz)   08/13/21 109.4 kg (241 lb 3.2 oz)   06/25/21 111.3 kg (245 lb 4.8 oz)   06/18/21 111.1 kg (245 lb)   06/11/21 109.4 kg (241 lb 1.6 oz)       Dosing Weight: 82 kg [adjusted, per  kg and IBW 75.5 kg]     ASSESSED NUTRITION NEEDS:  Estimated Energy Needs: 0151-8948+ kcals (25-30+ Kcal/Kg)  Justification: increased needs post-op SOT (in setting of obesity)  Estimated Protein Needs: 105-165 grams protein (1.3-2 gm/Kg)  Justification: increased needs post op SOT  Estimated Fluid Needs: 2656-5840  mL (25-30 mL/Kg)  Justification: maintenance, or per MD pending fluid status and adequate UOP    PHYSICAL FINDINGS  See malnutrition section below.    MALNUTRITION  % Intake: No decreased intake noted  % Weight Loss: None noted  Subcutaneous Fat Loss: None observed  Muscle Loss: None observed  Fluid Accumulation/Edema: None noted  Malnutrition Diagnosis: Patient does not meet two of the established criteria necessary for diagnosing malnutrition    NUTRITION DIAGNOSIS:  Food and nutrition-related knowledge deficit r/t length of time since previous post-transplant education AEB patient verbal report, review of chart record, and MD consult for nutrition education.     INTERVENTIONS  Implementation  Nutrition Education:  1. Provided instruction on post-transplant diet with discussion regarding protein sources and high protein needs in acute post-tx " phase.  Reviewed recommendations to follow low fat/low sodium diet long term and discussed heart healthy diet tips.  Discussed monitoring of K+/Phos lab values with possible need for adjustment of these in the diet as necessary. Reviewed need for food safety precautions to prevent food borne illness.    2. Provided & reviewed handout: Post-transplant diet guidelines. Patient receptive to information provided. Expected diet compliance is good.     Goals  1. Patient will verbalize understanding of 3 important aspects of post-transplant diet guidelines.   2. PO intake >50% meals TID.    Monitoring/Evaluation  Progress toward goals will be monitored and evaluated per protocol.    Ector Garibay RDN, LD, CNSC  6[address] RD pager: [phone] Phone: [phone]

## 2022-05-18 NOTE — PROVIDER NOTIFICATION
Time of notification: 8:15 AM  Provider notified: Transplant PA  Patient status: Urine output now 25ml/hr, creat and BUN increasing since last draw.  Temp:  [97.7  F (36.5  C)-98.6  F (37  C)] 98.1  F (36.7  C)  Pulse:  [] 73  Resp:  [8-19] 15  BP: ()/(64-84) 103/64  SpO2:  [94 %-100 %] 98 %  Orders received: PA to assess at bedside.      Time of notification: 9:35 AM  Provider notified: Transplant PA  Patient status: K 5.8  Temp:  [97.7  F (36.5  C)-98.6  F (37  C)] 98.1  F (36.7  C)  Pulse:  [] 73  Resp:  [8-19] 15  BP: ()/(64-84) 103/64  SpO2:  [94 %-100 %] 98 %  Orders received: 80mg IV lasix, recheck K at 1200.

## 2022-05-18 NOTE — PLAN OF CARE
Neuro: A&Ox4.   Cardiac: SR. VSS.   Respiratory: Sating 91% on 1LNC.  GI/: Minimal output noted from MD zuleyka aware. Miralax and senna given this shift, not passing gas. Hypoactive BS.  Diet/appetite: Tolerating low K diet. Denies nausea.  Activity:  Assist of 1 with GB and walker, up to chair and in halls.  Pain: At acceptable level on current regimen.   Skin: No new deficits noted. Pain remains unchanged at 6-7/10 with PRN oxycodone and dilaudid.  LDA's: Right BAUDILIO drain, Right TL internal jugular, PIVx2, gardiner. Heparin infusing at 200units/hr. 100ml D5LR maintenance.     Plan: Continue with POC. Notify primary team with changes. 80mg IV lasix.

## 2022-05-19 ENCOUNTER — APPOINTMENT (OUTPATIENT)
Dept: ULTRASOUND IMAGING | Facility: CLINIC | Age: 57
DRG: 650 | End: 2022-05-19
Attending: PHYSICIAN ASSISTANT
Payer: COMMERCIAL

## 2022-05-19 PROBLEM — Z76.82 AWAITING ORGAN TRANSPLANT: Status: RESOLVED | Noted: 2022-05-17 | Resolved: 2022-05-19

## 2022-05-19 PROBLEM — E87.5 HYPERKALEMIA: Status: ACTIVE | Noted: 2022-05-19

## 2022-05-19 PROBLEM — D84.9 IMMUNOSUPPRESSED STATUS (H): Chronic | Status: ACTIVE | Noted: 2022-05-19

## 2022-05-19 PROBLEM — N18.6 ESRD (END STAGE RENAL DISEASE) (H): Status: RESOLVED | Noted: 2022-05-17 | Resolved: 2022-05-19

## 2022-05-19 PROBLEM — D84.9 IMMUNOSUPPRESSED STATUS (H): Status: ACTIVE | Noted: 2022-05-19

## 2022-05-19 PROBLEM — Z94.0 KIDNEY REPLACED BY TRANSPLANT: Chronic | Status: ACTIVE | Noted: 2022-05-19

## 2022-05-19 PROBLEM — Z94.0 KIDNEY REPLACED BY TRANSPLANT: Status: ACTIVE | Noted: 2022-05-19

## 2022-05-19 LAB
ANION GAP SERPL CALCULATED.3IONS-SCNC: 11 MMOL/L (ref 3–14)
BASOPHILS # BLD AUTO: 0 10E3/UL (ref 0–0.2)
BASOPHILS NFR BLD AUTO: 0 %
BUN SERPL-MCNC: 67 MG/DL (ref 7–30)
CALCIUM SERPL-MCNC: 8.9 MG/DL (ref 8.5–10.1)
CHLORIDE BLD-SCNC: 94 MMOL/L (ref 94–109)
CO2 SERPL-SCNC: 30 MMOL/L (ref 20–32)
CREAT SERPL-MCNC: 10.6 MG/DL (ref 0.66–1.25)
EOSINOPHIL # BLD AUTO: 0 10E3/UL (ref 0–0.7)
EOSINOPHIL NFR BLD AUTO: 0 %
ERYTHROCYTE [DISTWIDTH] IN BLOOD BY AUTOMATED COUNT: 13 % (ref 10–15)
GFR SERPL CREATININE-BSD FRML MDRD: 5 ML/MIN/1.73M2
GLUCOSE BLD-MCNC: 149 MG/DL (ref 70–99)
GLUCOSE BLDC GLUCOMTR-MCNC: 146 MG/DL (ref 70–99)
GLUCOSE BLDC GLUCOMTR-MCNC: 147 MG/DL (ref 70–99)
GLUCOSE BLDC GLUCOMTR-MCNC: 154 MG/DL (ref 70–99)
GLUCOSE BLDC GLUCOMTR-MCNC: 159 MG/DL (ref 70–99)
GLUCOSE BLDC GLUCOMTR-MCNC: 198 MG/DL (ref 70–99)
HCT VFR BLD AUTO: 30.6 % (ref 40–53)
HGB BLD-MCNC: 10 G/DL (ref 13.3–17.7)
HGB BLD-MCNC: 9.6 G/DL (ref 13.3–17.7)
HGB BLD-MCNC: 9.7 G/DL (ref 13.3–17.7)
IMM GRANULOCYTES # BLD: 0 10E3/UL
IMM GRANULOCYTES NFR BLD: 0 %
LACTATE SERPL-SCNC: 3 MMOL/L (ref 0.7–2)
LACTATE SERPL-SCNC: 3 MMOL/L (ref 0.7–2)
LYMPHOCYTES # BLD AUTO: 0.5 10E3/UL (ref 0.8–5.3)
LYMPHOCYTES NFR BLD AUTO: 4 %
MAGNESIUM SERPL-MCNC: 2.6 MG/DL (ref 1.6–2.3)
MCH RBC QN AUTO: 32.5 PG (ref 26.5–33)
MCHC RBC AUTO-ENTMCNC: 31.4 G/DL (ref 31.5–36.5)
MCV RBC AUTO: 104 FL (ref 78–100)
MONOCYTES # BLD AUTO: 0.5 10E3/UL (ref 0–1.3)
MONOCYTES NFR BLD AUTO: 5 %
NEUTROPHILS # BLD AUTO: 10.2 10E3/UL (ref 1.6–8.3)
NEUTROPHILS NFR BLD AUTO: 91 %
NRBC # BLD AUTO: 0 10E3/UL
NRBC BLD AUTO-RTO: 0 /100
PHOSPHATE SERPL-MCNC: 7.1 MG/DL (ref 2.5–4.5)
PLATELET # BLD AUTO: 158 10E3/UL (ref 150–450)
POTASSIUM BLD-SCNC: 5.2 MMOL/L (ref 3.4–5.3)
POTASSIUM BLD-SCNC: 5.5 MMOL/L (ref 3.4–5.3)
POTASSIUM BLD-SCNC: 5.7 MMOL/L (ref 3.4–5.3)
RBC # BLD AUTO: 2.95 10E6/UL (ref 4.4–5.9)
SODIUM SERPL-SCNC: 135 MMOL/L (ref 133–144)
UFH PPP CHRO-ACNC: <0.1 IU/ML
WBC # BLD AUTO: 11.2 10E3/UL (ref 4–11)

## 2022-05-19 PROCEDURE — 83605 ASSAY OF LACTIC ACID: CPT | Performed by: SURGERY

## 2022-05-19 PROCEDURE — 85520 HEPARIN ASSAY: CPT | Performed by: NURSE PRACTITIONER

## 2022-05-19 PROCEDURE — 120N000011 HC R&B TRANSPLANT UMMC

## 2022-05-19 PROCEDURE — 250N000011 HC RX IP 250 OP 636

## 2022-05-19 PROCEDURE — 85025 COMPLETE CBC W/AUTO DIFF WBC: CPT | Performed by: STUDENT IN AN ORGANIZED HEALTH CARE EDUCATION/TRAINING PROGRAM

## 2022-05-19 PROCEDURE — 84132 ASSAY OF SERUM POTASSIUM: CPT | Performed by: STUDENT IN AN ORGANIZED HEALTH CARE EDUCATION/TRAINING PROGRAM

## 2022-05-19 PROCEDURE — 258N000003 HC RX IP 258 OP 636: Performed by: INTERNAL MEDICINE

## 2022-05-19 PROCEDURE — 84100 ASSAY OF PHOSPHORUS: CPT | Performed by: STUDENT IN AN ORGANIZED HEALTH CARE EDUCATION/TRAINING PROGRAM

## 2022-05-19 PROCEDURE — 258N000003 HC RX IP 258 OP 636: Performed by: PHYSICIAN ASSISTANT

## 2022-05-19 PROCEDURE — 250N000013 HC RX MED GY IP 250 OP 250 PS 637

## 2022-05-19 PROCEDURE — 250N000013 HC RX MED GY IP 250 OP 250 PS 637: Performed by: PHYSICIAN ASSISTANT

## 2022-05-19 PROCEDURE — 85018 HEMOGLOBIN: CPT | Performed by: NURSE PRACTITIONER

## 2022-05-19 PROCEDURE — 83735 ASSAY OF MAGNESIUM: CPT | Performed by: STUDENT IN AN ORGANIZED HEALTH CARE EDUCATION/TRAINING PROGRAM

## 2022-05-19 PROCEDURE — P9041 ALBUMIN (HUMAN),5%, 50ML: HCPCS

## 2022-05-19 PROCEDURE — 250N000013 HC RX MED GY IP 250 OP 250 PS 637: Performed by: STUDENT IN AN ORGANIZED HEALTH CARE EDUCATION/TRAINING PROGRAM

## 2022-05-19 PROCEDURE — 93010 ELECTROCARDIOGRAM REPORT: CPT | Performed by: INTERNAL MEDICINE

## 2022-05-19 PROCEDURE — 250N000009 HC RX 250

## 2022-05-19 PROCEDURE — 5A1D70Z PERFORMANCE OF URINARY FILTRATION, INTERMITTENT, LESS THAN 6 HOURS PER DAY: ICD-10-PCS | Performed by: INTERNAL MEDICINE

## 2022-05-19 PROCEDURE — 250N000012 HC RX MED GY IP 250 OP 636 PS 637: Performed by: STUDENT IN AN ORGANIZED HEALTH CARE EDUCATION/TRAINING PROGRAM

## 2022-05-19 PROCEDURE — 250N000011 HC RX IP 250 OP 636: Performed by: STUDENT IN AN ORGANIZED HEALTH CARE EDUCATION/TRAINING PROGRAM

## 2022-05-19 PROCEDURE — 250N000011 HC RX IP 250 OP 636: Performed by: NURSE PRACTITIONER

## 2022-05-19 PROCEDURE — 258N000003 HC RX IP 258 OP 636

## 2022-05-19 PROCEDURE — 250N000011 HC RX IP 250 OP 636: Performed by: PHYSICIAN ASSISTANT

## 2022-05-19 PROCEDURE — 93005 ELECTROCARDIOGRAM TRACING: CPT

## 2022-05-19 PROCEDURE — 84132 ASSAY OF SERUM POTASSIUM: CPT | Performed by: PHYSICIAN ASSISTANT

## 2022-05-19 PROCEDURE — 36592 COLLECT BLOOD FROM PICC: CPT | Performed by: NURSE PRACTITIONER

## 2022-05-19 PROCEDURE — 80048 BASIC METABOLIC PNL TOTAL CA: CPT | Performed by: NURSE PRACTITIONER

## 2022-05-19 PROCEDURE — P9047 ALBUMIN (HUMAN), 25%, 50ML: HCPCS | Performed by: NURSE PRACTITIONER

## 2022-05-19 PROCEDURE — 83605 ASSAY OF LACTIC ACID: CPT | Performed by: NURSE PRACTITIONER

## 2022-05-19 PROCEDURE — 250N000012 HC RX MED GY IP 250 OP 636 PS 637: Performed by: PHYSICIAN ASSISTANT

## 2022-05-19 PROCEDURE — 36592 COLLECT BLOOD FROM PICC: CPT | Performed by: PHYSICIAN ASSISTANT

## 2022-05-19 PROCEDURE — 90937 HEMODIALYSIS REPEATED EVAL: CPT

## 2022-05-19 PROCEDURE — 76776 US EXAM K TRANSPL W/DOPPLER: CPT

## 2022-05-19 PROCEDURE — 76776 US EXAM K TRANSPL W/DOPPLER: CPT | Mod: 26 | Performed by: RADIOLOGY

## 2022-05-19 PROCEDURE — 258N000001 HC RX 258

## 2022-05-19 PROCEDURE — 250N000013 HC RX MED GY IP 250 OP 250 PS 637: Performed by: NURSE PRACTITIONER

## 2022-05-19 PROCEDURE — 99233 SBSQ HOSP IP/OBS HIGH 50: CPT | Mod: 24 | Performed by: INTERNAL MEDICINE

## 2022-05-19 RX ORDER — HYDROMORPHONE HYDROCHLORIDE 2 MG/1
2-4 TABLET ORAL EVERY 4 HOURS PRN
Status: DISCONTINUED | OUTPATIENT
Start: 2022-05-19 | End: 2022-05-20

## 2022-05-19 RX ORDER — DIPHENHYDRAMINE HCL 25 MG
25-50 CAPSULE ORAL EVERY 6 HOURS PRN
Status: DISCONTINUED | OUTPATIENT
Start: 2022-05-19 | End: 2022-05-21

## 2022-05-19 RX ORDER — ACETAMINOPHEN 325 MG/1
650 TABLET ORAL ONCE
Status: COMPLETED | OUTPATIENT
Start: 2022-05-19 | End: 2022-05-19

## 2022-05-19 RX ORDER — METHYLPREDNISOLONE SODIUM SUCCINATE 125 MG/2ML
100 INJECTION, POWDER, LYOPHILIZED, FOR SOLUTION INTRAMUSCULAR; INTRAVENOUS
Status: DISCONTINUED | OUTPATIENT
Start: 2022-05-19 | End: 2022-05-22

## 2022-05-19 RX ORDER — ALBUMIN, HUMAN INJ 5% 5 %
25 SOLUTION INTRAVENOUS ONCE
Status: COMPLETED | OUTPATIENT
Start: 2022-05-19 | End: 2022-05-20

## 2022-05-19 RX ORDER — ALBUMIN (HUMAN) 12.5 G/50ML
25 SOLUTION INTRAVENOUS ONCE
Status: COMPLETED | OUTPATIENT
Start: 2022-05-19 | End: 2022-05-19

## 2022-05-19 RX ORDER — DEXTROSE MONOHYDRATE 25 G/50ML
25 INJECTION, SOLUTION INTRAVENOUS ONCE
Status: COMPLETED | OUTPATIENT
Start: 2022-05-19 | End: 2022-05-19

## 2022-05-19 RX ORDER — FUROSEMIDE 10 MG/ML
80 INJECTION INTRAMUSCULAR; INTRAVENOUS ONCE
Status: COMPLETED | OUTPATIENT
Start: 2022-05-19 | End: 2022-05-19

## 2022-05-19 RX ORDER — ASPIRIN 81 MG/1
81 TABLET, CHEWABLE ORAL DAILY
Status: DISCONTINUED | OUTPATIENT
Start: 2022-05-20 | End: 2022-05-26

## 2022-05-19 RX ORDER — SODIUM CHLORIDE 9 MG/ML
INJECTION, SOLUTION INTRAVENOUS CONTINUOUS
Status: DISCONTINUED | OUTPATIENT
Start: 2022-05-19 | End: 2022-05-21

## 2022-05-19 RX ORDER — DIPHENHYDRAMINE HCL 12.5MG/5ML
25-50 LIQUID (ML) ORAL ONCE
Status: COMPLETED | OUTPATIENT
Start: 2022-05-19 | End: 2022-05-19

## 2022-05-19 RX ORDER — ESZOPICLONE 1 MG/1
2 TABLET, FILM COATED ORAL
Status: DISCONTINUED | OUTPATIENT
Start: 2022-05-19 | End: 2022-05-20

## 2022-05-19 RX ORDER — DIPHENHYDRAMINE HCL 25 MG
25-50 CAPSULE ORAL ONCE
Status: COMPLETED | OUTPATIENT
Start: 2022-05-19 | End: 2022-05-19

## 2022-05-19 RX ORDER — ALBUMIN, HUMAN INJ 5% 5 %
25 SOLUTION INTRAVENOUS ONCE
Status: COMPLETED | OUTPATIENT
Start: 2022-05-19 | End: 2022-05-19

## 2022-05-19 RX ADMIN — ANTI-THYMOCYTE GLOBULIN (RABBIT) 200 MG: 5 INJECTION, POWDER, LYOPHILIZED, FOR SOLUTION INTRAVENOUS at 13:48

## 2022-05-19 RX ADMIN — SODIUM CHLORIDE 250 ML: 9 INJECTION, SOLUTION INTRAVENOUS at 14:55

## 2022-05-19 RX ADMIN — HYDROMORPHONE HYDROCHLORIDE 4 MG: 2 TABLET ORAL at 11:52

## 2022-05-19 RX ADMIN — ACETAMINOPHEN 975 MG: 325 TABLET ORAL at 17:32

## 2022-05-19 RX ADMIN — HYDROMORPHONE HYDROCHLORIDE 4 MG: 2 TABLET ORAL at 08:08

## 2022-05-19 RX ADMIN — ALBUMIN HUMAN 25 G: 0.25 SOLUTION INTRAVENOUS at 22:09

## 2022-05-19 RX ADMIN — ALBUMIN HUMAN 25 G: 0.25 SOLUTION INTRAVENOUS at 22:23

## 2022-05-19 RX ADMIN — SODIUM CHLORIDE 300 ML: 9 INJECTION, SOLUTION INTRAVENOUS at 14:55

## 2022-05-19 RX ADMIN — SODIUM BICARBONATE 50 MEQ: 84 INJECTION INTRAVENOUS at 02:05

## 2022-05-19 RX ADMIN — MYCOPHENOLATE MOFETIL 750 MG: 250 CAPSULE ORAL at 17:33

## 2022-05-19 RX ADMIN — DEXTROSE MONOHYDRATE 25 G: 25 INJECTION, SOLUTION INTRAVENOUS at 01:57

## 2022-05-19 RX ADMIN — SODIUM CHLORIDE, SODIUM LACTATE, POTASSIUM CHLORIDE, CALCIUM CHLORIDE AND DEXTROSE MONOHYDRATE: 5; 600; 310; 30; 20 INJECTION, SOLUTION INTRAVENOUS at 01:55

## 2022-05-19 RX ADMIN — HYDROMORPHONE HYDROCHLORIDE 2 MG: 2 TABLET ORAL at 17:34

## 2022-05-19 RX ADMIN — DIPHENHYDRAMINE HYDROCHLORIDE 50 MG: 25 CAPSULE ORAL at 13:07

## 2022-05-19 RX ADMIN — POLYETHYLENE GLYCOL 3350 17 G: 17 POWDER, FOR SOLUTION ORAL at 08:06

## 2022-05-19 RX ADMIN — FUROSEMIDE 80 MG: 10 INJECTION, SOLUTION INTRAVENOUS at 09:39

## 2022-05-19 RX ADMIN — DOCUSATE SODIUM 50 MG AND SENNOSIDES 8.6 MG 1 TABLET: 8.6; 5 TABLET, FILM COATED ORAL at 08:07

## 2022-05-19 RX ADMIN — HUMAN INSULIN 10 UNITS: 100 INJECTION, SOLUTION SUBCUTANEOUS at 02:15

## 2022-05-19 RX ADMIN — Medication: at 14:55

## 2022-05-19 RX ADMIN — LIDOCAINE 2 PATCH: 560 PATCH PERCUTANEOUS; TOPICAL; TRANSDERMAL at 08:05

## 2022-05-19 RX ADMIN — ALBUMIN HUMAN 25 G: 0.05 INJECTION, SOLUTION INTRAVENOUS at 22:37

## 2022-05-19 RX ADMIN — MYCOPHENOLATE MOFETIL 750 MG: 250 CAPSULE ORAL at 08:08

## 2022-05-19 RX ADMIN — ACETAMINOPHEN 975 MG: 325 TABLET ORAL at 00:07

## 2022-05-19 RX ADMIN — ACETAMINOPHEN 975 MG: 325 TABLET ORAL at 08:08

## 2022-05-19 RX ADMIN — HYDROMORPHONE HYDROCHLORIDE 2 MG: 2 TABLET ORAL at 19:38

## 2022-05-19 RX ADMIN — TACROLIMUS 3 MG: 1 CAPSULE ORAL at 17:33

## 2022-05-19 RX ADMIN — METHYLPREDNISOLONE SODIUM SUCCINATE 100 MG: 125 INJECTION, POWDER, FOR SOLUTION INTRAMUSCULAR; INTRAVENOUS at 08:09

## 2022-05-19 RX ADMIN — SODIUM CHLORIDE: 9 INJECTION, SOLUTION INTRAVENOUS at 22:38

## 2022-05-19 RX ADMIN — ATORVASTATIN CALCIUM 10 MG: 10 TABLET, FILM COATED ORAL at 08:08

## 2022-05-19 RX ADMIN — DOCUSATE SODIUM 50 MG AND SENNOSIDES 8.6 MG 1 TABLET: 8.6; 5 TABLET, FILM COATED ORAL at 19:39

## 2022-05-19 RX ADMIN — ACETAMINOPHEN 650 MG: 325 TABLET ORAL at 13:08

## 2022-05-19 RX ADMIN — TACROLIMUS 3 MG: 1 CAPSULE ORAL at 08:07

## 2022-05-19 RX ADMIN — VALGANCICLOVIR 450 MG: 450 TABLET, FILM COATED ORAL at 09:35

## 2022-05-19 ASSESSMENT — ACTIVITIES OF DAILY LIVING (ADL)
ADLS_ACUITY_SCORE: 23

## 2022-05-19 ASSESSMENT — ENCOUNTER SYMPTOMS
DIZZINESS: 1
FEVER: 0

## 2022-05-19 NOTE — PROVIDER NOTIFICATION
0254 surg cross cover notified of pts K resulting 5.7. Received orders to shift patient. Will continue to monitor closely.    0215: paged team regarding pts CVP of 16. Other VSS. No further orders at time, will continue to monitor.

## 2022-05-19 NOTE — PLAN OF CARE
Neuro: A&Ox4.   Cardiac: SR. VSS. CVP 11-15 this shift   Respiratory: Sating >93% on RA while awake. 1L NC while resting. Plans to wear home CPAP tonight.  GI/: Low urine outputs, 14-35cc/hr this shift, see provider notification notes. Repeat renal ultrasound completed tonight, one time dose of IV lasix given. Pt passed flatus this shift, active bowel sounds.  Diet/appetite: Tolerating low potassium diet. Fair appetite  Activity:  Assist of 1 with gait belt and walker, up to chair and in halls.  Pain: At acceptable level on current regimen.   Skin: No new deficits noted. Previous drainage marked on incisional dressing and has not expanded.  LDA's: left internal jugular, left HD line, left PIV, right PIV, gardiner.    Plan: Continue with POC. Continue to monitor urine output hourly and vitals/CVP every 2 hours. Notify primary team with changes.

## 2022-05-19 NOTE — OP NOTE
Transplant Surgery  Operative Note     Procedure date:  05/18/22    Preoperative diagnosis:  End Stage renal failure due to IgA nephropathy    Postoperative diagnosis:  Same    Procedure:  1. Left kidney transplant,  Living, Right  iliac fossa, without vascular reconstruction. A J-J ureteral stent was not placed.  2. Kidney allograft preparation on Back Table      Surgeon:  GUNNAR LEMUS    Fellow/Assistant:  Sandeep Hernandez MD. There was no qualified resident to assist with this procedure.    Anesthesia:  General    Specimen:  None    Drains:  Sha-Sierra drain    Urine output:  100 mls    Estimated blood loss:  150    Fluids administered:       Indication: The patient has End Stage renal failure due to IgA nephropathy and received an organ offer for a Living kidney allograft. After discussing the risks and benefits of proceeding, the patient agreed to proceed with surgery and provided informed consent.  Findings:   1. Right external iliac vein stuck to the pelvis secondary to the previous prostatectomy and radiation  2. End to side anastomosis to the external iliac artery and vein  3. Liche anterior multistitch to the bladder. No stent was placed  4. A BAUDILIO drain placed in the operative bed     Final ABO/Crossmatch verification: After the donor organ arrived to the operating room and prior to anastomosis, I participated in the transplant pre-verification upon organ receipt timeout by visually verifying the donor ID, organ and laterality, donor blood type, recipient unique identifier, recipient blood type, and that the donor and recipient are blood type compatible. The crossmatch was done prospectively; the T cell flow crossmatch result was negative and B cell flow crossmatch result was negative prior to anastomosis.  The patient received Basiliximab, Cellcept and Solumedrol on induction.  Kidney imported from [place]. Single artery, single vein, single ureter.     Donor Organ Information:   Donor UNOS ID:   [id number]    Donor arterial clamp on:  5/17/2022  8:02 AM    Total ischemic time:  613 min    Cold ischemic time:  573 min    Warm ischemic time:  40 min    Preservation fluid:   UW     Back Table Details:   Procedure:  Bench preparation of the kidney allograft for transplantation without vascular reconstruction    Surgeon:  FERNANDO TREVINO    Faculty Co-Surgeon:      Fellow/Assistant:  None     Donor arrival to recipient room:  5/17/2022  3:52 PM    Graft injury:  No    Graft biopsy:  None     Organ received on:  Ice    Pump resistance:      Pump flow:      Arterial anatomy:  Single    Donor arterial quality:  Normal    Venous anatomy:  Single    Ureteral anatomy:  Single    Any reconstruction:  None     Artery:  None     Vein:  None     Complications: None.    Back Table Preparation:  The donor kidney was received and inspected. It had been flushed with UW. The graft was prepared on the back table by removing perinephric fat and ligating venous tributaries and lymphatics. The ureter was also cleaned of excess tissue. If required, reconstruction was performed as detailed above. The kidney was stored in iced cold preservation solution until ready for transplantation. Faculty was present for the critical portions of the procedure.    Operative Procedure:   Arterial anastomosis start:  5/17/2022  5:35 PM    Arterial unclamp:  5/17/2022  6:15 PM    Extra vessels used:   None       The patient was brought to the operating room, placed in a supine position, and a time out was performed. Sequential compression devices were placed on both lower extremities and general endotracheal anesthesia was induced.  The patient was given IV antibiotics and a Flores catheter. A central line was placed by Anesthesia service. The abdomen was then shaved, prepped, and draped in the usual sterile fashion.  An incision was made in the right lower quadrant and carried down through the subcutaneous tissue and the abdominal wall fascia. The  epigastric vessels were ligated in continuity, divided and secured with surgical clips. The right iliac artery and vein were exposed. The retractor system was placed and the lymphatics overlying the vessels were serially ligated and divided. The iliac vein was noted to be stuck to pelvis. This was mobilized carefully. The internal iliac vein was identified, ligated and divided.   The patient was heparinized. We applied atraumatic vascular clamps and the donor kidney was brought to the operative field. We made a venotomy and the renal vein was anastomosed to the recipient right External Iliac vein in an end-to-side fashion. An arteriotomy was made and the donor renal artery was anastomosed to the recipient right external iliac artery  in an end to side fashion. The patient was simultaneously loaded with IV mannitol, Lasix and volume. The renal artery was protected and the clamps were removed. After several cardiac cycles, we opened the renal artery and the kidney had Fair reperfusion and was firm and purple. The kidney slowly recovered and turned pink.     The transplant ureter was managed by creating a Liche (anterior multistitch) anastomosis with absorbable suture. Minimal urine was made.    Hemostasis was obtained, the anastomoses inspected, and the kidney placed in the iliac fossa. After placement, the vessel lay was inspected and found to be acceptable. The kidney position was Retroperitoneal. The field was irrigated with antibiotic solution. A drain was placed. The retractor was removed and the abdominal wall fascia reapproximated. Subcutaneous tissues were irrigated and hemostasis obtained.  The skin was reapproximated with staples.   All needle, sponge and instrument counts were correct x 2. The patient was awakened, extubated, and transferred to PACU for post-op monitoring. Faculty was present for key portions of the procedure.    I was present during the key portions of the procedure, and I was immediately  available for the entire procedure. There was no qualified resident available to assist with the entire procedure. The fellow noted above participated as the first assistant in all parts of the dictated procedure and was the primary in the opening and closure with assistance from me as needed.

## 2022-05-19 NOTE — PROGRESS NOTES
"Transplant Surgery  Inpatient Daily Progress Note  05/19/2022    Assessment & Plan: 56yo with history of ESRD secondary to IgA nephropathy, dialysis via a tunneled catheter, HTN, and prostate cancer s/p prostatectomy and radiation. S/p living donor kidney transplant without stent on 5/17/22. Imported kidney with vasospasm in OR.    Graft function: POD #2. Slow graft function. Cr 10.6 (9.9), low UOP, hyperkalemia. RV velocities continue to improve on US. Discussed with patient need for likely dialysis in light of hyperkalemia and fluid overload, ultimate decision per nephrology.  Immunosuppression management: PRA 19, will move to \"intermediate\" risk protocol in light of slow graft function/possible DGF  -Basiliximab 5/17 & 5/21  -Will give thymo x 1 dose (2 mg/kg or 200 mg) (already received steroid dose this AM, will not repeat as long as thymo given before 1400)  -Steroid taper per low risk induction protocol  -Tacro level goal 8-10  -MMF 750mg BID  Complexity of management: High. Contributing factors:  induction    Neuro:  Acute post-op pain: Oxycodone almost completely ineffective. Switched to PO dilaudid. Continue scheduled APAP, lidoderm and Robaxin.  Hematology:   Anemia: Borderline macrocytic. Hgb 9.6, stable.  Renal vein prophylaxis: Heparin increased to 500u/hr.  Cardiorespiratory:   Hx HTN: BP stable. Monitor.  GI/Nutrition:   Diet: Low potassium.  Bowel regimen: Senna  Endocrine: No acute issues.   Fluid/Electrolytes:   Volume: Stop UOP IVF replacement  Hyperkalemia: K 5.8. Shifted overnight. Lasix. Low K diet. Will likely need a run of dialysis until kidney wakes up.  : Flores to remain due to new surgical anastomosis x3d.  Infectious disease: Afebrile  Prophylaxis: DVT, fall, GI, viral (Valcyte), pneumocystis (Bactrim, hold until K normalizes)  Disposition: 6B, may transfer to  today    Medical Decision Making: Medium  Subsequent visit 40706 (moderate level decision making)    TOM/Fellow/Resident " "Provider: Clotilde Hughes PA-C [id number]    Faculty: Boris Huang M.D.  _________________________________________________________________  Transplant History:   5/17/2022 (Kidney), Postoperative day: 2     Interval History: History is obtained from the patient  Overnight events: No nausea. Asking appropriate questions.    ROS:   A 10-point review of systems was negative except as noted above.    Meds:   acetaminophen  650 mg Oral Once    acetaminophen  975 mg Oral Q8H    anti-thymocyte globulin  200 mg Intravenous Central line Once    atorvastatin  10 mg Oral Daily    diphenhydrAMINE  25-50 mg Oral Once    Or    diphenhydrAMINE  25-50 mg Per NG tube Once    lidocaine  2 patch Transdermal Q24H    lidocaine   Transdermal Q8H    magnesium oxide  400 mg Oral Daily with lunch    mycophenolate  750 mg Oral BID IS    polyethylene glycol  17 g Oral Daily    [START ON 5/20/2022] predniSONE  60 mg Oral Daily    Followed by    [START ON 5/22/2022] predniSONE  40 mg Oral Daily    Followed by    [START ON 5/24/2022] predniSONE  20 mg Oral Daily    Followed by    [START ON 5/31/2022] predniSONE  15 mg Oral Daily    Followed by    [START ON 6/7/2022] predniSONE  10 mg Oral Daily    Followed by    [START ON 6/14/2022] predniSONE  5 mg Oral Daily    senna-docusate  1 tablet Oral BID    sodium chloride (PF)  10 mL Intracatheter Q8H    [Held by provider] sulfamethoxazole-trimethoprim  1 tablet Oral Once per day on Mon Wed Fri    tacrolimus  3 mg Oral BID IS    valGANciclovir  450 mg Oral Once per day on Mon Thu       Physical Exam:     Admit Weight: 103.4 kg (227 lb 15.3 oz)    Current vitals:   /86   Pulse 82   Temp 97.7  F (36.5  C) (Oral)   Resp 18   Ht 1.778 m (5' 10\")   Wt 111.9 kg (246 lb 11.1 oz)   SpO2 96%   BMI 35.40 kg/m      Vital sign ranges:    Temp:  [97.6  F (36.4  C)-98  F (36.7  C)] 97.7  F (36.5  C)  Pulse:  [54-84] 82  Resp:  [10-18] 18  BP: (102-135)/(60-86) 135/86  SpO2:  [91 %-98 %] 96 %    General " Appearance: in no apparent distress.   Skin: Warm, perfused  Heart: NSR  Lungs: Unlabored  Abdomen: The abdomen is rounded, BAUDILIO serosang, incision covered  : gardiner is present.  Urine is trace hematuria.  Extremities: edema: trace gen, strength 5/5  Neurologic: alert and oriented x4. Tremor absent..     Data:   CMP  Recent Labs   Lab 05/19/22  0510 05/19/22  0440 05/19/22  0149 05/19/22  0028 05/18/22 2012 05/18/22  1541 05/18/22  1400 05/17/22  2046 05/17/22  1748   NA  --  135  --   --  133  --   --    < > 137   POTASSIUM  --  5.5*  --  5.7* 4.9  4.9   < >  --    < > 4.4   CHLORIDE  --  94  --   --  93*  --   --    < >  --    CO2  --  30  --   --  26  --   --    < >  --    * 149*   < >  --  168*  --   --    < > 96   BUN  --  67*  --   --  62*  --   --    < >  --    CR  --  10.60*  --   --  9.89*  --   --    < >  --    GFRESTIMATED  --  5*  --   --  6*  --   --    < >  --    YEE  --  8.9  --   --  9.3  --   --    < >  --    ICAW  --   --   --   --   --   --   --   --  4.0*   MAG  --  2.6*  --   --  2.3  --   --    < >  --    PHOS  --  7.1*  --   --  5.6*  --   --    < >  --    FCREAT  --   --   --   --   --   --  10.1  --   --     < > = values in this interval not displayed.     CBC  Recent Labs   Lab 05/19/22 0440 05/19/22 0028 05/18/22 2012   HGB 9.6* 10.0* 10.9*  10.9*   WBC 11.2*  --  14.9*     --  196       Attestation:    The patient has been seen with the team and evaluated by me.   Vital signs, labs, medications and orders were reviewed.   When obtained, diagnostic images were reviewed by me and interpreted as above.    The care plan was discussed with the multidisciplinary team and I agree with the findings and plan in this note, with any differences recorded in blue.    Immunosuppressive medication management was reviewed and adjusted as reflected in the note and orders.     .

## 2022-05-19 NOTE — PROGRESS NOTES
Transfer  Transferred to: 7A  Via:bed  Reason for transfer: Pt no longer appropriate for 6B- improved patient condition  Family: Aware of transfer  Belongings: Packed and sent with pt  Chart: Delivered with pt to next unit  Medications: Meds sent to new unit with pt  Report given to: Praneeth KRUSE 7A  Pt status: Pt transferring from dialysis.

## 2022-05-19 NOTE — DISCHARGE SUMMARY
[facility]    Discharge Summary  Transplant Surgery    Date of Admission:  5/17/2022  Date of Discharge:  5/27/2022  Discharging Provider: Clotilde Hughes PA-C/Dr. Rust    Discharge Diagnoses   Principal Problem:    Kidney replaced by transplant  Active Problems:    Immunosuppressed status (H)    Hyperkalemia      History of Present Illness   Donald Blanco is an 57 year old male with history of ESRD secondary to IgA nephropathy, dialysis via a tunneled catheter, HTN, and prostate cancer s/p prostatectomy and radiation. S/p living donor kidney transplant without stent on 5/17/22. Imported kidney with vasospasm in OR.    Hospital Course   LDKT 5/17/22; DGF: POD #10. Cr still with upward trend, though increase slowing, 9 <7.7<5.9<4.3 from last HD on 5/23. Will plan to dialyze on 5/27 prior to discharge per nephrology. UOP increasing, made 1625 mL urine yesterday with 3 mg Bumex BID. Continue Bumex. RV velocities continue to improve on US. Kidney biopsy in IR 5/24, pathology with [facility]. Voiding post gardiner removal.     Fluid collection: US 5/20 with 7.7 cm fluid collection. Drain Cr 4.9: Serum Cr 4.2. US 5/23 unchanged.    Immunosuppression management:   Induction: initially via low risk protocol (PRA 19), transitioned to high risk in light of DGF.  -Basiliximab 5/17  -Thymo 200 mg (2 mg/kg) given 5/19, 5/21 (3/4 dose due to rigors), and 5/22 complete  -Steroid taper initially per low risk induction protocol (now discontinued), additional 100 mg on 5/21& 5/22 prior to Thymo  Maintenance:  -Tacro level goal 8-10, will discharge on 4.5 mg BID.  - mg BID  -Infectious prophylaxis with Bactrim indefinitely and valganciclovir x 3 months.    Transplant coordinator Alicia Valentin  [phone]  Donor type:  Live  DSA at time of transplant:  NO  Ureteral stent: NO  CMV:  Donor - / Recipient -  EBV:  Donor + / Recipient +  Thymoglobulin:  600 mg ordered, though likely  likely received ~550 mg due to reactions, so 5.3-5.8 mg/kg     Neuro:  Acute post-op pain: Controlled with oxycodone PRN, APAP PRN, and scheduled Lidoderm patches.     Hematology:   Anemia: Borderline macrocytic. Hgb 7-8, stable.  Renal vein prophylaxis: Initially on Heparin 500 u/hr and ASA 81 mg daily. Stopped heparin drip, will continue  mg daily.  Thrombocytopenia: Due to Thymo. HIT negative. Improving.     Cardiorespiratory:   Hx HTN: BPs 110-120's without intervention.  H/o sleep apnea: Continue CPAP.   Hypotension: Did undergo dialysis run on POD 2, but was acutely hypotensive post dialysis likely due to some combination of thymo reaction + dialysis. Blood pressures did not improve despite several fluid boluses and albumin. Ultimately transferred to the SICU for dopamine. Weaned off Dopamine 5/22. Off CRRT 5/22. Tolerated iHD 5/23.      GI/Nutrition:   Diet: Regular  Bowel regimen: Senna/Miralax PRN     Fluid/Electrolytes:   Hyperphosphatemia: Phos 8.4, start phos binder with meals.       Significant Results and Procedures   5/17/22  Kidney Transplant Directed Recipient  Surgeon:              * Howard Flores MD - Primary     * Jordon Marin MD - Fellow - Assisting     * Sandeep Hernandez MD - Fellow - Assisting  Drains: Sha-Sierra  Findings:  living donor kidney transplant; single artery, single vein, single ureter, NO stent; BAUDILIO drain.    Code Status   Full    Primary Care Physician   [facility]    Physical Exam   Temp: 97.9  F (36.6  C) Temp src: Oral BP: 123/65 Pulse: 80   Resp: 16 SpO2: 99 % O2 Device: None (Room air)    Vitals:    05/26/22 0530 05/27/22 0509 05/27/22 1509   Weight: 115.2 kg (253 lb 15.5 oz) 114.1 kg (251 lb 8.7 oz) 112.1 kg (247 lb 2.2 oz)     Vital Signs with Ranges  Temp:  [97.5  F (36.4  C)-98.3  F (36.8  C)] 97.9  F (36.6  C)  Pulse:  [69-82] 80  Resp:  [16] 16  BP: (107-143)/(65-76) 123/65  SpO2:  [96 %-100 %] 99 %  I/O last 3 completed shifts:  In: -   Out:  2595 [Urine:2075; Drains:520]    Constitutional: Awake, alert, cooperative, no apparent distress, and appears stated age.  Eyes: Lids and lashes normal, pupils equal, round, extra ocular muscles intact, sclera clear, conjunctiva normal.  ENT: Normocephalic, without obvious abnormality, atraumatic  Respiratory: Nonlabored resps on RA  Cardiovascular: Perfused  GI: Soft, non-distended, non-tender, incision c/d/i, drain with serosanguinous output  Genitourinary:  Voiding  Skin: No bruising or bleeding, normal skin color, texture, turgor  Musculoskeletal: There is no redness, warmth, or swelling of the joints.  Full range of motion noted.  Neurologic: Awake, alert, oriented to name, place and time.    Neuropsychiatric: Calm, normal eye contact, alert, normal affect, oriented to self, place, time and situation, memory for past and recent events intact and thought process normal.    Time Spent on this Encounter   I, Clotilde Hughes PA-C, personally saw the patient today and spent greater than 30 minutes discharging this patient.    Discharge Disposition   Discharged to home for DGF protocol  Condition at discharge: Stable    Consultations This Hospital Stay   SOT MEDICATION HISTORY IP PHARMACY CONSULT  SOCIAL WORK IP CONSULT  PHARMACY IP CONSULT  NUTRITION SERVICES ADULT IP CONSULT  NEPHROLOGY KIDNEY/PANCREAS TRANSPLANT ADULT IP CONSULT  CARE MANAGEMENT / SOCIAL WORK IP CONSULT  INTENSIVIST IP CONSULT  PHYSICAL THERAPY ADULT IP CONSULT  OCCUPATIONAL THERAPY ADULT IP CONSULT  PHARMACY CRRT IP CONSULT  PHARMACY CRRT IP CONSULT  PHARMACY IP CONSULT  INTERVENTIONAL RADIOLOGY ADULT/PEDS IP CONSULT  NURSING TO CONSULT FOR VASCULAR ACCESS CARE IP CONSULT    Discharge Orders       Home Care Referral      Reason for your hospital stay    Patient underwent a living donor kidney transplant (no stent) on 5/17/2022, complicated by vasospasm and delayed graft function.     Activity    Your activity upon discharge: Walk at least  four times a day, lift no greater than 10 pounds for 6 weeks from the time of surgery.  After 6 weeks time please return to your normal exercise routine.  No driving while taking narcotics or until 3 weeks after surgery.     [facility] Follow-up and recommended labs and tests    Over the next 2 days you will be seen in the [facility] at 7 am (ph. [phone], option 7).  Your labs will be drawn at the beginning of your appointment at 7:00 am.  DO NOT take your medications prior to having labs drawn. Please bring all your medications with you from home to take after labs are drawn.    LABS:  CBC, BMP, Mg, Phos to be drawn daily while in AT[facility], then every Monday, Thursday by home health care nurse if arranged, or at an outpatient lab.   Tacrolimus levels (12 hours after administration) daily while in ATC then 2 times weekly.     FOLLOW UP APPOINTMENTS:  Remember to always bring an updated medication list to all appointments.     An appointment with your transplant surgeon will be scheduled for approximately 2 weeks following discharge from the hospital.  Your transplant surgeon is: Dr. Flores.  You will follow up with transplant nephrology in clinic as scheduled.   Follow up with primary care provider in 4-8 weeks. (Pt to schedule)    Call scheduling at [phone] if you have not heard about your appointments within 48 hours after discharge.     When to contact your care team    WHEN TO CONTACT YOUR  COORDINATOR:  Transplant Coordinator [phone]  Notify your coordinator if you have pain over your kidney, increased redness or drainage from your incision, fever greater than 100.5F, decreased urine output or new or increased amount of blood in urine.  Notify your coordinator immediately if you are ever unable to take your immunosuppressive medications for any reason.  If it is outside of office hours, please call the hospital switchboard at [phone] and ask to have the kidney transplant  surgery fellow paged for urgent medical questions, or present to the emergency department.     Tubes and drains    You are going home with the following tubes or drains:   BAUDILIO plan: Please monitor color and amount of output. Drain will remain in place per surgeon, but likely until output is < 30 mL/day.     Discharge Instructions    Discharged to home/hotel for DGF protocol/rehab     Monitor and record    Monitor blood pressure and weight daily initially post transplant.     Wound care and dressings    Instructions to care for your wound at home: Keep wound clean and dry.  Staples may be removed 3 weeks post-op.  Pt may shower but do not soak incision in pool, hot tub, or bath until drain site is healed.     IV access    **Ordering Provider MUST call/page Care Coordinator/ to discuss arranging this service**    You are going home with the following vascular access device: Hemodialysis.     Diet    Follow this diet upon discharge: 3 g K diet at this time, until kidney function has stabilized    Diet recommendations post-transplant: Heart healthy dietary habits long term (low saturated/trans fat, low sodium). High protein diet x 8 weeks. Practice food safety precautions.     Discharge Medications    Current Discharge Medication List      START taking these medications    Details   acetaminophen (TYLENOL) 325 MG tablet Take 1-2 tablets (325-650 mg) by mouth every 4 hours as needed for mild pain or fever  Qty: 100 tablet, Refills: 0    Associated Diagnoses: Kidney replaced by transplant      aspirin (ASA) 325 MG tablet Take 1 tablet (325 mg) by mouth daily  Qty: 30 tablet, Refills: 11    Associated Diagnoses: Kidney replaced by transplant      bumetanide (BUMEX) 1 MG tablet Take 3 tablets (3 mg) by mouth 2 times daily  Qty: 180 tablet, Refills: 0    Associated Diagnoses: Kidney replaced by transplant      Lidocaine (LIDOCARE) 4 % Patch Place 1-2 patches onto the skin every 24 hours To prevent lidocaine  toxicity, patient should be patch free for 12 hrs daily.  Qty: 10 patch, Refills: 0    Associated Diagnoses: Kidney replaced by transplant      mycophenolate (GENERIC EQUIVALENT) 250 MG capsule Take 3 capsules (750 mg) by mouth 2 times daily  Qty: 180 capsule, Refills: 11    Associated Diagnoses: Kidney replaced by transplant      oxyCODONE (ROXICODONE) 5 MG tablet Take 1 tablet (5 mg) by mouth every 6 hours as needed for moderate to severe pain  Qty: 10 tablet, Refills: 0    Associated Diagnoses: Kidney replaced by transplant      pantoprazole (PROTONIX) 40 MG EC tablet Take 1 tablet (40 mg) by mouth 2 times daily (before meals)  Qty: 60 tablet, Refills: 3    Associated Diagnoses: Kidney replaced by transplant      senna-docusate (SENOKOT-S/PERICOLACE) 8.6-50 MG tablet 1-2 tablets by Oral or Feeding Tube route 2 times daily as needed for constipation Take while taking oxycodone, hold for loose stools.  Qty: 100 tablet, Refills: 0    Associated Diagnoses: Kidney replaced by transplant      sulfamethoxazole-trimethoprim (BACTRIM) 400-80 MG tablet Take 1 tablet by mouth Every Mon, Wed, Fri Morning Increase to one tab by mouth daily when directed by transplant team  Qty: 30 tablet, Refills: 11    Associated Diagnoses: Kidney replaced by transplant      tacrolimus (GENERIC EQUIVALENT) 0.5 MG capsule Take 1 capsule (0.5 mg) by mouth 2 times daily Take with four 1 mg capsules by mouth twice daily for a total daily dose of 4.5 mg by mouth twice daily.  Qty: 60 capsule, Refills: 11    Associated Diagnoses: Kidney replaced by transplant      tacrolimus (GENERIC EQUIVALENT) 1 MG capsule Take four 1 mg capsules by mouth twice daily with one 0.5 mg capsule by mouth twice daily for a total daily dose of 4.5 mg by mouth twice daily.  Qty: 240 capsule, Refills: 11    Associated Diagnoses: Kidney replaced by transplant      valGANciclovir (VALCYTE) 450 MG tablet Take 1 tablet (450 mg) by mouth twice a week Titrate dose up to 2 tabs  by mouth daily as directed by transplant team (based on kidney function)  Qty: 60 tablet, Refills: 2    Associated Diagnoses: Kidney replaced by transplant         CONTINUE these medications which have CHANGED    Details   sevelamer carbonate (RENVELA) 800 MG tablet Take 1 tablet (800 mg) by mouth 3 times daily (with meals)  Qty: 90 tablet, Refills: 1    Associated Diagnoses: Kidney replaced by transplant         CONTINUE these medications which have NOT CHANGED    Details   B Complex-C-Folic Acid (VIRT-CAPS) 1 MG CAPS Take 1 tablet by mouth daily      eszopiclone (LUNESTA) 2 MG tablet Take 1 tablet by mouth At Bedtime      simvastatin (ZOCOR) 40 MG tablet Take 40 mg by mouth every morning       Vitamin D3 (CHOLECALCIFEROL) 25 mcg (1000 units) tablet Take 1 capsule by mouth every morning          STOP taking these medications       allopurinol (ZYLOPRIM) 100 MG tablet Comments:   Reason for Stopping:         aspirin (ASA) 81 MG EC tablet Comments:   Reason for Stopping:         calcitRIOL (ROCALTROL) 0.25 MCG capsule Comments:   Reason for Stopping:         cinacalcet (SENSIPAR) 30 MG tablet Comments:   Reason for Stopping:         colchicine (MITIGARE) 0.6 MG capsule Comments:   Reason for Stopping:         furosemide (LASIX) 20 MG tablet Comments:   Reason for Stopping:             Allergies   No Active Allergies  Data   Most Recent 3 Creatinines:Recent Labs   Lab Test 05/27/22  0627 05/26/22  0516 05/25/22  0427   CR 8.97* 7.74* 5.86*

## 2022-05-19 NOTE — PROGRESS NOTES
HEMODIALYSIS TREATMENT NOTE    Date: 5/19/2022  Time: 5:42 PM    Data:  Pre Wt:   111.9 kg  Desired Wt: 110.9 kg   Post Wt:  110.9 kg  Weight change:  1 kg  Ultrafiltration - Post Run Net Total Removed (mL): 1000 mL  Vascular Access Status: patent  Dialyzer Rinse: Streaked, Light  Total Blood Volume Processed: 35.7 L Liters  Total Dialysis (Treatment) Time: 2 Hours    Lab:   No    Assessment:  Pt ran for 2 hours on a K2/ Ca2.5 bath with a /  and 1 L pulled with no complications. Pt rinsed back, CVC NS locked, and caps changed. Patient VSS, alert and oriented x4 and rested throughout tx. Wife at bedside supportive throughout tx. Report given to PCN     Plan:    Per renal team

## 2022-05-19 NOTE — PLAN OF CARE
Goal Outcome Evaluation:    Neuro: A/Ox4, able to make needs known.   Cardiac: SR/SB with HR 52-70. VSS. CVPs slightly elevated ranging 13-16, team aware.   Respiratory: O2 sats > 92% on home CPAP with 3L bled in. RA/2L NC while awake.  GI/: Hourly urine output ranging 25-40 via gardiner. Frequent K monitoring for hyperkalemia. No BM, bowel sounds present.  Diet/appetite: 3g K diet.   Activity:  Ax 1. Encourage activity.  Pain: At acceptable level on current regimen. Prn dilaudid for surgical/incisional pain.  Skin: R abdominal dressing wdl.   LDA's: Gardiner wdl. R BAUDILIO drain with moderate output.    Plan: Q4h K/Hgb checks. Continue with POC. Notify primary team with changes.

## 2022-05-19 NOTE — PROGRESS NOTES
[facility]  Transplant Nephrology progress note   Date of Admission:  5/17/2022  Today's Date: 05/19/2022  Requesting physician: Howard Flores MD    Recommendations:  iHD to address hypervolemia and hyperkalemia as a result of DGF   - will do 2 hr run   - target 500 - 1000 ml UF provided SBP> 110       Assessment & Plan    Donald Blanco is a 57 year old male with history of IgA nephropathy, resultant ESRD, on HD 3 times/week. (Access: Right IJ TDC).At baseline trace urine output.Underwent LDKT on 5/17/22, without ureteral stent.Imported kidney with vasospasm in OR.Other comorbidities include HTN, and prostate cancer s/p prostatectomy and radiation.    # LDKT: without ureteral stent.Imported kidney with vasospasm in OR . DGF. UOP inadequate, hypervolemia , hyperkalemia   despite diuresis . Will proceed with iHD    - Baseline Creatinine: ~  TBD   - Proteinuria: Not checked post transplant   - Date DSA Last Checked: May/2022      Latest DSA:  DSA pending   - BK Viremia: Not checked post transplant   - Kidney Tx Biopsy: No     # HD ACCESS: R internal jugular TDC .     # Patient consented for Renal Replacement therapy ( RRT ) on 5/18/22    # Immunosuppression: Tacrolimus immediate release (goal 8-10) and Mycophenolate mofetil (dose 750 mg every 12 hours)               - Induction: Intermediate . PRA 19   - Changes: YES . Patient will get Thymo 2 mg/kg today     # Infection Prophylaxis:   - PJP: Sulfa/TMP (Bactrim) - currently held due to hyperkalemia   - CMV: Valganciclovir (Valcyte). No discordance D-/R-    # Hypertension: Controlled;  Goal BP: < 150/90   - Volume status: Mildly hypervolemic    - Changes: No    # Elevated Blood Glucose: Glucose generally running ~ 170 -200    - Management as per primary team.    # Anemia in Chronic Renal Disease: Hgb: Stable      YUDI: No   - Iron studies: Unknown at this time, but checked with dialysis    # Mineral Bone Disorder:   -  Secondary renal hyperparathyroidism; PTH level: Unknown at this time, but checked with dialysis        On treatment: None  - Vitamin D; level: Unknown at this time, but checked with dialysis        On supplement: No  - Calcium; level: Normal        On supplement: No  - Phosphorus; level: Normal        On supplement: No    # Electrolytes:   - Potassium; level: High        On supplement: No  - Magnesium; level: Normal        On supplement: No  - Bicarbonate; level: Normal        On supplement: No  - Sodium; level: Normal    # Transplant History:  Etiology of Kidney Failure: IgA nephropathy  Tx: LDKT  Transplant: 5/17/2022 (Kidney)  Crossmatch at time of Tx: negative  Significant changes in immunosuppression: None  Significant transplant-related complications: None    Recommendations were communicated to the primary team verbally.  Seen and discussed with Dr. Clarisa Ayala MD  Pager: [phone]    Physician Attestation   I, Tucker Mckenna MD, personally examined and evaluated this patient.  I discussed the patient with the resident/fellow and care team, and agree with the assessment and plan of care as documented in the note on 05/19/22 .    I personally reviewed vital signs, medications, labs, and imaging.  K rising, minimal response to diuretics. Suspect DGF due to known vasospasm, hypoperfusion in OR along with renal vein stenosis.   Will dialyze with 2 hour/1L/2K/low flow HD primarily for clearance. Goal will be to use the minimum effective dialysis to maintain electrolytes, volume while minimizing ischemia-reperfusion injury to his allograft.     Tucker Mckenna MD  Date of Service (when I saw the patient): 05/19/22              REASON FOR CONSULT   LDKT    History of Present Illness   Donald Blanco is a 57 year old male with history of IgA nephropathy, resultant ESRD, on HD 3 times/week. (Access: Right IJ TDC).  At baseline trace urine output.  Underwent LDKT on 5/17/22, without ureteral  stent.Imported kidney with vasospasm in OR  Other comorbidities include : HTN, and prostate cancer s/p prostatectomy and radiation.  PRA 19  Intermediate induction  CMV R-/R-  EBV D+/R+  Crossmatch negative at time of transplant  DSA pending    Interval History   Serial allograft ultrasound shows improved vascular flow.  However patient having poor urine output, persistent hyperkalemia despite diuresis.  Patient denies any shortness of breath or chest pain.  Denies any abdominal pain except for mild discomfort at operative site.  Passing flatus but no bowel movement yet.  No confusion, focal weakness or altered sensation.  No headache.    Review of Systems    The 10 point Review of Systems is negative other than noted in the HPI or here.     Social History   I have reviewed this patient's social history and updated it with pertinent information if needed. Donald GAONA Francis  reports that he has never smoked. He has never used smokeless tobacco. He reports current alcohol use. He reports that he does not use drugs.    Allergies   No Known Allergies  Prior to Admission Medications    acetaminophen  975 mg Oral Q8H    anti-thymocyte globulin  200 mg Intravenous Central line Once    [START ON 5/20/2022] aspirin  81 mg Oral Daily    atorvastatin  10 mg Oral Daily    lidocaine  2 patch Transdermal Q24H    lidocaine   Transdermal Q8H    [Held by provider] magnesium oxide  400 mg Oral Daily with lunch    mycophenolate  750 mg Oral BID IS    polyethylene glycol  17 g Oral Daily    [START ON 5/20/2022] predniSONE  60 mg Oral Daily    Followed by    [START ON 5/22/2022] predniSONE  40 mg Oral Daily    Followed by    [START ON 5/24/2022] predniSONE  20 mg Oral Daily    Followed by    [START ON 5/31/2022] predniSONE  15 mg Oral Daily    Followed by    [START ON 6/7/2022] predniSONE  10 mg Oral Daily    Followed by    [START ON 6/14/2022] predniSONE  5 mg Oral Daily    senna-docusate  1 tablet Oral BID    sodium chloride (PF)  10  "mL Intracatheter Q8H    [Held by provider] sulfamethoxazole-trimethoprim  1 tablet Oral Once per day on     tacrolimus  3 mg Oral BID IS    valGANciclovir  450 mg Oral Once per day on       heparin 500 Units/hr (22 1200)       Physical Exam   Temp  Av.1  F (36.7  C)  Min: 97.7  F (36.5  C)  Max: 98.6  F (37  C)      Pulse  Av.1  Min: 63  Max: 105 Resp  Avg: 15.8  Min: 8  Max: 19  SpO2  Av.3 %  Min: 94 %  Max: 100 %    CVP (mmHg): 15 mmHgBP 108/72   Pulse 88   Temp 97.9  F (36.6  C) (Oral)   Resp 9   Ht 1.778 m (5' 10\")   Wt 111.9 kg (246 lb 11.1 oz)   SpO2 93%   BMI 35.40 kg/m     Date 22 0700 - 22 0659   Shift 7001-3836 5217-1669 2928-1340 24 Hour Total   INTAKE   I.V. 1081.67   1081.67   Shift Total(mL/kg) 1081.67(10)   1081.67(10)   OUTPUT   Urine 335 50  385   Drains 165   165   Shift Total(mL/kg) 500(4.62) 50(0.46)  550(5.08)   Weight (kg) 108.2 108.2 108.2 108.2      Admit Weight: 103.4 kg (227 lb 15.3 oz)     GENERAL APPEARANCE: alert and no distress  HENT: mouth without ulcers or lesions  LYMPHATICS: no cervical or supraclavicular nodes  RESP: lungs clear to auscultation - no rales, rhonchi or wheezes  CV: regular rhythm, normal rate, no rub, no murmur  EDEMA: no LE edema bilaterally  ABDOMEN: soft, nondistended, nontender, bowel sounds normal  MS: extremities normal - no gross deformities noted, no evidence of inflammation in joints, no muscle tenderness  SKIN: no rash  ACCESS: [facility] TDC     Data   CMP  Recent Labs   Lab 22  1202 22  0510 22  0440 22  0433 22  0357 22  0149 22  0028 22  0436 22  0421 22  2326 226   NA  --   --  135  --   --   --   --  133  --  132*  --  132*   POTASSIUM 5.2  --  5.5*  --   --   --  5.7* 4.9  4.9   < > 5.5*  5.5*   < > 5.6*   CHLORIDE  --   --  94  --   --   --   --  93*  --  95  --  95   CO2  --   --  30  --   --   --   --  26  --  " 27  --  28   ANIONGAP  --   --  11  --   --   --   --  14  --  10  --  9   GLC  --  147* 149* 146* 154*   < >  --  168*   < > 192*   < > 122*   BUN  --   --  67*  --   --   --   --  62*  --  52*  --  48*   CR  --   --  10.60*  --   --   --   --  9.89*  --  9.20*  --  8.48*   GFRESTIMATED  --   --  5*  --   --   --   --  6*  --  6*  --  7*   YEE  --   --  8.9  --   --   --   --  9.3  --  8.8  --  8.6   MAG  --   --  2.6*  --   --   --   --  2.3  --  2.4*  --  2.2   PHOS  --   --  7.1*  --   --   --   --  5.6*  --  4.0  --  3.3    < > = values in this interval not displayed.     CBC  Recent Labs   Lab 05/19/22  0440 05/19/22  0028 05/18/22 2012 05/18/22  1541 05/18/22  0904 05/18/22  0421 05/17/22 2326 05/17/22 2046   HGB 9.6* 10.0* 10.9*  10.9* 10.2*   < > 10.5*   < > 11.3*   WBC 11.2*  --  14.9*  --   --  12.0*  --  12.1*   RBC 2.95*  --  3.33*  --   --  3.18*  --  3.44*   HCT 30.6*  --  34.1*  --   --  32.5*  --  34.4*   *  --  102*  --   --  102*  --  100   MCH 32.5  --  33.0  --   --  33.0  --  32.8   MCHC 31.4*  --  31.5  --   --  32.3  --  32.8   RDW 13.0  --  13.2  --   --  13.0  --  13.0     --  196  --   --  172  --  180    < > = values in this interval not displayed.     INRNo lab results found in last 7 days.  ABG  Recent Labs   Lab 05/17/22  1748   O2PER 42.0      Urine Studies  Recent Labs   Lab Test 05/09/22  0957 12/10/20  1338 09/03/20  1142   COLOR Yellow Straw Straw   APPEARANCE Clear Clear Clear   URINEGLC Negative Negative Negative   URINEBILI Negative Negative Negative   URINEKETONE Negative Negative Negative   SG 1.015 1.011 1.011   UBLD Moderate* Negative Small*   URINEPH 6.0 5.0 5.0   PROTEIN 300 * >499* >499*   NITRITE Negative Negative Negative   LEUKEST Small* Negative Negative   RBCU 0 1 1   WBCU 6* 2 2     No lab results found.  PTH  Recent Labs   Lab Test 05/09/22  0959   PTHI 450*     Iron Studies  Recent Labs   Lab Test 05/09/22  0959   IRON 79      IRONSAT 24    LANCE 1,002*       IMAGING:  All imaging studies reviewed by me.

## 2022-05-19 NOTE — PROGRESS NOTES
Care Management Follow Up    Length of Stay (days): 2    Expected Discharge Date: 05/21/2022     Concerns to be Addressed:   Discharge planning     Anticipated Discharge Disposition:  Home     Anticipated Discharge Services:  Skilled home care RN  Anticipated Discharge DME:      Additional Information:  Informed by [facility] they are not able to accept pt due to capacity with pt's insurance.     Will consider making referrals to the following home care agencies that take his insurance: [facility], [facility], [facility], [facility] and [facility].     Called [facility], [facility]. They are at capacity and are not able to start until next Friday or Saturday, 5-27 or 5-28.   Called [facility], but it was after hours. Will try again tomorrow.       Amber Santiago RN Care Coordinator (RNCC coverage for 6B)  [address], [facility]

## 2022-05-19 NOTE — PROVIDER NOTIFICATION
Time of notification: 8:08pm  Provider notified: Dr. Ferro  Patient status: Urine output for 2000 = 15cc. Unable to draw blood of central line, please order cathflo. Can we D/C CVP and EtCO2 monitoring?  Temp:  [97.6  F (36.4  C)-98.6  F (37  C)] 97.6  F (36.4  C)  Pulse:  [] 78  Resp:  [8-19] 16  BP: ()/(64-84) 105/67  SpO2:  [91 %-100 %] 94 %  Orders received: STAT CBC, CMP, lactic acid and repeat renal ultrasound. Cathflo ordered. Continue to monitor.

## 2022-05-20 ENCOUNTER — APPOINTMENT (OUTPATIENT)
Dept: GENERAL RADIOLOGY | Facility: CLINIC | Age: 57
DRG: 650 | End: 2022-05-20
Attending: PHYSICIAN ASSISTANT
Payer: COMMERCIAL

## 2022-05-20 ENCOUNTER — APPOINTMENT (OUTPATIENT)
Dept: ULTRASOUND IMAGING | Facility: CLINIC | Age: 57
DRG: 650 | End: 2022-05-20
Attending: STUDENT IN AN ORGANIZED HEALTH CARE EDUCATION/TRAINING PROGRAM
Payer: COMMERCIAL

## 2022-05-20 ENCOUNTER — APPOINTMENT (OUTPATIENT)
Dept: CARDIOLOGY | Facility: CLINIC | Age: 57
DRG: 650 | End: 2022-05-20
Attending: SURGERY
Payer: COMMERCIAL

## 2022-05-20 ENCOUNTER — APPOINTMENT (OUTPATIENT)
Dept: NUCLEAR MEDICINE | Facility: CLINIC | Age: 57
DRG: 650 | End: 2022-05-20
Attending: PHYSICIAN ASSISTANT
Payer: COMMERCIAL

## 2022-05-20 LAB
ALBUMIN SERPL-MCNC: 3.3 G/DL (ref 3.4–5)
ANION GAP SERPL CALCULATED.3IONS-SCNC: 10 MMOL/L (ref 3–14)
ANION GAP SERPL CALCULATED.3IONS-SCNC: 9 MMOL/L (ref 3–14)
BASOPHILS # BLD AUTO: 0 10E3/UL (ref 0–0.2)
BASOPHILS # BLD AUTO: 0 10E3/UL (ref 0–0.2)
BASOPHILS NFR BLD AUTO: 0 %
BASOPHILS NFR BLD AUTO: 0 %
BUN SERPL-MCNC: 56 MG/DL (ref 7–30)
BUN SERPL-MCNC: 59 MG/DL (ref 7–30)
CA-I BLD-MCNC: 4.9 MG/DL (ref 4.4–5.2)
CALCIUM SERPL-MCNC: 8.6 MG/DL (ref 8.5–10.1)
CALCIUM SERPL-MCNC: 9.1 MG/DL (ref 8.5–10.1)
CHLORIDE BLD-SCNC: 98 MMOL/L (ref 94–109)
CHLORIDE BLD-SCNC: 98 MMOL/L (ref 94–109)
CO2 SERPL-SCNC: 27 MMOL/L (ref 20–32)
CO2 SERPL-SCNC: 27 MMOL/L (ref 20–32)
CREAT SERPL-MCNC: 7.61 MG/DL (ref 0.66–1.25)
CREAT SERPL-MCNC: 8.9 MG/DL (ref 0.66–1.25)
EOSINOPHIL # BLD AUTO: 0 10E3/UL (ref 0–0.7)
EOSINOPHIL # BLD AUTO: 0 10E3/UL (ref 0–0.7)
EOSINOPHIL NFR BLD AUTO: 0 %
EOSINOPHIL NFR BLD AUTO: 0 %
ERYTHROCYTE [DISTWIDTH] IN BLOOD BY AUTOMATED COUNT: 13.3 % (ref 10–15)
ERYTHROCYTE [DISTWIDTH] IN BLOOD BY AUTOMATED COUNT: 13.4 % (ref 10–15)
ERYTHROCYTE [DISTWIDTH] IN BLOOD BY AUTOMATED COUNT: 13.5 % (ref 10–15)
GFR SERPL CREATININE-BSD FRML MDRD: 6 ML/MIN/1.73M2
GFR SERPL CREATININE-BSD FRML MDRD: 8 ML/MIN/1.73M2
GLUCOSE BLD-MCNC: 95 MG/DL (ref 70–99)
GLUCOSE BLD-MCNC: 96 MG/DL (ref 70–99)
GLUCOSE BLDC GLUCOMTR-MCNC: 102 MG/DL (ref 70–99)
GLUCOSE BLDC GLUCOMTR-MCNC: 102 MG/DL (ref 70–99)
GLUCOSE BLDC GLUCOMTR-MCNC: 114 MG/DL (ref 70–99)
GLUCOSE BLDC GLUCOMTR-MCNC: 116 MG/DL (ref 70–99)
GLUCOSE BLDC GLUCOMTR-MCNC: 117 MG/DL (ref 70–99)
GLUCOSE BLDC GLUCOMTR-MCNC: 137 MG/DL (ref 70–99)
GLUCOSE BLDC GLUCOMTR-MCNC: 79 MG/DL (ref 70–99)
GLUCOSE BLDC GLUCOMTR-MCNC: 85 MG/DL (ref 70–99)
GLUCOSE BLDC GLUCOMTR-MCNC: 86 MG/DL (ref 70–99)
GLUCOSE BLDC GLUCOMTR-MCNC: 89 MG/DL (ref 70–99)
GLUCOSE BLDC GLUCOMTR-MCNC: 92 MG/DL (ref 70–99)
HBV DNA SERPL QL NAA+PROBE: NORMAL
HCT VFR BLD AUTO: 25.4 % (ref 40–53)
HCT VFR BLD AUTO: 26.5 % (ref 40–53)
HCT VFR BLD AUTO: 28.1 % (ref 40–53)
HCV RNA SERPL QL NAA+PROBE: NORMAL
HGB BLD-MCNC: 8 G/DL (ref 13.3–17.7)
HGB BLD-MCNC: 8.5 G/DL (ref 13.3–17.7)
HGB BLD-MCNC: 9.2 G/DL (ref 13.3–17.7)
HIV1+2 RNA SERPL QL NAA+PROBE: NORMAL
IMM GRANULOCYTES # BLD: 0.1 10E3/UL
IMM GRANULOCYTES # BLD: 0.1 10E3/UL
IMM GRANULOCYTES NFR BLD: 1 %
IMM GRANULOCYTES NFR BLD: 1 %
LACTATE SERPL-SCNC: 1.5 MMOL/L (ref 0.7–2)
LACTATE SERPL-SCNC: 2.1 MMOL/L (ref 0.7–2)
LVEF ECHO: NORMAL
LYMPHOCYTES # BLD AUTO: 0 10E3/UL (ref 0.8–5.3)
LYMPHOCYTES # BLD AUTO: 0 10E3/UL (ref 0.8–5.3)
LYMPHOCYTES NFR BLD AUTO: 0 %
LYMPHOCYTES NFR BLD AUTO: 0 %
MAGNESIUM SERPL-MCNC: 1.8 MG/DL (ref 1.6–2.3)
MAGNESIUM SERPL-MCNC: 2 MG/DL (ref 1.6–2.3)
MCH RBC QN AUTO: 32.7 PG (ref 26.5–33)
MCH RBC QN AUTO: 33.3 PG (ref 26.5–33)
MCH RBC QN AUTO: 33.5 PG (ref 26.5–33)
MCHC RBC AUTO-ENTMCNC: 31.5 G/DL (ref 31.5–36.5)
MCHC RBC AUTO-ENTMCNC: 32.1 G/DL (ref 31.5–36.5)
MCHC RBC AUTO-ENTMCNC: 32.7 G/DL (ref 31.5–36.5)
MCV RBC AUTO: 102 FL (ref 78–100)
MCV RBC AUTO: 104 FL (ref 78–100)
MCV RBC AUTO: 104 FL (ref 78–100)
MONOCYTES # BLD AUTO: 0.1 10E3/UL (ref 0–1.3)
MONOCYTES # BLD AUTO: 0.1 10E3/UL (ref 0–1.3)
MONOCYTES NFR BLD AUTO: 2 %
MONOCYTES NFR BLD AUTO: 2 %
NEUTROPHILS # BLD AUTO: 8.8 10E3/UL (ref 1.6–8.3)
NEUTROPHILS # BLD AUTO: 9 10E3/UL (ref 1.6–8.3)
NEUTROPHILS NFR BLD AUTO: 97 %
NEUTROPHILS NFR BLD AUTO: 97 %
NRBC # BLD AUTO: 0 10E3/UL
NRBC # BLD AUTO: 0 10E3/UL
NRBC BLD AUTO-RTO: 0 /100
NRBC BLD AUTO-RTO: 0 /100
PHOSPHATE SERPL-MCNC: 6.3 MG/DL (ref 2.5–4.5)
PHOSPHATE SERPL-MCNC: 6.3 MG/DL (ref 2.5–4.5)
PLAT MORPH BLD: NORMAL
PLATELET # BLD AUTO: 87 10E3/UL (ref 150–450)
PLATELET # BLD AUTO: 96 10E3/UL (ref 150–450)
PLATELET # BLD AUTO: 97 10E3/UL (ref 150–450)
POTASSIUM BLD-SCNC: 4.6 MMOL/L (ref 3.4–5.3)
POTASSIUM BLD-SCNC: 4.9 MMOL/L (ref 3.4–5.3)
POTASSIUM BLD-SCNC: 5.1 MMOL/L (ref 3.4–5.3)
POTASSIUM BLD-SCNC: 5.6 MMOL/L (ref 3.4–5.3)
POTASSIUM BLD-SCNC: 5.8 MMOL/L (ref 3.4–5.3)
RBC # BLD AUTO: 2.45 10E6/UL (ref 4.4–5.9)
RBC # BLD AUTO: 2.55 10E6/UL (ref 4.4–5.9)
RBC # BLD AUTO: 2.75 10E6/UL (ref 4.4–5.9)
RBC MORPH BLD: NORMAL
SODIUM SERPL-SCNC: 134 MMOL/L (ref 133–144)
SODIUM SERPL-SCNC: 135 MMOL/L (ref 133–144)
TACROLIMUS BLD-MCNC: 3.3 UG/L (ref 5–15)
TME LAST DOSE: ABNORMAL H
TME LAST DOSE: ABNORMAL H
TROPONIN I SERPL HS-MCNC: 46 NG/L
UFH PPP CHRO-ACNC: <0.1 IU/ML
WBC # BLD AUTO: 13.3 10E3/UL (ref 4–11)
WBC # BLD AUTO: 9 10E3/UL (ref 4–11)
WBC # BLD AUTO: 9.3 10E3/UL (ref 4–11)

## 2022-05-20 PROCEDURE — 71046 X-RAY EXAM CHEST 2 VIEWS: CPT | Mod: 26 | Performed by: STUDENT IN AN ORGANIZED HEALTH CARE EDUCATION/TRAINING PROGRAM

## 2022-05-20 PROCEDURE — 250N000013 HC RX MED GY IP 250 OP 250 PS 637

## 2022-05-20 PROCEDURE — 85004 AUTOMATED DIFF WBC COUNT: CPT

## 2022-05-20 PROCEDURE — 999N000157 HC STATISTIC RCP TIME EA 10 MIN

## 2022-05-20 PROCEDURE — 78580 LUNG PERFUSION IMAGING: CPT

## 2022-05-20 PROCEDURE — 250N000013 HC RX MED GY IP 250 OP 250 PS 637: Performed by: STUDENT IN AN ORGANIZED HEALTH CARE EDUCATION/TRAINING PROGRAM

## 2022-05-20 PROCEDURE — 85520 HEPARIN ASSAY: CPT

## 2022-05-20 PROCEDURE — 82330 ASSAY OF CALCIUM: CPT | Performed by: INTERNAL MEDICINE

## 2022-05-20 PROCEDURE — 250N000011 HC RX IP 250 OP 636

## 2022-05-20 PROCEDURE — 83605 ASSAY OF LACTIC ACID: CPT | Performed by: STUDENT IN AN ORGANIZED HEALTH CARE EDUCATION/TRAINING PROGRAM

## 2022-05-20 PROCEDURE — P9041 ALBUMIN (HUMAN),5%, 50ML: HCPCS | Performed by: STUDENT IN AN ORGANIZED HEALTH CARE EDUCATION/TRAINING PROGRAM

## 2022-05-20 PROCEDURE — 258N000001 HC RX 258: Performed by: STUDENT IN AN ORGANIZED HEALTH CARE EDUCATION/TRAINING PROGRAM

## 2022-05-20 PROCEDURE — 90947 DIALYSIS REPEATED EVAL: CPT

## 2022-05-20 PROCEDURE — 83735 ASSAY OF MAGNESIUM: CPT

## 2022-05-20 PROCEDURE — 84100 ASSAY OF PHOSPHORUS: CPT

## 2022-05-20 PROCEDURE — 85027 COMPLETE CBC AUTOMATED: CPT | Performed by: STUDENT IN AN ORGANIZED HEALTH CARE EDUCATION/TRAINING PROGRAM

## 2022-05-20 PROCEDURE — 80069 RENAL FUNCTION PANEL: CPT

## 2022-05-20 PROCEDURE — P9041 ALBUMIN (HUMAN),5%, 50ML: HCPCS

## 2022-05-20 PROCEDURE — 83605 ASSAY OF LACTIC ACID: CPT

## 2022-05-20 PROCEDURE — 250N000011 HC RX IP 250 OP 636: Performed by: STUDENT IN AN ORGANIZED HEALTH CARE EDUCATION/TRAINING PROGRAM

## 2022-05-20 PROCEDURE — 999N000208 ECHOCARDIOGRAM COMPLETE

## 2022-05-20 PROCEDURE — 250N000012 HC RX MED GY IP 250 OP 636 PS 637

## 2022-05-20 PROCEDURE — 250N000013 HC RX MED GY IP 250 OP 250 PS 637: Performed by: SURGERY

## 2022-05-20 PROCEDURE — 84484 ASSAY OF TROPONIN QUANT: CPT | Performed by: PHYSICIAN ASSISTANT

## 2022-05-20 PROCEDURE — 84132 ASSAY OF SERUM POTASSIUM: CPT | Performed by: STUDENT IN AN ORGANIZED HEALTH CARE EDUCATION/TRAINING PROGRAM

## 2022-05-20 PROCEDURE — 250N000012 HC RX MED GY IP 250 OP 636 PS 637: Performed by: PHYSICIAN ASSISTANT

## 2022-05-20 PROCEDURE — 76776 US EXAM K TRANSPL W/DOPPLER: CPT | Mod: 26 | Performed by: RADIOLOGY

## 2022-05-20 PROCEDURE — 78580 LUNG PERFUSION IMAGING: CPT | Mod: 26 | Performed by: STUDENT IN AN ORGANIZED HEALTH CARE EDUCATION/TRAINING PROGRAM

## 2022-05-20 PROCEDURE — 343N000001 HC RX 343: Performed by: SURGERY

## 2022-05-20 PROCEDURE — A9540 TC99M MAA: HCPCS | Performed by: SURGERY

## 2022-05-20 PROCEDURE — 76776 US EXAM K TRANSPL W/DOPPLER: CPT

## 2022-05-20 PROCEDURE — 200N000002 HC R&B ICU UMMC

## 2022-05-20 PROCEDURE — 80197 ASSAY OF TACROLIMUS: CPT

## 2022-05-20 PROCEDURE — 83735 ASSAY OF MAGNESIUM: CPT | Performed by: INTERNAL MEDICINE

## 2022-05-20 PROCEDURE — 99222 1ST HOSP IP/OBS MODERATE 55: CPT | Mod: GC | Performed by: INTERNAL MEDICINE

## 2022-05-20 PROCEDURE — 93306 TTE W/DOPPLER COMPLETE: CPT | Mod: 26 | Performed by: INTERNAL MEDICINE

## 2022-05-20 PROCEDURE — 71046 X-RAY EXAM CHEST 2 VIEWS: CPT

## 2022-05-20 PROCEDURE — 250N000013 HC RX MED GY IP 250 OP 250 PS 637: Performed by: PHYSICIAN ASSISTANT

## 2022-05-20 PROCEDURE — 250N000009 HC RX 250: Performed by: INTERNAL MEDICINE

## 2022-05-20 PROCEDURE — 255N000002 HC RX 255 OP 636: Performed by: INTERNAL MEDICINE

## 2022-05-20 PROCEDURE — 84100 ASSAY OF PHOSPHORUS: CPT | Performed by: INTERNAL MEDICINE

## 2022-05-20 PROCEDURE — C1751 CATH, INF, PER/CENT/MIDLINE: HCPCS

## 2022-05-20 PROCEDURE — 3E043XZ INTRODUCTION OF VASOPRESSOR INTO CENTRAL VEIN, PERCUTANEOUS APPROACH: ICD-10-PCS | Performed by: INTERNAL MEDICINE

## 2022-05-20 PROCEDURE — 99233 SBSQ HOSP IP/OBS HIGH 50: CPT | Mod: 24 | Performed by: INTERNAL MEDICINE

## 2022-05-20 PROCEDURE — 999N000155 HC STATISTIC RAPCV CVP MONITORING

## 2022-05-20 RX ORDER — POTASSIUM CHLORIDE 29.8 MG/ML
20 INJECTION INTRAVENOUS EVERY 8 HOURS PRN
Status: DISCONTINUED | OUTPATIENT
Start: 2022-05-20 | End: 2022-05-21

## 2022-05-20 RX ORDER — NICOTINE POLACRILEX 4 MG
15-30 LOZENGE BUCCAL
Status: DISCONTINUED | OUTPATIENT
Start: 2022-05-20 | End: 2022-05-27 | Stop reason: HOSPADM

## 2022-05-20 RX ORDER — CALCIUM CHLORIDE, MAGNESIUM CHLORIDE, SODIUM CHLORIDE, SODIUM BICARBONATE, POTASSIUM CHLORIDE AND SODIUM PHOSPHATE DIBASIC DIHYDRATE 3.68; 3.05; 6.34; 3.09; .314; .187 G/L; G/L; G/L; G/L; G/L; G/L
20 INJECTION INTRAVENOUS CONTINUOUS
Status: DISCONTINUED | OUTPATIENT
Start: 2022-05-20 | End: 2022-05-21

## 2022-05-20 RX ORDER — VALGANCICLOVIR 450 MG/1
450 TABLET, FILM COATED ORAL
Status: DISCONTINUED | OUTPATIENT
Start: 2022-05-21 | End: 2022-05-20

## 2022-05-20 RX ORDER — DOPAMINE HYDROCHLORIDE 160 MG/100ML
2-20 INJECTION, SOLUTION INTRAVENOUS CONTINUOUS
Status: DISCONTINUED | OUTPATIENT
Start: 2022-05-20 | End: 2022-05-24

## 2022-05-20 RX ORDER — MAGNESIUM SULFATE HEPTAHYDRATE 40 MG/ML
2 INJECTION, SOLUTION INTRAVENOUS EVERY 8 HOURS PRN
Status: DISCONTINUED | OUTPATIENT
Start: 2022-05-20 | End: 2022-05-21

## 2022-05-20 RX ORDER — ALBUMIN, HUMAN INJ 5% 5 %
25 SOLUTION INTRAVENOUS ONCE
Status: COMPLETED | OUTPATIENT
Start: 2022-05-20 | End: 2022-05-20

## 2022-05-20 RX ORDER — VALGANCICLOVIR 450 MG/1
450 TABLET, FILM COATED ORAL DAILY
Status: DISCONTINUED | OUTPATIENT
Start: 2022-05-21 | End: 2022-05-23

## 2022-05-20 RX ORDER — CALCIUM CHLORIDE, MAGNESIUM CHLORIDE, SODIUM CHLORIDE, SODIUM BICARBONATE, POTASSIUM CHLORIDE AND SODIUM PHOSPHATE DIBASIC DIHYDRATE 3.68; 3.05; 6.34; 3.09; .314; .187 G/L; G/L; G/L; G/L; G/L; G/L
INJECTION INTRAVENOUS CONTINUOUS
Status: DISCONTINUED | OUTPATIENT
Start: 2022-05-20 | End: 2022-05-21

## 2022-05-20 RX ORDER — DEXTROSE MONOHYDRATE 100 MG/ML
INJECTION, SOLUTION INTRAVENOUS CONTINUOUS
Status: ACTIVE | OUTPATIENT
Start: 2022-05-20 | End: 2022-05-20

## 2022-05-20 RX ORDER — DEXTROSE MONOHYDRATE 25 G/50ML
25-50 INJECTION, SOLUTION INTRAVENOUS
Status: DISCONTINUED | OUTPATIENT
Start: 2022-05-20 | End: 2022-05-27 | Stop reason: HOSPADM

## 2022-05-20 RX ORDER — DEXTROSE MONOHYDRATE 25 G/50ML
25 INJECTION, SOLUTION INTRAVENOUS ONCE
Status: COMPLETED | OUTPATIENT
Start: 2022-05-20 | End: 2022-05-20

## 2022-05-20 RX ORDER — CALCIUM CHLORIDE, MAGNESIUM CHLORIDE, SODIUM CHLORIDE, SODIUM BICARBONATE, POTASSIUM CHLORIDE AND SODIUM PHOSPHATE DIBASIC DIHYDRATE 3.68; 3.05; 6.34; 3.09; .314; .187 G/L; G/L; G/L; G/L; G/L; G/L
12.5 INJECTION INTRAVENOUS CONTINUOUS
Status: DISCONTINUED | OUTPATIENT
Start: 2022-05-20 | End: 2022-05-21

## 2022-05-20 RX ORDER — CALCIUM GLUCONATE 20 MG/ML
2 INJECTION, SOLUTION INTRAVENOUS EVERY 8 HOURS PRN
Status: DISCONTINUED | OUTPATIENT
Start: 2022-05-20 | End: 2022-05-21

## 2022-05-20 RX ORDER — TACROLIMUS 5 MG/1
5 CAPSULE ORAL
Status: DISCONTINUED | OUTPATIENT
Start: 2022-05-20 | End: 2022-05-22

## 2022-05-20 RX ADMIN — ASPIRIN 81 MG CHEWABLE TABLET 81 MG: 81 TABLET CHEWABLE at 08:13

## 2022-05-20 RX ADMIN — DOCUSATE SODIUM 50 MG AND SENNOSIDES 8.6 MG 1 TABLET: 8.6; 5 TABLET, FILM COATED ORAL at 08:13

## 2022-05-20 RX ADMIN — CALCIUM CHLORIDE, MAGNESIUM CHLORIDE, SODIUM CHLORIDE, SODIUM BICARBONATE, POTASSIUM CHLORIDE AND SODIUM PHOSPHATE DIBASIC DIHYDRATE 20 ML/KG/HR: 3.68; 3.05; 6.34; 3.09; .314; .187 INJECTION INTRAVENOUS at 23:52

## 2022-05-20 RX ADMIN — METHOCARBAMOL 500 MG: 500 TABLET ORAL at 00:03

## 2022-05-20 RX ADMIN — TACROLIMUS 3 MG: 1 CAPSULE ORAL at 08:13

## 2022-05-20 RX ADMIN — MYCOPHENOLATE MOFETIL 750 MG: 250 CAPSULE ORAL at 08:11

## 2022-05-20 RX ADMIN — Medication 4 MG: at 08:08

## 2022-05-20 RX ADMIN — Medication 4 MG: at 18:10

## 2022-05-20 RX ADMIN — CALCIUM CHLORIDE, MAGNESIUM CHLORIDE, SODIUM CHLORIDE, SODIUM BICARBONATE, POTASSIUM CHLORIDE AND SODIUM PHOSPHATE DIBASIC DIHYDRATE 12.5 ML/KG/HR: 3.68; 3.05; 6.34; 3.09; .314; .187 INJECTION INTRAVENOUS at 14:31

## 2022-05-20 RX ADMIN — ONDANSETRON 4 MG: 2 INJECTION INTRAMUSCULAR; INTRAVENOUS at 09:49

## 2022-05-20 RX ADMIN — DOPAMINE HYDROCHLORIDE 2 MCG/KG/MIN: 160 INJECTION, SOLUTION INTRAVENOUS at 01:12

## 2022-05-20 RX ADMIN — DOCUSATE SODIUM 50 MG AND SENNOSIDES 8.6 MG 1 TABLET: 8.6; 5 TABLET, FILM COATED ORAL at 19:48

## 2022-05-20 RX ADMIN — Medication 4 MG: at 13:41

## 2022-05-20 RX ADMIN — ACETAMINOPHEN 975 MG: 325 TABLET ORAL at 00:00

## 2022-05-20 RX ADMIN — LIDOCAINE 2 PATCH: 560 PATCH PERCUTANEOUS; TOPICAL; TRANSDERMAL at 08:25

## 2022-05-20 RX ADMIN — CALCIUM CHLORIDE, MAGNESIUM CHLORIDE, SODIUM CHLORIDE, SODIUM BICARBONATE, POTASSIUM CHLORIDE AND SODIUM PHOSPHATE DIBASIC DIHYDRATE 20 ML/KG/HR: 3.68; 3.05; 6.34; 3.09; .314; .187 INJECTION INTRAVENOUS at 19:28

## 2022-05-20 RX ADMIN — TACROLIMUS 5 MG: 5 CAPSULE ORAL at 17:47

## 2022-05-20 RX ADMIN — PREDNISONE 60 MG: 50 TABLET ORAL at 08:13

## 2022-05-20 RX ADMIN — MYCOPHENOLATE MOFETIL 750 MG: 250 CAPSULE ORAL at 17:47

## 2022-05-20 RX ADMIN — METHOCARBAMOL 500 MG: 500 TABLET ORAL at 08:08

## 2022-05-20 RX ADMIN — POLYETHYLENE GLYCOL 3350 17 G: 17 POWDER, FOR SOLUTION ORAL at 08:13

## 2022-05-20 RX ADMIN — ACETAMINOPHEN 975 MG: 325 TABLET ORAL at 16:23

## 2022-05-20 RX ADMIN — ALBUMIN (HUMAN) 25 G: 12.5 INJECTION, SOLUTION INTRAVENOUS at 00:00

## 2022-05-20 RX ADMIN — HEPARIN SODIUM AND DEXTROSE 500 UNITS/HR: 10000; 5 INJECTION INTRAVENOUS at 08:26

## 2022-05-20 RX ADMIN — DOPAMINE HYDROCHLORIDE 5 MCG/KG/MIN: 160 INJECTION, SOLUTION INTRAVENOUS at 16:23

## 2022-05-20 RX ADMIN — CALCIUM CHLORIDE, MAGNESIUM CHLORIDE, SODIUM CHLORIDE, SODIUM BICARBONATE, POTASSIUM CHLORIDE AND SODIUM PHOSPHATE DIBASIC DIHYDRATE 20 ML/KG/HR: 3.68; 3.05; 6.34; 3.09; .314; .187 INJECTION INTRAVENOUS at 21:43

## 2022-05-20 RX ADMIN — KIT FOR THE PREPARATION OF TECHNETIUM TC 99M ALBUMIN AGGREGATED 6.5 MCI.: 2.5 INJECTION, POWDER, FOR SOLUTION INTRAVENOUS at 15:53

## 2022-05-20 RX ADMIN — DEXTROSE MONOHYDRATE 25 G: 25 INJECTION, SOLUTION INTRAVENOUS at 05:34

## 2022-05-20 RX ADMIN — HYDROMORPHONE HYDROCHLORIDE 4 MG: 2 TABLET ORAL at 00:00

## 2022-05-20 RX ADMIN — HUMAN INSULIN 10 UNITS: 100 INJECTION, SOLUTION SUBCUTANEOUS at 05:35

## 2022-05-20 RX ADMIN — Medication 4 MG: at 22:12

## 2022-05-20 RX ADMIN — CALCIUM CHLORIDE, MAGNESIUM CHLORIDE, SODIUM CHLORIDE, SODIUM BICARBONATE, POTASSIUM CHLORIDE AND SODIUM PHOSPHATE DIBASIC DIHYDRATE 12.5 ML/KG/HR: 3.68; 3.05; 6.34; 3.09; .314; .187 INJECTION INTRAVENOUS at 21:02

## 2022-05-20 RX ADMIN — ALBUMIN HUMAN 25 G: 0.05 INJECTION, SOLUTION INTRAVENOUS at 09:18

## 2022-05-20 RX ADMIN — CALCIUM CHLORIDE, MAGNESIUM CHLORIDE, SODIUM CHLORIDE, SODIUM BICARBONATE, POTASSIUM CHLORIDE AND SODIUM PHOSPHATE DIBASIC DIHYDRATE: 3.68; 3.05; 6.34; 3.09; .314; .187 INJECTION INTRAVENOUS at 14:31

## 2022-05-20 RX ADMIN — ACETAMINOPHEN 975 MG: 325 TABLET ORAL at 08:08

## 2022-05-20 RX ADMIN — HUMAN ALBUMIN MICROSPHERES AND PERFLUTREN 5 ML: 10; .22 INJECTION, SOLUTION INTRAVENOUS at 10:27

## 2022-05-20 RX ADMIN — DEXTROSE 300 ML: 10 SOLUTION INTRAVENOUS at 05:38

## 2022-05-20 RX ADMIN — ATORVASTATIN CALCIUM 10 MG: 10 TABLET, FILM COATED ORAL at 08:13

## 2022-05-20 RX ADMIN — CALCIUM CHLORIDE, MAGNESIUM CHLORIDE, SODIUM CHLORIDE, SODIUM BICARBONATE, POTASSIUM CHLORIDE AND SODIUM PHOSPHATE DIBASIC DIHYDRATE 20 ML/KG/HR: 3.68; 3.05; 6.34; 3.09; .314; .187 INJECTION INTRAVENOUS at 14:31

## 2022-05-20 ASSESSMENT — ACTIVITIES OF DAILY LIVING (ADL)
ADLS_ACUITY_SCORE: 22
ADLS_ACUITY_SCORE: 26
ADLS_ACUITY_SCORE: 22
ADLS_ACUITY_SCORE: 26
ADLS_ACUITY_SCORE: 26
ADLS_ACUITY_SCORE: 22
ADLS_ACUITY_SCORE: 23
ADLS_ACUITY_SCORE: 22
ADLS_ACUITY_SCORE: 22
ADLS_ACUITY_SCORE: 26

## 2022-05-20 NOTE — PROGRESS NOTES
CRRT INITIATION NOTE    Consent for CRRT Completed:  YES  Patient s Vascular Access: Catheter              Placement Confirmed: YES  Manufacture:  SHIRIN  Model:  SHIRIN  Length/English Size:  SHIRIN  Flush Volume:  20ml    DATA:  Procedure:  Initiation  Start Time:  1719  Machine#:  2/2  Filter:    Blood Flow:  200  ML/min  Pre-Replacement Solution:  Phoxillium 4/2.5  Post-Replacement Solution:  Phoxillium 4/2/5  Dialysate Solution:  Phoxillum 4/2.5  Pre-Replacement Solution Rate:  1400 mL/hr  Post-Replacement Solution Rate:  200mL/hr  Dialysate Flow Rate:  2300 mL/hr   Patient Removal Rate:  0 mL/hr  Anticoagulation Type and Rate:  N/A    ASSESSMENT:  How Patient Tolerated Initiation:   Vital Signs:  HR 92, /56 (69), Resp 22, SPO2 98%.    Initial Pressures:  Access:  -54  Filter:  101  Return:  69  TMP:  21  Change in Filter Pressure:  51      INTERVENTIONS:  Pt started on CRRT.  Sterile technique used.  Pt HD line dressing changed per pt request.    PLAN:  Administer CRRT per orders.  Fluid goal of I=O.

## 2022-05-20 NOTE — PROGRESS NOTES
Transplant Surgery  Inpatient Daily Progress Note  05/20/2022    Assessment & Plan: 58yo with history of ESRD secondary to IgA nephropathy, dialysis via a tunneled catheter, HTN, and prostate cancer s/p prostatectomy and radiation. S/p living donor kidney transplant without stent on 5/17/22. Imported kidney with vasospasm in OR.    Graft function: POD #3. Delayed graft function. Cr 8.9 (10.6), though post dialysis. Low UOP, hyperkalemia. RV velocities continue to improve on US.     Did undergo dialysis run yesterday, was acutely hypotensive post. Likely due to some combination of thymo reaction + dialysis. Blood pressures did not improve despite several fluid boluses and albumin. Ultimately transferred to the SICU for dopamine. Now on dopamine to keep MAP >65. Nephrology planning on CRRT for the next 12-24 hours for gentle fluid removal.  Immunosuppression management: PRA 19, will give thymo in light of slow graft function/possible DGF  -Basiliximab 5/17, will not plan for second dose if giving thymo  -Thymo 2 mg/kg or 200 mg given 5/19, may have had a thymo reaction (already received steroid dose this AM, will not repeat as long as thymo given before 1400). Will re-evaluate for repeat thymo dosing vs simulect 5/21  -Steroid taper per low risk induction protocol  -Tacro level goal 8-10  -MMF 750mg BID  Complexity of management: High. Contributing factors:  induction    Neuro:  Acute post-op pain: Oxycodone almost completely ineffective. Switched to PO dilaudid. Continue scheduled APAP, lidoderm and Robaxin.  Hematology:   Anemia: Borderline macrocytic. Hgb 8.5, drift with fluid shifts  Renal vein prophylaxis: Heparin increased to 500u/hr.  Cardiorespiratory:   Hx HTN: BP stable. Monitor.  Hypoxia: H/o sleep apnea, on CPAP. Also with some likely component of pulmonary edema. Will check VQ scan to rule out PE as source of hypoxia given acute change in status yesterday.  Rule out ACS: Trop WNL, echo WNL  GI/Nutrition:    Diet: Low potassium.  Bowel regimen: Senna  Endocrine: No acute issues.   Fluid/Electrolytes:   Hyperkalemia: Shifted this AM, improving after HD today.  : Flores to remain due to new surgical anastomosis, planning for 3d, will leave in one more day for monitoring of Is and Os.  Infectious disease: Afebrile  Prophylaxis: DVT, fall, GI, viral (Valcyte), pneumocystis (Bactrim, hold until K normalizes)  Disposition: 4A, will leave overnight while on CRRT    Medical Decision Making: Medium  Subsequent visit 70498 (moderate level decision making)    TOM/Fellow/Resident Provider: Clotilde Hughes PA-C [id number]    Faculty: Boris Huang M.D.  _________________________________________________________________  Transplant History:   5/17/2022 (Kidney), Postoperative day: 3     Interval History: History is obtained from the patient  Overnight events: No nausea. Asking appropriate questions. Requiring insulin    ROS:   A 10-point review of systems was negative except as noted above.    Meds:   acetaminophen  975 mg Oral Q8H    aspirin  81 mg Oral Daily    atorvastatin  10 mg Oral Daily    lidocaine  2 patch Transdermal Q24H    lidocaine   Transdermal Q8H    [Held by provider] magnesium oxide  400 mg Oral Daily with lunch    mycophenolate  750 mg Oral BID IS    polyethylene glycol  17 g Oral Daily    predniSONE  60 mg Oral Daily    Followed by    [START ON 5/22/2022] predniSONE  40 mg Oral Daily    Followed by    [START ON 5/24/2022] predniSONE  20 mg Oral Daily    Followed by    [START ON 5/31/2022] predniSONE  15 mg Oral Daily    Followed by    [START ON 6/7/2022] predniSONE  10 mg Oral Daily    Followed by    [START ON 6/14/2022] predniSONE  5 mg Oral Daily    senna-docusate  1 tablet Oral BID    sodium chloride (PF)  10 mL Intracatheter Q8H    [Held by provider] sulfamethoxazole-trimethoprim  1 tablet Oral Once per day on Mon Wed Fri    tacrolimus  3 mg Oral BID IS    [START ON 5/21/2022] valGANciclovir  450 mg Oral Once  "per day on Tue Sat       Physical Exam:     Admit Weight: 103.4 kg (227 lb 15.3 oz)    Current vitals:   /65   Pulse 98   Temp 98  F (36.7  C)   Resp 18   Ht 1.778 m (5' 10\")   Wt 115.1 kg (253 lb 12 oz)   SpO2 94%   BMI 36.41 kg/m      Vital sign ranges:    Temp:  [97.8  F (36.6  C)-99.4  F (37.4  C)] 98  F (36.7  C)  Pulse:  [] 98  Resp:  [8-26] 18  BP: ()/(43-81) 113/65  Cuff Mean (mmHg):  [57-68] 68  SpO2:  [89 %-100 %] 94 %    General Appearance: in no apparent distress.   Skin: Warm, perfused  Heart: NSR  Lungs: Unlabored  Abdomen: The abdomen is rounded, BAUDILIO serosang, incision covered  : gardiner is present.  Urine is yellow.  Extremities: edema: trace gen, strength 5/5  Neurologic: alert and oriented x4. Tremor absent..     Data:   CMP  Recent Labs   Lab 05/20/22  1327 05/20/22  1239 05/20/22  1002 05/20/22  0834 05/20/22  0827 05/20/22  0524 05/20/22  0349 05/19/22  0510 05/19/22  0440 05/18/22  1541 05/18/22  1400 05/17/22  2046 05/17/22  1748   NA  --   --   --   --   --   --  135  --  135   < >  --    < > 137   POTASSIUM  --  4.9  --   --  4.6   < > 5.6*   < > 5.5*   < >  --    < > 4.4   CHLORIDE  --   --   --   --   --   --  98  --  94   < >  --    < >  --    CO2  --   --   --   --   --   --  27  --  30   < >  --    < >  --    GLC 86  --  117*   < >  --    < > 96   < > 149*   < >  --    < > 96   BUN  --   --   --   --   --   --  59*  --  67*   < >  --    < >  --    CR  --   --   --   --   --   --  8.90*  --  10.60*   < >  --    < >  --    GFRESTIMATED  --   --   --   --   --   --  6*  --  5*   < >  --    < >  --    YEE  --   --   --   --   --   --  8.6  --  8.9   < >  --    < >  --    ICAW  --   --   --   --   --   --   --   --   --   --   --   --  4.0*   MAG  --   --   --   --   --   --  1.8  --  2.6*   < >  --    < >  --    PHOS  --   --   --   --   --   --  6.3*  --  7.1*   < >  --    < >  --    FCREAT  --   --   --   --   --   --   --   --   --   --  10.1  --   --     < > = " values in this interval not displayed.     CBC  Recent Labs   Lab 05/20/22  0526 05/20/22  0349   HGB 8.5* 8.0*   WBC 9.0 9.3   PLT 87* 96*       Attestation:    The patient has been seen with the team and evaluated by me.   Vital signs, labs, medications and orders were reviewed.   When obtained, diagnostic images were reviewed by me and interpreted as above.    The care plan was discussed with the multidisciplinary team and I agree with the findings and plan in this note, with any differences recorded in blue.    Immunosuppressive medication management was reviewed and adjusted as reflected in the note and orders.     .

## 2022-05-20 NOTE — PLAN OF CARE
Admitted/transferred from:    Reason for admission/transfer: hypotensive  Patient status upon admission/transfer: On Dopamine, 3L nasal cannula  Interventions: Collected Lactic  Plan: Titrate Dopamine to keep SBP>110  2 RN skin assessment: completed by Krishna  Result of skin assessment and interventions/actions: Abdominal incision and BAUDILIO drain.   Height, weight, drug calc weight: Done  Patient belongings (see Flowsheet - Adult Profile for details):  Backpack, CPAP from home, Phone, , shaving kit, clothes, shoes, glasses.

## 2022-05-20 NOTE — PLAN OF CARE
ICU End of Shift Summary. See flowsheets for vital signs and detailed assessment.    Changes this shift: On Dopamine to keep SBP>110. On 5L o2 with home CPAP. Kidney ultrasound performed this AM. Shifted due to potassium of 5.6. Minimal urine output. No bowel movement.  Afebrile. Alert and oriented     Plan: Continue to monitor and address changes.       Problem: Infection  Goal: Absence of Infection Signs and Symptoms  Outcome: Ongoing, Not Progressing     Problem: Adjustment to Transplant (Kidney Transplant)  Goal: Optimal Coping with Organ Transplant  Outcome: Ongoing, Not Progressing     Problem: Donor Organ Function (Kidney Transplant)  Goal: Effective Renal Function  Outcome: Ongoing, Not Progressing     Problem: Bowel Motility Impaired (Kidney Transplant)  Goal: Effective Bowel Elimination  Outcome: Ongoing, Not Progressing     Problem: Bleeding (Kidney Transplant)  Goal: Absence of Bleeding  Outcome: Ongoing, Not Progressing     Problem: Infection  Goal: Absence of Infection Signs and Symptoms  Outcome: Ongoing, Not Progressing   Goal Outcome Evaluation:  Requiring Dopamine to keep SBP >110.

## 2022-05-20 NOTE — PROGRESS NOTES
SURGICAL ICU PROGRESS NOTE  05/20/2022      CO-MORBIDITIES:   CKD (chronic kidney disease) stage 4, GFR 15-29 ml/min (H)  (primary encounter diagnosis)  Pre-diabetes  ESRD (end stage renal disease) (H)  S/p Living donor kidney transplantation    ASSESSMENT: Donald Blanco is a 57 year old male who was admitted on 5/17/2022 for a living-donor kidney transplant (ESRD 2/2 IgA Nephropathy).  Developing oliguria, requiring maintenance hemodialysis on 5/19, with patient experiencing persistent hypotension w/ SBPs in 70s-80s.  Admitted to SICU service for hemodynamic monitoring and pressors.     TODAY'S PROGRESS/PLANS:   - Holding lunesta  - Continue dopamine gtt per transplant, wean as tolerated, MAP goal >65  - 500 albumin bolus this AM   - Repeat ECHO  - K+ recheck at 1300 with q4 checks after    PLAN:    Neuro  # Acute pain   - Monitor neurological status. Delirium preventions and precautions.   - Pain: Dilaudid 2-4mg PO Q4H PRN               -Acetaminophen 975mg PO Q8H, methocarbamol 500mg PO Q4H PRN     - Sedation plan: No sedation necessary, keep RASS 0-1   - Hold PTA lunesta     Pulmonary:   #SORAIDA   - Home CPAP at night   - Supplemental oxygen to keep saturation above 92 %.  - Incentive spirometer every 15- 30 minutes when awake.    Cardiovascular:    #Persistent Hypotension   #Hyperlipidemia   - Monitor hemodynamic status: initial goal SBP goals > 110, transition to maintain MAP > 65              -Dopamine gtt per transplant, wean as tolerated    - Albumin 500 x1 + NS @50 + D10   - ECHO today per nephrology, ECHO 11/15/2021 EF 60-65%  - Troponin negative (46)   - PTA atorvastatin 10 mg     Gastroenterology/Nutrition:   - Diet: PO intake, 3g K+ Diet  - Bowel regimen: miralax, senna, PRN milk of mag  - Antiemetics: PRN zofran, compazine     Renal  #ESRD 2/2 IgA Nephropathy  #Oliguria  #Perinephric fluid collection  - Urine output 5/19 545cc, AM 27cc  - Will continue to monitor intake and output.  - Renal US 5/20  with perirenal fluid collection, no plan for intervention per transplant   - Prior renal US with elevated indices, improving      Graft Function: Cr (5/19 - pre HD): 10.60 -> now 8.9    K+ 5.8, shifted, now 4.6    Recheck at 1300, then q4 checks              Immunosuppression:                          -Mycophenolate PO 750mg BID                          -Tacrolimus PO 3mg BID                          -Prednisone PO taper    -Thymo per TXP team               Anti-Microbial Ppx:                          -Trimethoprim-Sulfamethoxazole 400mg-80mg PO 3x weekly (holding for hyperkalemia)                          -Valganciclovir 450mg PO 2x weekly    Fluid Balance/Electrolytes:  LDKT 5/17/2022, HD 5/19/22  X/p 100g albumin (4u 25%, 4u 5%)  - NS 50cc/hr IV fluid hydration + 500 albumin bolus  - D10 @ 100ml/hr with insulin  - Lactate normalized 1.5<-2.1<-3.0     Endocrine:  #Stress and steroid hyperglycemia   - Regular insulin, BG , goal <180   - Hypoglycemia protocol, maintain glucose >100  - Required insulin for K shift, D10 as needed  - Prednisone taper for immunosuppression   - Received solumedrol 5/17-5/19    ID:  # Stress induced leukocytosis 9.0 <- 9.3  - Afebrile, low concern for infectious etiology  - Anti-infectious ppx as above    Heme:     # Acute blood loss anemia   # Anemia of critical illness   - Hemoglobin stable, 8.5 <- 8.0 (bright red output of BAUDILIO yesterday during rapid)  - Transfuse if hgb <7.0 or signs/symptoms of hypoperfusion. Monitor and trend.   - Renal vein prophylaxis with heparin gtt @ 500u/hr  - Aspirin 81 mg     MSK:  #Deconditioning  - PT/OT consult    Prophylaxis:    DVT Prophylaxis: SCDs, (Heparin gtt 500u/hr)  GI Prophylaxis: Not indicated   Antimicrobial Prophylaxis: as above    Lines/ tubes/ drains:  - CVC x1 (R subclavian)  - Dialysis catheter right IJ  - PIV x2 (R dorsal hand; L ventral forearm)  - Indwelling urinary catheter x1  - BAUDILIO drain x1 R abdomen    Disposition  - Surgical  ICU.      ====================================    SUBJECTIVE:   Admitted to SICU overnight for persistent hypotension. Little light headed with headache this AM. Rates pain 5-6, overall well controled. Has some distention but continues to pass gas. Continued low urine output. No new concerns.     OBJECTIVE:   1. VITAL SIGNS:   Temp:  [97.7  F (36.5  C)-99.4  F (37.4  C)] 98.2  F (36.8  C)  Pulse:  [] 95  Resp:  [8-26] 16  BP: ()/(47-86) 106/60  Cuff Mean (mmHg):  [57-68] 68  SpO2:  [91 %-100 %] 95 %  Resp: 16      2. INTAKE/ OUTPUT:   I/O last 3 completed shifts:  In: 2262.15 [P.O.:1175; I.V.:1087.15]  Out: 1850 [Urine:575; Drains:275; Other:1000]    3. PHYSICAL EXAMINATION:   General: Pleasant, resting in bed comfortably   Neuro: A&Ox3, NAD, interactive, moves extremities purposefully   Resp: Breathing non-labored on room air, lungs clear bilaterally to auscultation   CV: RRR on telemetry   Abdomen: Soft, mildly distended, appropriately tender, BAUDILIO drain in place with serosanguinous output  Incisions: dressing on RLQ clean, dry, and intact   Extremities: warm and well perfused    4. INVESTIGATIONS:   Arterial Blood Gases   No lab results found in last 7 days.  Complete Blood Count   Recent Labs   Lab 05/20/22  0526 05/20/22  0349 05/19/22  2208 05/19/22  0440 05/19/22  0028 05/18/22 2012   WBC 9.0 9.3  --  11.2*  --  14.9*   HGB 8.5* 8.0* 9.7* 9.6*   < > 10.9*  10.9*   PLT 87* 96*  --  158  --  196    < > = values in this interval not displayed.     Basic Metabolic Panel  Recent Labs   Lab 05/20/22  0524 05/20/22  0349 05/20/22  0136 05/19/22  1202 05/19/22  0510 05/19/22  0440 05/19/22  0149 05/19/22  0028 05/18/22 2012 05/18/22 0436 05/18/22  0421   NA  --  135  --   --   --  135  --   --  133  --  132*   POTASSIUM  --  5.6*  --  5.2  --  5.5*  --  5.7* 4.9  4.9   < > 5.5*  5.5*   CHLORIDE  --  98  --   --   --  94  --   --  93*  --  95   CO2  --  27  --   --   --  30  --   --  26  --  27   BUN   --  59*  --   --   --  67*  --   --  62*  --  52*   CR  --  8.90*  --   --   --  10.60*  --   --  9.89*  --  9.20*   GLC 89 96 114*  --  147* 149*   < >  --  168*   < > 192*    < > = values in this interval not displayed.     Liver Function Tests  No lab results found in last 7 days.  Pancreatic Enzymes  No lab results found in last 7 days.  Coagulation Profile  No lab results found in last 7 days.  Lactate  Invalid input(s): LACTATE    5. RADIOLOGY:   Recent Results (from the past 24 hour(s))   US Renal Transplant with Doppler    Narrative    EXAMINATION: US RENAL TRANSPLANT,  5/19/2022 9:42 AM     COMPARISON: Multiple Renal transplant ultrasound on 5/18/2022 and  5/17/2022    HISTORY: Follow up velocity at the transplant renal vein, assess for  overall vascular patency. Now postop day #2.    TECHNIQUE:  Grey-scale, color Doppler and spectral flow analysis.    FINDINGS:  The transplant kidney is located in the right upper quadrant, and  measures 11.1 cm. Parenchyma is of normal thickness and echogenicity.  No focal lesions. No hydronephrosis. No perinephric fluid collection.    Renal artery flow:   68 cm/s cm/sec peak systolic at hilum.  197 cm/s peak systolic at anastomosis.  Arcuate artery resistive indices (upper to lower): 0.77, 0.78, 0.76    Renal Vein Flow:  101.0 cm/sat hilum.   88.5 cm/s at anastomosis. Previously measuring up to 258 cm/s.    Iliac artery flow:  165.4 cm/s peak systolic above anastomosis.  110.6 cm/s peak systolic below anastomosis.    Iliac vein flow:  Patent above and below the anastomosis.    Bladder: Flores in place, visualization obscured by bandage.        Impression    IMPRESSION:  Continued decline in the previously elevated velocity of the  transplant renal vein anastomosis. No hemodynamically significant  gradient noted.    Decrease in arcuate arterial resistive indices compared to prior, now  in upper normal limits.    I have personally reviewed the examination and initial  interpretation  and I agree with the findings.    FAUSTINA ZHAO MD         SYSTEM ID:  [id number]       =========================================    Patient seen, findings and plan discussed with surgical ICU staff Dr. Vizcarra and fellow Dr. Vega.    Ector Gerber, MS4    Resident/Fellow Attestation   I, Odessa Petty MD, was present with the medical/TOM student who participated in the service and in the documentation of the note.  I have verified the history and personally performed the physical exam and medical decision making.  I agree with the assessment and plan of care as documented in the note.        Odessa Petty MD  PGY2  Date of Service (when I saw the patient): 05/20/22

## 2022-05-20 NOTE — CODE/RAPID RESPONSE
Rapid Response Team Note    Assessment   In assessment a rapid response was called on Donald Blanco due to hypotension. This etiology of this presentation is being explored.    Plan   -  50g 25% albumin  -  Lactic acid (returned 3.0)  -  Hgb (returned 9.7)  -  25g 5% albumin  -  NS maintenance @ 50mL/hr  -  Monitor BP for response to fluids. If inadequate response after above fluids, plan transfer to higher level of care, likely for pressors.    -  The Surgery primary team was at the bedside for the rapid response.  -  Disposition: The patient will remain on the current unit for now; may require transfer pending improvement  -  Reassessment and plan follow-up will be performed by the primary team    Donald is critically ill with undifferentiated shock. These features are alarming and indicate that the patient is at imminent risk of life-threatening organ failure. Acute deterioration is being managed by the following critical interventions: IV fluids and albumin    Total Critical Care time spent by me, excluding procedures, was 45 minutes.  SYMONE Powell CNP  [facility] RRT [facility] Job Code Contact #[phone]  [facility] Paging/Directory    Hospital Course   Brief Summary of events leading to rapid response:   Patient hypotensive 70s/40s. Feeling dizzy and generally unwell.    Admission Diagnosis:   Awaiting organ transplant [Z76.82]  Pre-diabetes [R73.03]  ESRD (end stage renal disease) (H) [N18.6]  CKD (chronic kidney disease) stage 4, GFR 15-29 ml/min (H) [N18.4]    Physical Exam   Temp: 99.4  F (37.4  C) Temp  Min: 97.7  F (36.5  C)  Max: 99.4  F (37.4  C)  Resp: 16 Resp  Min: 8  Max: 19  SpO2: 100 % SpO2  Min: 91 %  Max: 100 %  Pulse: 116 Pulse  Min: 54  Max: 120    No data recorded  BP: (!) 89/51 (after 50gm 25% Albumin) Systolic (24hrs), Av , Min:72 , Max:136   Diastolic (24hrs), Av, Min:50, Max:86     I/Os: I/O last 3 completed shifts:  In: 7514.42 [P.O.:1295; I.V.:8039.42]  Out: 1022  [Urine:637; Drains:385]   Review of Systems   Constitutional: Negative for fever.   Eyes: Negative for visual disturbance.   Cardiovascular: Negative for chest pain.   Genitourinary: Positive for decreased urine volume.   Neurological: Positive for dizziness.     Physical Exam  Constitutional:       Appearance: He is not toxic-appearing.   Cardiovascular:      Rate and Rhythm: Regular rhythm. Tachycardia present.      Pulses: Normal pulses.      Heart sounds: Normal heart sounds.   Pulmonary:      Effort: No respiratory distress.   Abdominal:      General: There is distension.      Tenderness: There is no abdominal tenderness. There is no guarding.   Neurological:      General: No focal deficit present.      Mental Status: He is alert and oriented to person, place, and time.       Significant Results and Procedures   Lactic Acid:   Recent Labs   Lab Test 05/19/22 2208 05/18/22  2043 05/17/22  1748   LACT 3.0*  3.0* 2.3* 2.1*     CBC:   Recent Labs   Lab Test 05/19/22 2208 05/19/22  0440 05/19/22  0028 05/18/22 2012 05/18/22  0904 05/18/22  0421   WBC  --  11.2*  --  14.9*  --  12.0*   HGB 9.7* 9.6* 10.0* 10.9*  10.9*   < > 10.5*   HCT  --  30.6*  --  34.1*  --  32.5*   PLT  --  158  --  196  --  172    < > = values in this interval not displayed.      Sepsis Evaluation   The patient is not known to have an infection.

## 2022-05-20 NOTE — PROGRESS NOTES
Admitted/transferred from: 6B  Time of arrival on unit 17:30  2 RN full  skin assessment completed by Sofy RN and Praneeth RN  Skin assessment finding: issues found +1 edema bilateral lower extremities, triple lumen IJ right side, dialysis catheter right. 2 PIV, one each per hand.   Interventions/actions: skin interventions none needed.     Will continue to monitor.

## 2022-05-20 NOTE — PLAN OF CARE
Goal Outcome Evaluation:    Patient up from 6B this shift following dialysis. Vitally stable on room air. PRN PO Dilaudid given x2 for incisional pain. 2 nurse skin check completed. Patient up and walking halls with standby assist. At around 22:00 patient's BP 72/57, rapid response called, labs drawn, lactic acid 3.0. Albumin and fluid boluses given per orders with BPs improving to 90s/50s. 150 mL urine output via Flores since arrival on 7A, 105 mL bright red output via BAUDILIO.

## 2022-05-20 NOTE — PLAN OF CARE
Goal Outcome Evaluation:    Assumed care from [id number]    Major Shift Events: Pt remains A%O x4. Follows all commands with 5/5 strength. Uses call light appropriately. PERRLA. SR-ST with no noted ectopy. Afebrile. Dopamine titrated to maintain MAP>65. O2 WNL on 3L via NC. Bibasilar crackles noted. Pt wears home CPAP with 5L O2 at night. Pt tolerating regular diet. Intake limited by poor appetite. BS hypoactive. Last BM 5/17 per Pt. Encourage hydration, mobility, and bowel prophylaxis. Oliguria persists. CRRT started @ 1720 with goal I=O. Skin remains intact. Access unchanged. Wife and daughter at bedside this evening.    Plan: Encourage bowel prophylaxis as well as PO intake as tolerated. Continue goals of care.    For vital signs and complete assessments, please see documentation flowsheets.     Plan of Care Reviewed With: patient, spouse, daughter     Overall Patient Progress: improving    Outcome Evaluation: Pt tolerated walk in damico and upright position in recliner this evening.

## 2022-05-20 NOTE — CODE/RAPID RESPONSE
Rapid Response  Rapid response called 0054 for SBP <80. Pt's BP have been trending down for several hours, providers considering transferring to higher level of care but chose to hold off to see how patient responded to fluid administration. Patient had received overall 75g albumin & 50mL/hr NS for 5 hrs with no improvement in BP. RRT called for continued hypotension. Surgical on-call advised patient would be imminently transferred to the ICU for dopamine gtt and closer monitoring. Dopamine was started by resource float on 7A while preparing to transfer patient. Patient transferred to  at 0115; Wife Sana was called and made away of transfer and how to contact the receiving unit.

## 2022-05-20 NOTE — CONSULTS
SURGICAL ICU ADMISSION NOTE  05/20/2022    PRIMARY TEAM: Solid Organ Transplant, Kidney  PRIMARY PHYSICIAN: Howard Flores MD  REASON FOR CRITICAL CARE ADMISSION: Persistent hypotension, need for hemodynamic monitoring   ADMITTING PHYSICIAN: David Perlman, MD   Date of Service (when I saw the patient): 05/20/2022    ASSESSMENT:  Donald Blanco is a 57 year old male who was admitte on 5/17/2022 for a living-donor kidney transplant (ESRD 2/2 IgA Nephropathy).  Developing oliguria, requiring maintenance hemodialysis on 5/19, with patient experiencing persistent hypotension w/ SBPs in 70s-80s.  Admitted to SICU service for hemodynamic monitoring and pressor administration to achieve SBP goals >110.    PLAN:    Neurological:   # Acute pain   - Monitor neurological status. Delirium preventions and precautions.   - Pain: Hydromorphone 2-4mg PO Q4H PRN    -Acetaminophen 975mg PO Q8H, methocarbamol 500mg PO Q4H PRN   - Sedation plan: No sedation necessary, keep RASS 0-1    Pulmonary: No need for ventilation or advanced respiratory support  - Supplemental oxygen to keep saturation above 92 %.  - Incentive spirometer every 15- 30 minutes when awake.    Cardiovascular:    # Persistent Hypotension   - Monitor hemodynamic status: SBP goals > 110   -Dopamine gtt per transplant     Gastroenterology/Nutrition:   Okay for PO intake  #3G K Diet.    Fluids/Electrolytes:   LDKT 5/17/2022, HD 5/19/22  X/p 100g albumin (4u 25%, 4u 5%)  - NS 50cc/hr IV fluid hydration  - Lactate 3.0, repeat LA 2.1     Renal: LDKT 5/17/2022  #ESRD 2/2 IgA Nephropathy  #Oliguria -- 545cc UOP 5/19  - Will continue to monitor intake and output.  - repeat renal US this AM     Graft Function: Cr (5/19 - pre HD): 10.60   Immunosuppression:    -Mycophenolate PO 750mg BID    -Tacrolimus PO 3mg BID    -Prednisone PO taper   Anti-Microbial Ppx:    -Trimethoprim-Sulfamethoxazole 400mg-80mg PO 3x weekly    -Valganciclovir 450mg PO 2x weekly    Endocrine:  No  concerns, no intervention necessary  -Prednisone taper for immunosuppression    ID:  # Leukocytosis 14.9 >>> 11.2  - Afebrile, low concern for infectious etiology  - Anti-infectious ppx as above    Heme:     # Acute blood loss anemia   # Anemia of critical illness   - Hemoglobin - 9.7  - Transfuse if hgb <7.0 or signs/symptoms of hypoperfusion. Monitor and trend.     Musculoskeletal:  No concerns, no intervention necessary     Skin:  No concerns, no intervention necessary    General Cares/Prophylaxis:    DVT Prophylaxis: Defer to primary service (Heparin gtt 500u/hr)  GI Prophylaxis: Not indicated   Antimicrobial Prophylaxis: as above    Lines/ tubes/ drains:  -CVC x1 (R subclavian)  -PIV x2 (R dorsal hand; L ventral forearm)  -Indwelling urinary catheter x1  -BAUDILIO drain x1 R abdomen    Disposition:  - Surgical ICU.    Patient seen, findings and plan discussed with surgical ICU staff, Dr. Perlman.    Chelsie Alejandra MD  General Surgery, PGY-2    - - - - - - - - - - - - - - - - - - - - - - - - - - - - - - - - - - - - - - - - - - - - - -   HISTORY PRESENTING ILLNESS:  56yo with history of ESRD secondary to IgA nephropathy, dialysis via a tunneled catheter, HTN, and prostate cancer s/p prostatectomy and radiation. S/p living donor kidney transplant without stent on 5/17/22. Imported kidney with vasospasm in OR. Has had slow graft function. Cr 10.6 (9.9), low UOP, hyperkalemia. RV velocities continue to improve on US. Had HD today for hyperkalemia and fluid overload. He completed a 2 hour run and 1L was pulled off. After dialysis he was up in the halls ambulating. He became hypotensive to the 70s and felt dizzy. Since then he was persistently hypotensive despite fluid resuscitation and was transferred to the unit on dopamine.     Denies HA, CP, SOB, Fevers, chills, nausea/emesis. No lightheadedness or dizziness.     REVIEW OF SYSTEMS: 10 point ROS neg other than the symptoms noted above in the HPI.    PAST MEDICAL  HISTORY:   Past Medical History:   Diagnosis Date     CKD (chronic kidney disease) stage 4, GFR 15-29 ml/min (H)      Elevated PSA      Essential hypertension 05/21/2020     Hyperlipidemia      IgA nephropathy      Obesity      Prostate cancer (H) 10/15/2020     S/P radiation therapy     6,600 cGy to pelvis completed on 6/25/2021 [facility]       SURGICAL HISTORY:   Past Surgical History:   Procedure Laterality Date     BACK SURGERY      Back Surgey 20+ Years Ago      BIOPSY      [facility] 2010     COLONOSCOPY      10+ Years ago at [facility]      DAVINCI PROSTATECTOMY, LYMPHADENECTOMY N/A 12/14/2020    Procedure: PROSTATECTOMY, ROBOT-ASSISTED, WITH PELVIC LYMPHADENECTOMY;  Surgeon: Nabil Vásquez MD;  Location:  OR       SOCIAL HISTORY:   Social History     Socioeconomic History     Marital status:      Spouse name: None     Number of children: None     Years of education: None     Highest education level: None   Tobacco Use     Smoking status: Never Smoker     Smokeless tobacco: Never Used   Substance and Sexual Activity     Alcohol use: Yes     Comment: rare use     Drug use: Never     FAMILY HISTORY: No bleeding/clotting disorders nor problems with anesthesia.    ALLERGIES:   No Known Allergies    MEDICATIONS:  No current facility-administered medications on file prior to encounter.  allopurinol (ZYLOPRIM) 100 MG tablet, Take 200 mg by mouth daily  aspirin (ASA) 81 MG EC tablet, Take 81 mg by mouth every morning   B Complex-C-Folic Acid (VIRT-CAPS) 1 MG CAPS, Take 1 tablet by mouth daily  calcitRIOL (ROCALTROL) 0.25 MCG capsule, Take 0.75 mcg by mouth three times a week During dialysis  cinacalcet (SENSIPAR) 30 MG tablet, Take 60 mg by mouth daily (with dinner)  colchicine (MITIGARE) 0.6 MG capsule, Take 0.6 mg by mouth as needed Rarely uses  furosemide (LASIX) 20 MG tablet, Take 20 mg by mouth every morning  sevelamer carbonate (RENVELA) 800 MG tablet,  Take 2,400 mg by mouth 3 times daily (with meals)  simvastatin (ZOCOR) 40 MG tablet, Take 40 mg by mouth every morning   Vitamin D3 (CHOLECALCIFEROL) 25 mcg (1000 units) tablet, Take 1 capsule by mouth every morning         PHYSICAL EXAMINATION:  Temp:  [97.7  F (36.5  C)-99.4  F (37.4  C)] 99.1  F (37.3  C)  Pulse:  [] 112  Resp:  [8-26] 26  BP: ()/(47-86) 93/59  Cuff Mean (mmHg):  [57-68] 68  SpO2:  [91 %-100 %] 94 %  Gen: Alert and orientedx3; not in acute distress; mentating appropriately   HEENT: No sclera icterus; no facial deformity; both nostrils patent;   Cardio: RRR; no murmurs, rubs, or gallops; radial pulses ESS  Pulm: Ant. Lung fields clear; no w/c/r; no retractions; no accessory muscle use.    GI: Abdomen soft and nontender to palpation; bowel sounds present, appropriately resonant to percussion  Neuro: sensation to crude touch present on plantar surface bilaterally; plantarflexion 4/4 bilaterally, dorsiflexion 4/4 bilaterally,  strength 4/4 bilaterally  EXT/MSK: no LE or UE edema; LE and UE warm to touch  Inc: c/d/i; overlying adhesive bandage; no erythema or warmth    LABS: Reviewed.   Arterial Blood Gases   No lab results found in last 7 days.  Complete Blood Count   Recent Labs   Lab 05/19/22  2208 05/19/22  0440 05/19/22  0028 05/18/22 2012 05/18/22  0904 05/18/22  0421 05/17/22  2326 05/17/22  2046   WBC  --  11.2*  --  14.9*  --  12.0*  --  12.1*   HGB 9.7* 9.6* 10.0* 10.9*  10.9*   < > 10.5*   < > 11.3*   PLT  --  158  --  196  --  172  --  180    < > = values in this interval not displayed.     Basic Metabolic Panel  Recent Labs   Lab 05/19/22  1202 05/19/22  0510 05/19/22  0440 05/19/22 0433 05/19/22  0357 05/19/22  0149 05/19/22  0028 05/18/22 2012 05/18/22 0436 05/18/22 0421 05/17/22 2326 05/17/22 2046   NA  --   --  135  --   --   --   --  133  --  132*  --  132*   POTASSIUM 5.2  --  5.5*  --   --   --  5.7* 4.9  4.9   < > 5.5*  5.5*   < > 5.6*   CHLORIDE  --   --   94  --   --   --   --  93*  --  95  --  95   CO2  --   --  30  --   --   --   --  26  --  27  --  28   BUN  --   --  67*  --   --   --   --  62*  --  52*  --  48*   CR  --   --  10.60*  --   --   --   --  9.89*  --  9.20*  --  8.48*   GLC  --  147* 149* 146* 154*   < >  --  168*   < > 192*   < > 122*    < > = values in this interval not displayed.     Liver Function Tests  No lab results found in last 7 days.  Pancreatic Enzymes  No lab results found in last 7 days.  Coagulation Profile  No lab results found in last 7 days.    IMAGING:  Recent Results (from the past 24 hour(s))   US Renal Transplant with Doppler    Narrative    EXAMINATION: US RENAL TRANSPLANT,  5/19/2022 9:42 AM     COMPARISON: Multiple Renal transplant ultrasound on 5/18/2022 and  5/17/2022    HISTORY: Follow up velocity at the transplant renal vein, assess for  overall vascular patency. Now postop day #2.    TECHNIQUE:  Grey-scale, color Doppler and spectral flow analysis.    FINDINGS:  The transplant kidney is located in the right upper quadrant, and  measures 11.1 cm. Parenchyma is of normal thickness and echogenicity.  No focal lesions. No hydronephrosis. No perinephric fluid collection.    Renal artery flow:   68 cm/s cm/sec peak systolic at hilum.  197 cm/s peak systolic at anastomosis.  Arcuate artery resistive indices (upper to lower): 0.77, 0.78, 0.76    Renal Vein Flow:  101.0 cm/sat hilum.   88.5 cm/s at anastomosis. Previously measuring up to 258 cm/s.    Iliac artery flow:  165.4 cm/s peak systolic above anastomosis.  110.6 cm/s peak systolic below anastomosis.    Iliac vein flow:  Patent above and below the anastomosis.    Bladder: Flores in place, visualization obscured by bandage.        Impression    IMPRESSION:  Continued decline in the previously elevated velocity of the  transplant renal vein anastomosis. No hemodynamically significant  gradient noted.    Decrease in arcuate arterial resistive indices compared to prior, now  in  upper normal limits.    I have personally reviewed the examination and initial interpretation  and I agree with the findings.    FAUSTINA ZHAO MD         SYSTEM ID:  [id number]

## 2022-05-20 NOTE — CODE/RAPID RESPONSE
05/20/22 0100   Call Information   Date of Call 05/20/22   Time of Call 0054   Name of person requesting the team Aziza CHURCHILL   Title of person requesting team RN   RRT Arrival time 0106   Time RRT ended 0127   Reason for call   Type of RRT Adult   Primary reason for call Cardiovascular   Cardiovascular SBP less than 90   Was patient transferred from the ED, ICU, or PACU within last 24 hours prior to RRT call? No   SBAR   Situation BP remains consistently low   Background Admitted for living donor kidney transplant which was done on 5/17. Just had hemodialysis with 1 kg of fluid off.   Notable History/Conditions Cancer;End-Stage disease;Hypertension  (prostate cancer for which he received radiation therapy; CKD but s/p kidney transplant)   Assessment AAOx4, pleasant, cooperative, agreeable, follows commands, answers questions, denied having any pain, lightheadedness or dizziness though claims to be feeling anxious due to the fact he's being transfered to the ICU. Breathing regular, unlabored, with equal chest expansion, satting in the mid-90s% while on oxygen at 2 LPM via NC.   Interventions Meds  (Dopamine drip)   Patient Outcome   Patient Outcome Transferred to  (4C, room #4305)   RRT Team   Attending/Primary/Covering Physician Angel ONEIL (on-call surgery)   Date Attending Physician notified 05/20/22   Time Attending Physician notified 0106   Physician(s) Lenore GRIFFIN (NP)   Lead URIAH CHURCHILL

## 2022-05-20 NOTE — CODE/RAPID RESPONSE
05/19/22 2300   Call Information   Date of Call 05/19/22   Time of Call 2156   Name of person requesting the team Praneeth SINGH   Title of person requesting team RN   RRT Arrival time 2200   Time RRT ended 2242   Reason for call   Type of RRT Adult   Primary reason for call Cardiovascular   Cardiovascular SBP less than 90   Was patient transferred from the ED, ICU, or PACU within last 24 hours prior to RRT call? No   SBAR   Situation Hypotension, 72/57   Background Admitted for living donor kidney transplant which was done on 5/17. Just had hemodialysis with 1 kg of fluid off.   Notable History/Conditions Cancer;End-Stage disease;Hypertension  (prostate cancer for which he received radiation therapy; CKD but s/p kidney transplant)   Assessment AAOx4, pleasant, cooperative, agreeable, follows commands, answers questions, denied having any pain, lightheadedness or dizziness. Breathing regular, unlabored, with equal chest expansion, satting 91% while on room air.   Interventions ECG;Labs;Meds;O2 per N/C or mask   Adjustments to Recommend close monitoring of BP; plan is to transfer to a higher level of care if BP doesn't go up after Albumin 5% 500ml   Patient Outcome   Patient Outcome Stabilized on unit   RRT Team   Attending/Primary/Covering Physician Angel ONEIL (on-call surgery)   Date Attending Physician notified 05/19/22   Time Attending Physician notified 2156   Physician(s) Cary SURESH (NP)   Lead URIAH SINGH   Post RRT Intervention Assessment   Post RRT Assessment Other (see comment)  (see next rapid response entry)   Date Follow Up Done 05/20/22   Time Follow Up Done 0106

## 2022-05-20 NOTE — PROVIDER NOTIFICATION
To surgical on-call @ 2158:    7-201 (Francis) just called rapid response. Acute change in vitals. BP 70/40s, MAP 55, HR 120s. Checked x3. Pt feeling dizzy and generally worse than before.  Puja,  Charge [phone]    Orders placed for 25g 25% Albumin. BPs 80/40s.     2232: Lactic 3.0. Rapid response was not called because surgical on-call and rapid response team still at the bedside from previous RRT. Additional orders placed for 5% Albumin and NS @ 50mL/hr. Plan to continue monitoring BP and HR closely. Repeat Lactic in 4 hours. If no response to fluids and albumin, low threshold for transfer to higher level of care.

## 2022-05-20 NOTE — CODE/RAPID RESPONSE
Rapid Response Team Note    Assessment   A rapid response was called on Donald Blanco due to hypotension. Primary team at bedside and preparing to transfer to ICU  MICU paged    Plan   -  Transfer to a higher level of care    SYMONE Colbert CNP  [facility] RRT [facility] Job Code Contact #[phone]  [facility] Paging/Directory    Admission Diagnosis:   Awaiting organ transplant [Z76.82]  Pre-diabetes [R73.03]  ESRD (end stage renal disease) (H) [N18.6]  CKD (chronic kidney disease) stage 4, GFR 15-29 ml/min (H) [N18.4]    Physical Exam   Temp: 99.1  F (37.3  C) Temp  Min: 97.7  F (36.5  C)  Max: 99.4  F (37.4  C)  Resp: 26 Resp  Min: 8  Max: 26  SpO2: 92 % SpO2  Min: 91 %  Max: 100 %  Pulse: 111 Pulse  Min: 54  Max: 121    No data recorded  BP: (!) 77/52 Systolic (24hrs), Av , Min:72 , Max:136   Diastolic (24hrs), Av, Min:47, Max:86     I/Os: I/O last 3 completed shifts:  In: 2262.15 [P.O.:1175; I.V.:1087.15]  Out: 1850 [Urine:575; Drains:275; Other:1000]       Significant Results and Procedures   Lactic Acid:   Recent Labs   Lab Test 22  2043 22  1748   LACT 3.0*  3.0* 2.3* 2.1*     CBC:   Recent Labs   Lab Test 22  0440 22  0028 22  0904 22  0421   WBC  --  11.2*  --  14.9*  --  12.0*   HGB 9.7* 9.6* 10.0* 10.9*  10.9*   < > 10.5*   HCT  --  30.6*  --  34.1*  --  32.5*   PLT  --  158  --  196  --  172    < > = values in this interval not displayed.

## 2022-05-20 NOTE — PLAN OF CARE
ICU End of Shift Summary. See flowsheets for vital signs and detailed assessment.    Changes this shift: PRN dilaudid and robaxin given for abdominal/incisional pain. ECHO completed today. Dopamine continues with MAP goal >65. HR 80-90s with no ectopy. 4L NC, CPAP when asleep. Complaining of not being able to take deep breath and mild increase in SOB- lungs diminished, no wheezes. SICU notified, V/Q scan in progress and will review results. Approx 15mL/hr urine output. Zofran given x1 for nausea. Regular diet but no appetite. BAUDILIO drain with small bloody output.  D10 d/c'ed, BG remaining stable. Hep gtt continues at straight rate. NS at 50mL/hr. CRRT orders in place, will start this afternoon.     Plan:  CVP reading when able. Orders to start CRRT. Continue with plan of care.     Problem: Donor Organ Function (Kidney Transplant)  Goal: Effective Renal Function  Outcome: Ongoing, Not Progressing

## 2022-05-20 NOTE — PROGRESS NOTES
Pt set up on home CPAP machine with 5L O2 bleed in. RT will assess and titrate O2 bleed in as needed.    Carolyn Presley, RT on 5/20/2022 at 5:08 AM

## 2022-05-20 NOTE — PROGRESS NOTES
[facility]  Transplant Nephrology progress note   Date of Admission:  5/17/2022  Today's Date: 05/20/2022  Requesting physician: Howard Flores MD    Recommendations:  -- With recurrent hyperkalemia, poor urine output patient needs renal replacement therapy.  Since patient developed hypotension following time on HD yesterday, he remains dependent on dopamine today and currently mode referred CRRT rather than iHD  For now we will start CRRT with I= O.   We will use 4K baths for prefilter, post filter, dialysate but increase the dialysate prescription to 20 mL /kg/hr and continue prefilter at 12.5 ml/kg/hr  --Advised to do chest x-ray.      Assessment & Plan    Donald Blanco is a 57 year old male with history of IgA nephropathy, resultant ESRD, on HD 3 times/week. (Access: Right IJ TDC).At baseline trace urine output.Underwent LDKT on 5/17/22, without ureteral stent.Imported kidney with vasospasm in OR.Other comorbidities include HTN, and prostate cancer s/p prostatectomy and radiation.    # LDKT: without ureteral stent.Imported kidney with vasospasm in OR . DGF 2/2 muscle spasm, hypoperfusion and or, renal vein stenosis.  Patient continued to have inadequate UOP, hypervolemia , hyperkalemia   despite diuretic use  .  Patient given Thymoglobulin  ( 2 mg/kg) on 5/19/2022 followed by 2-hour run of low flow iHD with 1 L UF.  Following this patient had persistent hypotension warranting him to be transferred to the ICU, getting albumin infusions and being started on dopamine.  Currently MAP is acceptable.  However he remains oliguric.  He has had hyperkalemia which has been shifted successfully.  At this time patient will need to continue renal replacement therapy.  He remains dependent on dopamine and would prefer to start him on CRRT for the next 24 hours then reassess him.   - Baseline Creatinine: ~  TBD   - Proteinuria: Not checked post transplant   - Date DSA Last  Checked: May/2022      Latest DSA:  DSA pending   - BK Viremia: Not checked post transplant   - Kidney Tx Biopsy: No               -On heparin for renal vein prophylaxis in the setting of renal vein stenosis      # HD ACCESS: R internal jugular TDC .  In the past he has needed to switch the lines raising concern as to whether there is recirculation happening.  If patient continues to have hyperkalemia despite being initiated on CRRT we may have to consider exchanging the tunneled line.    # Patient consented for Renal Replacement therapy ( RRT ) on 5/18/22    # Immunosuppression: Tacrolimus immediate release (goal 8-10), Mycophenolate mofetil (dose 750 mg every 12 hours) and Prednisone (dose taper)               - Induction: Intermediate . PRA 19.  Basiliximab 5/17 & 5/21   - Changes: YES . Patient will get Thymo 2 mg/kg today     # Infection Prophylaxis:   - PJP: Sulfa/TMP (Bactrim) - currently held due to hyperkalemia   - CMV: Valganciclovir (Valcyte). No discordance D-/R-    # Hypertension: Controlled;  Goal BP: < 150/90   - Volume status: Mildly hypervolemic    - Changes: No    # Elevated Blood Glucose: Glucose generally running ~ 170 -200    - Management as per primary team.    # Anemia in Chronic Renal Disease: Hgb: Stable      YUDI: No   - Iron studies: Unknown at this time, but checked with dialysis    # Mineral Bone Disorder:   - Secondary renal hyperparathyroidism; PTH level: Unknown at this time, but checked with dialysis        On treatment: None  - Vitamin D; level: Unknown at this time, but checked with dialysis        On supplement: No  - Calcium; level: Normal        On supplement: No  - Phosphorus; level: Normal        On supplement: No    # Electrolytes:   - Potassium; level: High        On supplement: No  - Magnesium; level: Normal        On supplement: No  - Bicarbonate; level: Normal        On supplement: No  - Sodium; level: Normal    # Transplant History:  Etiology of Kidney Failure: IgA  nephropathy  Tx: LDKT  Transplant: 5/17/2022 (Kidney)  Crossmatch at time of Tx: negative  Significant changes in immunosuppression: None  Significant transplant-related complications: None    Recommendations were communicated to the primary team verbally.  Seen and discussed with Dr. Clarisa Ayala MD  Pager: [phone]    Physician Attestation   I, Tucker Mckenna MD, personally examined and evaluated this patient.  I discussed the patient with the resident/fellow and care team, and agree with the assessment and plan of care as documented in the note on 05/20/22 .      I personally reviewed vital signs, medications, labs and imaging.  Delayed graft function 2/2 vasospasm, renal vein problems in LDKT. Subsequent hemodynamic hit with thymoglobulin and hemodialysis leading to hypotension prompting ICU admission. No evidence of ACS, MI and pulmonary embolism work up negative. For tonight into tomorrow, recommend CRRT to avoid subsequent hemodynamic hit whilst on supportive therapies and reassessing tomorrow.     Tucker Mckenna MD  Date of Service (when I saw the patient): 05/20/22          REASON FOR CONSULT   LDKT    History of Present Illness   Donald Blanco is a 57 year old male with history of IgA nephropathy, resultant ESRD, on HD 3 times/week. (Access: Right IJ TDC).  At baseline trace urine output.  Underwent LDKT on 5/17/22, without ureteral stent.Imported kidney with vasospasm in OR  Other comorbidities include : HTN, and prostate cancer s/p prostatectomy and radiation.  PRA 19  Intermediate induction  CMV R-/R-  EBV D+/R+  Crossmatch negative at time of transplant  DSA pending    Interval History   Had quite an eventful night.  Patient plans DGF.  Received Thymoglobulin 2 mg per cake's and underwent dialysis with low flow prescription for 2 hours and 1 L UF.  Following these, developed persistent hypotension warranting rapid response.  Was given albumin infusions and was eventually started on  dopamine and transferred to the ICU for further management.    Patient's map currently stable, remains on dopamine.  Urine output oliguric.  He is tired but no focal weakness or altered sensation.  No confusion or headache or any acute change in vision.  No chest pain but he feels some shortness of breath.  He feels he is fluid filled around his abdomen also.  He did get albumin infusions during rapid responses.  Denies any nausea or vomiting.  Denies any bowel movement but has been passing flatus.      Review of Systems    The 10 point Review of Systems is negative other than noted in the HPI or here.     Allergies   No Known Allergies  Prior to Admission Medications     acetaminophen  975 mg Oral Q8H     aspirin  81 mg Oral Daily     atorvastatin  10 mg Oral Daily     lidocaine  2 patch Transdermal Q24H     lidocaine   Transdermal Q8H     [Held by provider] magnesium oxide  400 mg Oral Daily with lunch     mycophenolate  750 mg Oral BID IS     polyethylene glycol  17 g Oral Daily     predniSONE  60 mg Oral Daily    Followed by     [START ON 2022] predniSONE  40 mg Oral Daily    Followed by     [START ON 2022] predniSONE  20 mg Oral Daily    Followed by     [START ON 2022] predniSONE  15 mg Oral Daily    Followed by     [START ON 2022] predniSONE  10 mg Oral Daily    Followed by     [START ON 2022] predniSONE  5 mg Oral Daily     senna-docusate  1 tablet Oral BID     sodium chloride (PF)  10 mL Intracatheter Q8H     [Held by provider] sulfamethoxazole-trimethoprim  1 tablet Oral Once per day on      tacrolimus  3 mg Oral BID IS     [START ON 2022] valGANciclovir  450 mg Oral Once per day on Tue Sat       DOPamine 5 mcg/kg/min (22 1048)     heparin 500 Units/hr (22 1100)     sodium chloride 50 mL/hr at 22 2238       Physical Exam   Temp  Av.1  F (36.7  C)  Min: 97.7  F (36.5  C)  Max: 98.6  F (37  C)      Pulse  Av.1  Min: 63  Max: 105 Resp  Avg:  "15.8  Min: 8  Max: 19  SpO2  Av.3 %  Min: 94 %  Max: 100 %    CVP (mmHg): 15 mmHgBP 113/58   Pulse 97   Temp 98  F (36.7  C)   Resp 18   Ht 1.778 m (5' 10\")   Wt 115.1 kg (253 lb 12 oz)   SpO2 92%   BMI 36.41 kg/m     Date 22 0700 - 22 0659   Shift 9402-0440 3931-6529 1353-9378 24 Hour Total   INTAKE   I.V. 1081.67   1081.67   Shift Total(mL/kg) 1081.67(10)   1081.67(10)   OUTPUT   Urine 335 50  385   Drains 165   165   Shift Total(mL/kg) 500(4.62) 50(0.46)  550(5.08)   Weight (kg) 108.2 108.2 108.2 108.2      Admit Weight: 103.4 kg (227 lb 15.3 oz)     GENERAL APPEARANCE: alert and no distress  HENT: mouth without ulcers or lesions  LYMPHATICS: no cervical or supraclavicular nodes  RESP: lungs clear to auscultation - no rales, rhonchi or wheezes  CV: regular rhythm, normal rate, no rub, no murmur  EDEMA: 1+ trace bilateral edema   ABDOMEN: soft, nondistended, nontender, bowel sounds normal  MS: extremities normal - no gross deformities noted, no evidence of inflammation in joints, no muscle tenderness  SKIN: no rash  NEURO: Alert awake oriented x3, moving all extremities well.  ACCESS: [facility]     Data   CMP  Recent Labs   Lab 22  1239 22  1002 22  0834 22  0827 22  0743 22  0657 22  0603 22  0546 22  0524 22  0349 22  0510 22  0440 22  0028 22  0436 22  0421   NA  --   --   --   --   --   --   --   --   --  135  --  135  --  133  --  132*   POTASSIUM 4.9  --   --  4.6  --   --   --  5.8*  --  5.6*   < > 5.5*   < > 4.9  4.9   < > 5.5*  5.5*   CHLORIDE  --   --   --   --   --   --   --   --   --  98  --  94  --  93*  --  95   CO2  --   --   --   --   --   --   --   --   --  27  --  30  --  26  --  27   ANIONGAP  --   --   --   --   --   --   --   --   --  10  --  11  --  14  --  10   GLC  --  117* 116*  --  85 92   < >  --    < > 96   < > 149*   < > 168*   < > 192*   BUN  --   --   --   " --   --   --   --   --   --  59*  --  67*  --  62*  --  52*   CR  --   --   --   --   --   --   --   --   --  8.90*  --  10.60*  --  9.89*  --  9.20*   GFRESTIMATED  --   --   --   --   --   --   --   --   --  6*  --  5*  --  6*  --  6*   YEE  --   --   --   --   --   --   --   --   --  8.6  --  8.9  --  9.3  --  8.8   MAG  --   --   --   --   --   --   --   --   --  1.8  --  2.6*  --  2.3  --  2.4*   PHOS  --   --   --   --   --   --   --   --   --  6.3*  --  7.1*  --  5.6*  --  4.0    < > = values in this interval not displayed.     CBC  Recent Labs   Lab 05/20/22  0526 05/20/22  0349 05/19/22  2208 05/19/22  0440 05/19/22  0028 05/18/22 2012   HGB 8.5* 8.0* 9.7* 9.6*   < > 10.9*  10.9*   WBC 9.0 9.3  --  11.2*  --  14.9*   RBC 2.55* 2.45*  --  2.95*  --  3.33*   HCT 26.5* 25.4*  --  30.6*  --  34.1*   * 104*  --  104*  --  102*   MCH 33.3* 32.7  --  32.5  --  33.0   MCHC 32.1 31.5  --  31.4*  --  31.5   RDW 13.5 13.3  --  13.0  --  13.2   PLT 87* 96*  --  158  --  196    < > = values in this interval not displayed.     INRNo lab results found in last 7 days.  ABG  Recent Labs   Lab 05/17/22  1748   O2PER 42.0      Urine Studies  Recent Labs   Lab Test 05/09/22  0957 12/10/20  1338 09/03/20  1142   COLOR Yellow Straw Straw   APPEARANCE Clear Clear Clear   URINEGLC Negative Negative Negative   URINEBILI Negative Negative Negative   URINEKETONE Negative Negative Negative   SG 1.015 1.011 1.011   UBLD Moderate* Negative Small*   URINEPH 6.0 5.0 5.0   PROTEIN 300 * >499* >499*   NITRITE Negative Negative Negative   LEUKEST Small* Negative Negative   RBCU 0 1 1   WBCU 6* 2 2     No lab results found.  PTH  Recent Labs   Lab Test 05/09/22  0959   PTHI 450*     Iron Studies  Recent Labs   Lab Test 05/09/22  0959   IRON 79      IRONSAT 24   LANCE 1,002*       IMAGING:  All imaging studies reviewed by me.

## 2022-05-21 LAB
ALBUMIN SERPL-MCNC: 2.9 G/DL (ref 3.4–5)
ALBUMIN SERPL-MCNC: 3.1 G/DL (ref 3.4–5)
ALBUMIN SERPL-MCNC: 3.1 G/DL (ref 3.4–5)
ALP SERPL-CCNC: 64 U/L (ref 40–150)
ALT SERPL W P-5'-P-CCNC: 23 U/L (ref 0–70)
ANION GAP SERPL CALCULATED.3IONS-SCNC: 7 MMOL/L (ref 3–14)
ANION GAP SERPL CALCULATED.3IONS-SCNC: 8 MMOL/L (ref 3–14)
ANION GAP SERPL CALCULATED.3IONS-SCNC: 8 MMOL/L (ref 3–14)
APTT PPP: 39 SECONDS (ref 22–38)
AST SERPL W P-5'-P-CCNC: 13 U/L (ref 0–45)
BASOPHILS # BLD AUTO: 0 10E3/UL (ref 0–0.2)
BASOPHILS NFR BLD AUTO: 0 %
BILIRUB DIRECT SERPL-MCNC: 0.3 MG/DL (ref 0–0.2)
BILIRUB SERPL-MCNC: 0.7 MG/DL (ref 0.2–1.3)
BUN SERPL-MCNC: 39 MG/DL (ref 7–30)
BUN SERPL-MCNC: 41 MG/DL (ref 7–30)
BUN SERPL-MCNC: 44 MG/DL (ref 7–30)
CA-I BLD-MCNC: 5 MG/DL (ref 4.4–5.2)
CALCIUM SERPL-MCNC: 9 MG/DL (ref 8.5–10.1)
CALCIUM SERPL-MCNC: 9 MG/DL (ref 8.5–10.1)
CALCIUM SERPL-MCNC: 9.4 MG/DL (ref 8.5–10.1)
CHLORIDE BLD-SCNC: 102 MMOL/L (ref 94–109)
CHLORIDE BLD-SCNC: 102 MMOL/L (ref 94–109)
CHLORIDE BLD-SCNC: 104 MMOL/L (ref 94–109)
CO2 SERPL-SCNC: 23 MMOL/L (ref 20–32)
CO2 SERPL-SCNC: 24 MMOL/L (ref 20–32)
CO2 SERPL-SCNC: 25 MMOL/L (ref 20–32)
CREAT FLD-MCNC: 4.9 MG/DL
CREAT SERPL-MCNC: 3.8 MG/DL (ref 0.66–1.25)
CREAT SERPL-MCNC: 4.21 MG/DL (ref 0.66–1.25)
CREAT SERPL-MCNC: 5.26 MG/DL (ref 0.66–1.25)
CREATININE BODY FLUID SOURCE: NORMAL
EOSINOPHIL # BLD AUTO: 0 10E3/UL (ref 0–0.7)
EOSINOPHIL NFR BLD AUTO: 0 %
ERYTHROCYTE [DISTWIDTH] IN BLOOD BY AUTOMATED COUNT: 13.5 % (ref 10–15)
GFR SERPL CREATININE-BSD FRML MDRD: 12 ML/MIN/1.73M2
GFR SERPL CREATININE-BSD FRML MDRD: 15 ML/MIN/1.73M2
GFR SERPL CREATININE-BSD FRML MDRD: 17 ML/MIN/1.73M2
GLUCOSE BLD-MCNC: 120 MG/DL (ref 70–99)
GLUCOSE BLD-MCNC: 138 MG/DL (ref 70–99)
GLUCOSE BLD-MCNC: 169 MG/DL (ref 70–99)
GLUCOSE BLDC GLUCOMTR-MCNC: 147 MG/DL (ref 70–99)
HCT VFR BLD AUTO: 27.7 % (ref 40–53)
HGB BLD-MCNC: 8.9 G/DL (ref 13.3–17.7)
HOLD SPECIMEN HIT: NORMAL
IMM GRANULOCYTES # BLD: 0.1 10E3/UL
IMM GRANULOCYTES NFR BLD: 1 %
LYMPHOCYTES # BLD AUTO: 0.1 10E3/UL (ref 0.8–5.3)
LYMPHOCYTES NFR BLD AUTO: 1 %
MAGNESIUM SERPL-MCNC: 2.2 MG/DL (ref 1.6–2.3)
MAGNESIUM SERPL-MCNC: 2.2 MG/DL (ref 1.6–2.3)
MAGNESIUM SERPL-MCNC: 2.4 MG/DL (ref 1.6–2.3)
MCH RBC QN AUTO: 33.5 PG (ref 26.5–33)
MCHC RBC AUTO-ENTMCNC: 32.1 G/DL (ref 31.5–36.5)
MCV RBC AUTO: 104 FL (ref 78–100)
MONOCYTES # BLD AUTO: 0.4 10E3/UL (ref 0–1.3)
MONOCYTES NFR BLD AUTO: 3 %
NEUTROPHILS # BLD AUTO: 10.9 10E3/UL (ref 1.6–8.3)
NEUTROPHILS NFR BLD AUTO: 95 %
NRBC # BLD AUTO: 0 10E3/UL
NRBC BLD AUTO-RTO: 0 /100
PF4 HEPARIN CMPLX AB SER QL: NEGATIVE
PHOSPHATE SERPL-MCNC: 4.8 MG/DL (ref 2.5–4.5)
PHOSPHATE SERPL-MCNC: 5.4 MG/DL (ref 2.5–4.5)
PHOSPHATE SERPL-MCNC: 6.3 MG/DL (ref 2.5–4.5)
PLATELET # BLD AUTO: 85 10E3/UL (ref 150–450)
POTASSIUM BLD-SCNC: 4.6 MMOL/L (ref 3.4–5.3)
POTASSIUM BLD-SCNC: 4.9 MMOL/L (ref 3.4–5.3)
POTASSIUM BLD-SCNC: 5.4 MMOL/L (ref 3.4–5.3)
PROT SERPL-MCNC: 6.2 G/DL (ref 6.8–8.8)
RBC # BLD AUTO: 2.66 10E6/UL (ref 4.4–5.9)
SODIUM SERPL-SCNC: 134 MMOL/L (ref 133–144)
SODIUM SERPL-SCNC: 134 MMOL/L (ref 133–144)
SODIUM SERPL-SCNC: 135 MMOL/L (ref 133–144)
UFH PPP CHRO-ACNC: <0.1 IU/ML
WBC # BLD AUTO: 11.5 10E3/UL (ref 4–11)

## 2022-05-21 PROCEDURE — 250N000013 HC RX MED GY IP 250 OP 250 PS 637

## 2022-05-21 PROCEDURE — 82330 ASSAY OF CALCIUM: CPT | Performed by: INTERNAL MEDICINE

## 2022-05-21 PROCEDURE — 258N000003 HC RX IP 258 OP 636: Performed by: PHYSICIAN ASSISTANT

## 2022-05-21 PROCEDURE — 250N000009 HC RX 250: Performed by: INTERNAL MEDICINE

## 2022-05-21 PROCEDURE — 250N000012 HC RX MED GY IP 250 OP 636 PS 637

## 2022-05-21 PROCEDURE — 90947 DIALYSIS REPEATED EVAL: CPT

## 2022-05-21 PROCEDURE — 250N000011 HC RX IP 250 OP 636: Performed by: NURSE PRACTITIONER

## 2022-05-21 PROCEDURE — 83735 ASSAY OF MAGNESIUM: CPT | Performed by: INTERNAL MEDICINE

## 2022-05-21 PROCEDURE — 258N000003 HC RX IP 258 OP 636

## 2022-05-21 PROCEDURE — 250N000011 HC RX IP 250 OP 636: Performed by: PHYSICIAN ASSISTANT

## 2022-05-21 PROCEDURE — 250N000013 HC RX MED GY IP 250 OP 250 PS 637: Performed by: NURSE PRACTITIONER

## 2022-05-21 PROCEDURE — 85025 COMPLETE CBC W/AUTO DIFF WBC: CPT

## 2022-05-21 PROCEDURE — 85520 HEPARIN ASSAY: CPT | Performed by: SURGERY

## 2022-05-21 PROCEDURE — 99233 SBSQ HOSP IP/OBS HIGH 50: CPT | Mod: 24 | Performed by: INTERNAL MEDICINE

## 2022-05-21 PROCEDURE — 82247 BILIRUBIN TOTAL: CPT | Performed by: INTERNAL MEDICINE

## 2022-05-21 PROCEDURE — 84075 ASSAY ALKALINE PHOSPHATASE: CPT | Performed by: INTERNAL MEDICINE

## 2022-05-21 PROCEDURE — 250N000013 HC RX MED GY IP 250 OP 250 PS 637: Performed by: PHYSICIAN ASSISTANT

## 2022-05-21 PROCEDURE — 80053 COMPREHEN METABOLIC PANEL: CPT | Performed by: INTERNAL MEDICINE

## 2022-05-21 PROCEDURE — 84155 ASSAY OF PROTEIN SERUM: CPT | Performed by: INTERNAL MEDICINE

## 2022-05-21 PROCEDURE — 82248 BILIRUBIN DIRECT: CPT | Performed by: INTERNAL MEDICINE

## 2022-05-21 PROCEDURE — 200N000002 HC R&B ICU UMMC

## 2022-05-21 PROCEDURE — 84100 ASSAY OF PHOSPHORUS: CPT | Performed by: INTERNAL MEDICINE

## 2022-05-21 PROCEDURE — 250N000013 HC RX MED GY IP 250 OP 250 PS 637: Performed by: SURGERY

## 2022-05-21 PROCEDURE — 250N000012 HC RX MED GY IP 250 OP 636 PS 637: Performed by: PHYSICIAN ASSISTANT

## 2022-05-21 PROCEDURE — 85730 THROMBOPLASTIN TIME PARTIAL: CPT | Performed by: NURSE PRACTITIONER

## 2022-05-21 PROCEDURE — 86022 PLATELET ANTIBODIES: CPT | Performed by: NURSE PRACTITIONER

## 2022-05-21 PROCEDURE — 82570 ASSAY OF URINE CREATININE: CPT | Performed by: PHYSICIAN ASSISTANT

## 2022-05-21 RX ORDER — PANTOPRAZOLE SODIUM 40 MG/1
40 TABLET, DELAYED RELEASE ORAL
Status: DISCONTINUED | OUTPATIENT
Start: 2022-05-21 | End: 2022-05-27 | Stop reason: HOSPADM

## 2022-05-21 RX ORDER — CALCIUM CHLORIDE, MAGNESIUM CHLORIDE, DEXTROSE MONOHYDRATE, LACTIC ACID, SODIUM CHLORIDE, SODIUM BICARBONATE AND POTASSIUM CHLORIDE 5.15; 2.03; 22; 5.4; 6.46; 3.09; .157 G/L; G/L; G/L; G/L; G/L; G/L; G/L
12.5 INJECTION INTRAVENOUS CONTINUOUS
Status: DISCONTINUED | OUTPATIENT
Start: 2022-05-21 | End: 2022-05-24

## 2022-05-21 RX ORDER — POTASSIUM CHLORIDE 29.8 MG/ML
20 INJECTION INTRAVENOUS EVERY 8 HOURS PRN
Status: DISCONTINUED | OUTPATIENT
Start: 2022-05-21 | End: 2022-05-25

## 2022-05-21 RX ORDER — MAGNESIUM SULFATE HEPTAHYDRATE 40 MG/ML
2 INJECTION, SOLUTION INTRAVENOUS EVERY 8 HOURS PRN
Status: DISCONTINUED | OUTPATIENT
Start: 2022-05-21 | End: 2022-05-25

## 2022-05-21 RX ORDER — DIPHENHYDRAMINE HCL 12.5MG/5ML
25-50 LIQUID (ML) ORAL ONCE
Status: COMPLETED | OUTPATIENT
Start: 2022-05-21 | End: 2022-05-21

## 2022-05-21 RX ORDER — AMOXICILLIN 250 MG
2 CAPSULE ORAL 2 TIMES DAILY
Status: DISCONTINUED | OUTPATIENT
Start: 2022-05-21 | End: 2022-05-25

## 2022-05-21 RX ORDER — HYDROXYZINE HYDROCHLORIDE 25 MG/1
25 TABLET, FILM COATED ORAL EVERY 6 HOURS PRN
Status: DISCONTINUED | OUTPATIENT
Start: 2022-05-21 | End: 2022-05-27 | Stop reason: HOSPADM

## 2022-05-21 RX ORDER — BISACODYL 10 MG
10 SUPPOSITORY, RECTAL RECTAL ONCE
Status: DISCONTINUED | OUTPATIENT
Start: 2022-05-21 | End: 2022-05-22

## 2022-05-21 RX ORDER — ACETAMINOPHEN 325 MG/1
975 TABLET ORAL EVERY 8 HOURS
Status: DISCONTINUED | OUTPATIENT
Start: 2022-05-21 | End: 2022-05-23

## 2022-05-21 RX ORDER — DIPHENHYDRAMINE HCL 25 MG
25-50 CAPSULE ORAL ONCE
Status: COMPLETED | OUTPATIENT
Start: 2022-05-21 | End: 2022-05-21

## 2022-05-21 RX ORDER — METHYLPREDNISOLONE SODIUM SUCCINATE 125 MG/2ML
100 INJECTION, POWDER, LYOPHILIZED, FOR SOLUTION INTRAMUSCULAR; INTRAVENOUS ONCE
Status: COMPLETED | OUTPATIENT
Start: 2022-05-21 | End: 2022-05-21

## 2022-05-21 RX ORDER — POLYETHYLENE GLYCOL 3350 17 G/17G
17 POWDER, FOR SOLUTION ORAL 2 TIMES DAILY
Status: DISCONTINUED | OUTPATIENT
Start: 2022-05-21 | End: 2022-05-25

## 2022-05-21 RX ORDER — HEPARIN SODIUM 10000 [USP'U]/100ML
500 INJECTION, SOLUTION INTRAVENOUS CONTINUOUS
Status: DISCONTINUED | OUTPATIENT
Start: 2022-05-21 | End: 2022-05-26

## 2022-05-21 RX ORDER — CALCIUM GLUCONATE 20 MG/ML
2 INJECTION, SOLUTION INTRAVENOUS EVERY 8 HOURS PRN
Status: DISCONTINUED | OUTPATIENT
Start: 2022-05-21 | End: 2022-05-25

## 2022-05-21 RX ORDER — DIPHENHYDRAMINE HYDROCHLORIDE, ZINC ACETATE 2; .1 G/100G; G/100G
CREAM TOPICAL 3 TIMES DAILY PRN
Status: DISCONTINUED | OUTPATIENT
Start: 2022-05-21 | End: 2022-05-27 | Stop reason: HOSPADM

## 2022-05-21 RX ORDER — ACETAMINOPHEN 325 MG/1
650 TABLET ORAL ONCE
Status: COMPLETED | OUTPATIENT
Start: 2022-05-21 | End: 2022-05-21

## 2022-05-21 RX ORDER — 0.9 % SODIUM CHLORIDE 0.9 %
200 INTRAVENOUS SOLUTION INTRAVENOUS CONTINUOUS
Status: DISCONTINUED | OUTPATIENT
Start: 2022-05-21 | End: 2022-05-24

## 2022-05-21 RX ORDER — HYDROXYZINE HYDROCHLORIDE 50 MG/1
50 TABLET, FILM COATED ORAL EVERY 6 HOURS PRN
Status: DISCONTINUED | OUTPATIENT
Start: 2022-05-21 | End: 2022-05-27 | Stop reason: HOSPADM

## 2022-05-21 RX ADMIN — PANTOPRAZOLE SODIUM 40 MG: 40 TABLET, DELAYED RELEASE ORAL at 15:37

## 2022-05-21 RX ADMIN — SODIUM CHLORIDE 200 ML/HR: 9 INJECTION, SOLUTION INTRAVENOUS at 18:01

## 2022-05-21 RX ADMIN — CALCIUM CHLORIDE, MAGNESIUM CHLORIDE, DEXTROSE MONOHYDRATE, LACTIC ACID, SODIUM CHLORIDE, SODIUM BICARBONATE AND POTASSIUM CHLORIDE 12.5 ML/KG/HR: 5.15; 2.03; 22; 5.4; 6.46; 3.09; .157 INJECTION INTRAVENOUS at 22:40

## 2022-05-21 RX ADMIN — PREDNISONE 60 MG: 50 TABLET ORAL at 08:01

## 2022-05-21 RX ADMIN — ACETAMINOPHEN 975 MG: 325 TABLET ORAL at 11:32

## 2022-05-21 RX ADMIN — MYCOPHENOLATE MOFETIL 750 MG: 250 CAPSULE ORAL at 08:01

## 2022-05-21 RX ADMIN — DOCUSATE SODIUM 50 MG AND SENNOSIDES 8.6 MG 2 TABLET: 8.6; 5 TABLET, FILM COATED ORAL at 08:18

## 2022-05-21 RX ADMIN — DIPHENHYDRAMINE HYDROCHLORIDE, ZINC ACETATE: 2; .1 CREAM TOPICAL at 11:30

## 2022-05-21 RX ADMIN — CALCIUM CHLORIDE, MAGNESIUM CHLORIDE, DEXTROSE MONOHYDRATE, LACTIC ACID, SODIUM CHLORIDE, SODIUM BICARBONATE AND POTASSIUM CHLORIDE 12.5 ML/KG/HR: 5.15; 2.03; 22; 5.4; 6.46; 3.09; .157 INJECTION INTRAVENOUS at 12:00

## 2022-05-21 RX ADMIN — ATORVASTATIN CALCIUM 10 MG: 10 TABLET, FILM COATED ORAL at 08:01

## 2022-05-21 RX ADMIN — SODIUM CHLORIDE 200 ML/HR: 9 INJECTION, SOLUTION INTRAVENOUS at 11:31

## 2022-05-21 RX ADMIN — CALCIUM CHLORIDE, MAGNESIUM CHLORIDE, DEXTROSE MONOHYDRATE, LACTIC ACID, SODIUM CHLORIDE, SODIUM BICARBONATE AND POTASSIUM CHLORIDE 12.5 ML/KG/HR: 5.15; 2.03; 22; 5.4; 6.46; 3.09; .157 INJECTION INTRAVENOUS at 14:32

## 2022-05-21 RX ADMIN — LIDOCAINE 2 PATCH: 560 PATCH PERCUTANEOUS; TOPICAL; TRANSDERMAL at 08:04

## 2022-05-21 RX ADMIN — ACETAMINOPHEN 650 MG: 325 TABLET ORAL at 15:31

## 2022-05-21 RX ADMIN — SODIUM CHLORIDE: 9 INJECTION, SOLUTION INTRAVENOUS at 03:09

## 2022-05-21 RX ADMIN — CALCIUM CHLORIDE, MAGNESIUM CHLORIDE, DEXTROSE MONOHYDRATE, LACTIC ACID, SODIUM CHLORIDE, SODIUM BICARBONATE AND POTASSIUM CHLORIDE 12.5 ML/KG/HR: 5.15; 2.03; 22; 5.4; 6.46; 3.09; .157 INJECTION INTRAVENOUS at 15:43

## 2022-05-21 RX ADMIN — CALCIUM CHLORIDE, MAGNESIUM CHLORIDE, SODIUM CHLORIDE, SODIUM BICARBONATE, POTASSIUM CHLORIDE AND SODIUM PHOSPHATE DIBASIC DIHYDRATE 12.5 ML/KG/HR: 3.68; 3.05; 6.34; 3.09; .314; .187 INJECTION INTRAVENOUS at 04:31

## 2022-05-21 RX ADMIN — MYCOPHENOLATE MOFETIL 750 MG: 250 CAPSULE ORAL at 17:28

## 2022-05-21 RX ADMIN — Medication 4 MG: at 02:41

## 2022-05-21 RX ADMIN — CALCIUM CHLORIDE, MAGNESIUM CHLORIDE, DEXTROSE MONOHYDRATE, LACTIC ACID, SODIUM CHLORIDE, SODIUM BICARBONATE AND POTASSIUM CHLORIDE 12.5 ML/KG/HR: 5.15; 2.03; 22; 5.4; 6.46; 3.09; .157 INJECTION INTRAVENOUS at 08:09

## 2022-05-21 RX ADMIN — METHYLPREDNISOLONE SODIUM SUCCINATE 100 MG: 125 INJECTION, POWDER, FOR SOLUTION INTRAMUSCULAR; INTRAVENOUS at 15:32

## 2022-05-21 RX ADMIN — CALCIUM CHLORIDE, MAGNESIUM CHLORIDE, SODIUM CHLORIDE, SODIUM BICARBONATE, POTASSIUM CHLORIDE AND SODIUM PHOSPHATE DIBASIC DIHYDRATE 20 ML/KG/HR: 3.68; 3.05; 6.34; 3.09; .314; .187 INJECTION INTRAVENOUS at 02:11

## 2022-05-21 RX ADMIN — TACROLIMUS 5 MG: 5 CAPSULE ORAL at 08:05

## 2022-05-21 RX ADMIN — CALCIUM CHLORIDE, MAGNESIUM CHLORIDE, DEXTROSE MONOHYDRATE, LACTIC ACID, SODIUM CHLORIDE, SODIUM BICARBONATE AND POTASSIUM CHLORIDE 12.5 ML/KG/HR: 5.15; 2.03; 22; 5.4; 6.46; 3.09; .157 INJECTION INTRAVENOUS at 08:11

## 2022-05-21 RX ADMIN — Medication 4 MG: at 21:04

## 2022-05-21 RX ADMIN — CALCIUM CHLORIDE, MAGNESIUM CHLORIDE, DEXTROSE MONOHYDRATE, LACTIC ACID, SODIUM CHLORIDE, SODIUM BICARBONATE AND POTASSIUM CHLORIDE 12.5 ML/KG/HR: 5.15; 2.03; 22; 5.4; 6.46; 3.09; .157 INJECTION INTRAVENOUS at 18:00

## 2022-05-21 RX ADMIN — CALCIUM CHLORIDE, MAGNESIUM CHLORIDE, SODIUM CHLORIDE, SODIUM BICARBONATE, POTASSIUM CHLORIDE AND SODIUM PHOSPHATE DIBASIC DIHYDRATE 12.5 ML/KG/HR: 3.68; 3.05; 6.34; 3.09; .314; .187 INJECTION INTRAVENOUS at 00:43

## 2022-05-21 RX ADMIN — ACETAMINOPHEN 975 MG: 325 TABLET ORAL at 19:19

## 2022-05-21 RX ADMIN — DIPHENHYDRAMINE HYDROCHLORIDE 50 MG: 25 CAPSULE ORAL at 15:31

## 2022-05-21 RX ADMIN — HEPARIN SODIUM AND DEXTROSE 500 UNITS/HR: 10000; 5 INJECTION INTRAVENOUS at 11:56

## 2022-05-21 RX ADMIN — SODIUM CHLORIDE 200 ML/HR: 9 INJECTION, SOLUTION INTRAVENOUS at 18:18

## 2022-05-21 RX ADMIN — DOPAMINE HYDROCHLORIDE 2.5 MCG/KG/MIN: 160 INJECTION, SOLUTION INTRAVENOUS at 10:23

## 2022-05-21 RX ADMIN — ANTI-THYMOCYTE GLOBULIN (RABBIT) 200 MG: 5 INJECTION, POWDER, LYOPHILIZED, FOR SOLUTION INTRAVENOUS at 16:24

## 2022-05-21 RX ADMIN — SODIUM CHLORIDE 200 ML/HR: 9 INJECTION, SOLUTION INTRAVENOUS at 06:55

## 2022-05-21 RX ADMIN — VALGANCICLOVIR HYDROCHLORIDE 450 MG: 450 TABLET ORAL at 08:05

## 2022-05-21 RX ADMIN — POLYETHYLENE GLYCOL 3350 17 G: 17 POWDER, FOR SOLUTION ORAL at 08:05

## 2022-05-21 RX ADMIN — ASPIRIN 81 MG CHEWABLE TABLET 81 MG: 81 TABLET CHEWABLE at 08:02

## 2022-05-21 RX ADMIN — ARGATROBAN 0.5 MCG/KG/MIN: 50 INJECTION INTRAVENOUS at 10:03

## 2022-05-21 RX ADMIN — CALCIUM CHLORIDE, MAGNESIUM CHLORIDE, SODIUM CHLORIDE, SODIUM BICARBONATE, POTASSIUM CHLORIDE AND SODIUM PHOSPHATE DIBASIC DIHYDRATE 20 ML/KG/HR: 3.68; 3.05; 6.34; 3.09; .314; .187 INJECTION INTRAVENOUS at 04:28

## 2022-05-21 RX ADMIN — DOCUSATE SODIUM 50 MG AND SENNOSIDES 8.6 MG 2 TABLET: 8.6; 5 TABLET, FILM COATED ORAL at 19:19

## 2022-05-21 RX ADMIN — CALCIUM CHLORIDE, MAGNESIUM CHLORIDE, DEXTROSE MONOHYDRATE, LACTIC ACID, SODIUM CHLORIDE, SODIUM BICARBONATE AND POTASSIUM CHLORIDE 12.5 ML/KG/HR: 5.15; 2.03; 22; 5.4; 6.46; 3.09; .157 INJECTION INTRAVENOUS at 22:39

## 2022-05-21 RX ADMIN — TACROLIMUS 5 MG: 5 CAPSULE ORAL at 17:28

## 2022-05-21 RX ADMIN — SODIUM CHLORIDE 200 ML/HR: 9 INJECTION, SOLUTION INTRAVENOUS at 16:03

## 2022-05-21 RX ADMIN — CALCIUM CHLORIDE, MAGNESIUM CHLORIDE, DEXTROSE MONOHYDRATE, LACTIC ACID, SODIUM CHLORIDE, SODIUM BICARBONATE AND POTASSIUM CHLORIDE 12.5 ML/KG/HR: 5.15; 2.03; 22; 5.4; 6.46; 3.09; .157 INJECTION INTRAVENOUS at 10:44

## 2022-05-21 RX ADMIN — Medication 4 MG: at 07:09

## 2022-05-21 RX ADMIN — POLYETHYLENE GLYCOL 3350 17 G: 17 POWDER, FOR SOLUTION ORAL at 19:19

## 2022-05-21 ASSESSMENT — ACTIVITIES OF DAILY LIVING (ADL)
ADLS_ACUITY_SCORE: 22

## 2022-05-21 NOTE — PROGRESS NOTES
[facility]    ICU Progress Note       Date of Admission:  5/17/2022  Date of Service: 05/21/22    CO-MORBIDITIES:   CKD (chronic kidney disease) stage 4, GFR 15-29 ml/min (H)  (primary encounter diagnosis)  Pre-diabetes  ESRD (end stage renal disease) (H)  S/p Living donor kidney transplantation (5/17/22)    Assessment: Critical Care   Donald Blanco is a 57 year old male who was admitted on 5/17/2022 for a living-donor kidney transplant (ESRD 2/2 IgA Nephropathy).  Developing oliguria, requiring maintenance hemodialysis on 5/19, with patient experiencing persistent hypotension w/ SBPs in 70s-80s.  Admitted to SICU service for hemodynamic monitoring and pressors, started on CRRT 5/20/22.        Major Changes Today:  - increase Miralax to BID  - Increase Senna to 2 tab BID  - Bisacodyl supp x 1 today  - discontinue MIVF  - HIIT screen  - discontinue Heparin gtt  - start argatroban gtt   - schedule Tylenol again        Plan: Critical Care   Neuro  # Acute pain   - Monitor neurological status. Delirium preventions and precautions.   - Pain: - Dilaudid 2-4mg PO Q4H PRN               -Acetaminophen PRN   - methocarbamol PRN     - Sedation plan: No sedation necessary, keep RASS 0-1               - Holding PTA lunesta      Pulmonary:   #SORAIDA   - Home CPAP at night   - Supplemental oxygen to keep saturation above 92 %.  - Incentive spirometer every 15- 30 minutes when awake.     Cardiovascular:    #Persistent Hypotension   #Hyperlipidemia   - Monitor hemodynamic status: initial goal SBP goals > 110, transition to maintain MAP > 65              -Dopamine gtt per transplant, wean as tolerated    - discontinue MIVF   - ECHO 11/15/2021 EF 60-65%  - Repeat ECHO 5/20/22: EF 55-60%, otherwise unremarkable   - Troponin negative (46)   - PTA atorvastatin 10 mg      Gastroenterology/Nutrition:   - Diet: PO intake, 3g K+ Diet  - Bowel regimen: miralax, senna, PRN milk of mag - increase    - Antiemetics: PRN zofran, compazine     Renal  #ESRD 2/2 IgA Nephropathy  #Oliguria  #Perinephric fluid collection  - Urine output 5/20 280 ml (137 since [place]) ~ 10-30/hr   - Will continue to monitor intake and output.  - Renal US 5/20 with perirenal fluid collection, no plan for intervention per transplant               - Prior renal US with elevated indices, improving      Graft Function: Cr (5/19 - pre HD): 10.60 -> now 5.26                            K+ 5.8, shifted, CRRT started, now 5.4                                         Immunosuppression:                          -Mycophenolate PO 750mg BID                          -Tacrolimus PO 5mg BID                          -Prednisone PO taper                          -Thymo per TXP team 5/19              Anti-Microbial Ppx:                          -Trimethoprim-Sulfamethoxazole 400mg-80mg PO 3x weekly (holding for hyperkalemia)                          -Valganciclovir 450mg PO 2x weekly     Fluid Balance/Electrolytes:  LDKT 5/17/2022, HD 5/19/22, CRRT started 5/20/22  - NS 50cc/hr IV fluid hydration + 500 albumin bolus - discontinue MIVF   - Lactate normalized 1.5<-2.1<-3.0      Endocrine:  #Stress and steroid hyperglycemia   - Regular insulin, BG , goal <180   - Hypoglycemia protocol, maintain glucose >100  - Required insulin for K shift, D10 as needed  - Prednisone taper for immunosuppression               - Received solumedrol 5/17-5/19   - now on Prednisone      ID:  # Stress induced leukocytosis   - WBC 11.5 (13.3, 9.0)  - Afebrile, low concern for infectious etiology  - Anti-infectious ppx as above     Heme:     # Acute blood loss anemia   # Anemia of critical illness   # Thrombocytopenia  - Hemoglobin stable, 8.9 (9.2<8.5 < 8.0)   - Transfuse if hgb <7.0 or signs/symptoms of hypoperfusion. Monitor and trend.   - BISHOP screen  - Renal vein prophylaxis: change to Argatroban gtt   - Aspirin 81 mg      MSK:  #Deconditioning  - PT/OT  consult     Prophylaxis:    DVT Prophylaxis: SCDs, argatroban gtt   GI Prophylaxis: Not indicated   Antimicrobial Prophylaxis: as above     Lines/ tubes/ drains:  - CVC x1 (R subclavian)  - Dialysis catheter right IJ  - PIV x2 (R dorsal hand; L ventral forearm)  - Indwelling urinary catheter x1  - BAUDILIO drain x1 R abdomen     Disposition  - Surgical ICU    The patient's care was discussed with the Attending Physician, Dr. Conteh.  Critical care time exclusive of procedures: 40 min     SYMONE [facility]  Securely message with the Vocera Web Console (learn more here)  Text page via [place] Paging/Directory     _________________________________________________________    Interval History   Coarse reviewed. Patient slept in recliner overnight with CPAP at 5 LPM, otherwise uses 4 LPM NC during the daytime.  Dopamine gtt 2.5- 3.5 to maintain MAP > 65, SBP > 110.  CRRT started at 1719 yesterday with I=O, now -50 to -100/hr goal.  UOP 10-30/hr.     Physical Exam   Vital Signs: Temp: 97.6  F (36.4  C) Temp src: Oral BP: 125/71 Pulse: 84   Resp: 18 SpO2: 96 % O2 Device: BiPAP/CPAP Oxygen Delivery: 5 LPM  Weight: 260 lbs 0 oz  GEN: alert, pleasant, sitting in the chair not in distress  HEENT:  Normocephalic, atraumatic, trachea midline, Pupils PERRL  CV: RRR, no gallops, rubs, or murmurs, on Dopamine gtt  PULM/CHEST: Clear breath sounds bilaterally, diminished  GI: normal bowel sounds, soft, tender, mildly distended  : gardiner catheter in place, urine yellow and clear  EXTREMITIES: mild to moderate peripheral edema, moving all extremities, peripheral pulses intact  NEURO: Cranial nerves II-XII grossly intact, no motor-sensory deficits noted (balance not tested)  SKIN: no rashes appreciated, incision dressing with shadow drainage, BAUDILIO with serosang drainage   PSYCH:  Affect: appropriate , not anxious, mentation at baseline,  no hallucinations    Data   I  reviewed all medications, new labs and imaging results over the last 24 hours.  Arterial Blood Gases   No lab results found in last 7 days.    Complete Blood Count   Recent Labs   Lab 22  0349   WBC 11.5* 13.3* 9.0 9.3   HGB 8.9* 9.2* 8.5* 8.0*   PLT 85* 97* 87* 96*       Basic Metabolic Panel  Recent Labs   Lab 22  1738 22  1327 22  1239 22  0834 22  0827 2224 22  0510 05/19/22  0440    134  --   --   --   --   --   --   --  135  --  135   POTASSIUM 5.4* 5.1  --   --   --  4.9  --  4.6   < > 5.6*   < > 5.5*   CHLORIDE 102 98  --   --   --   --   --   --   --  98  --  94   CO2 24 27  --   --   --   --   --   --   --  27  --  30   BUN 44* 56*  --   --   --   --   --   --   --  59*  --  67*   CR 5.26* 7.61*  --   --   --   --   --   --   --  8.90*  --  10.60*   * 95 102* 79   < >  --    < >  --    < > 96   < > 149*    < > = values in this interval not displayed.       Liver Function Tests  Recent Labs   Lab 22   AST 13  --    ALT 23  --    ALKPHOS 64  --    BILITOTAL 0.7  --    ALBUMIN 3.1* 3.3*       Pancreatic Enzymes  No lab results found in last 7 days.    Coagulation Profile  No lab results found in last 7 days.    IMAGING:  Recent Results (from the past 24 hour(s))   Echo Complete   Result Value    LVEF  55-60%    Narrative    [id number]  [id number]  [id number]  [id number]^CAMILA^ROXANE     [facility]  Echocardiography Laboratory  [address] [place] [address]     Name: JF ZULETA  MRN: [id number]  : 1965  Study Date: 2022 10:06 AM  Age: 57 yrs  Gender: Male  [place] [facility] [place] For Study: Heart Failure  Ordering Physician: ROXANE JENSEN  Performed By: Arcelia Romeo RDCS     BSA: 2.3 m2  Height: 70 in  Weight: 253 lb  BP: 93/56  mmHg  ______________________________________________________________________________  Procedure  Echocardiogram with two-dimensional, color and spectral Doppler performed.  Contrast Optison. Optison (NDC #3415-6165-57) given intravenously. Patient was  given 5 ml mixture of 3 ml Optison and 6 ml saline. 4 ml wasted.  ______________________________________________________________________________  Interpretation Summary  Global and regional left ventricular function is normal with an EF of 55-60%.  Right ventricular function, chamber size, wall motion, and thickness are  normal.  Pulmonary artery systolic pressure cannot be assessed.  The inferior vena cava is normal.  No pericardial effusion is present.  No significant changes noted.  ______________________________________________________________________________  Left Ventricle  Global and regional left ventricular function is normal with an EF of 55-60%.  Left ventricular wall thickness is normal. Left ventricular size is normal.  Left ventricular diastolic function is normal. No regional wall motion  abnormalities are seen. Abnormal non-specific septal motion is present.     Right Ventricle  Right ventricular function, chamber size, wall motion, and thickness are  normal.     Atria  Both atria appear normal.     Mitral Valve  The mitral valve is normal.     Aortic Valve  The valve leaflets are not well visualized. On Doppler interrogation, there is  no significant stenosis or regurgitation.     Tricuspid Valve  The tricuspid valve is normal. Pulmonary artery systolic pressure cannot be  assessed.     Pulmonic Valve  The pulmonic valve is normal.     Vessels  The thoracic aorta is normal. The pulmonary artery and bifurcation cannot be  assessed. The inferior vena cava is normal.     Pericardium  No pericardial effusion is present.     Compared to Previous Study  No significant changes  noted.  ______________________________________________________________________________  MMode/2D Measurements & Calculations  IVSd: 0.72 cm  LVIDd: 4.9 cm  LVIDs: 3.4 cm  LVPWd: 0.81 cm  FS: 29.8 %  LV mass(C)d: 124.0 grams  LV mass(C)dI: 53.8 grams/m2  asc Aorta Diam: 2.8 cm  LVOT diam: 2.0 cm  LVOT area: 3.1 cm2  LA Volume Index (BP): 26.3 ml/m2  RWT: 0.33  TAPSE: 1.7 cm     Doppler Measurements & Calculations  MV E max pau: 103.7 cm/sec  MV A max pau: 94.0 cm/sec  MV E/A: 1.1  MV dec slope: 688.8 cm/sec2  MV dec time: 0.15 sec  E/E' av.5  Lateral E/e': 7.1  Medial E/e': 8.0     ______________________________________________________________________________  Report approved by: Graham Alicea 2022 10:58 AM         XR Chest 2 Views    Narrative    EXAM:  XR CHEST 2 VW    INDICATION: Hypoxia, assess for fluid overload vs infiltrate    COMPARISON:  2022    HISTORY: PMH IgA nephropathy, ESRD on HD S/P kidney transplant on  2022    FINDINGS:  PA and lateral views of the chest. Right IJ central venous catheter  terminating over the SVC. Another right IJ central venous catheter  over the right cavoatrial junction. Interval removal of endotracheal  tube.    Cardiomediastinal silhouette within normal limits. Diffuse bilateral  reticular opacities. No pneumothorax. Small left pleural effusion.  Unremarkable upper abdomen. No acute bony lesions.      Impression    IMPRESSION:  1.  Diffuse increased bilateral perihilar predominant reticular  opacities, likely pulmonary edema versus atypical infection.  2.  Small left pleural effusion.    I have personally reviewed the examination and initial interpretation  and I agree with the findings.    NAT CALERO MD         SYSTEM ID:  [id number]   NM Lung Scan Perfusion Particulate    Narrative    Examination:  NM LUNG SCAN PERFUSION PARTICULATE       Date:  2022 4:13 PM     Indication:    PE suspected, low/intermediate prob, neg D-dimer; ok     Previous  Study: Same-day chest x-ray    Additional Information: none    Technique:    The patient received 6.5 mCi of Tc-99m labeled MAA intravenously.  Multiple images were obtained of the lungs in Anterior, posterior,  POTTS, RPO, LPO, and  Romansh projections.    Findings:    The perfusion images demonstrate no focal perfusional deficits.      Impression    Impression: No abnormal perfusional deficits to suggest pulmonary  embolus.    I have personally reviewed the examination and initial interpretation  and I agree with the findings.    ANTONINA SEARS MD         SYSTEM ID:  [id number]

## 2022-05-21 NOTE — PLAN OF CARE
ICU End of Shift Summary. See flowsheets for vital signs and detailed assessment.    Changes this shift: Pt weaned to room air when awake. Uses home CPAP with 5L when asleep. Pt slept in recliner overnight for more comfortable breathing. Dopamine needs ranged from 2.5-3.5 ro maintain MAP>65 and SBP>110. Fair appetite, pt endorsed improvement in abdominal tightness after eating a snack. Tolerating CRRT at I=O. UOP 10-30 ml/hr overnight. Elecrolytes remain elevated. Pt shifts weight in chair independently and is steady on feet.    Plan: Increase bowel meds if no BM, continue CRRT

## 2022-05-21 NOTE — PROGRESS NOTES
Transplant Surgery  Inpatient Daily Progress Note  05/21/2022    Assessment & Plan: 56yo with history of ESRD secondary to IgA nephropathy, dialysis via a tunneled catheter, HTN, and prostate cancer s/p prostatectomy and radiation. S/p living donor kidney transplant without stent on 5/17/22. Imported kidney with vasospasm in OR.    Graft function: POD #4. Delayed graft function. Cr 5.3 on HD. Low UOP, hyperkalemia. RV velocities continue to improve on US.     Did undergo dialysis run on POD 2, was acutely hypotensive post dialysis. Likely due to some combination of thymo reaction + dialysis. Blood pressures did not improve despite several fluid boluses and albumin. Ultimately transferred to the SICU for dopamine. Now on dopamine to keep MAP >65. Remains on CRRT, goal UF 50cc/hour today.     US 5/20 with 7.7 cm fluid collection, send fluid Cr to rule out leak. Will consider renogram.   Immunosuppression management: PRA 19, will give thymo in light of DGF  -Basiliximab 5/17, will not plan for second dose if giving thymo  -Thymo 2 mg/kg or 200 mg given 5/19, may have had a thymo reaction (already received steroid dose in AM). Give 200mg 5/21.   -Steroid taper per low risk induction protocol, additional 100mg on 5/21 prior to Thymo  -Tacro level goal 8-10  -MMF 750mg BID  Complexity of management: High. Contributing factors:  induction    Neuro:  Acute post-op pain: Oxycodone almost completely ineffective. Switched to PO dilaudid. Continue scheduled APAP, lidoderm and Robaxin.  Hematology: ASA 81 mg daily   Anemia: Borderline macrocytic. Hgb 8.9  Renal vein prophylaxis: Heparin increased to 500u/hr.   Thrombocytopenia: Due to Thymo. HIT negative.   Cardiorespiratory:   Hx HTN: BP stable. Monitor.  Hypoxia: H/o sleep apnea, on CPAP. Also with some likely component of pulmonary edema. Now stable on room air when awake, BiPAP while sleeping  Rule out ACS: Trop WNL, echo WNL  GI/Nutrition:   Diet: Low potassium.  Bowel  regimen: Senna  Endocrine: No acute issues.   Fluid/Electrolytes:   Hyperkalemia: K 5.4 on CRRT, nephrology lowering K bath  : Flores to remain due to new surgical anastomosis, planning for 3d, will leave in one more day for monitoring of Is and Os. Consider removal tomorrow.   Infectious disease: Afebrile  Prophylaxis: DVT, fall, GI, viral (Valcyte), pneumocystis (Bactrim, hold until K normalizes)  Disposition: 4A due to CRRT     Medical Decision Making: Medium  Subsequent visit 56826 (moderate level decision making)    TOM/Fellow/Resident Provider: Virginia Barragan PA-C     Faculty: Boris Huang M.D.  _________________________________________________________________  Transplant History:   5/17/2022 (Kidney), Postoperative day: 4     Interval History: History is obtained from the patient  Overnight events: No nausea. Asking appropriate questions.     ROS:   A 10-point review of systems was negative except as noted above.    Meds:   acetaminophen  650 mg Oral Once    acetaminophen  975 mg Oral or Feeding Tube Q8H    anti-thymocyte globulin  200 mg Intravenous Central line Once    aspirin  81 mg Oral Daily    atorvastatin  10 mg Oral Daily    bisacodyl  10 mg Rectal Once    diphenhydrAMINE  25-50 mg Oral Once    Or    diphenhydrAMINE  25-50 mg Per NG tube Once    lidocaine  2 patch Transdermal Q24H    lidocaine   Transdermal Q8H    [Held by provider] magnesium oxide  400 mg Oral Daily with lunch    methylPREDNISolone  100 mg Intravenous Once    mycophenolate  750 mg Oral BID IS    pantoprazole  40 mg Oral BID AC    polyethylene glycol  17 g Oral or Feeding Tube BID    [START ON 5/22/2022] predniSONE  40 mg Oral Daily    Followed by    [START ON 5/24/2022] predniSONE  20 mg Oral Daily    Followed by    [START ON 5/31/2022] predniSONE  15 mg Oral Daily    Followed by    [START ON 6/7/2022] predniSONE  10 mg Oral Daily    Followed by    [START ON 6/14/2022] predniSONE  5 mg Oral Daily    senna-docusate  2 tablet  "Oral or Feeding Tube BID    sodium chloride (PF)  10 mL Intracatheter Q8H    [Held by provider] sulfamethoxazole-trimethoprim  1 tablet Oral Once per day on Mon Wed Fri    tacrolimus  5 mg Oral BID IS    valGANciclovir  450 mg Oral Daily       Physical Exam:     Admit Weight: 103.4 kg (227 lb 15.3 oz)    Current vitals:   /67   Pulse 92   Temp 97.6  F (36.4  C) (Axillary)   Resp 20   Ht 1.778 m (5' 10\")   Wt 117.9 kg (260 lb)   SpO2 96%   BMI 37.31 kg/m      Vital sign ranges:    Temp:  [97.5  F (36.4  C)-98  F (36.7  C)] 97.6  F (36.4  C)  Pulse:  [] 92  Resp:  [11-28] 20  BP: ()/(44-96) 115/67  SpO2:  [92 %-100 %] 96 %    General Appearance: in no apparent distress.   Skin: Warm, perfused  Heart: NSR  Lungs: Unlabored  Abdomen: The abdomen is rounded, BAUDILIO serosang, incision c/d/i.   : gardiner is present.  Urine is yellow.  Extremities: edema: trace gen, strength 5/5  Neurologic: alert and oriented x4. Tremor absent.    Data:   CMP  Recent Labs   Lab 05/21/22  1203 05/21/22  0418 05/18/22  1541 05/18/22  1400    134   < >  --    POTASSIUM 4.6 5.4*   < >  --    CHLORIDE 102 102   < >  --    CO2 25 24   < >  --    * 120*   < >  --    BUN 39* 44*   < >  --    CR 4.21* 5.26*   < >  --    GFRESTIMATED 15* 12*   < >  --    YEE 9.4 9.0   < >  --    ICAW 5.0 5.0   < >  --    MAG 2.2 2.4*   < >  --    PHOS 5.4* 6.3*   < >  --    ALBUMIN 3.1* 3.1*   < >  --    BILITOTAL  --  0.7  --   --    ALKPHOS  --  64  --   --    AST  --  13  --   --    ALT  --  23  --   --    FCREAT  --   --   --  10.1    < > = values in this interval not displayed.     CBC  Recent Labs   Lab 05/21/22  0418 05/20/22 2001   HGB 8.9* 9.2*   WBC 11.5* 13.3*   PLT 85* 97*       Attestation:    The patient has been seen with the team and evaluated by me.   Vital signs, labs, medications and orders were reviewed.   When obtained, diagnostic images were reviewed by me and interpreted as above.    The care plan was " discussed with the multidisciplinary team and I agree with the findings and plan in this note, with any differences recorded in blue.    Immunosuppressive medication management was reviewed and adjusted as reflected in the note and orders.     .

## 2022-05-21 NOTE — PLAN OF CARE
ICU End of Shift Summary. See flowsheets for vital signs and detailed assessment.    Changes this shift:  Hyperkalemic w/potassium 5.4 but without any EKG changes. CRRT solutions switched from 4K to 2K, Dialysate rate decreased and potassium recheck at noon 4.6. Otherwise tolerating fluid removal well currently net negative 708 ml since MN.  Minimal urine output 117ml out x 12 hours via gardiner. Bowel regimen increased to bid last BM 5/17 but passing flatus. An additional  dose Thymoglobulin given pre medications given 30 minutes prior to starting  tolerating well thus far.    Wife and daughter at  all and updated throughout the day.    Plan:  Continue fluid removal per CRRT, titrate dopamine to SBP>110, monitor urine output closely and notify the team of any changes in status      Goal Outcome Evaluation:        Problem: Adjustment to Transplant (Kidney Transplant)  Goal: Optimal Coping with Organ Transplant  Outcome: Ongoing, Not Progressing     Problem: Bleeding (Kidney Transplant)  Goal: Absence of Bleeding  Outcome: Ongoing, Not Progressing     Problem: Bowel Motility Impaired (Kidney Transplant)  Goal: Effective Bowel Elimination  Outcome: Ongoing, Not Progressing

## 2022-05-21 NOTE — PROGRESS NOTES
[facility]  Transplant Nephrology progress note   Date of Admission:  5/17/2022  Today's Date: 05/21/2022  Requesting physician: Howard Flores MD    Recommendations:  -Change dialysate and replacement fluids to 2K, keep systolic pressure>110 with pressors but also need to remove 50cc/hr net negative  -Consider mag 3 scan to rule out urine leak  -Change to rATG induction for DGF      Assessment & Plan    Donald Blanco is a 57 year old male with history of IgA nephropathy, resultant ESRD, on HD 3 times/week. (Access: Right IJ TDC).At baseline trace urine output.Underwent LDKT on 5/17/22, without ureteral stent.Imported kidney with vasospasm in OR.Other comorbidities include HTN, and prostate cancer s/p prostatectomy and radiation.    # LDKT: without ureteral stent.Imported kidney with vasospasm in OR . DGF 2/2 muscle spasm, hypoperfusion and or, renal vein stenosis. Patient continues to have low UOP, hypervolemia , hyperkalemia.  Patient given Thymoglobulin  ( 2 mg/kg) on 5/19/2022 followed by 2-hour run of low flow iHD with 1 L UF.  Following this patient had persistent hypotension warranting him to be transferred to the ICU, getting albumin infusions and being started on dopamine.     - Baseline Creatinine: ~  TBD   - Proteinuria: Not checked post transplant   - Date DSA Last Checked: May/2022      Latest DSA: No DSA at time of transplant   - BK Viremia: Not checked post transplant   - Kidney Tx Biopsy: No    -No stent placed              -On heparin for renal vein prophylaxis in the setting of renal vein stenosis   -Consider Mag 3 for peritransplant collection   - CRRT Info  Access: Right IJ Tunneled Catheter; Blood Flow Rate: 180 ml/min; Net Fluid Removal Goal: 50 ml/hr as tolerated to keep MAP > 65 or SBP>110  Prescription -  Dialysate: 12.5 ml/kg/hr with K2 bath, Pre: 12.5 ml/kg/hr with K2 bath, Post: 200 ml/hr with Normal Saline    # Immunosuppression:  Tacrolimus immediate release (goal 8-10), Mycophenolate mofetil (dose 750 mg every 12 hours) and Prednisone (dose taper)               - Induction: Intermediate . PRA 19.  Basiliximab 5/17 & 5/21, rATG on 5/19   - Changes: YES .Change to rATG induction for DGF. Stop prednisone taper after rATG induction    # Infection Prophylaxis:   - PJP: Sulfa/TMP (Bactrim) - currently held due to hyperkalemia   - CMV: Valganciclovir (Valcyte). No discordance D-/R-    # Hypertension: Hypotensive;  Goal BP: MAP > 65   - Volume status: Markedly hypervolemic    - Changes: Yes - dopamine or levo to keep SBP>110. UF 50cc/hr on CRRT    # Elevated Blood Glucose: Glucose generally running ~ 170 -200    - Management as per primary team.    # Anemia in Chronic Renal Disease: Hgb: Stable      YUDI: No   - Iron studies: Unknown at this time, but checked with dialysis    # Mineral Bone Disorder:   - Secondary renal hyperparathyroidism; PTH level: Unknown at this time, but checked with dialysis        On treatment: None  - Vitamin D; level: Unknown at this time, but checked with dialysis        On supplement: No  - Calcium; level: Normal        On supplement: No  - Phosphorus; level: Normal        On supplement: No    # Electrolytes:   - Potassium; level: High        On supplement: No  - Magnesium; level: Normal        On supplement: No  - Bicarbonate; level: Normal        On supplement: No  - Sodium; level: Normal    # Thrombocytopenia:   -Occurred after rATG dose on 5/19. Ok to send BISHOP panel    #HyperKalemia:   -Change CRRT to 2K bath    # Transplant History:  Etiology of Kidney Failure: IgA nephropathy  Tx: LDKT  Transplant: 5/17/2022 (Kidney)  Crossmatch at time of Tx: negative  Significant changes in immunosuppression: None  Significant transplant-related complications: None    Recommendations were communicated to the primary team verbally.    Yong Friedman MD  Pager: [phone]    Interval History   Awake and alert, tolerating CRRT, on  decreasing doses of dopamine, low UOP but increasing.     Review of Systems    The 4 point Review of Systems is negative other than noted above or here.     Allergies   Allergies   Allergen Reactions     Heparin Heparin Induced Thrombocytopenia     22 HIT HAZEL in process - this entry may be removed if HIT is ruled out     Prior to Admission Medications     acetaminophen  975 mg Oral or Feeding Tube Q8H     aspirin  81 mg Oral Daily     atorvastatin  10 mg Oral Daily     bisacodyl  10 mg Rectal Once     lidocaine  2 patch Transdermal Q24H     lidocaine   Transdermal Q8H     [Held by provider] magnesium oxide  400 mg Oral Daily with lunch     mycophenolate  750 mg Oral BID IS     polyethylene glycol  17 g Oral or Feeding Tube BID     [START ON 2022] predniSONE  40 mg Oral Daily    Followed by     [START ON 2022] predniSONE  20 mg Oral Daily    Followed by     [START ON 2022] predniSONE  15 mg Oral Daily    Followed by     [START ON 2022] predniSONE  10 mg Oral Daily    Followed by     [START ON 2022] predniSONE  5 mg Oral Daily     senna-docusate  2 tablet Oral or Feeding Tube BID     sodium chloride (PF)  10 mL Intracatheter Q8H     [Held by provider] sulfamethoxazole-trimethoprim  1 tablet Oral Once per day on      tacrolimus  5 mg Oral BID IS     valGANciclovir  450 mg Oral Daily       argatroban 0.5 mcg/kg/min (22 1003)     CRRT replacement solution 12.5 mL/kg/hr (22 0809)     DOPamine 2.5 mcg/kg/min (22 1016)     - MEDICATION INSTRUCTIONS -       POST-FILTER REPLACEMENT FOR CRRT 200 mL/hr (22 0655)     CRRT replacement solution 12.5 mL/kg/hr (22 0811)       Physical Exam   Temp  Av.1  F (36.7  C)  Min: 97.7  F (36.5  C)  Max: 98.6  F (37  C)      Pulse  Av.1  Min: 63  Max: 105 Resp  Avg: 15.8  Min: 8  Max: 19  SpO2  Av.3 %  Min: 94 %  Max: 100 %    CVP (mmHg): 15 mmHgBP 125/71   Pulse 84   Temp 97.8  F (36.6  C)   Resp 18    " 1.778 m (5' 10\")   Wt 117.9 kg (260 lb)   SpO2 96%   BMI 37.31 kg/m     Date 05/18/22 0700 - 05/19/22 0659   Shift 2231-7596 7061-7414 4917-8786 24 Hour Total   INTAKE   I.V. 1081.67   1081.67   Shift Total(mL/kg) 1081.67(10)   1081.67(10)   OUTPUT   Urine 335 50  385   Drains 165   165   Shift Total(mL/kg) 500(4.62) 50(0.46)  550(5.08)   Weight (kg) 108.2 108.2 108.2 108.2      Admit Weight: 103.4 kg (227 lb 15.3 oz)     GENERAL APPEARANCE: alert and no distress  HENT: mouth without ulcers or lesions  LYMPHATICS: no cervical or supraclavicular nodes  RESP: lungs clear to auscultation - no rales, rhonchi or wheezes  CV: regular rhythm, normal rate, no rub, no murmur  EDEMA: 1+ trace bilateral edema   ABDOMEN: soft, nondistended, nontender, bowel sounds normal  MS: extremities normal - no gross deformities noted, no evidence of inflammation in joints, no muscle tenderness  SKIN: no rash  NEURO: Alert awake oriented x3, moving all extremities well.  ACCESS: [facility]     Data   CMP  Recent Labs   Lab 05/21/22  0418 05/20/22 2001 05/20/22 2000 05/20/22  1738 05/20/22  1327 05/20/22  1239 05/20/22  0834 05/20/22  0827 05/20/22  0524 05/20/22  0349 05/19/22  0510 05/19/22  0440    134  --   --   --   --   --   --   --  135  --  135   POTASSIUM 5.4* 5.1  --   --   --  4.9  --  4.6   < > 5.6*   < > 5.5*   CHLORIDE 102 98  --   --   --   --   --   --   --  98  --  94   CO2 24 27  --   --   --   --   --   --   --  27  --  30   ANIONGAP 8 9  --   --   --   --   --   --   --  10  --  11   * 95 102* 79   < >  --    < >  --    < > 96   < > 149*   BUN 44* 56*  --   --   --   --   --   --   --  59*  --  67*   CR 5.26* 7.61*  --   --   --   --   --   --   --  8.90*  --  10.60*   GFRESTIMATED 12* 8*  --   --   --   --   --   --   --  6*  --  5*   YEE 9.0 9.1  --   --   --   --   --   --   --  8.6  --  8.9   MAG 2.4* 2.0  --   --   --   --   --   --   --  1.8  --  2.6*   PHOS 6.3* 6.3*  --   --   --   --   --   --  "  --  6.3*  --  7.1*   PROTTOTAL 6.2*  --   --   --   --   --   --   --   --   --   --   --    ALBUMIN 3.1* 3.3*  --   --   --   --   --   --   --   --   --   --    BILITOTAL 0.7  --   --   --   --   --   --   --   --   --   --   --    ALKPHOS 64  --   --   --   --   --   --   --   --   --   --   --    AST 13  --   --   --   --   --   --   --   --   --   --   --    ALT 23  --   --   --   --   --   --   --   --   --   --   --     < > = values in this interval not displayed.     CBC  Recent Labs   Lab 05/21/22 0418 05/20/22 2001 05/20/22  0526 05/20/22  0349   HGB 8.9* 9.2* 8.5* 8.0*   WBC 11.5* 13.3* 9.0 9.3   RBC 2.66* 2.75* 2.55* 2.45*   HCT 27.7* 28.1* 26.5* 25.4*   * 102* 104* 104*   MCH 33.5* 33.5* 33.3* 32.7   MCHC 32.1 32.7 32.1 31.5   RDW 13.5 13.4 13.5 13.3   PLT 85* 97* 87* 96*     INR  Recent Labs   Lab 05/21/22  0951   PTT 39*     ABG  Recent Labs   Lab 05/17/22  1748   O2PER 42.0      Urine Studies  Recent Labs   Lab Test 05/09/22  0957 12/10/20  1338 09/03/20  1142   COLOR Yellow Straw Straw   APPEARANCE Clear Clear Clear   URINEGLC Negative Negative Negative   URINEBILI Negative Negative Negative   URINEKETONE Negative Negative Negative   SG 1.015 1.011 1.011   UBLD Moderate* Negative Small*   URINEPH 6.0 5.0 5.0   PROTEIN 300 * >499* >499*   NITRITE Negative Negative Negative   LEUKEST Small* Negative Negative   RBCU 0 1 1   WBCU 6* 2 2     No lab results found.  PTH  Recent Labs   Lab Test 05/09/22  0959   PTHI 450*     Iron Studies  Recent Labs   Lab Test 05/09/22  0959   IRON 79      IRONSAT 24   LANCE 1,002*       IMAGING:  All imaging studies reviewed by me.

## 2022-05-22 LAB
ALBUMIN SERPL-MCNC: 2.7 G/DL (ref 3.4–5)
ALBUMIN SERPL-MCNC: 2.7 G/DL (ref 3.4–5)
ALP SERPL-CCNC: 61 U/L (ref 40–150)
ALT SERPL W P-5'-P-CCNC: 23 U/L (ref 0–70)
ANION GAP SERPL CALCULATED.3IONS-SCNC: 7 MMOL/L (ref 3–14)
ANION GAP SERPL CALCULATED.3IONS-SCNC: 8 MMOL/L (ref 3–14)
AST SERPL W P-5'-P-CCNC: 6 U/L (ref 0–45)
BILIRUB DIRECT SERPL-MCNC: 0.2 MG/DL (ref 0–0.2)
BILIRUB SERPL-MCNC: 0.5 MG/DL (ref 0.2–1.3)
BUN SERPL-MCNC: 38 MG/DL (ref 7–30)
BUN SERPL-MCNC: 39 MG/DL (ref 7–30)
CA-I BLD-MCNC: 5.1 MG/DL (ref 4.4–5.2)
CA-I BLD-MCNC: 5.1 MG/DL (ref 4.4–5.2)
CALCIUM SERPL-MCNC: 8.8 MG/DL (ref 8.5–10.1)
CALCIUM SERPL-MCNC: 9.6 MG/DL (ref 8.5–10.1)
CHLORIDE BLD-SCNC: 104 MMOL/L (ref 94–109)
CHLORIDE BLD-SCNC: 106 MMOL/L (ref 94–109)
CO2 SERPL-SCNC: 23 MMOL/L (ref 20–32)
CO2 SERPL-SCNC: 24 MMOL/L (ref 20–32)
CREAT SERPL-MCNC: 2.96 MG/DL (ref 0.66–1.25)
CREAT SERPL-MCNC: 3.28 MG/DL (ref 0.66–1.25)
ERYTHROCYTE [DISTWIDTH] IN BLOOD BY AUTOMATED COUNT: 13.2 % (ref 10–15)
ERYTHROCYTE [DISTWIDTH] IN BLOOD BY AUTOMATED COUNT: 13.2 % (ref 10–15)
GFR SERPL CREATININE-BSD FRML MDRD: 21 ML/MIN/1.73M2
GFR SERPL CREATININE-BSD FRML MDRD: 24 ML/MIN/1.73M2
GLUCOSE BLD-MCNC: 135 MG/DL (ref 70–99)
GLUCOSE BLD-MCNC: 152 MG/DL (ref 70–99)
GLUCOSE BLDC GLUCOMTR-MCNC: 146 MG/DL (ref 70–99)
HCT VFR BLD AUTO: 26.5 % (ref 40–53)
HCT VFR BLD AUTO: 26.7 % (ref 40–53)
HGB BLD-MCNC: 8.5 G/DL (ref 13.3–17.7)
HGB BLD-MCNC: 8.7 G/DL (ref 13.3–17.7)
MAGNESIUM SERPL-MCNC: 1.9 MG/DL (ref 1.6–2.3)
MAGNESIUM SERPL-MCNC: 2 MG/DL (ref 1.6–2.3)
MCH RBC QN AUTO: 32.3 PG (ref 26.5–33)
MCH RBC QN AUTO: 32.8 PG (ref 26.5–33)
MCHC RBC AUTO-ENTMCNC: 32.1 G/DL (ref 31.5–36.5)
MCHC RBC AUTO-ENTMCNC: 32.6 G/DL (ref 31.5–36.5)
MCV RBC AUTO: 101 FL (ref 78–100)
MCV RBC AUTO: 101 FL (ref 78–100)
PHOSPHATE SERPL-MCNC: 3.4 MG/DL (ref 2.5–4.5)
PHOSPHATE SERPL-MCNC: 4.3 MG/DL (ref 2.5–4.5)
PLATELET # BLD AUTO: 75 10E3/UL (ref 150–450)
PLATELET # BLD AUTO: 83 10E3/UL (ref 150–450)
POTASSIUM BLD-SCNC: 4.2 MMOL/L (ref 3.4–5.3)
POTASSIUM BLD-SCNC: 4.6 MMOL/L (ref 3.4–5.3)
PROT SERPL-MCNC: 5.9 G/DL (ref 6.8–8.8)
RBC # BLD AUTO: 2.63 10E6/UL (ref 4.4–5.9)
RBC # BLD AUTO: 2.65 10E6/UL (ref 4.4–5.9)
SODIUM SERPL-SCNC: 136 MMOL/L (ref 133–144)
SODIUM SERPL-SCNC: 136 MMOL/L (ref 133–144)
TACROLIMUS BLD-MCNC: 8.5 UG/L (ref 5–15)
TME LAST DOSE: NORMAL H
TME LAST DOSE: NORMAL H
UFH PPP CHRO-ACNC: <0.1 IU/ML
WBC # BLD AUTO: 3.7 10E3/UL (ref 4–11)
WBC # BLD AUTO: 4.1 10E3/UL (ref 4–11)

## 2022-05-22 PROCEDURE — 80053 COMPREHEN METABOLIC PANEL: CPT | Performed by: INTERNAL MEDICINE

## 2022-05-22 PROCEDURE — 250N000013 HC RX MED GY IP 250 OP 250 PS 637

## 2022-05-22 PROCEDURE — 250N000013 HC RX MED GY IP 250 OP 250 PS 637: Performed by: STUDENT IN AN ORGANIZED HEALTH CARE EDUCATION/TRAINING PROGRAM

## 2022-05-22 PROCEDURE — 82248 BILIRUBIN DIRECT: CPT | Performed by: INTERNAL MEDICINE

## 2022-05-22 PROCEDURE — 83735 ASSAY OF MAGNESIUM: CPT | Performed by: INTERNAL MEDICINE

## 2022-05-22 PROCEDURE — 258N000003 HC RX IP 258 OP 636: Performed by: PHYSICIAN ASSISTANT

## 2022-05-22 PROCEDURE — 80197 ASSAY OF TACROLIMUS: CPT

## 2022-05-22 PROCEDURE — 82565 ASSAY OF CREATININE: CPT | Performed by: INTERNAL MEDICINE

## 2022-05-22 PROCEDURE — 82330 ASSAY OF CALCIUM: CPT | Performed by: INTERNAL MEDICINE

## 2022-05-22 PROCEDURE — 200N000002 HC R&B ICU UMMC

## 2022-05-22 PROCEDURE — 250N000012 HC RX MED GY IP 250 OP 636 PS 637

## 2022-05-22 PROCEDURE — 84460 ALANINE AMINO (ALT) (SGPT): CPT | Performed by: INTERNAL MEDICINE

## 2022-05-22 PROCEDURE — 84450 TRANSFERASE (AST) (SGOT): CPT | Performed by: INTERNAL MEDICINE

## 2022-05-22 PROCEDURE — 85027 COMPLETE CBC AUTOMATED: CPT | Performed by: INTERNAL MEDICINE

## 2022-05-22 PROCEDURE — 84100 ASSAY OF PHOSPHORUS: CPT | Performed by: INTERNAL MEDICINE

## 2022-05-22 PROCEDURE — 99233 SBSQ HOSP IP/OBS HIGH 50: CPT | Mod: 24 | Performed by: INTERNAL MEDICINE

## 2022-05-22 PROCEDURE — 250N000013 HC RX MED GY IP 250 OP 250 PS 637: Performed by: SURGERY

## 2022-05-22 PROCEDURE — 250N000013 HC RX MED GY IP 250 OP 250 PS 637: Performed by: PHYSICIAN ASSISTANT

## 2022-05-22 PROCEDURE — 90947 DIALYSIS REPEATED EVAL: CPT

## 2022-05-22 PROCEDURE — 250N000012 HC RX MED GY IP 250 OP 636 PS 637: Performed by: PHYSICIAN ASSISTANT

## 2022-05-22 PROCEDURE — 250N000011 HC RX IP 250 OP 636: Performed by: PHYSICIAN ASSISTANT

## 2022-05-22 PROCEDURE — 250N000009 HC RX 250: Performed by: INTERNAL MEDICINE

## 2022-05-22 PROCEDURE — 84075 ASSAY ALKALINE PHOSPHATASE: CPT | Performed by: INTERNAL MEDICINE

## 2022-05-22 PROCEDURE — 85520 HEPARIN ASSAY: CPT | Performed by: NURSE PRACTITIONER

## 2022-05-22 PROCEDURE — 250N000013 HC RX MED GY IP 250 OP 250 PS 637: Performed by: NURSE PRACTITIONER

## 2022-05-22 PROCEDURE — 250N000011 HC RX IP 250 OP 636: Performed by: INTERNAL MEDICINE

## 2022-05-22 RX ORDER — ACETAMINOPHEN 325 MG/1
650 TABLET ORAL ONCE
Status: COMPLETED | OUTPATIENT
Start: 2022-05-22 | End: 2022-05-22

## 2022-05-22 RX ORDER — MEPERIDINE HYDROCHLORIDE 25 MG/ML
25 INJECTION INTRAMUSCULAR; INTRAVENOUS; SUBCUTANEOUS ONCE
Status: COMPLETED | OUTPATIENT
Start: 2022-05-22 | End: 2022-05-22

## 2022-05-22 RX ORDER — TACROLIMUS 1 MG/1
4 CAPSULE ORAL
Status: DISCONTINUED | OUTPATIENT
Start: 2022-05-22 | End: 2022-05-24

## 2022-05-22 RX ORDER — DIPHENHYDRAMINE HCL 12.5MG/5ML
25-50 LIQUID (ML) ORAL ONCE
Status: COMPLETED | OUTPATIENT
Start: 2022-05-22 | End: 2022-05-22

## 2022-05-22 RX ORDER — LANOLIN ALCOHOL/MO/W.PET/CERES
3 CREAM (GRAM) TOPICAL AT BEDTIME
Status: DISCONTINUED | OUTPATIENT
Start: 2022-05-22 | End: 2022-05-22

## 2022-05-22 RX ORDER — METHYLPREDNISOLONE SODIUM SUCCINATE 125 MG/2ML
100 INJECTION, POWDER, LYOPHILIZED, FOR SOLUTION INTRAMUSCULAR; INTRAVENOUS ONCE
Status: COMPLETED | OUTPATIENT
Start: 2022-05-22 | End: 2022-05-22

## 2022-05-22 RX ORDER — DIPHENHYDRAMINE HCL 25 MG
25-50 CAPSULE ORAL ONCE
Status: COMPLETED | OUTPATIENT
Start: 2022-05-22 | End: 2022-05-22

## 2022-05-22 RX ADMIN — POLYETHYLENE GLYCOL 3350 17 G: 17 POWDER, FOR SOLUTION ORAL at 20:31

## 2022-05-22 RX ADMIN — CALCIUM CHLORIDE, MAGNESIUM CHLORIDE, DEXTROSE MONOHYDRATE, LACTIC ACID, SODIUM CHLORIDE, SODIUM BICARBONATE AND POTASSIUM CHLORIDE 12.5 ML/KG/HR: 5.15; 2.03; 22; 5.4; 6.46; 3.09; .157 INJECTION INTRAVENOUS at 06:17

## 2022-05-22 RX ADMIN — PANTOPRAZOLE SODIUM 40 MG: 40 TABLET, DELAYED RELEASE ORAL at 16:07

## 2022-05-22 RX ADMIN — PANTOPRAZOLE SODIUM 40 MG: 40 TABLET, DELAYED RELEASE ORAL at 06:37

## 2022-05-22 RX ADMIN — SODIUM CHLORIDE 200 ML/HR: 9 INJECTION, SOLUTION INTRAVENOUS at 03:52

## 2022-05-22 RX ADMIN — VALGANCICLOVIR HYDROCHLORIDE 450 MG: 450 TABLET ORAL at 07:48

## 2022-05-22 RX ADMIN — PREDNISONE 40 MG: 20 TABLET ORAL at 07:49

## 2022-05-22 RX ADMIN — CALCIUM CHLORIDE, MAGNESIUM CHLORIDE, DEXTROSE MONOHYDRATE, LACTIC ACID, SODIUM CHLORIDE, SODIUM BICARBONATE AND POTASSIUM CHLORIDE 12.5 ML/KG/HR: 5.15; 2.03; 22; 5.4; 6.46; 3.09; .157 INJECTION INTRAVENOUS at 13:35

## 2022-05-22 RX ADMIN — LIDOCAINE 2 PATCH: 560 PATCH PERCUTANEOUS; TOPICAL; TRANSDERMAL at 07:53

## 2022-05-22 RX ADMIN — SODIUM CHLORIDE 200 ML/HR: 9 INJECTION, SOLUTION INTRAVENOUS at 08:28

## 2022-05-22 RX ADMIN — MEPERIDINE HYDROCHLORIDE 25 MG: 25 INJECTION INTRAMUSCULAR; INTRAVENOUS; SUBCUTANEOUS at 13:38

## 2022-05-22 RX ADMIN — ACETAMINOPHEN 975 MG: 325 TABLET ORAL at 11:26

## 2022-05-22 RX ADMIN — ANTI-THYMOCYTE GLOBULIN (RABBIT) 200 MG: 5 INJECTION, POWDER, LYOPHILIZED, FOR SOLUTION INTRAVENOUS at 14:00

## 2022-05-22 RX ADMIN — DOCUSATE SODIUM 50 MG AND SENNOSIDES 8.6 MG 2 TABLET: 8.6; 5 TABLET, FILM COATED ORAL at 07:48

## 2022-05-22 RX ADMIN — CALCIUM CHLORIDE, MAGNESIUM CHLORIDE, DEXTROSE MONOHYDRATE, LACTIC ACID, SODIUM CHLORIDE, SODIUM BICARBONATE AND POTASSIUM CHLORIDE 12.5 ML/KG/HR: 5.15; 2.03; 22; 5.4; 6.46; 3.09; .157 INJECTION INTRAVENOUS at 06:14

## 2022-05-22 RX ADMIN — TACROLIMUS 4 MG: 1 CAPSULE ORAL at 17:56

## 2022-05-22 RX ADMIN — MYCOPHENOLATE MOFETIL 750 MG: 250 CAPSULE ORAL at 17:49

## 2022-05-22 RX ADMIN — POLYETHYLENE GLYCOL 3350 17 G: 17 POWDER, FOR SOLUTION ORAL at 07:49

## 2022-05-22 RX ADMIN — CALCIUM CHLORIDE, MAGNESIUM CHLORIDE, DEXTROSE MONOHYDRATE, LACTIC ACID, SODIUM CHLORIDE, SODIUM BICARBONATE AND POTASSIUM CHLORIDE 12.5 ML/KG/HR: 5.15; 2.03; 22; 5.4; 6.46; 3.09; .157 INJECTION INTRAVENOUS at 02:23

## 2022-05-22 RX ADMIN — ASPIRIN 81 MG CHEWABLE TABLET 81 MG: 81 TABLET CHEWABLE at 07:48

## 2022-05-22 RX ADMIN — DIPHENHYDRAMINE HYDROCHLORIDE 50 MG: 25 CAPSULE ORAL at 13:37

## 2022-05-22 RX ADMIN — CALCIUM CHLORIDE, MAGNESIUM CHLORIDE, DEXTROSE MONOHYDRATE, LACTIC ACID, SODIUM CHLORIDE, SODIUM BICARBONATE AND POTASSIUM CHLORIDE 12.5 ML/KG/HR: 5.15; 2.03; 22; 5.4; 6.46; 3.09; .157 INJECTION INTRAVENOUS at 09:58

## 2022-05-22 RX ADMIN — MAGNESIUM SULFATE IN WATER 2 G: 40 INJECTION, SOLUTION INTRAVENOUS at 15:04

## 2022-05-22 RX ADMIN — CALCIUM CHLORIDE, MAGNESIUM CHLORIDE, DEXTROSE MONOHYDRATE, LACTIC ACID, SODIUM CHLORIDE, SODIUM BICARBONATE AND POTASSIUM CHLORIDE 12.5 ML/KG/HR: 5.15; 2.03; 22; 5.4; 6.46; 3.09; .157 INJECTION INTRAVENOUS at 09:57

## 2022-05-22 RX ADMIN — SODIUM CHLORIDE 200 ML/HR: 9 INJECTION, SOLUTION INTRAVENOUS at 12:59

## 2022-05-22 RX ADMIN — ATORVASTATIN CALCIUM 10 MG: 10 TABLET, FILM COATED ORAL at 07:49

## 2022-05-22 RX ADMIN — ACETAMINOPHEN 975 MG: 325 TABLET ORAL at 04:38

## 2022-05-22 RX ADMIN — DOCUSATE SODIUM 50 MG AND SENNOSIDES 8.6 MG 2 TABLET: 8.6; 5 TABLET, FILM COATED ORAL at 20:31

## 2022-05-22 RX ADMIN — MYCOPHENOLATE MOFETIL 750 MG: 250 CAPSULE ORAL at 07:49

## 2022-05-22 RX ADMIN — ACETAMINOPHEN 975 MG: 325 TABLET ORAL at 20:31

## 2022-05-22 RX ADMIN — CALCIUM CHLORIDE, MAGNESIUM CHLORIDE, DEXTROSE MONOHYDRATE, LACTIC ACID, SODIUM CHLORIDE, SODIUM BICARBONATE AND POTASSIUM CHLORIDE 12.5 ML/KG/HR: 5.15; 2.03; 22; 5.4; 6.46; 3.09; .157 INJECTION INTRAVENOUS at 02:09

## 2022-05-22 RX ADMIN — Medication 5 MG: at 23:56

## 2022-05-22 RX ADMIN — ACETAMINOPHEN 650 MG: 325 TABLET ORAL at 13:36

## 2022-05-22 RX ADMIN — METHYLPREDNISOLONE SODIUM SUCCINATE 100 MG: 125 INJECTION, POWDER, FOR SOLUTION INTRAMUSCULAR; INTRAVENOUS at 13:37

## 2022-05-22 RX ADMIN — TACROLIMUS 5 MG: 5 CAPSULE ORAL at 07:48

## 2022-05-22 ASSESSMENT — ACTIVITIES OF DAILY LIVING (ADL)
ADLS_ACUITY_SCORE: 20
ADLS_ACUITY_SCORE: 22

## 2022-05-22 NOTE — PLAN OF CARE
ICU End of Shift Summary. See flowsheets for vital signs and detailed assessment.     Changes this shift: Dopamine titrated off at 0830 this morning after SBP goal changed to >100.  CRRT off at 1530 2/2 elevated TMP at that time pt net negative 1.8L since MN. Currently pt is net negative 1.4L since MN, but +5.4LK since admission. Magnesium replaced per orders. Third dose of Thymo given tolerating well thus far(demerol added as a premed). Flores removed per order.    Family at  and updated throughout the shift.     Plan:   iHD tomorrow and transferring out of the ICU pending iHD run.Bladder scan every shift.Continue to monitor and w/goals of care.         Goal Outcome Evaluation:    Problem: Adjustment to Transplant (Kidney Transplant)  Goal: Optimal Coping with Organ Transplant  Outcome: Ongoing, Progressing     Problem: Bleeding (Kidney Transplant)  Goal: Absence of Bleeding  Outcome: Ongoing, Progressing     Problem: Bowel Motility Impaired (Kidney Transplant)  Goal: Effective Bowel Elimination  Outcome: Ongoing, Progressing     Problem: Donor Organ Function (Kidney Transplant)  Goal: Effective Renal Function  Outcome: Ongoing, Progressing     Problem: Fluid and Electrolyte Imbalance (Kidney Transplant)  Goal: Fluid and Electrolyte Balance  Outcome: Ongoing, Progressing

## 2022-05-22 NOTE — PROGRESS NOTES
[facility]  Transplant Nephrology progress note   Date of Admission:  5/17/2022  Today's Date: 05/22/2022  Requesting physician: Howard Flores MD    Recommendations:  -Goal to keep SBP>100 to get off pressors  -If SBP>100 off pressors and CRRT goes down would stop  -Eval tomorrow for iHD to see if he can tolerate in order to remove fluid  -Remove gardiner today  -I recommend giving 3rd dose of rATG today for DGF and no steroid taper, no further simulect      Assessment & Plan    Donald Blanco is a 57 year old male with history of IgA nephropathy, resultant ESRD, on HD 3 times/week. (Access: Right IJ TDC).At baseline trace urine output.Underwent LDKT on 5/17/22, without ureteral stent.Imported kidney with vasospasm in OR.Other comorbidities include HTN, and prostate cancer s/p prostatectomy and radiation.    # LDKT: without ureteral stent.Imported kidney with vasospasm in OR . DGF 2/2 muscle spasm, hypoperfusion and or, renal vein stenosis. Patient continues to have low UOP, hypervolemia , hyperkalemia.  Patient given Thymoglobulin  ( 2 mg/kg) on 5/19/2022 followed by 2-hour run of low flow iHD with 1 L UF.  Following this patient had persistent hypotension warranting him to be transferred to the ICU, received albumin infusions and started on dopamine.     - Baseline Creatinine: ~  TBD   - Proteinuria: Not checked post transplant   - Date DSA Last Checked: May/2022      Latest DSA: No DSA at time of transplant   - BK Viremia: Not checked post transplant   - Kidney Tx Biopsy: No    -No stent placed              -On heparin for renal vein prophylaxis in the setting of renal vein stenosis  - CRRT Info  Access: Right IJ Tunneled Catheter; Blood Flow Rate: 200 ml/min; Net Fluid Removal Goal: 50cc/hr to keep SBP>100  Prescription -  Dialysate: 12.5 ml/kg/hr with K2 bath, Pre: 12.5 ml/kg/hr with K2 bath, Post: 200 ml/hr with Normal Saline    # Immunosuppression: Tacrolimus  immediate release (goal 8-10), Mycophenolate mofetil (dose 750 mg every 12 hours) and Prednisone (dose taper)               - Induction: Intermediate . PRA 19.  Basiliximab 5/17, rATG on 5/19 and 5/21   - Changes: YES .Change to rATG induction for DGF. Stop prednisone taper after rATG induction    # Infection Prophylaxis:   - PJP: Sulfa/TMP (Bactrim) - currently held due to hyperkalemia   - CMV: Valganciclovir (Valcyte). No discordance D-/R-    # Hypertension: Hypotensive;  Goal BP: MAP > 65   - Volume status: Markedly hypervolemic    - Changes: Yes - dopamine or levo to keep SBP>100. UF 50cc/hr on CRRT    -Will evaluate tmrw for iHD in order to UF fluid    # Elevated Blood Glucose: Glucose generally running ~ 170 -200    - Management as per primary team.    # Anemia in Chronic Renal Disease: Hgb: Stable      YUDI: No   - Iron studies: Unknown at this time, but checked with dialysis    # Mineral Bone Disorder:   - Secondary renal hyperparathyroidism; PTH level: Unknown at this time, but checked with dialysis        On treatment: None  - Vitamin D; level: Unknown at this time, but checked with dialysis        On supplement: No  - Calcium; level: Normal        On supplement: No  - Phosphorus; level: Normal        On supplement: No    # Electrolytes:   - Potassium; level: Normal        On supplement: No  - Magnesium; level: Normal        On supplement: No  - Bicarbonate; level: Normal        On supplement: No  - Sodium; level: Normal    # Thrombocytopenia:   -Occurred after rATG dose on 5/19. Neg BISHOP panel    #HyperKalemia:   -Changed CRRT to 2K bath, improved    # Transplant History:  Etiology of Kidney Failure: IgA nephropathy  Tx: LDKT  Transplant: 5/17/2022 (Kidney)  Crossmatch at time of Tx: negative  Significant changes in immunosuppression: None  Significant transplant-related complications: None    Recommendations were communicated to the primary team verbally.    Yong Friedman MD  Pager: [phone]    Interval  "History   BP still low but on decreasing doses of dopamine. Slightly net negative. Denies N/V/D, fever, chills, SOB, chest pain.     Review of Systems    The 4 point Review of Systems is negative other than noted above or here.     Allergies   No Active Allergies  Prior to Admission Medications     acetaminophen  975 mg Oral or Feeding Tube Q8H     aspirin  81 mg Oral Daily     atorvastatin  10 mg Oral Daily     lidocaine  2 patch Transdermal Q24H     lidocaine   Transdermal Q8H     [Held by provider] magnesium oxide  400 mg Oral Daily with lunch     melatonin  5 mg Oral QPM     mycophenolate  750 mg Oral BID IS     pantoprazole  40 mg Oral BID AC     polyethylene glycol  17 g Oral or Feeding Tube BID     predniSONE  40 mg Oral Daily    Followed by     [START ON 2022] predniSONE  20 mg Oral Daily    Followed by     [START ON 2022] predniSONE  15 mg Oral Daily    Followed by     [START ON 2022] predniSONE  10 mg Oral Daily    Followed by     [START ON 2022] predniSONE  5 mg Oral Daily     senna-docusate  2 tablet Oral or Feeding Tube BID     sodium chloride (PF)  10 mL Intracatheter Q8H     [Held by provider] sulfamethoxazole-trimethoprim  1 tablet Oral Once per day on      tacrolimus  5 mg Oral BID IS     valGANciclovir  450 mg Oral Daily       CRRT replacement solution 12.5 mL/kg/hr (22 0617)     DOPamine Stopped (22 0830)     heparin 500 Units/hr (22 0900)     - MEDICATION INSTRUCTIONS -       POST-FILTER REPLACEMENT FOR CRRT 200 mL/hr (22 0828)     CRRT replacement solution 12.5 mL/kg/hr (22 0614)       Physical Exam   Temp  Av.1  F (36.7  C)  Min: 97.7  F (36.5  C)  Max: 98.6  F (37  C)      Pulse  Av.1  Min: 63  Max: 105 Resp  Avg: 15.8  Min: 8  Max: 19  SpO2  Av.3 %  Min: 94 %  Max: 100 %    CVP (mmHg): 15 mmHgBP 119/73   Pulse 79   Temp 97.5  F (36.4  C) (Axillary)   Resp 28   Ht 1.778 m (5' 10\")   Wt 115.8 kg (255 lb 6.4 oz)   " SpO2 99%   BMI 36.65 kg/m     Date 05/18/22 0700 - 05/19/22 0659   Shift 9518-4367 7103-3377 5972-1581 24 Hour Total   INTAKE   I.V. 1081.67   1081.67   Shift Total(mL/kg) 1081.67(10)   1081.67(10)   OUTPUT   Urine 335 50  385   Drains 165   165   Shift Total(mL/kg) 500(4.62) 50(0.46)  550(5.08)   Weight (kg) 108.2 108.2 108.2 108.2      Admit Weight: 103.4 kg (227 lb 15.3 oz)     GENERAL APPEARANCE: alert and no distress  HENT: mouth without ulcers or lesions  LYMPHATICS: no cervical or supraclavicular nodes  RESP: lungs clear to auscultation - no rales, rhonchi or wheezes  CV: regular rhythm, normal rate, no rub, no murmur  EDEMA: 1+ trace bilateral edema   ABDOMEN: soft, nondistended, nontender, bowel sounds normal  MS: extremities normal - no gross deformities noted, no evidence of inflammation in joints, no muscle tenderness  SKIN: no rash  NEURO: Alert awake oriented x3, moving all extremities well.  ACCESS: [facility]     Data   CMP  Recent Labs   Lab 05/22/22  0420 05/22/22  0418 05/21/22  1942 05/21/22  1939 05/21/22  1203 05/21/22  0418     --   --  135 134 134   POTASSIUM 4.6  --   --  4.9 4.6 5.4*   CHLORIDE 104  --   --  104 102 102   CO2 24  --   --  23 25 24   ANIONGAP 8  --   --  8 7 8   * 146* 147* 169* 138* 120*   BUN 39*  --   --  41* 39* 44*   CR 3.28*  --   --  3.80* 4.21* 5.26*   GFRESTIMATED 21*  --   --  17* 15* 12*   YEE 9.6  --   --  9.0 9.4 9.0   MAG 2.0  --   --  2.2 2.2 2.4*   PHOS 4.3  --   --  4.8* 5.4* 6.3*   PROTTOTAL 5.9*  --   --   --   --  6.2*   ALBUMIN 2.7*  --   --  2.9* 3.1* 3.1*   BILITOTAL 0.5  --   --   --   --  0.7   ALKPHOS 61  --   --   --   --  64   AST 6  --   --   --   --  13   ALT 23  --   --   --   --  23     CBC  Recent Labs   Lab 05/22/22 0420 05/21/22  0418 05/20/22 2001 05/20/22  0526   HGB 8.5* 8.9* 9.2* 8.5*   WBC 3.7* 11.5* 13.3* 9.0   RBC 2.63* 2.66* 2.75* 2.55*   HCT 26.5* 27.7* 28.1* 26.5*   * 104* 102* 104*   MCH 32.3 33.5* 33.5* 33.3*    MCHC 32.1 32.1 32.7 32.1   RDW 13.2 13.5 13.4 13.5   PLT 75* 85* 97* 87*     INR  Recent Labs   Lab 05/21/22  0951   PTT 39*     ABG  Recent Labs   Lab 05/17/22  1748   O2PER 42.0      Urine Studies  Recent Labs   Lab Test 05/09/22  0957 12/10/20  1338 09/03/20  1142   COLOR Yellow Straw Straw   APPEARANCE Clear Clear Clear   URINEGLC Negative Negative Negative   URINEBILI Negative Negative Negative   URINEKETONE Negative Negative Negative   SG 1.015 1.011 1.011   UBLD Moderate* Negative Small*   URINEPH 6.0 5.0 5.0   PROTEIN 300 * >499* >499*   NITRITE Negative Negative Negative   LEUKEST Small* Negative Negative   RBCU 0 1 1   WBCU 6* 2 2     No lab results found.  PTH  Recent Labs   Lab Test 05/09/22  0959   PTHI 450*     Iron Studies  Recent Labs   Lab Test 05/09/22  0959   IRON 79      IRONSAT 24   LANCE 1,002*       IMAGING:  All imaging studies reviewed by me.

## 2022-05-22 NOTE — PROGRESS NOTES
Transplant Surgery  Inpatient Daily Progress Note  05/22/2022    Assessment & Plan: 56yo with history of ESRD secondary to IgA nephropathy, dialysis via a tunneled catheter, HTN, and prostate cancer s/p prostatectomy and radiation. S/p living donor kidney transplant without stent on 5/17/22. Imported kidney with vasospasm in OR.    Graft function: POD #5. Delayed graft function. Cr 3.28 on HD. Low UOP, hyperkalemia. RV velocities continue to improve on US.     Did undergo dialysis run on POD 2, was acutely hypotensive post dialysis. Likely due to some combination of thymo reaction + dialysis. Blood pressures did not improve despite several fluid boluses and albumin. Ultimately transferred to the SICU for dopamine. Weaned off Dopamine this AM. Remains on CRRT, goal UF 50cc/hour today. Will discontinue CRRT when it clots off and trial iHD tomorrow.     US 5/20 with 7.7 cm fluid collection. Drain Cr 4.9: serum Cr 4.21.  Immunosuppression management: PRA 19, will give thymo in light of DGF  -Basiliximab 5/17, will not plan for second dose if giving thymo  -Thymo 2 mg/kg or 200 mg given 5/19 and 5/21 (3/4 dose due to rigors) and 5/22  -Steroid taper per low risk induction protocol, additional 100mg on 5/21& 5/22 prior to Thymo  -Tacro level goal 8-10  -MMF 750mg BID  Complexity of management: High. Contributing factors:  induction    Neuro:  Acute post-op pain: Oxycodone almost completely ineffective. Switched to PO dilaudid. Continue scheduled APAP, lidoderm and Robaxin.  Hematology: ASA 81 mg daily   Anemia: Borderline macrocytic. Hgb 8.5  Renal vein prophylaxis: Heparin increased to 500u/hr.   Thrombocytopenia: Due to Thymo. HIT negative. Monitor.  Cardiorespiratory:   Hx HTN: BP stable on dopamine drip. Monitor.  Hypoxia: H/o sleep apnea, on CPAP. Also with some likely component of pulmonary edema. Now stable on room air when awake, BiPAP while sleeping  Rule out ACS: Trop WNL, echo WNL  GI/Nutrition:   Diet: Low  potassium.  Bowel regimen: Senna  Endocrine: No acute issues.   Fluid/Electrolytes:   Hyperkalemia: K 4.6 on CRRT  : Flores to remain due to new surgical anastomosis, planning for 3d, left in place longer for monitoring of Is and Os. Remove today and follow PVR.   Infectious disease: Afebrile  Prophylaxis: DVT, fall, GI, viral (Valcyte), pneumocystis (Bactrim, hold until K normalizes)  Disposition: 4A due to CRRT     Medical Decision Making: Medium  Subsequent visit 84369 (moderate level decision making)    TOM/Fellow/Resident Provider: Virginia Barragan PA-C    Faculty: Boris Huang M.D.  _________________________________________________________________  Transplant History:   5/17/2022 (Kidney), Postoperative day: 5     Interval History: History is obtained from the patient  Overnight events: Did not tolerate complete thymo dose due to shivering. Infusion stopped. No complaints today.      ROS:   A 10-point review of systems was negative except as noted above.    Meds:   acetaminophen  975 mg Oral or Feeding Tube Q8H    aspirin  81 mg Oral Daily    atorvastatin  10 mg Oral Daily    bisacodyl  10 mg Rectal Once    lidocaine  2 patch Transdermal Q24H    lidocaine   Transdermal Q8H    [Held by provider] magnesium oxide  400 mg Oral Daily with lunch    mycophenolate  750 mg Oral BID IS    pantoprazole  40 mg Oral BID AC    polyethylene glycol  17 g Oral or Feeding Tube BID    predniSONE  40 mg Oral Daily    Followed by    [START ON 5/24/2022] predniSONE  20 mg Oral Daily    Followed by    [START ON 5/31/2022] predniSONE  15 mg Oral Daily    Followed by    [START ON 6/7/2022] predniSONE  10 mg Oral Daily    Followed by    [START ON 6/14/2022] predniSONE  5 mg Oral Daily    senna-docusate  2 tablet Oral or Feeding Tube BID    sodium chloride (PF)  10 mL Intracatheter Q8H    [Held by provider] sulfamethoxazole-trimethoprim  1 tablet Oral Once per day on Mon Wed Fri    tacrolimus  5 mg Oral BID IS    valGANciclovir  450  "mg Oral Daily       Physical Exam:     Admit Weight: 103.4 kg (227 lb 15.3 oz)    Current vitals:   /77   Pulse 77   Temp 97.2  F (36.2  C) (Axillary)   Resp 18   Ht 1.778 m (5' 10\")   Wt 115.8 kg (255 lb 6.4 oz)   SpO2 98%   BMI 36.65 kg/m      Vital sign ranges:    Temp:  [96.4  F (35.8  C)-98  F (36.7  C)] 97.2  F (36.2  C)  Pulse:  [] 77  Resp:  [9-42] 18  BP: ()/(44-83) 115/77  SpO2:  [92 %-100 %] 98 %    General Appearance: in no apparent distress.   Skin: Warm, perfused  Heart: NSR  Lungs: Unlabored  Abdomen: The abdomen is rounded, BAUDILIO serosang, incision c/d/i.   : gardiner is present.  Urine is yellow.  Extremities: edema: trace gen, strength 5/5  Neurologic: alert and oriented x4. Tremor absent.    Data:   CMP  Recent Labs   Lab 05/22/22  0420 05/22/22  0418 05/21/22  1942 05/21/22  1939 05/21/22  1203 05/21/22  1202 05/21/22 0418     --   --  135   < >  --  134   POTASSIUM 4.6  --   --  4.9   < >  --  5.4*   CHLORIDE 104  --   --  104   < >  --  102   CO2 24  --   --  23   < >  --  24   * 146*   < > 169*   < >  --  120*   BUN 39*  --   --  41*   < >  --  44*   CR 3.28*  --   --  3.80*   < >  --  5.26*   GFRESTIMATED 21*  --   --  17*   < >  --  12*   YEE 9.6  --   --  9.0   < >  --  9.0   ICAW 5.1  --   --  5.0   < >  --  5.0   MAG 2.0  --   --  2.2   < >  --  2.4*   PHOS 4.3  --   --  4.8*   < >  --  6.3*   ALBUMIN 2.7*  --   --  2.9*   < >  --  3.1*   BILITOTAL 0.5  --   --   --   --   --  0.7   ALKPHOS 61  --   --   --   --   --  64   AST 6  --   --   --   --   --  13   ALT 23  --   --   --   --   --  23   FCREAT  --   --   --   --   --  4.9  --     < > = values in this interval not displayed.     CBC  Recent Labs   Lab 05/22/22  0420 05/21/22  0418   HGB 8.5* 8.9*   WBC 3.7* 11.5*   PLT 75* 85*     Attestation:    The patient has been seen with team and evaluated by me.   Vital signs, labs, medications and orders were reviewed.   When obtained, diagnostic images " were reviewed by me and interpreted as above.    The care plan was discussed with the multidisciplinary team and I agree with the findings and plan in this note, with any differences recorded in blue.    Immunosuppressive medication management was reviewed and adjusted as reflected in the note and orders.     .

## 2022-05-22 NOTE — PROGRESS NOTES
CRRT STATUS NOTE    DATA:  Time:  6:50 PM  Pressures WNL:  Yes  Filter Status:  WDL    Problems Reported/Alarms Noted:  None    Supplies Present:  Yes    ASSESSMENT:  Patient Net Fluid Balance:  Net IO Since Admission: 5,857.65 mL [05/22/22 1850]     Intake/Output Summary (Last 24 hours) at 5/22/2022 1850  Last data filed at 5/22/2022 1800  Gross per 24 hour   Intake 2289.62 ml   Output 3654 ml   Net -1364.38 ml      Vitals:  Temp:  [96.4  F (35.8  C)-97.8  F (36.6  C)] 97.5  F (36.4  C)  Pulse:  [] 103  Resp:  [9-39] 39  BP: ()/(60-83) 124/79  SpO2:  [95 %-100 %] 97 %   Labs:    Lab Results   Component Value Date     05/22/2022    POTASSIUM 4.2 05/22/2022    CR 2.96 (H) 05/22/2022    BUN 38 (H) 05/22/2022    MAG 1.9 05/22/2022    PHOS 3.4 05/22/2022    WBC 4.1 05/22/2022    HGB 8.7 (L) 05/22/2022    HCT 26.7 (L) 05/22/2022    PLT 83 (L) 05/22/2022     Goals of Therapy:  UF 50-100cc/hr if possible to keep SBP >100.  Goal to get off Pressors.  Pt negative 1.5L for day.    INTERVENTIONS:   Review flow sheets and discuss plan of care with bedside nurse and nephrology team.    PLAN:  Continue fluid removal as tolerated per goals of therapy.  Check filter daily and change filter q 72 hrs and PRN.  Please contact the CRRT resource RN at [phone] with any questions/concerns.    Pt circuit went down.  Per Renal, pt will trial HD in AM.

## 2022-05-22 NOTE — PROGRESS NOTES
[facility]     ICU Progress Note       Date of Admission:  5/17/2022  Date of Service: 05/21/22     CO-MORBIDITIES:   CKD (chronic kidney disease) stage 4, GFR 15-29 ml/min (H)  (primary encounter diagnosis)  Pre-diabetes  ESRD (end stage renal disease) (H)  S/p Living donor kidney transplantation (5/17/22)     Assessment: Critical Care   Donald Blanco is a 57 year old male who was admitted on 5/17/2022 for a living-donor kidney transplant (ESRD 2/2 IgA Nephropathy).  Developing oliguria, requiring maintenance hemodialysis on 5/19, with patient experiencing persistent hypotension w/ SBPs in 70s-80s.  Admitted to SICU service for hemodynamic monitoring and pressors, started on CRRT 5/20/22.        Major Changes Today:  -transition CRRT to HD as per nephro   -SBP goals > 100  -wean off dopamine  -continue Heparin  -daily CBC  -Nephro recommend-- 3rd dose of rATG  for DGF and no steroid taper  -Remove foleys    Plan: Critical Care   Neuro  # Acute pain   - Monitor neurological status. Delirium preventions and precautions.   - Pain: - Dilaudid 2-4mg PO Q4H PRN               -Acetaminophen PRN              - methocarbamol PRN     - Sedation plan: No sedation necessary, keep RASS 0-1               - Holding PTA lunesta        Pulmonary:   #SORAIDA   - Home CPAP at night   - Supplemental oxygen to keep saturation above 92 %.  - Incentive spirometer every 15- 30 minutes when awake.     Cardiovascular:    #Persistent Hypotension   #Hyperlipidemia   - Monitor hemodynamic status: initial goal SBP goals > 100   - Wean off Dopamine gtt per transplant,   - discontinue MIVF   - ECHO 11/15/2021 EF 60-65%  - Repeat ECHO 5/20/22: EF 55-60%, otherwise unremarkable   - Troponin negative (46)   - PTA atorvastatin 10 mg      Gastroenterology/Nutrition:   - Diet: PO intake, 3g K+ Diet  - Bowel regimen: miralax, senna, PRN milk of mag - increase   - Antiemetics: PRN zofran,  compazine     Renal  #ESRD 2/2 IgA Nephropathy  #Oliguria  #Perinephric fluid collection  - Urine output 5/20 280 ml (137 since MN) ~ 10-30/hr   - Will continue to monitor intake and output.  - Renal US 5/20 with perirenal fluid collection, no plan for intervention per transplant               - Prior renal US with elevated indices, improving      Graft Function: Cr (5/19 - pre HD): 10.60 -> now 5.26                            K+ 5.8, shifted, CRRT started, now 5.4                                         Immunosuppression:                          -Mycophenolate PO 750mg BID                          -Tacrolimus PO 5mg BID                          -Prednisone PO taper                          -Thymo per TXP team 5/19              Anti-Microbial Ppx:                          -Trimethoprim-Sulfamethoxazole 400mg-80mg PO 3x weekly (holding for hyperkalemia)                          -Valganciclovir 450mg PO 2x weekly     Fluid Balance/Electrolytes:  LDKT 5/17/2022, HD 5/19/22, CRRT started 5/20/22  -CRRT to transition to HD today as per nephro  - NS 50cc/hr IV fluid hydration + 500 albumin bolus - discontinue MIVF   - Lactate normalized 1.5<-2.1<-3.0      Endocrine:  #Stress and steroid hyperglycemia   - Regular insulin, BG , goal <180   - Hypoglycemia protocol, maintain glucose >100  - Required insulin for K shift, D10 as needed  - Prednisone taper for immunosuppression               - Received solumedrol 5/17-5/19              - now on Prednisone      ID:  # Stress induced leukocytosis   - WBC 3.5 (13.3, 9.0)  - Afebrile, low concern for infectious etiology  - Anti-infectious ppx as above     Heme:     # Acute blood loss anemia   # Anemia of critical illness   # Thrombocytopenia-platelet 75  - Hemoglobin stable, 8.9 (9.2<8.5 < 8.0)   - Transfuse if hgb <7.0 or signs/symptoms of hypoperfusion. Monitor and trend.   -Heparin infusion continue  - Renal vein prophylaxis:   - Aspirin 81 mg      MSK:  #Deconditioning  -  PT/OT consult     Prophylaxis:    DVT Prophylaxis: SCDs, argatroban gtt   GI Prophylaxis: Not indicated   Antimicrobial Prophylaxis: as above     Lines/ tubes/ drains:  - CVC x1 (R subclavian)  - Dialysis catheter right IJ  - PIV x2 (R dorsal hand; L ventral forearm)  - Indwelling urinary catheter x1  - BAUDILIO drain x1 R abdomen     Disposition  -SICU      The patient's care was discussed with the Attending Physician, Dr. Conteh.  Critical care time exclusive of procedures: 40 min        Aide GARCIA  PGY-1      Securely message with the Vocera Web Console (learn more here)  Text page via [facility] Paging/Directory      _________________________________________________________        Interval History     Coarse reviewed. Patient slept in recliner overnight with CPAP at 5 LPM, otherwise uses 4 LPM NC during the daytime.  Dopamine gtt 2.5- 3.5 to maintain MAP > 65, SBP > 110.  CRRT started at 1719 yesterday with I=O, now -50 to -100/hr goal.  UOP 10-30/hr.            Physical Exam     Vital Signs: Temp: 97.6  F (36.4  C) Temp src: Oral BP: 125/71 Pulse: 84   Resp: 18 SpO2: 96 % O2 Device: BiPAP/CPAP Oxygen Delivery: 5 LPM  Weight: 260 lbs 0 oz  GEN: alert, pleasant, sitting in the chair not in distress  HEENT:  Normocephalic, atraumatic, trachea midline, Pupils PERRL  CV: RRR, no gallops, rubs, or murmurs, on Dopamine gtt  PULM/CHEST: Clear breath sounds bilaterally, diminished  GI: normal bowel sounds, soft, tender, mildly distended  : gardiner catheter in place, urine yellow and clear  EXTREMITIES: mild to moderate peripheral edema, moving all extremities, peripheral pulses intact  NEURO: Cranial nerves II-XII grossly intact, no motor-sensory deficits noted (balance not tested)  SKIN: no rashes appreciated, incision dressing with shadow drainage, BAUDILIO with serosang drainage   PSYCH:  Affect: appropriate , not anxious, mentation at baseline,  no hallucinations

## 2022-05-22 NOTE — PLAN OF CARE
ICU End of Shift Summary. See flowsheets for vital signs and detailed assessment.    Changes this shift: Dopamine titrated off at 0830 this morning after SBP goal changed to >100.  CRRT off at 1530 2/2 elevated TMP at that time, pt was net negative 1.8L.  Magnesium replaced per orders.  Third dose of Thymo given tolerating well thus far(demerol added as a premed,    Plan:iHD tomorrow and anticipation of transferring out of the MICU, as wll.       Goal Outcome Evaluation:

## 2022-05-22 NOTE — PROGRESS NOTES
CRRT STATUS NOTE    DATA:  Time:  0610 AM    Pressures WNL:  YES    Filter Status:  WDL    Problems Reported/Alarms Noted:  None    Supplies Present:  YES    ASSESSMENT:  Patient Net Fluid Balance:    05/21/2022 I/O= -383.33cc    Vital Signs @0000:   T= 96.4 (ax) (wilmarwolf mohaner applied), HR=56, ZE=768/65 (MAP=93), RR=12 , SPO2=98%    On dopamine and heparin gtts.    Labs:    Labs monitored and reviewed.    ROUTINE ICU LABS (Last four results)  CMPRecent Labs   Lab 05/22/22 0420 05/22/22 0418 05/21/22 1942 05/21/22 1939 05/21/22 1203 05/21/22 0418     --   --  135 134 134   POTASSIUM 4.6  --   --  4.9 4.6 5.4*   CHLORIDE 104  --   --  104 102 102   CO2 24  --   --  23 25 24   ANIONGAP 8  --   --  8 7 8   * 146* 147* 169* 138* 120*   BUN 39*  --   --  41* 39* 44*   CR 3.28*  --   --  3.80* 4.21* 5.26*   GFRESTIMATED 21*  --   --  17* 15* 12*   YEE 9.6  --   --  9.0 9.4 9.0   MAG 2.0  --   --  2.2 2.2 2.4*   PHOS 4.3  --   --  4.8* 5.4* 6.3*   PROTTOTAL 5.9*  --   --   --   --  6.2*   ALBUMIN 2.7*  --   --  2.9* 3.1* 3.1*   BILITOTAL 0.5  --   --   --   --  0.7   ALKPHOS 61  --   --   --   --  64   AST 6  --   --   --   --  13   ALT 23  --   --   --   --  23     CBC  Recent Labs   Lab 05/22/22 0420 05/21/22 0418 05/20/22 2001 05/20/22  0526   WBC 3.7* 11.5* 13.3* 9.0   RBC 2.63* 2.66* 2.75* 2.55*   HGB 8.5* 8.9* 9.2* 8.5*   HCT 26.5* 27.7* 28.1* 26.5*   * 104* 102* 104*   MCH 32.3 33.5* 33.5* 33.3*   MCHC 32.1 32.1 32.7 32.1   RDW 13.2 13.5 13.4 13.5   PLT 75* 85* 97* 87*     INRNo lab results found in last 7 days.  Arterial Blood Gas  Recent Labs   Lab 05/17/22  1748   O2PER 42.0     Goals of Therapy:    Net negative 50-100cc/hr    INTERVENTIONS:   None    PLAN:  Continue to monitor.  Change CRRT set q72hrs and PRN.  Call CRRT RN at [phone] with questions.  See flowsheets for details.

## 2022-05-22 NOTE — PLAN OF CARE
ICU End of Shift Summary. See flowsheets for vital signs and detailed assessment.    Changes this shift: Thymoglobulin infusing at start of shift, pt began shivering at about 2000. Infusion stopped at 2020 per SICU and Transplant recommendation. Vitals remained stable during infusion. Shivering stopped about 30 minutes after infusion stopped. Dopamine infusing at 2.25-2.5 to maintain SBP>110. Pt bradycardic overnight, down to 47 BPM. Pt states this is normal for him during sleep and denied symptoms. Temp down to 96.4 at midnight, wilmar hugger applied. CRRT continued overnight, pt tolerating fluid removal well, meeting goal negative . UOP decreasing, 3-8 ml/hr. Pt had 1 large formed BM in the evening.     Plan:     Goal Outcome Evaluation:    Plan of Care Reviewed With: patient, spouse, daughter     Overall Patient Progress: improving    Outcome Evaluation: Pt tolerating CRRT, goal negative

## 2022-05-23 ENCOUNTER — APPOINTMENT (OUTPATIENT)
Dept: ULTRASOUND IMAGING | Facility: CLINIC | Age: 57
DRG: 650 | End: 2022-05-23
Attending: NURSE PRACTITIONER
Payer: COMMERCIAL

## 2022-05-23 LAB
ANION GAP SERPL CALCULATED.3IONS-SCNC: 9 MMOL/L (ref 3–14)
BUN SERPL-MCNC: 60 MG/DL (ref 7–30)
CALCIUM SERPL-MCNC: 9.1 MG/DL (ref 8.5–10.1)
CHLORIDE BLD-SCNC: 106 MMOL/L (ref 94–109)
CO2 SERPL-SCNC: 22 MMOL/L (ref 20–32)
CREAT SERPL-MCNC: 4.61 MG/DL (ref 0.66–1.25)
ERYTHROCYTE [DISTWIDTH] IN BLOOD BY AUTOMATED COUNT: 13.2 % (ref 10–15)
GFR SERPL CREATININE-BSD FRML MDRD: 14 ML/MIN/1.73M2
GLUCOSE BLD-MCNC: 111 MG/DL (ref 70–99)
GLUCOSE BLDC GLUCOMTR-MCNC: 135 MG/DL (ref 70–99)
HCT VFR BLD AUTO: 26 % (ref 40–53)
HGB BLD-MCNC: 8.5 G/DL (ref 13.3–17.7)
MAGNESIUM SERPL-MCNC: 2.4 MG/DL (ref 1.6–2.3)
MCH RBC QN AUTO: 33.1 PG (ref 26.5–33)
MCHC RBC AUTO-ENTMCNC: 32.7 G/DL (ref 31.5–36.5)
MCV RBC AUTO: 101 FL (ref 78–100)
PHOSPHATE SERPL-MCNC: 4.2 MG/DL (ref 2.5–4.5)
PLATELET # BLD AUTO: 57 10E3/UL (ref 150–450)
POTASSIUM BLD-SCNC: 4.5 MMOL/L (ref 3.4–5.3)
RBC # BLD AUTO: 2.57 10E6/UL (ref 4.4–5.9)
SODIUM SERPL-SCNC: 137 MMOL/L (ref 133–144)
UFH PPP CHRO-ACNC: <0.1 IU/ML
WBC # BLD AUTO: 1.5 10E3/UL (ref 4–11)

## 2022-05-23 PROCEDURE — 250N000013 HC RX MED GY IP 250 OP 250 PS 637

## 2022-05-23 PROCEDURE — 85520 HEPARIN ASSAY: CPT | Performed by: NURSE PRACTITIONER

## 2022-05-23 PROCEDURE — 80048 BASIC METABOLIC PNL TOTAL CA: CPT

## 2022-05-23 PROCEDURE — 120N000002 HC R&B MED SURG/OB UMMC

## 2022-05-23 PROCEDURE — 250N000013 HC RX MED GY IP 250 OP 250 PS 637: Performed by: PHYSICIAN ASSISTANT

## 2022-05-23 PROCEDURE — 250N000013 HC RX MED GY IP 250 OP 250 PS 637: Performed by: NURSE PRACTITIONER

## 2022-05-23 PROCEDURE — 250N000013 HC RX MED GY IP 250 OP 250 PS 637: Performed by: SURGERY

## 2022-05-23 PROCEDURE — 258N000003 HC RX IP 258 OP 636: Performed by: INTERNAL MEDICINE

## 2022-05-23 PROCEDURE — 99233 SBSQ HOSP IP/OBS HIGH 50: CPT | Mod: 24 | Performed by: INTERNAL MEDICINE

## 2022-05-23 PROCEDURE — 90937 HEMODIALYSIS REPEATED EVAL: CPT

## 2022-05-23 PROCEDURE — 76776 US EXAM K TRANSPL W/DOPPLER: CPT

## 2022-05-23 PROCEDURE — 83735 ASSAY OF MAGNESIUM: CPT

## 2022-05-23 PROCEDURE — 85027 COMPLETE CBC AUTOMATED: CPT | Performed by: NURSE PRACTITIONER

## 2022-05-23 PROCEDURE — 250N000013 HC RX MED GY IP 250 OP 250 PS 637: Performed by: STUDENT IN AN ORGANIZED HEALTH CARE EDUCATION/TRAINING PROGRAM

## 2022-05-23 PROCEDURE — 250N000011 HC RX IP 250 OP 636: Performed by: INTERNAL MEDICINE

## 2022-05-23 PROCEDURE — 84100 ASSAY OF PHOSPHORUS: CPT

## 2022-05-23 PROCEDURE — 76776 US EXAM K TRANSPL W/DOPPLER: CPT | Mod: 26 | Performed by: RADIOLOGY

## 2022-05-23 PROCEDURE — 250N000011 HC RX IP 250 OP 636: Performed by: NURSE PRACTITIONER

## 2022-05-23 PROCEDURE — 250N000012 HC RX MED GY IP 250 OP 636 PS 637

## 2022-05-23 PROCEDURE — P9041 ALBUMIN (HUMAN),5%, 50ML: HCPCS | Performed by: INTERNAL MEDICINE

## 2022-05-23 PROCEDURE — 250N000012 HC RX MED GY IP 250 OP 636 PS 637: Performed by: PHYSICIAN ASSISTANT

## 2022-05-23 RX ORDER — ACETAMINOPHEN 325 MG/1
975 TABLET ORAL EVERY 8 HOURS PRN
Status: DISCONTINUED | OUTPATIENT
Start: 2022-05-23 | End: 2022-05-25

## 2022-05-23 RX ORDER — ALBUMIN (HUMAN) 12.5 G/50ML
50 SOLUTION INTRAVENOUS
Status: DISCONTINUED | OUTPATIENT
Start: 2022-05-23 | End: 2022-05-23

## 2022-05-23 RX ORDER — VALGANCICLOVIR 450 MG/1
450 TABLET, FILM COATED ORAL
Status: DISCONTINUED | OUTPATIENT
Start: 2022-05-23 | End: 2022-05-27 | Stop reason: HOSPADM

## 2022-05-23 RX ORDER — ALBUMIN, HUMAN INJ 5% 5 %
250 SOLUTION INTRAVENOUS
Status: COMPLETED | OUTPATIENT
Start: 2022-05-23 | End: 2022-05-23

## 2022-05-23 RX ADMIN — ACETAMINOPHEN 975 MG: 325 TABLET ORAL at 13:02

## 2022-05-23 RX ADMIN — PANTOPRAZOLE SODIUM 40 MG: 40 TABLET, DELAYED RELEASE ORAL at 16:17

## 2022-05-23 RX ADMIN — TACROLIMUS 4 MG: 1 CAPSULE ORAL at 09:06

## 2022-05-23 RX ADMIN — LIDOCAINE 2 PATCH: 560 PATCH PERCUTANEOUS; TOPICAL; TRANSDERMAL at 09:08

## 2022-05-23 RX ADMIN — MYCOPHENOLATE MOFETIL 750 MG: 250 CAPSULE ORAL at 17:52

## 2022-05-23 RX ADMIN — PREDNISONE 40 MG: 20 TABLET ORAL at 09:06

## 2022-05-23 RX ADMIN — MYCOPHENOLATE MOFETIL 750 MG: 250 CAPSULE ORAL at 09:06

## 2022-05-23 RX ADMIN — ACETAMINOPHEN 325MG 975 MG: 325 TABLET ORAL at 22:30

## 2022-05-23 RX ADMIN — HEPARIN SODIUM AND DEXTROSE 500 UNITS/HR: 10000; 5 INJECTION INTRAVENOUS at 10:39

## 2022-05-23 RX ADMIN — VALGANCICLOVIR 450 MG: 450 TABLET, FILM COATED ORAL at 17:54

## 2022-05-23 RX ADMIN — ASPIRIN 81 MG CHEWABLE TABLET 81 MG: 81 TABLET CHEWABLE at 09:06

## 2022-05-23 RX ADMIN — ATORVASTATIN CALCIUM 10 MG: 10 TABLET, FILM COATED ORAL at 09:07

## 2022-05-23 RX ADMIN — TACROLIMUS 4 MG: 1 CAPSULE ORAL at 17:52

## 2022-05-23 RX ADMIN — ALBUMIN (HUMAN) 250 ML: 12.5 INJECTION, SOLUTION INTRAVENOUS at 14:52

## 2022-05-23 RX ADMIN — SODIUM CHLORIDE 300 ML: 9 INJECTION, SOLUTION INTRAVENOUS at 14:51

## 2022-05-23 RX ADMIN — Medication: at 14:52

## 2022-05-23 RX ADMIN — SODIUM CHLORIDE 250 ML: 9 INJECTION, SOLUTION INTRAVENOUS at 14:51

## 2022-05-23 RX ADMIN — Medication 5 MG: at 22:24

## 2022-05-23 RX ADMIN — ACETAMINOPHEN 975 MG: 325 TABLET ORAL at 05:59

## 2022-05-23 RX ADMIN — PANTOPRAZOLE SODIUM 40 MG: 40 TABLET, DELAYED RELEASE ORAL at 09:07

## 2022-05-23 ASSESSMENT — ACTIVITIES OF DAILY LIVING (ADL)
ADLS_ACUITY_SCORE: 20

## 2022-05-23 NOTE — CONSULTS
Interventional Radiology Inpatient Consult Service Note      ===  CONSULT DETAILS    Reason for Consult s/p LDKT with DGF, Request for kidney biopsy tomorrow 5/24     Interventional Radiology Adult/Peds IP Consult: Patient to be seen: Routine within 24 hours; [facility] Call back #: [phone]; s/p LDKT with DGF, Request for kidney biopsy tomorrow 5/24; Requesting provider? Attending physician; Name: Yocasta [[id number]]    Electronically signed by: Xin Mustafa NP on 05/23/22 1210  [facility] Call back # [phone]   Howard Flores MD Attending  [phone]     Is the patient on an anticoagulant ? Yes  Specify Heparin gtt 500 units/hr, ASA  Is the patient currently NPO ? No    ===  LABS    INR - 0.98  Plts - 57  COVID - neg  AC - heparin infusion 25,000 units in D5W 250 mL ANTICOAGULANT   Order Details, Ordered Dose: 500 Units/hr Route: Intravenous Frequency: CONTINUOUS @ 5 mL/hr    ===  HISTORY    Hx - kidney txp 5/17/22  [  Donald Blanco is a 57 year old male with history of IgA nephropathy, resultant ESRD, on HD 3 times/week. (Access: Right IJ TDC).At baseline trace urine output.Underwent LDKT on 5/17/22, without ureteral stent.Imported kidney with vasospasm in OR.Other comorbidities include HTN, and prostate cancer s/p prostatectomy and radiation.  ]    ===  IMAGING        ===  PLAN    Approved and appointed procedure: Add-on, Tuesday, 5/24.  Right lower quadrant transplant kidney biopsy.    Pre-procedure orders will be entered by IR.  NPO required for optional sedation.    Medications to be held include: Heparin infusion, timing determined by IR charge nurse.    Referring to place desired specimen orders using IR RAD Biopsy or Fluid Aspirate Specimens Order Set.    Please contact the IR charge RN at [phone] for estimated time of procedure.       ALEKSANDR Marcum, PA-C  Interventional Radiology  IR pager [phone]

## 2022-05-23 NOTE — PROGRESS NOTES
[facility]  Transplant Nephrology progress note   Date of Admission:  5/17/2022  Today's Date: 05/23/2022  Requesting physician: Howard Flores MD    Recommendations:  - iHD today  - transfer to the floor if tolerates iHD without pressors  - renal bx tomorrow      Assessment & Plan    Donald Blanco is a 57 year old male with history of IgA nephropathy, resultant ESRD, on HD 3 times/week. (Access: Right IJ TDC).At baseline trace urine output.Underwent LDKT on 5/17/22, without ureteral stent.Imported kidney with vasospasm in OR.Other comorbidities include HTN, and prostate cancer s/p prostatectomy and radiation.    # LDKT: without ureteral stent.Imported kidney with vasospasm in OR . DGF 2/2 muscle spasm, hypoperfusion and or, renal vein stenosis. Patient continues to have low UOP, hypervolemia , hyperkalemia.  Patient given Thymoglobulin  ( 2 mg/kg) on 5/19/2022 followed by 2-hour run of low flow iHD with 1 L UF.  Following this patient had persistent hypotension warranting him to be transferred to the ICU, received albumin infusions and started on dopamine. Hypotension has since resolved and has been off of dopamine       - Baseline Creatinine: ~  TBD   - Proteinuria: Not checked post transplant   - Date DSA Last Checked: May/2022      Latest DSA: No DSA at time of transplant   - BK Viremia: Not checked post transplant   - Kidney Tx Biopsy: No    -No stent placed              -On heparin for renal vein prophylaxis in the setting of renal vein stenosis  - HD Info  Access: Right IJ Tunneled Catheter;     # Immunosuppression: Tacrolimus immediate release (goal 8-10), Mycophenolate mofetil (dose 750 mg every 12 hours) and Prednisone (dose taper)               - Induction: Intermediate . PRA 19.  Basiliximab 5/17, rATG on 5/19 and 5/21   - Changes: YES .Change to rATG induction for DGF. Stop prednisone taper after rATG induction    # Infection Prophylaxis:   - PJP:  Sulfa/TMP (Bactrim) - restart today, MWF  - CMV: Valganciclovir (Valcyte). No discordance D-/R-    # Hypertension: Hypotensive;  Goal BP: MAP > 65   - Volume status: Markedly hypervolemic    - Changes: Yes - dopamine or levo to keep SBP>100. UF 50cc/hr on CRRT    -Will evaluate tmrw for iHD in order to UF fluid    # Elevated Blood Glucose: Glucose generally running ~ 170 -200    - Management as per primary team.    # Anemia in Chronic Renal Disease: Hgb: Stable      YUDI: No   - Iron studies: Unknown at this time, but checked with dialysis    # Mineral Bone Disorder:   - Secondary renal hyperparathyroidism; PTH level: Unknown at this time, but checked with dialysis        On treatment: None  - Vitamin D; level: Unknown at this time, but checked with dialysis        On supplement: No  - Calcium; level: Normal        On supplement: No  - Phosphorus; level: Normal        On supplement: No    # Electrolytes:   - Potassium; level: Normal        On supplement: No  - Magnesium; level: Normal        On supplement: No  - Bicarbonate; level: Normal        On supplement: No  - Sodium; level: Normal    # Thrombocytopenia:   -Occurred after rATG dose on 5/19. Neg BISHOP panel    #HyperKalemia:   -Changed CRRT to 2K bath, improved    # Transplant History:  Etiology of Kidney Failure: IgA nephropathy  Tx: LDKT  Transplant: 5/17/2022 (Kidney)  Crossmatch at time of Tx: negative  Significant changes in immunosuppression: None  Significant transplant-related complications: None    Recommendations were communicated to the primary team verbally.    Marino Hanks MD  Pager: [phone]    Attestation:  This patient has been seen and evaluated by me, Woodrow Cr MD.  I have reviewed the note and agree with plan of care as documented by the fellow.       Interval History   990mL net negative. Off of pressors. CRRT stopped. Denies N/V/D, fever, chills, SOB, chest pain.     Review of Systems    The 4 point Review of Systems is  negative other than noted above or here.     Allergies   No Active Allergies  Prior to Admission Medications     acetaminophen  975 mg Oral or Feeding Tube Q8H     aspirin  81 mg Oral Daily     atorvastatin  10 mg Oral Daily     sodium chloride (PF) 0.9%  1.3-2.6 mL Intracatheter Once in dialysis/CRRT    Followed by     heparin  3 mL Intracatheter Once in dialysis/CRRT     sodium chloride (PF) 0.9%  1.3-2.6 mL Intracatheter Once in dialysis/CRRT    Followed by     heparin  3 mL Intracatheter Once in dialysis/CRRT     lidocaine  2 patch Transdermal Q24H     lidocaine   Transdermal Q8H     [Held by provider] magnesium oxide  400 mg Oral Daily with lunch     melatonin  5 mg Oral QPM     mycophenolate  750 mg Oral BID IS     pantoprazole  40 mg Oral BID AC     polyethylene glycol  17 g Oral or Feeding Tube BID     [START ON 2022] predniSONE  20 mg Oral Daily    Followed by     [START ON 2022] predniSONE  15 mg Oral Daily    Followed by     [START ON 2022] predniSONE  10 mg Oral Daily    Followed by     [START ON 2022] predniSONE  5 mg Oral Daily     senna-docusate  2 tablet Oral or Feeding Tube BID     sodium chloride (PF)  10 mL Intracatheter Q8H     [Held by provider] sulfamethoxazole-trimethoprim  1 tablet Oral Once per day on      tacrolimus  4 mg Oral BID IS     valGANciclovir  450 mg Oral Once per day on        CRRT replacement solution 12.5 mL/kg/hr (22 1335)     DOPamine Stopped (22 0830)     heparin 500 Units/hr (22 0700)     - MEDICATION INSTRUCTIONS -       POST-FILTER REPLACEMENT FOR CRRT 200 mL/hr (22 1259)     CRRT replacement solution 12.5 mL/kg/hr (22 1335)       Physical Exam   Temp  Av.1  F (36.7  C)  Min: 97.7  F (36.5  C)  Max: 98.6  F (37  C)      Pulse  Av.1  Min: 63  Max: 105 Resp  Avg: 15.8  Min: 8  Max: 19  SpO2  Av.3 %  Min: 94 %  Max: 100 %    CVP (mmHg): 15 mmHgBP 117/66 (BP Location: Right arm)   Pulse 64    "Temp 98.1  F (36.7  C) (Oral)   Resp 17   Ht 1.778 m (5' 10\")   Wt 115.8 kg (255 lb 6.4 oz)   SpO2 98%   BMI 36.65 kg/m     Date 05/18/22 0700 - 05/19/22 0659   Shift 3265-3690 6211-6077 1547-3492 24 Hour Total   INTAKE   I.V. 1081.67   1081.67   Shift Total(mL/kg) 1081.67(10)   1081.67(10)   OUTPUT   Urine 335 50  385   Drains 165   165   Shift Total(mL/kg) 500(4.62) 50(0.46)  550(5.08)   Weight (kg) 108.2 108.2 108.2 108.2      Admit Weight: 103.4 kg (227 lb 15.3 oz)     GENERAL APPEARANCE: alert and no distress, sitting up in bed, pleasant and cooperative  HENT: mouth without ulcers or lesions  LYMPHATICS: no cervical or supraclavicular nodes  RESP: lungs clear to auscultation - no rales, rhonchi or wheezes  CV: regular rhythm, normal rate, no rub, no murmur  EDEMA: 1+ trace bilateral edema   ABDOMEN: soft, nondistended, minimally TTP at incision which is c/d/i, bowel sounds normal  MS: extremities normal - no gross deformities noted, no evidence of inflammation in joints, no muscle tenderness  SKIN: no rash  NEURO: Alert awake oriented x3, moving all extremities well.  ACCESS: [facility]     Data   CMP  Recent Labs   Lab 05/23/22  0610 05/22/22  1157 05/22/22  0420 05/22/22  0418 05/21/22  1942 05/21/22  1939 05/21/22  1203 05/21/22  0418    136 136  --   --  135 134 134   POTASSIUM 4.5 4.2 4.6  --   --  4.9 4.6 5.4*   CHLORIDE 106 106 104  --   --  104 102 102   CO2 22 23 24  --   --  23 25 24   ANIONGAP 9 7 8  --   --  8 7 8   * 135* 152* 146*   < > 169* 138* 120*   BUN 60* 38* 39*  --   --  41* 39* 44*   CR 4.61* 2.96* 3.28*  --   --  3.80* 4.21* 5.26*   GFRESTIMATED 14* 24* 21*  --   --  17* 15* 12*   YEE 9.1 8.8 9.6  --   --  9.0 9.4 9.0   MAG 2.4* 1.9 2.0  --   --  2.2 2.2 2.4*   PHOS 4.2 3.4 4.3  --   --  4.8* 5.4* 6.3*   PROTTOTAL  --   --  5.9*  --   --   --   --  6.2*   ALBUMIN  --  2.7* 2.7*  --   --  2.9* 3.1* 3.1*   BILITOTAL  --   --  0.5  --   --   --   --  0.7   ALKPHOS  --   --  " 61  --   --   --   --  64   AST  --   --  6  --   --   --   --  13   ALT  --   --  23  --   --   --   --  23    < > = values in this interval not displayed.     CBC  Recent Labs   Lab 05/23/22  0610 05/22/22  1157 05/22/22  0420 05/21/22  0418   HGB 8.5* 8.7* 8.5* 8.9*   WBC 1.5* 4.1 3.7* 11.5*   RBC 2.57* 2.65* 2.63* 2.66*   HCT 26.0* 26.7* 26.5* 27.7*   * 101* 101* 104*   MCH 33.1* 32.8 32.3 33.5*   MCHC 32.7 32.6 32.1 32.1   RDW 13.2 13.2 13.2 13.5   PLT 57* 83* 75* 85*     INR  Recent Labs   Lab 05/21/22  0951   PTT 39*     ABG  Recent Labs   Lab 05/17/22  1748   O2PER 42.0      Urine Studies  Recent Labs   Lab Test 05/09/22  0957 12/10/20  1338 09/03/20  1142   COLOR Yellow Straw Straw   APPEARANCE Clear Clear Clear   URINEGLC Negative Negative Negative   URINEBILI Negative Negative Negative   URINEKETONE Negative Negative Negative   SG 1.015 1.011 1.011   UBLD Moderate* Negative Small*   URINEPH 6.0 5.0 5.0   PROTEIN 300 * >499* >499*   NITRITE Negative Negative Negative   LEUKEST Small* Negative Negative   RBCU 0 1 1   WBCU 6* 2 2     No lab results found.  PTH  Recent Labs   Lab Test 05/09/22  0959   PTHI 450*     Iron Studies  Recent Labs   Lab Test 05/09/22 0959   IRON 79      IRONSAT 24   LANCE 1,002*       IMAGING:  All imaging studies reviewed by me.

## 2022-05-23 NOTE — CONSULTS
Care management aware of consult, please see RNCC note for initial assessment. Will continue to follow for needs.     Xin Sweet  RN Care Coordinator   MICU/SICU  [phone]

## 2022-05-23 NOTE — PROGRESS NOTES
HEMODIALYSIS TREATMENT NOTE    Date: 5/23/2022  Time: 6:41 PM    Data:  Pre Wt: 117.7 kg (259 lb 7.7 oz)   Desired Wt: 113.8 kg   Post Wt: 115.7 kg (255 lb 1.2 oz)  Weight change: 2 kg  Ultrafiltration - Post Run Net Total Removed (mL): 2000 mL  Vascular Access Status: CVC  patent  Dialyzer Rinse: Streaked  Total Blood Volume Processed: 64.5 L   Total Dialysis (Treatment) Time: 3.5   Dialysate Bath: K 3, Ca 2.25  Heparin: None    Lab:   No    Interventions:  Set goal to 2 L per order. Good flow on CVC, lines right, at Qb 300, dsg c/d/i. No meds given. Tolerated run well. CVC NS locked, new caps. All questions answered.     Standing wgt after run is accurate.     Assessment:  A&O. Ambulatory without assist. Denied pain. VS WNL prior to start.      Plan:    Move to medicine/transplant floor. Tx biopsy tomorrow. Next run PRN per renal.    Alejandro Hampton, FRACISCON, RN

## 2022-05-23 NOTE — PLAN OF CARE
ICU End of Shift Summary. See flowsheets for vital signs and detailed assessment.    Changes this shift: VSS. Tolerated HD run. No acute events.    Plan: NPO at 0000 for renal biopsy tomorrow. Potentially transfer to floor    Goal Outcome Evaluation:    Plan of Care Reviewed With: patient     Overall Patient Progress: improving    Outcome Evaluation: Pt tolerating HD, went for a walk

## 2022-05-23 NOTE — PROGRESS NOTES
Transplant Surgery  Inpatient Daily Progress Note  05/23/2022    Assessment & Plan: 56yo with history of ESRD secondary to IgA nephropathy, dialysis via a tunneled catheter, HTN, and prostate cancer s/p prostatectomy and radiation. S/p living donor kidney transplant without stent on 5/17/22. Imported kidney with vasospasm in OR.    LDKT 5/17/22; DGF: POD #6. Cr 4.6 on HD. Low UOP, hyperkalemia. RV velocities continue to improve on US. IR consulted for biopsy tomorrow.    Did undergo dialysis run on POD 2, was acutely hypotensive post dialysis. Likely due to some combination of thymo reaction + dialysis. Blood pressures did not improve despite several fluid boluses and albumin. Ultimately transferred to the SICU for dopamine. Weaned off Dopamine 5/22. Off CRRT 5/22, trial iHD today.    US 5/20 with 7.7 cm fluid collection. Drain Cr 4.9: Serum Cr 4.2.    Immunosuppression management: Induction initially via low risk protocol (PRA 19), transitioned to high risk in light of DGF.  -Basiliximab 5/17  -Thymo 200 mg (2 mg/kg) given 5/19, 5/21 (3/4 dose due to rigors), and 5/22  -Steroid taper initially per low risk induction protocol (now discontinued), additional 100 mg on 5/21& 5/22 prior to Thymo  -Tacro level goal 8-10  -MMF 750mg BID  Complexity of management: High. Contributing factors:  induction    Neuro:  Acute post-op pain: Oxycodone almost completely ineffective. Switched to PO dilaudid, will discontinue (not using). Change APAP PRN, continue Lidoderm and Robaxin.    Hematology:   Anemia: Borderline macrocytic. Hgb 8.5  Renal vein prophylaxis: Heparin 500u/hr. ASA 81 mg daily-hold for biopsy.  Thrombocytopenia: Due to Thymo. HIT negative. Monitor.    Cardiorespiratory:   Hx HTN: Dopamine drip now discontinued. Monitor.  Hypoxia: H/o sleep apnea, on CPAP. Also with some likely component of pulmonary edema. Now stable on room air when awake, BiPAP while sleeping  Rule out ACS: Trop WNL, echo WNL.     GI/Nutrition:    Diet: Low potassium.  Bowel regimen: Senna, Miralax    Endocrine: No acute issues.     Fluid/Electrolytes:   Hyperkalemia: K 4.5, off CRRT. HD today.    : Flores removed.    Infectious disease: No acute issues.    Prophylaxis: DVT, fall, GI (PPI), viral (Valcyte), pneumocystis (Bactrim, hold until K normalizes)    Disposition: 4A, plan to transfer to  if iHD is tolerated.    Medical Decision Making: Medium  Subsequent visit 53467 (moderate level decision making)    TOM/Fellow/Resident Provider: Xin Mustafa, NP [id number]    Faculty: Stuart Rust MD PhD  _________________________________________________________________  Transplant History:   5/17/2022 (Kidney), Postoperative day: 6     Interval History: History is obtained from the patient  Overnight events: No acute events overnight. Off dopamine gtt. Denies pain. Good appetite. In good spirits.      ROS:   A 10-point review of systems was negative except as noted above.    Meds:    acetaminophen  975 mg Oral or Feeding Tube Q8H     [Held by provider] aspirin  81 mg Oral Daily     atorvastatin  10 mg Oral Daily     heparin  3 mL Intracatheter Once in dialysis/CRRT     heparin  3 mL Intracatheter Once in dialysis/CRRT     lidocaine  2 patch Transdermal Q24H     lidocaine   Transdermal Q8H     [Held by provider] magnesium oxide  400 mg Oral Daily with lunch     melatonin  5 mg Oral QPM     mycophenolate  750 mg Oral BID IS     pantoprazole  40 mg Oral BID AC     polyethylene glycol  17 g Oral or Feeding Tube BID     [START ON 5/24/2022] predniSONE  20 mg Oral Daily    Followed by     [START ON 5/31/2022] predniSONE  15 mg Oral Daily    Followed by     [START ON 6/7/2022] predniSONE  10 mg Oral Daily    Followed by     [START ON 6/14/2022] predniSONE  5 mg Oral Daily     senna-docusate  2 tablet Oral or Feeding Tube BID     sodium chloride (PF)  10 mL Intracatheter Q8H     [Held by provider] sulfamethoxazole-trimethoprim  1 tablet Oral Once per day on Mon Wed Fri      "tacrolimus  4 mg Oral BID IS     valGANciclovir  450 mg Oral Once per day on Mon Thu       Physical Exam:     Admit Weight: 103.4 kg (227 lb 15.3 oz)    Current vitals:   /58   Pulse 75   Temp 97.8  F (36.6  C) (Axillary)   Resp 21   Ht 1.778 m (5' 10\")   Wt 115.8 kg (255 lb 6.4 oz)   SpO2 100%   BMI 36.65 kg/m      Vital sign ranges:    Temp:  [97.7  F (36.5  C)-98.1  F (36.7  C)] 97.8  F (36.6  C)  Pulse:  [] 75  Resp:  [11-39] 21  BP: (100-126)/(58-79) 113/58  Cuff Mean (mmHg):  [83] 83  SpO2:  [95 %-100 %] 100 %    General Appearance: in no apparent distress.   Skin: Warm, perfused  Heart: NSR  Lungs: Unlabored  Abdomen: The abdomen is rounded, BAUDILIO serosang, incision closed with staples, c/d/i.   : gardiner removed  Extremities: edema: generalized, strength 5/5  Neurologic: alert and oriented x4. Tremor absent.    Data:   CMP  Recent Labs   Lab 05/23/22  0610 05/22/22  1157 05/22/22  0420 05/21/22  1203 05/21/22  1202 05/21/22  0418    136 136   < >  --  134   POTASSIUM 4.5 4.2 4.6   < >  --  5.4*   CHLORIDE 106 106 104   < >  --  102   CO2 22 23 24   < >  --  24   * 135* 152*   < >  --  120*   BUN 60* 38* 39*   < >  --  44*   CR 4.61* 2.96* 3.28*   < >  --  5.26*   GFRESTIMATED 14* 24* 21*   < >  --  12*   YEE 9.1 8.8 9.6   < >  --  9.0   ICAW  --  5.1 5.1   < >  --  5.0   MAG 2.4* 1.9 2.0   < >  --  2.4*   PHOS 4.2 3.4 4.3   < >  --  6.3*   ALBUMIN  --  2.7* 2.7*   < >  --  3.1*   BILITOTAL  --   --  0.5  --   --  0.7   ALKPHOS  --   --  61  --   --  64   AST  --   --  6  --   --  13   ALT  --   --  23  --   --  23   FCREAT  --   --   --   --  4.9  --     < > = values in this interval not displayed.     CBC  Recent Labs   Lab 05/23/22  0610 05/22/22  1157   HGB 8.5* 8.7*   WBC 1.5* 4.1   PLT 57* 83*       "

## 2022-05-24 ENCOUNTER — APPOINTMENT (OUTPATIENT)
Dept: PHYSICAL THERAPY | Facility: CLINIC | Age: 57
DRG: 650 | End: 2022-05-24
Attending: SURGERY
Payer: COMMERCIAL

## 2022-05-24 ENCOUNTER — APPOINTMENT (OUTPATIENT)
Dept: INTERVENTIONAL RADIOLOGY/VASCULAR | Facility: CLINIC | Age: 57
DRG: 650 | End: 2022-05-24
Attending: NURSE PRACTITIONER
Payer: COMMERCIAL

## 2022-05-24 LAB
ANION GAP SERPL CALCULATED.3IONS-SCNC: 7 MMOL/L (ref 3–14)
ATRIAL RATE - MUSE: 122 BPM
BUN SERPL-MCNC: 55 MG/DL (ref 7–30)
CALCIUM SERPL-MCNC: 8.3 MG/DL (ref 8.5–10.1)
CHLORIDE BLD-SCNC: 103 MMOL/L (ref 94–109)
CO2 SERPL-SCNC: 25 MMOL/L (ref 20–32)
CREAT SERPL-MCNC: 4.33 MG/DL (ref 0.66–1.25)
DIASTOLIC BLOOD PRESSURE - MUSE: NORMAL MMHG
ERYTHROCYTE [DISTWIDTH] IN BLOOD BY AUTOMATED COUNT: 13 % (ref 10–15)
GFR SERPL CREATININE-BSD FRML MDRD: 15 ML/MIN/1.73M2
GLUCOSE BLD-MCNC: 126 MG/DL (ref 70–99)
HCT VFR BLD AUTO: 24.2 % (ref 40–53)
HGB BLD-MCNC: 7.9 G/DL (ref 13.3–17.7)
HGB BLD-MCNC: 8.7 G/DL (ref 13.3–17.7)
INTERPRETATION ECG - MUSE: NORMAL
MAGNESIUM SERPL-MCNC: 2 MG/DL (ref 1.6–2.3)
MCH RBC QN AUTO: 32.5 PG (ref 26.5–33)
MCHC RBC AUTO-ENTMCNC: 32.6 G/DL (ref 31.5–36.5)
MCV RBC AUTO: 100 FL (ref 78–100)
P AXIS - MUSE: 55 DEGREES
PHOSPHATE SERPL-MCNC: 4.6 MG/DL (ref 2.5–4.5)
PLATELET # BLD AUTO: 52 10E3/UL (ref 150–450)
POTASSIUM BLD-SCNC: 4 MMOL/L (ref 3.4–5.3)
PR INTERVAL - MUSE: 128 MS
QRS DURATION - MUSE: 88 MS
QT - MUSE: 320 MS
QTC - MUSE: 456 MS
R AXIS - MUSE: -19 DEGREES
RBC # BLD AUTO: 2.43 10E6/UL (ref 4.4–5.9)
SARS-COV-2 RNA RESP QL NAA+PROBE: NEGATIVE
SODIUM SERPL-SCNC: 135 MMOL/L (ref 133–144)
SYSTOLIC BLOOD PRESSURE - MUSE: NORMAL MMHG
T AXIS - MUSE: 58 DEGREES
TACROLIMUS BLD-MCNC: 7 UG/L (ref 5–15)
TME LAST DOSE: NORMAL H
TME LAST DOSE: NORMAL H
VENTRICULAR RATE- MUSE: 122 BPM
WBC # BLD AUTO: 3.3 10E3/UL (ref 4–11)

## 2022-05-24 PROCEDURE — 83735 ASSAY OF MAGNESIUM: CPT

## 2022-05-24 PROCEDURE — 250N000013 HC RX MED GY IP 250 OP 250 PS 637: Performed by: STUDENT IN AN ORGANIZED HEALTH CARE EDUCATION/TRAINING PROGRAM

## 2022-05-24 PROCEDURE — 84999 UNLISTED CHEMISTRY PROCEDURE: CPT | Performed by: TRANSPLANT SURGERY

## 2022-05-24 PROCEDURE — 76942 ECHO GUIDE FOR BIOPSY: CPT

## 2022-05-24 PROCEDURE — 80048 BASIC METABOLIC PNL TOTAL CA: CPT

## 2022-05-24 PROCEDURE — 85018 HEMOGLOBIN: CPT

## 2022-05-24 PROCEDURE — 76942 ECHO GUIDE FOR BIOPSY: CPT | Mod: 26 | Performed by: PHYSICIAN ASSISTANT

## 2022-05-24 PROCEDURE — 88346 IMFLUOR 1ST 1ANTB STAIN PX: CPT | Performed by: TRANSPLANT SURGERY

## 2022-05-24 PROCEDURE — 250N000013 HC RX MED GY IP 250 OP 250 PS 637: Performed by: NURSE PRACTITIONER

## 2022-05-24 PROCEDURE — 99152 MOD SED SAME PHYS/QHP 5/>YRS: CPT

## 2022-05-24 PROCEDURE — 88313 SPECIAL STAINS GROUP 2: CPT | Performed by: TRANSPLANT SURGERY

## 2022-05-24 PROCEDURE — 272N000505 HC NEEDLE CR5

## 2022-05-24 PROCEDURE — 250N000012 HC RX MED GY IP 250 OP 636 PS 637: Performed by: PHYSICIAN ASSISTANT

## 2022-05-24 PROCEDURE — 250N000013 HC RX MED GY IP 250 OP 250 PS 637

## 2022-05-24 PROCEDURE — 97110 THERAPEUTIC EXERCISES: CPT | Mod: GP

## 2022-05-24 PROCEDURE — 84100 ASSAY OF PHOSPHORUS: CPT

## 2022-05-24 PROCEDURE — 97161 PT EVAL LOW COMPLEX 20 MIN: CPT | Mod: GP

## 2022-05-24 PROCEDURE — U0003 INFECTIOUS AGENT DETECTION BY NUCLEIC ACID (DNA OR RNA); SEVERE ACUTE RESPIRATORY SYNDROME CORONAVIRUS 2 (SARS-COV-2) (CORONAVIRUS DISEASE [COVID-19]), AMPLIFIED PROBE TECHNIQUE, MAKING USE OF HIGH THROUGHPUT TECHNOLOGIES AS DESCRIBED BY CMS-2020-01-R: HCPCS | Performed by: SURGERY

## 2022-05-24 PROCEDURE — 250N000011 HC RX IP 250 OP 636: Performed by: PHYSICIAN ASSISTANT

## 2022-05-24 PROCEDURE — 85014 HEMATOCRIT: CPT | Performed by: NURSE PRACTITIONER

## 2022-05-24 PROCEDURE — 80197 ASSAY OF TACROLIMUS: CPT

## 2022-05-24 PROCEDURE — 88305 TISSUE EXAM BY PATHOLOGIST: CPT | Performed by: TRANSPLANT SURGERY

## 2022-05-24 PROCEDURE — 88350 IMFLUOR EA ADDL 1ANTB STN PX: CPT | Performed by: TRANSPLANT SURGERY

## 2022-05-24 PROCEDURE — 99152 MOD SED SAME PHYS/QHP 5/>YRS: CPT | Performed by: PHYSICIAN ASSISTANT

## 2022-05-24 PROCEDURE — 250N000013 HC RX MED GY IP 250 OP 250 PS 637: Performed by: PHYSICIAN ASSISTANT

## 2022-05-24 PROCEDURE — 120N000002 HC R&B MED SURG/OB UMMC

## 2022-05-24 PROCEDURE — 50200 RENAL BIOPSY PERQ: CPT | Mod: RT | Performed by: PHYSICIAN ASSISTANT

## 2022-05-24 PROCEDURE — 0TB03ZX EXCISION OF RIGHT KIDNEY, PERCUTANEOUS APPROACH, DIAGNOSTIC: ICD-10-PCS | Performed by: PHYSICIAN ASSISTANT

## 2022-05-24 PROCEDURE — 250N000012 HC RX MED GY IP 250 OP 636 PS 637

## 2022-05-24 PROCEDURE — 250N000012 HC RX MED GY IP 250 OP 636 PS 637: Performed by: NURSE PRACTITIONER

## 2022-05-24 PROCEDURE — 88348 ELECTRON MICROSCOPY DX: CPT | Performed by: TRANSPLANT SURGERY

## 2022-05-24 PROCEDURE — 250N000009 HC RX 250: Performed by: PHYSICIAN ASSISTANT

## 2022-05-24 PROCEDURE — 99233 SBSQ HOSP IP/OBS HIGH 50: CPT | Mod: 24 | Performed by: INTERNAL MEDICINE

## 2022-05-24 RX ORDER — NALOXONE HYDROCHLORIDE 0.4 MG/ML
0.4 INJECTION, SOLUTION INTRAMUSCULAR; INTRAVENOUS; SUBCUTANEOUS
Status: DISCONTINUED | OUTPATIENT
Start: 2022-05-24 | End: 2022-05-24 | Stop reason: HOSPADM

## 2022-05-24 RX ORDER — FLUMAZENIL 0.1 MG/ML
0.2 INJECTION, SOLUTION INTRAVENOUS
Status: DISCONTINUED | OUTPATIENT
Start: 2022-05-24 | End: 2022-05-24 | Stop reason: HOSPADM

## 2022-05-24 RX ORDER — ALBUMIN, HUMAN INJ 5% 5 %
250 SOLUTION INTRAVENOUS
Status: DISCONTINUED | OUTPATIENT
Start: 2022-05-24 | End: 2022-05-24

## 2022-05-24 RX ORDER — NALOXONE HYDROCHLORIDE 0.4 MG/ML
0.2 INJECTION, SOLUTION INTRAMUSCULAR; INTRAVENOUS; SUBCUTANEOUS
Status: DISCONTINUED | OUTPATIENT
Start: 2022-05-24 | End: 2022-05-24 | Stop reason: HOSPADM

## 2022-05-24 RX ORDER — ALBUMIN (HUMAN) 12.5 G/50ML
50 SOLUTION INTRAVENOUS
Status: DISCONTINUED | OUTPATIENT
Start: 2022-05-24 | End: 2022-05-24

## 2022-05-24 RX ORDER — FENTANYL CITRATE 50 UG/ML
25-50 INJECTION, SOLUTION INTRAMUSCULAR; INTRAVENOUS EVERY 5 MIN PRN
Status: DISCONTINUED | OUTPATIENT
Start: 2022-05-24 | End: 2022-05-24 | Stop reason: HOSPADM

## 2022-05-24 RX ADMIN — FENTANYL CITRATE 50 MCG: 50 INJECTION, SOLUTION INTRAMUSCULAR; INTRAVENOUS at 10:20

## 2022-05-24 RX ADMIN — LIDOCAINE HYDROCHLORIDE 6 ML: 10 INJECTION, SOLUTION EPIDURAL; INFILTRATION; INTRACAUDAL; PERINEURAL at 10:28

## 2022-05-24 RX ADMIN — PANTOPRAZOLE SODIUM 40 MG: 40 TABLET, DELAYED RELEASE ORAL at 07:56

## 2022-05-24 RX ADMIN — MYCOPHENOLATE MOFETIL 750 MG: 250 CAPSULE ORAL at 17:52

## 2022-05-24 RX ADMIN — TACROLIMUS 4 MG: 1 CAPSULE ORAL at 08:08

## 2022-05-24 RX ADMIN — DOCUSATE SODIUM 50 MG AND SENNOSIDES 8.6 MG 2 TABLET: 8.6; 5 TABLET, FILM COATED ORAL at 19:35

## 2022-05-24 RX ADMIN — ATORVASTATIN CALCIUM 10 MG: 10 TABLET, FILM COATED ORAL at 07:56

## 2022-05-24 RX ADMIN — PANTOPRAZOLE SODIUM 40 MG: 40 TABLET, DELAYED RELEASE ORAL at 16:12

## 2022-05-24 RX ADMIN — LIDOCAINE 2 PATCH: 560 PATCH PERCUTANEOUS; TOPICAL; TRANSDERMAL at 07:55

## 2022-05-24 RX ADMIN — ACETAMINOPHEN 325MG 975 MG: 325 TABLET ORAL at 16:12

## 2022-05-24 RX ADMIN — MIDAZOLAM HYDROCHLORIDE 1 MG: 1 INJECTION, SOLUTION INTRAMUSCULAR; INTRAVENOUS at 10:21

## 2022-05-24 RX ADMIN — MYCOPHENOLATE MOFETIL 750 MG: 250 CAPSULE ORAL at 07:56

## 2022-05-24 RX ADMIN — TACROLIMUS 4.5 MG: 5 CAPSULE ORAL at 17:51

## 2022-05-24 RX ADMIN — Medication 5 MG: at 21:57

## 2022-05-24 RX ADMIN — HYDROXYZINE HYDROCHLORIDE 50 MG: 25 TABLET, FILM COATED ORAL at 21:57

## 2022-05-24 ASSESSMENT — ACTIVITIES OF DAILY LIVING (ADL)
ADLS_ACUITY_SCORE: 20

## 2022-05-24 NOTE — PROCEDURES
[facility]    Procedure: IR Procedure Note    Date/Time: 5/24/2022 10:45 AM  Performed by: Philip Lara PA-C  Authorized by: Philip Lara PA-C       UNIVERSAL PROTOCOL   Site Marked: NA  Prior Images Obtained and Reviewed:  Yes  Required items: Required blood products, implants, devices and special equipment available    Patient identity confirmed:  Verbally with patient, arm band, provided demographic data and hospital-assigned identification number  Patient was reevaluated immediately before administering moderate or deep sedation or anesthesia  Confirmation Checklist:  Patient's identity using two indicators, relevant allergies, procedure was appropriate and matched the consent or emergent situation and correct equipment/implants were available  Time out: Immediately prior to the procedure a time out was called    Universal Protocol: the Joint Commission Universal Protocol was followed    Preparation: Patient was prepped and draped in usual sterile fashion       ANESTHESIA    Anesthesia: Local infiltration  Local Anesthetic:  Lidocaine 1% without epinephrine  Anesthetic Total (mL):  8      SEDATION  Patient Sedated: Yes    Sedation Type:  Moderate (conscious) sedation  Sedation:  Fentanyl and midazolam  Vital signs: Vital signs monitored during sedation    See dictated procedure note for full details.  Findings: Moderate sedation for kidney biopsy - 1 mg of midazolam and 50 mcg of fentanyl.     Specimens: core needle biopsy specimens sent for pathological analysis    Complications: None    Condition: Stable    Plan: 2 hours bedrest      PROCEDURE  Describe Procedure: Completed ultrasound-guided right lower quadrant transplant kidney biopsy. 3 passes yielded 3 cores handed directly to pathology to confirm adequacy. Gelfoam in tract. No immediate complication.  Patient Tolerance:  Patient tolerated the procedure well with no immediate complications  Length  of time physician/provider present for 1:1 monitoring during sedation: 10

## 2022-05-24 NOTE — PROGRESS NOTES
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 S/p living donor kidney transplant without stent on 5/17/22. Imported kidney with vasospasm in OR.    LDKT 5/17/22; DGF: POD #7. Cr 4.3 on HD. Low UOP, hyperkalemia. RV velocities continue to improve on US. IR consulted for biopsy this morning, pathology pending.    DGF: Did undergo dialysis run on POD 2, was acutely hypotensive post dialysis. Likely due to some combination of thymo reaction + dialysis. Blood pressures did not improve despite several fluid boluses and albumin. Ultimately transferred to the SICU for dopamine. Weaned off Dopamine 5/22. Off CRRT 5/22. Tolerated iHD 5/23. Plan for HD tomorrow.    Fluid collection: US 5/20 with 7.7 cm fluid collection. Drain Cr 4.9: Serum Cr 4.2. US 5/23 unchanged.    Immunosuppression management:   Induction: initially via low risk protocol (PRA 19), transitioned to high risk in light of DGF.  -Basiliximab 5/17  -Thymo 200 mg (2 mg/kg) given 5/19, 5/21 (3/4 dose due to rigors), and 5/22 complete  -Steroid taper initially per low risk induction protocol (now discontinued), additional 100 mg on 5/21& 5/22 prior to Thymo  Maintenance:  -Tacro level goal 8-10  - mg BID    Neuro:  Acute post-op pain: Oxycodone almost completely ineffective. Switched to PO dilaudid, will discontinue (not using). Change APAP PRN, continue Lidoderm and Robaxin PRN.    Hematology:   Anemia: Borderline macrocytic. Hgb 8.5  Renal vein prophylaxis: Heparin 500u/hr. ASA 81 mg daily-hold for biopsy.  Thrombocytopenia: Due to Thymo. HIT negative. Monitor.    Cardiorespiratory:   Hx HTN: Dopamine drip now discontinued. Monitor.  Hypoxia: H/o sleep apnea, on CPAP. Also with some likely component of pulmonary edema. Now stable on room air when awake, BiPAP while  "sleeping.  Rule out ACS: Trop WNL, echo WNL.     GI/Nutrition:   Diet: Low potassium.  Bowel regimen: Senna, Miralax    Endocrine: No acute issues.     Fluid/Electrolytes: No acute issues.    : No acute issues.    Infectious disease: No acute issues.    Prophylaxis: DVT, fall, GI (PPI), viral (Valcyte), pneumocystis (Bactrim, hold until K normalizes)    Disposition: 4A, transfer to  today.    Medical Decision Making: Medium  Subsequent visit 02304 (moderate level decision making)    TOM/Fellow/Resident Provider: Xin Mustafa NP 1901    Faculty: Stuart Rust MD PhD  _________________________________________________________________  Transplant History:   5/17/2022 (Kidney), Postoperative day: 7     Interval History: History is obtained from the patient  Overnight events: No acute events overnight. Now c/o BLE foot pain described as \"burning.\"      ROS:   A 10-point review of systems was negative except as noted above.    Meds:    [Held by provider] aspirin  81 mg Oral Daily     atorvastatin  10 mg Oral Daily     heparin  3 mL Intracatheter Once in dialysis/CRRT     heparin  3 mL Intracatheter Once in dialysis/CRRT     lidocaine  2 patch Transdermal Q24H     lidocaine   Transdermal Q8H     [Held by provider] magnesium oxide  400 mg Oral Daily with lunch     melatonin  5 mg Oral QPM     mycophenolate  750 mg Oral BID IS     pantoprazole  40 mg Oral BID AC     polyethylene glycol  17 g Oral or Feeding Tube BID     senna-docusate  2 tablet Oral or Feeding Tube BID     sodium chloride (PF)  10 mL Intracatheter Q8H     [Held by provider] sulfamethoxazole-trimethoprim  1 tablet Oral Once per day on Mon Wed Fri     tacrolimus  4 mg Oral BID IS     valGANciclovir  450 mg Oral Once per day on Mon Thu       Physical Exam:     Admit Weight: 103.4 kg (227 lb 15.3 oz)    Current vitals:   /68   Pulse 71   Temp 97.5  F (36.4  C) (Oral)   Resp 17   Ht 1.778 m (5' 10\")   Wt 116.2 kg (256 lb 2.8 oz)   SpO2 96%   BMI " 36.76 kg/m      Vital sign ranges:    Temp:  [97.5  F (36.4  C)-97.8  F (36.6  C)] 97.5  F (36.4  C)  Pulse:  [60-82] 71  Resp:  [15-21] 17  BP: (100-131)/(54-80) 114/68  Cuff Mean (mmHg):  [80] 80  SpO2:  [96 %-100 %] 96 %    General Appearance: in no apparent distress.   Skin: Warm, perfused  Heart: NSR  Lungs: Unlabored on RA  Abdomen: The abdomen is rounded, BAUDILIO serosang, incision closed with staples, c/d/i.   : gardiner removed  Extremities: edema: generalized, strength 5/5  Neurologic: alert and oriented x4. Tremor absent.    Data:   CMP  Recent Labs   Lab 05/24/22  0457 05/23/22  2047 05/23/22  0610 05/22/22  1157 05/22/22  0420 05/21/22  1203 05/21/22  1202 05/21/22  0418     --  137 136 136   < >  --  134   POTASSIUM 4.0  --  4.5 4.2 4.6   < >  --  5.4*   CHLORIDE 103  --  106 106 104   < >  --  102   CO2 25  --  22 23 24   < >  --  24   * 135* 111* 135* 152*   < >  --  120*   BUN 55*  --  60* 38* 39*   < >  --  44*   CR 4.33*  --  4.61* 2.96* 3.28*   < >  --  5.26*   GFRESTIMATED 15*  --  14* 24* 21*   < >  --  12*   YEE 8.3*  --  9.1 8.8 9.6   < >  --  9.0   ICAW  --   --   --  5.1 5.1   < >  --  5.0   MAG 2.0  --  2.4* 1.9 2.0   < >  --  2.4*   PHOS 4.6*  --  4.2 3.4 4.3   < >  --  6.3*   ALBUMIN  --   --   --  2.7* 2.7*   < >  --  3.1*   BILITOTAL  --   --   --   --  0.5  --   --  0.7   ALKPHOS  --   --   --   --  61  --   --  64   AST  --   --   --   --  6  --   --  13   ALT  --   --   --   --  23  --   --  23   FCREAT  --   --   --   --   --   --  4.9  --     < > = values in this interval not displayed.     CBC  Recent Labs   Lab 05/24/22  0457 05/23/22  0610   HGB 7.9* 8.5*   WBC 3.3* 1.5*   PLT 52* 57*

## 2022-05-24 NOTE — PROGRESS NOTES
05/24/22 1300   Quick Adds   Type of Visit Initial PT Evaluation   Living Environment   People in Home spouse   Current Living Arrangements house   Home Accessibility stairs to enter home;stairs within home   Number of Stairs, Main Entrance 3   Stair Railings, Main Entrance railings on both sides of stairs  (from garage, used most of time)   Number of Stairs, Within Home, Primary greater than 10 stairs  (to basement does not need to do)   Transportation Anticipated family or friend will provide   Living Environment Comments Pt lives with supportive wife who works from home. Pt   Self-Care   Usual Activity Tolerance good   Current Activity Tolerance moderate   Regular Exercise Yes   Activity/Exercise Type walking;biking   Exercise Amount/Frequency 3-5 times/wk   Activity/Exercise/Self-Care Comment Pt has walk in shower, handheld shower. 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 S/p living donor kidney transplant without stent on 5/17/22. Imported kidney with vasospasm in OR.   Existing Precautions/Restrictions abdominal   General Observations Pt veyr agreeable to PT   Cognition   Affect/Mental Status (Cognition) WFL   Follows Commands (Cognition) WFL;increased processing time needed   Posture    Posture Forward head position;Protracted shoulders   Range of Motion (ROM)   ROM Comment B LEs WFL   Strength (Manual Muscle Testing)   Strength Comments B LEs mildly weaker with less mobility in the last week, functionally still good   Bed Mobility   Comment, (Bed Mobility) pt reports mod I with rail, no observed today    Transfers   Comment, (Transfers) mod I   Gait/Stairs (Locomotion)   Comment, (Gait/Stairs) ' no AD   Clinical Impression   Criteria for Skilled Therapeutic Intervention Yes, treatment indicated   PT Diagnosis (PT) impaired functional activity tolerance   Influenced by the following impairments decreased balance, act tolerance   Functional limitations due to impairments decreased I with gait, stairs, endurance   Clinical Presentation (PT Evaluation Complexity) Stable/Uncomplicated   Clinical Presentation Rationale clinical judgement   Clinical Decision Making (Complexity) moderate complexity   Planned Therapy Interventions (PT) balance training;home exercise program;gait training;strengthening;patient/family education;progressive activity/exercise;risk factor education;home program guidelines   Risk & Benefits of therapy have been explained evaluation/treatment results reviewed;care plan/treatment goals reviewed;risks/benefits reviewed;current/potential barriers reviewed;participants voiced agreement with care plan;participants included;patient;spouse/significant other   PT Discharge Planning   PT Discharge Recommendation (DC Rec) home with assist;home with home care physical therapy   PT Rationale for DC Rec PT may benefit from home PT, or pmay progress to not have therapy needs, will continue to determine based on progress in next few days.   PT Brief overview of current status CGA to SBA mobility around room and on unit   Plan of Care Review   Plan of Care Reviewed With patient;spouse   Total Evaluation Time   Total Evaluation Time (Minutes) 7   Physical Therapy Goals   PT Frequency 3x/week   PT Predicted Duration/Target Date for Goal Attainment 06/07/22   PT Goals Gait;Stairs   PT: Gait Modified independent;Within precautions;Greater than 200 feet   PT: Stairs Modified independent;4 stairs;Within precautions

## 2022-05-24 NOTE — PROGRESS NOTES
Care Management Follow Up    Length of Stay (days): 7    Expected Discharge Date:       Concerns to be Addressed:  Home Care      Patient plan of care discussed at interdisciplinary rounds: Yes    Anticipated Discharge Disposition:  Home with home care     Anticipated Discharge Services:  Home care  Anticipated Discharge DME:      Patient/family educated on Medicare website which has current facility and service quality ratings:  yes  Education Provided on the Discharge Plan:  yes  Patient/Family in Agreement with the Plan:  yes    Referrals Placed by CM/SW:  Home care for PT/RN  Private pay costs discussed: Not applicable    Additional Information:  Patient will need home care post kidney transplant. Writer placed referrals for the following home care agencies. Patient was accepted to [facility] for home care for PT/RN services. Told them that RN would need to draw labs two times per week. Team also alerted writer that patient will be a delayed graft function. Writer called inpatient HD at [phone] at let them know. Will need to update [facility] on discharge plan and place orders.     Xin Sweet  RN Care Coordinator   MICU/SICU  [phone]       [facility] PT/RN  [phone]        Home Care Agencies   Pijbwqcwx-Aevihhky-qapqzews   [facility]-szvvjwwz-vqziznmn   Nhuwoohx-maivqqpe-hattbhoim   [facility]-declined   [facility]-declined   [facility]-pending  [facility]-declined  [facility]-Accepted  [facility]-pending  [facility]-pending  [facility]-pending   [facility]-declined-insurance  [facility]-declined   [facility]-pending     RNCC will continue to work on home care for patient.     Xin Sweet  RN Care Coordinator   MICU/SICHUMZA  [phone]                                     Xin Sweet, RN

## 2022-05-24 NOTE — PLAN OF CARE
ICU End of Shift Summary. See flowsheets for vital signs and detailed assessment.    Changes this shift:   No major events, heparin stopped for renal biopsy this morning. PRN tylenol given x1    Plan: Continue with POC, transfer to floor with bed availability      Goal Outcome Evaluation:    Plan of Care Reviewed With: patient     Overall Patient Progress: improving    Outcome Evaluation: Patient walked around unit, transfer appropriate

## 2022-05-24 NOTE — IR NOTE
Patient Name: Donald Blanco  Medical Record Number: [id number]  Today's Date: 5/24/2022    Procedure: Transplant renal biopsy  Proceduralist: Philip Lara  Pathology present: yes    Procedure Start: 1019  Procedure end: 1035  Sedation medications administered: 50 mcg fentanyl, 1 mg versed     Report given to: Xin KRUSE   : CARMINE    Other Notes: Pt arrived to IR room 2 from . Consent reviewed. Pt denies any questions or concerns regarding procedure. Pt positioned supine and monitored per protocol. Pt tolerated procedure without any noted complications. Pt transferred back to .    Bedrest x2 hours

## 2022-05-24 NOTE — PLAN OF CARE
ICU End of Shift Summary. See flowsheets for vital signs and detailed assessment.    Changes this shift:No major events, VSS, J-bulb drainage adequate. IR for biopsy today. PRN Tylenol given x1 with good result. Held dialysis, will reevaluate tomorrow.     Plan: Continue plan of care, adjust as needed.    Goal Outcome Evaluation: Progressing    Plan of Care Reviewed With: patient     Overall Patient Progress: improving    Outcome Evaluation: Patient worked with PT, IR for biopsy, transfer appropriate.

## 2022-05-24 NOTE — PRE-PROCEDURE
GENERAL PRE-PROCEDURE:   Procedure:  Transplant kidney biopsy  Date/Time:  5/24/2022 9:59 AM    Verbal consent obtained?: Yes    Written consent obtained?: Yes    Risks and benefits: Risks, benefits and alternatives were discussed    Consent given by:  Patient  Patient states understanding of procedure being performed: Yes    Patient's understanding of procedure matches consent: Yes    Procedure consent matches procedure scheduled: Yes    Expected level of sedation:  Moderate  Appropriately NPO:  Yes  Mallampati  :  Grade 2- soft palate, base of uvula, tonsillar pillars, and portion of posterior pharyngeal wall visible  Lungs:  Lungs clear with good breath sounds bilaterally  Heart:  Normal heart sounds and rate  History & Physical reviewed:  History and physical reviewed and no updates needed  Statement of review:  I have reviewed the lab findings, diagnostic data, medications, and the plan for sedation

## 2022-05-25 LAB
ANION GAP SERPL CALCULATED.3IONS-SCNC: 11 MMOL/L (ref 3–14)
BK VIRUS IGG ANTIBODY: ABNORMAL
BUN SERPL-MCNC: 83 MG/DL (ref 7–30)
CALCIUM SERPL-MCNC: 8.8 MG/DL (ref 8.5–10.1)
CHLORIDE BLD-SCNC: 103 MMOL/L (ref 94–109)
CO2 SERPL-SCNC: 21 MMOL/L (ref 20–32)
CREAT SERPL-MCNC: 5.86 MG/DL (ref 0.66–1.25)
ERYTHROCYTE [DISTWIDTH] IN BLOOD BY AUTOMATED COUNT: 13.2 % (ref 10–15)
GFR SERPL CREATININE-BSD FRML MDRD: 10 ML/MIN/1.73M2
GLUCOSE BLD-MCNC: 106 MG/DL (ref 70–99)
GLUCOSE BLDC GLUCOMTR-MCNC: 104 MG/DL (ref 70–99)
HCT VFR BLD AUTO: 23.5 % (ref 40–53)
HGB BLD-MCNC: 7.6 G/DL (ref 13.3–17.7)
MAGNESIUM SERPL-MCNC: 2 MG/DL (ref 1.6–2.3)
MCH RBC QN AUTO: 32.1 PG (ref 26.5–33)
MCHC RBC AUTO-ENTMCNC: 32.3 G/DL (ref 31.5–36.5)
MCV RBC AUTO: 99 FL (ref 78–100)
PHOSPHATE SERPL-MCNC: 7.1 MG/DL (ref 2.5–4.5)
PLATELET # BLD AUTO: 69 10E3/UL (ref 150–450)
POTASSIUM BLD-SCNC: 4.2 MMOL/L (ref 3.4–5.3)
RBC # BLD AUTO: 2.37 10E6/UL (ref 4.4–5.9)
SODIUM SERPL-SCNC: 135 MMOL/L (ref 133–144)
WBC # BLD AUTO: 4.5 10E3/UL (ref 4–11)

## 2022-05-25 PROCEDURE — 85027 COMPLETE CBC AUTOMATED: CPT | Performed by: NURSE PRACTITIONER

## 2022-05-25 PROCEDURE — 99233 SBSQ HOSP IP/OBS HIGH 50: CPT | Mod: 24 | Performed by: INTERNAL MEDICINE

## 2022-05-25 PROCEDURE — 250N000013 HC RX MED GY IP 250 OP 250 PS 637: Performed by: STUDENT IN AN ORGANIZED HEALTH CARE EDUCATION/TRAINING PROGRAM

## 2022-05-25 PROCEDURE — 83735 ASSAY OF MAGNESIUM: CPT

## 2022-05-25 PROCEDURE — 250N000013 HC RX MED GY IP 250 OP 250 PS 637

## 2022-05-25 PROCEDURE — 84100 ASSAY OF PHOSPHORUS: CPT

## 2022-05-25 PROCEDURE — 80048 BASIC METABOLIC PNL TOTAL CA: CPT

## 2022-05-25 PROCEDURE — 250N000013 HC RX MED GY IP 250 OP 250 PS 637: Performed by: NURSE PRACTITIONER

## 2022-05-25 PROCEDURE — 120N000002 HC R&B MED SURG/OB UMMC

## 2022-05-25 PROCEDURE — 250N000012 HC RX MED GY IP 250 OP 636 PS 637

## 2022-05-25 PROCEDURE — 250N000013 HC RX MED GY IP 250 OP 250 PS 637: Performed by: PHYSICIAN ASSISTANT

## 2022-05-25 PROCEDURE — 250N000012 HC RX MED GY IP 250 OP 636 PS 637: Performed by: NURSE PRACTITIONER

## 2022-05-25 RX ORDER — ACETAMINOPHEN 325 MG/1
650 TABLET ORAL EVERY 4 HOURS PRN
Status: DISCONTINUED | OUTPATIENT
Start: 2022-05-25 | End: 2022-05-27 | Stop reason: HOSPADM

## 2022-05-25 RX ORDER — POLYETHYLENE GLYCOL 3350 17 G/17G
17 POWDER, FOR SOLUTION ORAL DAILY PRN
Status: DISCONTINUED | OUTPATIENT
Start: 2022-05-25 | End: 2022-05-27 | Stop reason: HOSPADM

## 2022-05-25 RX ORDER — AMOXICILLIN 250 MG
2 CAPSULE ORAL 2 TIMES DAILY PRN
Status: DISCONTINUED | OUTPATIENT
Start: 2022-05-25 | End: 2022-05-27 | Stop reason: HOSPADM

## 2022-05-25 RX ADMIN — PANTOPRAZOLE SODIUM 40 MG: 40 TABLET, DELAYED RELEASE ORAL at 16:01

## 2022-05-25 RX ADMIN — ACETAMINOPHEN 325MG 650 MG: 325 TABLET ORAL at 20:29

## 2022-05-25 RX ADMIN — ATORVASTATIN CALCIUM 10 MG: 10 TABLET, FILM COATED ORAL at 08:49

## 2022-05-25 RX ADMIN — ACETAMINOPHEN 325MG 975 MG: 325 TABLET ORAL at 09:04

## 2022-05-25 RX ADMIN — ACETAMINOPHEN 325MG 975 MG: 325 TABLET ORAL at 01:52

## 2022-05-25 RX ADMIN — HYDROMORPHONE HYDROCHLORIDE 1 MG: 2 TABLET ORAL at 22:21

## 2022-05-25 RX ADMIN — ACETAMINOPHEN 325MG 975 MG: 325 TABLET ORAL at 13:20

## 2022-05-25 RX ADMIN — LIDOCAINE 2 PATCH: 560 PATCH PERCUTANEOUS; TOPICAL; TRANSDERMAL at 08:49

## 2022-05-25 RX ADMIN — Medication 5 MG: at 22:21

## 2022-05-25 RX ADMIN — PANTOPRAZOLE SODIUM 40 MG: 40 TABLET, DELAYED RELEASE ORAL at 08:49

## 2022-05-25 RX ADMIN — BUMETANIDE 3 MG: 1 TABLET ORAL at 13:20

## 2022-05-25 RX ADMIN — TACROLIMUS 4.5 MG: 5 CAPSULE ORAL at 17:21

## 2022-05-25 RX ADMIN — MYCOPHENOLATE MOFETIL 750 MG: 250 CAPSULE ORAL at 17:21

## 2022-05-25 RX ADMIN — TACROLIMUS 4.5 MG: 5 CAPSULE ORAL at 08:49

## 2022-05-25 RX ADMIN — BUMETANIDE 3 MG: 1 TABLET ORAL at 20:29

## 2022-05-25 RX ADMIN — MYCOPHENOLATE MOFETIL 750 MG: 250 CAPSULE ORAL at 08:49

## 2022-05-25 ASSESSMENT — ACTIVITIES OF DAILY LIVING (ADL)
ADLS_ACUITY_SCORE: 20

## 2022-05-25 NOTE — PROGRESS NOTES
[facility]  Transplant Nephrology progress note   Date of Admission:  5/17/2022  Today's Date: 05/24/2022  Requesting physician: Howard Flores MD    Recommendations:  - Kidney allograft biopsy today   - monitor Hb  Post biopsy     Assessment & Plan    Donald Blanco is a 57 year old male with history of IgA nephropathy, resultant ESRD, on HD 3 times/week. (Access: Right IJ TDC).At baseline trace urine output.Underwent LDKT on 5/17/22, without ureteral stent.Imported kidney with vasospasm in OR.Other comorbidities include HTN, and prostate cancer s/p prostatectomy and radiation.    # LDKT: without ureteral stent.Imported kidney with vasospasm in OR . DGF 2/2 muscle spasm, hypoperfusion and or, renal vein stenosis. Patient continues to have low UOP, hypervolemia , hyperkalemia.  Patient given Thymoglobulin  ( 2 mg/kg) on 5/19/2022 followed by 2-hour run of low flow iHD with 1 L UF.  Following this patient had persistent hypotension warranting him to be transferred to the ICU, received albumin infusions and started on dopamine. Hypotension has since resolved and has been off of dopamine. Continues to be anuric        - Baseline Creatinine: ~  TBD   - Proteinuria: Not checked post transplant   - Date DSA Last Checked: May/2022      Latest DSA: No DSA at time of transplant   - BK Viremia: Not checked post transplant   - Kidney Tx Biopsy: No    -No stent placed              -On heparin for renal vein prophylaxis in the setting of renal vein stenosis  - HD Info  Access: Right IJ Tunneled Catheter;   - HD 5/24 : 2 L UF achieved     # Immunosuppression: Tacrolimus immediate release (goal 8-10), Mycophenolate mofetil (dose 750 mg every 12 hours) and Prednisone (dose taper)               - Induction: Intermediate . PRA 19.  Basiliximab 5/17, rATG on 5/19 and 5/21   - Changes: YES .Change to rATG induction for DGF. Stop prednisone taper after rATG induction    # Infection  Prophylaxis:   - PJP: Sulfa/TMP (Bactrim) - restart today, MWF  - CMV: Valganciclovir (Valcyte). No discordance D-/R-    # Hypertension: Hypotensive;improved , off pressors .oal BP: MAP > 65   - Volume status: Markedly hypervolemic    - Changes: No    -Will evaluate tmrw for iHD in order to UF fluid    # Elevated Blood Glucose: Glucose generally running ~ 170 -200    - Management as per primary team.    # Anemia in Chronic Renal Disease: Hgb: Stable      YUDI: No   - Iron studies: Unknown at this time, but checked with dialysis    # Mineral Bone Disorder:   - Secondary renal hyperparathyroidism; PTH level: Unknown at this time, but checked with dialysis        On treatment: None  - Vitamin D; level: Unknown at this time, but checked with dialysis        On supplement: No  - Calcium; level: Normal        On supplement: No  - Phosphorus; level: Normal        On supplement: No    # Electrolytes:   - Potassium; level: Normal        On supplement: No  - Magnesium; level: Normal        On supplement: No  - Bicarbonate; level: Normal        On supplement: No  - Sodium; level: Normal    # Thrombocytopenia:   -Occurred after rATG dose on 5/19. Neg BISHOP panel    #HyperKalemia:   -Changed CRRT to 2K bath, improved    # Transplant History:  Etiology of Kidney Failure: IgA nephropathy  Tx: LDKT  Transplant: 5/17/2022 (Kidney)  Crossmatch at time of Tx: negative  Significant changes in immunosuppression: None  Significant transplant-related complications: None    Recommendations were communicated to the primary team verbally.  Patient seen and discussed with Dr Derrick Ayala MD  Pager: [phone]    Attestation:  This patient has been seen and evaluated by me, Woodrow Cr MD.  I have reviewed the note and agree with plan of care as documented by the fellow.     Interval History   Allograft biopsy today  Off pressors   Feels fine  No CP/SOB/Abd pain     Review of Systems    The 4 point Review of Systems is negative  "other than noted above or here.     Allergies   No Active Allergies  Prior to Admission Medications    [Held by provider] aspirin  81 mg Oral Daily    atorvastatin  10 mg Oral Daily    heparin  3 mL Intracatheter Once in dialysis/CRRT    heparin  3 mL Intracatheter Once in dialysis/CRRT    lidocaine  2 patch Transdermal Q24H    lidocaine   Transdermal Q8H    [Held by provider] magnesium oxide  400 mg Oral Daily with lunch    melatonin  5 mg Oral QPM    mycophenolate  750 mg Oral BID IS    pantoprazole  40 mg Oral BID AC    polyethylene glycol  17 g Oral or Feeding Tube BID    senna-docusate  2 tablet Oral or Feeding Tube BID    sodium chloride (PF)  10 mL Intracatheter Q8H    [Held by provider] sulfamethoxazole-trimethoprim  1 tablet Oral Once per day on     tacrolimus  4.5 mg Oral BID IS    valGANciclovir  450 mg Oral Once per day on       heparin Stopped (22 0030)    - MEDICATION INSTRUCTIONS -         Physical Exam   Temp  Av.1  F (36.7  C)  Min: 97.7  F (36.5  C)  Max: 98.6  F (37  C)      Pulse  Av.1  Min: 63  Max: 105 Resp  Avg: 15.8  Min: 8  Max: 19  SpO2  Av.3 %  Min: 94 %  Max: 100 %    CVP (mmHg): 15 mmHgBP 109/56 (BP Location: Right arm)   Pulse 75   Temp 97.7  F (36.5  C) (Oral)   Resp 16   Ht 1.778 m (5' 10\")   Wt 116.2 kg (256 lb 2.8 oz)   SpO2 100%   BMI 36.76 kg/m     Date 22 07 - 22 0659   Shift 0680-7238 0322-7804 7083-0166 24 Hour Total   INTAKE   I.V. 1081.67   1081.67   Shift Total(mL/kg) 1081.67(10)   1081.67(10)   OUTPUT   Urine 335 50  385   Drains 165   165   Shift Total(mL/kg) 500(4.62) 50(0.46)  550(5.08)   Weight (kg) 108.2 108.2 108.2 108.2      Admit Weight: 103.4 kg (227 lb 15.3 oz)     GENERAL APPEARANCE: alert and no distress, sitting up in bed, pleasant and cooperative  HENT: mouth without ulcers or lesions  RESP: lungs clear to auscultation - no rales, rhonchi or wheezes  CV: regular rhythm, normal rate, no rub, no " murmur  EDEMA: 1+ trace bilateral edema   ABDOMEN: soft, nondistended, minimally TTP at incision which is c/d/i, bowel sounds normal  MS: extremities normal - no gross deformities noted, no evidence of inflammation in joints, no muscle tenderness  SKIN: no rash  NEURO: Alert awake oriented x3, moving all extremities well.  ACCESS: [facility]     Data   CMP  Recent Labs   Lab 05/24/22  0457 05/23/22  2047 05/23/22  0610 05/22/22  1157 05/22/22  0420 05/21/22  1942 05/21/22  1939 05/21/22  1203 05/21/22  0418     --  137 136 136  --  135 134 134   POTASSIUM 4.0  --  4.5 4.2 4.6  --  4.9 4.6 5.4*   CHLORIDE 103  --  106 106 104  --  104 102 102   CO2 25  --  22 23 24  --  23 25 24   ANIONGAP 7  --  9 7 8  --  8 7 8   * 135* 111* 135* 152*   < > 169* 138* 120*   BUN 55*  --  60* 38* 39*  --  41* 39* 44*   CR 4.33*  --  4.61* 2.96* 3.28*  --  3.80* 4.21* 5.26*   GFRESTIMATED 15*  --  14* 24* 21*  --  17* 15* 12*   YEE 8.3*  --  9.1 8.8 9.6  --  9.0 9.4 9.0   MAG 2.0  --  2.4* 1.9 2.0  --  2.2 2.2 2.4*   PHOS 4.6*  --  4.2 3.4 4.3  --  4.8* 5.4* 6.3*   PROTTOTAL  --   --   --   --  5.9*  --   --   --  6.2*   ALBUMIN  --   --   --  2.7* 2.7*  --  2.9* 3.1* 3.1*   BILITOTAL  --   --   --   --  0.5  --   --   --  0.7   ALKPHOS  --   --   --   --  61  --   --   --  64   AST  --   --   --   --  6  --   --   --  13   ALT  --   --   --   --  23  --   --   --  23    < > = values in this interval not displayed.     CBC  Recent Labs   Lab 05/24/22  0457 05/23/22  0610 05/22/22  1157 05/22/22  0420   HGB 7.9* 8.5* 8.7* 8.5*   WBC 3.3* 1.5* 4.1 3.7*   RBC 2.43* 2.57* 2.65* 2.63*   HCT 24.2* 26.0* 26.7* 26.5*    101* 101* 101*   MCH 32.5 33.1* 32.8 32.3   MCHC 32.6 32.7 32.6 32.1   RDW 13.0 13.2 13.2 13.2   PLT 52* 57* 83* 75*     INR  Recent Labs   Lab 05/21/22  0951   PTT 39*     ABG  No lab results found in last 7 days.   Urine Studies  Recent Labs   Lab Test 05/09/22  0957 12/10/20  1338 09/03/20  1142   COLOR  Yellow Straw Straw   APPEARANCE Clear Clear Clear   URINEGLC Negative Negative Negative   URINEBILI Negative Negative Negative   URINEKETONE Negative Negative Negative   SG 1.015 1.011 1.011   UBLD Moderate* Negative Small*   URINEPH 6.0 5.0 5.0   PROTEIN 300 * >499* >499*   NITRITE Negative Negative Negative   LEUKEST Small* Negative Negative   RBCU 0 1 1   WBCU 6* 2 2     No lab results found.  PTH  Recent Labs   Lab Test 05/09/22  0959   PTHI 450*     Iron Studies  Recent Labs   Lab Test 05/09/22  0959   IRON 79      IRONSAT 24   LANCE 1,002*       IMAGING:  All imaging studies reviewed by me.

## 2022-05-25 NOTE — PLAN OF CARE
ICU End of Shift Summary. See flowsheets for vital signs and detailed assessment.    Changes this shift: VSS. No acute events, no knew skin concerns.     Plan: Continue plan of care, adjust as needed.    Goal Outcome Evaluation: Progressing    Plan of Care Reviewed With: patient     Overall Patient Progress: improving    Outcome Evaluation: Vitals stable. Floor status/Floor ready. No HD today.

## 2022-05-25 NOTE — PLAN OF CARE
ICU End of Shift Summary. See flowsheets for vital signs and detailed assessment.    Changes this shift: Vital signs stable. Heparin gtt restarted, straight rate 500. PRN tylenol and atarax given for pain in feet. Moderate edema noted in BLE. Home CPAP in use when asleep. Serosanguinous output from BAUDILIO drain.    Plan: Hemodialysis per renal team. Transfer to floor when able. Continue plan of care.    Goal Outcome Evaluation:    Plan of Care Reviewed With: patient     Overall Patient Progress: improving    Outcome Evaluation: Vitals stable. HD today.

## 2022-05-25 NOTE — PROGRESS NOTES
Transplant Surgery  Inpatient Daily Progress Note  05/25/2022    Assessment & Plan: 58yo with history of ESRD secondary to IgA nephropathy, dialysis via a tunneled catheter, HTN, and prostate cancer s/p prostatectomy and radiation. S/p living donor kidney transplant without stent on 5/17/22. Imported kidney with vasospasm in OR.    LDKT 5/17/22; DGF: POD #8. Cr 5.9 (from 4.3), last HD 5/23. UOP increasing, made 370+ mL urine yesterday. Will give 3 mg Bumex x2 today. RV velocities continue to improve on US. Kidney biopsy in IR 5/24, pathology pending.    DGF: Did undergo dialysis run on POD 2, was acutely hypotensive post dialysis. Likely due to some combination of thymo reaction + dialysis. Blood pressures did not improve despite several fluid boluses and albumin. Ultimately transferred to the SICU for dopamine. Weaned off Dopamine 5/22. Off CRRT 5/22. Tolerated iHD 5/23.     Fluid collection: US 5/20 with 7.7 cm fluid collection. Drain Cr 4.9: Serum Cr 4.2. US 5/23 unchanged.    Immunosuppression management:   Induction: initially via low risk protocol (PRA 19), transitioned to high risk in light of DGF.  -Basiliximab 5/17  -Thymo 200 mg (2 mg/kg) given 5/19, 5/21 (3/4 dose due to rigors), and 5/22 complete  -Steroid taper initially per low risk induction protocol (now discontinued), additional 100 mg on 5/21& 5/22 prior to Thymo  Maintenance:  -Tacro level goal 8-10  - mg BID    Neuro:  Acute post-op pain: Oxycodone almost completely ineffective. Switched to PO dilaudid. Change APAP PRN, continue Lidoderm and Robaxin PRN.    Hematology:   Anemia: Borderline macrocytic. Hgb 7.6  Renal vein prophylaxis: Heparin 500 u/hr. ASA 81 mg daily-hold for biopsy.  Thrombocytopenia: Due to Thymo. HIT negative. Monitor.    Cardiorespiratory:   Hx HTN: Dopamine drip now discontinued. Monitor.  Hypoxia: H/o sleep apnea, on CPAP. Also with some likely component of pulmonary edema. Now stable on room air when awake, BiPAP  while sleeping.  Rule out ACS: Trop WNL, echo WNL.     GI/Nutrition:   Diet: Regular  Bowel regimen: Senna/Miralax, change to PRN.    Endocrine: No acute issues.     Fluid/Electrolytes: No acute issues.    : No acute issues.    Infectious disease: No acute issues.    Prophylaxis: DVT, fall, GI (PPI), viral (Valcyte), pneumocystis (Bactrim, hold until K normalizes)    Disposition: 4A, transfer to 7A today.    Medical Decision Making: Medium  Subsequent visit 60741 (moderate level decision making)    TOM/Fellow/Resident Provider: Xin Mustafa NP [id number]    Faculty: Stuart Rust MD PhD  _________________________________________________________________  Transplant History:   5/17/2022 (Kidney), Postoperative day: 8     Interval History: History is obtained from the patient  Overnight events: No acute events overnight. Bilateral foot pain slightly improved today with elevation/Tylenol. Tolerating diet. Up ad francisco. +BMs.      ROS:   A 10-point review of systems was negative except as noted above.    Meds:    [Held by provider] aspirin  81 mg Oral Daily     atorvastatin  10 mg Oral Daily     bumetanide  3 mg Oral BID     heparin  3 mL Intracatheter Once in dialysis/CRRT     heparin  3 mL Intracatheter Once in dialysis/CRRT     lidocaine  2 patch Transdermal Q24H     lidocaine   Transdermal Q8H     [Held by provider] magnesium oxide  400 mg Oral Daily with lunch     melatonin  5 mg Oral QPM     mycophenolate  750 mg Oral BID IS     pantoprazole  40 mg Oral BID AC     polyethylene glycol  17 g Oral or Feeding Tube BID     sodium chloride (PF)  10 mL Intracatheter Q8H     [Held by provider] sulfamethoxazole-trimethoprim  1 tablet Oral Once per day on Mon Wed Fri     tacrolimus  4.5 mg Oral BID IS     valGANciclovir  450 mg Oral Once per day on Mon Thu       Physical Exam:     Admit Weight: 103.4 kg (227 lb 15.3 oz)    Current vitals:   /66 (BP Location: Right arm)   Pulse 78   Temp 98  F (36.7  C) (Oral)   Resp 15    " 1.778 m (5' 10\")   Wt 115 kg (253 lb 8 oz)   SpO2 100%   BMI 36.37 kg/m      Vital sign ranges:    Temp:  [97.5  F (36.4  C)-98  F (36.7  C)] 98  F (36.7  C)  Pulse:  [72-78] 78  Resp:  [15-18] 15  BP: (109-121)/(56-66) 121/66  SpO2:  [97 %-100 %] 100 %    General Appearance: in no apparent distress.   Skin: Warm, perfused  Heart: NSR  Lungs: Unlabored on RA  Abdomen: The abdomen is rounded, BAUDILIO serosang, incision closed with staples, c/d/i.   : gardiner removed  Extremities: edema: generalized, strength 5/5  Neurologic: alert and oriented x4. Tremor absent.    Data:   CMP  Recent Labs   Lab 05/25/22  0427 05/24/22  0457 05/23/22  0610 05/22/22  1157 05/22/22  0420 05/21/22  1203 05/21/22  1202 05/21/22  0418    135   < > 136 136   < >  --  134   POTASSIUM 4.2 4.0   < > 4.2 4.6   < >  --  5.4*   CHLORIDE 103 103   < > 106 104   < >  --  102   CO2 21 25   < > 23 24   < >  --  24   * 126*   < > 135* 152*   < >  --  120*   BUN 83* 55*   < > 38* 39*   < >  --  44*   CR 5.86* 4.33*   < > 2.96* 3.28*   < >  --  5.26*   GFRESTIMATED 10* 15*   < > 24* 21*   < >  --  12*   YEE 8.8 8.3*   < > 8.8 9.6   < >  --  9.0   ICAW  --   --   --  5.1 5.1   < >  --  5.0   MAG 2.0 2.0   < > 1.9 2.0   < >  --  2.4*   PHOS 7.1* 4.6*   < > 3.4 4.3   < >  --  6.3*   ALBUMIN  --   --   --  2.7* 2.7*   < >  --  3.1*   BILITOTAL  --   --   --   --  0.5  --   --  0.7   ALKPHOS  --   --   --   --  61  --   --  64   AST  --   --   --   --  6  --   --  13   ALT  --   --   --   --  23  --   --  23   FCREAT  --   --   --   --   --   --  4.9  --     < > = values in this interval not displayed.     CBC  Recent Labs   Lab 05/25/22  0427 05/24/22 2203 05/24/22 0457   HGB 7.6* 8.7* 7.9*   WBC 4.5  --  3.3*   PLT 69*  --  52*       "

## 2022-05-25 NOTE — PROGRESS NOTES
"CLINICAL NUTRITION SERVICES - REASSESSMENT NOTE     Nutrition Prescription    RECOMMENDATIONS FOR MDs/PROVIDERS TO ORDER:  none    Malnutrition Status:    Patient does not meet two of the established criteria necessary for diagnosing malnutrition    Recommendations already ordered by Registered Dietitian (RD):  None    Future/Additional Recommendations:  Monitor K+ / need to resumed restricted diet     EVALUATION OF THE PROGRESS TOWARD GOALS   Diet: 5/24 Regular <-- 3 gm K+  Intake: 75 - 100% per flowsheets     NEW FINDINGS   Pt transferred to SICU 5/19 for hemodynamic instability / CRRT.   Off CRRT since 5/22, transitioned to iHD    Pt reports appetite \"fair\", ~ 75% of baseline, growing tired of hospital menu.   Wife at bedside attentive and both patient and wife able to verbalize understanding of post transplant diet guidelines.    Weights: 116.2 kg, up from pre op weight of 103.4 kg, likely fluid related  Labs:   K+ 4.2 (previously hyperkamic 5/21 5.4, high).   this am  Meds reviewed    MALNUTRITION  % Intake: No decreased intake noted  % Weight Loss: None noted  Subcutaneous Fat Loss: None observed  Muscle Loss: None observed  Fluid Accumulation/Edema: trace  Malnutrition Diagnosis: Patient does not meet two of the established criteria necessary for diagnosing malnutrition    Previous Goals   1. Patient will verbalize understanding of 3 important aspects of post-transplant diet guidelines.   2. PO intake >50% meals TID.  Evaluation: Met    Previous Nutrition Diagnosis  Food and nutrition-related knowledge deficit r/t length of time since previous post-transplant education AEB patient verbal report, review of chart record, and MD consult for nutrition education.   Evaluation: No longer applicable, nutrition diagnosis changed below    CURRENT NUTRITION DIAGNOSIS  Increased nutrient needs (calories / protein) related to Post op SOT     INTERVENTIONS  Implementation  Nutrition education:  1.  Discussed need to " monitor K+ (currently WNL, previously elevated)  2.  Reinforced need for high protein and food safety guidelines    Goals  Patient to consume % of nutritionally adequate meal trays TID, or the equivalent with supplements/snacks.    Monitoring/Evaluation  Progress toward goals will be monitored and evaluated per protocol.    Virginia Noble RD, LD  [address] [facility] pager: [phone]  Ascom: [phone]

## 2022-05-25 NOTE — PROGRESS NOTES
[facility]  Transplant Nephrology progress note   Date of Admission:  5/17/2022  Today's Date: 05/25/2022  Requesting physician: Howard Flores MD    Recommendations:  -  Bumex 3 mg IV BID x2 doses  - no HD today    Assessment & Plan    Donald Blanco is a 57 year old male with history of IgA nephropathy, resultant ESRD, on HD 3 times/week. (Access: Right IJ TDC).At baseline trace urine output.Underwent LDKT on 5/17/22, without ureteral stent.Imported kidney with vasospasm in OR.Other comorbidities include HTN, and prostate cancer s/p prostatectomy and radiation.    # LDKT: without ureteral stent.Imported kidney with vasospasm in OR . DGF 2/2 muscle spasm, hypoperfusion and or, renal vein stenosis. Patient continues to have low UOP, hypervolemia , hyperkalemia.  Patient given Thymoglobulin  ( 2 mg/kg) on 5/19/2022 followed by 2-hour run of low flow iHD with 1 L UF.  Following this patient had persistent hypotension warranting him to be transferred to the ICU, received albumin infusions and started on dopamine. Hypotension has since resolved and has been off of dopamine. HD on 5/23, UF 2000 mL. UOP has started to increase       - Baseline Creatinine: ~  TBD   - Proteinuria: Not checked post transplant   - Date DSA Last Checked: May/2022      Latest DSA: No DSA at time of transplant   - BK Viremia: Not checked post transplant   - Kidney Tx Biopsy:  Yes, results pending    -No stent placed              -On heparin for renal vein prophylaxis in the setting of renal vein stenosis  - HD Info  Access: Right IJ Tunneled Catheter;   - HD 5/23 : 2 L UF achieved     # Immunosuppression: Tacrolimus immediate release (goal 8-10), Mycophenolate mofetil (dose 750 mg every 12 hours) and Prednisone (dose taper)               - Induction: Intermediate . PRA 19.  Basiliximab 5/17, rATG on 5/19 and 5/21   - Changes: YES .Change to rATG induction for DGF. Stop prednisone taper after  rATG induction    # Infection Prophylaxis:   - PJP: Sulfa/TMP (Bactrim) - MWF  - CMV: Valganciclovir (Valcyte). No discordance D-/R-    # Hypertension: Hypotensive;improved , off pressors .oal BP: MAP > 65   - Volume status: Markedly hypervolemic    - Changes: No      # Elevated Blood Glucose: Glucose generally running ~ 170 -200    - Management as per primary team.    # Anemia in Chronic Renal Disease: Hgb: Stable      YUDI: No   - Iron studies: Unknown at this time, but checked with dialysis    # Mineral Bone Disorder:   - Secondary renal hyperparathyroidism; PTH level: Unknown at this time, but checked with dialysis        On treatment: None  - Vitamin D; level: Unknown at this time, but checked with dialysis        On supplement: No  - Calcium; level: Normal        On supplement: No  - Phosphorus; level: Normal        On supplement: No    # Electrolytes:   - Potassium; level: Normal        On supplement: No  - Magnesium; level: Normal        On supplement: No  - Bicarbonate; level: Normal        On supplement: No  - Sodium; level: Normal    # Thrombocytopenia:   -Occurred after rATG dose on 5/19. Neg BISHOP panel, improving    #HyperKalemia:   - resolved    # Transplant History:  Etiology of Kidney Failure: IgA nephropathy  Tx: LDKT  Transplant: 5/17/2022 (Kidney)  Crossmatch at time of Tx: negative  Significant changes in immunosuppression: None  Significant transplant-related complications: None    Recommendations were communicated to the primary team via this note.  Patient seen and discussed with Dr Marino Hanks MD    Attestation:  This patient has been seen and evaluated by me, Woodrow Cr MD.  I have reviewed the note and agree with plan of care as documented by the fellow.       Interval History   Reports incisional pain that is well controlled. He reports burning pain in the bottom of his feet, legs feel heavy. No SOB, no chest pain.     Review of Systems    The 4 point Review of Systems is  "negative other than noted above or here.     Allergies   No Active Allergies  Prior to Admission Medications     [Held by provider] aspirin  81 mg Oral Daily     atorvastatin  10 mg Oral Daily     bumetanide  3 mg Oral BID     heparin  3 mL Intracatheter Once in dialysis/CRRT     heparin  3 mL Intracatheter Once in dialysis/CRRT     lidocaine  2 patch Transdermal Q24H     lidocaine   Transdermal Q8H     [Held by provider] magnesium oxide  400 mg Oral Daily with lunch     melatonin  5 mg Oral QPM     mycophenolate  750 mg Oral BID IS     pantoprazole  40 mg Oral BID AC     polyethylene glycol  17 g Oral or Feeding Tube BID     sodium chloride (PF)  10 mL Intracatheter Q8H     [Held by provider] sulfamethoxazole-trimethoprim  1 tablet Oral Once per day on      tacrolimus  4.5 mg Oral BID IS     valGANciclovir  450 mg Oral Once per day on        heparin 500 Units/hr (22 0800)     - MEDICATION INSTRUCTIONS -         Physical Exam   Temp  Av.1  F (36.7  C)  Min: 97.7  F (36.5  C)  Max: 98.6  F (37  C)      Pulse  Av.1  Min: 63  Max: 105 Resp  Avg: 15.8  Min: 8  Max: 19  SpO2  Av.3 %  Min: 94 %  Max: 100 %    CVP (mmHg): 15 mmHgBP 121/66 (BP Location: Right arm)   Pulse 78   Temp 98  F (36.7  C) (Oral)   Resp 15   Ht 1.778 m (5' 10\")   Wt 116.2 kg (256 lb 2.8 oz)   SpO2 100%   BMI 36.76 kg/m     Date 22 07 - 22 0659   Shift 6776-2764 8220-2481 8078-5875 24 Hour Total   INTAKE   I.V. 1081.67   1081.67   Shift Total(mL/kg) 1081.67(10)   1081.67(10)   OUTPUT   Urine 335 50  385   Drains 165   165   Shift Total(mL/kg) 500(4.62) 50(0.46)  550(5.08)   Weight (kg) 108.2 108.2 108.2 108.2      Admit Weight: 103.4 kg (227 lb 15.3 oz)     GENERAL APPEARANCE: alert and no distress, sitting up in chair, pleasant and cooperative  HENT: mouth without ulcers or lesions  RESP: lungs clear to auscultation - no rales, rhonchi or wheezes  CV: regular rhythm, normal rate, no rub, no " murmur  EDEMA: 1+ trace bilateral edema   ABDOMEN: soft, nondistended, minimally TTP at incision which is c/d/i, bowel sounds normal  MS: extremities normal - no gross deformities noted, no evidence of inflammation in joints, no muscle tenderness  SKIN: no rash  NEURO: Alert awake oriented x3, moving all extremities well.    ACCESS: [facility]     Data   CMP  Recent Labs   Lab 05/25/22  0427 05/24/22  0457 05/23/22  2047 05/23/22  0610 05/22/22  1157 05/22/22  0420 05/21/22  1942 05/21/22  1939 05/21/22  1203 05/21/22  0418    135  --  137 136 136  --  135 134 134   POTASSIUM 4.2 4.0  --  4.5 4.2 4.6  --  4.9 4.6 5.4*   CHLORIDE 103 103  --  106 106 104  --  104 102 102   CO2 21 25  --  22 23 24  --  23 25 24   ANIONGAP 11 7  --  9 7 8  --  8 7 8   * 126* 135* 111* 135* 152*   < > 169* 138* 120*   BUN 83* 55*  --  60* 38* 39*  --  41* 39* 44*   CR 5.86* 4.33*  --  4.61* 2.96* 3.28*  --  3.80* 4.21* 5.26*   GFRESTIMATED 10* 15*  --  14* 24* 21*  --  17* 15* 12*   YEE 8.8 8.3*  --  9.1 8.8 9.6  --  9.0 9.4 9.0   MAG 2.0 2.0  --  2.4* 1.9 2.0  --  2.2 2.2 2.4*   PHOS 7.1* 4.6*  --  4.2 3.4 4.3  --  4.8* 5.4* 6.3*   PROTTOTAL  --   --   --   --   --  5.9*  --   --   --  6.2*   ALBUMIN  --   --   --   --  2.7* 2.7*  --  2.9* 3.1* 3.1*   BILITOTAL  --   --   --   --   --  0.5  --   --   --  0.7   ALKPHOS  --   --   --   --   --  61  --   --   --  64   AST  --   --   --   --   --  6  --   --   --  13   ALT  --   --   --   --   --  23  --   --   --  23    < > = values in this interval not displayed.     CBC  Recent Labs   Lab 05/25/22  0427 05/24/22  2203 05/24/22  0457 05/23/22  0610 05/22/22  1157   HGB 7.6* 8.7* 7.9* 8.5* 8.7*   WBC 4.5  --  3.3* 1.5* 4.1   RBC 2.37*  --  2.43* 2.57* 2.65*   HCT 23.5*  --  24.2* 26.0* 26.7*   MCV 99  --  100 101* 101*   MCH 32.1  --  32.5 33.1* 32.8   MCHC 32.3  --  32.6 32.7 32.6   RDW 13.2  --  13.0 13.2 13.2   PLT 69*  --  52* 57* 83*     INR  Recent Labs   Lab  05/21/22  0951   PTT 39*     ABG  No lab results found in last 7 days.   Urine Studies  Recent Labs   Lab Test 05/09/22  0957 12/10/20  1338 09/03/20  1142   COLOR Yellow Straw Straw   APPEARANCE Clear Clear Clear   URINEGLC Negative Negative Negative   URINEBILI Negative Negative Negative   URINEKETONE Negative Negative Negative   SG 1.015 1.011 1.011   UBLD Moderate* Negative Small*   URINEPH 6.0 5.0 5.0   PROTEIN 300 * >499* >499*   NITRITE Negative Negative Negative   LEUKEST Small* Negative Negative   RBCU 0 1 1   WBCU 6* 2 2     No lab results found.  PTH  Recent Labs   Lab Test 05/09/22  0959   PTHI 450*     Iron Studies  Recent Labs   Lab Test 05/09/22  0959   IRON 79      IRONSAT 24   LANCE 1,002*       IMAGING:  All imaging studies reviewed by me.

## 2022-05-26 LAB
ANION GAP SERPL CALCULATED.3IONS-SCNC: 13 MMOL/L (ref 3–14)
BUN SERPL-MCNC: 102 MG/DL (ref 7–30)
CALCIUM SERPL-MCNC: 8.6 MG/DL (ref 8.5–10.1)
CHLORIDE BLD-SCNC: 105 MMOL/L (ref 94–109)
CO2 SERPL-SCNC: 20 MMOL/L (ref 20–32)
CREAT SERPL-MCNC: 7.74 MG/DL (ref 0.66–1.25)
ERYTHROCYTE [DISTWIDTH] IN BLOOD BY AUTOMATED COUNT: 13.1 % (ref 10–15)
GFR SERPL CREATININE-BSD FRML MDRD: 7 ML/MIN/1.73M2
GLUCOSE BLD-MCNC: 87 MG/DL (ref 70–99)
HCT VFR BLD AUTO: 25 % (ref 40–53)
HGB BLD-MCNC: 8.2 G/DL (ref 13.3–17.7)
MAGNESIUM SERPL-MCNC: 2 MG/DL (ref 1.6–2.3)
MCH RBC QN AUTO: 32.5 PG (ref 26.5–33)
MCHC RBC AUTO-ENTMCNC: 32.8 G/DL (ref 31.5–36.5)
MCV RBC AUTO: 99 FL (ref 78–100)
PATH REPORT.COMMENTS IMP SPEC: NORMAL
PATH REPORT.COMMENTS IMP SPEC: NORMAL
PATH REPORT.FINAL DX SPEC: NORMAL
PATH REPORT.RELEVANT HX SPEC: NORMAL
PHOSPHATE SERPL-MCNC: 8.2 MG/DL (ref 2.5–4.5)
PHOTO IMAGE: NORMAL
PLATELET # BLD AUTO: 90 10E3/UL (ref 150–450)
POTASSIUM BLD-SCNC: 4.6 MMOL/L (ref 3.4–5.3)
RBC # BLD AUTO: 2.52 10E6/UL (ref 4.4–5.9)
SODIUM SERPL-SCNC: 138 MMOL/L (ref 133–144)
TACROLIMUS BLD-MCNC: 8.1 UG/L (ref 5–15)
TME LAST DOSE: NORMAL H
TME LAST DOSE: NORMAL H
WBC # BLD AUTO: 5.7 10E3/UL (ref 4–11)

## 2022-05-26 PROCEDURE — 250N000013 HC RX MED GY IP 250 OP 250 PS 637: Performed by: PHYSICIAN ASSISTANT

## 2022-05-26 PROCEDURE — 80048 BASIC METABOLIC PNL TOTAL CA: CPT

## 2022-05-26 PROCEDURE — 120N000011 HC R&B TRANSPLANT UMMC

## 2022-05-26 PROCEDURE — 99233 SBSQ HOSP IP/OBS HIGH 50: CPT | Mod: 24 | Performed by: INTERNAL MEDICINE

## 2022-05-26 PROCEDURE — 85027 COMPLETE CBC AUTOMATED: CPT | Performed by: NURSE PRACTITIONER

## 2022-05-26 PROCEDURE — 250N000012 HC RX MED GY IP 250 OP 636 PS 637

## 2022-05-26 PROCEDURE — 250N000013 HC RX MED GY IP 250 OP 250 PS 637

## 2022-05-26 PROCEDURE — 999N000128 HC STATISTIC PERIPHERAL IV START W/O US GUIDANCE

## 2022-05-26 PROCEDURE — 83735 ASSAY OF MAGNESIUM: CPT

## 2022-05-26 PROCEDURE — 250N000011 HC RX IP 250 OP 636: Performed by: NURSE PRACTITIONER

## 2022-05-26 PROCEDURE — 250N000013 HC RX MED GY IP 250 OP 250 PS 637: Performed by: STUDENT IN AN ORGANIZED HEALTH CARE EDUCATION/TRAINING PROGRAM

## 2022-05-26 PROCEDURE — 84100 ASSAY OF PHOSPHORUS: CPT

## 2022-05-26 PROCEDURE — 250N000012 HC RX MED GY IP 250 OP 636 PS 637: Performed by: NURSE PRACTITIONER

## 2022-05-26 PROCEDURE — 80197 ASSAY OF TACROLIMUS: CPT

## 2022-05-26 PROCEDURE — 250N000013 HC RX MED GY IP 250 OP 250 PS 637: Performed by: NURSE PRACTITIONER

## 2022-05-26 PROCEDURE — 999N000111 HC STATISTIC OT IP EVAL DEFER: Performed by: OCCUPATIONAL THERAPIST

## 2022-05-26 PROCEDURE — 250N000013 HC RX MED GY IP 250 OP 250 PS 637: Performed by: SURGERY

## 2022-05-26 RX ORDER — BUMETANIDE 1 MG/1
3 TABLET ORAL
Status: DISCONTINUED | OUTPATIENT
Start: 2022-05-27 | End: 2022-05-27 | Stop reason: HOSPADM

## 2022-05-26 RX ORDER — ASPIRIN 325 MG
325 TABLET ORAL DAILY
Status: DISCONTINUED | OUTPATIENT
Start: 2022-05-27 | End: 2022-05-27 | Stop reason: HOSPADM

## 2022-05-26 RX ORDER — OXYCODONE HYDROCHLORIDE 5 MG/1
5 TABLET ORAL EVERY 6 HOURS PRN
Status: DISCONTINUED | OUTPATIENT
Start: 2022-05-26 | End: 2022-05-27 | Stop reason: HOSPADM

## 2022-05-26 RX ORDER — SEVELAMER CARBONATE 800 MG/1
800 TABLET, FILM COATED ORAL
Status: DISCONTINUED | OUTPATIENT
Start: 2022-05-26 | End: 2022-05-27 | Stop reason: HOSPADM

## 2022-05-26 RX ORDER — BUMETANIDE 1 MG/1
3 TABLET ORAL
Status: COMPLETED | OUTPATIENT
Start: 2022-05-26 | End: 2022-05-26

## 2022-05-26 RX ADMIN — SEVELAMER CARBONATE 800 MG: 800 TABLET, FILM COATED ORAL at 18:03

## 2022-05-26 RX ADMIN — BUMETANIDE 3 MG: 2 TABLET ORAL at 13:50

## 2022-05-26 RX ADMIN — MYCOPHENOLATE MOFETIL 750 MG: 250 CAPSULE ORAL at 18:03

## 2022-05-26 RX ADMIN — BUMETANIDE 3 MG: 2 TABLET ORAL at 18:04

## 2022-05-26 RX ADMIN — MYCOPHENOLATE MOFETIL 750 MG: 250 CAPSULE ORAL at 09:01

## 2022-05-26 RX ADMIN — Medication 5 MG: at 21:33

## 2022-05-26 RX ADMIN — TACROLIMUS 4.5 MG: 5 CAPSULE ORAL at 18:03

## 2022-05-26 RX ADMIN — LIDOCAINE 2 PATCH: 560 PATCH PERCUTANEOUS; TOPICAL; TRANSDERMAL at 09:10

## 2022-05-26 RX ADMIN — ATORVASTATIN CALCIUM 10 MG: 10 TABLET, FILM COATED ORAL at 09:01

## 2022-05-26 RX ADMIN — ACETAMINOPHEN 325MG 650 MG: 325 TABLET ORAL at 02:00

## 2022-05-26 RX ADMIN — TACROLIMUS 4.5 MG: 5 CAPSULE ORAL at 09:01

## 2022-05-26 RX ADMIN — PANTOPRAZOLE SODIUM 40 MG: 40 TABLET, DELAYED RELEASE ORAL at 16:12

## 2022-05-26 RX ADMIN — VALGANCICLOVIR 450 MG: 450 TABLET, FILM COATED ORAL at 18:26

## 2022-05-26 RX ADMIN — ACETAMINOPHEN 325MG 650 MG: 325 TABLET ORAL at 09:01

## 2022-05-26 RX ADMIN — ASPIRIN 81 MG CHEWABLE TABLET 81 MG: 81 TABLET CHEWABLE at 09:01

## 2022-05-26 RX ADMIN — PANTOPRAZOLE SODIUM 40 MG: 40 TABLET, DELAYED RELEASE ORAL at 09:01

## 2022-05-26 RX ADMIN — HEPARIN SODIUM AND DEXTROSE 500 UNITS/HR: 10000; 5 INJECTION INTRAVENOUS at 00:06

## 2022-05-26 RX ADMIN — OXYCODONE HYDROCHLORIDE 5 MG: 5 TABLET ORAL at 12:28

## 2022-05-26 ASSESSMENT — ACTIVITIES OF DAILY LIVING (ADL)
ADLS_ACUITY_SCORE: 20
ADLS_ACUITY_SCORE: 21
ADLS_ACUITY_SCORE: 21
ADLS_ACUITY_SCORE: 20
ADLS_ACUITY_SCORE: 21
ADLS_ACUITY_SCORE: 20
ADLS_ACUITY_SCORE: 20
ADLS_ACUITY_SCORE: 21
ADLS_ACUITY_SCORE: 20

## 2022-05-26 NOTE — PLAN OF CARE
ICU End of Shift Summary. See flowsheets for vital signs and detailed assessment.    Changes this shift:     No acute changes, vital signs stable, adequate urine output. Pain not fully controlled, passed along to communicate better pain control option for patient with oncoming nurse    Plan:  Continue with POC, transfer to floor upon bed availability       Goal Outcome Evaluation:    Plan of Care Reviewed With: patient     Overall Patient Progress: improving    Outcome Evaluation: Stable vital signs, transfer ready, increasing urine output

## 2022-05-26 NOTE — PROGRESS NOTES
Transferred to: 7A @ 1645  Status at time of transfer: A/O x 4, denies pain. VSS on RA.   Belongings: Sent w/patient.  Flores removed? (if no, why?): NA  Chart and medications: Sent w/patient  Family notified: Yes, wife at bedside at time of transfer.

## 2022-05-26 NOTE — PLAN OF CARE
ICU End of Shift Summary. See flowsheets for vital signs and detailed assessment.    Changes this shift: A/O x 4, pain controlled w/5 mg PO Oxycodone. Ambulating in hallway. BAUDILIO needing drainage Q4 hours, becoming more serous in color. Heparin gtt discontinued.     Plan:  Transfer to  when bed is available. Likely discharge tomorrow.        Goal Outcome Evaluation:    Problem: Pain (Kidney Transplant)  Goal: Acceptable Pain Control  Outcome: Ongoing, Progressing    Problem: Bowel Motility Impaired (Kidney Transplant)  Goal: Effective Bowel Elimination  Outcome: Met        Problem: Fluid and Electrolyte Imbalance (Kidney Transplant)  Goal: Fluid and Electrolyte Balance  Outcome: Met     Problem: Glycemic Control Impaired (Kidney Transplant)  Goal: Blood Glucose Level Within Targeted Range  Outcome: Met     Problem: Infection (Kidney Transplant)  Goal: Absence of Infection Signs and Symptoms  Outcome: Met

## 2022-05-26 NOTE — CARE PLAN
Occupational Therapy: Orders received. Chart reviewed and discussed with RN and PT.? Occupational Therapy not indicated due to pt completing ADLs w/ SBA, ambulating in [place] w/ SBA. Per PT, patient will have good assist for IADLs upon discharge. PT to continue following for endurance and strengthening and education in post-surgical precautions.? Defer discharge recommendations to PT.? Will complete orders.

## 2022-05-26 NOTE — PROGRESS NOTES
[facility]  Transplant Nephrology progress note   Date of Admission:  5/17/2022  Today's Date: 05/26/2022  Requesting physician: Howard Flores MD    Recommendations:  -  Bumex 3 mg x2 doses  - no HD today    Assessment & Plan    Donald Blanco is a 57 year old male with history of IgA nephropathy, resultant ESRD, on HD 3 times/week. (Access: Right IJ TDC).At baseline trace urine output.Underwent LDKT on 5/17/22, without ureteral stent.Imported kidney with vasospasm in OR.Other comorbidities include HTN, and prostate cancer s/p prostatectomy and radiation.    # LDKT: without ureteral stent.Imported kidney with vasospasm in OR . DGF 2/2 muscle spasm, hypoperfusion and or, renal vein stenosis. Patient continues to have low UOP, hypervolemia , hyperkalemia.  Patient given Thymoglobulin  ( 2 mg/kg) on 5/19/2022 followed by 2-hour run of low flow iHD with 1 L UF.  Following this patient had persistent hypotension warranting him to be transferred to the ICU, received albumin infusions and started on dopamine. Hypotension has since resolved and has been off of dopamine. HD on 5/23, UF 2000 mL. UOP has started to increase. Bumex yesterday with good response. Bumex again today, no HD today.        - Baseline Creatinine: ~  TBD   - Proteinuria: Not checked post transplant   - Date DSA Last Checked: May/2022      Latest DSA: No DSA at time of transplant   - BK Viremia: Not checked post transplant   - Kidney Tx Biopsy:  Yes, no rejection   -No stent placed              -On heparin for renal vein prophylaxis in the setting of renal vein stenosis  - HD Info  Access: Right IJ Tunneled Catheter;   - HD 5/23 : 2 L UF achieved     # Immunosuppression: Tacrolimus immediate release (goal 8-10), Mycophenolate mofetil (dose 750 mg every 12 hours) and Prednisone (dose taper)               - Induction: Intermediate . PRA 19.  Basiliximab 5/17, rATG on 5/19 and 5/21   - Changes: YES .Change  to rATG induction for DGF. Stop prednisone taper after rATG induction    # Infection Prophylaxis:   - PJP: Sulfa/TMP (Bactrim) - MWF  - CMV: Valganciclovir (Valcyte). No discordance D-/R-    # Hypertension: Hypotensive;improved , off pressors .oal BP: MAP > 65   - Volume status: Markedly hypervolemic    - Changes: No      # Elevated Blood Glucose: Glucose generally running ~ 170 -200    - Management as per primary team.    # Anemia in Chronic Renal Disease: Hgb: Stable      YUDI: No   - Iron studies: Unknown at this time, but checked with dialysis    # Mineral Bone Disorder:   - Secondary renal hyperparathyroidism; PTH level: Unknown at this time, but checked with dialysis        On treatment: None  - Vitamin D; level: Unknown at this time, but checked with dialysis        On supplement: No  - Calcium; level: Normal        On supplement: No  - Phosphorus; level: Normal        On supplement: No    # Electrolytes:   - Potassium; level: Normal        On supplement: No  - Magnesium; level: Normal        On supplement: No  - Bicarbonate; level: Normal        On supplement: No  - Sodium; level: Normal    # Thrombocytopenia:   -Occurred after rATG dose on 5/19. Neg BISHOP panel, improving    #HyperKalemia:   - resolved    # Transplant History:  Etiology of Kidney Failure: IgA nephropathy  Tx: LDKT  Transplant: 5/17/2022 (Kidney)  Crossmatch at time of Tx: negative  Significant changes in immunosuppression: None  Significant transplant-related complications: None    Recommendations were communicated to the primary team via this note.  Patient seen and discussed with Dr Marino Hanks MD    Attestation:  This patient has been seen and evaluated by me, Woodrow Cr MD.  I have reviewed the note and agree with plan of care as documented by the fellow.       Interval History   Feels well, eager to go home.     Review of Systems    The 4 point Review of Systems is negative other than noted above or here.     Allergies  "  No Active Allergies  Prior to Admission Medications     aspirin  81 mg Oral Daily     atorvastatin  10 mg Oral Daily     bumetanide  3 mg Oral BID     [START ON 2022] bumetanide  3 mg Oral BID     heparin  3 mL Intracatheter Once in dialysis/CRRT     heparin  3 mL Intracatheter Once in dialysis/CRRT     lidocaine  2 patch Transdermal Q24H     lidocaine   Transdermal Q8H     [Held by provider] magnesium oxide  400 mg Oral Daily with lunch     melatonin  5 mg Oral QPM     mycophenolate  750 mg Oral BID IS     pantoprazole  40 mg Oral BID AC     sevelamer carbonate  800 mg Oral TID w/meals     sodium chloride (PF)  10 mL Intracatheter Q8H     [Held by provider] sulfamethoxazole-trimethoprim  1 tablet Oral Once per day on      tacrolimus  4.5 mg Oral BID IS     valGANciclovir  450 mg Oral Once per day on        heparin 500 Units/hr (22 0600)       Physical Exam   Temp  Av.1  F (36.7  C)  Min: 97.7  F (36.5  C)  Max: 98.6  F (37  C)      Pulse  Av.1  Min: 63  Max: 105 Resp  Avg: 15.8  Min: 8  Max: 19  SpO2  Av.3 %  Min: 94 %  Max: 100 %    CVP (mmHg): 15 mmHgBP 118/69 (BP Location: Right arm)   Pulse 74   Temp 98  F (36.7  C) (Oral)   Resp 16   Ht 1.778 m (5' 10\")   Wt 115.2 kg (253 lb 15.5 oz)   SpO2 98%   BMI 36.44 kg/m     Date 22 07 - 22 0659   Shift 7676-8959 0642-6085 9733-6767 24 Hour Total   INTAKE   I.V. 1081.67   1081.67   Shift Total(mL/kg) 1081.67(10)   1081.67(10)   OUTPUT   Urine 335 50  385   Drains 165   165   Shift Total(mL/kg) 500(4.62) 50(0.46)  550(5.08)   Weight (kg) 108.2 108.2 108.2 108.2      Admit Weight: 103.4 kg (227 lb 15.3 oz)     GENERAL APPEARANCE: alert and no distress, sitting up in chair, pleasant and cooperative  HENT: mouth without ulcers or lesions  RESP: lungs clear to auscultation - no rales, rhonchi or wheezes  CV: regular rhythm, normal rate, no rub, no murmur  EDEMA: 1+ trace bilateral edema   ABDOMEN: soft, " nondistended, minimally TTP at incision which is c/d/i, bowel sounds normal  MS: extremities normal - no gross deformities noted, no evidence of inflammation in joints, no muscle tenderness  SKIN: no rash  NEURO: Alert awake oriented x3, moving all extremities well.    ACCESS: [facility]     Data   CMP  Recent Labs   Lab 05/26/22  0516 05/25/22 2032 05/25/22  0427 05/24/22  0457 05/23/22 2047 05/23/22  0610 05/22/22  1157 05/22/22  0420 05/21/22  1942 05/21/22  1939 05/21/22  1203 05/21/22  0418     --  135 135  --  137 136 136  --  135 134 134   POTASSIUM 4.6  --  4.2 4.0  --  4.5 4.2 4.6  --  4.9 4.6 5.4*   CHLORIDE 105  --  103 103  --  106 106 104  --  104 102 102   CO2 20  --  21 25  --  22 23 24  --  23 25 24   ANIONGAP 13  --  11 7  --  9 7 8  --  8 7 8   GLC 87 104* 106* 126*   < > 111* 135* 152*   < > 169* 138* 120*   *  --  83* 55*  --  60* 38* 39*  --  41* 39* 44*   CR 7.74*  --  5.86* 4.33*  --  4.61* 2.96* 3.28*  --  3.80* 4.21* 5.26*   GFRESTIMATED 7*  --  10* 15*  --  14* 24* 21*  --  17* 15* 12*   YEE 8.6  --  8.8 8.3*  --  9.1 8.8 9.6  --  9.0 9.4 9.0   MAG 2.0  --  2.0 2.0  --  2.4* 1.9 2.0  --  2.2 2.2 2.4*   PHOS 8.2*  --  7.1* 4.6*  --  4.2 3.4 4.3  --  4.8* 5.4* 6.3*   PROTTOTAL  --   --   --   --   --   --   --  5.9*  --   --   --  6.2*   ALBUMIN  --   --   --   --   --   --  2.7* 2.7*  --  2.9* 3.1* 3.1*   BILITOTAL  --   --   --   --   --   --   --  0.5  --   --   --  0.7   ALKPHOS  --   --   --   --   --   --   --  61  --   --   --  64   AST  --   --   --   --   --   --   --  6  --   --   --  13   ALT  --   --   --   --   --   --   --  23  --   --   --  23    < > = values in this interval not displayed.     CBC  Recent Labs   Lab 05/26/22  0516 05/25/22  0427 05/24/22  2203 05/24/22  0457 05/23/22  0610   HGB 8.2* 7.6* 8.7* 7.9* 8.5*   WBC 5.7 4.5  --  3.3* 1.5*   RBC 2.52* 2.37*  --  2.43* 2.57*   HCT 25.0* 23.5*  --  24.2* 26.0*   MCV 99 99  --  100 101*   MCH 32.5 32.1  --   32.5 33.1*   MCHC 32.8 32.3  --  32.6 32.7   RDW 13.1 13.2  --  13.0 13.2   PLT 90* 69*  --  52* 57*     INR  Recent Labs   Lab 05/21/22  0951   PTT 39*     ABG  No lab results found in last 7 days.   Urine Studies  Recent Labs   Lab Test 05/09/22  0957 12/10/20  1338 09/03/20  1142   COLOR Yellow Straw Straw   APPEARANCE Clear Clear Clear   URINEGLC Negative Negative Negative   URINEBILI Negative Negative Negative   URINEKETONE Negative Negative Negative   SG 1.015 1.011 1.011   UBLD Moderate* Negative Small*   URINEPH 6.0 5.0 5.0   PROTEIN 300 * >499* >499*   NITRITE Negative Negative Negative   LEUKEST Small* Negative Negative   RBCU 0 1 1   WBCU 6* 2 2     No lab results found.  PTH  Recent Labs   Lab Test 05/09/22  0959   PTHI 450*     Iron Studies  Recent Labs   Lab Test 05/09/22  0959   IRON 79      IRONSAT 24   LANCE 1,002*       IMAGING:  All imaging studies reviewed by me.

## 2022-05-26 NOTE — PROGRESS NOTES
Transplant Surgery  Inpatient Daily Progress Note  05/26/2022    Assessment & Plan: 56yo with history of ESRD secondary to IgA nephropathy, dialysis via a tunneled catheter, HTN, and prostate cancer s/p prostatectomy and radiation. S/p living donor kidney transplant without stent on 5/17/22. Imported kidney with vasospasm in OR.    LDKT 5/17/22; DGF: POD #9. Cr 7.7 (from 5.9<4.3), last HD 5/23. UOP increasing, made 950 mL urine yesterday with 3 mg Bumex BID. Continue Bumex. RV velocities continue to improve on US. Kidney biopsy in IR 5/24, pathology with ATN.    Fluid collection: US 5/20 with 7.7 cm fluid collection. Drain Cr 4.9: Serum Cr 4.2. US 5/23 unchanged.    Immunosuppression management:   Induction: initially via low risk protocol (PRA 19), transitioned to high risk in light of DGF.  -Basiliximab 5/17  -Thymo 200 mg (2 mg/kg) given 5/19, 5/21 (3/4 dose due to rigors), and 5/22 complete  -Steroid taper initially per low risk induction protocol (now discontinued), additional 100 mg on 5/21& 5/22 prior to Thymo  Maintenance:  -Tacro level goal 8-10  - mg BID    Neuro:  Acute post-op pain: Controlled with oxycodone PRN, APAP PRN, Robaxin PRN, and scheduled Lidoderm patches.    Hematology:   Anemia: Borderline macrocytic. Hgb 7-8, stable.  Renal vein prophylaxis: Currently on Heparin 500 u/hr and ASA 81 mg daily. Stop heparin drip and start  mg daily.  Thrombocytopenia: Due to Thymo. HIT negative. Improving.    Cardiorespiratory:   Hx HTN: BPs 110-120's without intervention.  H/o sleep apnea: Continue CPAP.   Hypotension: Did undergo dialysis run on POD 2, but was acutely hypotensive post dialysis likely due to some combination of thymo reaction + dialysis. Blood pressures did not improve despite several fluid boluses and albumin. Ultimately transferred to the SICU for dopamine. Weaned off Dopamine 5/22. Off CRRT 5/22. Tolerated iHD 5/23.     GI/Nutrition:   Diet: Regular  Bowel regimen:  "Senna/Miralax PRN    Endocrine: No acute issues.     Fluid/Electrolytes:   Hyperphosphatemia: Phos 8.2, start phos binder with meals.    : No acute issues.    Infectious disease: No acute issues.    Prophylaxis: DVT, fall, GI (PPI), viral (Valcyte), pneumocystis (Bactrim, hold until K normalizes)    Disposition: 4A, transfer to 7A today. Plan for discharge home on DGF protocol tomorrow.    Medical Decision Making: Medium  Subsequent visit 69765 (moderate level decision making)    TOM/Fellow/Resident Provider: Xin Mustafa NP [id number]    Faculty: Stuart Rust MD PhD  _________________________________________________________________  Transplant History:   5/17/2022 (Kidney), Postoperative day: 9     Interval History: History is obtained from the patient  Overnight events: No acute events overnight. Tolerating diet. UOP increasing. No new complaints.     ROS:   A 10-point review of systems was negative except as noted above.    Meds:    aspirin  81 mg Oral Daily     atorvastatin  10 mg Oral Daily     bumetanide  3 mg Oral BID     [START ON 5/27/2022] bumetanide  3 mg Oral BID     heparin  3 mL Intracatheter Once in dialysis/CRRT     heparin  3 mL Intracatheter Once in dialysis/CRRT     lidocaine  2 patch Transdermal Q24H     lidocaine   Transdermal Q8H     [Held by provider] magnesium oxide  400 mg Oral Daily with lunch     melatonin  5 mg Oral QPM     mycophenolate  750 mg Oral BID IS     pantoprazole  40 mg Oral BID AC     sevelamer carbonate  800 mg Oral TID w/meals     sodium chloride (PF)  10 mL Intracatheter Q8H     [Held by provider] sulfamethoxazole-trimethoprim  1 tablet Oral Once per day on Mon Wed Fri     tacrolimus  4.5 mg Oral BID IS     valGANciclovir  450 mg Oral Once per day on Mon Thu       Physical Exam:     Admit Weight: 103.4 kg (227 lb 15.3 oz)    Current vitals:   /71   Pulse 75   Temp 97.7  F (36.5  C)   Resp 16   Ht 1.778 m (5' 10\")   Wt 115.2 kg (253 lb 15.5 oz)   SpO2 98%   BMI " 36.44 kg/m      Vital sign ranges:    Temp:  [97.7  F (36.5  C)-98.2  F (36.8  C)] 97.7  F (36.5  C)  Pulse:  [74-79] 75  Resp:  [16-18] 16  BP: (114-123)/(56-71) 123/71  SpO2:  [97 %-100 %] 98 %    General Appearance: in no apparent distress.   Skin: Warm, perfused  Heart: NSR  Lungs: Unlabored on RA  Abdomen: The abdomen is rounded, BAUDILIO serosang, incision closed with staples, c/d/i.   : Flores removed  Extremities: edema: generalized, strength 5/5  Neurologic: alert and oriented x4. Tremor absent.    Data:   CMP  Recent Labs   Lab 05/26/22  0516 05/25/22  2032 05/25/22  0427 05/23/22  0610 05/22/22  1157 05/22/22  0420 05/21/22  1203 05/21/22  1202 05/21/22  0418     --  135   < > 136 136   < >  --  134   POTASSIUM 4.6  --  4.2   < > 4.2 4.6   < >  --  5.4*   CHLORIDE 105  --  103   < > 106 104   < >  --  102   CO2 20  --  21   < > 23 24   < >  --  24   GLC 87 104* 106*   < > 135* 152*   < >  --  120*   *  --  83*   < > 38* 39*   < >  --  44*   CR 7.74*  --  5.86*   < > 2.96* 3.28*   < >  --  5.26*   GFRESTIMATED 7*  --  10*   < > 24* 21*   < >  --  12*   YEE 8.6  --  8.8   < > 8.8 9.6   < >  --  9.0   ICAW  --   --   --   --  5.1 5.1   < >  --  5.0   MAG 2.0  --  2.0   < > 1.9 2.0   < >  --  2.4*   PHOS 8.2*  --  7.1*   < > 3.4 4.3   < >  --  6.3*   ALBUMIN  --   --   --   --  2.7* 2.7*   < >  --  3.1*   BILITOTAL  --   --   --   --   --  0.5  --   --  0.7   ALKPHOS  --   --   --   --   --  61  --   --  64   AST  --   --   --   --   --  6  --   --  13   ALT  --   --   --   --   --  23  --   --  23   FCREAT  --   --   --   --   --   --   --  4.9  --     < > = values in this interval not displayed.     CBC  Recent Labs   Lab 05/26/22  0516 05/25/22  0427   HGB 8.2* 7.6*   WBC 5.7 4.5   PLT 90* 69*

## 2022-05-27 ENCOUNTER — TELEPHONE (OUTPATIENT)
Dept: TRANSPLANT | Facility: CLINIC | Age: 57
End: 2022-05-27
Payer: COMMERCIAL

## 2022-05-27 VITALS
DIASTOLIC BLOOD PRESSURE: 65 MMHG | WEIGHT: 247.14 LBS | SYSTOLIC BLOOD PRESSURE: 123 MMHG | OXYGEN SATURATION: 99 % | BODY MASS INDEX: 35.38 KG/M2 | HEIGHT: 70 IN | HEART RATE: 80 BPM | RESPIRATION RATE: 16 BRPM | TEMPERATURE: 97.9 F

## 2022-05-27 LAB
ANION GAP SERPL CALCULATED.3IONS-SCNC: 16 MMOL/L (ref 3–14)
BUN SERPL-MCNC: 119 MG/DL (ref 7–30)
CALCIUM SERPL-MCNC: 9.2 MG/DL (ref 8.5–10.1)
CHLORIDE BLD-SCNC: 104 MMOL/L (ref 94–109)
CO2 SERPL-SCNC: 17 MMOL/L (ref 20–32)
CREAT SERPL-MCNC: 8.97 MG/DL (ref 0.66–1.25)
ERYTHROCYTE [DISTWIDTH] IN BLOOD BY AUTOMATED COUNT: 13.2 % (ref 10–15)
GFR SERPL CREATININE-BSD FRML MDRD: 6 ML/MIN/1.73M2
GLUCOSE BLD-MCNC: 97 MG/DL (ref 70–99)
HCT VFR BLD AUTO: 25.2 % (ref 40–53)
HGB BLD-MCNC: 8.2 G/DL (ref 13.3–17.7)
MAGNESIUM SERPL-MCNC: 2 MG/DL (ref 1.6–2.3)
MCH RBC QN AUTO: 32.7 PG (ref 26.5–33)
MCHC RBC AUTO-ENTMCNC: 32.5 G/DL (ref 31.5–36.5)
MCV RBC AUTO: 100 FL (ref 78–100)
PHOSPHATE SERPL-MCNC: 8.4 MG/DL (ref 2.5–4.5)
PLATELET # BLD AUTO: 136 10E3/UL (ref 150–450)
POTASSIUM BLD-SCNC: 4.8 MMOL/L (ref 3.4–5.3)
RBC # BLD AUTO: 2.51 10E6/UL (ref 4.4–5.9)
SODIUM SERPL-SCNC: 137 MMOL/L (ref 133–144)
WBC # BLD AUTO: 6.4 10E3/UL (ref 4–11)

## 2022-05-27 PROCEDURE — 250N000013 HC RX MED GY IP 250 OP 250 PS 637

## 2022-05-27 PROCEDURE — 250N000013 HC RX MED GY IP 250 OP 250 PS 637: Performed by: NURSE PRACTITIONER

## 2022-05-27 PROCEDURE — 250N000013 HC RX MED GY IP 250 OP 250 PS 637: Performed by: PHYSICIAN ASSISTANT

## 2022-05-27 PROCEDURE — 90937 HEMODIALYSIS REPEATED EVAL: CPT

## 2022-05-27 PROCEDURE — 36415 COLL VENOUS BLD VENIPUNCTURE: CPT | Performed by: TRANSPLANT SURGERY

## 2022-05-27 PROCEDURE — 258N000003 HC RX IP 258 OP 636: Performed by: INTERNAL MEDICINE

## 2022-05-27 PROCEDURE — 80048 BASIC METABOLIC PNL TOTAL CA: CPT | Performed by: TRANSPLANT SURGERY

## 2022-05-27 PROCEDURE — 250N000012 HC RX MED GY IP 250 OP 636 PS 637

## 2022-05-27 PROCEDURE — 250N000012 HC RX MED GY IP 250 OP 636 PS 637: Performed by: NURSE PRACTITIONER

## 2022-05-27 PROCEDURE — 84100 ASSAY OF PHOSPHORUS: CPT | Performed by: TRANSPLANT SURGERY

## 2022-05-27 PROCEDURE — 83735 ASSAY OF MAGNESIUM: CPT | Performed by: TRANSPLANT SURGERY

## 2022-05-27 PROCEDURE — 85014 HEMATOCRIT: CPT | Performed by: TRANSPLANT SURGERY

## 2022-05-27 RX ORDER — VALGANCICLOVIR 450 MG/1
450 TABLET, FILM COATED ORAL
Qty: 60 TABLET | Refills: 2 | Status: ON HOLD | OUTPATIENT
Start: 2022-05-30 | End: 2022-06-23

## 2022-05-27 RX ORDER — MYCOPHENOLATE MOFETIL 250 MG/1
750 CAPSULE ORAL 2 TIMES DAILY
Qty: 180 CAPSULE | Refills: 11 | Status: SHIPPED | OUTPATIENT
Start: 2022-05-27 | End: 2023-04-20

## 2022-05-27 RX ORDER — SIMVASTATIN 40 MG
40 TABLET ORAL EVERY MORNING
Qty: 30 TABLET | Refills: 11 | Status: SHIPPED | OUTPATIENT
Start: 2022-05-27 | End: 2022-05-28 | Stop reason: ALTCHOICE

## 2022-05-27 RX ORDER — LIDOCAINE 40 MG/G
CREAM TOPICAL
Status: DISCONTINUED | OUTPATIENT
Start: 2022-05-27 | End: 2022-05-27 | Stop reason: HOSPADM

## 2022-05-27 RX ORDER — SEVELAMER CARBONATE 800 MG/1
800 TABLET, FILM COATED ORAL
Qty: 90 TABLET | Refills: 1 | Status: SHIPPED | OUTPATIENT
Start: 2022-05-27 | End: 2022-06-15

## 2022-05-27 RX ORDER — OXYCODONE HYDROCHLORIDE 5 MG/1
5 TABLET ORAL EVERY 6 HOURS PRN
Qty: 10 TABLET | Refills: 0 | Status: SHIPPED | OUTPATIENT
Start: 2022-05-27 | End: 2022-07-19

## 2022-05-27 RX ORDER — ACETAMINOPHEN 325 MG/1
325-650 TABLET ORAL EVERY 4 HOURS PRN
Qty: 100 TABLET | Refills: 0 | Status: SHIPPED | OUTPATIENT
Start: 2022-05-27 | End: 2022-09-27

## 2022-05-27 RX ORDER — PANTOPRAZOLE SODIUM 40 MG/1
40 TABLET, DELAYED RELEASE ORAL
Qty: 60 TABLET | Refills: 3 | Status: ON HOLD | OUTPATIENT
Start: 2022-05-27 | End: 2022-06-23

## 2022-05-27 RX ORDER — TACROLIMUS 0.5 MG/1
0.5 CAPSULE ORAL 2 TIMES DAILY
Qty: 60 CAPSULE | Refills: 11 | Status: ON HOLD | OUTPATIENT
Start: 2022-05-27 | End: 2022-06-23

## 2022-05-27 RX ORDER — SULFAMETHOXAZOLE AND TRIMETHOPRIM 400; 80 MG/1; MG/1
1 TABLET ORAL
Qty: 30 TABLET | Refills: 11 | Status: SHIPPED | OUTPATIENT
Start: 2022-05-30 | End: 2022-08-08

## 2022-05-27 RX ORDER — LIDOCAINE 4 G/G
1-2 PATCH TOPICAL EVERY 24 HOURS
Qty: 10 PATCH | Refills: 0 | Status: SHIPPED | OUTPATIENT
Start: 2022-05-27 | End: 2022-07-19

## 2022-05-27 RX ORDER — ASPIRIN 325 MG
325 TABLET ORAL DAILY
Qty: 30 TABLET | Refills: 11 | Status: SHIPPED | OUTPATIENT
Start: 2022-05-28 | End: 2023-05-17

## 2022-05-27 RX ORDER — ALBUMIN (HUMAN) 12.5 G/50ML
50 SOLUTION INTRAVENOUS
Status: DISCONTINUED | OUTPATIENT
Start: 2022-05-27 | End: 2022-05-27

## 2022-05-27 RX ORDER — TACROLIMUS 1 MG/1
CAPSULE ORAL
Qty: 240 CAPSULE | Refills: 11 | Status: SHIPPED | OUTPATIENT
Start: 2022-05-27 | End: 2022-05-28

## 2022-05-27 RX ORDER — AMOXICILLIN 250 MG
1-2 CAPSULE ORAL 2 TIMES DAILY PRN
Qty: 100 TABLET | Refills: 0 | Status: SHIPPED | OUTPATIENT
Start: 2022-05-27 | End: 2022-07-19

## 2022-05-27 RX ORDER — BUMETANIDE 1 MG/1
3 TABLET ORAL
Qty: 180 TABLET | Refills: 0 | Status: SHIPPED | OUTPATIENT
Start: 2022-05-27 | End: 2022-06-20

## 2022-05-27 RX ADMIN — MYCOPHENOLATE MOFETIL 750 MG: 250 CAPSULE ORAL at 09:24

## 2022-05-27 RX ADMIN — SODIUM CHLORIDE 300 ML: 9 INJECTION, SOLUTION INTRAVENOUS at 12:09

## 2022-05-27 RX ADMIN — ATORVASTATIN CALCIUM 10 MG: 10 TABLET, FILM COATED ORAL at 09:25

## 2022-05-27 RX ADMIN — BUMETANIDE 3 MG: 2 TABLET ORAL at 09:25

## 2022-05-27 RX ADMIN — ASPIRIN 325 MG ORAL TABLET 325 MG: 325 PILL ORAL at 09:33

## 2022-05-27 RX ADMIN — SEVELAMER CARBONATE 800 MG: 800 TABLET, FILM COATED ORAL at 09:24

## 2022-05-27 RX ADMIN — SODIUM CHLORIDE 250 ML: 9 INJECTION, SOLUTION INTRAVENOUS at 12:09

## 2022-05-27 RX ADMIN — OXYCODONE HYDROCHLORIDE 5 MG: 5 TABLET ORAL at 09:32

## 2022-05-27 RX ADMIN — PANTOPRAZOLE SODIUM 40 MG: 40 TABLET, DELAYED RELEASE ORAL at 09:24

## 2022-05-27 RX ADMIN — BUMETANIDE 3 MG: 2 TABLET ORAL at 16:16

## 2022-05-27 RX ADMIN — Medication: at 12:09

## 2022-05-27 RX ADMIN — TACROLIMUS 4.5 MG: 5 CAPSULE ORAL at 09:23

## 2022-05-27 RX ADMIN — PANTOPRAZOLE SODIUM 40 MG: 40 TABLET, DELAYED RELEASE ORAL at 16:16

## 2022-05-27 RX ADMIN — LIDOCAINE 2 PATCH: 560 PATCH PERCUTANEOUS; TOPICAL; TRANSDERMAL at 09:33

## 2022-05-27 ASSESSMENT — ACTIVITIES OF DAILY LIVING (ADL)
ADLS_ACUITY_SCORE: 21

## 2022-05-27 NOTE — PROGRESS NOTES
Care Management Follow Up    Length of Stay (days): 10    Expected Discharge Date: 05/27/2022     Concerns to be Addressed:     discharge planning, psychosocial support   Patient plan of care discussed at interdisciplinary rounds: Yes    Anticipated Discharge Disposition: Home     Anticipated Discharge Services:  NA  Anticipated Discharge DME:  NA      Private pay costs discussed: Not applicable    Additional Information:  SW met with Donald and his spouse Sana kunz to provide support. Donald presented with euthymic mood, cooperative, engaged and was in positive spirits. He laughed mentioning he does not recall any of the post transplant SW visit so the medicare guidelines were re explained with spouse present. SW mentioned to call if any assistance is needed in the future. SW will continue to follow for psychosocial support, resources and advocate on behalf of the patient.    Marcella GOINS [facility]  Outpatient Kidney/Pancreas/Auto Islet Transplant Program   [address] [place] [id number]  [email or web address]  Office: [phone] I Fax: [phone]      GLENNY Hidalgo

## 2022-05-27 NOTE — PLAN OF CARE
"VS: /74 (BP Location: Right arm)   Pulse 77   Temp 97.8  F (36.6  C) (Oral)   Resp 16   Ht 1.778 m (5' 10\")   Wt 115.2 kg (253 lb 15.5 oz)   SpO2 98%   BMI 36.44 kg/m      Cares: [id number]    Current condition: Stable on RA  Neuro: WDL  Cardio: WDL  Respiratory: WDL  GI/: Voiding spontaneously, Last BM 5/26  Skin: WDL, abd inc stapled ERLINDA.   Diet:   Regular, good appetite.   Labs: Creat: 7.74  LDA:  PIV saline locked.   Mobility:  UAL  Pain: denies.   PRN medications: None given.   Changes: Pt arrived to floor at 1630. Extensive medication and BAUDILIO drain teaching with patient and wife completed.   Plan of Care: Will continue to monitor and assess for any changes.       "

## 2022-05-27 NOTE — PLAN OF CARE
Goal Outcome Evaluation:  [id number] Nursing shift report  Donald appeared to sleep between cares. No c/o pain or discomfort. Voiding without difficulty.  Up ambulating in the halls x2. BAUDILIO draining serosanguinous. Voiding without difficulty. Abdominal incision clean/dry with staples intact. Is on track for discharge today. Will continue to monitor and report any significant changes.

## 2022-05-27 NOTE — PROGRESS NOTES
CLINICAL NUTRITION SERVICES - DISCHARGE NOTE    Patient s discharge needs assessed and discharge planning has been conducted with the multidisciplinary transplant care team including physicians, pharmacy, social work and transplant coordinator.    Follow up/Monitoring:  Once discharged, place outpatient nutrition consult via the transplant team if nutrition concerns arise.    Camille Desai RDN, LD  7A/OBS Pg [phone]

## 2022-05-27 NOTE — TELEPHONE ENCOUNTER
A pharmacist spent 35 minutes providing medication teaching with Donald Blanco and spouse Sana.  The discharge medication list was reviewed with the patient/family and the following points were discussed, as applicable: Name, description, purpose, dose/strength, duration of medications, common side effects, food/medications to avoid, action to be taken if dose is missed and how to obtain refills.  The patient will be responsible for managing medications. Additionally, the following transplant related education was covered: Purpose of medication card, Timing of medications and day of lab draw considerations , Emesis or missed dose, Prescription Insurance  and Discharge process for receiving meds   Patient will  transplant supplies including 7 day pill organizer, thermometer, and BP monitor, at discharge pharmacy along with medications.  Donald would like to use [facility] for outpatient prescriptions. Due to mailing restriction with patient's insurance, we will arrange for [facility] to send monthly orders to [facility] for patient to .   Clinical Pharmacy Consult:                                                      Transplant Specific:   Date of Transplant: 05/17/2022  Type of Transplant: kidney  First Transplant: yes  History of rejection: no    Immunosuppression Regimen   TAC 4.5 mg qAM & 4.5 mg qPM and  mg qAM & 750 mg qPM  Patient specific goal: Tac 8 to 10  Most recent level: 8.1, date 05/26  Immunosuppressant Levels:  Therapeutic  Pt adherent to lab draws: yes  Scr:   Lab Results   Component Value Date    CR 8.97 05/27/2022    CR 6.13 12/15/2020     Side effects: no side effects    Prophylactic Medications  Antibacterial:  Bactrim 400/80 mg three times weekly  Scheduled Discontinue Date: Lifelong    Antifungal: Not needed thus far  Scheduled Discontinue Date: N/A    Antiviral: CrCl 10 to 24 mL/minute: Valcyte 450 mg twice weekly   Scheduled Discontinue  Date: 3 months    Acid Reducer: Protonix (pantoprazole) 40 mg twice daily  Scheduled Reviewed Date: review in clinic    Thrombosis Prevention: Aspirin 325 mg daily  Scheduled Discontinue Date: review in clinic    Blood Pressure Management  Frequency of home Blood Pressure checks: twice daily  Most recent home BP: 141/72  Patient Blood pressure goal: MAP >65  Patient blood pressure at goal:  yes  Hospitalizations/ER visits since last assessment: 0      Med rec/DUR performed: yes  Med Rec Discrepancies: no    Reminders:    1. Bring to first clinic appt: med box, med card, bp monitor, all medications being taken, and lab book.  2.   MTM pharmacist visit on first clinic appt and if ok, again in 3 to 4 months during follow up appt.  3.   Avoid Grapefruit and Grapefruit juice.   4.   Avoid herbal supplements. If wish to take other medications or supplements, call your coordinator.   5.   Keep lab appts.   6.   Can use apps on phone like [employer] to help manage medication lists and reminders.   7.   Make sure you are protecting your skin by wearing long sleeves and applying sunscreen to exposed skin, for any significant time in the sun.     Transplant Coordinator is Alicia Strickland, Pharm.D.  [facility]  [phone]

## 2022-05-27 NOTE — PROGRESS NOTES
Care Management Follow Up    Length of Stay (days): 10  Expected Discharge Date:  5/27/22  Concerns to be Addressed:    Discharge planning   Patient plan of care discussed at interdisciplinary rounds: Yes    Anticipated Discharge Disposition: Home  Anticipated Discharge Services:  [facility] ph:[phone] fx:[phone]  (RN/PT)   Anticipated Discharge DME:        Additional Information:  Per ICU RNCC, [facility] accepted pt for RN/PT. Per Flores, TOM pt may get HD today and is DGF. Contacted inpatient HD unit to update on discharge today and DGF. Sent inbasket to [facility] with info as well as request for appointments. Updated [facility].       Anna Marie Baker RN, [place]  Float Care Coordinator  Covering 7A RNCC   Pager: [phone]

## 2022-05-27 NOTE — PHARMACY-TRANSPLANT NOTE
Solid Organ Transplant Recipient Prior to Discharge Note    57 year old male s/p kidney transplant on 5/17/2022.    Maintenance:   Mycophenolate 750mg BID  Tacrolimus 4.5mg BID    Opportunistic pathogen prophylaxis includes:   CMV: valganciclovir (X3 months)  PJP: trimethoprim/sulfamethoxazole (indefinitely)        Pharmacy has monitored for medication interactions and immunosuppression levels in conjunction with the multidisciplinary team. In anticipation for discharge, medication therapy needs have been addressed daily throughout the current admission via multidisciplinary rounds and/or discussions, order verification, daily clinical pharmacy review, and communication with prescribers.

## 2022-05-27 NOTE — PROGRESS NOTES
[facility]  Transplant Nephrology progress note   Date of Admission:  5/17/2022  Today's Date: 05/27/2022  Requesting physician: Howard Flores MD      Patient underwent iHD with  2 L UF  Tolerated procedure well   VS  Patient discharged today   Will follow up in AM at [facility]   Plan of care discussed by Dr Capellan over phone with patient     Jamie Meza MD, FACP, [facility]  Transplant Nephrology Fellow   [facility]   Pager [phone]    Patient was evaluated by me, Raj Capellan MD. I have reviewed the note and agree with the the plan of care as documented by the fellow.

## 2022-05-27 NOTE — PROGRESS NOTES
HEMODIALYSIS TREATMENT NOTE    Date: 5/27/2022  Time: 3:14 PM    Data:  Pre Wt: 114.1 kg (251 lb 8.7 oz)   Desired Wt: 112.1 kg   Post Wt: 112.1 kg (247 lb 2.2 oz)  Weight change: 2 kg  Ultrafiltration - Post Run Net Total Removed (mL): 2000 mL  Vascular Access Status: CVC  patent  Dialyzer Rinse: Light, Streaked  Total Blood Volume Processed: 61.13 L   Total Dialysis (Treatment) Time: 3   Dialysate Bath: K 2, Ca 2.5  Heparin: None    Lab:   No    Interventions:  HD dressing changed  Education about home care for HD catheter site    Assessment:     Pt vitally stable through out. Able to achieve max fluid removal goal. Pt had lunch during treatment with wife at bedside. Pt to discontinue post treatment.handoff given to floor RN.  Plan:    Per renal team.

## 2022-05-28 ENCOUNTER — TELEPHONE (OUTPATIENT)
Dept: TRANSPLANT | Facility: CLINIC | Age: 57
End: 2022-05-28

## 2022-05-28 ENCOUNTER — INFUSION THERAPY VISIT (OUTPATIENT)
Dept: INFUSION THERAPY | Facility: CLINIC | Age: 57
End: 2022-05-28
Attending: INTERNAL MEDICINE
Payer: COMMERCIAL

## 2022-05-28 VITALS
OXYGEN SATURATION: 99 % | TEMPERATURE: 97.9 F | WEIGHT: 245.7 LBS | RESPIRATION RATE: 18 BRPM | BODY MASS INDEX: 35.25 KG/M2

## 2022-05-28 DIAGNOSIS — Z94.0 KIDNEY REPLACED BY TRANSPLANT: Chronic | ICD-10-CM

## 2022-05-28 DIAGNOSIS — Z94.0 KIDNEY REPLACED BY TRANSPLANT: Primary | ICD-10-CM

## 2022-05-28 LAB
ANION GAP SERPL CALCULATED.3IONS-SCNC: 14 MMOL/L (ref 3–14)
BASOPHILS # BLD AUTO: 0 10E3/UL (ref 0–0.2)
BASOPHILS NFR BLD AUTO: 0 %
BUN SERPL-MCNC: 78 MG/DL (ref 7–30)
CALCIUM SERPL-MCNC: 9.3 MG/DL (ref 8.5–10.1)
CHLORIDE BLD-SCNC: 99 MMOL/L (ref 94–109)
CO2 SERPL-SCNC: 22 MMOL/L (ref 20–32)
CREAT SERPL-MCNC: 6.36 MG/DL (ref 0.66–1.25)
EOSINOPHIL # BLD AUTO: 0.1 10E3/UL (ref 0–0.7)
EOSINOPHIL NFR BLD AUTO: 1 %
ERYTHROCYTE [DISTWIDTH] IN BLOOD BY AUTOMATED COUNT: 13.1 % (ref 10–15)
GFR SERPL CREATININE-BSD FRML MDRD: 9 ML/MIN/1.73M2
GLUCOSE BLD-MCNC: 106 MG/DL (ref 70–99)
HCT VFR BLD AUTO: 26.1 % (ref 40–53)
HGB BLD-MCNC: 8.8 G/DL (ref 13.3–17.7)
IMM GRANULOCYTES # BLD: 0.2 10E3/UL
IMM GRANULOCYTES NFR BLD: 2 %
LYMPHOCYTES # BLD AUTO: 0.1 10E3/UL (ref 0.8–5.3)
LYMPHOCYTES NFR BLD AUTO: 1 %
MAGNESIUM SERPL-MCNC: 1.7 MG/DL (ref 1.6–2.3)
MCH RBC QN AUTO: 32.7 PG (ref 26.5–33)
MCHC RBC AUTO-ENTMCNC: 33.7 G/DL (ref 31.5–36.5)
MCV RBC AUTO: 97 FL (ref 78–100)
MONOCYTES # BLD AUTO: 0.4 10E3/UL (ref 0–1.3)
MONOCYTES NFR BLD AUTO: 4 %
NEUTROPHILS # BLD AUTO: 7.7 10E3/UL (ref 1.6–8.3)
NEUTROPHILS NFR BLD AUTO: 92 %
NRBC # BLD AUTO: 0 10E3/UL
NRBC BLD AUTO-RTO: 0 /100
PHOSPHATE SERPL-MCNC: 7.3 MG/DL (ref 2.5–4.5)
PLATELET # BLD AUTO: 165 10E3/UL (ref 150–450)
POTASSIUM BLD-SCNC: 4.7 MMOL/L (ref 3.4–5.3)
RBC # BLD AUTO: 2.69 10E6/UL (ref 4.4–5.9)
SODIUM SERPL-SCNC: 135 MMOL/L (ref 133–144)
TACROLIMUS BLD-MCNC: 7.4 UG/L (ref 5–15)
TME LAST DOSE: NORMAL H
TME LAST DOSE: NORMAL H
WBC # BLD AUTO: 8.4 10E3/UL (ref 4–11)

## 2022-05-28 PROCEDURE — 83735 ASSAY OF MAGNESIUM: CPT

## 2022-05-28 PROCEDURE — 36415 COLL VENOUS BLD VENIPUNCTURE: CPT

## 2022-05-28 PROCEDURE — 84100 ASSAY OF PHOSPHORUS: CPT

## 2022-05-28 PROCEDURE — 80197 ASSAY OF TACROLIMUS: CPT

## 2022-05-28 PROCEDURE — 80048 BASIC METABOLIC PNL TOTAL CA: CPT

## 2022-05-28 PROCEDURE — 85025 COMPLETE CBC W/AUTO DIFF WBC: CPT

## 2022-05-28 RX ORDER — TACROLIMUS 1 MG/1
5 CAPSULE ORAL EVERY 12 HOURS
Qty: 240 CAPSULE | Refills: 11 | Status: SHIPPED | OUTPATIENT
Start: 2022-05-28 | End: 2022-05-31

## 2022-05-28 RX ORDER — NEPHROCAP 1 MG
1 CAP ORAL DAILY
Qty: 30 CAPSULE | Refills: 1 | Status: SHIPPED | OUTPATIENT
Start: 2022-05-28 | End: 2022-07-22

## 2022-05-28 RX ORDER — ATORVASTATIN CALCIUM 10 MG/1
10 TABLET, FILM COATED ORAL DAILY
Qty: 30 TABLET | Refills: 1 | Status: SHIPPED | OUTPATIENT
Start: 2022-05-28 | End: 2022-07-19

## 2022-05-28 NOTE — PLAN OF CARE
Physical Therapy Discharge Summary    Reason for therapy discharge:    Discharged to home with home therapy.    Progress towards therapy goal(s). See goals on Care Plan in [facility] electronic health record for goal details.  Goals partially met.  Barriers to achieving goals:   discharge from facility.    Therapy recommendation(s):    Continued therapy is recommended.  Rationale/Recommendations:  progress mobility.

## 2022-05-28 NOTE — PROGRESS NOTES
"Donald Blanco came to [facility] today for a lab and assess following a kidney transplant on 5/17/22.      Discharge date: 5/27/22  Transplant coordinator: Alicia  Phone number patient can be reached at: [phone]      Physical Assessment:  See physical assessment located under \"Document Flowsheets\".  Incision site: staples; small amount of pink noted on bottom of incision at last staple  Lines: BAUDILIO drain. Emptied over 150cc since dischage yesterday.   Output: Pt measured 850cc urine since discharge  Hydration: Pt drank a few glasses of water since discharge. Encouraged at least 2L fluid daily  Nutrition: good appetite; no nausea   Last BM: Thursday (5/26)  Pain: controlled with prn oxycodone and tylenol   My transplant place videos watched: yes    Labs drawn by [facility] staff Yes    Plan of care for today: Pt and vitals assessed. Labs obtained and results reviewed with Dr. Capellan. Pt had dialysis yesterday. Will continue to assess the need for dialysis daily. Pt overall has minimal complaints. Reviewed medications with pt and wife Sana. Filled pt's medication box x 1 week. Dr. Capellan came to see pt. Clarified some medications and advised pt to get compression socks. [place] [facility] so pt will try to find them at pharmacy.     Medication changes: Stop Simvastatin  Start Lipitor 10mg PO Daily    Medications administered: none      Patient education:    The following teaching topics were addressed: Importance of drinking 2L of non-caffeinated fluids daily, Incisional care, Signs/symptoms of infection, Good handwashing, Medications (purposes, doses and times of administration), Phone numbers to call with concenrs (Transplant coordinator, Unit [address] and [facility]), 7A discharge check list and Plan of care   Patient and wife verbalized understanding and all questions answered.    Drug level:  Patient took 4.5mg of Tacrolimus last evening at 8:00.  Care coordinator to follow up with the result.    Face to face " time: 75 minutes  Discharge Plan    Pt will follow up with [facility] tomorrow for labs and assessment  Discharge instructions reviewed with patient: YES  Patient/Representative verbalized understanding, all questions answered: YES    Discharged from unit at 1045 with whom: wife to home.    Carolyn Wallis, RN, RN

## 2022-05-28 NOTE — PATIENT INSTRUCTIONS
1. the following medications from pharmacy:  Lipitor  B Complex-C-Folic Acid    2.Please add these to pill box    3.Continue to drink lots of fluids (goal 2L fluid daily)    4.Nurse will call you if you need to change dose of Tacrolimus this afternoon    5.Return tomorrow for labs and assessment(please bring all pills and medication card)    Dear Donald Blanco    Thank you for choosing [facility] ([facility]) for your transplant cares.  The following information is a summary of our appointment as well as important reminders.      Please make sure your phone is available today because I will call to update you with your anti-rejection drug levels and possibly make changes to your anti-rejection dosages.    We look forward in seeing you on your next appointment here at [facility] ([facility]).  Please don t hesitate to call us at [phone] to reschedule any of your appointments or to speak with one of the [facility] registered nurses.  It was a pleasure taking care of you today.    Sincerely,    [facility]  [address] [place] [address]  [id number]  Phone:  [phone]

## 2022-05-28 NOTE — LETTER
"    5/28/2022         RE: Donald Blanco  [address]   [place] [address]        Dear Colleague,    Thank you for referring your patient, Donald Blanco, to the [facility]. Please see a copy of my visit note below.    Donald Blanco came to [facility] today for a lab and assess following a kidney transplant on 5/17/22.      Discharge date: 5/27/22  Transplant coordinator: Alicia  Phone number patient can be reached at: [phone]      Physical Assessment:  See physical assessment located under \"Document Flowsheets\".  Incision site: staples; small amount of pink noted on bottom of incision at last staple  Lines: BAUDILIO drain. Emptied over 150cc since dischage yesterday.   Output: Pt measured 850cc urine since discharge  Hydration: Pt drank a few glasses of water since discharge. Encouraged at least 2L fluid daily  Nutrition: good appetite; no nausea   Last BM: Thursday (5/26)  Pain: controlled with prn oxycodone and tylenol   My transplant place videos watched: yes    Labs drawn by [facility] staff Yes    Plan of care for today: Pt and vitals assessed. Labs obtained and results reviewed with Dr. Capellan. Pt had dialysis yesterday. Will continue to assess the need for dialysis daily. Pt overall has minimal complaints. Reviewed medications with pt and wife Sana. Filled pt's medication box x 1 week. Dr. Capellan came to see pt. Clarified some medications and advised pt to get compression socks. Unable to provide them at [facility] so pt will try to find them at pharmacy.     Medication changes: Stop Simvastatin  Start Lipitor 10mg PO Daily    Medications administered: none      Patient education:    The following teaching topics were addressed: Importance of drinking 2L of non-caffeinated fluids daily, Incisional care, Signs/symptoms of infection, Good handwashing, Medications (purposes, doses and times of administration), Phone numbers to call with jennifer (Transplant coordinator, [facility] and [facility]), 7A discharge check list and Plan of care   Patient and wife verbalized understanding and all questions answered.    Drug level:  Patient took 4.5mg of Tacrolimus last evening at 8:00.  Care coordinator to follow up with the result.    Face to face time: 75 minutes  Discharge Plan    Pt will follow up with [facility] tomorrow for labs and assessment  Discharge instructions reviewed with patient: YES  Patient/Representative verbalized understanding, all questions answered: YES    Discharged from unit at 1045 with whom: wife to home.    Carolyn Wallis, RN, RN       Again, thank you for allowing me to participate in the care of your patient.        Sincerely,        [facility]

## 2022-05-28 NOTE — TELEPHONE ENCOUNTER
ISSUE:   Tacrolimus IR level 7.4 on 5/28, goal 8-12, dose 4.5 mg BID.    PLAN:   Please call patient and confirm this was an accurate 12-hour trough. Verify Tacrolimus IR dose 4.5 mg BID. Confirm no new medications or illness. Confirm no missed doses. If accurate trough and accurate dose, increase Tacrolimus IR dose to 5 mg BID and repeat labs in 1 days    OUTCOME:   Spoke with patient, they confirm accurate trough level and current dose 4.5 mg BID. Patient confirmed dose change to 5 mg BID and to repeat labs in 1 days. Orders sent to preferred pharmacy for dose change and lab for repeat labs. Patient voiced understanding of plan.

## 2022-05-29 ENCOUNTER — INFUSION THERAPY VISIT (OUTPATIENT)
Dept: INFUSION THERAPY | Facility: CLINIC | Age: 57
End: 2022-05-29
Attending: INTERNAL MEDICINE
Payer: COMMERCIAL

## 2022-05-29 VITALS
RESPIRATION RATE: 16 BRPM | OXYGEN SATURATION: 95 % | WEIGHT: 246.2 LBS | BODY MASS INDEX: 35.33 KG/M2 | TEMPERATURE: 97.8 F

## 2022-05-29 DIAGNOSIS — Z94.0 KIDNEY REPLACED BY TRANSPLANT: Primary | ICD-10-CM

## 2022-05-29 LAB
ANION GAP SERPL CALCULATED.3IONS-SCNC: 14 MMOL/L (ref 3–14)
BASOPHILS # BLD AUTO: 0 10E3/UL (ref 0–0.2)
BASOPHILS NFR BLD AUTO: 0 %
BUN SERPL-MCNC: 93 MG/DL (ref 7–30)
CALCIUM SERPL-MCNC: 9.1 MG/DL (ref 8.5–10.1)
CHLORIDE BLD-SCNC: 97 MMOL/L (ref 94–109)
CO2 SERPL-SCNC: 22 MMOL/L (ref 20–32)
CREAT SERPL-MCNC: 7.14 MG/DL (ref 0.66–1.25)
EOSINOPHIL # BLD AUTO: 0.1 10E3/UL (ref 0–0.7)
EOSINOPHIL NFR BLD AUTO: 1 %
ERYTHROCYTE [DISTWIDTH] IN BLOOD BY AUTOMATED COUNT: 13 % (ref 10–15)
GFR SERPL CREATININE-BSD FRML MDRD: 8 ML/MIN/1.73M2
GLUCOSE BLD-MCNC: 115 MG/DL (ref 70–99)
HCT VFR BLD AUTO: 22.8 % (ref 40–53)
HGB BLD-MCNC: 7.7 G/DL (ref 13.3–17.7)
IMM GRANULOCYTES # BLD: 0.1 10E3/UL
IMM GRANULOCYTES NFR BLD: 2 %
LYMPHOCYTES # BLD AUTO: 0.1 10E3/UL (ref 0.8–5.3)
LYMPHOCYTES NFR BLD AUTO: 1 %
MAGNESIUM SERPL-MCNC: 1.5 MG/DL (ref 1.6–2.3)
MCH RBC QN AUTO: 32.6 PG (ref 26.5–33)
MCHC RBC AUTO-ENTMCNC: 33.8 G/DL (ref 31.5–36.5)
MCV RBC AUTO: 97 FL (ref 78–100)
MONOCYTES # BLD AUTO: 0.3 10E3/UL (ref 0–1.3)
MONOCYTES NFR BLD AUTO: 4 %
NEUTROPHILS # BLD AUTO: 6.7 10E3/UL (ref 1.6–8.3)
NEUTROPHILS NFR BLD AUTO: 92 %
NRBC # BLD AUTO: 0 10E3/UL
NRBC BLD AUTO-RTO: 0 /100
PHOSPHATE SERPL-MCNC: 7.7 MG/DL (ref 2.5–4.5)
PLATELET # BLD AUTO: 165 10E3/UL (ref 150–450)
POTASSIUM BLD-SCNC: 4.5 MMOL/L (ref 3.4–5.3)
RBC # BLD AUTO: 2.36 10E6/UL (ref 4.4–5.9)
SODIUM SERPL-SCNC: 133 MMOL/L (ref 133–144)
TACROLIMUS BLD-MCNC: 9.4 UG/L (ref 5–15)
TME LAST DOSE: NORMAL H
TME LAST DOSE: NORMAL H
WBC # BLD AUTO: 7.3 10E3/UL (ref 4–11)

## 2022-05-29 PROCEDURE — 84100 ASSAY OF PHOSPHORUS: CPT

## 2022-05-29 PROCEDURE — 83735 ASSAY OF MAGNESIUM: CPT

## 2022-05-29 PROCEDURE — 36415 COLL VENOUS BLD VENIPUNCTURE: CPT

## 2022-05-29 PROCEDURE — 85025 COMPLETE CBC W/AUTO DIFF WBC: CPT

## 2022-05-29 PROCEDURE — 80048 BASIC METABOLIC PNL TOTAL CA: CPT

## 2022-05-29 PROCEDURE — 80197 ASSAY OF TACROLIMUS: CPT

## 2022-05-29 RX ORDER — POLYETHYLENE GLYCOL 3350 17 G/17G
1 POWDER, FOR SOLUTION ORAL DAILY
COMMUNITY
End: 2022-07-19

## 2022-05-29 NOTE — PROGRESS NOTES
"Donald Blanco came to [facility] today for a lab and assess following a Kidney transplant on 05/17/2022.      Discharge date: 05/27/2022  Transplant coordinator: Alicia  Phone number patient can be reached at: [phone] (Cell)      Physical Assessment:  See physical assessment located under \"Document Flowsheets\".  Incision site: Abdominal incision to RLQ intact with staples; no s/s of infection noted. Incisional care reviewed w/ patient who verbalized understanding.  Lines: BAUDILIO drain to (R) abdominal region intact and draining serous fluid (approximately 200 ml while at [facility] appointment today). Patient verbalized comfort in BAUDILIO drain cares.   Flores: None  Urine clarity: Clear yellow w/ no foul odor per patient.   Hydration: Patient reports drinking approximately 74 oz in the last 24 hours. Reviewed fluid intake requirements w/ patient who verbalized understanding (see new order below).  Nutrition: Patient reports appetite is good.   Last BM: Yesterday (large in size; normal in consistency per patient). Taking Senna and Miralax PRN. Aware of how to adjust dose based off of bowel status.  Pain: Patient reports (R) abdominal region pain at 4/10 upon arrival to [facility]; states he is taking Tylenol and Oxycodone PRN and states it is effective.   Blood pressure/heart rate: 125/73 - 81 (sitting); 111/66 - 87 (standing); denies dizziness.  Urine output in the last 24 hours (patient reported) = 1925 ml.  Edema to BLE unchanged from when Dr Capellan saw them yesterday (per patient). Patient reports an increase in BLE edema yesterday afternoon; he obtained compression stockings (per orders) and swelling decreased to size they were yesterday morning when Dr Capellan saw them (per patient).     Labs drawn by [facility] staff: Yes    Plan of care for today:   1. Today's labs, vitals and assessment were reviewed w/ Dr Raj Capellan who gave the following orders:          - Decrease daily fluid intake to 50 - 60 oz.         - Return to [facility] tomorrow for " LBA and possible dialysis.     Medication changes: None    Medications administered: None      Patient education:    The following teaching topics were addressed: Incisional care, Medications (purposes, doses and times of administration), Phone numbers to call with concenrs (Transplant coordinator, Unit [facility] and [facility]), Plan of care and fluid intake requirement of 50 - 60 oz per 24 hours.   Patient and spouse verbalized understanding and all questions answered.    Drug level:  Patient reports last dose of Prograf 5 mg was taken last night at 1915. Spoke with transplant coordinator on call (Naima Montalvo) who states she will follow up with today's drug level. Patient aware.     Face to face time: 45 minutes  Discharge Plan    [facility]  Discharge instructions reviewed with patient: YES  Patient/Representative verbalized understanding, all questions answered: YES    Discharged from unit at 09:30 with spouse to home.    Farhat Larkin, RN, RN

## 2022-05-29 NOTE — PATIENT INSTRUCTIONS
Decrease fluid intake to 50 - 60 oz daily. Do not exceed 60 oz.    2. Return to [facility] tomorrow for LBA at 7:00 and possible dialysis.     Contact Information:    Transplant Coordinator: Alicia  Transplant Office:  [phone]  [facility]:  [phone]  Ask for physician on call for kidney transplant.  Unit [address] (Transplant Unit):  [phone]  [facility]:  [phone]

## 2022-05-29 NOTE — LETTER
"    5/29/2022         RE: Donald Blanco  [address]   [place] [address]        Dear Colleague,    Thank you for referring your patient, Donald Blanco, to the [facility]. Please see a copy of my visit note below.    Donald Blanco came to [facility] today for a lab and assess following a Kidney transplant on 05/17/2022.      Discharge date: 05/27/2022  Transplant coordinator: Alicia  Phone number patient can be reached at: [phone] (Cell)      Physical Assessment:  See physical assessment located under \"Document Flowsheets\".  Incision site: Abdominal incision to RLQ intact with staples; no s/s of infection noted. Incisional care reviewed w/ patient who verbalized understanding.  Lines: BAUDILIO drain to (R) abdominal region intact and draining serous fluid (approximately 200 ml while at [facility] appointment today). Patient verbalized comfort in BAUDILIO drain cares.   Flores: None  Urine clarity: Clear yellow w/ no foul odor per patient.   Hydration: Patient reports drinking approximately 74 oz in the last 24 hours. Reviewed fluid intake requirements w/ patient who verbalized understanding (see new order below).  Nutrition: Patient reports appetite is good.   Last BM: Yesterday (large in size; normal in consistency per patient). Taking Senna and Miralax PRN. Aware of how to adjust dose based off of bowel status.  Pain: Patient reports (R) abdominal region pain at 4/10 upon arrival to [facility]; states he is taking Tylenol and Oxycodone PRN and states it is effective.   Blood pressure/heart rate: 125/73 - 81 (sitting); 111/66 - 87 (standing); denies dizziness.  Urine output in the last 24 hours (patient reported) = 1925 ml.  Edema to BLE unchanged from when Dr Capellan saw them yesterday (per patient). Patient reports an increase in BLE edema yesterday afternoon; he obtained compression stockings (per orders) and swelling decreased to size they were yesterday morning when Dr Capellan saw them " (per patient).     Labs drawn by [facility] staff: Yes    Plan of care for today:   1. Today's labs, vitals and assessment were reviewed w/ Dr Raj Capellan who gave the following orders:          - Decrease daily fluid intake to 50 - 60 oz.         - Return to [facility] tomorrow for LBA and possible dialysis.     Medication changes: None    Medications administered: None      Patient education:    The following teaching topics were addressed: Incisional care, Medications (purposes, doses and times of administration), Phone numbers to call with concenrs (Transplant coordinator, Unit [facility]D and [facility]), Plan of care and fluid intake requirement of 50 - 60 oz per 24 hours.   Patient and spouse verbalized understanding and all questions answered.    Drug level:  Patient reports last dose of Prograf 5 mg was taken last night at 1915. Spoke with transplant coordinator on call (Naima Montalvo) who states she will follow up with today's drug level. Patient aware.     Face to face time: 45 minutes  Discharge Plan    Pt will follow up with [facility] tomorrow for LBA.  Discharge instructions reviewed with patient: YES  Patient/Representative verbalized understanding, all questions answered: YES    Discharged from unit at 09:30 with spouse to home.    Farhat Larkin, RN, RN         Again, thank you for allowing me to participate in the care of your patient.        Sincerely,        [facility]

## 2022-05-30 ENCOUNTER — INFUSION THERAPY VISIT (OUTPATIENT)
Dept: INFUSION THERAPY | Facility: CLINIC | Age: 57
End: 2022-05-30
Attending: RADIOLOGY
Payer: COMMERCIAL

## 2022-05-30 VITALS
TEMPERATURE: 98.2 F | BODY MASS INDEX: 34.58 KG/M2 | OXYGEN SATURATION: 97 % | RESPIRATION RATE: 16 BRPM | WEIGHT: 241 LBS

## 2022-05-30 DIAGNOSIS — Z94.0 KIDNEY REPLACED BY TRANSPLANT: Primary | ICD-10-CM

## 2022-05-30 LAB
ANION GAP SERPL CALCULATED.3IONS-SCNC: 13 MMOL/L (ref 3–14)
BASOPHILS # BLD AUTO: 0 10E3/UL (ref 0–0.2)
BASOPHILS NFR BLD AUTO: 0 %
BUN SERPL-MCNC: 106 MG/DL (ref 7–30)
CALCIUM SERPL-MCNC: 9.2 MG/DL (ref 8.5–10.1)
CHLORIDE BLD-SCNC: 98 MMOL/L (ref 94–109)
CO2 SERPL-SCNC: 22 MMOL/L (ref 20–32)
CREAT SERPL-MCNC: 7.65 MG/DL (ref 0.66–1.25)
EOSINOPHIL # BLD AUTO: 0.1 10E3/UL (ref 0–0.7)
EOSINOPHIL NFR BLD AUTO: 1 %
ERYTHROCYTE [DISTWIDTH] IN BLOOD BY AUTOMATED COUNT: 12.9 % (ref 10–15)
GFR SERPL CREATININE-BSD FRML MDRD: 8 ML/MIN/1.73M2
GLUCOSE BLD-MCNC: 120 MG/DL (ref 70–99)
HCT VFR BLD AUTO: 24.4 % (ref 40–53)
HGB BLD-MCNC: 8.1 G/DL (ref 13.3–17.7)
IMM GRANULOCYTES # BLD: 0.1 10E3/UL
IMM GRANULOCYTES NFR BLD: 1 %
LYMPHOCYTES # BLD AUTO: 0.1 10E3/UL (ref 0.8–5.3)
LYMPHOCYTES NFR BLD AUTO: 1 %
MAGNESIUM SERPL-MCNC: 1.6 MG/DL (ref 1.6–2.3)
MCH RBC QN AUTO: 32.4 PG (ref 26.5–33)
MCHC RBC AUTO-ENTMCNC: 33.2 G/DL (ref 31.5–36.5)
MCV RBC AUTO: 98 FL (ref 78–100)
MONOCYTES # BLD AUTO: 0.3 10E3/UL (ref 0–1.3)
MONOCYTES NFR BLD AUTO: 4 %
NEUTROPHILS # BLD AUTO: 7.6 10E3/UL (ref 1.6–8.3)
NEUTROPHILS NFR BLD AUTO: 93 %
NRBC # BLD AUTO: 0 10E3/UL
NRBC BLD AUTO-RTO: 0 /100
PHOSPHATE SERPL-MCNC: 8.6 MG/DL (ref 2.5–4.5)
PLATELET # BLD AUTO: 202 10E3/UL (ref 150–450)
POTASSIUM BLD-SCNC: 4.6 MMOL/L (ref 3.4–5.3)
RBC # BLD AUTO: 2.5 10E6/UL (ref 4.4–5.9)
SODIUM SERPL-SCNC: 133 MMOL/L (ref 133–144)
TACROLIMUS BLD-MCNC: 10 UG/L (ref 5–15)
TME LAST DOSE: NORMAL H
TME LAST DOSE: NORMAL H
WBC # BLD AUTO: 8.2 10E3/UL (ref 4–11)

## 2022-05-30 PROCEDURE — 84100 ASSAY OF PHOSPHORUS: CPT

## 2022-05-30 PROCEDURE — 36415 COLL VENOUS BLD VENIPUNCTURE: CPT

## 2022-05-30 PROCEDURE — 80048 BASIC METABOLIC PNL TOTAL CA: CPT

## 2022-05-30 PROCEDURE — 80180 DRUG SCRN QUAN MYCOPHENOLATE: CPT

## 2022-05-30 PROCEDURE — 80197 ASSAY OF TACROLIMUS: CPT

## 2022-05-30 PROCEDURE — 83735 ASSAY OF MAGNESIUM: CPT

## 2022-05-30 PROCEDURE — 85025 COMPLETE CBC W/AUTO DIFF WBC: CPT

## 2022-05-30 NOTE — LETTER
"    5/30/2022         RE: Donald Blanco  [address]  [place] [address]        Dear Colleague,    Thank you for referring your patient, Donald Blanco, to the [facility]. Please see a copy of my visit note below.    Donald Blanco came to [facility] today for a lab and assess following a Kidney transplant on 05/17/2022.      Discharge date: 05/27/2022  Transplant coordinator: Alicia  Phone number patient can be reached at: [phone] (Cell)      Physical Assessment:  See physical assessment located under \"Document Flowsheets\".  Incision site: Abdominal incision to RLQ intact with staples; no s/s of infection noted. Incisional care and s/s of infection reviewed w/ patient/spouse who verbalized understanding.  Lines: BAUDILIO drain intact to (R) abdominal region and draining serous colored fluid; patient reports 1365 ml of output in the last 24 hours.  Flores: None  Urine clarity: Clear yellow w/ no foul odor per patient. Patient reports 2800 ml of urine output in the last 24 hours.  Hydration: Patient reports drinking 44 oz in the last 24 hours. Reviewed fluid intake requirements w/ patient who verbalized understanding (see new order below).  Nutrition: Patient reports appetite remains good.   Last BM: Today; patient reports one small bowel movement yesterday and one small bowel movement today. Denies feeling constipated. Using Senna and Miralax PRN and is aware of how to adjust dose.   Pain: Patient reports (R) abdominal discomfort rated at 4/10 upon arrival to [facility]; taking Tylenol and Oxycodone PRN w/ effective results.   Blood pressure/heart rate: 109/70 - 85 (sitting); 112/67 - 99 (standing)   Edema to BLE decreased from yesterday; patient states he is almost back to baseline.     Labs drawn by [facility] staff: Yes    Plan of care for today:   1. Today's labs, vitals and assessment were reviewed w/ Dr Raj Capellan who gave the following orders:          - Decrease Bumex to 1 " mg PO BID (medication card/box updated).         - Increase daily fluid intake to 60 oz.         - No dialysis today.         - Return to [facility] tomorrow for LBA (possible need for dialysis tomorrow).         - Increase Renvela to 1600 mg TID with meals (medication card/box updated).    Medication changes: See above    Medications administered: None      Patient education:    The following teaching topics were addressed: Incisional care, Signs/symptoms of infection, Medications (purposes, doses and times of administration), Plan of care and change in fluid intake requirement to 60 oz daily.   Patient and spouse  verbalized understanding and all questions answered.    Drug level:  Patient reports last dose of Prograf 5 mg and Cellecept 750 mg were taken last night at 1945. On call transplant coordinator aware of need to follow up with today's drug level. Patient aware as well.    Face to face time: 20 minutes  Discharge Plan    Pt will follow up with [facility] tomorrow for LBA.  Discharge instructions reviewed with patient: YES  Patient/Representative verbalized understanding, all questions answered: YES    Discharged from unit at 0900 with spouse to home.    Farhat Larkin RN

## 2022-05-30 NOTE — PROGRESS NOTES
"Donald Blanco came to [facility] today for a lab and assess following a Kidney transplant on 05/17/2022.      Discharge date: 05/27/2022  Transplant coordinator: Alicia  Phone number patient can be reached at: [phone] (Cell)      Physical Assessment:  See physical assessment located under \"Document Flowsheets\".  Incision site: Abdominal incision to RLQ intact with staples; no s/s of infection noted. Incisional care and s/s of infection reviewed w/ patient/spouse who verbalized understanding.  Lines: BAUDILIO drain intact to (R) abdominal region and draining serous colored fluid; patient reports 1365 ml of output in the last 24 hours.  Flores: None  Urine clarity: Clear yellow w/ no foul odor per patient. Patient reports 2800 ml of urine output in the last 24 hours.  Hydration: Patient reports drinking 44 oz in the last 24 hours. Reviewed fluid intake requirements w/ patient who verbalized understanding (see new order below).  Nutrition: Patient reports appetite remains good.   Last BM: Today; patient reports one small bowel movement yesterday and one small bowel movement today. Denies feeling constipated. Using Senna and Miralax PRN and is aware of how to adjust dose.   Pain: Patient reports (R) abdominal discomfort rated at 4/10 upon arrival to [facility]; taking Tylenol and Oxycodone PRN w/ effective results.   Blood pressure/heart rate: 109/70 - 85 (sitting); 112/67 - 99 (standing)   Edema to BLE decreased from yesterday; patient states he is almost back to baseline.     Labs drawn by [facility] staff: Yes    Plan of care for today:   1. Today's labs, vitals and assessment were reviewed w/ Dr Raj Capellan who gave the following orders:          - Decrease Bumex to 1 mg PO BID (medication card/box updated).         - Increase daily fluid intake to 60 oz.         - No dialysis today.         - Return to [facility] tomorrow for LBA (possible need for dialysis tomorrow).         - Increase Renvela to 1600 mg TID with meals (medication " card/box updated).    Medication changes: See above    Medications administered: None      Patient education:    The following teaching topics were addressed: Incisional care, Signs/symptoms of infection, Medications (purposes, doses and times of administration), Plan of care and change in fluid intake requirement to 60 oz daily.   Patient and spouse  verbalized understanding and all questions answered.    Drug level:  Patient reports last dose of Prograf 5 mg and Cellecept 750 mg were taken last night at 1945. On call transplant coordinator aware of need to follow up with today's drug level. Patient aware as well.    Face to face time: 20 minutes  Discharge Plan    [facility]  Discharge instructions reviewed with patient: YES  Patient/Representative verbalized understanding, all questions answered: YES    Discharged from unit at 0900 with spouse to home.    Farhat Larkin RN

## 2022-05-30 NOTE — PATIENT INSTRUCTIONS
Decrease Bumex to 1 mg twice daily.    2. Increase fluid intake to 60 oz daily.    3. No dialysis today.    4. Return to [facility] tomorrow at 7:00 with lab draw in lab prior at 6:30.    5. Increase Renvela to 2 tabs three times daily with meals.

## 2022-05-31 ENCOUNTER — TELEPHONE (OUTPATIENT)
Dept: TRANSPLANT | Facility: CLINIC | Age: 57
End: 2022-05-31

## 2022-05-31 ENCOUNTER — OFFICE VISIT (OUTPATIENT)
Dept: INFUSION THERAPY | Facility: CLINIC | Age: 57
End: 2022-05-31
Attending: INTERNAL MEDICINE
Payer: COMMERCIAL

## 2022-05-31 ENCOUNTER — LAB (OUTPATIENT)
Dept: LAB | Facility: CLINIC | Age: 57
End: 2022-05-31
Payer: COMMERCIAL

## 2022-05-31 ENCOUNTER — TELEPHONE (OUTPATIENT)
Dept: NEPHROLOGY | Facility: CLINIC | Age: 57
End: 2022-05-31

## 2022-05-31 VITALS
WEIGHT: 237.4 LBS | HEART RATE: 96 BPM | TEMPERATURE: 98.4 F | SYSTOLIC BLOOD PRESSURE: 123 MMHG | BODY MASS INDEX: 34.06 KG/M2 | OXYGEN SATURATION: 97 % | DIASTOLIC BLOOD PRESSURE: 67 MMHG | RESPIRATION RATE: 16 BRPM

## 2022-05-31 DIAGNOSIS — Z94.0 KIDNEY REPLACED BY TRANSPLANT: ICD-10-CM

## 2022-05-31 DIAGNOSIS — Z79.899 ENCOUNTER FOR LONG-TERM CURRENT USE OF MEDICATION: ICD-10-CM

## 2022-05-31 DIAGNOSIS — Z94.0 KIDNEY REPLACED BY TRANSPLANT: Primary | ICD-10-CM

## 2022-05-31 DIAGNOSIS — Z48.298 AFTERCARE FOLLOWING ORGAN TRANSPLANT: ICD-10-CM

## 2022-05-31 DIAGNOSIS — T86.19 DELAYED GRAFT FUNCTION OF KIDNEY: ICD-10-CM

## 2022-05-31 DIAGNOSIS — Z94.0 KIDNEY REPLACED BY TRANSPLANT: Chronic | ICD-10-CM

## 2022-05-31 LAB
ANION GAP SERPL CALCULATED.3IONS-SCNC: 13 MMOL/L (ref 3–14)
BASOPHILS # BLD AUTO: 0 10E3/UL (ref 0–0.2)
BASOPHILS NFR BLD AUTO: 0 %
BUN SERPL-MCNC: 118 MG/DL (ref 7–30)
CALCIUM SERPL-MCNC: 9.6 MG/DL (ref 8.5–10.1)
CHLORIDE BLD-SCNC: 97 MMOL/L (ref 94–109)
CO2 SERPL-SCNC: 25 MMOL/L (ref 20–32)
CREAT SERPL-MCNC: 8.11 MG/DL (ref 0.66–1.25)
EOSINOPHIL # BLD AUTO: 0.1 10E3/UL (ref 0–0.7)
EOSINOPHIL NFR BLD AUTO: 1 %
ERYTHROCYTE [DISTWIDTH] IN BLOOD BY AUTOMATED COUNT: 12.9 % (ref 10–15)
GFR SERPL CREATININE-BSD FRML MDRD: 7 ML/MIN/1.73M2
GLUCOSE BLD-MCNC: 125 MG/DL (ref 70–99)
HCT VFR BLD AUTO: 23.8 % (ref 40–53)
HGB BLD-MCNC: 7.9 G/DL (ref 13.3–17.7)
IMM GRANULOCYTES # BLD: 0.1 10E3/UL
IMM GRANULOCYTES NFR BLD: 1 %
LYMPHOCYTES # BLD AUTO: 0.1 10E3/UL (ref 0.8–5.3)
LYMPHOCYTES NFR BLD AUTO: 2 %
MAGNESIUM SERPL-MCNC: 1.8 MG/DL (ref 1.6–2.3)
MCH RBC QN AUTO: 32.6 PG (ref 26.5–33)
MCHC RBC AUTO-ENTMCNC: 33.2 G/DL (ref 31.5–36.5)
MCV RBC AUTO: 98 FL (ref 78–100)
MONOCYTES # BLD AUTO: 0.2 10E3/UL (ref 0–1.3)
MONOCYTES NFR BLD AUTO: 3 %
MYCOPHENOLATE SERPL LC/MS/MS-MCNC: 2.69 MG/L (ref 1–3.5)
MYCOPHENOLATE-G SERPL LC/MS/MS-MCNC: 193.3 MG/L (ref 30–95)
NEUTROPHILS # BLD AUTO: 6.3 10E3/UL (ref 1.6–8.3)
NEUTROPHILS NFR BLD AUTO: 93 %
NRBC # BLD AUTO: 0 10E3/UL
NRBC BLD AUTO-RTO: 0 /100
PHOSPHATE SERPL-MCNC: 8.7 MG/DL (ref 2.5–4.5)
PLATELET # BLD AUTO: 205 10E3/UL (ref 150–450)
POTASSIUM BLD-SCNC: 4.5 MMOL/L (ref 3.4–5.3)
RBC # BLD AUTO: 2.42 10E6/UL (ref 4.4–5.9)
SODIUM SERPL-SCNC: 135 MMOL/L (ref 133–144)
TACROLIMUS BLD-MCNC: 11.5 UG/L (ref 5–15)
TME LAST DOSE: ABNORMAL H
TME LAST DOSE: ABNORMAL H
TME LAST DOSE: NORMAL H
TME LAST DOSE: NORMAL H
WBC # BLD AUTO: 6.8 10E3/UL (ref 4–11)

## 2022-05-31 PROCEDURE — 36415 COLL VENOUS BLD VENIPUNCTURE: CPT | Performed by: PATHOLOGY

## 2022-05-31 PROCEDURE — 80197 ASSAY OF TACROLIMUS: CPT | Mod: 90 | Performed by: PATHOLOGY

## 2022-05-31 PROCEDURE — 85025 COMPLETE CBC W/AUTO DIFF WBC: CPT | Performed by: PATHOLOGY

## 2022-05-31 PROCEDURE — 99000 SPECIMEN HANDLING OFFICE-LAB: CPT | Performed by: PATHOLOGY

## 2022-05-31 PROCEDURE — 80048 BASIC METABOLIC PNL TOTAL CA: CPT | Performed by: PATHOLOGY

## 2022-05-31 PROCEDURE — 99215 OFFICE O/P EST HI 40 MIN: CPT | Mod: 24 | Performed by: INTERNAL MEDICINE

## 2022-05-31 PROCEDURE — G0463 HOSPITAL OUTPT CLINIC VISIT: HCPCS

## 2022-05-31 PROCEDURE — 83735 ASSAY OF MAGNESIUM: CPT | Performed by: PATHOLOGY

## 2022-05-31 PROCEDURE — 84100 ASSAY OF PHOSPHORUS: CPT | Performed by: PATHOLOGY

## 2022-05-31 RX ORDER — TACROLIMUS 1 MG/1
5 CAPSULE ORAL EVERY 12 HOURS
Qty: 300 CAPSULE | Refills: 11 | Status: SHIPPED | OUTPATIENT
Start: 2022-05-31 | End: 2022-06-03

## 2022-05-31 NOTE — LETTER
"    5/31/2022         RE: Donald Blanco  [address]  [place] [address]        Dear Colleague,    Thank you for referring your patient, Donald Blanco, to the [facility]. Please see a copy of my visit note below.    Donald Blanco came to [facility] today for labs and assessment following a kidney transplant on 5/17/2022.      Discharge date: 5/17/2022  Transplant coordinator: Alicia Valentin    Physical Assessment:  See physical assessment located under \"Document Flowsheets\".  Incision site: right lower abdomen; staples. No drainage.  Lines: BAUDILIO drain right abdomen; serosanguinous output. Pt reports continued high output; 1590mLs since yesterday morning.  Flores: NA  Urine clarity: clear/yellow. 4150mLs urine output per patient since yesterday morning.  Hydration: 60 ounces daily  Nutrition: good appetite. Denies N/V.  Last BM: yesterday; small/hard  Pain: 3-4/10 incisional; tolerable with tylenol and oxy at night.  Labs drawn by [facility] staff No. Drawn per first floor [facility]    Plan of care for today:   [facility]; printed copy given. Vitals obtained and nursing assessment completed. Dr. Braker and Dr. Capellan in to see patient, plan as follows:  -no dialysis needed today  -labs-only tomorrow. Scheduled in [place]. Transplant clinic will notify him if he needs to come in for dialysis.  -continue to watch incision. Stop using wound cleanser, and switch to soap and water. Follow-up with Katie as scheduled on 6/2.  -discharge from [facility]. Discussed with home health; they will see patient for first appointment and labs on Thursday at 10am.  -patient should be seen next week in transplant clinic; messaged scheduling    Medication changes:   -if lose 2lbs or greater by tomorrow, decrease bumex to 1mg once daily    Medications administered:  pt took own transplant meds    Patient education:    The following teaching topics were addressed: Incisional " care, Signs/symptoms of infection, Phone numbers to call with concenrs (Transplant coordinator, [facility] and [facility]), Plan of care and Drain care   Patient verbalized understanding and all questions answered.    Drug level:  Patient took 5mg of tacrolimus last evening at 8pm.  Care coordinator to follow up with the result.    Discharge Plan    Pt will follow up for labs tomorrow, home health and post-op appointment on Thursday, transplant   Discharge instructions reviewed with patient: YES  Patient/Representative verbalized understanding, all questions answered: YES    Discharged from unit at 1045 with whom: wife to home.    Sylvia Stauffer RN      Again, thank you for allowing me to participate in the care of your patient.        Sincerely,        [facility] [place]

## 2022-05-31 NOTE — PATIENT INSTRUCTIONS
Dear Donald Blanco    Thank you for choosing [facility] ([facility]) for your transplant cares.  The following information is a summary of our appointment as well as important reminders.      PLAN    We have you scheduled for labs-only in clinic here at the [facility] tomorrow at 0815. You can call  to see if you can get a lab appointment scheduled in the [place] clinic instead.  I confirmed with home health that they will not see you tomorrow, but instead will come on Thursday 6/2 at 10am. Then follow-up with surgery appointment after that here at [facility] at 1230.   After labs tomorrow, your coordinator will be in touch with you if you need to come in for dialysis.   For questions/concerns overnight or tomorrow, call your coordinator at the transplant office.    Contact Information:    Transplant Coordinator: Alicia  Transplant Office:  [phone]  [facility]:  [phone]  Ask for physician on call for kidney transplant.  [address] (Transplant Unit):  [phone]  [facility]:  [phone]  [facility]:  [phone]     We look forward in seeing you on your next appointment here at [facility] ([facility]).  Please don t hesitate to call us at [phone] to reschedule any of your appointments or to speak with one of the [facility] registered nurses.  It was a pleasure taking care of you today.    Sincerely,    [facility]  [address] [place]  [address]  Phone:  [phone]

## 2022-05-31 NOTE — TELEPHONE ENCOUNTER
We received an rx for Tacrolimus 1mg. It has 2 different sets of directions on rx. Please call to clarify or resend rx with the correct set of directions.     Thank You  Sofy Bergeron, [facility]  [phone]

## 2022-05-31 NOTE — LETTER
5/31/2022         RE: Donald Blanco  [address] [facility]  [place] [address]        Dear Colleague,    Thank you for referring your patient, Donald Blanco, to the [facility]. Please see a copy of my visit note below.    TRANSPLANT NEPHROLOGY EARLY POST TRANSPLANT VISIT    Today's Recommendations:  - No dialysis  - Return to [facility] tomorrow, may require HD  - Continue bumex 1mg BID    Assessment & Plan   # LDKT: DGF - Cr rising despite good UOP, last HD was 5/27/22.    - Baseline Creatinine: ~ TBD   - Proteinuria: Not checked post transplant   - Date DSA Last Checked: May/2022      Latest DSA: No DSA at time of transplant   - BK Viremia: Not checked post transplant   - Kidney Tx Biopsy: May 2022; Result:  Relatively well preserved renal  parenchyma. Tubules show mild degenerative changes with distention and flattening of the epithelium. There is no evidence of acute rejection. The peritubular capillaries are negative for C4d.   - Transplant Ureteral Stent: No     # Immunosuppression: Tacrolimus immediate release (goal 8-10) and Mycophenolate mofetil (dose 750 mg every 12 hours)   - Induction with Recent Transplant:  Intermediate Intensity - PRA 19.  Basiliximab 5/17, rATG on 5/19 and 5/21. Changed to rATG due to DGF.    - Continue with intensive monitoring of immunosuppression for efficacy and toxicity.   - Changes: No    # Infection Prophylaxis:   - PJP: Sulfa/TMP (Bactrim)  - CMV: Valganciclovir (Valcyte)    # Blood Pressure: Controlled;  Goal BP: < 150/90   - Volume status: Mildly hypervolemic  EDW ~ 103kg   - Changes: No    # Elevated Blood Glucose: Glucose generally running ~ 120s   - Management as per primary team.    # Anemia in Chronic Renal Disease: Hgb: Stable      YUDI: No   - Iron studies: Replete    # Mineral Bone Disorder:   - Secondary renal hyperparathyroidism; PTH level: Moderately elevated (301-600 pg/ml)        On treatment: None  - Vitamin D; level:  Normal        On supplement: Yes  - Calcium; level: Normal        On supplement: No  - Phosphorus; level: High        On supplement:  On Renvela  - 2 tabs TID    # Electrolytes:   - Potassium; level: Normal        On supplement: No  - Magnesium; level: Normal        On supplement: No  - Bicarbonate; level: Normal        On supplement: No    # Medical Compliance: Yes    # COVID-19 Virus Review: Discussed COVID-19 virus and the potential medical risks.  Reviewed preventative health recommendations, including wearing a mask where appropriate.  Recommended COVID vaccination should be up to date with either an initial vaccination or booster shot when appropriate.  Asked the patient to inform the transplant center if they are exposed or diagnosed with this virus.    # COVID Vaccination Up To Date:  Has received 2 doses, can receive booster 3m post-transplant    # Transplant History:  Etiology of Kidney Failure: IgA nephropathy  Tx: LDKT  Transplant: 5/17/2022 (Kidney)  Donor Type: Living Donor Class:   Crossmatch at time of Tx: negative  DSA at time of Tx: No  Significant changes in immunosuppression: None  CMV IgG Ab High Risk Discordance (D+/R-): No, D-/R-  EBV IgG Ab High Risk Discordance (D+/R-): No  Significant transplant-related complications: DGF    Transplant Office Phone Number: [phone]    Assessment and plan was discussed with the patient and he voiced his understanding and agreement.    Return visit: No follow-ups on file.    Katya Barker MD    Chief Complaint   Mr. Blanco is a 57 year old here for immunosuppression management and hospital follow up after kidney transplant.     History of Present Illness   Mr. 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 Imported kidney with vasospasm in the OR. He has had delayed graft function. Last dialysis was on 5/27/22.     He's been feeling well at home. Pain is controlled with acetaminophen  during the day and he has been using oxycodone at night to help with sleep, though he's unsure if it's helping. He's getting up every 2 hours or so overnight to urinate. He had ~3.5L of urine out yesterday and also had ~1.5L from his drain. His weight is trending down to 237lbs today from 241lbs yesterday. BP is well controlled - 120s/60s. Creatinine continues to rise despite excellent UOP.     Home BP:  120-130s/60-70s    Problem List   Patient Active Problem List   Diagnosis     Essential hypertension     Gout     Hyperlipidemia     Hyperparathyroidism due to renal insufficiency (H)     IgA nephropathy     Obesity     Umbilical hernia     Pre-operative cardiovascular examination     Prostate cancer (H)     Morbid obesity (H)     Kidney replaced by transplant     Immunosuppressed status (H)     Hyperkalemia       Allergies   No Known Allergies    Medications   Current Outpatient Medications   Medication Sig     acetaminophen (TYLENOL) 325 MG tablet Take 1-2 tablets (325-650 mg) by mouth every 4 hours as needed for mild pain or fever     aspirin (ASA) 325 MG tablet Take 1 tablet (325 mg) by mouth daily     atorvastatin (LIPITOR) 10 MG tablet Take 1 tablet (10 mg) by mouth daily     B Complex-C-Folic Acid (VIRT-CAPS) 1 MG CAPS Take 1 mg by mouth daily     bumetanide (BUMEX) 1 MG tablet Take 3 tablets (3 mg) by mouth 2 times daily (Patient taking differently: Take 1 mg by mouth 2 times daily)     Lidocaine (LIDOCARE) 4 % Patch Place 1-2 patches onto the skin every 24 hours To prevent lidocaine toxicity, patient should be patch free for 12 hrs daily.     mycophenolate (GENERIC EQUIVALENT) 250 MG capsule Take 3 capsules (750 mg) by mouth 2 times daily     oxyCODONE (ROXICODONE) 5 MG tablet Take 1 tablet (5 mg) by mouth every 6 hours as needed for moderate to severe pain     pantoprazole (PROTONIX) 40 MG EC tablet Take 1 tablet (40 mg) by mouth 2 times daily (before meals)     polyethylene glycol (MIRALAX) 17 GM/Dose  powder Take 1 capful by mouth daily PRN     senna-docusate (SENOKOT-S/PERICOLACE) 8.6-50 MG tablet 1-2 tablets by Oral or Feeding Tube route 2 times daily as needed for constipation Take while taking oxycodone, hold for loose stools.     sevelamer carbonate (RENVELA) 800 MG tablet Take 1 tablet (800 mg) by mouth 3 times daily (with meals) (Patient taking differently: Take 1,600 mg by mouth 3 times daily (with meals))     sulfamethoxazole-trimethoprim (BACTRIM) 400-80 MG tablet Take 1 tablet by mouth Every Mon, Wed, Fri Morning Increase to one tab by mouth daily when directed by transplant team     tacrolimus (GENERIC EQUIVALENT) 0.5 MG capsule Take 1 capsule (0.5 mg) by mouth 2 times daily Take with four 1 mg capsules by mouth twice daily for a total daily dose of 4.5 mg by mouth twice daily. (Patient not taking: Reported on 5/29/2022)     tacrolimus (GENERIC EQUIVALENT) 1 MG capsule Take 5 capsules (5 mg) by mouth every 12 hours Take four 1 mg capsules by mouth twice daily with one 0.5 mg capsule by mouth twice daily for a total daily dose of 4.5 mg by mouth twice daily. (Patient taking differently: Take 5 mg by mouth every 12 hours Take 5 mg twice daily.)     valGANciclovir (VALCYTE) 450 MG tablet Take 1 tablet (450 mg) by mouth twice a week Titrate dose up to 2 tabs by mouth daily as directed by transplant team (based on kidney function)     Vitamin D3 (CHOLECALCIFEROL) 25 mcg (1000 units) tablet Take 1 capsule by mouth every morning      No current facility-administered medications for this visit.     There are no discontinued medications.    Physical Exam   Vital Signs: There were no vitals taken for this visit.    GENERAL APPEARANCE: alert and no distress  HENT: mouth without ulcers or lesions  LYMPHATICS: no cervical or supraclavicular nodes  RESP: lungs clear to auscultation - no rales, rhonchi or wheezes  CV: regular rhythm, normal rate, no rub, no murmur  EDEMA: trace to 1+ LE edema bilaterally  ABDOMEN:  soft, nondistended, nontender, bowel sounds normal  MS: extremities normal - no gross deformities noted, no evidence of inflammation in joints, no muscle tenderness  SKIN: no rash  TX KIDNEY: normal, incision closed with staples, inferior aspect of incision in pannus fold but remains c/d/i    Data     Renal Latest Ref Rng & Units 5/31/2022 5/30/2022 5/29/2022   Na 133 - 144 mmol/L 135 133 133   K 3.4 - 5.3 mmol/L 4.5 4.6 4.5   Cl 94 - 109 mmol/L 97 98 97   CO2 20 - 32 mmol/L 25 22 22   BUN 7 - 30 mg/dL 118(H) 106(H) 93(H)   Cr 0.66 - 1.25 mg/dL 8.11(H) 7.65(H) 7.14(H)   Glucose 70 - 99 mg/dL 125(H) 120(H) 115(H)   Ca  8.5 - 10.1 mg/dL 9.6 9.2 9.1   Mg 1.6 - 2.3 mg/dL 1.8 1.6 1.5(L)     Bone Health Latest Ref Rng & Units 5/31/2022 5/30/2022 5/29/2022   Phos 2.5 - 4.5 mg/dL 8.7(H) 8.6(H) 7.7(H)   PTHi 15 - 65 pg/mL - - -   Vit D Def 20 - 75 ug/L - - -     Heme Latest Ref Rng & Units 5/31/2022 5/30/2022 5/29/2022   WBC 4.0 - 11.0 10e3/uL 6.8 8.2 7.3   Hgb 13.3 - 17.7 g/dL 7.9(L) 8.1(L) 7.7(L)   Plt 150 - 450 10e3/uL 205 202 165   ABSOLUTE NEUTROPHIL 1.6 - 8.3 10e9/L - - -   ABSOLUTE LYMPHOCYTES 0.8 - 5.3 10e9/L - - -   ABSOLUTE MONOCYTES 0.0 - 1.3 10e9/L - - -   ABSOLUTE EOSINOPHILS 0.0 - 0.7 10e9/L - - -   ABSOLUTE BASOPHILS 0.0 - 0.2 10e9/L - - -   ABS IMMATURE GRANULOCYTES 0 - 0.4 10e9/L - - -   ABSOLUTE NUCLEATED RBC - - - -     Liver Latest Ref Rng & Units 5/22/2022 5/22/2022 5/21/2022   AP 40 - 150 U/L - 61 -   TBili 0.2 - 1.3 mg/dL - 0.5 -   DBili 0.0 - 0.2 mg/dL - 0.2 -   ALT 0 - 70 U/L - 23 -   AST 0 - 45 U/L - 6 -   Tot Protein 6.8 - 8.8 g/dL - 5.9(L) -   Albumin 3.4 - 5.0 g/dL 2.7(L) 2.7(L) 2.9(L)     Pancreas Latest Ref Rng & Units 5/9/2022   A1C 0.0 - 5.6 % 5.0     Iron studies Latest Ref Rng & Units 5/9/2022   Iron 35 - 180 ug/dL 79   Iron sat 15 - 46 % 24   Ferritin 26 - 388 ng/mL 1,002(H)     [facility] Txp Virology Latest Ref Rng & Units 5/17/2022 5/9/2022 9/3/2020   CMV QUANT IU/ML Not Detected IU/mL Not  Detected - -   EBV CAPSID ANTIBODY IGG No detectable antibody. - Positive(A) >8.0(H)   Hep B Core NR:Nonreactive - - Nonreactive        Recent Labs   Lab Test 05/24/22  0543 05/26/22  0604 05/28/22  0813 05/29/22  0738 05/30/22  0736   DOSTAC 5/23/2022 5/25/2022 5/27/2022  --   --    TACROL 7.0 8.1 7.4 9.4 10.0     Patient was seen and evaluated by me, Raj Capellan MD. I have reviewed the note and agree with the the plan of care as documented by the fellow.      Sincerely,    Raj Capellan MD

## 2022-05-31 NOTE — PROGRESS NOTES
"Donald Blanco came to [facility] today for labs and assessment following a kidney transplant on 5/17/2022.      Discharge date: 5/17/2022  Transplant coordinator: Alicia Valentin    Physical Assessment:  See physical assessment located under \"Document Flowsheets\".  Incision site: right lower abdomen; staples. No drainage.  Lines: BAUDILIO drain right abdomen; serosanguinous output. Pt reports continued high output; 1590mLs since yesterday morning.  Flores: NA  Urine clarity: clear/yellow. 4150mLs urine output per patient since yesterday morning.  Hydration: 60 ounces daily  Nutrition: good appetite. Denies N/V.  Last BM: yesterday; small/hard  Pain: 3-4/10 incisional; tolerable with tylenol and oxy at night.  Labs drawn by [facility] staff No. Drawn per first floor prior to [facility]    Plan of care for today:   Labs prior to [facility] on first floor; printed copy given. Vitals obtained and nursing assessment completed. Dr. Barker and Dr. Capellan in to see patient, plan as follows:  -no dialysis needed today  -labs-only tomorrow. Scheduled in [place]. Transplant clinic will notify him if he needs to come in for dialysis.  -continue to watch incision. Stop using wound cleanser, and switch to soap and water. Follow-up with Katie as scheduled on 6/2.  -discharge from [facility]. Discussed with home health; they will see patient for first appointment and labs on Thursday at 10am.  -patient should be seen next week in transplant clinic; messaged scheduling    Medication changes:   -if lose 2lbs or greater by tomorrow, decrease bumex to 1mg once daily    Medications administered:  pt took own transplant meds    Patient education:    The following teaching topics were addressed: Incisional care, Signs/symptoms of infection, Phone numbers to call with concenrs (Transplant coordinator, [facility] [address] and [facility]), Plan of care and Drain care   Patient verbalized understanding and all questions answered.    Drug level:  Patient took 5mg of tacrolimus last " evening at 8pm.  Care coordinator to follow up with the result.    Discharge Plan    Pt will follow up for labs tomorrow, home health and post-op appointment on Thursday, transplant   Discharge instructions reviewed with patient: YES  Patient/Representative verbalized understanding, all questions answered: YES    Discharged from unit at 1045 with whom: wife to home.    Sylvia Stauffer RN

## 2022-05-31 NOTE — PROGRESS NOTES
TRANSPLANT NEPHROLOGY EARLY POST TRANSPLANT VISIT    Today's Recommendations:  - No dialysis  - Return to [facility] tomorrow, may require HD  - Continue bumex 1mg BID    Assessment & Plan   # LDKT: DGF - Cr rising despite good UOP, last HD was 5/27/22.    - Baseline Creatinine: ~ TBD   - Proteinuria: Not checked post transplant   - Date DSA Last Checked: May/2022      Latest DSA: No DSA at time of transplant   - BK Viremia: Not checked post transplant   - Kidney Tx Biopsy: May 2022; Result:  Relatively well preserved renal  parenchyma. Tubules show mild degenerative changes with distention and flattening of the epithelium. There is no evidence of acute rejection. The peritubular capillaries are negative for C4d.   - Transplant Ureteral Stent: No     # Immunosuppression: Tacrolimus immediate release (goal 8-10) and Mycophenolate mofetil (dose 750 mg every 12 hours)   - Induction with Recent Transplant:  Intermediate Intensity - PRA 19.  Basiliximab 5/17, rATG on 5/19 and 5/21. Changed to rATG due to DGF.    - Continue with intensive monitoring of immunosuppression for efficacy and toxicity.   - Changes: No    # Infection Prophylaxis:   - PJP: Sulfa/TMP (Bactrim)  - CMV: Valganciclovir (Valcyte)    # Blood Pressure: Controlled;  Goal BP: < 150/90   - Volume status: Mildly hypervolemic  EDW ~ 103kg   - Changes: No    # Elevated Blood Glucose: Glucose generally running ~ 120s   - Management as per primary team.    # Anemia in Chronic Renal Disease: Hgb: Stable      YUDI: No   - Iron studies: Replete    # Mineral Bone Disorder:   - Secondary renal hyperparathyroidism; PTH level: Moderately elevated (301-600 pg/ml)        On treatment: None  - Vitamin D; level: Normal        On supplement: Yes  - Calcium; level: Normal        On supplement: No  - Phosphorus; level: High        On supplement:  On Renvela  - 2 tabs TID    # Electrolytes:   - Potassium; level: Normal        On supplement: No  - Magnesium; level: Normal        On  supplement: No  - Bicarbonate; level: Normal        On supplement: No    # Medical Compliance: Yes    # COVID-19 Virus Review: Discussed COVID-19 virus and the potential medical risks.  Reviewed preventative health recommendations, including wearing a mask where appropriate.  Recommended COVID vaccination should be up to date with either an initial vaccination or booster shot when appropriate.  Asked the patient to inform the transplant center if they are exposed or diagnosed with this virus.    # COVID Vaccination Up To Date:  Has received 2 doses, can receive booster 3m post-transplant    # Transplant History:  Etiology of Kidney Failure: IgA nephropathy  Tx: LDKT  Transplant: 5/17/2022 (Kidney)  Donor Type: Living Donor Class:   Crossmatch at time of Tx: negative  DSA at time of Tx: No  Significant changes in immunosuppression: None  CMV IgG Ab High Risk Discordance (D+/R-): No, D-/R-  EBV IgG Ab High Risk Discordance (D+/R-): No  Significant transplant-related complications: DGF    Transplant Office Phone Number: [phone]    Assessment and plan was discussed with the patient and he voiced his understanding and agreement.    Return visit: No follow-ups on file.    Katya Barker MD    Chief Complaint   Mr. Blanco is a 57 year old here for immunosuppression management and hospital follow up after kidney transplant.     History of Present Illness   Mr. 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 Imported kidney with vasospasm in the OR. He has had delayed graft function. Last dialysis was on 5/27/22.     He's been feeling well at home. Pain is controlled with acetaminophen during the day and he has been using oxycodone at night to help with sleep, though he's unsure if it's helping. He's getting up every 2 hours or so overnight to urinate. He had ~3.5L of urine out yesterday and also had ~1.5L from his drain. His weight is trending down to  237lbs today from 241lbs yesterday. BP is well controlled - 120s/60s. Creatinine continues to rise despite excellent UOP.     Home BP:  120-130s/60-70s    Problem List   Patient Active Problem List   Diagnosis    Essential hypertension    Gout    Hyperlipidemia    Hyperparathyroidism due to renal insufficiency (H)    IgA nephropathy    Obesity    Umbilical hernia    Pre-operative cardiovascular examination    Prostate cancer (H)    Morbid obesity (H)    Kidney replaced by transplant    Immunosuppressed status (H)    Hyperkalemia       Allergies   No Known Allergies    Medications   Current Outpatient Medications   Medication Sig    acetaminophen (TYLENOL) 325 MG tablet Take 1-2 tablets (325-650 mg) by mouth every 4 hours as needed for mild pain or fever    aspirin (ASA) 325 MG tablet Take 1 tablet (325 mg) by mouth daily    atorvastatin (LIPITOR) 10 MG tablet Take 1 tablet (10 mg) by mouth daily    B Complex-C-Folic Acid (VIRT-CAPS) 1 MG CAPS Take 1 mg by mouth daily    bumetanide (BUMEX) 1 MG tablet Take 3 tablets (3 mg) by mouth 2 times daily (Patient taking differently: Take 1 mg by mouth 2 times daily)    Lidocaine (LIDOCARE) 4 % Patch Place 1-2 patches onto the skin every 24 hours To prevent lidocaine toxicity, patient should be patch free for 12 hrs daily.    mycophenolate (GENERIC EQUIVALENT) 250 MG capsule Take 3 capsules (750 mg) by mouth 2 times daily    oxyCODONE (ROXICODONE) 5 MG tablet Take 1 tablet (5 mg) by mouth every 6 hours as needed for moderate to severe pain    pantoprazole (PROTONIX) 40 MG EC tablet Take 1 tablet (40 mg) by mouth 2 times daily (before meals)    polyethylene glycol (MIRALAX) 17 GM/Dose powder Take 1 capful by mouth daily PRN    senna-docusate (SENOKOT-S/PERICOLACE) 8.6-50 MG tablet 1-2 tablets by Oral or Feeding Tube route 2 times daily as needed for constipation Take while taking oxycodone, hold for loose stools.    sevelamer carbonate (RENVELA) 800 MG tablet Take 1 tablet (800  mg) by mouth 3 times daily (with meals) (Patient taking differently: Take 1,600 mg by mouth 3 times daily (with meals))    sulfamethoxazole-trimethoprim (BACTRIM) 400-80 MG tablet Take 1 tablet by mouth Every Mon, Wed, Fri Morning Increase to one tab by mouth daily when directed by transplant team    tacrolimus (GENERIC EQUIVALENT) 0.5 MG capsule Take 1 capsule (0.5 mg) by mouth 2 times daily Take with four 1 mg capsules by mouth twice daily for a total daily dose of 4.5 mg by mouth twice daily. (Patient not taking: Reported on 5/29/2022)    tacrolimus (GENERIC EQUIVALENT) 1 MG capsule Take 5 capsules (5 mg) by mouth every 12 hours Take four 1 mg capsules by mouth twice daily with one 0.5 mg capsule by mouth twice daily for a total daily dose of 4.5 mg by mouth twice daily. (Patient taking differently: Take 5 mg by mouth every 12 hours Take 5 mg twice daily.)    valGANciclovir (VALCYTE) 450 MG tablet Take 1 tablet (450 mg) by mouth twice a week Titrate dose up to 2 tabs by mouth daily as directed by transplant team (based on kidney function)    Vitamin D3 (CHOLECALCIFEROL) 25 mcg (1000 units) tablet Take 1 capsule by mouth every morning      No current facility-administered medications for this visit.     There are no discontinued medications.    Physical Exam   Vital Signs: There were no vitals taken for this visit.    GENERAL APPEARANCE: alert and no distress  HENT: mouth without ulcers or lesions  LYMPHATICS: no cervical or supraclavicular nodes  RESP: lungs clear to auscultation - no rales, rhonchi or wheezes  CV: regular rhythm, normal rate, no rub, no murmur  EDEMA: trace to 1+ LE edema bilaterally  ABDOMEN: soft, nondistended, nontender, bowel sounds normal  MS: extremities normal - no gross deformities noted, no evidence of inflammation in joints, no muscle tenderness  SKIN: no rash  TX KIDNEY: normal, incision closed with staples, inferior aspect of incision in pannus fold but remains c/d/i    Data      Renal Latest Ref Rng & Units 5/31/2022 5/30/2022 5/29/2022   Na 133 - 144 mmol/L 135 133 133   K 3.4 - 5.3 mmol/L 4.5 4.6 4.5   Cl 94 - 109 mmol/L 97 98 97   CO2 20 - 32 mmol/L 25 22 22   BUN 7 - 30 mg/dL 118(H) 106(H) 93(H)   Cr 0.66 - 1.25 mg/dL 8.11(H) 7.65(H) 7.14(H)   Glucose 70 - 99 mg/dL 125(H) 120(H) 115(H)   Ca  8.5 - 10.1 mg/dL 9.6 9.2 9.1   Mg 1.6 - 2.3 mg/dL 1.8 1.6 1.5(L)     Bone Health Latest Ref Rng & Units 5/31/2022 5/30/2022 5/29/2022   Phos 2.5 - 4.5 mg/dL 8.7(H) 8.6(H) 7.7(H)   PTHi 15 - 65 pg/mL - - -   Vit D Def 20 - 75 ug/L - - -     Heme Latest Ref Rng & Units 5/31/2022 5/30/2022 5/29/2022   WBC 4.0 - 11.0 10e3/uL 6.8 8.2 7.3   Hgb 13.3 - 17.7 g/dL 7.9(L) 8.1(L) 7.7(L)   Plt 150 - 450 10e3/uL 205 202 165   ABSOLUTE NEUTROPHIL 1.6 - 8.3 10e9/L - - -   ABSOLUTE LYMPHOCYTES 0.8 - 5.3 10e9/L - - -   ABSOLUTE MONOCYTES 0.0 - 1.3 10e9/L - - -   ABSOLUTE EOSINOPHILS 0.0 - 0.7 10e9/L - - -   ABSOLUTE BASOPHILS 0.0 - 0.2 10e9/L - - -   ABS IMMATURE GRANULOCYTES 0 - 0.4 10e9/L - - -   ABSOLUTE NUCLEATED RBC - - - -     Liver Latest Ref Rng & Units 5/22/2022 5/22/2022 5/21/2022   AP 40 - 150 U/L - 61 -   TBili 0.2 - 1.3 mg/dL - 0.5 -   DBili 0.0 - 0.2 mg/dL - 0.2 -   ALT 0 - 70 U/L - 23 -   AST 0 - 45 U/L - 6 -   Tot Protein 6.8 - 8.8 g/dL - 5.9(L) -   Albumin 3.4 - 5.0 g/dL 2.7(L) 2.7(L) 2.9(L)     Pancreas Latest Ref Rng & Units 5/9/2022   A1C 0.0 - 5.6 % 5.0     Iron studies Latest Ref Rng & Units 5/9/2022   Iron 35 - 180 ug/dL 79   Iron sat 15 - 46 % 24   Ferritin 26 - 388 ng/mL 1,002(H)     [facility] Txp Virology Latest Ref Rng & Units 5/17/2022 5/9/2022 9/3/2020   CMV QUANT IU/ML Not Detected IU/mL Not Detected - -   EBV CAPSID ANTIBODY IGG No detectable antibody. - Positive(A) >8.0(H)   Hep B Core NR:Nonreactive - - Nonreactive        Recent Labs   Lab Test 05/24/22  0543 05/26/22  0604 05/28/22  0813 05/29/22  0738 05/30/22  0736   DOSTAC 5/23/2022 5/25/2022 5/27/2022  --   --    TACROL 7.0 8.1  7.4 9.4 10.0     Patient was seen and evaluated by me, Raj Capellan MD. I have reviewed the note and agree with the the plan of care as documented by the fellow.

## 2022-05-31 NOTE — TELEPHONE ENCOUNTER
Donald is a recent kidney transplant patient who discharged on 05/27/2022.     Donald will be responsible for managing medications. Was given transplant supplies including 7 day pill organizer, thermometer, and BP monitor, at discharge pharmacy along with medications.      Donald would like to use [facility] for outpatient prescriptions. Due to mailing restriction with patient's insurance, we will arrange for [facility] to send monthly orders to [facility] for patient to .     **CAN PATIENT FILL AT [facility] FOR MEDICATIONS LISTED? PATIENT CAN FILL WITH US HOWEVER PT HAS A PLAN THROUGH [facility], AND PER OUR CONTRACT WITH THEM, WE ARE ONLY ALLOWED TO SHIP TO PT IF THEY ARE PHYSICALLY UNABLE TO COME IN TO OUR PHARMACY TO  FOR ( [place] RESIDENTS ONLY ). IF PT IS COMFORTABLE WITH US DOCUMENTING THIS IN THEIR RECORD, WE CAN SHIP OUT THEIR MEDS. IF NOT, THEY WILL NEED TO UTILIZE [facility]([phone]) OR  AT ANY [facility] SITE    HEALTH BENEFIT: (MEDICA)  ID#[id number] [facility]# [id number] (EFFECTIVE (1/1/2020) )      PHARMACY BENEFIT: (MEDICA)  PROCESSING INFO: ID#[id number] GRP# [id number] PCN#[id number] BIN#[id number] (EFFECTIVE (1/1/2020) ).   DEDUCTIBLE ($2800) & MAX OUT-OF-POCKET ($5,000)  COPAY STRUCTURE:  $ (20%) FOR GENERIC  $ (20%) FOR BRAND  $ (40%) FOR NON-FORMULARY MEDICATIONS       TEST CLAIM SPECIALTY #28  MYCOPHENOLATE 250mg (#240/30DS)-$0  PROGRAF 1mg (#180/30DS)-$0  TACROLIMUS 1mg (#60/30DS)-$0  CYCLOSPRINE 100mg (#60/30DS)-$0  VALGANCICLOVIR 450mg (#60/30DS)-$0        Austin Strickland, Pharm.D.  [facility]  [phone]

## 2022-06-01 ENCOUNTER — ANCILLARY PROCEDURE (OUTPATIENT)
Dept: ULTRASOUND IMAGING | Facility: CLINIC | Age: 57
End: 2022-06-01
Attending: INTERNAL MEDICINE
Payer: COMMERCIAL

## 2022-06-01 ENCOUNTER — TELEPHONE (OUTPATIENT)
Dept: TRANSPLANT | Facility: CLINIC | Age: 57
End: 2022-06-01

## 2022-06-01 ENCOUNTER — LAB (OUTPATIENT)
Dept: LAB | Facility: CLINIC | Age: 57
End: 2022-06-01
Payer: COMMERCIAL

## 2022-06-01 DIAGNOSIS — Z94.0 KIDNEY REPLACED BY TRANSPLANT: ICD-10-CM

## 2022-06-01 DIAGNOSIS — Z48.298 AFTERCARE FOLLOWING ORGAN TRANSPLANT: ICD-10-CM

## 2022-06-01 DIAGNOSIS — R79.89 ELEVATED SERUM CREATININE: ICD-10-CM

## 2022-06-01 DIAGNOSIS — C61 PROSTATE CANCER (H): ICD-10-CM

## 2022-06-01 DIAGNOSIS — R79.89 ELEVATED SERUM CREATININE: Primary | ICD-10-CM

## 2022-06-01 DIAGNOSIS — Z79.899 ENCOUNTER FOR LONG-TERM CURRENT USE OF MEDICATION: ICD-10-CM

## 2022-06-01 LAB
ANION GAP SERPL CALCULATED.3IONS-SCNC: 12 MMOL/L (ref 3–14)
BUN SERPL-MCNC: 123 MG/DL (ref 7–30)
CALCIUM SERPL-MCNC: 9.5 MG/DL (ref 8.5–10.1)
CHLORIDE BLD-SCNC: 99 MMOL/L (ref 94–109)
CO2 SERPL-SCNC: 23 MMOL/L (ref 20–32)
CREAT SERPL-MCNC: 8.76 MG/DL (ref 0.66–1.25)
DEPRECATED CALCIDIOL+CALCIFEROL SERPL-MC: 55 UG/L (ref 20–75)
ERYTHROCYTE [DISTWIDTH] IN BLOOD BY AUTOMATED COUNT: 12.6 % (ref 10–15)
GFR SERPL CREATININE-BSD FRML MDRD: 6 ML/MIN/1.73M2
GLUCOSE BLD-MCNC: 118 MG/DL (ref 70–99)
HCT VFR BLD AUTO: 24.1 % (ref 40–53)
HGB BLD-MCNC: 8 G/DL (ref 13.3–17.7)
MAGNESIUM SERPL-MCNC: 2.1 MG/DL (ref 1.6–2.3)
MAYO MISC RESULT: NORMAL
MCH RBC QN AUTO: 33.2 PG (ref 26.5–33)
MCHC RBC AUTO-ENTMCNC: 33.2 G/DL (ref 31.5–36.5)
MCV RBC AUTO: 100 FL (ref 78–100)
PHOSPHATE SERPL-MCNC: 7.7 MG/DL (ref 2.5–4.5)
PLATELET # BLD AUTO: 211 10E3/UL (ref 150–450)
POTASSIUM BLD-SCNC: 5.1 MMOL/L (ref 3.4–5.3)
PSA SERPL-MCNC: 0.04 UG/L (ref 0–4)
PTH-INTACT SERPL-MCNC: 1220 PG/ML (ref 18–80)
RBC # BLD AUTO: 2.41 10E6/UL (ref 4.4–5.9)
SODIUM SERPL-SCNC: 134 MMOL/L (ref 133–144)
TACROLIMUS BLD-MCNC: 11.6 UG/L (ref 5–15)
TME LAST DOSE: NORMAL H
TME LAST DOSE: NORMAL H
WBC # BLD AUTO: 7.7 10E3/UL (ref 4–11)

## 2022-06-01 PROCEDURE — 76776 US EXAM K TRANSPL W/DOPPLER: CPT | Mod: GC | Performed by: RADIOLOGY

## 2022-06-01 PROCEDURE — 85027 COMPLETE CBC AUTOMATED: CPT

## 2022-06-01 PROCEDURE — 83970 ASSAY OF PARATHORMONE: CPT

## 2022-06-01 PROCEDURE — 87799 DETECT AGENT NOS DNA QUANT: CPT

## 2022-06-01 PROCEDURE — 82306 VITAMIN D 25 HYDROXY: CPT

## 2022-06-01 PROCEDURE — 83735 ASSAY OF MAGNESIUM: CPT

## 2022-06-01 PROCEDURE — 84153 ASSAY OF PSA TOTAL: CPT

## 2022-06-01 PROCEDURE — 84100 ASSAY OF PHOSPHORUS: CPT

## 2022-06-01 PROCEDURE — 80180 DRUG SCRN QUAN MYCOPHENOLATE: CPT

## 2022-06-01 PROCEDURE — 36415 COLL VENOUS BLD VENIPUNCTURE: CPT

## 2022-06-01 PROCEDURE — 80048 BASIC METABOLIC PNL TOTAL CA: CPT

## 2022-06-01 PROCEDURE — 80197 ASSAY OF TACROLIMUS: CPT

## 2022-06-01 NOTE — TELEPHONE ENCOUNTER
Post Kidney and Pancreas Transplant Team Conference  Date: 6/1/2022  Transplant Coordinator: Alicia Valentin     Attendees:  [x]  Dr. Cr [x] Alicia Valentin, RN [x] Hanny Simmons LPN     [x]  Dr. Capellan [x] Rut Pickens, URIAH [] Ester Bragg LPN   [x]  Dr. Duff [x] Phoebe Howard, URIAH    [x]  Dr. Friedman [] Jazzmine Arzate RN [x] Alejandro Garces, PharmD   [x] Dr. Mckenna [] Shruthi Reyes, URIAH    [x] Dr. Flores [] Campos Falcon RN    [] Dr. Frank [] Karina Martinez RN [] Aurora Hollingsworth RN   [] Dr. Yu [] Sara Pablo RN    []  Dr. Cohen [] Carolyn Vasquez RN    [] Dr. Rust [x] Xin Venegas RN    [] Katie Sanders NP [] Rossy Solorio RN        Verbal Plan Read Back:   Repeat renogram and ultrasound  Review in 1 week    Routed to RN Coordinator   Hanny Simmons LPN

## 2022-06-01 NOTE — TELEPHONE ENCOUNTER
Spoke with patient about dialysis tomorrow, and ultrasound this afternoon. Patient agreeable to plan.

## 2022-06-01 NOTE — TELEPHONE ENCOUNTER
DATE:  6/1/2022     TIME OF RECEIPT FROM LAB:  0900    ORDERING PROVIDER:     LAB TEST:      LAB VALUE:  Creatinine 8.76    RESULTS GIVEN WITH READ-BACK TO (PROVIDER):  CALOS CRUMP    TIME LAB VALUE REPORTED TO PROVIDER:   0912

## 2022-06-02 ENCOUNTER — OFFICE VISIT (OUTPATIENT)
Dept: TRANSPLANT | Facility: CLINIC | Age: 57
End: 2022-06-02
Attending: NURSE PRACTITIONER
Payer: COMMERCIAL

## 2022-06-02 ENCOUNTER — TELEPHONE (OUTPATIENT)
Dept: TRANSPLANT | Facility: CLINIC | Age: 57
End: 2022-06-02

## 2022-06-02 VITALS
WEIGHT: 235 LBS | SYSTOLIC BLOOD PRESSURE: 110 MMHG | OXYGEN SATURATION: 97 % | BODY MASS INDEX: 33.72 KG/M2 | HEART RATE: 94 BPM | DIASTOLIC BLOOD PRESSURE: 70 MMHG

## 2022-06-02 DIAGNOSIS — Z48.298 AFTERCARE FOLLOWING ORGAN TRANSPLANT: Primary | ICD-10-CM

## 2022-06-02 LAB
BKV DNA # SPEC NAA+PROBE: NOT DETECTED COPIES/ML
MYCOPHENOLATE SERPL LC/MS/MS-MCNC: 3.8 MG/L (ref 1–3.5)
MYCOPHENOLATE-G SERPL LC/MS/MS-MCNC: 192.2 MG/L (ref 30–95)
TME LAST DOSE: ABNORMAL H
TME LAST DOSE: ABNORMAL H

## 2022-06-02 PROCEDURE — 99213 OFFICE O/P EST LOW 20 MIN: CPT | Performed by: NURSE PRACTITIONER

## 2022-06-02 ASSESSMENT — PAIN SCALES - GENERAL: PAINLEVEL: NO PAIN (0)

## 2022-06-02 NOTE — TELEPHONE ENCOUNTER
Provider Call: General  Route to LPN    Reason for call: Call from N they tried calling pt and pt did not answer door this AM for them to start of care  I told them pt had labs and clinic appt this Am  They will touch base with pt later today to get things started     Call back needed? No

## 2022-06-02 NOTE — TELEPHONE ENCOUNTER
Donald paged the triage line with concerns about a leaking BAUDILIO drain with some redness around the site and less drainage out of the bulb. Encouraged to clean and dry around the site and use a towel to absorb the drainage until his appointment tomorrow after dialysis.

## 2022-06-02 NOTE — LETTER
6/2/2022         RE: Donald Blanco  [address] [facility]  [place] [address]        Dear Colleague,    Thank you for referring your patient, Donald Blanco, to the [facility]. Please see a copy of my visit note below.    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 S/p living donor kidney transplant without stent on 5/17/22. Imported kidney with vasospasm in OR.    Patient presents to clinic today he denies any fevers, chills, nausea or vomiting.  He states that he has been urinating more.  He had dialysis without fluid removal.     He states overall he feels okay however he is very bothered by his drain and he would like it to be removed.    He states that his drain is putting out approximately 200 to 300 mL/day if not more.       Transplant History:    Transplant Type:  LDKT  Donor Type: Living   Transplant Date:  5/17/2022 (Kidney)   Ureteral Stent:  No   Crossmatch:  negative   DSA at Tx:  No  Baseline Cr: TBD   DeNovo DSA: No    Acute Rejection Hx:  No    Present Maintenance Immunosuppression:  Tacrolimus and Mycophenolate mofetil    CMV IgG Ab Discordance:  No  EBV IgG Ab Discordance:  No    BK Viremia:  No  EBV Viremia:  No    Transplant Coordinator: Alicia Valentin     Transplant Office Phone Number: [phone]     Immunosuppressant Medications     Immunosuppressive Agents Disp Start End     mycophenolate (GENERIC EQUIVALENT) 250 MG capsule    180 capsule 5/27/2022     Sig - Route: Take 3 capsules (750 mg) by mouth 2 times daily - Oral    Class: E-Prescribe     tacrolimus (GENERIC EQUIVALENT) 0.5 MG capsule    60 capsule 5/27/2022     Sig - Route: Take 1 capsule (0.5 mg) by mouth 2 times daily Take with four 1 mg capsules by mouth twice daily for a total daily dose of 4.5 mg by mouth twice daily. -  Oral    Patient not taking: No sig reported       Class: E-Prescribe     tacrolimus (GENERIC EQUIVALENT) 1 MG capsule    300 capsule 6/3/2022     Sig - Route: Take 5 capsules (5 mg) by mouth every 12 hours - Oral    Class: E-Prescribe    Notes to Pharmacy: TXP DT 5/17/2022 (Kidney) TXP Dischg DT 5/27/2022 DX Kidney replaced by transplant Z94.0 TX Center [facility] ([place], [place])          Possible Immunosuppression-related side effects:   []             headache  []             vivid dreams  []             irritability  []             cognitive difficuties  []             fine tremor  []             nausea  []             diarrhea  []             neuropathy      []             edema  []             renal calcineurin toxicity  []             hyperkalemia  []             post-transplant diabetes  []             decreased appetite  []             increased appetite  []             other:  []             none    Prescription Medications as of 6/14/2022       Rx Number Disp Refills Start End Last Dispensed Date Next Fill Date Owning Pharmacy    acetaminophen (TYLENOL) 325 MG tablet  100 tablet 0 5/27/2022    [place], [place] - [address] [place] [address]    Sig: Take 1-2 tablets (325-650 mg) by mouth every 4 hours as needed for mild pain or fever    Class: E-Prescribe    Route: Oral    aspirin (ASA) 325 MG tablet  30 tablet 11 5/28/2022    [place], [place] - [address] [place] [address]    Sig: Take 1 tablet (325 mg) by mouth daily    Class: E-Prescribe    Route: Oral    atorvastatin (LIPITOR) 10 MG tablet  30 tablet 1 5/28/2022    [address]    Sig: Take 1 tablet (10 mg) by mouth daily    Class: E-Prescribe    Route: Oral    B Complex-C-Folic Acid (VIRT-CAPS) 1 MG CAPS  30 capsule 1 5/28/2022    [address]  [facility] [address]    Sig: Take 1 mg by mouth daily    Class: E-Prescribe    Route: Oral    bumetanide (BUMEX) 1 MG tablet  180 tablet 0 2022    [address]    Sig: Take 3 tablets (3 mg) by mouth 2 times daily    Class: E-Prescribe    Route: Oral    Lidocaine (LIDOCARE) 4 % Patch  10 patch 0 2022    [address]    Sig: Place 1-2 patches onto the skin every 24 hours To prevent lidocaine toxicity, patient should be patch free for 12 hrs daily.    Class: E-Prescribe    Route: Transdermal    mycophenolate (GENERIC EQUIVALENT) 250 MG capsule  180 capsule 11 2022    [address]    Sig: Take 3 capsules (750 mg) by mouth 2 times daily    Class: E-Prescribe    Route: Oral    oxyCODONE (ROXICODONE) 5 MG tablet  10 tablet 0 2022    [address]    Sig: Take 1 tablet (5 mg) by mouth every 6 hours as needed for moderate to severe pain    Class: E-Prescribe    Earliest Fill Date: 2022    Route: Oral    pantoprazole (PROTONIX) 40 MG EC tablet  60 tablet 3 2022    [address]    Sig: Take 1 tablet (40 mg) by mouth 2 times daily (before meals)    Class: E-Prescribe    Route: Oral    polyethylene glycol (MIRALAX) 17 GM/Dose powder        [facility] [address], [address]    Sig: Take 1 capful by mouth daily PRN    Class: Historical    Route: Oral    senna-docusate (SENOKOT-S/PERICOLACE) 8.6-50 MG tablet  100 tablet 0 2022    [address]    Si-2 tablets by Oral or Feeding Tube route 2 times daily as needed for constipation Take while taking oxycodone, hold for loose stools.    Class: E-Prescribe    Route: Oral or  Feeding Tube    sevelamer carbonate (RENVELA) 800 MG tablet  90 tablet 1 5/27/2022    [address]    Sig: Take 1 tablet (800 mg) by mouth 3 times daily (with meals)    Class: E-Prescribe    Route: Oral    sulfamethoxazole-trimethoprim (BACTRIM) 400-80 MG tablet  30 tablet 11 5/30/2022    [address]    Sig: Take 1 tablet by mouth Every Mon, Wed, Fri Morning Increase to one tab by mouth daily when directed by transplant team    Class: E-Prescribe    Route: Oral    tacrolimus (GENERIC EQUIVALENT) 0.5 MG capsule  60 capsule 11 5/27/2022    [address]    Sig: Take 1 capsule (0.5 mg) by mouth 2 times daily Take with four 1 mg capsules by mouth twice daily for a total daily dose of 4.5 mg by mouth twice daily.    Class: E-Prescribe    Route: Oral    tacrolimus (GENERIC EQUIVALENT) 1 MG capsule  300 capsule 11 6[place]3/2022    [facility] [place] [id number] [place] [address], [address]    Sig: Take 5 capsules (5 mg) by mouth every 12 hours    Class: E-Prescribe    Notes to Pharmacy: TXP DT 5/17/2022 (Kidney) TXP Dischg DT 5/27/2022 DX Kidney replaced by transplant Z94.0 TX Center [facility] ([place], [place])    Route: Oral    valGANciclovir (VALCYTE) 450 MG tablet  60 tablet 2 5/30/2022    [address]    Sig: Take 1 tablet (450 mg) by mouth twice a week Titrate dose up to 2 tabs by mouth daily as directed by transplant team (based on kidney function)    Class: E-Prescribe    Route: Oral    Vitamin D3 (CHOLECALCIFEROL) 25 mcg (1000 units) tablet            Sig: Take 1 capsule by mouth every morning     Class: Historical    Route: Oral          O:   Pulse:  [92] 92  BP: (100)/(64) 100/64  SpO2:  [98 %] 98 %  General Appearance: in no apparent  distress.   Skin: Normal, no rashes or jaundice  Heart: regular rate and rhythm  Lungs: easy respirations, no audible wheezing.  Abdomen: rounded, The wound is dry and intact, without hernia. The abdomen is non-tender. The kidney graft is not tender.  There is no ascites.  Extremities: Tremor absent.   Edema: absent.     Transplant Immunosuppression Labs Latest Ref Rng & Units 6/13/2022 6/9/2022 6/3/2022 6/2/2022 6/1/2022   Creat 0.66 - 1.25 mg/dL 4.48(H) 4.05(H) 5.48(H) 8.15(H) 8.76(HH)   BUN 7 - 30 mg/dL 66(H) 65(H) 65(H) 117(H) 123(HH)   WBC 4.0 - 11.0 10e3/uL 3.3(L) 3.9(L) 5.5 7.3 7.7   Neutrophil % - - - 94 -   ANEU 1.6 - 8.3 10e9/L - - - - -       Chemistries:   Recent Labs   Lab Test 06/13/22  0958   BUN 66*   CR 4.48*   GFRESTIMATED 14*   *     Lab Results   Component Value Date    A1C 5.1 06/03/2022     Recent Labs   Lab Test 06/03/22  1048   ALBUMIN 3.3*   BILITOTAL 0.5   ALKPHOS 83   AST 9   ALT 23     Urine Studies:  Recent Labs   Lab Test 05/09/22  0957   COLOR Yellow   APPEARANCE Clear   URINEGLC Negative   URINEBILI Negative   URINEKETONE Negative   SG 1.015   UBLD Moderate*   URINEPH 6.0   PROTEIN 300 *   NITRITE Negative   LEUKEST Small*   RBCU 0   WBCU 6*     No lab results found.  Hematology:   Recent Labs   Lab Test 06/13/22  0958 06/09/22  1014 06/03/22  1048   HGB 7.8* 8.1* 7.6*    231 200   WBC 3.3* 3.9* 5.5     Coags:   Recent Labs   Lab Test 05/09/22  0959 09/03/20  1131   INR 0.98 1.03     HLA antibodies:   SA1 Hi Risk Ramin   Date Value Ref Range Status   09/03/2020 None  Final     SA1 HI RISK RAMIN   Date Value Ref Range Status   05/09/2022 None  Final     SA1 Mod Risk Ramin   Date Value Ref Range Status   09/03/2020 None  Final     SA1 MOD RISK RAMIN   Date Value Ref Range Status   05/09/2022 Cw:2  Final     SA2 Hi Risk Ramin   Date Value Ref Range Status   09/03/2020 None  Final     SA2 HI RISK RAMIN   Date Value Ref Range Status   05/09/2022 None  Final     SA2 Mod Risk Ramin   Date  Value Ref Range Status   09/03/2020 DR:1  Final     SA2 MOD RISK RAMIN   Date Value Ref Range Status   05/09/2022 DR:1  Final       Assessment: Donald Blanco is doing fairly well s/p LDKT:  Issues we addressed during his visit include:    Plan:    1. Graft function: last dialysis was 6/2 for fluid removal.    2. Immunosuppression Management: No change continue MMF and prograf .  Complexity of management:Medium.  Contributing factors: delayed graft function  3. Drain:  Drain cr checked.  Large amount of fluid still draining.  LDH fluid gram stain and culture both checked up with fluid.  Patient informed the drain should stay in place due to amount coming out of drain and per surgeon recommendation.  Patient will have a NM renogram tomorrow  Followup: as directed    Total Time: 25 min,   Counselling Time: 15 min.            Again, thank you for allowing me to participate in the care of your patient.        Sincerely,        Katie Sanders NP

## 2022-06-02 NOTE — TELEPHONE ENCOUNTER
Spoke with patient who had questions about whether or not to take his medication prior to lab draw tomorrow. Questions answered. Home care will meet him Sunday then start care on Monday.

## 2022-06-02 NOTE — TELEPHONE ENCOUNTER
Called and left voicemail for Leda, updating her on his plan of care for this week. Instructed her to call [facility] if further questions.

## 2022-06-03 ENCOUNTER — LAB (OUTPATIENT)
Dept: LAB | Facility: CLINIC | Age: 57
End: 2022-06-03
Payer: COMMERCIAL

## 2022-06-03 ENCOUNTER — TELEPHONE (OUTPATIENT)
Dept: TRANSPLANT | Facility: CLINIC | Age: 57
End: 2022-06-03
Payer: COMMERCIAL

## 2022-06-03 ENCOUNTER — VIRTUAL VISIT (OUTPATIENT)
Dept: PHARMACY | Facility: CLINIC | Age: 57
End: 2022-06-03
Payer: COMMERCIAL

## 2022-06-03 ENCOUNTER — HOSPITAL ENCOUNTER (OUTPATIENT)
Dept: NUCLEAR MEDICINE | Facility: CLINIC | Age: 57
Setting detail: NUCLEAR MEDICINE
Discharge: HOME | End: 2022-06-03
Attending: SURGERY | Admitting: SURGERY
Payer: COMMERCIAL

## 2022-06-03 DIAGNOSIS — Z48.298 AFTERCARE FOLLOWING ORGAN TRANSPLANT: ICD-10-CM

## 2022-06-03 DIAGNOSIS — Z94.0 KIDNEY REPLACED BY TRANSPLANT: Chronic | ICD-10-CM

## 2022-06-03 DIAGNOSIS — R60.9 EDEMA, UNSPECIFIED TYPE: ICD-10-CM

## 2022-06-03 DIAGNOSIS — Z94.0 KIDNEY REPLACED BY TRANSPLANT: ICD-10-CM

## 2022-06-03 DIAGNOSIS — Z94.0 KIDNEY REPLACED BY TRANSPLANT: Primary | ICD-10-CM

## 2022-06-03 DIAGNOSIS — R79.89 ELEVATED SERUM CREATININE: ICD-10-CM

## 2022-06-03 DIAGNOSIS — Z79.899 ENCOUNTER FOR LONG-TERM CURRENT USE OF MEDICATION: ICD-10-CM

## 2022-06-03 DIAGNOSIS — E83.39 HYPERPHOSPHATEMIA: ICD-10-CM

## 2022-06-03 DIAGNOSIS — R52 PAIN: ICD-10-CM

## 2022-06-03 LAB
ALBUMIN SERPL-MCNC: 3.3 G/DL (ref 3.4–5)
ALP SERPL-CCNC: 83 U/L (ref 40–150)
ALT SERPL W P-5'-P-CCNC: 23 U/L (ref 0–70)
ANION GAP SERPL CALCULATED.3IONS-SCNC: 9 MMOL/L (ref 3–14)
AST SERPL W P-5'-P-CCNC: 9 U/L (ref 0–45)
BILIRUB DIRECT SERPL-MCNC: 0.2 MG/DL (ref 0–0.2)
BILIRUB SERPL-MCNC: 0.5 MG/DL (ref 0.2–1.3)
BUN SERPL-MCNC: 65 MG/DL (ref 7–30)
CALCIUM SERPL-MCNC: 9.7 MG/DL (ref 8.5–10.1)
CHLORIDE BLD-SCNC: 100 MMOL/L (ref 94–109)
CHOLEST SERPL-MCNC: 85 MG/DL
CO2 SERPL-SCNC: 25 MMOL/L (ref 20–32)
CREAT SERPL-MCNC: 5.48 MG/DL (ref 0.66–1.25)
DEPRECATED CALCIDIOL+CALCIFEROL SERPL-MC: 53 UG/L (ref 20–75)
ERYTHROCYTE [DISTWIDTH] IN BLOOD BY AUTOMATED COUNT: 12.6 % (ref 10–15)
FASTING STATUS PATIENT QL REPORTED: NO
GFR SERPL CREATININE-BSD FRML MDRD: 11 ML/MIN/1.73M2
GLUCOSE BLD-MCNC: 118 MG/DL (ref 70–99)
HBA1C MFR BLD: 5.1 % (ref 0–5.6)
HCT VFR BLD AUTO: 23.6 % (ref 40–53)
HDLC SERPL-MCNC: 35 MG/DL
HGB BLD-MCNC: 7.6 G/DL (ref 13.3–17.7)
LDLC SERPL CALC-MCNC: 28 MG/DL
MCH RBC QN AUTO: 32.1 PG (ref 26.5–33)
MCHC RBC AUTO-ENTMCNC: 32.2 G/DL (ref 31.5–36.5)
MCV RBC AUTO: 100 FL (ref 78–100)
NONHDLC SERPL-MCNC: 50 MG/DL
PLATELET # BLD AUTO: 200 10E3/UL (ref 150–450)
POTASSIUM BLD-SCNC: 4.7 MMOL/L (ref 3.4–5.3)
PROT SERPL-MCNC: 6.6 G/DL (ref 6.8–8.8)
PTH-INTACT SERPL-MCNC: 969 PG/ML (ref 15–65)
RBC # BLD AUTO: 2.37 10E6/UL (ref 4.4–5.9)
SODIUM SERPL-SCNC: 134 MMOL/L (ref 133–144)
TACROLIMUS BLD-MCNC: 13.1 UG/L (ref 5–15)
TME LAST DOSE: NORMAL H
TME LAST DOSE: NORMAL H
TRIGL SERPL-MCNC: 112 MG/DL
URATE SERPL-MCNC: 6.1 MG/DL (ref 3.5–7.2)
WBC # BLD AUTO: 5.5 10E3/UL (ref 4–11)

## 2022-06-03 PROCEDURE — 36415 COLL VENOUS BLD VENIPUNCTURE: CPT | Performed by: PATHOLOGY

## 2022-06-03 PROCEDURE — 84550 ASSAY OF BLOOD/URIC ACID: CPT | Performed by: PATHOLOGY

## 2022-06-03 PROCEDURE — 80197 ASSAY OF TACROLIMUS: CPT | Mod: 90 | Performed by: PATHOLOGY

## 2022-06-03 PROCEDURE — 82248 BILIRUBIN DIRECT: CPT | Performed by: PATHOLOGY

## 2022-06-03 PROCEDURE — 83970 ASSAY OF PARATHORMONE: CPT | Performed by: PATHOLOGY

## 2022-06-03 PROCEDURE — 83036 HEMOGLOBIN GLYCOSYLATED A1C: CPT | Performed by: PATHOLOGY

## 2022-06-03 PROCEDURE — 99000 SPECIMEN HANDLING OFFICE-LAB: CPT | Performed by: PATHOLOGY

## 2022-06-03 PROCEDURE — 99605 MTMS BY PHARM NP 15 MIN: CPT | Performed by: PHARMACIST

## 2022-06-03 PROCEDURE — 343N000001 HC RX 343: Performed by: SURGERY

## 2022-06-03 PROCEDURE — A9562 TC99M MERTIATIDE: HCPCS | Performed by: SURGERY

## 2022-06-03 PROCEDURE — 82306 VITAMIN D 25 HYDROXY: CPT | Mod: 90 | Performed by: PATHOLOGY

## 2022-06-03 PROCEDURE — 85027 COMPLETE CBC AUTOMATED: CPT | Performed by: PATHOLOGY

## 2022-06-03 PROCEDURE — 78707 K FLOW/FUNCT IMAGE W/O DRUG: CPT | Mod: 26 | Performed by: STUDENT IN AN ORGANIZED HEALTH CARE EDUCATION/TRAINING PROGRAM

## 2022-06-03 PROCEDURE — 99607 MTMS BY PHARM ADDL 15 MIN: CPT | Performed by: PHARMACIST

## 2022-06-03 PROCEDURE — 80061 LIPID PANEL: CPT | Performed by: PATHOLOGY

## 2022-06-03 PROCEDURE — 78707 K FLOW/FUNCT IMAGE W/O DRUG: CPT

## 2022-06-03 PROCEDURE — 80053 COMPREHEN METABOLIC PANEL: CPT | Performed by: PATHOLOGY

## 2022-06-03 RX ORDER — TACROLIMUS 1 MG/1
5 CAPSULE ORAL EVERY 12 HOURS
Qty: 300 CAPSULE | Refills: 11 | Status: ON HOLD | OUTPATIENT
Start: 2022-06-03 | End: 2022-06-23

## 2022-06-03 RX ADMIN — TECHNESCAN TC 99M MERTIATIDE 11.1 MILLICURIE: 1 INJECTION, POWDER, LYOPHILIZED, FOR SOLUTION INTRAVENOUS at 09:18

## 2022-06-03 NOTE — TELEPHONE ENCOUNTER
Provider Call: General  Route to LPN    Reason for call: Pt does not have a PCP  They need a Dr name for their orders that is covering pt     Call back needed? Yes    Return Call Needed  Same as documented in contacts section  When to return call?: Greater than one day: Route standard priority

## 2022-06-03 NOTE — PATIENT INSTRUCTIONS
"Recommendations from today's MTM visit:                                                       1. [facility] mail order will call you three weeks post discharge, but if your dose changes, call the number on the back of the pill box to talk to them earlier, [phone]. If your doctor sends over a new prescription to the mail order pharmacy you will have to call them to set up the order. They have an Viktor called \"[facility]\" as well.   2. Reduce Pantoprazole 40mg to just once daily. Let us know if you have any stomach or heartburn symptoms.     Follow-up: 2 months    It was great speaking with you today.  I value your experience and would be very thankful for your time in providing feedback in our clinic survey. In the next few days, you may receive an email or text message from [employer] with a link to a survey related to your  clinical pharmacist.\"     To schedule another MTM appointment, please call the clinic directly or you may call the MTM scheduling line at [phone] or toll-free at [phone].     My Clinical Pharmacist's contact information:                                                      Please feel free to contact me with any questions or concerns you have.      Alejandro Garces, PharmCRISTELA  MTM Pharmacist    Phone: [phone]     "

## 2022-06-03 NOTE — PROGRESS NOTES
"Medication Therapy Management (MTM) Encounter    ASSESSMENT:                            Medication Adherence/Access: No issues identified    Renal Transplant:  No indication for Pantoprazole in chart. Denies GI upset or GIB sx. Will reduce to 40mg once daily.     Edema: Stable.     Hyperphosphatemia: Stable.    Pain: Stable    PLAN:                            1. [facility] mail order will call you three weeks post discharge, but if your dose changes, call the number on the back of the pill box to talk to them earlier, [phone]. If your doctor sends over a new prescription to the mail order pharmacy you will have to call them to set up the order. They have an Viktor called \"[facility]\" as well.   2. Reduce Pantoprazole 40mg to just once daily.     Follow-up: 2 months    SUBJECTIVE/OBJECTIVE:                          Donald Blanco is a 57 year old male called for a transitions of care visit. He was discharged from [facility] on 5/27 for kidney transplant. Patient was accompanied by Sana.     Reason for visit: initial post txp med review.    Allergies/ADRs: Reviewed in chart  Past Medical History: Reviewed in chart  Tobacco: He reports that he has never smoked. He has never used smokeless tobacco.  Alcohol: not currently using    Medication Adherence/Access: Patient uses pill box(es) and has assistance with medication administration: family member, Sana. Using reminders in his phone.   Patient takes medications 4 time(s) per day. 8am, 11am, 6pm, 8pm  Per patient, misses medication 0 times per week.   The patient fills medications at [facility]: YES.    Renal Transplant:  Current immunosuppressants include Tacrolimus 5mg twice daily and Mycophenolate Mofetil 750mg twice daily.  Pt reports no issues.   Vascular prophylaxis: Aspirin 325mg daily. Denies GIB sx.  Transplant date: 5/17/22  Estimated Creatinine Clearance: 11.9 mL/min (A) (based on SCr of 8.15 mg/dL (H)).  CMV prophylaxis: CrCl 10 to 24 mL/minute: Valcyte 450 mg " twice weekly Treat 3 months post tx   PCP prophylaxis: Bactrim S S three times per week  PPI use: Pantoprazole 40mg twice daily. Denies GERD sx.   Supplements: Vitamin D3 1000 units daily, B complex folic acid and C daily.   Tx Coordinator: Katie Moore, Using Med Card: Yes  Immunizations: annual flu shot 2021; Pneumovax 23:  2020; Prevnar 13: 2021; TDaP:  2020; Shingrix: unknown, HBV: immunity per last titer, COVID: Pfizer-BioNTech 2021x2    Edema: Pt is taking Bumex 1mg twice daily. Creating around 2 L of urine in a 24 hour period. Weights decreasing. 234.3# this morning.     Hyperphosphatemia: Pt is taking Sevelamer 1600mg TID, last dialysis was yesterday, before that was Friday.     Pain: Well controlled, not using oxycodone. Using Acetaminophen 650mg twice daily prn. No longer using laxatives, when he used oxycodone it constipated him.     Today's Vitals: There were no vitals taken for this visit.  ----------------  Post Discharge Medication Reconciliation Status: discharge medications reconciled and changed, per note/orders.    I spent 20 minutes with this patient today. All changes were made via collaborative practice agreement with Dr. Mckenna. A copy of the visit note was provided to the patient's provider(s).    The patient was sent via [employer] a summary of these recommendations.     Alejandro Garces, PharmD  [facility] Pharmacist    Phone: [phone]     Telemedicine Visit Details  Type of service:  Telephone visit  Start Time: 8:26 AM  End Time: 8:46 AM  Originating Location (patient location): Home  Distant Location (provider location):  [facility]     Medication Therapy Recommendations  Kidney replaced by transplant    Current Medication: pantoprazole (PROTONIX) 40 MG EC tablet   Rationale: No medical indication at this time - Unnecessary medication therapy - Indication   Recommendation: Decrease Dose   Status: Accepted per [facility]

## 2022-06-06 ENCOUNTER — TELEPHONE (OUTPATIENT)
Dept: UROLOGY | Facility: CLINIC | Age: 57
End: 2022-06-06
Payer: COMMERCIAL

## 2022-06-06 NOTE — CONFIDENTIAL NOTE
Called to schedule a virtual appointment with Dr. Vásquez to review PSA results. Scheduled on 7/12 at 1:30 PM.

## 2022-06-07 ENCOUNTER — TELEPHONE (OUTPATIENT)
Dept: TRANSPLANT | Facility: CLINIC | Age: 57
End: 2022-06-07
Payer: COMMERCIAL

## 2022-06-07 NOTE — TELEPHONE ENCOUNTER
Donald reports that home care did not show up for his initial lab draw until 9:30 - 10am on 6/6.  Intake visit occurred on Sunday, 6/5, where he had stressed the importance of timeliness to [facility].    Labs not yet available in  nor in [facility].  RNCC advised Donald that we will follow up with [facility] to review where labs are resulting.     Discussed opportunity to adjust medication timing if needed for accurate 12h trough levels.    Donald will look into having labs drawn in subsequent weeks at [facility].

## 2022-06-08 ENCOUNTER — OFFICE VISIT (OUTPATIENT)
Dept: NEPHROLOGY | Facility: CLINIC | Age: 57
End: 2022-06-08
Attending: INTERNAL MEDICINE
Payer: COMMERCIAL

## 2022-06-08 VITALS
OXYGEN SATURATION: 96 % | BODY MASS INDEX: 32.84 KG/M2 | HEART RATE: 82 BPM | SYSTOLIC BLOOD PRESSURE: 114 MMHG | HEIGHT: 70 IN | DIASTOLIC BLOOD PRESSURE: 72 MMHG | WEIGHT: 229.4 LBS

## 2022-06-08 DIAGNOSIS — E83.39 HYPOPHOSPHATEMIA: ICD-10-CM

## 2022-06-08 DIAGNOSIS — Z94.0 KIDNEY TRANSPLANTED: Primary | ICD-10-CM

## 2022-06-08 DIAGNOSIS — T86.19 DELAYED GRAFT FUNCTION OF KIDNEY: ICD-10-CM

## 2022-06-08 DIAGNOSIS — D84.9 IMMUNOSUPPRESSION (H): ICD-10-CM

## 2022-06-08 PROCEDURE — 99215 OFFICE O/P EST HI 40 MIN: CPT | Mod: 24 | Performed by: INTERNAL MEDICINE

## 2022-06-08 ASSESSMENT — PAIN SCALES - GENERAL: PAINLEVEL: NO PAIN (0)

## 2022-06-08 NOTE — TELEPHONE ENCOUNTER
Spoke to Willis who states that [facility] lab completed labs for patient but transcribed the patient name as Suzanne.  [facility] has fixed the problem and will fax lab results to the txp office today.

## 2022-06-08 NOTE — LETTER
6/8/2022       RE: Donald Blanco  [address]  [place] [address]     Dear Colleague,    Thank you for referring your patient, Donald Blanco, to the [facility] [place] at [facility]. Please see a copy of my visit note below.    TRANSPLANT NEPHROLOGY EARLY POST TRANSPLANT VISIT    Assessment & Plan      Recommendations:   -Stop Bumex   -Stop Sevelamer   -Refill Asa, Atorvastatin   -Return to clinic in 1 month         # LDKT: DGF - Cr improved slightly.               - Baseline Creatinine: ~ TBD              - Proteinuria: Not checked post transplant              - Date DSA Last Checked: May/2022      Latest DSA: No DSA at time of transplant              - BK Viremia: Not checked post transplant              - Kidney Tx Biopsy: May 2022; Result:  Relatively well preserved renal  parenchyma. Tubules show mild degenerative changes with distention and flattening of the epithelium. There is no evidence of acute rejection. The peritubular capillaries are negative for C4d.              - Transplant Ureteral Stent: No                # Immunosuppression: Tacrolimus immediate release (goal 8-10) and Mycophenolate mofetil (dose 750 mg every 12 hours)              - Induction with Recent Transplant:  Intermediate Intensity - PRA 19.  Basiliximab 5/17, rATG on 5/19 and 5/21. Changed to rATG due to DGF.               - Continue with intensive monitoring of immunosuppression for efficacy and toxicity.              - Changes: No     # Infection Prophylaxis:   - PJP: Sulfa/TMP (Bactrim)  - CMV: Valganciclovir (Valcyte)     # Blood Pressure: Controlled;          Goal BP: < 150/90              - Volume status: Mildly hypervolemic             EDW ~ 103kg              - Changes: Yes  Stop Bumex      # Elevated Blood Glucose: Glucose generally running ~ 120s              - Management as per primary team.     # Anemia in Chronic Renal Disease: Hgb: Stable       YUDI: No              - Iron studies: Replete     # Mineral Bone Disorder:   - Secondary renal hyperparathyroidism; PTH level: Moderately elevated (301-600 pg/ml)        On treatment: None  - Vitamin D; level: Normal        On supplement: Yes  - Calcium; level: Normal        On supplement: No  - Phosphorus; level: Low    On supplement:  Stop phos binder      # Electrolytes:   - Potassium; level: Normal        On supplement: No  - Magnesium; level: Normal        On supplement: No  - Bicarbonate; level: Normal        On supplement: No     # Medical Compliance: Yes     # COVID-19 Virus Review: Discussed COVID-19 virus and the potential medical risks.  Reviewed preventative health recommendations, including wearing a mask where appropriate.  Recommended COVID vaccination should be up to date with either an initial vaccination or booster shot when appropriate.  Asked the patient to inform the transplant center if they are exposed or diagnosed with this virus.     # COVID Vaccination Up To Date:  Has received 2 doses, can receive booster 3m post-transplant     # Transplant History:  Etiology of Kidney Failure: IgA nephropathy  Tx: LDKT  Transplant: 5/17/2022 (Kidney)  Donor Type: Living Donor Class:   Crossmatch at time of Tx: negative  DSA at time of Tx: No  Significant changes in immunosuppression: None  CMV IgG Ab High Risk Discordance (D+/R-): No, D-/R-  EBV IgG Ab High Risk Discordance (D+/R-): No  Significant transplant-related complications: DGF     Transplant Office Phone Number: [phone]    Assessment and plan was discussed with the patient and he voiced his understanding and agreement.    Return visit: 1 month     Tucker Mckenna MD  Transplant Nephrology   Pager: [phone]    Chief Complaint   Mr. Blanco is a 57 year old here for routine follow up and immunosuppression management.     History of Present Illness   Donald is here for follow up after kidney transplant.  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  His last dialysis was on 6/2.  He has been making around 2 L of urine per day.  At his last visit, he had his Bumex decreased to 1 mg daily.  He is near his dry weight.  He otherwise is doing well with intact appetite, taste, and denies nausea, vomiting.  He notes mild pruritus.  He continues to have high drain output and is seeing transplant surgery.        Home BP: 100-120s    Problem List   Patient Active Problem List   Diagnosis     Essential hypertension     Gout     Hyperlipidemia     Hyperparathyroidism due to renal insufficiency (H)     IgA nephropathy     Obesity     Umbilical hernia     Pre-operative cardiovascular examination     Prostate cancer (H)     Morbid obesity (H)     Kidney replaced by transplant     Immunosuppressed status (H)     Hyperkalemia       Allergies   No Known Allergies    Medications   Current Outpatient Medications   Medication Sig     acetaminophen (TYLENOL) 325 MG tablet Take 1-2 tablets (325-650 mg) by mouth every 4 hours as needed for mild pain or fever     aspirin (ASA) 325 MG tablet Take 1 tablet (325 mg) by mouth daily     atorvastatin (LIPITOR) 10 MG tablet Take 1 tablet (10 mg) by mouth daily     B Complex-C-Folic Acid (VIRT-CAPS) 1 MG CAPS Take 1 mg by mouth daily     bumetanide (BUMEX) 1 MG tablet Take 3 tablets (3 mg) by mouth 2 times daily (Patient taking differently: Take 1 mg by mouth 2 times daily)     Lidocaine (LIDOCARE) 4 % Patch Place 1-2 patches onto the skin every 24 hours To prevent lidocaine toxicity, patient should be patch free for 12 hrs daily.     mycophenolate (GENERIC EQUIVALENT) 250 MG capsule Take 3 capsules (750 mg) by mouth 2 times daily     pantoprazole (PROTONIX) 40 MG EC tablet Take 1 tablet (40 mg) by mouth 2 times daily (before meals) (Patient taking differently: Take 40 mg by mouth daily)     polyethylene glycol (MIRALAX) 17 GM/Dose powder Take 1 capful by mouth daily PRN     sulfamethoxazole-trimethoprim (BACTRIM) 400-80  "MG tablet Take 1 tablet by mouth Every Mon, Wed, Fri Morning Increase to one tab by mouth daily when directed by transplant team     tacrolimus (GENERIC EQUIVALENT) 1 MG capsule Take 5 capsules (5 mg) by mouth every 12 hours     valGANciclovir (VALCYTE) 450 MG tablet Take 1 tablet (450 mg) by mouth twice a week Titrate dose up to 2 tabs by mouth daily as directed by transplant team (based on kidney function)     oxyCODONE (ROXICODONE) 5 MG tablet Take 1 tablet (5 mg) by mouth every 6 hours as needed for moderate to severe pain (Patient not taking: No sig reported)     senna-docusate (SENOKOT-S/PERICOLACE) 8.6-50 MG tablet 1-2 tablets by Oral or Feeding Tube route 2 times daily as needed for constipation Take while taking oxycodone, hold for loose stools. (Patient not taking: Reported on 6/8/2022)     tacrolimus (GENERIC EQUIVALENT) 0.5 MG capsule Take 1 capsule (0.5 mg) by mouth 2 times daily Take with four 1 mg capsules by mouth twice daily for a total daily dose of 4.5 mg by mouth twice daily. (Patient not taking: No sig reported)     Vitamin D3 (CHOLECALCIFEROL) 25 mcg (1000 units) tablet Take 1 capsule by mouth every morning      No current facility-administered medications for this visit.     There are no discontinued medications.    Physical Exam   Vital Signs: /72   Pulse 82   Ht 1.778 m (5' 10\")   Wt 104.1 kg (229 lb 6.4 oz)   SpO2 96%   BMI 32.92 kg/m      GENERAL APPEARANCE: alert and no distress  HENT: mouth without ulcers or lesions  LYMPHATICS: no cervical or supraclavicular nodes  RESP: lungs clear to auscultation - no rales, rhonchi or wheezes  CV: regular rhythm, normal rate, no rub, no murmur  EDEMA: no LE edema bilaterally  ABDOMEN: soft, nondistended, nontender, bowel sounds normal  MS: extremities normal - no gross deformities noted, no evidence of inflammation in joints, no muscle tenderness  SKIN: no rash    Data     Renal Latest Ref Rng & Units 6/13/2022 6/9/2022 6/6/2022   Na 133 - " 144 mmol/L 142 139 -   Na (external) 135 - 145 mmol/L - - 134(L)   K 3.4 - 5.3 mmol/L 4.6 4.6 -   K (external) 3.5 - 5.0 mmol/L - - 4.3   Cl 94 - 109 mmol/L 114(H) 111(H) 102   CO2 20 - 32 mmol/L 21 24 -   CO2 (external) 21 - 31 mmol/L - - 20(L)   BUN 7 - 30 mg/dL 66(H) 65(H) -   BUN (external) 8 - 25 mg/dL - - 62(H)   Cr 0.66 - 1.25 mg/dL 4.48(H) 4.05(H) -   Cr (external) 0.57 - 1.11 mg/dL - - 4.64(H)   Glucose 70 - 99 mg/dL 117(H) 133(H) -   Glucose (external) 65 - 100 mg/dL - - 99   Ca  8.5 - 10.1 mg/dL 9.6 9.9 -   Ca (external) 8.5 - 10.5 mg/dL - - 9.4   Mg 1.6 - 2.3 mg/dL 2.2 2.2 -   Mg (external) 1.6 - 2.6 mg/dL - - 1.6     Bone Health Latest Ref Rng & Units 6/13/2022 6/9/2022 6/6/2022   Phos 2.5 - 4.5 mg/dL 2.7 2.3(L) -   Phos (external) 2.3 - 4.7 mg/dL - - 3.9   PTHi 15 - 65 pg/mL - - -   Vit D Def 20 - 75 ug/L - - -     Heme Latest Ref Rng & Units 6/13/2022 6/9/2022 6/6/2022   WBC 4.0 - 11.0 10e3/uL 3.3(L) 3.9(L) -   WBC (external) 4.5 - 11.0 thou/cu mm - - 4.9   Hgb 13.3 - 17.7 g/dL 7.8(LL) 8.1(L) -   Hgb (external) 12.0 - 16.0 g/dL - - 7.5(L)   Plt 150 - 450 10e3/uL 271 231 -   Plt (external) 140 - 440 thou/cu mm - - 188   ABSOLUTE NEUTROPHIL 1.6 - 8.3 10e9/L - - -   ABSOLUTE LYMPHOCYTES 0.8 - 5.3 10e9/L - - -   ABSOLUTE MONOCYTES 0.0 - 1.3 10e9/L - - -   ABSOLUTE EOSINOPHILS 0.0 - 0.7 10e9/L - - -   ABSOLUTE BASOPHILS 0.0 - 0.2 10e9/L - - -   ABS IMMATURE GRANULOCYTES 0 - 0.4 10e9/L - - -   ABSOLUTE NUCLEATED RBC - - - -     Liver Latest Ref Rng & Units 6/3/2022 6/2/2022 5/22/2022   AP 40 - 150 U/L 83 - -   TBili 0.2 - 1.3 mg/dL 0.5 - -   DBili 0.0 - 0.2 mg/dL 0.2 - -   ALT 0 - 70 U/L 23 - -   AST 0 - 45 U/L 9 - -   Tot Protein 6.8 - 8.8 g/dL 6.6(L) - -   Albumin 3.4 - 5.0 g/dL 3.3(L) 3.2(L) 2.7(L)     Pancreas Latest Ref Rng & Units 6/3/2022 5/9/2022   A1C 0.0 - 5.6 % 5.1 5.0     Iron studies Latest Ref Rng & Units 5/9/2022   Iron 35 - 180 ug/dL 79   Iron sat 15 - 46 % 24   Ferritin 26 - 388 ng/mL  1,002(H)     [facility] Txp Virology Latest Ref Rng & Units 5/17/2022 5/9/2022 9/3/2020   CMV QUANT IU/ML Not Detected IU/mL Not Detected - -   EBV CAPSID ANTIBODY IGG No detectable antibody. - Positive(A) >8.0(H)   Hep B Core NR:Nonreactive - - Nonreactive        Recent Labs   Lab Test 06/03/22  1048 06/09/22  1014 06/13/22  0958   DOSTAC 6/3/2022 6/8/2022 6/12/2022   TACROL 13.1 8.1 8.2     Recent Labs   Lab Test 05/30/22  0736 06/01/22  0726   DOSMPA 5/29/2022   7:45 PM  --    MPACID 2.69 3.80*   MPAG 193.3* 192.2*         Again, thank you for allowing me to participate in the care of your patient.      Sincerely,    Tucker Mckenna MD

## 2022-06-08 NOTE — PROGRESS NOTES
TRANSPLANT NEPHROLOGY EARLY POST TRANSPLANT VISIT    Assessment & Plan      Recommendations:   -Stop Bumex   -Stop Sevelamer   -Refill Asa, Atorvastatin   -Return to clinic in 1 month         # LDKT: DGF - Cr improved slightly.               - Baseline Creatinine: ~ TBD              - Proteinuria: Not checked post transplant              - Date DSA Last Checked: May/2022      Latest DSA: No DSA at time of transplant              - BK Viremia: Not checked post transplant              - Kidney Tx Biopsy: May 2022; Result:  Relatively well preserved renal  parenchyma. Tubules show mild degenerative changes with distention and flattening of the epithelium. There is no evidence of acute rejection. The peritubular capillaries are negative for C4d.              - Transplant Ureteral Stent: No                # Immunosuppression: Tacrolimus immediate release (goal 8-10) and Mycophenolate mofetil (dose 750 mg every 12 hours)              - Induction with Recent Transplant:  Intermediate Intensity - PRA 19.  Basiliximab 5/17, rATG on 5/19 and 5/21. Changed to rATG due to DGF.               - Continue with intensive monitoring of immunosuppression for efficacy and toxicity.              - Changes: No     # Infection Prophylaxis:   - PJP: Sulfa/TMP (Bactrim)  - CMV: Valganciclovir (Valcyte)     # Blood Pressure: Controlled;          Goal BP: < 150/90              - Volume status: Mildly hypervolemic             EDW ~ 103kg              - Changes: Yes  Stop Bumex      # Elevated Blood Glucose: Glucose generally running ~ 120s              - Management as per primary team.     # Anemia in Chronic Renal Disease: Hgb: Stable      YUDI: No              - Iron studies: Replete     # Mineral Bone Disorder:   - Secondary renal hyperparathyroidism; PTH level: Moderately elevated (301-600 pg/ml)        On treatment: None  - Vitamin D; level: Normal        On supplement: Yes  - Calcium; level: Normal        On supplement: No  - Phosphorus;  level: Low    On supplement:  Stop phos binder      # Electrolytes:   - Potassium; level: Normal        On supplement: No  - Magnesium; level: Normal        On supplement: No  - Bicarbonate; level: Normal        On supplement: No     # Medical Compliance: Yes     # COVID-19 Virus Review: Discussed COVID-19 virus and the potential medical risks.  Reviewed preventative health recommendations, including wearing a mask where appropriate.  Recommended COVID vaccination should be up to date with either an initial vaccination or booster shot when appropriate.  Asked the patient to inform the transplant center if they are exposed or diagnosed with this virus.     # COVID Vaccination Up To Date:  Has received 2 doses, can receive booster 3m post-transplant     # Transplant History:  Etiology of Kidney Failure: IgA nephropathy  Tx: LDKT  Transplant: 5/17/2022 (Kidney)  Donor Type: Living Donor Class:   Crossmatch at time of Tx: negative  DSA at time of Tx: No  Significant changes in immunosuppression: None  CMV IgG Ab High Risk Discordance (D+/R-): No, D-/R-  EBV IgG Ab High Risk Discordance (D+/R-): No  Significant transplant-related complications: DGF     Transplant Office Phone Number: [phone]    Assessment and plan was discussed with the patient and he voiced his understanding and agreement.    Return visit: 1 month     Tucker Mckenna MD  Transplant Nephrology   Pager: [phone]    Chief Complaint   Mr. Blanco is a 57 year old here for routine follow up and immunosuppression management.     History of Present Illness   Donald is here for follow up after kidney transplant.  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  His last dialysis was on 6/2.  He has been making around 2 L of urine per day.  At his last visit, he had his Bumex decreased to 1 mg daily.  He is near his dry weight.  He otherwise is doing well with intact appetite, taste, and denies nausea, vomiting.  He notes mild pruritus.  He continues to  have high drain output and is seeing transplant surgery.        Home BP: 100-120s    Problem List   Patient Active Problem List   Diagnosis     Essential hypertension     Gout     Hyperlipidemia     Hyperparathyroidism due to renal insufficiency (H)     IgA nephropathy     Obesity     Umbilical hernia     Pre-operative cardiovascular examination     Prostate cancer (H)     Morbid obesity (H)     Kidney replaced by transplant     Immunosuppressed status (H)     Hyperkalemia       Allergies   No Known Allergies    Medications   Current Outpatient Medications   Medication Sig     acetaminophen (TYLENOL) 325 MG tablet Take 1-2 tablets (325-650 mg) by mouth every 4 hours as needed for mild pain or fever     aspirin (ASA) 325 MG tablet Take 1 tablet (325 mg) by mouth daily     atorvastatin (LIPITOR) 10 MG tablet Take 1 tablet (10 mg) by mouth daily     B Complex-C-Folic Acid (VIRT-CAPS) 1 MG CAPS Take 1 mg by mouth daily     bumetanide (BUMEX) 1 MG tablet Take 3 tablets (3 mg) by mouth 2 times daily (Patient taking differently: Take 1 mg by mouth 2 times daily)     Lidocaine (LIDOCARE) 4 % Patch Place 1-2 patches onto the skin every 24 hours To prevent lidocaine toxicity, patient should be patch free for 12 hrs daily.     mycophenolate (GENERIC EQUIVALENT) 250 MG capsule Take 3 capsules (750 mg) by mouth 2 times daily     pantoprazole (PROTONIX) 40 MG EC tablet Take 1 tablet (40 mg) by mouth 2 times daily (before meals) (Patient taking differently: Take 40 mg by mouth daily)     polyethylene glycol (MIRALAX) 17 GM/Dose powder Take 1 capful by mouth daily PRN     sulfamethoxazole-trimethoprim (BACTRIM) 400-80 MG tablet Take 1 tablet by mouth Every Mon, Wed, Fri Morning Increase to one tab by mouth daily when directed by transplant team     tacrolimus (GENERIC EQUIVALENT) 1 MG capsule Take 5 capsules (5 mg) by mouth every 12 hours     valGANciclovir (VALCYTE) 450 MG tablet Take 1 tablet (450 mg) by mouth twice a week  "Titrate dose up to 2 tabs by mouth daily as directed by transplant team (based on kidney function)     oxyCODONE (ROXICODONE) 5 MG tablet Take 1 tablet (5 mg) by mouth every 6 hours as needed for moderate to severe pain (Patient not taking: No sig reported)     senna-docusate (SENOKOT-S/PERICOLACE) 8.6-50 MG tablet 1-2 tablets by Oral or Feeding Tube route 2 times daily as needed for constipation Take while taking oxycodone, hold for loose stools. (Patient not taking: Reported on 6/8/2022)     tacrolimus (GENERIC EQUIVALENT) 0.5 MG capsule Take 1 capsule (0.5 mg) by mouth 2 times daily Take with four 1 mg capsules by mouth twice daily for a total daily dose of 4.5 mg by mouth twice daily. (Patient not taking: No sig reported)     Vitamin D3 (CHOLECALCIFEROL) 25 mcg (1000 units) tablet Take 1 capsule by mouth every morning      No current facility-administered medications for this visit.     There are no discontinued medications.    Physical Exam   Vital Signs: /72   Pulse 82   Ht 1.778 m (5' 10\")   Wt 104.1 kg (229 lb 6.4 oz)   SpO2 96%   BMI 32.92 kg/m      GENERAL APPEARANCE: alert and no distress  HENT: mouth without ulcers or lesions  LYMPHATICS: no cervical or supraclavicular nodes  RESP: lungs clear to auscultation - no rales, rhonchi or wheezes  CV: regular rhythm, normal rate, no rub, no murmur  EDEMA: no LE edema bilaterally  ABDOMEN: soft, nondistended, nontender, bowel sounds normal  MS: extremities normal - no gross deformities noted, no evidence of inflammation in joints, no muscle tenderness  SKIN: no rash    Data     Renal Latest Ref Rng & Units 6/13/2022 6/9/2022 6/6/2022   Na 133 - 144 mmol/L 142 139 -   Na (external) 135 - 145 mmol/L - - 134(L)   K 3.4 - 5.3 mmol/L 4.6 4.6 -   K (external) 3.5 - 5.0 mmol/L - - 4.3   Cl 94 - 109 mmol/L 114(H) 111(H) 102   CO2 20 - 32 mmol/L 21 24 -   CO2 (external) 21 - 31 mmol/L - - 20(L)   BUN 7 - 30 mg/dL 66(H) 65(H) -   BUN (external) 8 - 25 mg/dL - - " 62(H)   Cr 0.66 - 1.25 mg/dL 4.48(H) 4.05(H) -   Cr (external) 0.57 - 1.11 mg/dL - - 4.64(H)   Glucose 70 - 99 mg/dL 117(H) 133(H) -   Glucose (external) 65 - 100 mg/dL - - 99   Ca  8.5 - 10.1 mg/dL 9.6 9.9 -   Ca (external) 8.5 - 10.5 mg/dL - - 9.4   Mg 1.6 - 2.3 mg/dL 2.2 2.2 -   Mg (external) 1.6 - 2.6 mg/dL - - 1.6     Bone Health Latest Ref Rng & Units 6/13/2022 6/9/2022 6/6/2022   Phos 2.5 - 4.5 mg/dL 2.7 2.3(L) -   Phos (external) 2.3 - 4.7 mg/dL - - 3.9   PTHi 15 - 65 pg/mL - - -   Vit D Def 20 - 75 ug/L - - -     Heme Latest Ref Rng & Units 6/13/2022 6/9/2022 6/6/2022   WBC 4.0 - 11.0 10e3/uL 3.3(L) 3.9(L) -   WBC (external) 4.5 - 11.0 thou/cu mm - - 4.9   Hgb 13.3 - 17.7 g/dL 7.8(LL) 8.1(L) -   Hgb (external) 12.0 - 16.0 g/dL - - 7.5(L)   Plt 150 - 450 10e3/uL 271 231 -   Plt (external) 140 - 440 thou/cu mm - - 188   ABSOLUTE NEUTROPHIL 1.6 - 8.3 10e9/L - - -   ABSOLUTE LYMPHOCYTES 0.8 - 5.3 10e9/L - - -   ABSOLUTE MONOCYTES 0.0 - 1.3 10e9/L - - -   ABSOLUTE EOSINOPHILS 0.0 - 0.7 10e9/L - - -   ABSOLUTE BASOPHILS 0.0 - 0.2 10e9/L - - -   ABS IMMATURE GRANULOCYTES 0 - 0.4 10e9/L - - -   ABSOLUTE NUCLEATED RBC - - - -     Liver Latest Ref Rng & Units 6/3/2022 6/2/2022 5/22/2022   AP 40 - 150 U/L 83 - -   TBili 0.2 - 1.3 mg/dL 0.5 - -   DBili 0.0 - 0.2 mg/dL 0.2 - -   ALT 0 - 70 U/L 23 - -   AST 0 - 45 U/L 9 - -   Tot Protein 6.8 - 8.8 g/dL 6.6(L) - -   Albumin 3.4 - 5.0 g/dL 3.3(L) 3.2(L) 2.7(L)     Pancreas Latest Ref Rng & Units 6/3/2022 5/9/2022   A1C 0.0 - 5.6 % 5.1 5.0     Iron studies Latest Ref Rng & Units 5/9/2022   Iron 35 - 180 ug/dL 79   Iron sat 15 - 46 % 24   Ferritin 26 - 388 ng/mL 1,002(H)     [facility] Txp Virology Latest Ref Rng & Units 5/17/2022 5/9/2022 9/3/2020   CMV QUANT IU/ML Not Detected IU/mL Not Detected - -   EBV CAPSID ANTIBODY IGG No detectable antibody. - Positive(A) >8.0(H)   Hep B Core NR:Nonreactive - - Nonreactive        Recent Labs   Lab Test 06/03/22  1048 06/09/22  1014  06/13/22  0958   DOSTAC 6/3/2022 6/8/2022 6/12/2022   TACROL 13.1 8.1 8.2     Recent Labs   Lab Test 05/30/22  0736 06/01/22  0726   DOSMPA 5/29/2022   7:45 PM  --    MPACID 2.69 3.80*   MPAG 193.3* 192.2*

## 2022-06-09 ENCOUNTER — LAB (OUTPATIENT)
Dept: LAB | Facility: CLINIC | Age: 57
End: 2022-06-09
Payer: COMMERCIAL

## 2022-06-09 DIAGNOSIS — Z48.298 AFTERCARE FOLLOWING ORGAN TRANSPLANT: ICD-10-CM

## 2022-06-09 DIAGNOSIS — Z79.899 ENCOUNTER FOR LONG-TERM CURRENT USE OF MEDICATION: ICD-10-CM

## 2022-06-09 DIAGNOSIS — Z94.0 KIDNEY REPLACED BY TRANSPLANT: ICD-10-CM

## 2022-06-09 LAB
ANION GAP SERPL CALCULATED.3IONS-SCNC: 4 MMOL/L (ref 3–14)
BUN SERPL-MCNC: 65 MG/DL (ref 7–30)
CALCIUM SERPL-MCNC: 9.9 MG/DL (ref 8.5–10.1)
CHLORIDE BLD-SCNC: 111 MMOL/L (ref 94–109)
CO2 SERPL-SCNC: 24 MMOL/L (ref 20–32)
CREAT SERPL-MCNC: 4.05 MG/DL (ref 0.66–1.25)
ERYTHROCYTE [DISTWIDTH] IN BLOOD BY AUTOMATED COUNT: 13 % (ref 10–15)
GFR SERPL CREATININE-BSD FRML MDRD: 16 ML/MIN/1.73M2
GLUCOSE BLD-MCNC: 133 MG/DL (ref 70–99)
HCT VFR BLD AUTO: 25 % (ref 40–53)
HGB BLD-MCNC: 8.1 G/DL (ref 13.3–17.7)
MAGNESIUM SERPL-MCNC: 2.2 MG/DL (ref 1.6–2.3)
MCH RBC QN AUTO: 33.3 PG (ref 26.5–33)
MCHC RBC AUTO-ENTMCNC: 32.4 G/DL (ref 31.5–36.5)
MCV RBC AUTO: 103 FL (ref 78–100)
PHOSPHATE SERPL-MCNC: 2.3 MG/DL (ref 2.5–4.5)
PLATELET # BLD AUTO: 231 10E3/UL (ref 150–450)
POTASSIUM BLD-SCNC: 4.6 MMOL/L (ref 3.4–5.3)
RBC # BLD AUTO: 2.43 10E6/UL (ref 4.4–5.9)
SODIUM SERPL-SCNC: 139 MMOL/L (ref 133–144)
TACROLIMUS BLD-MCNC: 8.1 UG/L (ref 5–15)
TME LAST DOSE: NORMAL H
TME LAST DOSE: NORMAL H
WBC # BLD AUTO: 3.9 10E3/UL (ref 4–11)

## 2022-06-09 PROCEDURE — 84100 ASSAY OF PHOSPHORUS: CPT

## 2022-06-09 PROCEDURE — 80048 BASIC METABOLIC PNL TOTAL CA: CPT

## 2022-06-09 PROCEDURE — 83735 ASSAY OF MAGNESIUM: CPT

## 2022-06-09 PROCEDURE — 85027 COMPLETE CBC AUTOMATED: CPT

## 2022-06-09 PROCEDURE — 80197 ASSAY OF TACROLIMUS: CPT

## 2022-06-09 PROCEDURE — 36415 COLL VENOUS BLD VENIPUNCTURE: CPT

## 2022-06-13 ENCOUNTER — LAB (OUTPATIENT)
Dept: LAB | Facility: CLINIC | Age: 57
End: 2022-06-13
Payer: COMMERCIAL

## 2022-06-13 ENCOUNTER — TELEPHONE (OUTPATIENT)
Dept: TRANSPLANT | Facility: CLINIC | Age: 57
End: 2022-06-13

## 2022-06-13 ENCOUNTER — OFFICE VISIT (OUTPATIENT)
Dept: TRANSPLANT | Facility: CLINIC | Age: 57
End: 2022-06-13
Attending: SURGERY
Payer: COMMERCIAL

## 2022-06-13 VITALS
HEART RATE: 92 BPM | BODY MASS INDEX: 32.86 KG/M2 | SYSTOLIC BLOOD PRESSURE: 100 MMHG | DIASTOLIC BLOOD PRESSURE: 64 MMHG | OXYGEN SATURATION: 98 % | WEIGHT: 229 LBS

## 2022-06-13 DIAGNOSIS — Z94.0 KIDNEY REPLACED BY TRANSPLANT: ICD-10-CM

## 2022-06-13 DIAGNOSIS — Z48.298 AFTERCARE FOLLOWING ORGAN TRANSPLANT: ICD-10-CM

## 2022-06-13 DIAGNOSIS — Z94.0 STATUS POST KIDNEY TRANSPLANT: Primary | ICD-10-CM

## 2022-06-13 DIAGNOSIS — Z79.899 ENCOUNTER FOR LONG-TERM CURRENT USE OF MEDICATION: ICD-10-CM

## 2022-06-13 LAB
ANION GAP SERPL CALCULATED.3IONS-SCNC: 7 MMOL/L (ref 3–14)
BUN SERPL-MCNC: 66 MG/DL (ref 7–30)
CALCIUM SERPL-MCNC: 9.6 MG/DL (ref 8.5–10.1)
CHLORIDE BLD-SCNC: 114 MMOL/L (ref 94–109)
CO2 SERPL-SCNC: 21 MMOL/L (ref 20–32)
CREAT FLD-MCNC: 4.4 MG/DL
CREAT SERPL-MCNC: 4.48 MG/DL (ref 0.66–1.25)
CREATININE BODY FLUID SOURCE: NORMAL
ERYTHROCYTE [DISTWIDTH] IN BLOOD BY AUTOMATED COUNT: 12.9 % (ref 10–15)
GFR SERPL CREATININE-BSD FRML MDRD: 14 ML/MIN/1.73M2
GLUCOSE BLD-MCNC: 117 MG/DL (ref 70–99)
HCT VFR BLD AUTO: 24.4 % (ref 40–53)
HGB BLD-MCNC: 7.8 G/DL (ref 13.3–17.7)
MAGNESIUM SERPL-MCNC: 2.2 MG/DL (ref 1.6–2.3)
MCH RBC QN AUTO: 33.3 PG (ref 26.5–33)
MCHC RBC AUTO-ENTMCNC: 32 G/DL (ref 31.5–36.5)
MCV RBC AUTO: 104 FL (ref 78–100)
PHOSPHATE SERPL-MCNC: 2.7 MG/DL (ref 2.5–4.5)
PLATELET # BLD AUTO: 271 10E3/UL (ref 150–450)
POTASSIUM BLD-SCNC: 4.6 MMOL/L (ref 3.4–5.3)
RBC # BLD AUTO: 2.34 10E6/UL (ref 4.4–5.9)
SODIUM SERPL-SCNC: 142 MMOL/L (ref 133–144)
TACROLIMUS BLD-MCNC: 8.2 UG/L (ref 5–15)
TME LAST DOSE: NORMAL H
TME LAST DOSE: NORMAL H
TRIGL FLD-MCNC: 7 MG/DL
TRIGLYCERIDE BODY FLUID SOURCE: NORMAL
WBC # BLD AUTO: 3.3 10E3/UL (ref 4–11)

## 2022-06-13 PROCEDURE — 80197 ASSAY OF TACROLIMUS: CPT

## 2022-06-13 PROCEDURE — 84100 ASSAY OF PHOSPHORUS: CPT

## 2022-06-13 PROCEDURE — 36415 COLL VENOUS BLD VENIPUNCTURE: CPT

## 2022-06-13 PROCEDURE — 82570 ASSAY OF URINE CREATININE: CPT | Performed by: SURGERY

## 2022-06-13 PROCEDURE — 80048 BASIC METABOLIC PNL TOTAL CA: CPT

## 2022-06-13 PROCEDURE — 83735 ASSAY OF MAGNESIUM: CPT

## 2022-06-13 PROCEDURE — 84478 ASSAY OF TRIGLYCERIDES: CPT | Performed by: SURGERY

## 2022-06-13 PROCEDURE — 85027 COMPLETE CBC AUTOMATED: CPT

## 2022-06-13 PROCEDURE — 99213 OFFICE O/P EST LOW 20 MIN: CPT | Mod: GC | Performed by: SURGERY

## 2022-06-13 NOTE — PROGRESS NOTES
Transplant Surgery Progress Note    Transplants:  5/17/2022 (Kidney); Postoperative day:  27  S:   Doing well postoperatively. DGF kidney. Last dialysis 6/2. Pain well controlled. Good UOP.     Transplant History:    Transplant Type:  LDKT  Donor Type: Living   Transplant Date:  5/17/2022 (Kidney)   Ureteral Stent:  No   Crossmatch:  negative   DSA at Tx:  No  Baseline Cr: 13.2/ current 4.48    De[place]ovo DSA: No    Acute Rejection Hx:  No    Present Maintenance Immunosuppression:  Tacrolimus and Mycophenolate mofetil    CMV IgG Ab Discordance:  No  EBV IgG Ab Discordance:  No    BK Viremia:  No  EBV Viremia:  No    Transplant Coordinator: Alicia Valentin     Transplant Office Phone Number: [phone]     Immunosuppressant Medications     Immunosuppressive Agents Disp Start End     mycophenolate (GENERIC EQUIVALENT) 250 MG capsule    180 capsule 5/27/2022     Sig - Route: Take 3 capsules (750 mg) by mouth 2 times daily - Oral    Class: E-Prescribe     tacrolimus (GENERIC EQUIVALENT) 0.5 MG capsule    60 capsule 5/27/2022     Sig - Route: Take 1 capsule (0.5 mg) by mouth 2 times daily Take with four 1 mg capsules by mouth twice daily for a total daily dose of 4.5 mg by mouth twice daily. - Oral    Patient not taking: No sig reported       Class: E-Prescribe     tacrolimus (GENERIC EQUIVALENT) 1 MG capsule    300 capsule 6/3/2022     Sig - Route: Take 5 capsules (5 mg) by mouth every 12 hours - Oral    Class: E-Prescribe    Notes to Pharmacy: TXP DT 5/17/2022 (Kidney) TXP Dischg DT 5/27/2022 DX Kidney replaced by transplant [id number] TX Center [facility] ([place], [place])          Possible Immunosuppression-related side effects:   []             headache  []             vivid dreams  []             irritability  []             cognitive difficuties  []             fine tremor  []             nausea  []             diarrhea  []             neuropathy      []             edema  []              renal calcineurin toxicity  []             hyperkalemia  []             post-transplant diabetes  []             decreased appetite  []             increased appetite  []             other:  []             none    Prescription Medications as of 6/13/2022       Rx Number Disp Refills Start End Last Dispensed Date Next Fill Date Owning Pharmacy    acetaminophen (TYLENOL) 325 MG tablet  100 tablet 0 5/27/2022    [address]    Sig: Take 1-2 tablets (325-650 mg) by mouth every 4 hours as needed for mild pain or fever    Class: E-Prescribe    Route: Oral    aspirin (ASA) 325 MG tablet  30 tablet 11 5/28/2022    [address]    Sig: Take 1 tablet (325 mg) by mouth daily    Class: E-Prescribe    Route: Oral    atorvastatin (LIPITOR) 10 MG tablet  30 tablet 1 5/28/2022    [address]    Sig: Take 1 tablet (10 mg) by mouth daily    Class: E-Prescribe    Route: Oral    B Complex-C-Folic Acid (VIRT-CAPS) 1 MG CAPS  30 capsule 1 5/28/2022    [address]    Sig: Take 1 mg by mouth daily    Class: E-Prescribe    Route: Oral    bumetanide (BUMEX) 1 MG tablet  180 tablet 0 5/27/2022    [address]    Sig: Take 3 tablets (3 mg) by mouth 2 times daily    Class: E-Prescribe    Route: Oral    Lidocaine (LIDOCARE) 4 % Patch  10 patch 0 5/27/2022    [address]    Sig: Place 1-2 patches onto the skin every 24 hours To prevent lidocaine toxicity, patient should be patch free for 12 hrs daily.    Class: E-Prescribe    Route: Transdermal    mycophenolate (GENERIC EQUIVALENT) 250 MG capsule  180 capsule 11 5/27/2022    [facility]  [address]    Sig: Take 3 capsules (750 mg) by mouth 2 times daily    Class: E-Prescribe    Route: Oral    oxyCODONE (ROXICODONE) 5 MG tablet  10 tablet 0 2022    [address]    Sig: Take 1 tablet (5 mg) by mouth every 6 hours as needed for moderate to severe pain    Class: E-Prescribe    Earliest Fill Date: 2022    Route: Oral    pantoprazole (PROTONIX) 40 MG EC tablet  60 tablet 3 2022    [address]    Sig: Take 1 tablet (40 mg) by mouth 2 times daily (before meals)    Class: E-Prescribe    Route: Oral    polyethylene glycol (MIRALAX) 17 GM/Dose powder        [address], [address]    Sig: Take 1 capful by mouth daily PRN    Class: Historical    Route: Oral    senna-docusate (SENOKOT-S/PERICOLACE) 8.6-50 MG tablet  100 tablet 0 2022    [address]    Si-2 tablets by Oral or Feeding Tube route 2 times daily as needed for constipation Take while taking oxycodone, hold for loose stools.    Class: E-Prescribe    Route: Oral or Feeding Tube    sevelamer carbonate (RENVELA) 800 MG tablet  90 tablet 1 2022    [address]    Sig: Take 1 tablet (800 mg) by mouth 3 times daily (with meals)    Class: E-Prescribe    Route: Oral    sulfamethoxazole-trimethoprim (BACTRIM) 400-80 MG tablet  30 tablet 11 2022    [address]    Sig: Take 1 tablet by mouth Every Mon, Wed, Fri Morning Increase to one tab by mouth daily when directed by transplant team    Class: E-Prescribe    Route: Oral    tacrolimus (GENERIC EQUIVALENT) 0.5 MG capsule  60 capsule 11 2022    [address]    Sig: Take 1  capsule (0.5 mg) by mouth 2 times daily Take with four 1 mg capsules by mouth twice daily for a total daily dose of 4.5 mg by mouth twice daily.    Class: E-Prescribe    Route: Oral    tacrolimus (GENERIC EQUIVALENT) 1 MG capsule  300 capsule 11 6/3/2022    [facility] [place], [place] - [address] [address] [facility], [address]    Sig: Take 5 capsules (5 mg) by mouth every 12 hours    Class: E-Prescribe    Notes to Pharmacy: TXP DT 5/17/2022 (Kidney) TXP Dischg DT 5/27/2022 DX Kidney replaced by transplant Z94.0 TX Center [facility] [place] ([place], [place])    Route: Oral    valGANciclovir (VALCYTE) 450 MG tablet  60 tablet 2 5/30/2022    [facility] [place], [place] - [address] [facility]    Sig: Take 1 tablet (450 mg) by mouth twice a week Titrate dose up to 2 tabs by mouth daily as directed by transplant team (based on kidney function)    Class: E-Prescribe    Route: Oral    Vitamin D3 (CHOLECALCIFEROL) 25 mcg (1000 units) tablet            Sig: Take 1 capsule by mouth every morning     Class: Historical    Route: Oral          O:   Pulse:  [92] 92  BP: (100)/(64) 100/64  SpO2:  [98 %] 98 %  General Appearance: in no apparent distress.   Skin: Normal, no rashes or jaundice  Heart: regular rate and rhythm  Lungs: easy respirations, no audible wheezing.  Abdomen: obese, The wound is dry and intact, without hernia. The abdomen is non-tender. The kidney graft is not tender.  There is no ascites. Drain with thin serous fluid with about 150-200cc/day   Extremities: Tremor absent.   Edema: absent.     Transplant Immunosuppression Labs Latest Ref Rng & Units 6/13/2022 6/9/2022 6/3/2022 6/2/2022 6/1/2022   Creat 0.66 - 1.25 mg/dL 4.48(H) 4.05(H) 5.48(H) 8.15(H) 8.76(HH)   BUN 7 - 30 mg/dL 66(H) 65(H) 65(H) 117(H) 123(HH)   WBC 4.0 - 11.0 10e3/uL 3.3(L) 3.9(L) 5.5 7.3 7.7   Neutrophil % - - - 94 -   ANEU 1.6 - 8.3 10e9/L - - - - -       Chemistries:   Recent Labs   Lab Test  06/13/22  0958   BUN 66*   CR 4.48*   GFRESTIMATED 14*   *     Lab Results   Component Value Date    A1C 5.1 06/03/2022     Recent Labs   Lab Test 06/03/22  1048   ALBUMIN 3.3*   BILITOTAL 0.5   ALKPHOS 83   AST 9   ALT 23     Urine Studies:  Recent Labs   Lab Test 05/09/22  0957   COLOR Yellow   APPEARANCE Clear   URINEGLC Negative   URINEBILI Negative   URINEKETONE Negative   SG 1.015   UBLD Moderate*   URINEPH 6.0   PROTEIN 300 *   NITRITE Negative   LEUKEST Small*   RBCU 0   WBCU 6*     No lab results found.  Hematology:   Recent Labs   Lab Test 06/13/22  0958 06/09/22  1014 06/03/22  1048   HGB 7.8* 8.1* 7.6*    231 200   WBC 3.3* 3.9* 5.5     Coags:   Recent Labs   Lab Test 05/09/22  0959 09/03/20  1131   INR 0.98 1.03     HLA antibodies:   SA1 Hi Risk Ramin   Date Value Ref Range Status   09/03/2020 None  Final     SA1 HI RISK RAMIN   Date Value Ref Range Status   05/09/2022 None  Final     SA1 Mod Risk Ramin   Date Value Ref Range Status   09/03/2020 None  Final     SA1 MOD RISK RAMIN   Date Value Ref Range Status   05/09/2022 Cw:2  Final     SA2 Hi Risk Ramin   Date Value Ref Range Status   09/03/2020 None  Final     SA2 HI RISK RAMIN   Date Value Ref Range Status   05/09/2022 None  Final     SA2 Mod Risk Ramin   Date Value Ref Range Status   09/03/2020 DR:1  Final     SA2 MOD RISK RAMIN   Date Value Ref Range Status   05/09/2022 DR:1  Final       Assessment: Donald Blanco is doing fairly well s/p LDKT.  Issues we addressed during his visit include:  1. DGF kidney current Cr 4.48 (slightly elevated from last week 4.09). Recommend for renal US with doppler to evaluate flow and fluid collection. Recommend for fluid study (triglyceride, creatinine). Will plan to send allosure.   2. Sandy-nephric fluid collection continues to have high serous output for the surgical drain. Will rule out for any urine leak. Then plan for sclerosing discussion with NORMA Hernandez MD   Transplant surgery fellow     I have  reviewed history, examined patient and discussed plan with the fellow/resident/TOM.  I concur with the findings in this note.    Immunosuppressive regimen, management and long term risks discussed in detail. Changes, when applicable made as per orders.    Total time: 20 min  Counseling time: 10 min

## 2022-06-13 NOTE — LETTER
6/13/2022         RE: Donald Blanco  [address] [facility]  [place] [address]        Dear Colleague,    Thank you for referring your patient, Donald Blanco, to the [facility]. Please see a copy of my visit note below.    Transplant Surgery Progress Note    Transplants:  5/17/2022 (Kidney); Postoperative day:  27  S:   Doing well postoperatively. DGF kidney. Last dialysis 6/2. Pain well controlled. Good UOP.     Transplant History:    Transplant Type:  LDKT  Donor Type: Living   Transplant Date:  5/17/2022 (Kidney)   Ureteral Stent:  No   Crossmatch:  negative   DSA at Tx:  No  Baseline Cr: 13.2/ current 4.48    DeNovo DSA: No    Acute Rejection Hx:  No    Present Maintenance Immunosuppression:  Tacrolimus and Mycophenolate mofetil    CMV IgG Ab Discordance:  No  EBV IgG Ab Discordance:  No    BK Viremia:  No  EBV Viremia:  No    Transplant Coordinator: Alicia Valentin     Transplant Office Phone Number: [phone]     Immunosuppressant Medications     Immunosuppressive Agents Disp Start End     mycophenolate (GENERIC EQUIVALENT) 250 MG capsule    180 capsule 5/27/2022     Sig - Route: Take 3 capsules (750 mg) by mouth 2 times daily - Oral    Class: E-Prescribe     tacrolimus (GENERIC EQUIVALENT) 0.5 MG capsule    60 capsule 5/27/2022     Sig - Route: Take 1 capsule (0.5 mg) by mouth 2 times daily Take with four 1 mg capsules by mouth twice daily for a total daily dose of 4.5 mg by mouth twice daily. - Oral    Patient not taking: No sig reported       Class: E-Prescribe     tacrolimus (GENERIC EQUIVALENT) 1 MG capsule    300 capsule 6/3/2022     Sig - Route: Take 5 capsules (5 mg) by mouth every 12 hours - Oral    Class: E-Prescribe    Notes to Pharmacy: TXP DT 5/17/2022 (Kidney) TXP Dischg DT 5/27/2022 DX Kidney replaced by transplant Z94.0 TX Center [facility] ([place], [place])          Possible Immunosuppression-related side effects:   []              headache  []             vivid dreams  []             irritability  []             cognitive difficuties  []             fine tremor  []             nausea  []             diarrhea  []             neuropathy      []             edema  []             renal calcineurin toxicity  []             hyperkalemia  []             post-transplant diabetes  []             decreased appetite  []             increased appetite  []             other:  []             none    Prescription Medications as of 6/13/2022       Rx Number Disp Refills Start End Last Dispensed Date Next Fill Date Owning Pharmacy    acetaminophen (TYLENOL) 325 MG tablet  100 tablet 0 5/27/2022    [address]    Sig: Take 1-2 tablets (325-650 mg) by mouth every 4 hours as needed for mild pain or fever    Class: E-Prescribe    Route: Oral    aspirin (ASA) 325 MG tablet  30 tablet 11 5/28/2022    [address]    Sig: Take 1 tablet (325 mg) by mouth daily    Class: E-Prescribe    Route: Oral    atorvastatin (LIPITOR) 10 MG tablet  30 tablet 1 5/28/2022    [address]    Sig: Take 1 tablet (10 mg) by mouth daily    Class: E-Prescribe    Route: Oral    B Complex-C-Folic Acid (VIRT-CAPS) 1 MG CAPS  30 capsule 1 5/28/2022    [address]    Sig: Take 1 mg by mouth daily    Class: E-Prescribe    Route: Oral    bumetanide (BUMEX) 1 MG tablet  180 tablet 0 5/27/2022    [address]    Sig: Take 3 tablets (3 mg) by mouth 2 times daily    Class: E-Prescribe    Route: Oral    Lidocaine (LIDOCARE) 4 % Patch  10 patch 0 5/27/2022    [address]    Sig: Place 1-2 patches onto the skin every 24 hours To  prevent lidocaine toxicity, patient should be patch free for 12 hrs daily.    Class: E-Prescribe    Route: Transdermal    mycophenolate (GENERIC EQUIVALENT) 250 MG capsule  180 capsule 11 2022    [address]    Sig: Take 3 capsules (750 mg) by mouth 2 times daily    Class: E-Prescribe    Route: Oral    oxyCODONE (ROXICODONE) 5 MG tablet  10 tablet 0 2022    [address]    Sig: Take 1 tablet (5 mg) by mouth every 6 hours as needed for moderate to severe pain    Class: E-Prescribe    Earliest Fill Date: 2022    Route: Oral    pantoprazole (PROTONIX) 40 MG EC tablet  60 tablet 3 2022    [address]    Sig: Take 1 tablet (40 mg) by mouth 2 times daily (before meals)    Class: E-Prescribe    Route: Oral    polyethylene glycol (MIRALAX) 17 GM/Dose powder        [place] [address] [address], [address]    Sig: Take 1 capful by mouth daily PRN    Class: Historical    Route: Oral    senna-docusate (SENOKOT-S/PERICOLACE) 8.6-50 MG tablet  100 tablet 0 2022    [address]    Si-2 tablets by Oral or Feeding Tube route 2 times daily as needed for constipation Take while taking oxycodone, hold for loose stools.    Class: E-Prescribe    Route: Oral or Feeding Tube    sevelamer carbonate (RENVELA) 800 MG tablet  90 tablet 1 2022    [address]    Sig: Take 1 tablet (800 mg) by mouth 3 times daily (with meals)    Class: E-Prescribe    Route: Oral    sulfamethoxazole-trimethoprim (BACTRIM) 400-80 MG tablet  30 tablet 11 2022    [address]    Sig: Take 1 tablet by mouth Every Mon, Wed, Fri Morning Increase to one tab  by mouth daily when directed by transplant team    Class: E-Prescribe    Route: Oral    tacrolimus (GENERIC EQUIVALENT) 0.5 MG capsule  60 capsule 11 5/27/2022    [facility] [place], [place] - [address] [place] [address]    Sig: Take 1 capsule (0.5 mg) by mouth 2 times daily Take with four 1 mg capsules by mouth twice daily for a total daily dose of 4.5 mg by mouth twice daily.    Class: E-Prescribe    Route: Oral    tacrolimus (GENERIC EQUIVALENT) 1 MG capsule  300 capsule 11 6/3/2022    [place], [place] - [address] [facility], [address]    Sig: Take 5 capsules (5 mg) by mouth every 12 hours    Class: E-Prescribe    Notes to Pharmacy: TXP DT 5/17/2022 (Kidney) TXP Dischg DT 5/27/2022 DX Kidney replaced by transplant Z94.0 TX Center [facility] ([place], [place])    Route: Oral    valGANciclovir (VALCYTE) 450 MG tablet  60 tablet 2 5/30/2022    [place], [place] - [address] [place] [address]    Sig: Take 1 tablet (450 mg) by mouth twice a week Titrate dose up to 2 tabs by mouth daily as directed by transplant team (based on kidney function)    Class: E-Prescribe    Route: Oral    Vitamin D3 (CHOLECALCIFEROL) 25 mcg (1000 units) tablet            Sig: Take 1 capsule by mouth every morning     Class: Historical    Route: Oral          O:   Pulse:  [92] 92  BP: (100)/(64) 100/64  SpO2:  [98 %] 98 %  General Appearance: in no apparent distress.   Skin: Normal, no rashes or jaundice  Heart: regular rate and rhythm  Lungs: easy respirations, no audible wheezing.  Abdomen: obese, The wound is dry and intact, without hernia. The abdomen is non-tender. The kidney graft is not tender.  There is no ascites. Drain with thin serous fluid with about 150-200cc/day   Extremities: Tremor absent.   Edema: absent.     Transplant Immunosuppression Labs Latest Ref Rng & Units 6/13/2022 6/9/2022 6/3/2022 6/2/2022 6/1/2022   Creat 0.66 - 1.25 mg/dL  4.48(H) 4.05(H) 5.48(H) 8.15(H) 8.76(HH)   BUN 7 - 30 mg/dL 66(H) 65(H) 65(H) 117(H) 123(HH)   WBC 4.0 - 11.0 10e3/uL 3.3(L) 3.9(L) 5.5 7.3 7.7   Neutrophil % - - - 94 -   ANEU 1.6 - 8.3 10e9/L - - - - -       Chemistries:   Recent Labs   Lab Test 06/13/22  0958   BUN 66*   CR 4.48*   GFRESTIMATED 14*   *     Lab Results   Component Value Date    A1C 5.1 06/03/2022     Recent Labs   Lab Test 06/03/22  1048   ALBUMIN 3.3*   BILITOTAL 0.5   ALKPHOS 83   AST 9   ALT 23     Urine Studies:  Recent Labs   Lab Test 05/09/22  0957   COLOR Yellow   APPEARANCE Clear   URINEGLC Negative   URINEBILI Negative   URINEKETONE Negative   SG 1.015   UBLD Moderate*   URINEPH 6.0   PROTEIN 300 *   NITRITE Negative   LEUKEST Small*   RBCU 0   WBCU 6*     No lab results found.  Hematology:   Recent Labs   Lab Test 06/13/22  0958 06/09/22  1014 06/03/22  1048   HGB 7.8* 8.1* 7.6*    231 200   WBC 3.3* 3.9* 5.5     Coags:   Recent Labs   Lab Test 05/09/22  0959 09/03/20  1131   INR 0.98 1.03     HLA antibodies:   SA1 Hi Risk Ramin   Date Value Ref Range Status   09/03/2020 None  Final     SA1 HI RISK RAMIN   Date Value Ref Range Status   05/09/2022 None  Final     SA1 Mod Risk Ramin   Date Value Ref Range Status   09/03/2020 None  Final     SA1 MOD RISK RAMIN   Date Value Ref Range Status   05/09/2022 Cw:2  Final     SA2 Hi Risk Ramin   Date Value Ref Range Status   09/03/2020 None  Final     SA2 HI RISK RAMIN   Date Value Ref Range Status   05/09/2022 None  Final     SA2 Mod Risk Ramin   Date Value Ref Range Status   09/03/2020 DR:1  Final     SA2 MOD RISK RAMIN   Date Value Ref Range Status   05/09/2022 DR:1  Final       Assessment: Donald Blanco is doing fairly well s/p LDKT.  Issues we addressed during his visit include:  1. DGF kidney current Cr 4.48 (slightly elevated from last week 4.09). Recommend for renal US with doppler to evaluate flow and fluid collection. Recommend for fluid study (triglyceride, creatinine). Will plan to  send allosure.   2. Sandy-nephric fluid collection continues to have high serous output for the surgical drain. Will rule out for any urine leak. Then plan for sclerosing discussion with NORMA Hernandez MD   Transplant surgery fellow     I have reviewed history, examined patient and discussed plan with the fellow/resident/TOM.  I concur with the findings in this note.    Immunosuppressive regimen, management and long term risks discussed in detail. Changes, when applicable made as per orders.    Total time: 20 min  Counseling time: 10 min

## 2022-06-14 ENCOUNTER — ANCILLARY PROCEDURE (OUTPATIENT)
Dept: GENERAL RADIOLOGY | Facility: CLINIC | Age: 57
End: 2022-06-14
Attending: SURGERY
Payer: COMMERCIAL

## 2022-06-14 ENCOUNTER — TELEPHONE (OUTPATIENT)
Dept: TRANSPLANT | Facility: CLINIC | Age: 57
End: 2022-06-14
Payer: COMMERCIAL

## 2022-06-14 ENCOUNTER — ANCILLARY PROCEDURE (OUTPATIENT)
Dept: ULTRASOUND IMAGING | Facility: CLINIC | Age: 57
End: 2022-06-14
Attending: SURGERY
Payer: COMMERCIAL

## 2022-06-14 DIAGNOSIS — Z48.298 AFTERCARE FOLLOWING ORGAN TRANSPLANT: Primary | ICD-10-CM

## 2022-06-14 DIAGNOSIS — Z94.0 KIDNEY REPLACED BY TRANSPLANT: Chronic | ICD-10-CM

## 2022-06-14 DIAGNOSIS — Z48.298 AFTERCARE FOLLOWING ORGAN TRANSPLANT: ICD-10-CM

## 2022-06-14 DIAGNOSIS — Z94.0 STATUS POST KIDNEY TRANSPLANT: ICD-10-CM

## 2022-06-14 PROCEDURE — 74018 RADEX ABDOMEN 1 VIEW: CPT | Mod: GC | Performed by: RADIOLOGY

## 2022-06-14 PROCEDURE — 76776 US EXAM K TRANSPL W/DOPPLER: CPT | Performed by: RADIOLOGY

## 2022-06-14 NOTE — TELEPHONE ENCOUNTER
Patient Call: General  Route to LPN    Reason for call: Patient called and has questions for you, and would like a call back as soon as possible, he also needs to be scheduled for an US per Dr. Flores    Call back needed? Yes    Return Call Needed  Same as documented in contacts section  When to return call?: Same day: Route High Priority

## 2022-06-14 NOTE — PROGRESS NOTES
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 S/p living donor kidney transplant without stent on 5/17/22. Imported kidney with vasospasm in OR.    Patient presents to clinic today he denies any fevers, chills, nausea or vomiting.  He states that he has been urinating more.  He had dialysis without fluid removal.     He states overall he feels okay however he is very bothered by his drain and he would like it to be removed.    He states that his drain is putting out approximately 200 to 300 mL/day if not more.       Transplant History:    Transplant Type:  LDKT  Donor Type: Living   Transplant Date:  5/17/2022 (Kidney)   Ureteral Stent:  No   Crossmatch:  negative   DSA at Tx:  No  Baseline Cr: TBD   DeNovo DSA: No    Acute Rejection Hx:  No    Present Maintenance Immunosuppression:  Tacrolimus and Mycophenolate mofetil    CMV IgG Ab Discordance:  No  EBV IgG Ab Discordance:  No    BK Viremia:  No  EBV Viremia:  No    Transplant Coordinator: Alicia Valentin     Transplant Office Phone Number: [phone]     Immunosuppressant Medications     Immunosuppressive Agents Disp Start End     mycophenolate (GENERIC EQUIVALENT) 250 MG capsule    180 capsule 5/27/2022     Sig - Route: Take 3 capsules (750 mg) by mouth 2 times daily - Oral    Class: E-Prescribe     tacrolimus (GENERIC EQUIVALENT) 0.5 MG capsule    60 capsule 5/27/2022     Sig - Route: Take 1 capsule (0.5 mg) by mouth 2 times daily Take with four 1 mg capsules by mouth twice daily for a total daily dose of 4.5 mg by mouth twice daily. - Oral    Patient not taking: No sig reported       Class: E-Prescribe     tacrolimus (GENERIC EQUIVALENT) 1 MG capsule    300 capsule 6/3/2022     Sig - Route: Take 5 capsules (5 mg) by mouth every 12 hours - Oral    Class: E-Prescribe    Notes to  Pharmacy: TXP DT 5/17/2022 (Kidney) TXP Dischg DT 5/27/2022 DX Kidney replaced by transplant Z94.0 TX Center [facility] ([place], [place])          Possible Immunosuppression-related side effects:   []             headache  []             vivid dreams  []             irritability  []             cognitive difficuties  []             fine tremor  []             nausea  []             diarrhea  []             neuropathy      []             edema  []             renal calcineurin toxicity  []             hyperkalemia  []             post-transplant diabetes  []             decreased appetite  []             increased appetite  []             other:  []             none    Prescription Medications as of 6/14/2022       Rx Number Disp Refills Start End Last Dispensed Date Next Fill Date Owning Pharmacy    acetaminophen (TYLENOL) 325 MG tablet  100 tablet 0 5/27/2022    [address]    Sig: Take 1-2 tablets (325-650 mg) by mouth every 4 hours as needed for mild pain or fever    Class: E-Prescribe    Route: Oral    aspirin (ASA) 325 MG tablet  30 tablet 11 5/28/2022    [address]    Sig: Take 1 tablet (325 mg) by mouth daily    Class: E-Prescribe    Route: Oral    atorvastatin (LIPITOR) 10 MG tablet  30 tablet 1 5/28/2022    [address]    Sig: Take 1 tablet (10 mg) by mouth daily    Class: E-Prescribe    Route: Oral    B Complex-C-Folic Acid (VIRT-CAPS) 1 MG CAPS  30 capsule 1 5/28/2022    [address]    Sig: Take 1 mg by mouth daily    Class: E-Prescribe    Route: Oral    bumetanide (BUMEX) 1 MG tablet  180 tablet 0 5/27/2022    [address]    Sig: Take 3 tablets (3 mg)  by mouth 2 times daily    Class: E-Prescribe    Route: Oral    Lidocaine (LIDOCARE) 4 % Patch  10 patch 0 2022    [address]    Sig: Place 1-2 patches onto the skin every 24 hours To prevent lidocaine toxicity, patient should be patch free for 12 hrs daily.    Class: E-Prescribe    Route: Transdermal    mycophenolate (GENERIC EQUIVALENT) 250 MG capsule  180 capsule 11 2022    [address]    Sig: Take 3 capsules (750 mg) by mouth 2 times daily    Class: E-Prescribe    Route: Oral    oxyCODONE (ROXICODONE) 5 MG tablet  10 tablet 0 2022    [address]    Sig: Take 1 tablet (5 mg) by mouth every 6 hours as needed for moderate to severe pain    Class: E-Prescribe    Earliest Fill Date: 2022    Route: Oral    pantoprazole (PROTONIX) 40 MG EC tablet  60 tablet 3 2022    [address]    Sig: Take 1 tablet (40 mg) by mouth 2 times daily (before meals)    Class: E-Prescribe    Route: Oral    polyethylene glycol (MIRALAX) 17 GM/Dose powder        [place] [address] [address], [address]    Sig: Take 1 capful by mouth daily PRN    Class: Historical    Route: Oral    senna-docusate (SENOKOT-S/PERICOLACE) 8.6-50 MG tablet  100 tablet 0 2022    [address]    Si-2 tablets by Oral or Feeding Tube route 2 times daily as needed for constipation Take while taking oxycodone, hold for loose stools.    Class: E-Prescribe    Route: Oral or Feeding Tube    sevelamer carbonate (RENVELA) 800 MG tablet  90 tablet 1 2022    [address]    Sig: Take 1 tablet (800 mg) by mouth 3 times daily (with meals)    Class: E-Prescribe     Route: Oral    sulfamethoxazole-trimethoprim (BACTRIM) 400-80 MG tablet  30 tablet 11 5/30/2022    [address]    Sig: Take 1 tablet by mouth Every Mon, Wed, Fri Morning Increase to one tab by mouth daily when directed by transplant team    Class: E-Prescribe    Route: Oral    tacrolimus (GENERIC EQUIVALENT) 0.5 MG capsule  60 capsule 11 5/27/2022    [address]    Sig: Take 1 capsule (0.5 mg) by mouth 2 times daily Take with four 1 mg capsules by mouth twice daily for a total daily dose of 4.5 mg by mouth twice daily.    Class: E-Prescribe    Route: Oral    tacrolimus (GENERIC EQUIVALENT) 1 MG capsule  300 capsule 11 6/3/2022    [address], [address]    Sig: Take 5 capsules (5 mg) by mouth every 12 hours    Class: E-Prescribe    Notes to Pharmacy: TXP DT 5/17/2022 (Kidney) TXP Dischg DT 5/27/2022 DX Kidney replaced by transplant Z94.0 TX Center [facility] ([place], [place])    Route: Oral    valGANciclovir (VALCYTE) 450 MG tablet  60 tablet 2 5/30/2022    [address]    Sig: Take 1 tablet (450 mg) by mouth twice a week Titrate dose up to 2 tabs by mouth daily as directed by transplant team (based on kidney function)    Class: E-Prescribe    Route: Oral    Vitamin D3 (CHOLECALCIFEROL) 25 mcg (1000 units) tablet            Sig: Take 1 capsule by mouth every morning     Class: Historical    Route: Oral          O:   Pulse:  [92] 92  BP: (100)/(64) 100/64  SpO2:  [98 %] 98 %  General Appearance: in no apparent distress.   Skin: Normal, no rashes or jaundice  Heart: regular rate and rhythm  Lungs: easy respirations, no audible wheezing.  Abdomen: rounded, The wound is dry and intact, without hernia. The abdomen is non-tender. The kidney graft is not tender.   There is no ascites.  Extremities: Tremor absent.   Edema: absent.     Transplant Immunosuppression Labs Latest Ref Rng & Units 6/13/2022 6/9/2022 6/3/2022 6/2/2022 6/1/2022   Creat 0.66 - 1.25 mg/dL 4.48(H) 4.05(H) 5.48(H) 8.15(H) 8.76(HH)   BUN 7 - 30 mg/dL 66(H) 65(H) 65(H) 117(H) 123(HH)   WBC 4.0 - 11.0 10e3/uL 3.3(L) 3.9(L) 5.5 7.3 7.7   Neutrophil % - - - 94 -   ANEU 1.6 - 8.3 10e9/L - - - - -       Chemistries:   Recent Labs   Lab Test 06/13/22  0958   BUN 66*   CR 4.48*   GFRESTIMATED 14*   *     Lab Results   Component Value Date    A1C 5.1 06/03/2022     Recent Labs   Lab Test 06/03/22  1048   ALBUMIN 3.3*   BILITOTAL 0.5   ALKPHOS 83   AST 9   ALT 23     Urine Studies:  Recent Labs   Lab Test 05/09/22  0957   COLOR Yellow   APPEARANCE Clear   URINEGLC Negative   URINEBILI Negative   URINEKETONE Negative   SG 1.015   UBLD Moderate*   URINEPH 6.0   PROTEIN 300 *   NITRITE Negative   LEUKEST Small*   RBCU 0   WBCU 6*     No lab results found.  Hematology:   Recent Labs   Lab Test 06/13/22  0958 06/09/22  1014 06/03/22  1048   HGB 7.8* 8.1* 7.6*    231 200   WBC 3.3* 3.9* 5.5     Coags:   Recent Labs   Lab Test 05/09/22  0959 09/03/20  1131   INR 0.98 1.03     HLA antibodies:   SA1 Hi Risk Ramin   Date Value Ref Range Status   09/03/2020 None  Final     SA1 HI RISK RAMIN   Date Value Ref Range Status   05/09/2022 None  Final     SA1 Mod Risk Ramin   Date Value Ref Range Status   09/03/2020 None  Final     SA1 MOD RISK RAMIN   Date Value Ref Range Status   05/09/2022 Cw:2  Final     SA2 Hi Risk Ramin   Date Value Ref Range Status   09/03/2020 None  Final     SA2 HI RISK RAMIN   Date Value Ref Range Status   05/09/2022 None  Final     SA2 Mod Risk Ramin   Date Value Ref Range Status   09/03/2020 DR:1  Final     SA2 MOD RISK RAMIN   Date Value Ref Range Status   05/09/2022 DR:1  Final       Assessment: Donald GAONA Francis is doing fairly well s/p LDKT:  Issues we addressed during his visit include:    Plan:    1.  Graft function: last dialysis was 6/2 for fluid removal.    2. Immunosuppression Management: No change continue MMF and prograf .  Complexity of management:Medium.  Contributing factors: delayed graft function  3. Drain:  Drain cr checked.  Large amount of fluid still draining.  LDH fluid gram stain and culture both checked up with fluid.  Patient informed the drain should stay in place due to amount coming out of drain and per surgeon recommendation.  Patient will have a NM renogram tomorrow  Followup: as directed    Total Time: 25 min,   Counselling Time: 15 min.

## 2022-06-14 NOTE — TELEPHONE ENCOUNTER
Spoke with patient regarding scheduling his abdominal xray, labs, etc. Patient had questions regarding refills on his medication, and RNCC instructed him to call [facility] pharmacy to sort out mailing these rx.

## 2022-06-15 ENCOUNTER — TELEPHONE (OUTPATIENT)
Dept: TRANSPLANT | Facility: CLINIC | Age: 57
End: 2022-06-15
Payer: COMMERCIAL

## 2022-06-16 ENCOUNTER — PRE VISIT (OUTPATIENT)
Dept: UROLOGY | Facility: CLINIC | Age: 57
End: 2022-06-16

## 2022-06-16 ENCOUNTER — TELEPHONE (OUTPATIENT)
Dept: TRANSPLANT | Facility: CLINIC | Age: 57
End: 2022-06-16

## 2022-06-16 ENCOUNTER — LAB REQUISITION (OUTPATIENT)
Dept: LAB | Facility: CLINIC | Age: 57
End: 2022-06-16
Payer: COMMERCIAL

## 2022-06-16 LAB
ANION GAP SERPL CALCULATED.3IONS-SCNC: 6 MMOL/L (ref 3–14)
BUN SERPL-MCNC: 52 MG/DL (ref 7–30)
CALCIUM SERPL-MCNC: 9.5 MG/DL (ref 8.5–10.1)
CHLORIDE BLD-SCNC: 117 MMOL/L (ref 94–109)
CO2 SERPL-SCNC: 20 MMOL/L (ref 20–32)
CREAT SERPL-MCNC: 3.44 MG/DL (ref 0.66–1.25)
ERYTHROCYTE [DISTWIDTH] IN BLOOD BY AUTOMATED COUNT: 13.1 % (ref 10–15)
GFR SERPL CREATININE-BSD FRML MDRD: 20 ML/MIN/1.73M2
GLUCOSE BLD-MCNC: 101 MG/DL (ref 70–99)
HCT VFR BLD AUTO: 25.2 % (ref 40–53)
HGB BLD-MCNC: 8 G/DL (ref 13.3–17.7)
MAGNESIUM SERPL-MCNC: 2.3 MG/DL (ref 1.6–2.3)
MCH RBC QN AUTO: 32.9 PG (ref 26.5–33)
MCHC RBC AUTO-ENTMCNC: 31.7 G/DL (ref 31.5–36.5)
MCV RBC AUTO: 104 FL (ref 78–100)
PHOSPHATE SERPL-MCNC: 2 MG/DL (ref 2.5–4.5)
PLATELET # BLD AUTO: 296 10E3/UL (ref 150–450)
POTASSIUM BLD-SCNC: 5 MMOL/L (ref 3.4–5.3)
RBC # BLD AUTO: 2.43 10E6/UL (ref 4.4–5.9)
SODIUM SERPL-SCNC: 143 MMOL/L (ref 133–144)
TACROLIMUS BLD-MCNC: 8.2 UG/L (ref 5–15)
TME LAST DOSE: NORMAL H
TME LAST DOSE: NORMAL H
WBC # BLD AUTO: 3.9 10E3/UL (ref 4–11)

## 2022-06-16 PROCEDURE — 80197 ASSAY OF TACROLIMUS: CPT | Mod: ORL | Performed by: INTERNAL MEDICINE

## 2022-06-16 NOTE — TELEPHONE ENCOUNTER
Patient Call: General  Route to LPN    Reason for call: Patient called and has questions regarding lab draw this morning, and would like a call back    Call back needed? Yes    Return Call Needed  Same as documented in contacts section  When to return call?: Same day: Route High Priority

## 2022-06-16 NOTE — CONFIDENTIAL NOTE
Reason for visit: Review PSA results     Relevant information: Prostate cancer s/p RALP 12/2020    Records/imaging/labs/orders: PSA on 6/1    Pt called: N/A    At Rooming: Video

## 2022-06-16 NOTE — TELEPHONE ENCOUNTER
Spoke with patient who states lab had a challenging time trying to get his blood this AM, but by the time RNCC called, they were able to get it. No further questions

## 2022-06-18 ENCOUNTER — TELEPHONE (OUTPATIENT)
Dept: TRANSPLANT | Facility: CLINIC | Age: 57
End: 2022-06-18
Payer: COMMERCIAL

## 2022-06-18 NOTE — TELEPHONE ENCOUNTER
Page from pt. Reports his BAUDILIO drain fell out while he was emptying it. Stitch came out. Pt is 1 mo post kidney tx, still having high serous output from BAUDILIO drain. Had ultrasound and xray done this week to evaluate.     Instructed pt to keep site covered w/ gauze and change dressing prn. Will likely leak a large amount, plan to have multiple thick gauze on hand.     Instructed pt to present to ED if he develops pain, redness or swelling in the area, change in UOP or fever. If drainage excessive and he cannot keep up with dressing changes go to ED. Call coordinator on Monday for plan.     Reviewed w/ fellow, Dr Ramirez. Pt to come to ED if he had a decrease in UOP. Pt v/u.

## 2022-06-20 ENCOUNTER — ANCILLARY PROCEDURE (OUTPATIENT)
Dept: CT IMAGING | Facility: CLINIC | Age: 57
End: 2022-06-20
Attending: NURSE PRACTITIONER
Payer: COMMERCIAL

## 2022-06-20 ENCOUNTER — LAB REQUISITION (OUTPATIENT)
Dept: LAB | Facility: CLINIC | Age: 57
End: 2022-06-20
Payer: COMMERCIAL

## 2022-06-20 DIAGNOSIS — Z48.288 ENCOUNTER FOR AFTERCARE FOLLOWING MULTIPLE ORGAN TRANSPLANT: ICD-10-CM

## 2022-06-20 DIAGNOSIS — Z48.298 AFTERCARE FOLLOWING ORGAN TRANSPLANT: Primary | ICD-10-CM

## 2022-06-20 DIAGNOSIS — Z48.298 AFTERCARE FOLLOWING ORGAN TRANSPLANT: ICD-10-CM

## 2022-06-20 DIAGNOSIS — Z94.0 KIDNEY TRANSPLANT STATUS: ICD-10-CM

## 2022-06-20 LAB
ANION GAP SERPL CALCULATED.3IONS-SCNC: 10 MMOL/L (ref 3–14)
BUN SERPL-MCNC: 60 MG/DL (ref 7–30)
CALCIUM SERPL-MCNC: 9.6 MG/DL (ref 8.5–10.1)
CHLORIDE BLD-SCNC: 115 MMOL/L (ref 94–109)
CO2 SERPL-SCNC: 13 MMOL/L (ref 20–32)
CREAT SERPL-MCNC: 4.45 MG/DL (ref 0.66–1.25)
ERYTHROCYTE [DISTWIDTH] IN BLOOD BY AUTOMATED COUNT: 13.3 % (ref 10–15)
GFR SERPL CREATININE-BSD FRML MDRD: 14 ML/MIN/1.73M2
GLUCOSE BLD-MCNC: 126 MG/DL (ref 70–99)
HCT VFR BLD AUTO: 25.1 % (ref 40–53)
HGB BLD-MCNC: 8 G/DL (ref 13.3–17.7)
MAGNESIUM SERPL-MCNC: 2 MG/DL (ref 1.6–2.3)
MCH RBC QN AUTO: 32.8 PG (ref 26.5–33)
MCHC RBC AUTO-ENTMCNC: 31.9 G/DL (ref 31.5–36.5)
MCV RBC AUTO: 103 FL (ref 78–100)
PHOSPHATE SERPL-MCNC: 2.8 MG/DL (ref 2.5–4.5)
PLATELET # BLD AUTO: 245 10E3/UL (ref 150–450)
POTASSIUM BLD-SCNC: 5 MMOL/L (ref 3.4–5.3)
RBC # BLD AUTO: 2.44 10E6/UL (ref 4.4–5.9)
SODIUM SERPL-SCNC: 138 MMOL/L (ref 133–144)
TACROLIMUS BLD-MCNC: 8.6 UG/L (ref 5–15)
TME LAST DOSE: NORMAL H
TME LAST DOSE: NORMAL H
WBC # BLD AUTO: 5.4 10E3/UL (ref 4–11)

## 2022-06-20 PROCEDURE — 74176 CT ABD & PELVIS W/O CONTRAST: CPT | Mod: GC | Performed by: RADIOLOGY

## 2022-06-20 PROCEDURE — 83735 ASSAY OF MAGNESIUM: CPT | Mod: ORL | Performed by: INTERNAL MEDICINE

## 2022-06-20 PROCEDURE — 84100 ASSAY OF PHOSPHORUS: CPT | Mod: ORL | Performed by: INTERNAL MEDICINE

## 2022-06-20 PROCEDURE — 80197 ASSAY OF TACROLIMUS: CPT | Mod: ORL | Performed by: INTERNAL MEDICINE

## 2022-06-20 PROCEDURE — 80048 BASIC METABOLIC PNL TOTAL CA: CPT | Mod: ORL | Performed by: INTERNAL MEDICINE

## 2022-06-20 NOTE — TELEPHONE ENCOUNTER
Spoke with patient regarding scheduling a CT scan for tomorrow. Patient agreeable to plan. Patient has appointment with Dr. Friedman tomorrow and will discuss drain management.

## 2022-06-20 NOTE — TELEPHONE ENCOUNTER
"Spoke with patient who states drainage has stopped from the hole where the BAUDILIO was in place. Wife concerned \"fluid is building up inside of him\". Reached out to Katie Sanders, surgical PA for guidance. She will reach out to Sandra for further guidance on when or if we need to replace the tube and next steps. Updated patient, and waiting to hear from surgery team  "

## 2022-06-20 NOTE — TELEPHONE ENCOUNTER
Patient Call: General  Route to LPN    Reason for call: Pt calling to talk with Alicia ortiz JP  Would like to see Dr Flores if possible today angel ASTUDILLO drain     Call back needed? Yes    Return Call Needed  Same as documented in contacts section  When to return call?: Same day: Route High Priority

## 2022-06-21 ENCOUNTER — HOSPITAL ENCOUNTER (INPATIENT)
Facility: CLINIC | Age: 57
LOS: 2 days | Discharge: HOME | DRG: 699 | End: 2022-06-23
Attending: TRANSPLANT SURGERY | Admitting: TRANSPLANT SURGERY
Payer: COMMERCIAL

## 2022-06-21 ENCOUNTER — INFUSION THERAPY VISIT (OUTPATIENT)
Dept: INFUSION THERAPY | Facility: CLINIC | Age: 57
DRG: 699 | End: 2022-06-21
Attending: INTERNAL MEDICINE
Payer: COMMERCIAL

## 2022-06-21 ENCOUNTER — OFFICE VISIT (OUTPATIENT)
Dept: NEPHROLOGY | Facility: CLINIC | Age: 57
DRG: 699 | End: 2022-06-21
Attending: INTERNAL MEDICINE
Payer: COMMERCIAL

## 2022-06-21 ENCOUNTER — DOCUMENTATION ONLY (OUTPATIENT)
Dept: INTERVENTIONAL RADIOLOGY/VASCULAR | Facility: CLINIC | Age: 57
End: 2022-06-21

## 2022-06-21 VITALS
BODY MASS INDEX: 33.79 KG/M2 | SYSTOLIC BLOOD PRESSURE: 121 MMHG | DIASTOLIC BLOOD PRESSURE: 71 MMHG | OXYGEN SATURATION: 98 % | TEMPERATURE: 98 F | WEIGHT: 235.5 LBS | HEART RATE: 83 BPM

## 2022-06-21 DIAGNOSIS — Z76.82 ORGAN TRANSPLANT CANDIDATE: Primary | ICD-10-CM

## 2022-06-21 DIAGNOSIS — N17.9 ACUTE KIDNEY INJURY (H): ICD-10-CM

## 2022-06-21 DIAGNOSIS — E66.01 MORBID OBESITY (H): Primary | ICD-10-CM

## 2022-06-21 DIAGNOSIS — Z94.0 KIDNEY REPLACED BY TRANSPLANT: Chronic | ICD-10-CM

## 2022-06-21 DIAGNOSIS — T86.19 PERINEPHRIC FLUID COLLECTION OF KIDNEY TRANSPLANT: ICD-10-CM

## 2022-06-21 DIAGNOSIS — Z94.0 KIDNEY REPLACED BY TRANSPLANT: Primary | ICD-10-CM

## 2022-06-21 DIAGNOSIS — Z94.0 KIDNEY REPLACED BY TRANSPLANT: ICD-10-CM

## 2022-06-21 DIAGNOSIS — D84.9 IMMUNOSUPPRESSION (H): ICD-10-CM

## 2022-06-21 DIAGNOSIS — N28.89 PERINEPHRIC FLUID COLLECTION OF KIDNEY TRANSPLANT: ICD-10-CM

## 2022-06-21 DIAGNOSIS — E87.8 LOW BICARBONATE: ICD-10-CM

## 2022-06-21 PROBLEM — R18.8 ABDOMINAL FLUID COLLECTION: Status: ACTIVE | Noted: 2022-06-21

## 2022-06-21 PROBLEM — Z01.810 PRE-OPERATIVE CARDIOVASCULAR EXAMINATION: Status: RESOLVED | Noted: 2020-10-06 | Resolved: 2022-06-21

## 2022-06-21 PROBLEM — I15.1 HTN, KIDNEY TRANSPLANT RELATED: Status: ACTIVE | Noted: 2020-05-21

## 2022-06-21 LAB
ANION GAP SERPL CALCULATED.3IONS-SCNC: 4 MMOL/L (ref 3–14)
APTT PPP: 27 SECONDS (ref 22–38)
BASOPHILS # BLD AUTO: 0 10E3/UL (ref 0–0.2)
BASOPHILS NFR BLD AUTO: 0 %
BUN SERPL-MCNC: 68 MG/DL (ref 7–30)
CALCIUM SERPL-MCNC: 9.3 MG/DL (ref 8.5–10.1)
CHLORIDE BLD-SCNC: 119 MMOL/L (ref 94–109)
CO2 SERPL-SCNC: 17 MMOL/L (ref 20–32)
CREAT SERPL-MCNC: 4.56 MG/DL (ref 0.66–1.25)
EOSINOPHIL # BLD AUTO: 0 10E3/UL (ref 0–0.7)
EOSINOPHIL NFR BLD AUTO: 1 %
ERYTHROCYTE [DISTWIDTH] IN BLOOD BY AUTOMATED COUNT: 13.6 % (ref 10–15)
GFR SERPL CREATININE-BSD FRML MDRD: 14 ML/MIN/1.73M2
GLUCOSE BLD-MCNC: 112 MG/DL (ref 70–99)
HCT VFR BLD AUTO: 22.6 % (ref 40–53)
HGB BLD-MCNC: 7.2 G/DL (ref 13.3–17.7)
IMM GRANULOCYTES # BLD: 0 10E3/UL
IMM GRANULOCYTES NFR BLD: 1 %
INR PPP: 1.12 (ref 0.85–1.15)
LYMPHOCYTES # BLD AUTO: 0.2 10E3/UL (ref 0.8–5.3)
LYMPHOCYTES NFR BLD AUTO: 4 %
MAGNESIUM SERPL-MCNC: 2.2 MG/DL (ref 1.6–2.3)
MCH RBC QN AUTO: 32.4 PG (ref 26.5–33)
MCHC RBC AUTO-ENTMCNC: 31.9 G/DL (ref 31.5–36.5)
MCV RBC AUTO: 102 FL (ref 78–100)
MONOCYTES # BLD AUTO: 0.7 10E3/UL (ref 0–1.3)
MONOCYTES NFR BLD AUTO: 12 %
NEUTROPHILS # BLD AUTO: 4.5 10E3/UL (ref 1.6–8.3)
NEUTROPHILS NFR BLD AUTO: 82 %
NRBC # BLD AUTO: 0 10E3/UL
NRBC BLD AUTO-RTO: 0 /100
PHOSPHATE SERPL-MCNC: 3.1 MG/DL (ref 2.5–4.5)
PLATELET # BLD AUTO: 185 10E3/UL (ref 150–450)
POTASSIUM BLD-SCNC: 5.1 MMOL/L (ref 3.4–5.3)
RBC # BLD AUTO: 2.22 10E6/UL (ref 4.4–5.9)
SARS-COV-2 RNA RESP QL NAA+PROBE: NEGATIVE
SODIUM SERPL-SCNC: 140 MMOL/L (ref 133–144)
WBC # BLD AUTO: 5.5 10E3/UL (ref 4–11)

## 2022-06-21 PROCEDURE — 84100 ASSAY OF PHOSPHORUS: CPT

## 2022-06-21 PROCEDURE — 36592 COLLECT BLOOD FROM PICC: CPT

## 2022-06-21 PROCEDURE — 85610 PROTHROMBIN TIME: CPT

## 2022-06-21 PROCEDURE — 120N000011 HC R&B TRANSPLANT UMMC

## 2022-06-21 PROCEDURE — 85025 COMPLETE CBC W/AUTO DIFF WBC: CPT

## 2022-06-21 PROCEDURE — 250N000012 HC RX MED GY IP 250 OP 636 PS 637

## 2022-06-21 PROCEDURE — 999N000128 HC STATISTIC PERIPHERAL IV START W/O US GUIDANCE

## 2022-06-21 PROCEDURE — 258N000003 HC RX IP 258 OP 636: Performed by: NURSE PRACTITIONER

## 2022-06-21 PROCEDURE — 85730 THROMBOPLASTIN TIME PARTIAL: CPT

## 2022-06-21 PROCEDURE — 250N000013 HC RX MED GY IP 250 OP 250 PS 637

## 2022-06-21 PROCEDURE — U0005 INFEC AGEN DETEC AMPLI PROBE: HCPCS

## 2022-06-21 PROCEDURE — 99215 OFFICE O/P EST HI 40 MIN: CPT | Mod: 24 | Performed by: INTERNAL MEDICINE

## 2022-06-21 PROCEDURE — 250N000011 HC RX IP 250 OP 636: Performed by: INTERNAL MEDICINE

## 2022-06-21 PROCEDURE — G0463 HOSPITAL OUTPT CLINIC VISIT: HCPCS

## 2022-06-21 PROCEDURE — 83735 ASSAY OF MAGNESIUM: CPT

## 2022-06-21 PROCEDURE — 80048 BASIC METABOLIC PNL TOTAL CA: CPT

## 2022-06-21 RX ORDER — TACROLIMUS 5 MG/1
5 CAPSULE ORAL EVERY 12 HOURS
Status: DISCONTINUED | OUTPATIENT
Start: 2022-06-21 | End: 2022-06-21

## 2022-06-21 RX ORDER — NALOXONE HYDROCHLORIDE 0.4 MG/ML
0.4 INJECTION, SOLUTION INTRAMUSCULAR; INTRAVENOUS; SUBCUTANEOUS
Status: DISCONTINUED | OUTPATIENT
Start: 2022-06-21 | End: 2022-06-23 | Stop reason: HOSPADM

## 2022-06-21 RX ORDER — POLYETHYLENE GLYCOL 3350 17 G/17G
17 POWDER, FOR SOLUTION ORAL DAILY
Status: DISCONTINUED | OUTPATIENT
Start: 2022-06-22 | End: 2022-06-21

## 2022-06-21 RX ORDER — ACETAMINOPHEN 325 MG/1
325-650 TABLET ORAL EVERY 4 HOURS PRN
Status: DISCONTINUED | OUTPATIENT
Start: 2022-06-21 | End: 2022-06-23 | Stop reason: HOSPADM

## 2022-06-21 RX ORDER — HEPARIN SODIUM 1000 [USP'U]/ML
2.1 INJECTION, SOLUTION INTRAVENOUS; SUBCUTANEOUS ONCE
Status: COMPLETED | OUTPATIENT
Start: 2022-06-21 | End: 2022-06-21

## 2022-06-21 RX ORDER — TACROLIMUS 0.5 MG/1
0.5 CAPSULE ORAL 2 TIMES DAILY
Status: DISCONTINUED | OUTPATIENT
Start: 2022-06-21 | End: 2022-06-21

## 2022-06-21 RX ORDER — NALOXONE HYDROCHLORIDE 0.4 MG/ML
0.2 INJECTION, SOLUTION INTRAMUSCULAR; INTRAVENOUS; SUBCUTANEOUS
Status: DISCONTINUED | OUTPATIENT
Start: 2022-06-21 | End: 2022-06-23 | Stop reason: HOSPADM

## 2022-06-21 RX ORDER — TACROLIMUS 1 MG/1
4 CAPSULE ORAL 2 TIMES DAILY
Status: DISCONTINUED | OUTPATIENT
Start: 2022-06-21 | End: 2022-06-23

## 2022-06-21 RX ORDER — ATORVASTATIN CALCIUM 10 MG/1
10 TABLET, FILM COATED ORAL EVERY EVENING
Status: DISCONTINUED | OUTPATIENT
Start: 2022-06-21 | End: 2022-06-23 | Stop reason: HOSPADM

## 2022-06-21 RX ORDER — VITAMIN B COMPLEX
25 TABLET ORAL EVERY MORNING
Status: DISCONTINUED | OUTPATIENT
Start: 2022-06-22 | End: 2022-06-23 | Stop reason: HOSPADM

## 2022-06-21 RX ORDER — HEPARIN SODIUM 1000 [USP'U]/ML
2 INJECTION, SOLUTION INTRAVENOUS; SUBCUTANEOUS ONCE
Status: DISCONTINUED | OUTPATIENT
Start: 2022-06-21 | End: 2022-06-21

## 2022-06-21 RX ORDER — HEPARIN SODIUM 1000 [USP'U]/ML
2.1 INJECTION, SOLUTION INTRAVENOUS; SUBCUTANEOUS ONCE
Status: CANCELLED
Start: 2022-06-21 | End: 2022-06-21

## 2022-06-21 RX ORDER — OXYCODONE HYDROCHLORIDE 5 MG/1
5 TABLET ORAL EVERY 6 HOURS PRN
Status: DISCONTINUED | OUTPATIENT
Start: 2022-06-21 | End: 2022-06-23 | Stop reason: HOSPADM

## 2022-06-21 RX ORDER — AMOXICILLIN 250 MG
1-2 CAPSULE ORAL DAILY
Status: DISCONTINUED | OUTPATIENT
Start: 2022-06-22 | End: 2022-06-23 | Stop reason: HOSPADM

## 2022-06-21 RX ORDER — SODIUM CHLORIDE 9 MG/ML
INJECTION, SOLUTION INTRAVENOUS CONTINUOUS
Status: DISCONTINUED | OUTPATIENT
Start: 2022-06-21 | End: 2022-06-22

## 2022-06-21 RX ORDER — PANTOPRAZOLE SODIUM 40 MG/1
40 TABLET, DELAYED RELEASE ORAL DAILY
Status: DISCONTINUED | OUTPATIENT
Start: 2022-06-22 | End: 2022-06-23 | Stop reason: HOSPADM

## 2022-06-21 RX ORDER — SULFAMETHOXAZOLE AND TRIMETHOPRIM 400; 80 MG/1; MG/1
1 TABLET ORAL
Status: DISCONTINUED | OUTPATIENT
Start: 2022-06-22 | End: 2022-06-23 | Stop reason: HOSPADM

## 2022-06-21 RX ORDER — MYCOPHENOLATE MOFETIL 250 MG/1
750 CAPSULE ORAL 2 TIMES DAILY
Status: DISCONTINUED | OUTPATIENT
Start: 2022-06-21 | End: 2022-06-23 | Stop reason: HOSPADM

## 2022-06-21 RX ORDER — VALGANCICLOVIR 450 MG/1
450 TABLET, FILM COATED ORAL
Status: DISCONTINUED | OUTPATIENT
Start: 2022-06-23 | End: 2022-06-23

## 2022-06-21 RX ORDER — HEPARIN SODIUM 1000 [USP'U]/ML
2 INJECTION, SOLUTION INTRAVENOUS; SUBCUTANEOUS ONCE
Status: CANCELLED
Start: 2022-06-21 | End: 2022-06-21

## 2022-06-21 RX ORDER — AMOXICILLIN 250 MG
1-2 CAPSULE ORAL 2 TIMES DAILY PRN
Status: DISCONTINUED | OUTPATIENT
Start: 2022-06-21 | End: 2022-06-21

## 2022-06-21 RX ORDER — LIDOCAINE 40 MG/G
CREAM TOPICAL
Status: DISCONTINUED | OUTPATIENT
Start: 2022-06-21 | End: 2022-06-23 | Stop reason: HOSPADM

## 2022-06-21 RX ORDER — PANTOPRAZOLE SODIUM 40 MG/1
40 TABLET, DELAYED RELEASE ORAL
Status: DISCONTINUED | OUTPATIENT
Start: 2022-06-22 | End: 2022-06-21

## 2022-06-21 RX ADMIN — MYCOPHENOLATE MOFETIL 750 MG: 250 CAPSULE ORAL at 21:13

## 2022-06-21 RX ADMIN — SODIUM CHLORIDE: 9 INJECTION, SOLUTION INTRAVENOUS at 22:19

## 2022-06-21 RX ADMIN — TACROLIMUS 4 MG: 1 CAPSULE ORAL at 21:27

## 2022-06-21 RX ADMIN — ATORVASTATIN CALCIUM 10 MG: 10 TABLET, FILM COATED ORAL at 21:13

## 2022-06-21 RX ADMIN — HEPARIN SODIUM 2100 UNITS: 1000 INJECTION INTRAVENOUS; SUBCUTANEOUS at 11:27

## 2022-06-21 ASSESSMENT — ACTIVITIES OF DAILY LIVING (ADL)
ADLS_ACUITY_SCORE: 20
ADLS_ACUITY_SCORE: 35

## 2022-06-21 ASSESSMENT — PAIN SCALES - GENERAL: PAINLEVEL: NO PAIN (0)

## 2022-06-21 NOTE — PATIENT INSTRUCTIONS
Dear Donald Blanco    Thank you for choosing [facility] ([facility]) for your transplant cares.  The following information is a summary of our appointment as well as important reminders.      We look forward in seeing you on your next appointment here at [facility]  Please don t hesitate to call us at [phone] to reschedule any of your appointments or to speak with one of the [facility] registered nurses.  It was a pleasure taking care of you today.    Sincerely,    [facility]  [address] [place] [address]  [id number]  Phone:  [phone]

## 2022-06-21 NOTE — PROGRESS NOTES
TRANSPLANT NEPHROLOGY EARLY POST TRANSPLANT VISIT    Assessment & Plan   # LDKT: initial DGF, now PETER with concern for fluid collection causing compression on the graft after drain fell out on 6/18/22   - Baseline Creatinine: ~ TBD. Rocky Scr 3.44 on 6/16/22   - Proteinuria: Not checked post transplant   - Date DSA Last Checked: May/2022      Latest DSA: No DSA at time of transplant, recheck now    - BK Viremia: No   - Kidney Tx Biopsy: May 24, 2022; Result: ATI   - Transplant Ureteral Stent: No   -Last HD on 6/2/22   -HD access: RIJ tunneled line, but one port is not functional according to [facility]. Will need to be removed in house after IR drainage of fluid.     # Immunosuppression: Tacrolimus immediate release (goal 8-10) and Mycophenolate mofetil (dose 750 mg every 12 hours)   - Induction with Recent Transplant:  High Intensity due to DGF after initial dose of simulect   - Continue with intensive monitoring of immunosuppression for efficacy and toxicity.   - Changes: Not at this time    # Infection Prophylaxis:   - PJP: Sulfa/TMP (Bactrim) MWF  - CMV: Valganciclovir (Valcyte) twice weekly. CMV -/-    # Blood Pressure: Controlled;  Goal BP: < 140/90   - Volume status: Euvolemic     - Changes: Not at this time    # Anemia in Chronic Renal Disease: Hgb: Stable, low      YUDI: No   - Iron studies: Replete    -Consider anemia clinic after drain replaced    # Mineral Bone Disorder:   - Secondary renal hyperparathyroidism; PTH level: Significantly elevated (601-1200 pg/ml)        On treatment: None  - Vitamin D; level: Normal        On supplement: Yes  - Calcium; level: Normal        On supplement: No  - Phosphorus; level: Normal        On supplement: No    # Electrolytes:   - Potassium; level: High normal        On supplement: No  - Magnesium; level: Normal        On supplement: No  - Bicarbonate; level: Low        On supplement: No      # Constipation:   -Needs to take senna once daily.    -Consider fiber    # Fluid  collection:    -2 twin transplant fluid collections on 6/14/22 U/S. High drain output. Drain fell out on 6/18, pt refused to go to ED   -CT performed on 6/20 shows large fluid collection. Plan for direct admission and IR drainage after discussion between IR and Dr. Huntley as IR does not see safe window.    -COVID Swab pending from today    # History of prostate CA:   -Stage T3b s/p RALP 12/14/2020 with focal positive margin, received adjuvant radiation. Moderate risk of recurrence in 10-15y.    -Follows with urology.     # Medical Compliance: Yes    # COVID-19 Virus Review: Discussed COVID-19 virus and the potential medical risks.  Reviewed preventative health recommendations, including wearing a mask where appropriate.  Recommended COVID vaccination should be up to date with either an initial vaccination or booster shot when appropriate.  Asked the patient to inform the transplant center if they are exposed or diagnosed with this virus.    # COVID Vaccination Up To Date: No, due for next dose at 3m post txp    # Transplant History:  Etiology of Kidney Failure: IgA nephropathy  Tx: LDKT  Transplant: 5/17/2022 (Kidney)  Donor Type: Living Donor Class:   Crossmatch at time of Tx: negative  DSA at time of Tx: No  Significant changes in immunosuppression: None  CMV IgG Ab High Risk Discordance (D+/R-): No  EBV IgG Ab High Risk Discordance (D+/R-): No  Significant transplant-related complications: DGF    Transplant Office Phone Number: [phone]    Assessment and plan was discussed with the patient and he voiced his understanding and agreement.    Return visit: Return in 27 days (on 7/18/2022).    Yong Friedman MD    Chief Complaint   Mr. Blanco is a 57 year old here for routine follow up, kidney transplant, immunosuppression management and concern for increasing fluid collection.     History of Present Illness   He has been having some BRBPR associated with hemorrhoids. He has been eating and drinking well. He  describes some discomfort over RLQ. His drain fell out on Saturday and since then he has noticed increasing size of RLQ over kidney transplant. He denies fever, chills, N/V/D.     Home BP: 110s-120s systolic    Problem List   Patient Active Problem List   Diagnosis     Essential hypertension     Gout     Hyperlipidemia     Hyperparathyroidism due to renal insufficiency (H)     IgA nephropathy     Obesity     Umbilical hernia     Pre-operative cardiovascular examination     Prostate cancer (H)     Morbid obesity (H)     Kidney replaced by transplant     Immunosuppressed status (H)     Hyperkalemia       Allergies   No Known Allergies    Medications   Current Outpatient Medications   Medication Sig     acetaminophen (TYLENOL) 325 MG tablet Take 1-2 tablets (325-650 mg) by mouth every 4 hours as needed for mild pain or fever     aspirin (ASA) 325 MG tablet Take 1 tablet (325 mg) by mouth daily     atorvastatin (LIPITOR) 10 MG tablet Take 1 tablet (10 mg) by mouth daily     B Complex-C-Folic Acid (VIRT-CAPS) 1 MG CAPS Take 1 mg by mouth daily     Lidocaine (LIDOCARE) 4 % Patch Place 1-2 patches onto the skin every 24 hours To prevent lidocaine toxicity, patient should be patch free for 12 hrs daily.     mycophenolate (GENERIC EQUIVALENT) 250 MG capsule Take 3 capsules (750 mg) by mouth 2 times daily     oxyCODONE (ROXICODONE) 5 MG tablet Take 1 tablet (5 mg) by mouth every 6 hours as needed for moderate to severe pain (Patient not taking: No sig reported)     pantoprazole (PROTONIX) 40 MG EC tablet Take 1 tablet (40 mg) by mouth 2 times daily (before meals) (Patient taking differently: Take 40 mg by mouth daily)     polyethylene glycol (MIRALAX) 17 GM/Dose powder Take 1 capful by mouth daily PRN     senna-docusate (SENOKOT-S/PERICOLACE) 8.6-50 MG tablet 1-2 tablets by Oral or Feeding Tube route 2 times daily as needed for constipation Take while taking oxycodone, hold for loose stools. (Patient not taking: Reported on  6/8/2022)     sulfamethoxazole-trimethoprim (BACTRIM) 400-80 MG tablet Take 1 tablet by mouth Every Mon, Wed, Fri Morning Increase to one tab by mouth daily when directed by transplant team     tacrolimus (GENERIC EQUIVALENT) 0.5 MG capsule Take 1 capsule (0.5 mg) by mouth 2 times daily Take with four 1 mg capsules by mouth twice daily for a total daily dose of 4.5 mg by mouth twice daily. (Patient not taking: No sig reported)     tacrolimus (GENERIC EQUIVALENT) 1 MG capsule Take 5 capsules (5 mg) by mouth every 12 hours     valGANciclovir (VALCYTE) 450 MG tablet Take 1 tablet (450 mg) by mouth twice a week Titrate dose up to 2 tabs by mouth daily as directed by transplant team (based on kidney function)     Vitamin D3 (CHOLECALCIFEROL) 25 mcg (1000 units) tablet Take 1 capsule by mouth every morning      No current facility-administered medications for this visit.     There are no discontinued medications.    Physical Exam   Vital Signs: /71 (BP Location: Right arm, Patient Position: Sitting, Cuff Size: Adult Regular)   Pulse 83   Temp 98  F (36.7  C) (Oral)   Wt 106.8 kg (235 lb 8 oz)   SpO2 98%   BMI 33.79 kg/m      GENERAL APPEARANCE: alert and no distress  HENT: mouth without ulcers or lesions  LYMPHATICS: no cervical or supraclavicular nodes  RESP: lungs clear to auscultation - no rales, rhonchi or wheezes  CV: regular rhythm, normal rate, no rub, no murmur  EDEMA: no LE edema bilaterally  ABDOMEN: soft, nondistended, nontender, bowel sounds normal  MS: extremities normal - no gross deformities noted, no evidence of inflammation in joints, no muscle tenderness  SKIN: no rash  NEURO: normal strength and tone, sensory exam grossly normal, mentation intact and speech normal  PSYCH: mentation appears normal and affect normal/bright  TX KIDNEY: bulging over RLQ  DIALYSIS ACCESS:  Right IJ tunneled catheter    Data     Renal Latest Ref Rng & Units 6/20/2022 6/16/2022 6/13/2022   Na 133 - 144 mmol/L 138  143 142   Na (external) 135 - 145 mmol/L - - -   K 3.4 - 5.3 mmol/L 5.0 5.0 4.6   K (external) 3.5 - 5.0 mmol/L - - -   Cl 94 - 109 mmol/L 115(H) 117(H) 114(H)   CO2 20 - 32 mmol/L 13(L) 20 21   CO2 (external) 21 - 31 mmol/L - - -   BUN 7 - 30 mg/dL 60(H) 52(H) 66(H)   BUN (external) 8 - 25 mg/dL - - -   Cr 0.66 - 1.25 mg/dL 4.45(H) 3.44(H) 4.48(H)   Cr (external) 0.57 - 1.11 mg/dL - - -   Glucose 70 - 99 mg/dL 126(H) 101(H) 117(H)   Glucose (external) 65 - 100 mg/dL - - -   Ca  8.5 - 10.1 mg/dL 9.6 9.5 9.6   Ca (external) 8.5 - 10.5 mg/dL - - -   Mg 1.6 - 2.3 mg/dL 2.0 2.3 2.2   Mg (external) 1.6 - 2.6 mg/dL - - -     Bone Health Latest Ref Rng & Units 6/20/2022 6/16/2022 6/13/2022   Phos 2.5 - 4.5 mg/dL 2.8 2.0(L) 2.7   Phos (external) 2.3 - 4.7 mg/dL - - -   PTHi 15 - 65 pg/mL - - -   Vit D Def 20 - 75 ug/L - - -     Heme Latest Ref Rng & Units 6/20/2022 6/16/2022 6/13/2022   WBC 4.0 - 11.0 10e3/uL 5.4 3.9(L) 3.3(L)   WBC (external) 4.5 - 11.0 thou/cu mm - - -   Hgb 13.3 - 17.7 g/dL 8.0(L) 8.0(L) 7.8(LL)   Hgb (external) 12.0 - 16.0 g/dL - - -   Plt 150 - 450 10e3/uL 245 296 271   Plt (external) 140 - 440 thou/cu mm - - -   ABSOLUTE NEUTROPHIL 1.6 - 8.3 10e9/L - - -   ABSOLUTE LYMPHOCYTES 0.8 - 5.3 10e9/L - - -   ABSOLUTE MONOCYTES 0.0 - 1.3 10e9/L - - -   ABSOLUTE EOSINOPHILS 0.0 - 0.7 10e9/L - - -   ABSOLUTE BASOPHILS 0.0 - 0.2 10e9/L - - -   ABS IMMATURE GRANULOCYTES 0 - 0.4 10e9/L - - -   ABSOLUTE NUCLEATED RBC - - - -     Liver Latest Ref Rng & Units 6/3/2022 6/2/2022 5/22/2022   AP 40 - 150 U/L 83 - -   TBili 0.2 - 1.3 mg/dL 0.5 - -   DBili 0.0 - 0.2 mg/dL 0.2 - -   ALT 0 - 70 U/L 23 - -   AST 0 - 45 U/L 9 - -   Tot Protein 6.8 - 8.8 g/dL 6.6(L) - -   Albumin 3.4 - 5.0 g/dL 3.3(L) 3.2(L) 2.7(L)     Pancreas Latest Ref Rng & Units 6/3/2022 5/9/2022   A1C 0.0 - 5.6 % 5.1 5.0     Iron studies Latest Ref Rng & Units 5/9/2022   Iron 35 - 180 ug/dL 79   Iron sat 15 - 46 % 24   Ferritin 26 - 388 ng/mL 1,002(H)      [facility] Txp Virology Latest Ref Rng & Units 5/17/2022 5/9/2022 9/3/2020   CMV QUANT IU/ML Not Detected IU/mL Not Detected - -   EBV CAPSID ANTIBODY IGG No detectable antibody. - Positive(A) >8.0(H)   Hep B Core NR:Nonreactive - - Nonreactive        Recent Labs   Lab Test 06/09/22  1014 06/13/22  0958 06/16/22  0800 06/20/22  0800   DOSTAC 6/8/2022 6/12/2022  --  6/19/2022   TACROL 8.1 8.2 8.2 8.6     Recent Labs   Lab Test 05/30/22  0736 06/01/22  0726   DOSMPA 5/29/2022   7:45 PM  --    MPACID 2.69 3.80*   MPAG 193.3* 192.2*

## 2022-06-21 NOTE — H&P
[facility]    History and Physical  Solid Organ Transplant     Date of Admission:  6/21/22    Assessment & Plan   Donald Blanco is a 57 year old male with a past medical history of ESRD secondary to IgA nephropathy, dialysis via a tunneled catheter, HTN, and prostate cancer s/p prostatectomy and radiation. S/p living donor kidney transplant without stent on 5/17/22 (imported kidney with vasospasm in OR) with DGF. Recent drain removed accidentally a few days prior 6/18. 6/20/22 CT A/P with large twin-transplant fluid collection, admit for percutaneous drain placement.     -Admit to 7A  -IR consulted for drain placement  -NPO after midnight  -Restart PTA meds       Code Status   Full Code  Disposition Plan   Expected discharge: Tomorrow, recommended to prior living arrangement once .     Entered: Xin Mustafa NP 06/21/2022, 4:34 PM   Information in the above section will display in the discharge planner report.    Note started by:   Xin Mustafa NP     Completed by: Christo Sotelo PGY1. Discussed with fellow.     Primary Care Physician   Viola Alonzo [facility]    Chief Complaint   Drain removal and now perirenal transplant fluid collection.     History is obtained from the patient    History of Present Illness   Donald Blanco is a 57 year old male with a past medical history of ESRD secondary to IgA nephropathy, dialysis via a tunneled catheter, HTN, and prostate cancer s/p prostatectomy and radiation. S/p living donor kidney transplant without stent on 5/17/22 (imported kidney with vasospasm in OR) with DGF. Pt states while on the toilet his drain was accidentally removed (6/18). Denies any fevers,chills, nausea,vomitting, abdominal pain, skin changes. States that he has some tenderness around the drain site.       Past Medical History    I have reviewed this patient's medical history and updated it with pertinent information if needed.   Past Medical History:    Diagnosis Date     CKD (chronic kidney disease) stage 4, GFR 15-29 ml/min (H)      Elevated PSA      Essential hypertension 2020     Hyperlipidemia      IgA nephropathy      Obesity      Prostate cancer (H) 10/15/2020     S/P radiation therapy     6,600 cGy to pelvis completed on 2021 [facility]       Past Surgical History   Reviewed  Prior to Admission Medications   Cannot display prior to admission medications because the patient has not been admitted in this contact.     Allergies   No Known Allergies    Social History   Reviewed     Family History   Reviewed     Review of Systems   The 10 point Review of Systems is negative other than noted in the HPI or here.     Physical Exam   GEN: Sitting upright in chair, NAD  Pulm: CTAB, RA   CV: RRR  GI: Abdomen soft, nontender, non distended. RLQ drain site with mild erythema and tenderness. Gauze pad covering with some expressed drainage.   MSK: Full ROM    Vital Signs with Ranges  Temp:  [98  F (36.7  C)] 98  F (36.7  C)  Pulse:  [83] 83  BP: (121)/(71) 121/71  SpO2:  [98 %] 98 %  0 lbs 0 oz      Data   Reviewed imaging and labs     Imagin. Right lower quadrant renal transplant with continued hypodense  peritransplant fluid collections, similar to ultrasound dated  2022 given the technique. Differentials include seroma, urinoma,  evolving hematoma, lymphocele.  2. Subtle groundglass opacities in the right and left lower lobes,  decreased when compared to CT dated 11/15/2021, likely evolving scar  from Covid pneumonia .

## 2022-06-21 NOTE — LETTER
6/21/2022       RE: Donald Blanco  [address]   [place] [address]     Dear Colleague,    Thank you for referring your patient, Donald Blanco, to the [facility] [place] at [facility]. Please see a copy of my visit note below.    TRANSPLANT NEPHROLOGY EARLY POST TRANSPLANT VISIT    Assessment & Plan   # LDKT: initial DGF, now PETER with concern for fluid collection causing compression on the graft after drain fell out on 6/18/22   - Baseline Creatinine: ~ TBD. Rocky Scr 3.44 on 6/16/22   - Proteinuria: Not checked post transplant   - Date DSA Last Checked: May/2022      Latest DSA: No DSA at time of transplant, recheck now    - BK Viremia: No   - Kidney Tx Biopsy: May 24, 2022; Result: ATI   - Transplant Ureteral Stent: No   -Last HD on 6/2/22   -HD access: RIJ tunneled line, but one port is not functional according to [facility]. Will need to be removed in house after IR drainage of fluid.     # Immunosuppression: Tacrolimus immediate release (goal 8-10) and Mycophenolate mofetil (dose 750 mg every 12 hours)   - Induction with Recent Transplant:  High Intensity due to DGF after initial dose of simulect   - Continue with intensive monitoring of immunosuppression for efficacy and toxicity.   - Changes: Not at this time    # Infection Prophylaxis:   - PJP: Sulfa/TMP (Bactrim) MWF  - CMV: Valganciclovir (Valcyte) twice weekly. CMV -/-    # Blood Pressure: Controlled;  Goal BP: < 140/90   - Volume status: Euvolemic     - Changes: Not at this time    # Anemia in Chronic Renal Disease: Hgb: Stable, low      YUDI: No   - Iron studies: Replete    -Consider anemia clinic after drain replaced    # Mineral Bone Disorder:   - Secondary renal hyperparathyroidism; PTH level: Significantly elevated (601-1200 pg/ml)        On treatment: None  - Vitamin D; level: Normal        On supplement: Yes  - Calcium; level: Normal        On supplement: No  -  Phosphorus; level: Normal        On supplement: No    # Electrolytes:   - Potassium; level: High normal        On supplement: No  - Magnesium; level: Normal        On supplement: No  - Bicarbonate; level: Low        On supplement: No      # Constipation:   -Needs to take senna once daily.    -Consider fiber    # Fluid collection:    -2 twin transplant fluid collections on 6/14/22 U/S. High drain output. Drain fell out on 6/18, pt refused to go to ED   -CT performed on 6/20 shows large fluid collection. Plan for direct admission and IR drainage after discussion between IR and Dr. Huntley as IR does not see safe window.    -COVID Swab pending from today    # History of prostate CA:   -Stage T3b s/p RALP 12/14/2020 with focal positive margin, received adjuvant radiation. Moderate risk of recurrence in 10-15y.    -Follows with urology.     # Medical Compliance: Yes    # COVID-19 Virus Review: Discussed COVID-19 virus and the potential medical risks.  Reviewed preventative health recommendations, including wearing a mask where appropriate.  Recommended COVID vaccination should be up to date with either an initial vaccination or booster shot when appropriate.  Asked the patient to inform the transplant center if they are exposed or diagnosed with this virus.    # COVID Vaccination Up To Date: No, due for next dose at 3m post txp    # Transplant History:  Etiology of Kidney Failure: IgA nephropathy  Tx: LDKT  Transplant: 5/17/2022 (Kidney)  Donor Type: Living Donor Class:   Crossmatch at time of Tx: negative  DSA at time of Tx: No  Significant changes in immunosuppression: None  CMV IgG Ab High Risk Discordance (D+/R-): No  EBV IgG Ab High Risk Discordance (D+/R-): No  Significant transplant-related complications: DGF    Transplant Office Phone Number: [phone]    Assessment and plan was discussed with the patient and he voiced his understanding and agreement.    Return visit: Return in 27 days (on  7/18/2022).    Yong Friedman MD    Chief Complaint   Mr. Blanco is a 57 year old here for routine follow up, kidney transplant, immunosuppression management and concern for increasing fluid collection.     History of Present Illness   He has been having some BRBPR associated with hemorrhoids. He has been eating and drinking well. He describes some discomfort over RLQ. His drain fell out on Saturday and since then he has noticed increasing size of RLQ over kidney transplant. He denies fever, chills, N/V/D.     Home BP: 110s-120s systolic    Problem List   Patient Active Problem List   Diagnosis     Essential hypertension     Gout     Hyperlipidemia     Hyperparathyroidism due to renal insufficiency (H)     IgA nephropathy     Obesity     Umbilical hernia     Pre-operative cardiovascular examination     Prostate cancer (H)     Morbid obesity (H)     Kidney replaced by transplant     Immunosuppressed status (H)     Hyperkalemia       Allergies   No Known Allergies    Medications   Current Outpatient Medications   Medication Sig     acetaminophen (TYLENOL) 325 MG tablet Take 1-2 tablets (325-650 mg) by mouth every 4 hours as needed for mild pain or fever     aspirin (ASA) 325 MG tablet Take 1 tablet (325 mg) by mouth daily     atorvastatin (LIPITOR) 10 MG tablet Take 1 tablet (10 mg) by mouth daily     B Complex-C-Folic Acid (VIRT-CAPS) 1 MG CAPS Take 1 mg by mouth daily     Lidocaine (LIDOCARE) 4 % Patch Place 1-2 patches onto the skin every 24 hours To prevent lidocaine toxicity, patient should be patch free for 12 hrs daily.     mycophenolate (GENERIC EQUIVALENT) 250 MG capsule Take 3 capsules (750 mg) by mouth 2 times daily     oxyCODONE (ROXICODONE) 5 MG tablet Take 1 tablet (5 mg) by mouth every 6 hours as needed for moderate to severe pain (Patient not taking: No sig reported)     pantoprazole (PROTONIX) 40 MG EC tablet Take 1 tablet (40 mg) by mouth 2 times daily (before meals) (Patient taking differently: Take  40 mg by mouth daily)     polyethylene glycol (MIRALAX) 17 GM/Dose powder Take 1 capful by mouth daily PRN     senna-docusate (SENOKOT-S/PERICOLACE) 8.6-50 MG tablet 1-2 tablets by Oral or Feeding Tube route 2 times daily as needed for constipation Take while taking oxycodone, hold for loose stools. (Patient not taking: Reported on 6/8/2022)     sulfamethoxazole-trimethoprim (BACTRIM) 400-80 MG tablet Take 1 tablet by mouth Every Mon, Wed, Fri Morning Increase to one tab by mouth daily when directed by transplant team     tacrolimus (GENERIC EQUIVALENT) 0.5 MG capsule Take 1 capsule (0.5 mg) by mouth 2 times daily Take with four 1 mg capsules by mouth twice daily for a total daily dose of 4.5 mg by mouth twice daily. (Patient not taking: No sig reported)     tacrolimus (GENERIC EQUIVALENT) 1 MG capsule Take 5 capsules (5 mg) by mouth every 12 hours     valGANciclovir (VALCYTE) 450 MG tablet Take 1 tablet (450 mg) by mouth twice a week Titrate dose up to 2 tabs by mouth daily as directed by transplant team (based on kidney function)     Vitamin D3 (CHOLECALCIFEROL) 25 mcg (1000 units) tablet Take 1 capsule by mouth every morning      No current facility-administered medications for this visit.     There are no discontinued medications.    Physical Exam   Vital Signs: /71 (BP Location: Right arm, Patient Position: Sitting, Cuff Size: Adult Regular)   Pulse 83   Temp 98  F (36.7  C) (Oral)   Wt 106.8 kg (235 lb 8 oz)   SpO2 98%   BMI 33.79 kg/m      GENERAL APPEARANCE: alert and no distress  HENT: mouth without ulcers or lesions  LYMPHATICS: no cervical or supraclavicular nodes  RESP: lungs clear to auscultation - no rales, rhonchi or wheezes  CV: regular rhythm, normal rate, no rub, no murmur  EDEMA: no LE edema bilaterally  ABDOMEN: soft, nondistended, nontender, bowel sounds normal  MS: extremities normal - no gross deformities noted, no evidence of inflammation in joints, no muscle tenderness  SKIN: no  rash  NEURO: normal strength and tone, sensory exam grossly normal, mentation intact and speech normal  PSYCH: mentation appears normal and affect normal/bright  TX KIDNEY: bulging over RLQ  DIALYSIS ACCESS:  Right IJ tunneled catheter    Data     Renal Latest Ref Rng & Units 6/20/2022 6/16/2022 6/13/2022   Na 133 - 144 mmol/L 138 143 142   Na (external) 135 - 145 mmol/L - - -   K 3.4 - 5.3 mmol/L 5.0 5.0 4.6   K (external) 3.5 - 5.0 mmol/L - - -   Cl 94 - 109 mmol/L 115(H) 117(H) 114(H)   CO2 20 - 32 mmol/L 13(L) 20 21   CO2 (external) 21 - 31 mmol/L - - -   BUN 7 - 30 mg/dL 60(H) 52(H) 66(H)   BUN (external) 8 - 25 mg/dL - - -   Cr 0.66 - 1.25 mg/dL 4.45(H) 3.44(H) 4.48(H)   Cr (external) 0.57 - 1.11 mg/dL - - -   Glucose 70 - 99 mg/dL 126(H) 101(H) 117(H)   Glucose (external) 65 - 100 mg/dL - - -   Ca  8.5 - 10.1 mg/dL 9.6 9.5 9.6   Ca (external) 8.5 - 10.5 mg/dL - - -   Mg 1.6 - 2.3 mg/dL 2.0 2.3 2.2   Mg (external) 1.6 - 2.6 mg/dL - - -     Bone Health Latest Ref Rng & Units 6/20/2022 6/16/2022 6/13/2022   Phos 2.5 - 4.5 mg/dL 2.8 2.0(L) 2.7   Phos (external) 2.3 - 4.7 mg/dL - - -   PTHi 15 - 65 pg/mL - - -   Vit D Def 20 - 75 ug/L - - -     Heme Latest Ref Rng & Units 6/20/2022 6/16/2022 6/13/2022   WBC 4.0 - 11.0 10e3/uL 5.4 3.9(L) 3.3(L)   WBC (external) 4.5 - 11.0 thou/cu mm - - -   Hgb 13.3 - 17.7 g/dL 8.0(L) 8.0(L) 7.8(LL)   Hgb (external) 12.0 - 16.0 g/dL - - -   Plt 150 - 450 10e3/uL 245 296 271   Plt (external) 140 - 440 thou/cu mm - - -   ABSOLUTE NEUTROPHIL 1.6 - 8.3 10e9/L - - -   ABSOLUTE LYMPHOCYTES 0.8 - 5.3 10e9/L - - -   ABSOLUTE MONOCYTES 0.0 - 1.3 10e9/L - - -   ABSOLUTE EOSINOPHILS 0.0 - 0.7 10e9/L - - -   ABSOLUTE BASOPHILS 0.0 - 0.2 10e9/L - - -   ABS IMMATURE GRANULOCYTES 0 - 0.4 10e9/L - - -   ABSOLUTE NUCLEATED RBC - - - -     Liver Latest Ref Rng & Units 6/3/2022 6/2/2022 5/22/2022   AP 40 - 150 U/L 83 - -   TBili 0.2 - 1.3 mg/dL 0.5 - -   DBili 0.0 - 0.2 mg/dL 0.2 - -   ALT 0 - 70 U/L  23 - -   AST 0 - 45 U/L 9 - -   Tot Protein 6.8 - 8.8 g/dL 6.6(L) - -   Albumin 3.4 - 5.0 g/dL 3.3(L) 3.2(L) 2.7(L)     Pancreas Latest Ref Rng & Units 6/3/2022 5/9/2022   A1C 0.0 - 5.6 % 5.1 5.0     Iron studies Latest Ref Rng & Units 5/9/2022   Iron 35 - 180 ug/dL 79   Iron sat 15 - 46 % 24   Ferritin 26 - 388 ng/mL 1,002(H)     [facility] Txp Virology Latest Ref Rng & Units 5/17/2022 5/9/2022 9/3/2020   CMV QUANT IU/ML Not Detected IU/mL Not Detected - -   EBV CAPSID ANTIBODY IGG No detectable antibody. - Positive(A) >8.0(H)   Hep B Core NR:Nonreactive - - Nonreactive        Recent Labs   Lab Test 06/09/22  1014 06/13/22  0958 06/16/22  0800 06/20/22  0800   DOSTAC 6/8/2022 6/12/2022  --  6/19/2022   TACROL 8.1 8.2 8.2 8.6     Recent Labs   Lab Test 05/30/22  0736 06/01/22  0726   DOSMPA 5/29/2022   7:45 PM  --    MPACID 2.69 3.80*   MPAG 193.3* 192.2*       Again, thank you for allowing me to participate in the care of your patient.      Sincerely,    Yong Friedman MD

## 2022-06-21 NOTE — LETTER
[facility] [address]  [address] [address]  Phone: [phone]    June 23, 2022        Donald Blanco  [address] [facility] [address]          To whom it may concern:    RE: Donald Blanco is a patient at the [facility] who underwent a kidney transplant on 5/17/22.  He has had a prolonged post-op course requiring frequent follow ups and readmission to the hospital.  Please excuse him from work for the next 4 weeks (through 7/21/22) for ongoing follow up related to kidney transplantation.     Please contact me for questions or concerns.      Sincerely,        Xin Mustafa NP   Transplant Surgery

## 2022-06-21 NOTE — PROGRESS NOTES
Patient status post right lower quadrant kidney transplant 5/17/2022.  He had a right lower quadrant surgical drain that had a high output.  Drain was accidentally removed 6/18/2022.  CT recommended by IR and completed.      6/20/22 CT shows large perirenal transplant fluid collection.  Request for IR to replace percutaneous drain.      I reviewed imaging and case with IR Dr. Constantine De Jesus.  There is no safe percutaneous window to place a drain.  One option may be to let to the fluid collection become larger, thus allowing a suitable percutaneous route for drain placement, however no procedure approved now.    Recs communicated to Terry Flores and Elder via Epic.

## 2022-06-21 NOTE — LETTER
6/21/2022         RE: Donald Blanco  [address] [facility]  [place] [address]        Dear Colleague,    Thank you for referring your patient, Donald Blanco, to the [facility]. Please see a copy of my visit note below.    Infusion Nursing Note:  Donald Blanco presents today for dressing change and labs.    Patient seen by provider today: Yes: Dr Friedman   present during visit today: Not Applicable.    Note: COVID test and labs completed.     Intravenous Access:  Dialysis line - blue lumen was working properly. Labs were drawn and lumen was locked with high dose heparin. Red lumen is not working properly; contents of lumen unable to be withdrawn. Pt does not know if dialysis locked it last (6/3/22) with saline or heparin so nothing was infused into this lumen. Dr Friedman notified.     Treatment Conditions:  Not Applicable.    Discharge Plan:   Patient will be discharged home from [facility].  Per Dr Friedman and YUMIKO Da Silva, patient will be called later when there is a bed available at the hospital. His non-functioning dialysis line will be addressed once he is inpatient.     Administrations This Visit     heparin Lock (1000 units/mL High concentration) 2,100 Units     Admin Date  06/21/2022 Action  Given Dose  2,100 Units Route  Intracatheter Administered By  Riya Sen RN Alyssa Marie Sakhitab-Kerestes, RN                        Again, thank you for allowing me to participate in the care of your patient.        Sincerely,        [facility]

## 2022-06-21 NOTE — TELEPHONE ENCOUNTER
Call from Dr. Friedman regarding needing direct admission    Called admissions at 1050  Requested bed at 1056  Spoke to 7A charge, patient is 4th in line for admission ETA hopefully this afternoon/evening 1236  7A called [facility] to update that bed might be available in 1-2 hours. 1510  Updated patient 1525  Patient updated that bed will be ready at 5pm

## 2022-06-21 NOTE — NURSING NOTE
Chief Complaint   Patient presents with     RECHECK     S/p kidney tx     Blood pressure 121/71, pulse 83, temperature 98  F (36.7  C), temperature source Oral, weight 106.8 kg (235 lb 8 oz), SpO2 98 %.    Anders Sears on 6/21/2022 at 10:15 AM

## 2022-06-21 NOTE — PROGRESS NOTES
Infusion Nursing Note:  Donald GAONA Blanco presents today for dressing change and labs.    Patient seen by provider today: Yes: Dr Friedman   present during visit today: Not Applicable.    Note: COVID test and labs completed.     Intravenous Access:  Dialysis line - blue lumen was working properly. Labs were drawn and lumen was locked with high dose heparin. Red lumen is not working properly; contents of lumen unable to be withdrawn. Pt does not know if dialysis locked it last (6/3/22) with saline or heparin so nothing was infused into this lumen. Dr Friedman notified.     Treatment Conditions:  Not Applicable.    Discharge Plan:   Patient will be discharged home from [facility].  Per Dr Friedman and YUMIKO Da Silva, patient will be called later when there is a bed available at the hospital. His non-functioning dialysis line will be addressed once he is inpatient.     Administrations This Visit     heparin Lock (1000 units/mL High concentration) 2,100 Units     Admin Date  06/21/2022 Action  Given Dose  2,100 Units Route  Intracatheter Administered By  Riya Sen RN Alyssa Marie Sakhitab-Kerestes, RN

## 2022-06-22 ENCOUNTER — APPOINTMENT (OUTPATIENT)
Dept: ULTRASOUND IMAGING | Facility: CLINIC | Age: 57
DRG: 699 | End: 2022-06-22
Attending: STUDENT IN AN ORGANIZED HEALTH CARE EDUCATION/TRAINING PROGRAM
Payer: COMMERCIAL

## 2022-06-22 ENCOUNTER — APPOINTMENT (OUTPATIENT)
Dept: INTERVENTIONAL RADIOLOGY/VASCULAR | Facility: CLINIC | Age: 57
End: 2022-06-22
Attending: NURSE PRACTITIONER
Payer: COMMERCIAL

## 2022-06-22 LAB
ANION GAP SERPL CALCULATED.3IONS-SCNC: <1 MMOL/L (ref 7–15)
APPEARANCE FLD: CLEAR
BASOPHILS # BLD AUTO: 0 10E3/UL (ref 0–0.2)
BASOPHILS NFR BLD AUTO: 1 %
BUN SERPL-MCNC: 60.5 MG/DL (ref 8–23)
CALCIUM SERPL-MCNC: 9.7 MG/DL (ref 8.6–10)
CELL COUNT BODY FLUID SOURCE: NORMAL
CHLORIDE SERPL-SCNC: 117 MMOL/L (ref 98–107)
COLOR FLD: COLORLESS
CREAT FLD-MCNC: 4.4 MG/DL
CREAT SERPL-MCNC: 4.16 MG/DL (ref 0.67–1.17)
CREATININE BODY FLUID SOURCE: NORMAL
DEPRECATED HCO3 PLAS-SCNC: 38 MMOL/L (ref 22–29)
EOSINOPHIL # BLD AUTO: 0 10E3/UL (ref 0–0.7)
EOSINOPHIL NFR BLD AUTO: 0 %
ERYTHROCYTE [DISTWIDTH] IN BLOOD BY AUTOMATED COUNT: 14 % (ref 10–15)
GFR SERPL CREATININE-BSD FRML MDRD: 16 ML/MIN/1.73M2
GLUCOSE SERPL-MCNC: 109 MG/DL (ref 70–99)
GRAM STAIN RESULT: NORMAL
HCT VFR BLD AUTO: 24 % (ref 40–53)
HGB BLD-MCNC: 7.5 G/DL (ref 13.3–17.7)
IMM GRANULOCYTES # BLD: 0.1 10E3/UL
IMM GRANULOCYTES NFR BLD: 1 %
LYMPHOCYTES # BLD AUTO: 0.2 10E3/UL (ref 0.8–5.3)
LYMPHOCYTES NFR BLD AUTO: 5 %
LYMPHOCYTES NFR FLD MANUAL: 96 %
MAGNESIUM SERPL-MCNC: 2.1 MG/DL (ref 1.7–2.3)
MCH RBC QN AUTO: 32.3 PG (ref 26.5–33)
MCHC RBC AUTO-ENTMCNC: 31.3 G/DL (ref 31.5–36.5)
MCV RBC AUTO: 103 FL (ref 78–100)
MONOCYTES # BLD AUTO: 0.5 10E3/UL (ref 0–1.3)
MONOCYTES NFR BLD AUTO: 10 %
MONOS+MACROS NFR FLD MANUAL: 4 %
NEUTROPHILS # BLD AUTO: 4.4 10E3/UL (ref 1.6–8.3)
NEUTROPHILS NFR BLD AUTO: 83 %
NEUTS BAND NFR FLD MANUAL: NORMAL %
NRBC # BLD AUTO: 0 10E3/UL
NRBC BLD AUTO-RTO: 0 /100
PHOSPHATE SERPL-MCNC: 3.5 MG/DL (ref 2.5–4.5)
PLATELET # BLD AUTO: 200 10E3/UL (ref 150–450)
POTASSIUM SERPL-SCNC: 5.2 MMOL/L (ref 3.4–4.5)
RBC # BLD AUTO: 2.32 10E6/UL (ref 4.4–5.9)
SODIUM SERPL-SCNC: 142 MMOL/L (ref 136–145)
TACROLIMUS BLD-MCNC: 11.4 UG/L (ref 5–15)
TME LAST DOSE: NORMAL H
TME LAST DOSE: NORMAL H
WBC # BLD AUTO: 5.3 10E3/UL (ref 4–11)
WBC # FLD AUTO: 23 /UL

## 2022-06-22 PROCEDURE — C1750 CATH, HEMODIALYSIS,LONG-TERM: HCPCS

## 2022-06-22 PROCEDURE — 88305 TISSUE EXAM BY PATHOLOGIST: CPT | Mod: 26 | Performed by: PATHOLOGY

## 2022-06-22 PROCEDURE — 88348 ELECTRON MICROSCOPY DX: CPT | Mod: 26 | Performed by: PATHOLOGY

## 2022-06-22 PROCEDURE — 250N000012 HC RX MED GY IP 250 OP 636 PS 637

## 2022-06-22 PROCEDURE — 250N000009 HC RX 250: Performed by: PHYSICIAN ASSISTANT

## 2022-06-22 PROCEDURE — 88346 IMFLUOR 1ST 1ANTB STAIN PX: CPT | Mod: 26 | Performed by: PATHOLOGY

## 2022-06-22 PROCEDURE — 0TB03ZX EXCISION OF RIGHT KIDNEY, PERCUTANEOUS APPROACH, DIAGNOSTIC: ICD-10-PCS | Performed by: RADIOLOGY

## 2022-06-22 PROCEDURE — 250N000013 HC RX MED GY IP 250 OP 250 PS 637

## 2022-06-22 PROCEDURE — 272N000653 HC COIL/EMBOLIC DEVICE CR8

## 2022-06-22 PROCEDURE — 99223 1ST HOSP IP/OBS HIGH 75: CPT | Mod: GC | Performed by: INTERNAL MEDICINE

## 2022-06-22 PROCEDURE — 76942 ECHO GUIDE FOR BIOPSY: CPT | Mod: 26 | Performed by: RADIOLOGY

## 2022-06-22 PROCEDURE — 36415 COLL VENOUS BLD VENIPUNCTURE: CPT | Performed by: STUDENT IN AN ORGANIZED HEALTH CARE EDUCATION/TRAINING PROGRAM

## 2022-06-22 PROCEDURE — 272N000504 HC NEEDLE CR4

## 2022-06-22 PROCEDURE — 86828 HLA CLASS I&II ANTIBODY QUAL: CPT | Performed by: NURSE PRACTITIONER

## 2022-06-22 PROCEDURE — 84100 ASSAY OF PHOSPHORUS: CPT | Performed by: NURSE PRACTITIONER

## 2022-06-22 PROCEDURE — 50200 RENAL BIOPSY PERQ: CPT | Mod: RT | Performed by: RADIOLOGY

## 2022-06-22 PROCEDURE — 88313 SPECIAL STAINS GROUP 2: CPT | Mod: 26 | Performed by: PATHOLOGY

## 2022-06-22 PROCEDURE — 36589 REMOVAL TUNNELED CV CATH: CPT

## 2022-06-22 PROCEDURE — 75901 REMOVE CVA DEVICE OBSTRUCT: CPT | Mod: 26 | Performed by: RADIOLOGY

## 2022-06-22 PROCEDURE — 99153 MOD SED SAME PHYS/QHP EA: CPT

## 2022-06-22 PROCEDURE — 76776 US EXAM K TRANSPL W/DOPPLER: CPT

## 2022-06-22 PROCEDURE — 50200 RENAL BIOPSY PERQ: CPT

## 2022-06-22 PROCEDURE — 82570 ASSAY OF URINE CREATININE: CPT | Performed by: NURSE PRACTITIONER

## 2022-06-22 PROCEDURE — C1769 GUIDE WIRE: HCPCS

## 2022-06-22 PROCEDURE — 77012 CT SCAN FOR NEEDLE BIOPSY: CPT | Mod: 26 | Performed by: RADIOLOGY

## 2022-06-22 PROCEDURE — 36581 REPLACE TUNNELED CV CATH: CPT | Performed by: RADIOLOGY

## 2022-06-22 PROCEDURE — 87075 CULTR BACTERIA EXCEPT BLOOD: CPT | Performed by: NURSE PRACTITIONER

## 2022-06-22 PROCEDURE — 86832 HLA CLASS I HIGH DEFIN QUAL: CPT | Performed by: NURSE PRACTITIONER

## 2022-06-22 PROCEDURE — 83735 ASSAY OF MAGNESIUM: CPT | Performed by: NURSE PRACTITIONER

## 2022-06-22 PROCEDURE — 88305 TISSUE EXAM BY PATHOLOGIST: CPT | Mod: TC | Performed by: NURSE PRACTITIONER

## 2022-06-22 PROCEDURE — 86833 HLA CLASS II HIGH DEFIN QUAL: CPT | Performed by: NURSE PRACTITIONER

## 2022-06-22 PROCEDURE — 82310 ASSAY OF CALCIUM: CPT | Performed by: NURSE PRACTITIONER

## 2022-06-22 PROCEDURE — 76776 US EXAM K TRANSPL W/DOPPLER: CPT | Mod: 26 | Performed by: RADIOLOGY

## 2022-06-22 PROCEDURE — 76942 ECHO GUIDE FOR BIOPSY: CPT

## 2022-06-22 PROCEDURE — 258N000003 HC RX IP 258 OP 636: Performed by: NURSE PRACTITIONER

## 2022-06-22 PROCEDURE — 85025 COMPLETE CBC W/AUTO DIFF WBC: CPT | Performed by: NURSE PRACTITIONER

## 2022-06-22 PROCEDURE — 272N000500 HC NEEDLE CR2

## 2022-06-22 PROCEDURE — 88350 IMFLUOR EA ADDL 1ANTB STN PX: CPT | Mod: 26 | Performed by: PATHOLOGY

## 2022-06-22 PROCEDURE — 77012 CT SCAN FOR NEEDLE BIOPSY: CPT

## 2022-06-22 PROCEDURE — 99152 MOD SED SAME PHYS/QHP 5/>YRS: CPT | Performed by: RADIOLOGY

## 2022-06-22 PROCEDURE — 36595 MECH REMOV TUNNELED CV CATH: CPT | Mod: XS | Performed by: RADIOLOGY

## 2022-06-22 PROCEDURE — 87015 SPECIMEN INFECT AGNT CONCNTJ: CPT | Performed by: NURSE PRACTITIONER

## 2022-06-22 PROCEDURE — 80197 ASSAY OF TACROLIMUS: CPT | Performed by: NURSE PRACTITIONER

## 2022-06-22 PROCEDURE — 10160 PNXR ASPIR ABSC HMTMA BULLA: CPT | Performed by: RADIOLOGY

## 2022-06-22 PROCEDURE — 36581 REPLACE TUNNELED CV CATH: CPT

## 2022-06-22 PROCEDURE — 36415 COLL VENOUS BLD VENIPUNCTURE: CPT | Performed by: NURSE PRACTITIONER

## 2022-06-22 PROCEDURE — 99152 MOD SED SAME PHYS/QHP 5/>YRS: CPT

## 2022-06-22 PROCEDURE — 77001 FLUOROGUIDE FOR VEIN DEVICE: CPT

## 2022-06-22 PROCEDURE — 87070 CULTURE OTHR SPECIMN AEROBIC: CPT | Performed by: NURSE PRACTITIONER

## 2022-06-22 PROCEDURE — 250N000011 HC RX IP 250 OP 636: Performed by: NURSE PRACTITIONER

## 2022-06-22 PROCEDURE — 0J2TXYZ CHANGE OTHER DEVICE IN TRUNK SUBCUTANEOUS TISSUE AND FASCIA, EXTERNAL APPROACH: ICD-10-PCS | Performed by: RADIOLOGY

## 2022-06-22 PROCEDURE — 272N000505 HC NEEDLE CR5

## 2022-06-22 PROCEDURE — 250N000011 HC RX IP 250 OP 636: Performed by: RADIOLOGY

## 2022-06-22 PROCEDURE — 87102 FUNGUS ISOLATION CULTURE: CPT | Performed by: NURSE PRACTITIONER

## 2022-06-22 PROCEDURE — 89051 BODY FLUID CELL COUNT: CPT | Performed by: NURSE PRACTITIONER

## 2022-06-22 PROCEDURE — 120N000011 HC R&B TRANSPLANT UMMC

## 2022-06-22 RX ORDER — FLUMAZENIL 0.1 MG/ML
0.2 INJECTION, SOLUTION INTRAVENOUS
Status: DISCONTINUED | OUTPATIENT
Start: 2022-06-22 | End: 2022-06-22 | Stop reason: CLARIF

## 2022-06-22 RX ORDER — FENTANYL CITRATE 50 UG/ML
25-50 INJECTION, SOLUTION INTRAMUSCULAR; INTRAVENOUS EVERY 5 MIN PRN
Status: DISCONTINUED | OUTPATIENT
Start: 2022-06-22 | End: 2022-06-22

## 2022-06-22 RX ORDER — CEFAZOLIN SODIUM 2 G/100ML
2 INJECTION, SOLUTION INTRAVENOUS
Status: COMPLETED | OUTPATIENT
Start: 2022-06-22 | End: 2022-06-22

## 2022-06-22 RX ORDER — NALOXONE HYDROCHLORIDE 0.4 MG/ML
0.4 INJECTION, SOLUTION INTRAMUSCULAR; INTRAVENOUS; SUBCUTANEOUS
Status: DISCONTINUED | OUTPATIENT
Start: 2022-06-22 | End: 2022-06-22 | Stop reason: CLARIF

## 2022-06-22 RX ORDER — FLUMAZENIL 0.1 MG/ML
0.2 INJECTION, SOLUTION INTRAVENOUS
Status: DISCONTINUED | OUTPATIENT
Start: 2022-06-22 | End: 2022-06-22

## 2022-06-22 RX ORDER — HEPARIN SODIUM 1000 [USP'U]/ML
3 INJECTION, SOLUTION INTRAVENOUS; SUBCUTANEOUS ONCE
Status: COMPLETED | OUTPATIENT
Start: 2022-06-22 | End: 2022-06-22

## 2022-06-22 RX ORDER — NALOXONE HYDROCHLORIDE 0.4 MG/ML
0.2 INJECTION, SOLUTION INTRAMUSCULAR; INTRAVENOUS; SUBCUTANEOUS
Status: DISCONTINUED | OUTPATIENT
Start: 2022-06-22 | End: 2022-06-22 | Stop reason: CLARIF

## 2022-06-22 RX ORDER — NALOXONE HYDROCHLORIDE 0.4 MG/ML
0.2 INJECTION, SOLUTION INTRAMUSCULAR; INTRAVENOUS; SUBCUTANEOUS
Status: DISCONTINUED | OUTPATIENT
Start: 2022-06-22 | End: 2022-06-22

## 2022-06-22 RX ORDER — HEPARIN SODIUM 1000 [USP'U]/ML
3000 INJECTION, SOLUTION INTRAVENOUS; SUBCUTANEOUS WEEKLY
Status: CANCELLED
Start: 2022-06-23

## 2022-06-22 RX ORDER — FENTANYL CITRATE 50 UG/ML
25-50 INJECTION, SOLUTION INTRAMUSCULAR; INTRAVENOUS EVERY 5 MIN PRN
Status: DISCONTINUED | OUTPATIENT
Start: 2022-06-22 | End: 2022-06-22 | Stop reason: CLARIF

## 2022-06-22 RX ORDER — NALOXONE HYDROCHLORIDE 0.4 MG/ML
0.4 INJECTION, SOLUTION INTRAMUSCULAR; INTRAVENOUS; SUBCUTANEOUS
Status: DISCONTINUED | OUTPATIENT
Start: 2022-06-22 | End: 2022-06-22

## 2022-06-22 RX ADMIN — Medication 2 G: at 12:19

## 2022-06-22 RX ADMIN — SODIUM CHLORIDE: 9 INJECTION, SOLUTION INTRAVENOUS at 14:21

## 2022-06-22 RX ADMIN — Medication 25 MCG: at 08:01

## 2022-06-22 RX ADMIN — PANTOPRAZOLE SODIUM 40 MG: 40 TABLET, DELAYED RELEASE ORAL at 08:01

## 2022-06-22 RX ADMIN — MIDAZOLAM HYDROCHLORIDE 2.5 MG: 1 INJECTION, SOLUTION INTRAMUSCULAR; INTRAVENOUS at 12:13

## 2022-06-22 RX ADMIN — FENTANYL CITRATE 125 MCG: 50 INJECTION, SOLUTION INTRAMUSCULAR; INTRAVENOUS at 12:14

## 2022-06-22 RX ADMIN — HEPARIN SODIUM 2500 UNITS: 1000 INJECTION INTRAVENOUS; SUBCUTANEOUS at 12:12

## 2022-06-22 RX ADMIN — SULFAMETHOXAZOLE AND TRIMETHOPRIM 1 TABLET: 400; 80 TABLET ORAL at 08:01

## 2022-06-22 RX ADMIN — MYCOPHENOLATE MOFETIL 750 MG: 250 CAPSULE ORAL at 18:33

## 2022-06-22 RX ADMIN — Medication 1 MG: at 08:01

## 2022-06-22 RX ADMIN — SENNOSIDES AND DOCUSATE SODIUM 1 TABLET: 8.6; 5 TABLET ORAL at 08:01

## 2022-06-22 RX ADMIN — TACROLIMUS 4 MG: 1 CAPSULE ORAL at 18:32

## 2022-06-22 RX ADMIN — TACROLIMUS 4 MG: 1 CAPSULE ORAL at 08:02

## 2022-06-22 RX ADMIN — LIDOCAINE HYDROCHLORIDE 30 ML: 10 INJECTION, SOLUTION EPIDURAL; INFILTRATION; INTRACAUDAL; PERINEURAL at 12:14

## 2022-06-22 RX ADMIN — MYCOPHENOLATE MOFETIL 750 MG: 250 CAPSULE ORAL at 08:01

## 2022-06-22 RX ADMIN — ATORVASTATIN CALCIUM 10 MG: 10 TABLET, FILM COATED ORAL at 19:30

## 2022-06-22 RX ADMIN — HEPARIN SODIUM 2500 UNITS: 1000 INJECTION INTRAVENOUS; SUBCUTANEOUS at 12:13

## 2022-06-22 ASSESSMENT — ACTIVITIES OF DAILY LIVING (ADL)
ADLS_ACUITY_SCORE: 20
ADLS_ACUITY_SCORE: 24
ADLS_ACUITY_SCORE: 20
ADLS_ACUITY_SCORE: 20
DEPENDENT_IADLS:: INDEPENDENT
ADLS_ACUITY_SCORE: 20

## 2022-06-22 NOTE — PLAN OF CARE
/71 (BP Location: Left arm)   Pulse 72   Temp 97.5  F (36.4  C) (Oral)   Resp 18   Wt 106.3 kg (234 lb 5.6 oz)   SpO2 99%   BMI 33.63 kg/m      Shift: 1900-700  VS: stable on RA, afebrile  Cardiac: WDL  Neuro: AOx4  Respiratory: non labored breathing, clear lung sounds   Pain/Nausea/PRN: denies pain and nausea   Diet: NPO since 0000  LDA: L PIV infusing NaCl @ 125 ml/hr, R chest CVC HD   GI/: voiding adequately, no bm   Skin: RLQ approximated incision, abdominal old BAUDILIO site CDI   Mobility: UAL  Plan: Patient will have a drain placement in IR today     Will give report to oncoming nurse. Pt left in stable condition, care relinquished at this time.

## 2022-06-22 NOTE — PROGRESS NOTES
Patient Name: Donald Blanco  Medical Record Number: [id number]  Today's Date: 6/22/2022    Procedure: 1) Right tunneled dialysis catheter exchange, revision or new placement. 2) Right renal transplant biopsy. 3) Renal transplant fluid collection aspiration.  Proceduralist: MD Nael.  Pathology present: Yes, 3 cores obtained and sent to lab.    Procedure Start: 1055  Procedure end: 1211  Sedation medications administered: Fentanyl:125 mcg Versed:2.5mg     Report given to: 7A,RN  : n/a    Other Notes: Pt arrived to IR room 1 from . Consent reviewed. Pt denies any questions or concerns regarding procedure. Pt positioned supine and monitored per protocol.  80 mls of clear yellow fluid removed from abd. Specimens sent to lab.Pt tolerated procedure without any noted complications. Pt transferred back to .    Christy Hurd, RN

## 2022-06-22 NOTE — PROGRESS NOTES
Transplant Surgery  Inpatient Daily Progress Note  06/22/2022    Assessment & Plan: Donald Blanco is a 57 year old male with a past medical history of ESRD secondary to IgA nephropathy, dialysis via a tunneled catheter, HTN, and prostate cancer s/p prostatectomy and radiation. S/p living donor kidney transplant without stent on 5/17/22 (imported kidney with vasospasm in OR) with DGF. Recent drain removed accidentally a few days prior 6/18. 6/20/22 CT A/P with large twin-transplant fluid collection, admit for percutaneous drain placement.     Graft function:  DGF with PETER: LDKT 5/17/22. Rocky Cr 3.4 on 6/16. Recheck DSA pending, no history of DSA. Allosure pending.  IR consulted for tunneled catheter revision and graft biopsy  Perinephric fluid collection: IR consulted for fluid collection aspiration   6/20 CT: 2 large twin-nephric fluid collections noted  6/22 US: pending  Immunosuppression management: Completed high risk induction immunosuppression.   -Tac 4 mg BID. Goal 8-10. 6/22 level 11.4 (8.5 hour trough). No change, will recheck level with accurate trough.   -Cellcept 750 mg BID.   Contributing factors: organ dysfunction  Hematology:   Anemia in chronic disease: Hgb ~7, stable. Monitor.  Cardiorespiratory: Stable on room air   GI/Nutrition: NPO for procedure, advance diet post procedure  Endocrine: Euglycemic   Fluid/Electrolytes: MIVF: NS@125cc/hour while NPO   :   History of prostate CA: Stage T3b s/p RALP 12/14/2020 with focal positive margin, received adjuvant radiation. Moderate risk of recurrence in 10-15y. Follow with urology.   Infectious disease: AF. WBC WNL.   Prophylaxis: DVT mechanical; Bactrim & Valcyte   Disposition: 7A    Medical Decision Making: Medium  Subsequent visit 50442 (moderate level decision making)    TOM/Fellow/Resident Provider: Xin Mustafa NP    Faculty: Stuart Rust M.D., Ph.D.  _________________________________________________________________  Transplant History:  Admitted 6/21/2022 for twin nephric fluid collection.  5/17/2022 (Kidney), Postoperative day: 36     Interval History: History is obtained from the patient  Overnight events: Admit overnight for IR procedures. Denies abdominal pain.     ROS:   A 10-point review of systems was negative except as noted above.    Meds:    atorvastatin  10 mg Oral QPM     ceFAZolin  2 g Intravenous Pre-Op/Pre-procedure x 1 dose     heparin Lock (1000 units/mL High concentration)  3 mL Intracatheter Once     heparin Lock (1000 units/mL High concentration)  3 mL Intracatheter Once     multivitamin RENAL  1 mg Oral Daily     mycophenolate  750 mg Oral BID     pantoprazole  40 mg Oral Daily     senna-docusate  1-2 tablet Oral or Feeding Tube Daily     sodium chloride (PF)  3 mL Intracatheter Q8H     sulfamethoxazole-trimethoprim  1 tablet Oral Q Mon Wed Fri AM     tacrolimus  4 mg Oral BID     [START ON 6/23/2022] valGANciclovir  450 mg Oral Once per day on Mon Thu     Vitamin D3  25 mcg Oral QAM       Physical Exam:     Admit Weight: 105.6 kg (232 lb 12.9 oz)    Current vitals:   /60   Pulse 77   Temp 97.5  F (36.4  C) (Oral)   Resp 21   Wt 106.3 kg (234 lb 5.6 oz)   SpO2 100%   BMI 33.63 kg/m           Vital sign ranges:    Temp:  [97.5  F (36.4  C)-98.1  F (36.7  C)] 97.5  F (36.4  C)  Pulse:  [72-87] 77  Resp:  [18-21] 21  BP: (103-129)/(60-71) 129/60  SpO2:  [97 %-100 %] 100 %  Patient Vitals for the past 24 hrs:   BP Temp Temp src Pulse Resp SpO2 Weight   06/22/22 1050 129/60 -- -- 77 21 100 % --   06/22/22 1045 126/63 -- -- 75 21 99 % --   06/22/22 1040 127/71 -- -- 81 20 99 % --   06/22/22 0546 108/71 97.5  F (36.4  C) Oral 72 18 99 % 106.3 kg (234 lb 5.6 oz)   06/22/22 0148 110/65 97.6  F (36.4  C) Oral 74 18 99 % --   06/21/22 2134 113/66 98.1  F (36.7  C) Oral 83 18 99 % --   06/21/22 1751 103/60 97.8  F (36.6  C) Oral 87 18 97 % 105.6 kg (232 lb 12.9 oz)       GEN: Sitting upright in chair, NAD  Pulm: CTAB, RA   CV:  RRR  GI: Abdomen soft, nontender, non distended. RLQ drain site with mild erythema; nontender.  MSK: Full ROM    Data:   CMP  Recent Labs   Lab 06/22/22  0545 06/21/22  1141    140   POTASSIUM 5.2* 5.1   CHLORIDE 117* 119*   CO2 38* 17*   * 112*   BUN 60.5* 68*   CR 4.16* 4.56*   GFRESTIMATED 16* 14*   YEE 9.7 9.3   MAG 2.1 2.2   PHOS 3.5 3.1     CBC  Recent Labs   Lab 06/22/22  0545 06/21/22  1212   HGB 7.5* 7.2*   WBC 5.3 5.5    185     COAGS  Recent Labs   Lab 06/21/22  1227   INR 1.12   PTT 27      Urinalysis  Recent Labs   Lab Test 05/09/22  0957 12/10/20  1338   COLOR Yellow Straw   APPEARANCE Clear Clear   URINEGLC Negative Negative   URINEBILI Negative Negative   URINEKETONE Negative Negative   SG 1.015 1.011   UBLD Moderate* Negative   URINEPH 6.0 5.0   PROTEIN 300 * >499*   NITRITE Negative Negative   LEUKEST Small* Negative   RBCU 0 1   WBCU 6* 2     Virology:  Hepatitis C Antibody   Date Value Ref Range Status   05/17/2022 Nonreactive Nonreactive Final   09/03/2020 Nonreactive NR^Nonreactive Final     Comment:     Assay performance characteristics have not been established for newborns,   infants, and children       BK Virus DNA copies/mL   Date Value Ref Range Status   06/01/2022 Not Detected Not Detected copies/mL Final

## 2022-06-22 NOTE — PRE-PROCEDURE
GENERAL PRE-PROCEDURE:   Procedure:  1. tunneled dialysis catheter revision, replacement, or placement of new catheter. 2. right lower quadrant renal transplant biopsy. 3. renal transplant perinephric fluid colleciton aspiration  Date/Time:  6/22/2022 10:34 AM    Verbal consent obtained?: Yes    Written consent obtained?: Yes    Risks and benefits: Risks, benefits and alternatives were discussed    Consent given by:  Patient  Patient states understanding of procedure being performed: Yes    Patient's understanding of procedure matches consent: Yes    Procedure consent matches procedure scheduled: Yes    Appropriately NPO:  Yes  Mallampati  :  Grade 2- soft palate, base of uvula, tonsillar pillars, and portion of posterior pharyngeal wall visible  Lungs:  Lungs clear with good breath sounds bilaterally  Heart:  Normal heart sounds and rate  History & Physical reviewed:  History and physical reviewed and no updates needed  Statement of review:  I have reviewed the lab findings, diagnostic data, medications, and the plan for sedation

## 2022-06-22 NOTE — CONSULTS
[facility]  Transplant Nephrology Consult  Date of Admission:  6/21/2022  Today's Date: 06/22/2022  Requesting physician: Stuart Rust MD    Recommendations:  Kidney allograft biopsy today  Revision of TDC  Perinephric fluid collection : aspiration by IR .   Follow DSA    Assessment & Plan   # LDKT: Stable. initial DGF, now PETER with concern for fluid collection causing compression on the graft after drain fell out on 6/18/22   - Baseline Creatinine: ~  TBD. [place] 3.4 on 6/16.    - Proteinuria: Not checked post transplant   - Date DSA Last Checked: May/2022      Latest DSA: No DSA at time of transplant. Recheck now   - BK Viremia: No   - Kidney Tx Biopsy:May 24, 2022; Result: ATI                                              June 22, 2022: Pending    # RIJ HD access: This will be revised by IR today as one port is not working      # Perinephric fluid collection: IR consulted for fluid collection aspiration                -2 twin transplant fluid collections on 6/14/22 U/S. High drain output. Drain fell out on 6/18, pt refused to go to ED              -CT performed on 6/20 shows large fluid collection. Plan for direct admission and IR drainage after discussion between IR and Dr. Huntley as IR does not see safe window.       # Immunosuppression: Tacrolimus immediate release (goal 8-10) and Mycophenolate mofetil (dose 750 mg every 12 hours)               - Induction:High Intensity due to DGF after initial dose of simulect   - Changes: No    # Infection Prophylaxis:   - PJP: Sulfa/TMP (Bactrim)  - CMV: Valganciclovir (Valcyte) D-/R-    # Hypertension: Controlled;  Goal BP: < 140/90   - Volume status: Euvolemic    - Changes: No    # Anemia in Chronic Renal Disease: Hgb: Stable      YUDI: No   - Iron studies: Replete    # Mineral Bone Disorder:   - Secondary renal hyperparathyroidism; PTH level: Significantly elevated (601-1200 pg/ml)        On treatment: None  -  Vitamin D; level: Normal        On supplement: Yes  - Calcium; level: Normal        On supplement: No  - Phosphorus; level: Normal        On supplement: No    # Electrolytes:   - Potassium; level: Normal        On supplement: No  - Magnesium; level: Normal        On supplement: No  - Bicarbonate; level: Normal        On supplement: No  - Sodium; level: Normal    # Constipation:              -Needs to take senna once daily.               -Consider fiber    # History of prostate CA:              -Stage T3b s/p RALP 12/14/2020 with focal positive margin, received adjuvant radiation. Moderate risk of recurrence in 10-15y.               -Follows with urology.      # Transplant History:  Etiology of Kidney Failure: IgA nephropathy  Tx: LDKT  Transplant: 5/17/2022 (Kidney)  Donor Type: Living Donor Class:   Crossmatch at time of Tx: negative  DSA at time of Tx: No  Significant changes in immunosuppression: None  CMV IgG Ab High Risk Discordance (D+/R-): No  EBV IgG Ab High Risk Discordance (D+/R-): No  Significant transplant-related complications: DGF     Recommendations were communicated to the primary team verbally.    Seen and discussed with Dr. Tayo Ayala MD  Pager: [phone]    Attestation:  This patient has been seen and evaluated by me, Woodrow Cr MD.  I have reviewed the note and agree with plan of care as documented by the fellow.       REASON FOR CONSULT   Perinephric fluid collection  LDKT  IS management    History of Present Illness   Donald Blanco is a 57 year old male with a past medical history of ESRD secondary to IgA nephropathy, dialysis via a tunneled catheter, HTN, and prostate cancer s/p prostatectomy and radiation. S/p living donor kidney transplant without stent on 5/17/22 (imported kidney with vasospasm in OR) with DGF. Recent drain removed accidentally a few days prior 6/18. 6/20/22 CT A/P with large twin-transplant fluid collection, admit for percutaneous drain  placement.   Also he has DGF.  IR consulted for tunneled catheter revision and graft biopsy    Patient back from procedure  when I saw him . He is feeling fine  No SOB/CP  No dizziness/lightheadedness  No Leg swelling   No cough/fever/chills  Appetite is good. No unintentional weight loss   No Abd pain/N/V/D/Constipation.   No voiding difficulty/hematuria /flank pain  No focal weakness/altered sensation.  No rash       Review of Systems    The 10 point Review of Systems is negative other than noted in the HPI or here.     Past Medical History    I have reviewed this patient's medical history and updated it with pertinent information if needed.   Past Medical History:   Diagnosis Date     CKD (chronic kidney disease) stage 4, GFR 15-29 ml/min (H)      Elevated PSA      Essential hypertension 05/21/2020     Hyperlipidemia      IgA nephropathy      Obesity      Prostate cancer (H) 10/15/2020     S/P radiation therapy     6,600 cGy to pelvis completed on 6/25/2021 [facility]       Past Surgical History   I have reviewed this patient's surgical history and updated it with pertinent information if needed.  Past Surgical History:   Procedure Laterality Date     BACK SURGERY      Back Surgey 20+ Years Ago      BIOPSY      [facility] 2010     COLONOSCOPY      10+ Years ago at [facility]      DAVINCI PROSTATECTOMY, LYMPHADENECTOMY N/A 12/14/2020    Procedure: PROSTATECTOMY, ROBOT-ASSISTED, WITH PELVIC LYMPHADENECTOMY;  Surgeon: Nabil Vásquez MD;  Location: [facility] OR     IR CVC TUNNEL REVISION RIGHT  6/22/2022     IR RENAL BIOPSY RIGHT  5/24/2022     IR RENAL BIOPSY RIGHT  6/22/2022       Family History   I have reviewed this patient's family history and updated it with pertinent information if needed.   Family History   Problem Relation Age of Onset     Kidney Disease Father      Hypertension Father      Cancer Mother      No Known Problems Brother      No Known Problems Sister       No Known Problems Son      No Known Problems Daughter      No Known Problems Brother      No Known Problems Brother      No Known Problems Brother      No Known Problems Sister      No Known Problems Sister      No Known Problems Sister        Social History   I have reviewed this patient's social history and updated it with pertinent information if needed. Donald Blanco  reports that he has never smoked. He has never used smokeless tobacco. He reports current alcohol use. He reports that he does not use drugs.    Allergies   No Known Allergies  Prior to Admission Medications     atorvastatin  10 mg Oral QPM     sodium chloride (PF) 0.9%  1.3-2.6 mL Intracatheter Once    Followed by     heparin  3 mL Intracatheter Once     sodium chloride (PF) 0.9%  1.3-2.6 mL Intracatheter Once    Followed by     heparin  3 mL Intracatheter Once     multivitamin RENAL  1 mg Oral Daily     mycophenolate  750 mg Oral BID     pantoprazole  40 mg Oral Daily     senna-docusate  1-2 tablet Oral or Feeding Tube Daily     sodium chloride (PF)  3 mL Intracatheter Q8H     sulfamethoxazole-trimethoprim  1 tablet Oral Q  AM     tacrolimus  4 mg Oral BID     [START ON 2022] valGANciclovir  450 mg Oral Once per day on u     Vitamin D3  25 mcg Oral QAM         Physical Exam   Temp  Av.9  F (36.6  C)  Min: 96.4  F (35.8  C)  Max: 99.4  F (37.4  C)      Pulse  Av.5  Min: 50  Max: 124 Resp  Av.2  Min: 8  Max: 42  SpO2  Av.6 %  Min: 89 %  Max: 100 %     /61 (BP Location: Left arm)   Pulse 72   Temp 97.4  F (36.3  C) (Oral)   Resp 16   Wt 106.3 kg (234 lb 5.6 oz)   SpO2 100%   BMI 33.63 kg/m     Date 22 0700 - 22 0659   Shift 0811-6900 2268-2800 2228-3658 24 Hour Total   INTAKE   P.O. 100   100   I.V. 1085.42   1085.42   Shift Total(mL/kg) 1185.42(11.15)   1185.42(11.15)   OUTPUT   Urine 800   800   Shift Total(mL/kg) 800(7.53)   800(7.53)   Weight (kg) 106.3 106.3 106.3 106.3       Admit Weight: 105.6 kg (232 lb 12.9 oz)     GENERAL APPEARANCE: alert and no distress  HENT: mouth without ulcers or lesions  LYMPHATICS: no cervical or supraclavicular nodes  RESP: lungs clear to auscultation - no rales, rhonchi or wheezes  CV: regular rhythm, normal rate, no rub, no murmur  EDEMA: no LE edema bilaterally  ABDOMEN: soft, nondistended, nontender, bowel sounds normal  MS: extremities normal - no gross deformities noted, no evidence of inflammation in joints, no muscle tenderness  SKIN: no rash  ACCESS: [facility]     Data   CMP  Recent Labs   Lab 06/22/22  0545 06/21/22  1141 06/20/22  0800 06/16/22  0800    140 138 143   POTASSIUM 5.2* 5.1 5.0 5.0   CHLORIDE 117* 119* 115* 117*   CO2 38* 17* 13* 20   ANIONGAP <1* 4 10 6   * 112* 126* 101*   BUN 60.5* 68* 60* 52*   CR 4.16* 4.56* 4.45* 3.44*   GFRESTIMATED 16* 14* 14* 20*   YEE 9.7 9.3 9.6 9.5   MAG 2.1 2.2 2.0 2.3   PHOS 3.5 3.1 2.8 2.0*     CBC  Recent Labs   Lab 06/22/22  0545 06/21/22  1212 06/20/22  0800 06/16/22  0800   HGB 7.5* 7.2* 8.0* 8.0*   WBC 5.3 5.5 5.4 3.9*   RBC 2.32* 2.22* 2.44* 2.43*   HCT 24.0* 22.6* 25.1* 25.2*   * 102* 103* 104*   MCH 32.3 32.4 32.8 32.9   MCHC 31.3* 31.9 31.9 31.7   RDW 14.0 13.6 13.3 13.1    185 245 296     INR  Recent Labs   Lab 06/21/22  1227   INR 1.12   PTT 27     ABGNo lab results found in last 7 days.   Urine Studies  Recent Labs   Lab Test 05/09/22  0957 12/10/20  1338 09/03/20  1142   COLOR Yellow Straw Straw   APPEARANCE Clear Clear Clear   URINEGLC Negative Negative Negative   URINEBILI Negative Negative Negative   URINEKETONE Negative Negative Negative   SG 1.015 1.011 1.011   UBLD Moderate* Negative Small*   URINEPH 6.0 5.0 5.0   PROTEIN 300 * >499* >499*   NITRITE Negative Negative Negative   LEUKEST Small* Negative Negative   RBCU 0 1 1   WBCU 6* 2 2     No lab results found.  PTH  Recent Labs   Lab Test 06/03/22  1048 06/01/22  0726 05/09/22  0959   PTHI 969* 1,220* 450*      Iron Studies  Recent Labs   Lab Test 05/09/22  0959   IRON 79      IRONSAT 24   LANCE 1,002*       IMAGING:  All imaging studies reviewed by me.

## 2022-06-22 NOTE — CONSULTS
Care Management Initial Consult    General Information  Assessment completed with: Patient, Care Team Member, Spouse or significant other,    Type of CM/SW Visit: Initial Assessment    Primary Care Provider verified and updated as needed:     Readmission within the last 30 days:        Reason for Consult: discharge planning  Advance Care Planning:            Communication Assessment  Patient's communication style: spoken language (English or Bilingual)    Hearing Difficulty or Deaf: no   Wear Glasses or Blind: yes    Cognitive  Cognitive/Neuro/Behavioral: WDL                      Living Environment:   People in home: spouse     Current living Arrangements: house      Able to return to prior arrangements: yes       Family/Social Support:  Care provided by: self, spouse/significant other  Provides care for: no one  Marital Status:   Wife          Description of Support System: Supportive, Involved         Current Resources:   Patient receiving home care services: Yes  Skilled Home Care Services: Skilled Nursing  Community Resources: None  Equipment currently used at home: none  Supplies currently used at home: None    Employment/Financial:  Employment Status: employed full-time        Financial Concerns: No concerns identified           Lifestyle & Psychosocial Needs:  Social Determinants of Health     Tobacco Use: Low Risk      Smoking Tobacco Use: Never Smoker     Smokeless Tobacco Use: Never Used   Alcohol Use: Unknown     Frequency of Alcohol Consumption: 2-3 times a week     Average Number of Drinks: Not on file     Frequency of Binge Drinking: Not on file   Financial Resource Strain: Not on file   Food Insecurity: Not on file   Transportation Needs: Not on file   Physical Activity: Not on file   Stress: Not on file   Social Connections: Not on file   Intimate Partner Violence: Not on file   Depression: Not at risk     PHQ-2 Score: 0   Housing Stability: Not on file       Functional Status:  Prior to  admission patient needed assistance:   Dependent ADLs:: Independent  Dependent IADLs:: Independent       Mental Health Status:          Chemical Dependency Status:                Values/Beliefs:  Spiritual, Cultural Beliefs, Uatsdin Practices, Values that affect care:                 Additional Information:  Pt admitted for percutaneous drain placement and HD .  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 S/p living donor kidney transplant without stent on 5/17/22 (imported kidney with vasospasm in OR) with DGF.    CMA for elevated readmission risk score, home care.     Met with pt and spouse.  Per discussion with pt he remains open to [facility] [phone], Fax [phone] for home RN services.  Per pt his last OP HD run was on 6/2.  Pt notes he did not OP HD line care following last HD run on 6/2.  Pt noted he spoke with OP Nephrologist yesterday regarding line care needs.  Pt ideally would prefer to have HD line care done closer to home.  Reviewed that d/t HD line being a speciality line not all infusion centers would be able to address weekly line care/site care.   Agreed to check with [facility].  Pt open to coming to [facility] [place] unable to accommodate.  Pt notes he will need a return to work letter - estimated return to work date of 7/12/2022.    Spoke with rep from [facility] and they are not able to complete HD line care/site care.    Spoke with [facility] who confirmed that they are able to manage the line care/site care but would need a therapy plan placed in Epic.    Reviewed above with [facility].      Updated [facility].  Resumption orders placed.    Updated pt and spouse.  Pt would prefer a mid morning appointment  at [facility] for HD line care.     Светлана Bang, RN BSN, PHN, ACM-RN  7A RN Care  Coordinator  Phone: [phone]  Pager [phone]    To contact the weekend Critical access hospital (0800 - 1630) Saturday and Sunday    Units: 4A, 4C, 4E, 5A and 5B- Pager 1: [phone]    Units: 6A, 6B, 6C, 6D- Pager 2: [phone]    Units: 7A, 7B, 7C, 7D, and 5C-Pager 3: [phone]    [facility] ([phone]) Saturday and Sunday    Units: 5 Ortho, 8A, 10 ICU, & Pediatric Units-Pager 4: [phone]    6/22/2022 4:10 PM

## 2022-06-22 NOTE — PHARMACY-ADMISSION MEDICATION HISTORY
Admission Medication History Completed by Pharmacy    See [facility] Admission Navigator for allergy information, preferred outpatient pharmacy, prior to admission medications and immunization status.     Medication History Sources:     Patient    Changes made to PTA medication list (reason):    Added: None    Deleted: None    Changed:   o Tacrolimus from 5 mg BID to 4 mg BID. Patient is not currently using the 0.5 mg caps - current dose is 4 mg BID total.     Additional Information:    Patient states he is no longer taking oxycodone or Miralax.    Patient reports not taking senna/docusate currently, but this morning at his outpatient nephrology appointment, his physician recommended that he begin taking 1-2 tabs po daily (scheduled, not PRN). He would like to start this tomorrow morning (6/22).     Patient confirmed Bactrim is Mon/Wed/Fri.    Patient confirmed current Valcyte is Mon/Thurs.    Patient confirmed dose of MMF is 750 mg BID.    Confirmed that pantoprazole was recently changed from BID to 40 mg daily.    Patient fills through  Specialty Pharmacy.     Confirmed no known drug allergies.    Prior to Admission medications    Medication Sig Last Dose Taking? Auth Provider Long Term End Date   acetaminophen (TYLENOL) 325 MG tablet Take 1-2 tablets (325-650 mg) by mouth every 4 hours as needed for mild pain or fever Past Month at Unknown time Yes Clotilde Hughes PA-C     aspirin (ASA) 325 MG tablet Take 1 tablet (325 mg) by mouth daily 6/21/2022 at AM Yes Clotilde Hughes PA-C     atorvastatin (LIPITOR) 10 MG tablet Take 1 tablet (10 mg) by mouth daily  Patient taking differently: Take 10 mg by mouth every evening 6/20/2022 at PM Yes Raj Capellan MD Yes    B Complex-C-Folic Acid (VIRT-CAPS) 1 MG CAPS Take 1 mg by mouth daily 6/21/2022 at AM Yes Raj Capellan MD     Lidocaine (LIDOCARE) 4 % Patch Place 1-2 patches onto the skin every 24 hours To prevent lidocaine toxicity, patient should  be patch free for 12 hrs daily.  Patient taking differently: Place 1-2 patches onto the skin daily as needed To prevent lidocaine toxicity, patient should be patch free for 12 hrs daily. Past Month at Unknown time Yes Clotilde Hughes PA-C     mycophenolate (GENERIC EQUIVALENT) 250 MG capsule Take 3 capsules (750 mg) by mouth 2 times daily 6/21/2022 at AM Yes Clotilde Hughes PA-C Yes    pantoprazole (PROTONIX) 40 MG EC tablet Take 1 tablet (40 mg) by mouth 2 times daily (before meals)  Patient taking differently: Take 40 mg by mouth daily 6/21/2022 at AM Yes Clotilde Hughes PA-C     sulfamethoxazole-trimethoprim (BACTRIM) 400-80 MG tablet Take 1 tablet by mouth Every Mon, Wed, Fri Morning Increase to one tab by mouth daily when directed by transplant team 6/20/2022 at AM Yes Clotilde Hughes PA-C     tacrolimus (GENERIC EQUIVALENT) 1 MG capsule Take 5 capsules (5 mg) by mouth every 12 hours  Patient taking differently: Take 4 mg by mouth every 12 hours 6/21/2022 at AM Yes Raj Capellan MD     valGANciclovir (VALCYTE) 450 MG tablet Take 1 tablet (450 mg) by mouth twice a week Titrate dose up to 2 tabs by mouth daily as directed by transplant team (based on kidney function) 6/20/2022 at AM Yes Clotilde Hughes PA-C Yes    Vitamin D3 (CHOLECALCIFEROL) 25 mcg (1000 units) tablet Take 1 capsule by mouth every morning  6/21/2022 at AM Yes Reported, Patient     oxyCODONE (ROXICODONE) 5 MG tablet Take 1 tablet (5 mg) by mouth every 6 hours as needed for moderate to severe pain   Clotilde Hughse PA-C     polyethylene glycol (MIRALAX) 17 GM/Dose powder Take 1 capful by mouth daily PRN   Reported, Patient     senna-docusate (SENOKOT-S/PERICOLACE) 8.6-50 MG tablet 1-2 tablets by Oral or Feeding Tube route 2 times daily as needed for constipation Take while taking oxycodone, hold for loose stools.  Patient taking differently: 1-2 tablets by Oral or Feeding Tube route daily  Take while taking oxycodone, hold for loose stools.   Clotilde Hughes PA-C     tacrolimus (GENERIC EQUIVALENT) 0.5 MG capsule Take 1 capsule (0.5 mg) by mouth 2 times daily Take with four 1 mg capsules by mouth twice daily for a total daily dose of 4.5 mg by mouth twice daily.  Patient not taking: No sig reported   Clotilde Hughes PA-C                                    Date completed: 06/21/22    Medication history completed by: Lolis Cotton [facility]

## 2022-06-22 NOTE — PROCEDURES
[facility]    Procedure: IR Procedure Note    Date/Time: 6/22/2022 12:24 PM  Performed by: Constantine De Jesus MD  Authorized by: Constantine De Jesus MD       UNIVERSAL PROTOCOL   Site Marked: NA  Prior Images Obtained and Reviewed:  Yes  Required items: Required blood products, implants, devices and special equipment available    Patient identity confirmed:  Verbally with patient, arm band, provided demographic data and hospital-assigned identification number  Patient was reevaluated immediately before administering moderate or deep sedation or anesthesia  Confirmation Checklist:  Patient's identity using two indicators, relevant allergies, procedure was appropriate and matched the consent or emergent situation and correct equipment/implants were available  Time out: Immediately prior to the procedure a time out was called    Universal Protocol: the Joint Commission Universal Protocol was followed    Preparation: Patient was prepped and draped in usual sterile fashion    ESBL (mL):  5     ANESTHESIA    Anesthesia: Local infiltration  Local Anesthetic:  Lidocaine 1% without epinephrine  Anesthetic Total (mL):  20      SEDATION  Patient Sedated: Yes    Sedation Type:  Moderate (conscious) sedation  Sedation:  Fentanyl and midazolam  Vital signs: Vital signs monitored during sedation    See dictated procedure note for full details.  Findings: Right internal jugular tunneled dialysis catheter exchanged over guidewire.    Right lower quadrant renal transplant biopsied under ultrasound guidance.    Transplant perinephric collection aspirated under ct guidance.    Specimens: fine needle aspirate for cytological analysis, core needle biopsy specimens sent for pathological analysis and fluid and/or tissue for laboratory analysis    Complications: None    Condition: Stable      PROCEDURE    Patient Tolerance:  Patient tolerated the procedure well with no immediate  complications  Length of time physician/provider present for 1:1 monitoring during sedation: 60

## 2022-06-22 NOTE — PROGRESS NOTES
[id number]    Kidney Transplant Team    Full Code    /51 (BP Location: Left arm)   Pulse 86   Temp 98.5  F (36.9  C) (Oral)   Resp 16   Wt 106.3 kg (234 lb 5.6 oz)   SpO2 98%   BMI 33.63 kg/m      VS: VSS, RA, Afebrile.   Labs: Potassium 5.2, Creatinine 4.16 from 4.56, Hgb stable.   Pain/Nausea/PRN: Denied both   Diet: Regular diet.   LDA: PIV infusing NS 125ml/hr. HD cath hep locked.   GI/: Voiding good amounts in bedside urinal, no BM this shift. Last BM overnight.   Skin: Right sided biopsy sight, minimal to no bleeding present.   Mobility: UAL, ambulating in room and in bathroom.   Seen in IR this AM for repair/replacement of Right HD cath, abscess drainage, and Kidney Biopsy. Recovered well, 1hr bedrest maintained. Wife, Sana, at bedside, attentive to pt. Belongings and call light within reach. Will continue to monitor and will update provider with changes in condition.

## 2022-06-22 NOTE — CONSULTS
Interventional Radiology Consult Service Note    Patient is on IR schedule 6/22 for a 1) CT guided RLQ twin-nephric fluid collection aspiration 2) RIJ TCVC revision. 3) RLQ renal transplant biopsy  Labs WNL for procedure. COVID neg.    Orders for NPO and antibiotics have been entered.   Consent will be done prior to procedure.     Please [place] the IR [place] RN at [phone] for estimated time of procedure.     Case discussed with Dr. De Jesus from IR, Xin Mustafa and transplant fellow. 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 S/p living donor kidney transplant without stent on 5/17/22. Had surgical BAUDILIO drain in place with reports of high outputs. This then accidentally fell out. CT scan shows RLQ fluid collection posterior to the transplant kidney. Pt was admitted from transplant clinic due to fluid collection and PETER. IR is consulted for RLQ drain placement and RIJ TCVC Revision. IR cannot safely place a drain into the RLQ fluid collection given the location. The only access is through the iliacus muscle which would result in severe pain if a drain were left in place. Transplant has asked for aspiration of the collection. This is likely to re-accumulate, but we will proceed with aspiration of the collection for dx labs. IR is also asked to check and revise the RIJ TCVC as one of the lumen reportedly does not work. It is unclear when/who placed this access.     **call back from transplant surgery- they are also requesting a RLQ transplant kidney biopsy to rule out rejection given elevated creatinine. IR will add this procedure on also for today.    Dx labs per transplant.    Expected date of discharge: TBD    Vitals:   /71 (BP Location: Left arm)   Pulse 72   Temp 97.5  F (36.4  C) (Oral)   Resp 18   Wt 106.3 kg (234 lb 5.6 oz)   SpO2 99%   BMI 33.63 kg/m       Pertinent Labs:     Lab Results   Component Value Date    WBC 5.3 06/22/2022    WBC 5.5 06/21/2022    WBC 5.4 06/20/2022    WBC 10.6 12/15/2020    WBC 22.8 (H) 12/14/2020    WBC 8.9 12/10/2020       Lab Results   Component Value Date    HGB 7.5 06/22/2022    HGB 7.2 06/21/2022    HGB 8.0 06/20/2022    HGB 9.2 12/15/2020    HGB 8.9 12/15/2020    HGB 10.6 12/14/2020       Lab Results   Component Value Date     06/22/2022     06/21/2022     06/20/2022     12/15/2020     12/14/2020     12/10/2020       Lab Results   Component Value Date    INR 1.12 06/21/2022    INR 1.03 09/03/2020    PTT 27 06/21/2022    PTT 33 09/03/2020       Lab Results   Component Value Date    POTASSIUM 5.2 (H) 06/22/2022    POTASSIUM 5.1 06/21/2022    POTASSIUM 3.9 12/15/2020        SYMONE Ferguson CNP  Interventional Radiology  Pager: [phone]

## 2022-06-22 NOTE — PROGRESS NOTES
Admitted/transferred from:   2 RN full   skin assessment completed by Christy Samayoa RN and Kamron MESA RN .  Skin assessment: Patient has RLQ abdominal scar that is approximated. He has a right sided abdominal wound where his BAUDILIO fell out, gauze over wound was CDI.  Interventions/actions: No adjustments needed at this time.  Will continue to monitor.

## 2022-06-23 ENCOUNTER — APPOINTMENT (OUTPATIENT)
Dept: ULTRASOUND IMAGING | Facility: CLINIC | Age: 57
DRG: 699 | End: 2022-06-23
Attending: NURSE PRACTITIONER
Payer: COMMERCIAL

## 2022-06-23 VITALS
BODY MASS INDEX: 34.38 KG/M2 | HEART RATE: 76 BPM | OXYGEN SATURATION: 99 % | WEIGHT: 239.64 LBS | TEMPERATURE: 97.9 F | RESPIRATION RATE: 16 BRPM | SYSTOLIC BLOOD PRESSURE: 120 MMHG | DIASTOLIC BLOOD PRESSURE: 68 MMHG

## 2022-06-23 PROBLEM — D64.9 ANEMIA: Status: ACTIVE | Noted: 2022-06-23

## 2022-06-23 PROBLEM — E87.70 HYPERVOLEMIA: Status: ACTIVE | Noted: 2022-06-23

## 2022-06-23 PROBLEM — T86.19 DELAYED GRAFT FUNCTION OF KIDNEY: Status: ACTIVE | Noted: 2022-06-23

## 2022-06-23 LAB
ABO/RH(D): NORMAL
ANION GAP SERPL CALCULATED.3IONS-SCNC: 9 MMOL/L (ref 7–15)
ANTIBODY SCREEN: NEGATIVE
BASOPHILS # BLD AUTO: 0 10E3/UL (ref 0–0.2)
BASOPHILS NFR BLD AUTO: 0 %
BLD PROD TYP BPU: NORMAL
BLOOD COMPONENT TYPE: NORMAL
BUN SERPL-MCNC: 53.2 MG/DL (ref 8–23)
CALCIUM SERPL-MCNC: 9 MG/DL (ref 8.6–10)
CHLORIDE SERPL-SCNC: 118 MMOL/L (ref 98–107)
CODING SYSTEM: NORMAL
CREAT SERPL-MCNC: 3.46 MG/DL (ref 0.67–1.17)
CROSSMATCH: NORMAL
DEPRECATED HCO3 PLAS-SCNC: 14 MMOL/L (ref 22–29)
EOSINOPHIL # BLD AUTO: 0 10E3/UL (ref 0–0.7)
EOSINOPHIL NFR BLD AUTO: 1 %
ERYTHROCYTE [DISTWIDTH] IN BLOOD BY AUTOMATED COUNT: 13.9 % (ref 10–15)
GFR SERPL CREATININE-BSD FRML MDRD: 20 ML/MIN/1.73M2
GLUCOSE SERPL-MCNC: 116 MG/DL (ref 70–99)
HCT VFR BLD AUTO: 20.2 % (ref 40–53)
HGB BLD-MCNC: 6.2 G/DL (ref 13.3–17.7)
HGB BLD-MCNC: 6.7 G/DL (ref 13.3–17.7)
HGB BLD-MCNC: 8.3 G/DL (ref 13.3–17.7)
IMM GRANULOCYTES # BLD: 0 10E3/UL
IMM GRANULOCYTES NFR BLD: 1 %
IRON BINDING CAPACITY (ROCHE): 227 UG/DL (ref 240–430)
IRON SATN MFR SERPL: 25 % (ref 15–46)
IRON SERPL-MCNC: 57 UG/DL (ref 61–157)
ISSUE DATE AND TIME: NORMAL
LYMPHOCYTES # BLD AUTO: 0.2 10E3/UL (ref 0.8–5.3)
LYMPHOCYTES NFR BLD AUTO: 4 %
MAGNESIUM SERPL-MCNC: 1.9 MG/DL (ref 1.7–2.3)
MCH RBC QN AUTO: 32.1 PG (ref 26.5–33)
MCHC RBC AUTO-ENTMCNC: 30.7 G/DL (ref 31.5–36.5)
MCV RBC AUTO: 105 FL (ref 78–100)
MONOCYTES # BLD AUTO: 0.7 10E3/UL (ref 0–1.3)
MONOCYTES NFR BLD AUTO: 11 %
NEUTROPHILS # BLD AUTO: 4.9 10E3/UL (ref 1.6–8.3)
NEUTROPHILS NFR BLD AUTO: 83 %
NRBC # BLD AUTO: 0 10E3/UL
NRBC BLD AUTO-RTO: 0 /100
PATH REPORT.COMMENTS IMP SPEC: NORMAL
PATH REPORT.COMMENTS IMP SPEC: NORMAL
PATH REPORT.FINAL DX SPEC: NORMAL
PATH REPORT.GROSS SPEC: NORMAL
PATH REPORT.MICROSCOPIC SPEC OTHER STN: NORMAL
PATH REPORT.RELEVANT HX SPEC: NORMAL
PHOSPHATE SERPL-MCNC: 3 MG/DL (ref 2.5–4.5)
PHOTO IMAGE: NORMAL
PLATELET # BLD AUTO: 150 10E3/UL (ref 150–450)
POTASSIUM SERPL-SCNC: 5.4 MMOL/L (ref 3.4–4.5)
RBC # BLD AUTO: 1.93 10E6/UL (ref 4.4–5.9)
SODIUM SERPL-SCNC: 141 MMOL/L (ref 136–145)
SPECIMEN EXPIRATION DATE: NORMAL
TACROLIMUS BLD-MCNC: 8.1 UG/L (ref 5–15)
TME LAST DOSE: NORMAL H
TME LAST DOSE: NORMAL H
UNIT ABO/RH: NORMAL
UNIT NUMBER: NORMAL
UNIT STATUS: NORMAL
UNIT TYPE ISBT: 6200
WBC # BLD AUTO: 5.9 10E3/UL (ref 4–11)

## 2022-06-23 PROCEDURE — 85025 COMPLETE CBC W/AUTO DIFF WBC: CPT | Performed by: NURSE PRACTITIONER

## 2022-06-23 PROCEDURE — 999N000248 HC STATISTIC IV INSERT WITH US BY RN

## 2022-06-23 PROCEDURE — 85018 HEMOGLOBIN: CPT | Performed by: NURSE PRACTITIONER

## 2022-06-23 PROCEDURE — 36415 COLL VENOUS BLD VENIPUNCTURE: CPT | Performed by: NURSE PRACTITIONER

## 2022-06-23 PROCEDURE — 80197 ASSAY OF TACROLIMUS: CPT | Performed by: PHYSICIAN ASSISTANT

## 2022-06-23 PROCEDURE — 86923 COMPATIBILITY TEST ELECTRIC: CPT

## 2022-06-23 PROCEDURE — 250N000013 HC RX MED GY IP 250 OP 250 PS 637

## 2022-06-23 PROCEDURE — 250N000011 HC RX IP 250 OP 636: Performed by: NURSE PRACTITIONER

## 2022-06-23 PROCEDURE — 93971 EXTREMITY STUDY: CPT | Mod: 26 | Performed by: RADIOLOGY

## 2022-06-23 PROCEDURE — 76776 US EXAM K TRANSPL W/DOPPLER: CPT | Mod: 26 | Performed by: RADIOLOGY

## 2022-06-23 PROCEDURE — 86901 BLOOD TYPING SEROLOGIC RH(D): CPT | Performed by: TRANSPLANT SURGERY

## 2022-06-23 PROCEDURE — 83735 ASSAY OF MAGNESIUM: CPT | Performed by: NURSE PRACTITIONER

## 2022-06-23 PROCEDURE — 250N000013 HC RX MED GY IP 250 OP 250 PS 637: Performed by: NURSE PRACTITIONER

## 2022-06-23 PROCEDURE — 93971 EXTREMITY STUDY: CPT | Mod: RT

## 2022-06-23 PROCEDURE — 86850 RBC ANTIBODY SCREEN: CPT | Performed by: TRANSPLANT SURGERY

## 2022-06-23 PROCEDURE — 99238 HOSP IP/OBS DSCHRG MGMT 30/<: CPT | Mod: FS | Performed by: TRANSPLANT SURGERY

## 2022-06-23 PROCEDURE — 250N000012 HC RX MED GY IP 250 OP 636 PS 637

## 2022-06-23 PROCEDURE — P9016 RBC LEUKOCYTES REDUCED: HCPCS

## 2022-06-23 PROCEDURE — 99233 SBSQ HOSP IP/OBS HIGH 50: CPT | Mod: 24 | Performed by: INTERNAL MEDICINE

## 2022-06-23 PROCEDURE — 83550 IRON BINDING TEST: CPT | Performed by: PHYSICIAN ASSISTANT

## 2022-06-23 PROCEDURE — 84100 ASSAY OF PHOSPHORUS: CPT | Performed by: NURSE PRACTITIONER

## 2022-06-23 PROCEDURE — 80048 BASIC METABOLIC PNL TOTAL CA: CPT | Performed by: NURSE PRACTITIONER

## 2022-06-23 PROCEDURE — 76776 US EXAM K TRANSPL W/DOPPLER: CPT

## 2022-06-23 RX ORDER — TACROLIMUS 0.5 MG/1
CAPSULE ORAL
Qty: 60 CAPSULE | Refills: 11
Start: 2022-06-23 | End: 2022-07-12

## 2022-06-23 RX ORDER — VALGANCICLOVIR 450 MG/1
450 TABLET, FILM COATED ORAL EVERY OTHER DAY
Status: DISCONTINUED | OUTPATIENT
Start: 2022-06-25 | End: 2022-06-23 | Stop reason: HOSPADM

## 2022-06-23 RX ORDER — VALGANCICLOVIR 450 MG/1
450 TABLET, FILM COATED ORAL EVERY OTHER DAY
Start: 2022-06-25 | End: 2022-08-03

## 2022-06-23 RX ORDER — SODIUM BICARBONATE 650 MG/1
1300 TABLET ORAL 3 TIMES DAILY
Qty: 180 TABLET | Refills: 11 | Status: SHIPPED | OUTPATIENT
Start: 2022-06-23 | End: 2022-07-06

## 2022-06-23 RX ORDER — TACROLIMUS 1 MG/1
CAPSULE ORAL
Qty: 300 CAPSULE | Refills: 11
Start: 2022-06-23 | End: 2022-07-12

## 2022-06-23 RX ORDER — PANTOPRAZOLE SODIUM 40 MG/1
40 TABLET, DELAYED RELEASE ORAL DAILY
Start: 2022-06-24 | End: 2022-07-19

## 2022-06-23 RX ORDER — SODIUM BICARBONATE 650 MG/1
1300 TABLET ORAL 3 TIMES DAILY
Status: DISCONTINUED | OUTPATIENT
Start: 2022-06-23 | End: 2022-06-23 | Stop reason: HOSPADM

## 2022-06-23 RX ORDER — FUROSEMIDE 10 MG/ML
40 INJECTION INTRAMUSCULAR; INTRAVENOUS ONCE
Status: COMPLETED | OUTPATIENT
Start: 2022-06-23 | End: 2022-06-23

## 2022-06-23 RX ADMIN — SODIUM BICARBONATE 1300 MG: 650 TABLET ORAL at 13:46

## 2022-06-23 RX ADMIN — PANTOPRAZOLE SODIUM 40 MG: 40 TABLET, DELAYED RELEASE ORAL at 08:10

## 2022-06-23 RX ADMIN — Medication 25 MCG: at 08:12

## 2022-06-23 RX ADMIN — FUROSEMIDE 40 MG: 10 INJECTION, SOLUTION INTRAMUSCULAR; INTRAVENOUS at 13:46

## 2022-06-23 RX ADMIN — VALGANCICLOVIR 450 MG: 450 TABLET, FILM COATED ORAL at 08:11

## 2022-06-23 RX ADMIN — MYCOPHENOLATE MOFETIL 750 MG: 250 CAPSULE ORAL at 08:11

## 2022-06-23 RX ADMIN — TACROLIMUS 4 MG: 1 CAPSULE ORAL at 08:10

## 2022-06-23 RX ADMIN — Medication 1 MG: at 08:11

## 2022-06-23 RX ADMIN — SENNOSIDES AND DOCUSATE SODIUM 1 TABLET: 8.6; 5 TABLET ORAL at 08:12

## 2022-06-23 ASSESSMENT — ACTIVITIES OF DAILY LIVING (ADL)
ADLS_ACUITY_SCORE: 20

## 2022-06-23 NOTE — DISCHARGE SUMMARY
[facility]    Discharge Summary  Transplant Surgery    Date of Admission:  6/21/2022  Date of Discharge:  6/23/2022  Discharging Provider: Xin Mustafa NP / Stuart Rust MD PhD    Discharge Diagnoses   Active Problems:    Prostate cancer (H)    Kidney replaced by transplant    Immunosuppression (H)    PETER (acute kidney injury) (H)    Abdominal fluid collection    Delayed graft function of kidney    Anemia    Hypervolemia    Procedure/Surgery Information   Non-operative procedures 6/22/22 Tunneled line revision, RLQ renal transplant biopsy, renal transplant perinephric fluid collection aspiration     History of Present Illness   Donald Blanco is a 57 year old male with a past medical history of ESRD secondary to IgA nephropathy, dialysis via a tunneled catheter, HTN, and prostate cancer s/p prostatectomy and radiation. S/p living donor kidney transplant without stent on 5/17/22 (imported kidney with vasospasm in OR) with DGF. Recent drain removed accidentally a few days prior 6/18. 6/20/22 CT A/P with large twin-transplant fluid collection, admit for percutaneous drain placement.     Hospital Course   LDKT 5/17/22; DGF; PETER: [place] 3.4 on 6/16. Recheck DSA pending, no history of DSA. Allosure pending.  IR consulted for tunneled catheter revision and graft biopsy 6/22. Biopsy pathology consistent with ATI without evidence of rejection. Continue twice weekly labs and follow up with Transplant nephrology next week.    Perinephric fluid collection: Imaging as below. IR consulted for fluid collection aspiration on 6/22, drained 80 mL clear yellow fluid, cultures pending. Follow up with Dr. Flores in 2 weeks.    Imaging:  -6/20 CT: 2 large twin-nephric fluid collections noted  -6/22 US: Normal, perinephric fluid collections.   -6/22 IR: 80 mL clear yellow fluid aspirated.  -6/23 US: Recurrent fluid collections similar to previous.     Immunosuppression management:    Induction: Completed high risk induction protocol d/t DGF.   Maintenance:  mg BID, tacrolimus 4.5 mg BID (Goal level 8-10).   Infection prophylaxis: PCP (Bactrim indefinitely), viral (Vaclyte x 3 months).     Transplant coordinator Alicia Peoplesgaudencio  [phone]  Donor type:  Live  DSA at time of transplant:  NO  Ureteral stent: NO  CMV:  Donor - / Recipient -  EBV:  Donor + / Recipient +  Thymoglobulin: 600 mg ordered, though likely likely received ~550 mg due to reactions, so 5.3-5.8 mg/kg    Hematology:   Anemia in chronic disease: Hgb 6.2 (from 7.5), recheck 6.7. Renal US post-biopsy without e/o bleeding. 1 unit pRBCs transfused. Repeat Hgb 8.3.     Fluid/Electrolytes:  Hypervolemia: Up 3kg from admission. BLE edema, R>L, US no e/o DVT. Lasix 40 mg IV given post-blood transfusion. Will not discharge on diuretics.    :   History of prostate CA: Stage T3b s/p RALP 12/14/2020 with focal positive margin, received adjuvant radiation. Moderate risk of recurrence in 10-15y. Follow with urology.       Discharge Disposition   Discharged to home   Condition at discharge: Stable    Pending Results   These results will be followed up by transplant team  Unresulted Labs Ordered in the Past 30 Days of this Admission     Date and Time Order Name Status Description    6/23/2022  5:30 AM Prepare red blood cells (unit) Preliminary     6/22/2022 10:13 AM Body fluid, unsp Aerobic Bacterial Culture Routine Preliminary     6/22/2022 10:13 AM Fungus Culture, non-blood In process     6/22/2022 10:13 AM Anaerobic bacterial culture In process     6/22/2022  7:55 AM AlloSure Kidney Transplant Monitoring In process         Final pathology results:   Case Report   Surgical Pathology Report                         Case: [id number]                                   Authorizing Provider:  Xin Mustafa NP   Collected:           06/22/2022 10:32 AM           Ordering Location:     [facility]     Received:             06/22/2022 11:50 AM                                  [address] [place]                                                             Pathologist:           Man Rouse MD                                                             Specimen:    Kidney, Transplanted, Kidney biopsy, transp w/EM, IF                                       Final Diagnosis   Kidney allograft with acute tubular injury and no evidence of acute rejection     Primary Care Physician   [facility]    Physical Exam   Temp: 97.9  F (36.6  C) Temp src: Oral BP: 120/68 Pulse: 76   Resp: 16 SpO2: 99 % O2 Device: Nasal cannula    Vitals:    06/21/22 1751 06/22/22 0546 06/23/22 0645   Weight: 105.6 kg (232 lb 12.9 oz) 106.3 kg (234 lb 5.6 oz) 108.7 kg (239 lb 10.2 oz)     Vital Signs with Ranges  Temp:  [97.9  F (36.6  C)-98.6  F (37  C)] 97.9  F (36.6  C)  Pulse:  [58-86] 76  Resp:  [16] 16  BP: (101-120)/(50-68) 120/68  Arterial Line BP: (110-119)/(61-62) 110/61  SpO2:  [98 %-99 %] 99 %  I/O last 3 completed shifts:  In: 1740 [P.O.:1440]  Out: 1475 [Urine:1475]    Constitutional: resting comfortably in bed  Eyes: EOMI, corrective lenses in place  Respiratory: unlabored on RA  Cardiovascular: perfused  GI: abdomen soft. Biopsy and aspiration sites without hematoma, bleeding.   Genitourinary: no Flores present  Skin: warm, dry  Musculoskeletal: BLE edema, R>L. Strength 5/5.  Neurologic: A&OX4  Neuropsychiatric: pleasant    Consultations This Hospital Stay   INTERVENTIONAL RADIOLOGY ADULT/PEDS IP CONSULT  NURSING TO CONSULT FOR VASCULAR ACCESS CARE IP CONSULT  CARE MANAGEMENT / SOCIAL WORK IP CONSULT  NURSING TO CONSULT FOR VASCULAR ACCESS CARE IP CONSULT    Time Spent on this Encounter   I have spent greater than 30 minutes on this discharge.    Discharge Orders   Discharge Medications   Current Discharge Medication List      START taking these medications    Details   sodium bicarbonate 650 MG tablet Take 2 tablets (1,300 mg) by mouth 3  times daily  Qty: 180 tablet, Refills: 11    Associated Diagnoses: Low bicarbonate         CONTINUE these medications which have CHANGED    Details   pantoprazole (PROTONIX) 40 MG EC tablet Take 1 tablet (40 mg) by mouth daily    Associated Diagnoses: Kidney replaced by transplant      tacrolimus (GENERIC EQUIVALENT) 0.5 MG capsule Total dose of 4.5 mg by mouth twice daily.  Qty: 60 capsule, Refills: 11    Associated Diagnoses: Kidney replaced by transplant      tacrolimus (GENERIC EQUIVALENT) 1 MG capsule Total dose 4.5 mg BID.  Qty: 300 capsule, Refills: 11    Associated Diagnoses: Kidney replaced by transplant      valGANciclovir (VALCYTE) 450 MG tablet Take 1 tablet (450 mg) by mouth every other day    Associated Diagnoses: Morbid obesity (H)         CONTINUE these medications which have NOT CHANGED    Details   acetaminophen (TYLENOL) 325 MG tablet Take 1-2 tablets (325-650 mg) by mouth every 4 hours as needed for mild pain or fever  Qty: 100 tablet, Refills: 0    Associated Diagnoses: Kidney replaced by transplant      aspirin (ASA) 325 MG tablet Take 1 tablet (325 mg) by mouth daily  Qty: 30 tablet, Refills: 11    Associated Diagnoses: Kidney replaced by transplant      atorvastatin (LIPITOR) 10 MG tablet Take 1 tablet (10 mg) by mouth daily  Qty: 30 tablet, Refills: 1    Associated Diagnoses: Kidney replaced by transplant      B Complex-C-Folic Acid (VIRT-CAPS) 1 MG CAPS Take 1 mg by mouth daily  Qty: 30 capsule, Refills: 1    Associated Diagnoses: Kidney replaced by transplant      Lidocaine (LIDOCARE) 4 % Patch Place 1-2 patches onto the skin every 24 hours To prevent lidocaine toxicity, patient should be patch free for 12 hrs daily.  Qty: 10 patch, Refills: 0    Associated Diagnoses: Kidney replaced by transplant      mycophenolate (GENERIC EQUIVALENT) 250 MG capsule Take 3 capsules (750 mg) by mouth 2 times daily  Qty: 180 capsule, Refills: 11    Associated Diagnoses: Kidney replaced by transplant       sulfamethoxazole-trimethoprim (BACTRIM) 400-80 MG tablet Take 1 tablet by mouth Every Mon, Wed, Fri Morning Increase to one tab by mouth daily when directed by transplant team  Qty: 30 tablet, Refills: 11    Associated Diagnoses: Kidney replaced by transplant      Vitamin D3 (CHOLECALCIFEROL) 25 mcg (1000 units) tablet Take 1 capsule by mouth every morning       oxyCODONE (ROXICODONE) 5 MG tablet Take 1 tablet (5 mg) by mouth every 6 hours as needed for moderate to severe pain  Qty: 10 tablet, Refills: 0    Associated Diagnoses: Kidney replaced by transplant      polyethylene glycol (MIRALAX) 17 GM/Dose powder Take 1 capful by mouth daily PRN      senna-docusate (SENOKOT-S/PERICOLACE) 8.6-50 MG tablet 1-2 tablets by Oral or Feeding Tube route 2 times daily as needed for constipation Take while taking oxycodone, hold for loose stools.  Qty: 100 tablet, Refills: 0    Associated Diagnoses: Kidney replaced by transplant                Resume Home Care Services    [facility] [phone], Fax [phone]  Resumption of previous home care services     Activity    Your activity upon discharge: Walk at least four times a day, lift no greater than 10 pounds for 6-8 weeks from the time of surgery.  No driving while taking narcotics or 3 weeks after surgery.     Reason for your hospital stay    You were admitted for a fluid collection after BAUDILIO removal, this was aspirated in IR. You are discharging home in stable condition with close follow up.     Adult [facility] Follow-up and recommended labs and tests    LABS:  CBC, BMP, Mg, Phos, Tacrolimus levels (12 hours after administration) to be drawn every Monday, Thursday by home health care nurse if arranged, or at an outpatient lab.     FOLLOW UP APPOINTMENTS:  Remember to always bring an updated medication list to all appointments.     Follow up with transplant nephrology in one week.  An appointment with your transplant surgeon will be scheduled for approximately 2 weeks  following discharge from the hospital.  Your transplant surgeon is: Dr. Flores.  Call scheduling at [phone] if you have not heard about your appointments within 48 hours after discharge.     When to contact your care team    WHEN TO CONTACT YOUR  COORDINATOR:  Transplant Coordinator [phone]  Notify your coordinator if you have pain over your kidney, increased redness or drainage from your incision, fever greater than 100.5F, decreased urine output or new or increased amount of blood in urine.  Notify your coordinator immediately if you are ever unable to take your immunosuppressive medications for any reason.  If it is outside of office hours, please call the hospital switchboard at [phone] and ask to have the kidney transplant surgery fellow paged for urgent medical questions, or present to the emergency department.     Monitor and record    Monitor blood pressure and weight daily initially post transplant.     Discharge Instructions    People with weakened immune systems are at higher risk of getting severely sick from SARS-CoV-2, the virus that causes COVID-19. They may also remain infectious for a longer period of time than others with COVID-19, but we cannot confirm this until we learn more about this new virus.    If you are immunocompromised, the best way to prevent COVID-19 is to avoid being exposed to this virus. For details, see CDC's advice for what you can do to prepare for COVID-19 and how to protect yourself and others.    Wash your hands often with soap and water for at least 20 seconds especially after you have been in a public place, or after blowing your nose, coughing, or sneezing.   If soap and water are not readily available, use a hand  that contains at least 60% alcohol. Cover all surfaces of your hands and rub them together until they feel dry.  Avoid leaving home as much as possible and practice social distancing.   If you must leave home, avoid other people as  much as possible by practicing social distancing. Maintain a distance of at least 6 feet (2 meters) between you and people outside your household.  Avoid large gatherings or places where people congregate.  Have supplies, food, and medicine delivered to your home.  Cover your mouth and nose with a cloth face covering when around others to protect other people in case you are infected, and ask others to do the same.   Clean AND disinfect frequently touched surfaces. This includes tables, doorknobs, light switches, countertops, handles, desks, phones, keyboards, toilets, faucets, and sinks.     IV access    You are going home with the following vascular access device: Don. Please have this heparin locked in [facility] weekly as scheduled.     Wound care and dressings    Instructions to care for your wound at home: Keep your incision clean and dry. Wash daily with soap and water in the shower, but do not soak or scrub.     Diet    Diet recommendations post-transplant: Heart healthy dietary habits long term (low saturated/trans fat, low sodium). High protein diet x 8 weeks. Practice food safety precautions.         Data   Most Recent 3 CBC's:  Recent Labs   Lab Test 06/23/22  1505 06/23/22  0817 06/23/22  0447 06/22/22  0545 06/21/22  1212   WBC  --   --  5.9 5.3 5.5   HGB 8.3* 6.7* 6.2* 7.5* 7.2*   MCV  --   --  105* 103* 102*   PLT  --   --  150 200 185      Most Recent 3 BMP's:  Recent Labs   Lab Test 06/23/22  0447 06/22/22  0545 06/21/22  1141    142 140   POTASSIUM 5.4* 5.2* 5.1   CHLORIDE 118* 117* 119*   CO2 14* 38* 17*   BUN 53.2* 60.5* 68*   CR 3.46* 4.16* 4.56*   ANIONGAP 9 <1* 4   YEE 9.0 9.7 9.3   * 109* 112*     Most Recent 2 LFT's:  Recent Labs   Lab Test 06/03/22  1048 05/22/22  0420   AST 9 6   ALT 23 23   ALKPHOS 83 61   BILITOTAL 0.5 0.5     Most Recent INR's and Anticoagulation Dosing History:  Anticoagulation Dose History     Recent Dosing and Labs Latest Ref Rng & Units 9/3/2020 5/9/2022  6/21/2022    INR 0.85 - 1.15 1.03 0.98 1.12        Most Recent 3 Troponin's:No lab results found.  Most Recent Cholesterol Panel:  Recent Labs   Lab Test 06/03/22  1048   CHOL 85   LDL 28   HDL 35*   TRIG 112     Most Recent 6 Bacteria Isolates From Any Culture (See EPIC Reports for Culture Details):No lab results found.  Most Recent TSH, T4 and A1c Labs:  Recent Labs   Lab Test 06/03/22  1048   A1C 5.1     Results for orders placed or performed during the hospital encounter of 06/21/22   US Renal Transplant with Doppler    Narrative    ULTRASOUND RENAL TRANSPLANT  6/22/2022 11:07 AM    CLINICAL HISTORY: elevated creatinine; post kidney transplant.  Ultrasound performed prior to the biopsy and other procedures  performed today. Right lower quadrant renal transplant placed  5/17/2022.    COMPARISONS: Ultrasound 6/14/2022. CT 6/20/2022.    REFERRING PROVIDER: LOW ROWLEY    TECHNIQUE: Right lower quadrant renal transplant evaluated with  grayscale, color Doppler, and Doppler waveform ultrasound. Transplant  renal vasculature evaluated with color Doppler and Doppler waveform  ultrasound.    Cine clips through the renal transplant were saved in the patient's  record.    FINDINGS:   RIGHT LOWER QUADRANT RENAL TRANSPLANT:         Fluid collections:             #1: Superior and anterior: 2.3 x 3.6 x 5.5 cm. Hypoechoic.  No internal flow by color Doppler imaging.             #2: Posterior: 4.1 x 3.8 x 7.9 cm. Hypoechoic. No internal  flow by color Doppler imaging.            #3: Inferior and anterior: Connected to #2. 2.6 x 5.9 x 3.3  cm. Hypoechoic. No internal flow by color Doppler imaging.         Renal artery:            Hilum: 90/15 cm/s            Mid: 155/23 cm/s            Anastomosis: 233/27 cm/s (previously 209 cm/s)         Renal artery - iliac ratio: 1.25 (previously 1.17)         Renal vein:            Hilum: 72 cm/s            Mid: 39 cm/s            Anastomosis: 54 cm/s         Length: 10.7 cm          Arcuate artery resistive index (superior / mid / inferior): 0.80  / 0.81 / 0.80 (previously 0.78 / 0.81 / 0.75)         Parenchyma: Normal. No stone, mass, or hydronephrosis.    Iliac artery:       Above anastomosis: 187/0 cm/s       Below anastomosis: 169/0 cm/s    Iliac vein:       Above anastomosis: 60 cm/s       Below anastomosis: 11 cm/s    Bladder: Partially distended.      Impression    IMPRESSION:   1. No renal transplant arterial or venous stenosis demonstrated.    2. Elevated arcuate artery resistive indices.    3. Perinephric fluid collections. Differential includes hematoma,  seroma, urinoma, and lymphocele. Infection cannot be excluded.    4. Negative grayscale appearance of transplant kidney.    GUIDELINES:  For native kidneys:       Diagnostic criteria suggestive of > 60% diameter stenosis             Renal artery:             Peak systolic velocity > 180 cm/s             RAR > 3.5             Turbulent flow         Arcuate artery Resistive Index Normal < 0.7    For renal transplants:       Renal transplant peak systolic velocity criteria range from > 180  cm/s to > 400 cm/s       Source suggests using:            Peak systolic velocity > or = 300 cm/s            Spectral broadening            Intraparenchymal arterial acceleration time > or = 0.1 s     Source: Osvaldo ARTHUR, Paul BAUDILIO, Man MT, et al. Screening for  transplant renal artery stenosis: Ultrasound-based stenosis  probability stratification. American Journal of Roentgenology.  2017;209: 6635-0007. [id number]    CLAUS MCKEON MD         SYSTEM ID:  [id number]   IR CVC Tunnel Revision Right    Narrative    PROCEDURES 6/22/2022 12:23 PM:  1. Right tunneled dialysis catheter removal.  2. Superior vena cava balloon inflation to macerate possible fibrin  sheath.  3. New right tunneled dialysis catheter placement through existing  track.  4. Ultrasound guided right lower quadrant renal transplant biopsy.  5. CT guided right lower  quadrant transplant perinephric fluid  collection aspiration.    CLINICAL HISTORY: Existing right internal jugular tunneled dialysis  catheter does not aspirate. Catheter exchange or new catheter  placement requested.    Poor renal function Right lower quadrant renal transplant biopsy  requested.     Fluid collection around the transplant kidney. Concern for infection  but access would require a transiliacus approach. We agreed to  aspirate fluid for cultures rather than place a drainage catheter.    COMPARISONS: CT 6/20/2022    REFERRING PROVIDER: CECELIA ANDERSON    ATTENDING RADIOLOGIST: GEORGIE Mckeon MD    I, CLAUS MCKEON MD, attest that I was present in the procedure room for  the entire procedure.    MEDICATIONS: The patient was placed on continuous monitoring. Vital  signs and sedation were monitored by the Interventional Radiology  nurse under the Attending Physician's supervision. 2.5 mg Versed and  125 mcg Fentanyl IV. The patient remained stable throughout the  procedure.    ATTENDING FACE-TO-FACE SEDATION TIME: 60    FLUOROSCOPY TIME: 2.0 minutes / 24.3 mGy    CT DOSE: 2083 mGycm.    PROCEDURE: The patient understood the limitations, alternatives, and  risks of the procedure and requested the procedure be performed. Both  written and verbal consent were obtained.    The right neck, anterior chest, and existing 15.5 Guyanese 24 cm (19 cm  tip to cuff) AngioDynamics DuraFlow2 tunneled dialysis catheter were  prepped and draped in the usual sterile fashion.    The right lower quadrant was prepped and draped in the usual sterile  fashion.    Existing dialysis catheter would not aspirate from either port.    1% lidocaine without epinephrine was used for local anesthesia.    0.035 Terumo stiff angled Glidewire advanced through each catheter  lumen into the inferior vena cava.    Catheter cuff was freed. Catheter removed over guidewires.    10 x 40 mm [employer] [place] balloon advanced over guidewire  into the  superior vena cava. Balloon inflated to profile and advanced  and withdrawn over guidewire in an attempt to disrupt any existing  fibrin sheath. Balloon deflated and removed over guidewire.    New 19 cm tip to cuff 14.5 Malay Medtronic Palindrome tunneled  dialysis catheter advanced over guidewire and placed under  fluoroscopic guidance with the tip in the mid right atrium. Guidewires  removed. Both lumens aspirated and flushed adequately. Each lumen  heparin locked with 1000:1 heparin solution. 2-0 nylon catheter  retaining suture and sterile dressing applied.    Fluoroscopic image documenting placement and position of the tunneled  central venous catheter was saved in the patient's record.    1% lidocaine without epinephrine was used for local anesthesia. Under  ultrasound guidance, a 17 gauge coaxial needle was advanced into the  right lower quadrant renal transplant upper pole cortex. Ultrasound  image documenting coaxial needle placement was saved in the patient's  record.    Through the coaxial needle, three passes were made with a [employer] ACT  biopsy needle and three good 18 gauge cores were obtained.  Nephropathology indicated that sample adequacy was attained.    Good hemostasis achieved with 2.5 x 20 mm [employer] Torpedo  gelatin foam plugs and Gelfoam plugs that were deployed as the needle  was removed. Limited postprocedural scan demonstrated no immediate  complication. Representative ultrasound images were saved in the  patient's record.    1% lidocaine without epinephrine was used for local anesthesia. Under  CT guidance, a 5 Malay web care [employer] Yueh centesis needle catheter was  advanced into the transplant perinephric collection through a  transiliacus approach. CT image documenting needle catheter placement  was saved in the patient's record.    Needle removed. 80 mL yellow fluid aspirated and submitted for the  requested laboratory studies. Catheter removed. Limited postprocedural  scan  demonstrated no immediate complication. Representative CT images  were saved in the patient's record.    Sterile dressings applied over the biopsy and aspiration sites.      Impression    IMPRESSION:  1. Neither lumen of the existing tunneled dialysis catheter would  aspirate. Catheter removed over guidewire.    2. 10 mm balloon advanced and withdrawn over guidewire through the  superior vena cava in an attempt to disrupt any residual fibrin  sheath.    3. 19 cm tip to cuff 14.5 Romanian Medtronic Palindrome tunneled  dialysis catheter placed under fluoroscopic guidance with the tip in  the mid right atrium. Both lumens heparin locked and ready for use.    4. Ultrasound guided right lower quadrant renal transplant biopsy.  Three good 18 gauge cores taken from the upper pole cortex.  Nephropathology indicated adequate samples were obtained.    5. CT guided transplant perinephric fluid aspiration from a  transiliacus approach. 80 mL fluid aspirated and submitted for the  requested laboratory studies.    6. Post procedural care and observation in the patient's inpatient  care unit.    CLAUS MCKEON MD         SYSTEM ID:  [id number]   CT Abdomen Peritoneum Abscess Aspiration    Narrative    PROCEDURES 6/22/2022 12:23 PM:  1. Right tunneled dialysis catheter removal.  2. Superior vena cava balloon inflation to macerate possible fibrin  sheath.  3. New right tunneled dialysis catheter placement through existing  track.  4. Ultrasound guided right lower quadrant renal transplant biopsy.  5. CT guided right lower quadrant transplant perinephric fluid  collection aspiration.    CLINICAL HISTORY: Existing right internal jugular tunneled dialysis  catheter does not aspirate. Catheter exchange or new catheter  placement requested.    Poor renal function Right lower quadrant renal transplant biopsy  requested.     Fluid collection around the transplant kidney. Concern for infection  but access would require a transiliacus approach. We  agreed to  aspirate fluid for cultures rather than place a drainage catheter.    COMPARISONS: CT 6/20/2022    REFERRING PROVIDER: CECELIA ANDERSON    ATTENDING RADIOLOGIST: GEORGIE Mckeon MD    I, CLAUS MCKEON MD, attest that I was present in the procedure room for  the entire procedure.    MEDICATIONS: The patient was placed on continuous monitoring. Vital  signs and sedation were monitored by the Interventional Radiology  nurse under the Attending Physician's supervision. 2.5 mg Versed and  125 mcg Fentanyl IV. The patient remained stable throughout the  procedure.    ATTENDING FACE-TO-FACE SEDATION TIME: 60    FLUOROSCOPY TIME: 2.0 minutes / 24.3 mGy    CT DOSE: 2083 mGycm.    PROCEDURE: The patient understood the limitations, alternatives, and  risks of the procedure and requested the procedure be performed. Both  written and verbal consent were obtained.    The right neck, anterior chest, and existing 15.5 Green[place]ic 24 cm (19 cm  tip to cuff) AngioDynamics DuraFlow2 tunneled dialysis catheter were  prepped and draped in the usual sterile fashion.    The right lower quadrant was prepped and draped in the usual sterile  fashion.    Existing dialysis catheter would not aspirate from either port.    1% lidocaine without epinephrine was used for local anesthesia.    0.035 Terumo stiff angled Glidewire advanced through each catheter  lumen into the inferior vena cava.    Catheter cuff was freed. Catheter removed over guidewires.    10 x 40 mm [place] [employer] [place] balloon advanced over guidewire  into the superior vena cava. Balloon inflated to profile and advanced  and withdrawn over guidewire in an attempt to disrupt any existing  fibrin sheath. Balloon deflated and removed over guidewire.    New 19 cm tip to cuff 14.5 [place] Medtronic Palindrome tunneled  dialysis catheter advanced over guidewire and placed under  fluoroscopic guidance with the tip in the mid right atrium. Guidewires  removed. Both lumens aspirated and  flushed adequately. Each lumen  heparin locked with 1000:1 heparin solution. 2-0 nylon catheter  retaining suture and sterile dressing applied.    Fluoroscopic image documenting placement and position of the tunneled  central venous catheter was saved in the patient's record.    1% lidocaine without epinephrine was used for local anesthesia. Under  ultrasound guidance, a 17 gauge coaxial needle was advanced into the  right lower quadrant renal transplant upper pole cortex. Ultrasound  image documenting coaxial needle placement was saved in the patient's  record.    Through the coaxial needle, three passes were made with a Ext[employer] ACT  biopsy needle and three good 18 gauge cores were obtained.  Nephropathology indicated that sample adequacy was attained.    Good hemostasis achieved with 2.5 x 20 mm [employer] TorpedIMAGINATE - [employer]ovating Reality  gelatin foam plugs and Gelfoam plugs that were deployed as the needle  was removed. Limited postprocedural scan demonstrated no immediate  complication. Representative ultrasound images were saved in the  patient's record.    1% lidocaine without epinephrine was used for local anesthesia. Under  CT guidance, a 5 [place] [employer] centesis needle catheter was  advanced into the transplant perinephric collection through a  transiliacus approach. CT image documenting needle catheter placement  was saved in the patient's record.    Needle removed. 80 mL yellow fluid aspirated and submitted for the  requested laboratory studies. Catheter removed. Limited postprocedural  scan demonstrated no immediate complication. Representative CT images  were saved in the patient's record.    Sterile dressings applied over the biopsy and aspiration sites.      Impression    IMPRESSION:  1. Neither lumen of the existing tunneled dialysis catheter would  aspirate. Catheter removed over guidewire.    2. 10 mm balloon advanced and withdrawn over guidewire through the  superior vena cava in an attempt to disrupt any  residual fibrin  sheath.    3. 19 cm tip to cuff 14.5 [place] Medtronic Palindrome tunneled  dialysis catheter placed under fluoroscopic guidance with the tip in  the mid right atrium. Both lumens heparin locked and ready for use.    4. Ultrasound guided right lower quadrant renal transplant biopsy.  Three good 18 gauge cores taken from the upper pole cortex.  Nephropathology indicated adequate samples were obtained.    5. CT guided transplant perinephric fluid aspiration from a  transiliacus approach. 80 mL fluid aspirated and submitted for the  requested laboratory studies.    6. Post procedural care and observation in the patient's inpatient  care unit.    CLAUS MCKEON MD         SYSTEM ID:  [id number]   IR Renal Biopsy Right    Narrative    PROCEDURES 6/22/2022 12:23 PM:  1. Right tunneled dialysis catheter removal.  2. Superior vena cava balloon inflation to macerate possible fibrin  sheath.  3. New right tunneled dialysis catheter placement through existing  track.  4. Ultrasound guided right lower quadrant renal transplant biopsy.  5. CT guided right lower quadrant transplant perinephric fluid  collection aspiration.    CLINICAL HISTORY: Existing right internal jugular tunneled dialysis  catheter does not aspirate. Catheter exchange or new catheter  placement requested.    Poor renal function Right lower quadrant renal transplant biopsy  requested.     Fluid collection around the transplant kidney. Concern for infection  but access would require a transiliacus approach. We agreed to  aspirate fluid for cultures rather than place a drainage catheter.    COMPARISONS: CT 6/20/2022    REFERRING PROVIDER: CECELIA ANDERSON    ATTENDING RADIOLOGIST: GEORGIE Mckeon MD    I, CLAUS MCKEON MD, attest that I was present in the procedure room for  the entire procedure.    MEDICATIONS: The patient was placed on continuous monitoring. Vital  signs and sedation were monitored by the Interventional Radiology  nurse under the Attending  Physician's supervision. 2.5 mg Versed and  125 mcg Fentanyl IV. The patient remained stable throughout the  procedure.    ATTENDING FACE-TO-FACE SEDATION TIME: 60    FLUOROSCOPY TIME: 2.0 minutes / 24.3 mGy    CT DOSE: 2083 mGycm.    PROCEDURE: The patient understood the limitations, alternatives, and  risks of the procedure and requested the procedure be performed. Both  written and verbal consent were obtained.    The right neck, anterior chest, and existing 15.5 [place] Islander 24 cm (19 cm  tip to cuff) AngioDynamics DuraFlow2 tunneled dialysis catheter were  prepped and draped in the usual sterile fashion.    The right lower quadrant was prepped and draped in the usual sterile  fashion.    Existing dialysis catheter would not aspirate from either port.    1% lidocaine without epinephrine was used for local anesthesia.    0.035 Terumo stiff angled Glidewire advanced through each catheter  lumen into the inferior vena cava.    Catheter cuff was freed. Catheter removed over guidewires.    10 x 40 mm [employer] balloon advanced over guidewire  into the superior vena cava. Balloon inflated to profile and advanced  and withdrawn over guidewire in an attempt to disrupt any existing  fibrin sheath. Balloon deflated and removed over guidewire.    New 19 cm tip to cuff 14.5 [place] Islander Medtronic Palindrome tunneled  dialysis catheter advanced over guidewire and placed under  fluoroscopic guidance with the tip in the mid right atrium. Guidewires  removed. Both lumens aspirated and flushed adequately. Each lumen  heparin locked with 1000:1 heparin solution. 2-0 nylon catheter  retaining suture and sterile dressing applied.    Fluoroscopic image documenting placement and position of the tunneled  central venous catheter was saved in the patient's record.    1% lidocaine without epinephrine was used for local anesthesia. Under  ultrasound guidance, a 17 gauge coaxial needle was advanced into the  right lower quadrant renal  transplant upper pole cortex. Ultrasound  image documenting coaxial needle placement was saved in the patient's  record.    Through the coaxial needle, three passes were made with a Mobile Accordno ACT  biopsy needle and three good 18 gauge cores were obtained.  Nephropathology indicated that sample adequacy was attained.    Good hemostasis achieved with 2.5 x 20 mm Merit Medical Torpedo  gelatin foam plugs and Gelfoam plugs that were deployed as the needle  was removed. Limited postprocedural scan demonstrated no immediate  complication. Representative ultrasound images were saved in the  patient's record.    1% lidocaine without epinephrine was used for local anesthesia. Under  CT guidance, a 5 [place] Jazz[employer]keh centesis needle catheter was  advanced into the transplant perinephric collection through a  transiliacus approach. CT image documenting needle catheter placement  was saved in the patient's record.    Needle removed. 80 mL yellow fluid aspirated and submitted for the  requested laboratory studies. Catheter removed. Limited postprocedural  scan demonstrated no immediate complication. Representative CT images  were saved in the patient's record.    Sterile dressings applied over the biopsy and aspiration sites.      Impression    IMPRESSION:  1. Neither lumen of the existing tunneled dialysis catheter would  aspirate. Catheter removed over guidewire.    2. 10 mm balloon advanced and withdrawn over guidewire through the  superior vena cava in an attempt to disrupt any residual fibrin  sheath.    3. 19 cm tip to cuff 14.5 [place] Medtronic Palindrome tunneled  dialysis catheter placed under fluoroscopic guidance with the tip in  the mid right atrium. Both lumens heparin locked and ready for use.    4. Ultrasound guided right lower quadrant renal transplant biopsy.  Three good 18 gauge cores taken from the upper pole cortex.  Nephropathology indicated adequate samples were obtained.    5. CT guided transplant  perinephric fluid aspiration from a  transiliacus approach. 80 mL fluid aspirated and submitted for the  requested laboratory studies.    6. Post procedural care and observation in the patient's inpatient  care unit.    CLAUS MCKEON MD         SYSTEM ID:  [id number]   US Renal Transplant with Doppler    Narrative    ULTRASOUND RENAL TRANSPLANT  6/23/2022 8:59 AM    CLINICAL HISTORY: Hgb drop s/p biopsy yesterday; evaluate flow and for  bleeding. Biopsy performed yesterday.    COMPARISONS: Multiple procedures including renal transplant biopsy  6/22/2022. Ultrasound prior to biopsy 6/22/2022.     REFERRING PROVIDER: JESSEE LEON    TECHNIQUE: Right lower quadrant renal transplant evaluated with  grayscale, color Doppler, and Doppler waveform ultrasound. Transplant  renal vasculature evaluated with color Doppler and Doppler waveform  ultrasound.    Cine clips through the renal transplant and bladder were saved in the  patient's record.    FINDINGS:   RIGHT LOWER QUADRANT RENAL TRANSPLANT: Upper lobe biopsy site not  evident in the provided images. The echogenic gelatin plugs and post  biopsy air in yesterday's post biopsy images were not demonstrated in  today's study.     Recurrent collections are similar in morphology to previous studies.  No new hematoma demonstrated.    No extravasation demonstrated in color Doppler images.         Fluid collections:             #1: Superior: 1.0 x 1.8 x 5.3 cm. Hypoechoic. No internal  flow by color Doppler imaging.             #2: Inferior posterior: 4.2 x 5.8 x 4.1 cm. Hypoechoic. No  internal flow by color Doppler imaging.            #3: Inferior anterior: Connects with #2. 2.5 x 1.7 x 2.8 cm.  Hypoechoic. No internal flow by color Doppler imaging.         Renal artery:            Hilum: 199/36 cm/s - RI 0.82            Anastomosis: 186/31 cm/s - RI 0.83         Renal artery - iliac ratio: 1.18         Renal vein:            Hilum: 32 cm/s            Anastomosis: 50  cm/s         Length: 10.7 cm         Arcuate artery resistive index (superior / mid / inferior): 0.73  / 0.77 / 0.76 (previously 0.80 / 0.81 / 0.80)         Parenchyma: Normal. No stone, mass, or hydronephrosis. Expected  post biopsy changes not demonstrated.    Iliac artery:       Above anastomosis: 157 cm/s       Below anastomosis: 161 cm/s    Iliac vein:       Above anastomosis: 52 cm/s       Below anastomosis: 17 cm/s    Bladder: Partially distended.      Impression    IMPRESSION:   1. Recurrent collections are similar in morphology to previous. No new  collection to suggest a new hematoma from the biopsy yesterday.  Adequate hemostasis was achieved with gelatin plugs that were deployed  as the needle was removed. Expected post biopsy changes were not  demonstrated in this study. Echogenic post biopsy air and plugs may  have been resorbed. No active extravasation demonstrated in color  Doppler images.    2. Recurrent collections after fluid aspiration yesterday. Fluid  appeared to be urine on aspiration, correlate with laboratory studies.    3. No renal transplant arterial or venous stenosis demonstrated.    4. Arcuate artery resistive indices have improved but remain elevated.    5. Negative grayscale appearance of the renal transplant.    GUIDELINES:  For native kidneys:       Diagnostic criteria suggestive of > 60% diameter stenosis             Renal artery:             Peak systolic velocity > 180 cm/s             RAR > 3.5             Turbulent flow         Arcuate artery Resistive Index Normal < 0.7    For renal transplants:       Renal transplant peak systolic velocity criteria range from > 180  cm/s to > 400 cm/s       Source suggests using:            Peak systolic velocity > or = 300 cm/s            Spectral broadening            Intraparenchymal arterial acceleration time > or = 0.1 s     Source: Osvaldo ARTHUR, Paul ASTUDILLO, Man GARRISON, et al. Screening for  transplant renal artery stenosis:  Ultrasound-based stenosis  probability stratification. American Journal of Roentgenology.  2017;209: 3732-6002. [id number]    CLAUS MCKEON MD         SYSTEM ID:  [id number]   US Lower Extremity Venous Duplex Right    Narrative    RIGHT LOWER EXTREMITY DUPLEX VENOUS ULTRASOUND 6/23/2022 10:39 AM    CLINICAL HISTORY: Right worse than left leg swelling. Evaluate for  deep venous thrombosis.    COMPARISONS: None available.    REFERRING PROVIDER: JESSEE LEON LYNN    TECHNIQUE: Grayscale, color Doppler, Doppler waveform ultrasound  evaluation was performed through the right common femoral, femoral,  popliteal, and posterior tibial veins. Right peroneal veins were  evaluated with grayscale imaging and compression.    Left common femoral vein was evaluated for symmetry.    FINDINGS: Left common femoral vein is patent, fully compressible, and  demonstrates normal phasic Doppler waveform.    Right common femoral, femoral, popliteal, and posterior tibial veins  are fully compressible, patent, and demonstrate normal phasic Doppler  waveforms.    Right peroneal veins are fully compressible to the distal calf.      Impression    IMPRESSION: No deep venous thrombosis demonstrated in the RIGHT leg.    CLAUS MCKEON MD         SYSTEM ID:  [id number]

## 2022-06-23 NOTE — PLAN OF CARE
Goal Outcome Evaluation:  Donald appeared to sleep soundly between cares with his home CPAP/BIPAP on. In talking with him this morning he stated that he not having any pain or discomfort.  Aspiration site to lower right abdomen is clean and dry with a primapore intact. His only concern this morning is he will be discharged and what his biopsy result showed. Stated he was hoping to go home and not wait around a couple of days if it takes that long for his results to come back. Lab called back a Hgb of 6.2 this morning which is down from 7.5. Reported this to the surgery cross cover, Dr. Daniel. She stated that she will order 1 unit of cells and will be up shortly to consent him. Also his current IV site would not flush and I placed an order for vascular access to place another site after I discontinued his current site. Will continue to monitor and report any significant changes.

## 2022-06-23 NOTE — PROVIDER NOTIFICATION
Hgb this a.m. is 6.2 down from 7.5 from yesterday. Notified Dr. Lavon Moore, surgery cross cover and stated she will order a unit of blood.

## 2022-06-23 NOTE — PROGRESS NOTES
[facility]  Transplant Nephrology Consult  Date of Admission:  6/21/2022  Today's Date: 06/23/2022  Requesting physician: Stuart Rust MD    Recommendations:   - PRBC followed by lasix/bumex, repeat Hb post to ensure stable (US kidney tx not suggestive of hematoma)   - Kidney allograft biopsy: prelim report no rejection, mild ATI, IF/Em pending ()  - already underwent revision of his TDC: no RRT indications and last HD was 6/2?, will decide on TDC removal over the next 1-2 weeks depending on Cr trend  - Perinephric fluid collection : aspiration by IR: fluid Cr not suggestive of leak  - Follow DSA  -start Na bicarb 1300 mg po tid  - low k diet- if K>5.5 would hold bactrim (Na bicarb added as above)  - recheck labs tomorrow & clinic visit next week  - monitor home weights, will decide if need to add diuretic vs monitor while kidney function recovers    Assessment & Plan   # LDKT: Stable. initial DGF, now PETER with concern for fluid collection causing compression on the graft after drain fell out on 6/18/22   - Baseline Creatinine: ~  TBD. [place] Cr 3.4 on 6/16.    - Proteinuria: Not checked post transplant   - Date DSA Last Checked: May/2022      Latest DSA: No DSA at time of transplant. Recheck now   - BK Viremia: No   - Kidney Tx Biopsy:May 24, 2022; Result: ATI                                              June 22, 2022: Pending    # RIJ HD access: This will be revised by IR today as one port is not working      # Perinephric fluid collection: IR consulted for fluid collection aspiration                -2 twin transplant fluid collections on 6/14/22 U/S. High drain output. Drain fell out on 6/18, pt refused to go to ED              -CT performed on 6/20 shows large fluid collection. Plan for direct admission and IR drainage after discussion between IR and Dr. Huntley as IR does not see safe window.       # Immunosuppression: Tacrolimus immediate release  (goal 8-10) and Mycophenolate mofetil (dose 750 mg every 12 hours)               - Induction:High Intensity due to DGF after initial dose of simulect   - Changes: No    # Infection Prophylaxis:   - PJP: Sulfa/TMP (Bactrim)  - CMV: Valganciclovir (Valcyte) D-/R-    # Hypertension: Controlled;  Goal BP: < 140/90   - Volume status: Euvolemic    - Changes: No    # Anemia in Chronic Renal Disease: Hgb: Stable      YUDI: No   - Iron studies: Replete    # Mineral Bone Disorder:   - Secondary renal hyperparathyroidism; PTH level: Significantly elevated (601-1200 pg/ml)        On treatment: None  - Vitamin D; level: Normal        On supplement: Yes  - Calcium; level: Normal        On supplement: No  - Phosphorus; level: Normal        On supplement: No    # Electrolytes:   - Potassium; level: Normal        On supplement: No  - Magnesium; level: Normal        On supplement: No  - Bicarbonate; level:low      On supplement: No-=start Na bicarb 1300 mg po tid  - Sodium; level: Normal    # Constipation:              -Needs to take senna once daily.               -Consider fiber    # History of prostate CA:              -Stage T3b s/p RALP 12/14/2020 with focal positive margin, received adjuvant radiation. Moderate risk of recurrence in 10-15y.               -Follows with urology.      # Transplant History:  Etiology of Kidney Failure: IgA nephropathy  Tx: LDKT  Transplant: 5/17/2022 (Kidney)  Donor Type: Living Donor Class:   Crossmatch at time of Tx: negative  DSA at time of Tx: No  Significant changes in immunosuppression: None  CMV IgG Ab High Risk Discordance (D+/R-): No  EBV IgG Ab High Risk Discordance (D+/R-): No  Significant transplant-related complications: DGF     Recommendations were communicated to the primary team verbally.    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 S/p living donor kidney transplant without stent on 5/17/22 (imported kidney with vasospasm in OR) with DGF. Recent drain removed accidentally a few days prior 6/18. 6/20/22 CT A/P with large twin-transplant fluid collection, admit for percutaneous drain placement.   Also he has DGF.  IR consulted for tunneled catheter revision and graft biopsy    Interval Hx  Patient underwent kidney bx and IR guided drainage of perinephric fluid, fluid Cr ~ plasma, labs showed drop in H/H to 6.2 repeat up to 6.8, US not suggestive of hematoma. He notes chronic BRBPR/hx of ?hemorrhoids but no melena or change in frequency. He denies any graft pain or hematuria     Review of Systems    The 10 point Review of Systems is negative other than noted in the HPI or here.     Past Medical History    I have reviewed this patient's medical history and updated it with pertinent information if needed.   Past Medical History:   Diagnosis Date     CKD (chronic kidney disease) stage 4, GFR 15-29 ml/min (H)      Elevated PSA      Essential hypertension 05/21/2020     Hyperlipidemia      IgA nephropathy      Obesity      Prostate cancer (H) 10/15/2020     S/P radiation therapy     6,600 cGy to pelvis completed on 6/25/2021 - [facility]       Past Surgical History   I have reviewed this patient's surgical history and updated it with pertinent information if needed.  Past Surgical History:   Procedure Laterality Date     BACK SURGERY      Back Surgey 20+ Years Ago      BIOPSY      [facility] 2010     COLONOSCOPY      10+ Years ago at [facility]      DAVINCI PROSTATECTOMY, LYMPHADENECTOMY N/A 12/14/2020    Procedure: PROSTATECTOMY, ROBOT-ASSISTED, WITH PELVIC LYMPHADENECTOMY;  Surgeon: Nabil Vásquez MD;  Location: [facility] OR     IR CVC TUNNEL REVISION RIGHT   2022     IR RENAL BIOPSY RIGHT  2022     IR RENAL BIOPSY RIGHT  2022       Family History   I have reviewed this patient's family history and updated it with pertinent information if needed.   Family History   Problem Relation Age of Onset     Kidney Disease Father      Hypertension Father      Cancer Mother      No Known Problems Brother      No Known Problems Sister      No Known Problems Son      No Known Problems Daughter      No Known Problems Brother      No Known Problems Brother      No Known Problems Brother      No Known Problems Sister      No Known Problems Sister      No Known Problems Sister        Social History   I have reviewed this patient's social history and updated it with pertinent information if needed. Donald Blanco  reports that he has never smoked. He has never used smokeless tobacco. He reports current alcohol use. He reports that he does not use drugs.    Allergies   No Known Allergies  Prior to Admission Medications     atorvastatin  10 mg Oral QPM     sodium chloride (PF) 0.9%  1.3-2.6 mL Intracatheter Once    Followed by     heparin  3 mL Intracatheter Once     sodium chloride (PF) 0.9%  1.3-2.6 mL Intracatheter Once    Followed by     heparin  3 mL Intracatheter Once     multivitamin RENAL  1 mg Oral Daily     mycophenolate  750 mg Oral BID     pantoprazole  40 mg Oral Daily     senna-docusate  1-2 tablet Oral or Feeding Tube Daily     sodium bicarbonate  1,300 mg Oral TID     sodium chloride (PF)  3 mL Intracatheter Q8H     sulfamethoxazole-trimethoprim  1 tablet Oral Q Mon Wed Fri AM     tacrolimus  4.5 mg Oral BID IS     [START ON 2022] valGANciclovir  450 mg Oral Every Other Day     Vitamin D3  25 mcg Oral QAM         Physical Exam   Temp  Av.9  F (36.6  C)  Min: 96.4  F (35.8  C)  Max: 99.4  F (37.4  C)      Pulse  Av.5  Min: 50  Max: 124 Resp  Av.2  Min: 8  Max: 42  SpO2  Av.6 %  Min: 89 %  Max: 100 %     /68   Pulse 76   Temp  97.9  F (36.6  C) (Oral)   Resp 16   Wt 108.7 kg (239 lb 10.2 oz)   SpO2 99%   BMI 34.38 kg/m     Date 06/22/22 0700 - 06/23/22 0659   Shift 3619-7839 4455-1731 6279-0398 24 Hour Total   INTAKE   P.O. 100   100   I.V. 1085.42   1085.42   Shift Total(mL/kg) 1185.42(11.15)   1185.42(11.15)   OUTPUT   Urine 800   800   Shift Total(mL/kg) 800(7.53)   800(7.53)   Weight (kg) 106.3 106.3 106.3 106.3      Admit Weight: 105.6 kg (232 lb 12.9 oz)     GENERAL APPEARANCE: alert and no distress  HENT: mouth without ulcers or lesions  LYMPHATICS: no cervical or supraclavicular nodes  RESP: lungs clear to auscultation - no rales, rhonchi or wheezes  CV: regular rhythm, normal rate, no rub, no murmur  EDEMA: no LE edema bilaterally  ABDOMEN: soft, nondistended, nontender, bowel sounds normal  MS: extremities normal - no gross deformities noted, no evidence of inflammation in joints, no muscle tenderness  SKIN: no rash  ACCESS: [facility]     Data   CMP  Recent Labs   Lab 06/23/22  0447 06/22/22  0545 06/21/22  1141 06/20/22  0800    142 140 138   POTASSIUM 5.4* 5.2* 5.1 5.0   CHLORIDE 118* 117* 119* 115*   CO2 14* 38* 17* 13*   ANIONGAP 9 <1* 4 10   * 109* 112* 126*   BUN 53.2* 60.5* 68* 60*   CR 3.46* 4.16* 4.56* 4.45*   GFRESTIMATED 20* 16* 14* 14*   YEE 9.0 9.7 9.3 9.6   MAG 1.9 2.1 2.2 2.0   PHOS 3.0 3.5 3.1 2.8     CBC  Recent Labs   Lab 06/23/22  1505 06/23/22  0817 06/23/22  0447 06/22/22  0545 06/21/22  1212 06/20/22  0800   HGB 8.3* 6.7* 6.2* 7.5* 7.2* 8.0*   WBC  --   --  5.9 5.3 5.5 5.4   RBC  --   --  1.93* 2.32* 2.22* 2.44*   HCT  --   --  20.2* 24.0* 22.6* 25.1*   MCV  --   --  105* 103* 102* 103*   MCH  --   --  32.1 32.3 32.4 32.8   MCHC  --   --  30.7* 31.3* 31.9 31.9   RDW  --   --  13.9 14.0 13.6 13.3   PLT  --   --  150 200 185 245     INR  Recent Labs   Lab 06/21/22  1227   INR 1.12   PTT 27     ABGNo lab results found in last 7 days.   Urine Studies  Recent Labs   Lab Test 05/09/22  0905  12/10/20  1338 09/03/20  1142   COLOR Yellow Straw Straw   APPEARANCE Clear Clear Clear   URINEGLC Negative Negative Negative   URINEBILI Negative Negative Negative   URINEKETONE Negative Negative Negative   SG 1.015 1.011 1.011   UBLD Moderate* Negative Small*   URINEPH 6.0 5.0 5.0   PROTEIN 300 * >499* >499*   NITRITE Negative Negative Negative   LEUKEST Small* Negative Negative   RBCU 0 1 1   WBCU 6* 2 2     No lab results found.  PTH  Recent Labs   Lab Test 06/03/22  1048 06/01/22  0726 05/09/22  0959   PTHI 969* 1,220* 450*     Iron Studies  Recent Labs   Lab Test 06/23/22  0447 05/09/22  0959   IRON 57* 79   * 334   IRONSAT 25 24   LANCE  --  1,002*       IMAGING:  All imaging studies reviewed by me.

## 2022-06-23 NOTE — PROGRESS NOTES
Care Management Follow Up    Length of Stay (days): 2    Expected Discharge Date: 06/24/2022     Concerns to be Addressed: discharge planning     Patient plan of care discussed at interdisciplinary rounds: Yes    Anticipated Discharge Disposition: Home, Home Care, Outpatient Infusion Services     Anticipated Discharge Services: None  Anticipated Discharge DME: None    Patient/family educated on Medicare website which has current facility and service quality ratings: no  Education Provided on the Discharge Plan: yes   Patient/Family in Agreement with the Plan: yes    Referrals Placed by CM/SW: External Care Coordination, Homecare, Home Infusion  Private pay costs discussed: Not applicable    Additional Information:  Pt needing line care for HD catheter arranged.  Therapy plan completed and pt scheduled at the [facility] for 6/29 at 11am.  Appt added to AVS(see below).      Jun 29, 2022 11:00 AM   Infusion 60 with [facility] INFUSION NURSE,  [facility] ([facility] )    [facility](RN)  Phone: [phone]  Fax [phone]    RNCC will remain available if further needs arise.      Vicky Jules, RNCC  Phone: [phone]  Pager: [phone]  To contact the weekend RNCC  [place] (0800 - 1630) Saturday and Sunday    Units: 4A, 4C, 4E, 5A and 5B- Pager 1: [phone]    Units: 6A, 6B, 6C, 6D- Pager 2: [phone]    Units: 7A, 7B, 7C, 7D, and 5C-Pager 3: [phone]

## 2022-06-26 ENCOUNTER — TELEPHONE (OUTPATIENT)
Dept: TRANSPLANT | Facility: CLINIC | Age: 57
End: 2022-06-26

## 2022-06-26 NOTE — TELEPHONE ENCOUNTER
"Home Care company paged to request \"delay of care\" to start visits 6/27 at 8am.  They received orders yesterday and were not able to schedule until Monday.  Home care will see patient tomorrow, 6/27 at 8am, with labs.      "

## 2022-06-26 NOTE — TELEPHONE ENCOUNTER
----- Message from Yong Friedman MD sent at 6/26/2022 11:00 AM CDT -----  Would you mind looking into this please? Thanks  ----- Message -----  From: Noelle Aguiar MD  Sent: 6/26/2022   9:35 AM CDT  To: Yong Friedman MD    Please call [facility] [phone], they have a question about home care.    Thanks!    Noelle

## 2022-06-27 ENCOUNTER — TELEPHONE (OUTPATIENT)
Dept: TRANSPLANT | Facility: CLINIC | Age: 57
End: 2022-06-27

## 2022-06-27 ENCOUNTER — LAB REQUISITION (OUTPATIENT)
Dept: LAB | Facility: CLINIC | Age: 57
End: 2022-06-27
Payer: COMMERCIAL

## 2022-06-27 DIAGNOSIS — Z48.22 ENCOUNTER FOR AFTERCARE FOLLOWING KIDNEY TRANSPLANT: ICD-10-CM

## 2022-06-27 LAB
ALLOSURE DD-CFDNA: 0.36 %
ANION GAP SERPL CALCULATED.3IONS-SCNC: 8 MMOL/L (ref 7–15)
BACTERIA FLD CULT: NO GROWTH
BASOPHILS # BLD AUTO: 0 10E3/UL (ref 0–0.2)
BASOPHILS NFR BLD AUTO: 0 %
BUN SERPL-MCNC: 33.6 MG/DL (ref 8–23)
CALCIUM SERPL-MCNC: 9.3 MG/DL (ref 8.6–10)
CHLORIDE SERPL-SCNC: 116 MMOL/L (ref 98–107)
CREAT SERPL-MCNC: 2.71 MG/DL (ref 0.67–1.17)
DEPRECATED HCO3 PLAS-SCNC: 16 MMOL/L (ref 22–29)
DONOR IDENTIFICATION: NORMAL
DSA COMMENTS: NORMAL
DSA PRESENT: NO
DSA TEST METHOD: NORMAL
EOSINOPHIL # BLD AUTO: 0 10E3/UL (ref 0–0.7)
EOSINOPHIL NFR BLD AUTO: 1 %
ERYTHROCYTE [DISTWIDTH] IN BLOOD BY AUTOMATED COUNT: 13.7 % (ref 10–15)
GFR SERPL CREATININE-BSD FRML MDRD: 26 ML/MIN/1.73M2
GLUCOSE SERPL-MCNC: 99 MG/DL (ref 70–99)
HCT VFR BLD AUTO: 24.6 % (ref 40–53)
HGB BLD-MCNC: 7.9 G/DL (ref 13.3–17.7)
INT SUB RESULT: NORMAL
INTERF SUBSTANCE: NORMAL
INTSUB TEST METHOD: NORMAL
LYMPHOCYTES # BLD AUTO: 0.3 10E3/UL (ref 0.8–5.3)
LYMPHOCYTES NFR BLD AUTO: 5 %
MAGNESIUM SERPL-MCNC: 1.8 MG/DL (ref 1.7–2.3)
MCH RBC QN AUTO: 32.9 PG (ref 26.5–33)
MCHC RBC AUTO-ENTMCNC: 32.1 G/DL (ref 31.5–36.5)
MCV RBC AUTO: 103 FL (ref 78–100)
MONOCYTES # BLD AUTO: 0.5 10E3/UL (ref 0–1.3)
MONOCYTES NFR BLD AUTO: 7 %
NEUTROPHILS # BLD AUTO: 5.8 10E3/UL (ref 1.6–8.3)
NEUTROPHILS NFR BLD AUTO: 88 %
ORGAN: NORMAL
PHOSPHATE SERPL-MCNC: 2.2 MG/DL (ref 2.5–4.5)
PLATELET # BLD AUTO: 159 10E3/UL (ref 150–450)
POTASSIUM SERPL-SCNC: 5.8 MMOL/L (ref 3.4–5.3)
RBC # BLD AUTO: 2.4 10E6/UL (ref 4.4–5.9)
SA 1 CELL: NORMAL
SA 1 TEST METHOD: NORMAL
SA 2 CELL: NORMAL
SA 2 TEST METHOD: NORMAL
SA1 HI RISK ABY: NORMAL
SA1 MOD RISK ABY: NORMAL
SA2 HI RISK ABY: NORMAL
SA2 MOD RISK ABY: NORMAL
SODIUM SERPL-SCNC: 140 MMOL/L (ref 136–145)
TACROLIMUS BLD-MCNC: 8.8 UG/L (ref 5–15)
TME LAST DOSE: NORMAL H
TME LAST DOSE: NORMAL H
UNACCEPTABLE ANTIGENS: NORMAL
UNOS CPRA: 19
WBC # BLD AUTO: 6.6 10E3/UL (ref 4–11)
ZZZINT SUB COMMENTS: NORMAL
ZZZSA 1  COMMENTS: NORMAL
ZZZSA 2 COMMENTS: NORMAL

## 2022-06-27 PROCEDURE — 80048 BASIC METABOLIC PNL TOTAL CA: CPT | Performed by: INTERNAL MEDICINE

## 2022-06-27 PROCEDURE — 80197 ASSAY OF TACROLIMUS: CPT | Performed by: INTERNAL MEDICINE

## 2022-06-27 PROCEDURE — 84100 ASSAY OF PHOSPHORUS: CPT | Performed by: INTERNAL MEDICINE

## 2022-06-27 PROCEDURE — 83735 ASSAY OF MAGNESIUM: CPT | Performed by: INTERNAL MEDICINE

## 2022-06-27 NOTE — TELEPHONE ENCOUNTER
Yong Friedman MD Colianni, Lauren, RN  Caller: Unspecified (Today,  9:55 AM)  Yes but I would say that is within the window of lab variability so it may be stable

## 2022-06-27 NOTE — TELEPHONE ENCOUNTER
DATE:  6/27/2022     TIME OF RECEIPT FROM LAB:  0944    ORDERING PROVIDER: Dr. Cr    LAB TEST:  hgb    LAB VALUE:  7.9    RESULTS GIVEN WITH READ-BACK TO (PROVIDER):  CAIN Membreno    TIME LAB VALUE REPORTED TO PROVIDER:   0957

## 2022-06-28 ENCOUNTER — TELEPHONE (OUTPATIENT)
Dept: TRANSPLANT | Facility: CLINIC | Age: 57
End: 2022-06-28

## 2022-06-28 DIAGNOSIS — Z48.298 AFTERCARE FOLLOWING ORGAN TRANSPLANT: Primary | ICD-10-CM

## 2022-06-28 NOTE — TELEPHONE ENCOUNTER
Yong Friedman MD Colianni, Lauren, RN  Make sure that he is actually taking sodium bicarb. Is he having diarrhea? Hold bicarb for now, send G6PD with next labs which should be tomorrow or Weds

## 2022-06-29 ENCOUNTER — INFUSION THERAPY VISIT (OUTPATIENT)
Dept: INFUSION THERAPY | Facility: CLINIC | Age: 57
End: 2022-06-29
Attending: NURSE PRACTITIONER
Payer: COMMERCIAL

## 2022-06-29 ENCOUNTER — TELEPHONE (OUTPATIENT)
Dept: TRANSPLANT | Facility: CLINIC | Age: 57
End: 2022-06-29

## 2022-06-29 ENCOUNTER — TELEPHONE (OUTPATIENT)
Dept: PHARMACY | Facility: CLINIC | Age: 57
End: 2022-06-29

## 2022-06-29 DIAGNOSIS — Z48.298 AFTERCARE FOLLOWING ORGAN TRANSPLANT: Primary | ICD-10-CM

## 2022-06-29 DIAGNOSIS — Z94.0 KIDNEY REPLACED BY TRANSPLANT: Primary | ICD-10-CM

## 2022-06-29 LAB — BACTERIA FLD CULT: NORMAL

## 2022-06-29 PROCEDURE — 250N000011 HC RX IP 250 OP 636: Performed by: NURSE PRACTITIONER

## 2022-06-29 PROCEDURE — 96523 IRRIG DRUG DELIVERY DEVICE: CPT

## 2022-06-29 RX ORDER — HEPARIN SODIUM 1000 [USP'U]/ML
3000 INJECTION, SOLUTION INTRAVENOUS; SUBCUTANEOUS WEEKLY
Status: DISCONTINUED | OUTPATIENT
Start: 2022-06-29 | End: 2022-06-29 | Stop reason: HOSPADM

## 2022-06-29 RX ORDER — HEPARIN SODIUM 1000 [USP'U]/ML
3000 INJECTION, SOLUTION INTRAVENOUS; SUBCUTANEOUS WEEKLY
Status: CANCELLED
Start: 2022-07-06

## 2022-06-29 RX ORDER — BUMETANIDE 1 MG/1
1 TABLET ORAL 2 TIMES DAILY
Qty: 60 TABLET | Refills: 1
Start: 2022-06-29 | End: 2022-07-26

## 2022-06-29 RX ADMIN — HEPARIN SODIUM 3200 UNITS: 1000 INJECTION INTRAVENOUS; SUBCUTANEOUS at 11:09

## 2022-06-29 NOTE — PATIENT INSTRUCTIONS
Dear Donald Blanco    Thank you for choosing [facility] ([facility]) for your infusion.  The following information is a summary of our appointment as well as important reminders.      We look forward in seeing you on your next appointment here at [facility] ([facility]).  Please don t hesitate to call us at [phone] to reschedule any of your appointments or to speak with one of the [facility] registered nurses.  It was a pleasure taking care of you today.    Sincerely,    [facility]  [address] [place] [address]  [id number]  Phone:  [phone]

## 2022-06-29 NOTE — LETTER
6/29/2022         RE: Donald Blanco  [address] [facility]  [place] [address]        Dear Colleague,    Thank you for referring your patient, Donald Blanco, to the [facility]. Please see a copy of my visit note below.    Infusion Nursing Note:  Donald Blanco presents today for CVC line cares.    Patient seen by provider today: No   present during visit today: Not Applicable.    Note: sterile CVC dressing change and hep lock completed.  1.6ml heparin intracatheter to each lumen per lumen length     Discharge Plan:   Discharge instructions reviewed with: Patient.  Patient and/or family verbalized understanding of discharge instructions and all questions answered.  AVS to patient via [employer].  Patient will return weekly for next appointment.   Patient discharged in stable condition accompanied by: self.  Departure Mode: Ambulatory.    Administrations This Visit     heparin Lock (1000 units/mL High concentration) 3,000 Units     Admin Date  06/29/2022 Action  Given Dose  3,200 Units Route  Intracatheter Administered By  Sharron Cardona, RN              Sharron Cardona, URIAH                        Again, thank you for allowing me to participate in the care of your patient.        Sincerely,        [facility]

## 2022-06-29 NOTE — TELEPHONE ENCOUNTER
Patient states that he did have 2 days of diarrhea after taking a senna tablet. This diarrhea has since resolved. Will wait for labs to come through tomorrow before changes made to bicarb.    Patient also reports unilateral leg swelling he has had since transplant. Concerned he needs another diuretic. Was on Bumex post transplant for this issue but has since been taken off. Will update providers for plan of care

## 2022-06-29 NOTE — TELEPHONE ENCOUNTER
Post Kidney and Pancreas Transplant Team Conference  Date: 6/29/2022  Transplant Coordinator: Alicia Valentin   Attendees:  [x]  Dr. Cr [x] Alicia Valentin, RN [x] Hanny Simmons LPN     []  Dr. Capellan [x] Rut Pickens, URIAH [] Ester Bragg LPN   [x]  Dr. Duff [x] Phoebe Howard, URIAH    [x]  Dr. Friedman [] Jazzmine Arzate RN [] Alejandro Garces, PharmD   [x] Dr. Mckenna [] Shruthi Reyes, URIAH    [] Dr. Flores [] Campos Falcon RN    [x] Dr. Frank [] Karina Martinez, URIAH [] Aurora Hollingsworth RN   [] Dr. Yu [] Sara Pablo, URIAH    []  Dr. Cohen [] Carolyn Vasquez, RN    [] Dr. Rust [x] Xin Venegas, URIAH     [] Rossy Solorio RN        Verbal Plan Read Back:   Referral to anemia services needed  Increase sodium bicarb to 1950 mg bid or take 1 1/2 baking soda daily in water      Routed to RN Coordinator   Hanny Simmons LPN

## 2022-06-29 NOTE — PROGRESS NOTES
Infusion Nursing Note:  Donald Blanco presents today for CVC line cares.    Patient seen by provider today: No   present during visit today: Not Applicable.    Note: sterile CVC dressing change and hep lock completed.  1.6ml heparin intracatheter to each lumen per lumen length     Discharge Plan:   Discharge instructions reviewed with: Patient.  Patient and/or family verbalized understanding of discharge instructions and all questions answered.  AVS to patient via [email or web address].  Patient will return weekly for next appointment.   Patient discharged in stable condition accompanied by: self.  Departure Mode: Ambulatory.    Administrations This Visit     heparin Lock (1000 units/mL High concentration) 3,000 Units     Admin Date  06/29/2022 Action  Given Dose  3,200 Units Route  Intracatheter Administered By  Sharron Cardona, RN              Sharron Cardona, RN

## 2022-06-29 NOTE — TELEPHONE ENCOUNTER
Yong Friedman MD Colianni, Lauren, RN  Caller: Unspecified (Today,  7:45 AM)  Ok to go back on bumex 1mg bid please for now. We will monitor labs and his weight to see if this is having a negative impact. His kidney is now finally starting to work.

## 2022-06-29 NOTE — TELEPHONE ENCOUNTER
Clinical Pharmacy Consult:                                                      Transplant Specific: 1 Month Post Transplant medication review  Date of Transplant: 05/17/2022  Type of Transplant: kidney  First Transplant: yes  History of rejection: no    Immunosuppression Regimen   TAC 4.5mg qAM & 4.5mg qPM and MMF 750mg qAM & 750mg qPM  Patient specific goal: 8-10  Most recent level: 8.8, date 06/27/22  Immunosuppressant Levels:  Therapeutic  Pt adherent to lab draws: yes  Scr:   Lab Results   Component Value Date    CR 2.71 06/27/2022    CR 6.13 12/15/2020     Side effects: no side effects    Prophylactic Medications  Antibacterial:  Bactrim ss 3 times a week  Scheduled Discontinue Date: Lifelong    Antifungal: Not needed thus far  Scheduled Discontinue Date: N/A    Antiviral: CrCl 10 to 24 mL/minute: Valcyte 450 mg twice weekly   Scheduled Discontinue Date: 3 months    Acid Reducer: Protonix (pantoprazole)  Scheduled Reviewed Date:      Thrombosis Prevention: Aspirin 325 mg PO daily  Scheduled Discontinue Date: monitored by clinic    Blood Pressure Management  Frequency of home Blood Pressure checks: twice daily  Most recent home BP: 121/71  Patient Blood pressure goal: <140/90  Patient blood pressure at goal:  Yes    Hospitalizations/ER visits since last assessment: 1 fluid retention being treated with bumex    Med rec/DUR performed: yes  Med Rec Discrepancies: no    Medication adherence flowsheet 6/29/2022   Patient medication administration: Has assistance with medications   Patient estimated adherence level: %   Pharmacist assessment of adherence: Good   Patient reported doses missed per week: 0-1   Pharmacy MPR: -   Facilitators to medication adherence  Cell phone;Medication dosing chart;Pill box;Caregiver assistance   Patient reported barriers to medication adherence  -   Adherence intervention(s): -      Medication access flowsheet 6/29/2022   Number of pharmacies used: 1   Pharmacy: [facility]   Enrolled in [facility]? Yes   Patient reported barriers to accessing medications: -   Medication access interventions: -      Donald reports feeling good with no side effects. A drain was removed and he had fluid retention and was hospitalized for 3 days.  He is being treated with bumex.    He uses a med list, pill box, alarms on phone and his wife helps him manage his meds.  Last tacro level at goal.    Checks blood twice a day and it at goal.     mtm next month    Jaime Santos [facility]

## 2022-06-30 ENCOUNTER — TELEPHONE (OUTPATIENT)
Dept: TRANSPLANT | Facility: CLINIC | Age: 57
End: 2022-06-30

## 2022-06-30 ENCOUNTER — LAB REQUISITION (OUTPATIENT)
Dept: LAB | Facility: CLINIC | Age: 57
End: 2022-06-30
Payer: COMMERCIAL

## 2022-06-30 DIAGNOSIS — N17.9 ACUTE KIDNEY FAILURE, UNSPECIFIED (H): ICD-10-CM

## 2022-06-30 DIAGNOSIS — Z48.298 AFTERCARE FOLLOWING ORGAN TRANSPLANT: Primary | ICD-10-CM

## 2022-06-30 DIAGNOSIS — Z94.0 KIDNEY TRANSPLANT STATUS: ICD-10-CM

## 2022-06-30 LAB
BASOPHILS # BLD AUTO: 0 10E3/UL (ref 0–0.2)
BASOPHILS NFR BLD AUTO: 0 %
EOSINOPHIL # BLD AUTO: 0.1 10E3/UL (ref 0–0.7)
EOSINOPHIL NFR BLD AUTO: 1 %
ERYTHROCYTE [DISTWIDTH] IN BLOOD BY AUTOMATED COUNT: 13.6 % (ref 10–15)
HCT VFR BLD AUTO: 24.3 % (ref 40–53)
HGB BLD-MCNC: 7.6 G/DL (ref 13.3–17.7)
LYMPHOCYTES # BLD AUTO: 0.2 10E3/UL (ref 0.8–5.3)
LYMPHOCYTES NFR BLD AUTO: 4 %
MCH RBC QN AUTO: 32.8 PG (ref 26.5–33)
MCHC RBC AUTO-ENTMCNC: 31.3 G/DL (ref 31.5–36.5)
MCV RBC AUTO: 105 FL (ref 78–100)
MONOCYTES # BLD AUTO: 0.3 10E3/UL (ref 0–1.3)
MONOCYTES NFR BLD AUTO: 5 %
NEUTROPHILS # BLD AUTO: 5.7 10E3/UL (ref 1.6–8.3)
NEUTROPHILS NFR BLD AUTO: 90 %
PLATELET # BLD AUTO: 152 10E3/UL (ref 150–450)
RBC # BLD AUTO: 2.32 10E6/UL (ref 4.4–5.9)
TACROLIMUS BLD-MCNC: 8.9 UG/L (ref 5–15)
TME LAST DOSE: NORMAL H
TME LAST DOSE: NORMAL H
WBC # BLD AUTO: 6.4 10E3/UL (ref 4–11)

## 2022-06-30 PROCEDURE — 84100 ASSAY OF PHOSPHORUS: CPT | Mod: ORL | Performed by: INTERNAL MEDICINE

## 2022-06-30 PROCEDURE — 83735 ASSAY OF MAGNESIUM: CPT | Mod: ORL | Performed by: INTERNAL MEDICINE

## 2022-06-30 PROCEDURE — 80197 ASSAY OF TACROLIMUS: CPT | Mod: ORL | Performed by: INTERNAL MEDICINE

## 2022-06-30 PROCEDURE — 80048 BASIC METABOLIC PNL TOTAL CA: CPT | Mod: ORL | Performed by: INTERNAL MEDICINE

## 2022-06-30 NOTE — TELEPHONE ENCOUNTER
DATE:  6/30/2022     TIME OF RECEIPT FROM LAB:114    LAB TEST:  hgb    LAB VALUE: 7.6    RESULTS GIVEN WITH READ-BACK TO:Alicia Valentin    TIME LAB VALUE REPORTED TO PROVIDER:1146  Lab phone # [phone]

## 2022-07-01 ENCOUNTER — TELEPHONE (OUTPATIENT)
Dept: TRANSPLANT | Facility: CLINIC | Age: 57
End: 2022-07-01

## 2022-07-01 LAB
ANION GAP SERPL CALCULATED.3IONS-SCNC: 8 MMOL/L (ref 7–15)
BUN SERPL-MCNC: 36.1 MG/DL (ref 8–23)
CALCIUM SERPL-MCNC: 9.6 MG/DL (ref 8.6–10)
CHLORIDE SERPL-SCNC: 116 MMOL/L (ref 98–107)
CREAT SERPL-MCNC: 2.81 MG/DL (ref 0.67–1.17)
DEPRECATED HCO3 PLAS-SCNC: 17 MMOL/L (ref 22–29)
GFR SERPL CREATININE-BSD FRML MDRD: 25 ML/MIN/1.73M2
GLUCOSE SERPL-MCNC: 116 MG/DL (ref 70–99)
MAGNESIUM SERPL-MCNC: 1.6 MG/DL (ref 1.7–2.3)
PHOSPHATE SERPL-MCNC: 2.1 MG/DL (ref 2.5–4.5)
POTASSIUM SERPL-SCNC: 5.8 MMOL/L (ref 3.4–5.3)
SODIUM SERPL-SCNC: 141 MMOL/L (ref 136–145)

## 2022-07-01 NOTE — TELEPHONE ENCOUNTER
Pt does not see another appt for his dialysis graft site flushed and cleaned  Last done Mon or Tues  Thought it was to be done weekly

## 2022-07-04 ENCOUNTER — LAB (OUTPATIENT)
Dept: LAB | Facility: CLINIC | Age: 57
End: 2022-07-04
Payer: COMMERCIAL

## 2022-07-04 DIAGNOSIS — Z79.899 ENCOUNTER FOR LONG-TERM (CURRENT) USE OF HIGH-RISK MEDICATION: ICD-10-CM

## 2022-07-04 DIAGNOSIS — D63.8 ANEMIA IN OTHER CHRONIC DISEASES CLASSIFIED ELSEWHERE: ICD-10-CM

## 2022-07-04 DIAGNOSIS — Z48.288 ENCOUNTER FOR AFTERCARE FOLLOWING MULTIPLE ORGAN TRANSPLANT: ICD-10-CM

## 2022-07-04 DIAGNOSIS — Z94.0 KIDNEY REPLACED BY TRANSPLANT: Primary | ICD-10-CM

## 2022-07-04 LAB
ANION GAP SERPL CALCULATED.3IONS-SCNC: 5 MMOL/L (ref 3–14)
BUN SERPL-MCNC: 38 MG/DL (ref 7–30)
CALCIUM SERPL-MCNC: 9.7 MG/DL (ref 8.5–10.1)
CHLORIDE BLD-SCNC: 115 MMOL/L (ref 94–109)
CO2 SERPL-SCNC: 22 MMOL/L (ref 20–32)
CREAT SERPL-MCNC: 2.68 MG/DL (ref 0.66–1.25)
ERYTHROCYTE [DISTWIDTH] IN BLOOD BY AUTOMATED COUNT: 13.8 % (ref 10–15)
GFR SERPL CREATININE-BSD FRML MDRD: 27 ML/MIN/1.73M2
GLUCOSE BLD-MCNC: 102 MG/DL (ref 70–99)
HCT VFR BLD AUTO: 26.4 % (ref 40–53)
HGB BLD-MCNC: 8.1 G/DL (ref 13.3–17.7)
MAGNESIUM SERPL-MCNC: 1.4 MG/DL (ref 1.6–2.3)
MCH RBC QN AUTO: 32 PG (ref 26.5–33)
MCHC RBC AUTO-ENTMCNC: 30.7 G/DL (ref 31.5–36.5)
MCV RBC AUTO: 104 FL (ref 78–100)
PHOSPHATE SERPL-MCNC: 2.2 MG/DL (ref 2.5–4.5)
PLATELET # BLD AUTO: 155 10E3/UL (ref 150–450)
POTASSIUM BLD-SCNC: 5.2 MMOL/L (ref 3.4–5.3)
RBC # BLD AUTO: 2.53 10E6/UL (ref 4.4–5.9)
SODIUM SERPL-SCNC: 142 MMOL/L (ref 133–144)
WBC # BLD AUTO: 5.3 10E3/UL (ref 4–11)

## 2022-07-04 PROCEDURE — 85018 HEMOGLOBIN: CPT

## 2022-07-04 PROCEDURE — 84100 ASSAY OF PHOSPHORUS: CPT

## 2022-07-04 PROCEDURE — 83550 IRON BINDING TEST: CPT

## 2022-07-04 PROCEDURE — 83735 ASSAY OF MAGNESIUM: CPT

## 2022-07-04 PROCEDURE — 82728 ASSAY OF FERRITIN: CPT

## 2022-07-04 PROCEDURE — 80048 BASIC METABOLIC PNL TOTAL CA: CPT

## 2022-07-04 PROCEDURE — 36415 COLL VENOUS BLD VENIPUNCTURE: CPT

## 2022-07-04 PROCEDURE — 80197 ASSAY OF TACROLIMUS: CPT

## 2022-07-05 ENCOUNTER — TELEPHONE (OUTPATIENT)
Dept: TRANSPLANT | Facility: CLINIC | Age: 57
End: 2022-07-05

## 2022-07-05 ENCOUNTER — TELEPHONE (OUTPATIENT)
Dept: PHARMACY | Facility: CLINIC | Age: 57
End: 2022-07-05

## 2022-07-05 DIAGNOSIS — D63.8 ANEMIA IN OTHER CHRONIC DISEASES CLASSIFIED ELSEWHERE: ICD-10-CM

## 2022-07-05 DIAGNOSIS — Z94.0 KIDNEY REPLACED BY TRANSPLANT: Primary | ICD-10-CM

## 2022-07-05 LAB
FERRITIN SERPL-MCNC: 570 NG/ML (ref 26–388)
IRON SATN MFR SERPL: 24 % (ref 15–46)
IRON SERPL-MCNC: 77 UG/DL (ref 35–180)
TACROLIMUS BLD-MCNC: 9.3 UG/L (ref 5–15)
TIBC SERPL-MCNC: 316 UG/DL (ref 240–430)
TME LAST DOSE: NORMAL H
TME LAST DOSE: NORMAL H

## 2022-07-05 NOTE — TELEPHONE ENCOUNTER
Woodrow Cr MD Colianni, Lauren, RN  Stable, but elevated serum creatinine.  Also low bicarbonate level and high serum potassium level.  Recommend increasing sodium bicarbonate supplement to 1950 mg 3x daily.

## 2022-07-05 NOTE — TELEPHONE ENCOUNTER
Anemia Management Note - Enrollment  SUBJECTIVE/OBJECTIVE:    Referred by Dr. Yong Friedman on 2022  Primary Diagnosis: Anemia of Other Chronic Illness (D63.8)     Secondary Diagnosis:  Organ or tissue replaced by transplant, kidney (Z94.0)  Kidney Tx: 2022  Hgb goal range:  9-10  Epo/Darbo: Aranesp 40mcg every 14 days for Hgb <10. In Clinic.  *dosed at 0.45mcg/kg  Iron regimen:  NA  Labs : 2023  Recent YUDI use, transfusion, IV iron: NA  RX/TX plans : TBD    No history of stroke, MI and blood clots. Hx of Prostate Cancer. Had Prostectomy & radiation Dec 2020.     Contact: OK to speak with Sana Blanco (wife) regarding Scheduling, Medical, and Billing Inforamtion per consent to communicate scanned on 2020.    Anemia Latest Ref Rng & Units 2022   Hemoglobin 13.3 - 17.7 g/dL 7.5(L) 6.2(LL) 6.7(LL) 8.3(L) 7.9(LL) 7.6(LL) 8.1(L)   TSAT 15 - 46 % - - - - - - 24   Ferritin 26 - 388 ng/mL - - - - - - 570(H)       BP Readings from Last 3 Encounters:   22 120/68   22 121/71   22 100/64     Wt Readings from Last 2 Encounters:   22 239 lb 10.2 oz (108.7 kg)   22 235 lb 8 oz (106.8 kg)     Current Outpatient Medications   Medication Sig Dispense Refill     acetaminophen (TYLENOL) 325 MG tablet Take 1-2 tablets (325-650 mg) by mouth every 4 hours as needed for mild pain or fever 100 tablet 0     aspirin (ASA) 325 MG tablet Take 1 tablet (325 mg) by mouth daily 30 tablet 11     atorvastatin (LIPITOR) 10 MG tablet Take 1 tablet (10 mg) by mouth daily (Patient taking differently: Take 10 mg by mouth every evening) 30 tablet 1     B Complex-C-Folic Acid (VIRT-CAPS) 1 MG CAPS Take 1 mg by mouth daily 30 capsule 1     bumetanide (BUMEX) 1 MG tablet Take 1 tablet (1 mg) by mouth 2 times daily 60 tablet 1     Lidocaine (LIDOCARE) 4 % Patch Place 1-2 patches onto the skin every 24 hours To prevent lidocaine toxicity,  patient should be patch free for 12 hrs daily. (Patient taking differently: Place 1-2 patches onto the skin daily as needed To prevent lidocaine toxicity, patient should be patch free for 12 hrs daily.) 10 patch 0     mycophenolate (GENERIC EQUIVALENT) 250 MG capsule Take 3 capsules (750 mg) by mouth 2 times daily 180 capsule 11     oxyCODONE (ROXICODONE) 5 MG tablet Take 1 tablet (5 mg) by mouth every 6 hours as needed for moderate to severe pain 10 tablet 0     pantoprazole (PROTONIX) 40 MG EC tablet Take 1 tablet (40 mg) by mouth daily       polyethylene glycol (MIRALAX) 17 GM/Dose powder Take 1 capful by mouth daily PRN       senna-docusate (SENOKOT-S/PERICOLACE) 8.6-50 MG tablet 1-2 tablets by Oral or Feeding Tube route 2 times daily as needed for constipation Take while taking oxycodone, hold for loose stools. (Patient taking differently: 1-2 tablets by Oral or Feeding Tube route daily Take while taking oxycodone, hold for loose stools.) 100 tablet 0     sodium bicarbonate 650 MG tablet Take 2 tablets (1,300 mg) by mouth 3 times daily 180 tablet 11     sulfamethoxazole-trimethoprim (BACTRIM) 400-80 MG tablet Take 1 tablet by mouth Every Mon, Wed, Fri Morning Increase to one tab by mouth daily when directed by transplant team 30 tablet 11     tacrolimus (GENERIC EQUIVALENT) 0.5 MG capsule Total dose of 4.5 mg by mouth twice daily. 60 capsule 11     tacrolimus (GENERIC EQUIVALENT) 1 MG capsule Total dose 4.5 mg BID. 300 capsule 11     valGANciclovir (VALCYTE) 450 MG tablet Take 1 tablet (450 mg) by mouth every other day       Vitamin D3 (CHOLECALCIFEROL) 25 mcg (1000 units) tablet Take 1 capsule by mouth every morning        ASSESSMENT:  Hgb Not at goal/Initiation of therapy   Ferritin: Pending  TSat: pending  Iron regimen recommended: NA  Recommended YUDI regimen: Aranesp 40mcg every 14 days   Blood Pressure: Stable    PLAN:  1Ian Bennett called for enrollment in Anemia Management Service once Iron levels are  available.  2. Discussed:  anemia overview, monitoring service and goal hemoglobin range and rationale and risks of YUDI blood clots, stroke and increase in blood pressure  3. Dose location: in clinic [place]  4. Labs: [place]  5. Pharmacy: TBD      Need updated iron levels before we can initiate Anemia Treatment. Was able to add on to 7/4/22 lab draw. Iron levels are pending.    7/6/22; Spoke with Donald. Will be at the [facility] tomorrow and get Aranesp at that time.   Going forward he would like to get the injection at [facility]. Called the [facility] to schedule Aranesp for 7/7/22.     Next call date:  7/6/22    Camille Tsang RN   Anemia Services  [address] [address]   [email or web address]   Office : [phone]  Fax: [phone]

## 2022-07-06 ENCOUNTER — TELEPHONE (OUTPATIENT)
Dept: TRANSPLANT | Facility: CLINIC | Age: 57
End: 2022-07-06

## 2022-07-06 DIAGNOSIS — E87.8 LOW BICARBONATE: ICD-10-CM

## 2022-07-06 PROBLEM — D63.8 ANEMIA IN OTHER CHRONIC DISEASES CLASSIFIED ELSEWHERE: Status: ACTIVE | Noted: 2022-07-06

## 2022-07-06 RX ORDER — SODIUM BICARBONATE 650 MG/1
1950 TABLET ORAL 3 TIMES DAILY
Qty: 180 TABLET | Refills: 11
Start: 2022-07-06 | End: 2022-07-12

## 2022-07-06 RX ORDER — METHYLPREDNISOLONE SODIUM SUCCINATE 125 MG/2ML
125 INJECTION, POWDER, LYOPHILIZED, FOR SOLUTION INTRAMUSCULAR; INTRAVENOUS
Status: CANCELLED
Start: 2022-07-06

## 2022-07-06 RX ORDER — DIPHENHYDRAMINE HYDROCHLORIDE 50 MG/ML
50 INJECTION INTRAMUSCULAR; INTRAVENOUS
Status: CANCELLED
Start: 2022-07-06

## 2022-07-06 RX ORDER — ALBUTEROL SULFATE 90 UG/1
1-2 AEROSOL, METERED RESPIRATORY (INHALATION)
Status: CANCELLED
Start: 2022-07-06

## 2022-07-06 RX ORDER — MEPERIDINE HYDROCHLORIDE 25 MG/ML
25 INJECTION INTRAMUSCULAR; INTRAVENOUS; SUBCUTANEOUS EVERY 30 MIN PRN
Status: CANCELLED | OUTPATIENT
Start: 2022-07-06

## 2022-07-06 RX ORDER — ALBUTEROL SULFATE 0.83 MG/ML
2.5 SOLUTION RESPIRATORY (INHALATION)
Status: CANCELLED | OUTPATIENT
Start: 2022-07-06

## 2022-07-06 RX ORDER — EPINEPHRINE 1 MG/ML
0.3 INJECTION, SOLUTION, CONCENTRATE INTRAVENOUS EVERY 5 MIN PRN
Status: CANCELLED | OUTPATIENT
Start: 2022-07-06

## 2022-07-06 NOTE — TELEPHONE ENCOUNTER
Pt does not see any appt related to flushing his port  He has an appt with Dr Flores on Thurs if we want to work around that time frame

## 2022-07-06 NOTE — TELEPHONE ENCOUNTER
Post Kidney and Pancreas Transplant Team Conference  Date: 7/6/2022  Transplant Coordinator: Alicia Valentin     Attendees:  [x]  Dr. Cr [x] Alicia Valentin, RN [x] Hanny Simmons LPN     []  Dr. Capellan [] Rut Pickens RN [] Ester Bargg LPN   []  Dr. Duff [x] Phoebe Howard RN    []  Dr. Friedman [] Jazzmine Arzate RN [x] Alejandro Garces, QuetaD   [x] Dr. Mckenna [] Shruthi Reyes RN    [] Dr. Flores [] Campos Falcon RN    [] Dr. Frank [] Karina Martinez RN [] Aurora Hollingsworth RN   [] Dr. Yu [] Sara Pablo, URIAH    [x]  Dr. Cohen [] Carolyn Vasquez RN    [] Dr. Rust [] Xin Venegas RN    [x] Katie Sadners, MARIO [] Rossy Solorio RN        Verbal Plan Read Back:   Continue current treatment plan    Routed to RN Coordinator   Hanny Simmons LPN

## 2022-07-06 NOTE — TELEPHONE ENCOUNTER
Spoke with patient who will wait to hear from [facility] scheduling regarding dressing changes. Message sent to Dr. Cr regarding when the line can come out.    - - - - - - - - -

## 2022-07-07 ENCOUNTER — OFFICE VISIT (OUTPATIENT)
Dept: TRANSPLANT | Facility: CLINIC | Age: 57
End: 2022-07-07
Attending: INTERNAL MEDICINE
Payer: COMMERCIAL

## 2022-07-07 ENCOUNTER — INFUSION THERAPY VISIT (OUTPATIENT)
Dept: INFUSION THERAPY | Facility: CLINIC | Age: 57
End: 2022-07-07
Attending: INTERNAL MEDICINE
Payer: COMMERCIAL

## 2022-07-07 ENCOUNTER — LAB REQUISITION (OUTPATIENT)
Dept: LAB | Facility: CLINIC | Age: 57
End: 2022-07-07
Payer: COMMERCIAL

## 2022-07-07 VITALS
DIASTOLIC BLOOD PRESSURE: 76 MMHG | RESPIRATION RATE: 16 BRPM | SYSTOLIC BLOOD PRESSURE: 128 MMHG | OXYGEN SATURATION: 98 % | TEMPERATURE: 98.1 F | HEART RATE: 82 BPM

## 2022-07-07 VITALS
WEIGHT: 238.5 LBS | DIASTOLIC BLOOD PRESSURE: 71 MMHG | HEART RATE: 84 BPM | SYSTOLIC BLOOD PRESSURE: 127 MMHG | BODY MASS INDEX: 34.22 KG/M2 | OXYGEN SATURATION: 97 %

## 2022-07-07 DIAGNOSIS — Z94.0 KIDNEY REPLACED BY TRANSPLANT: ICD-10-CM

## 2022-07-07 DIAGNOSIS — Z48.298 AFTERCARE FOLLOWING ORGAN TRANSPLANT: Primary | ICD-10-CM

## 2022-07-07 DIAGNOSIS — Z94.0 KIDNEY REPLACED BY TRANSPLANT: Primary | ICD-10-CM

## 2022-07-07 DIAGNOSIS — Z48.288 ENCOUNTER FOR AFTERCARE FOLLOWING MULTIPLE ORGAN TRANSPLANT: ICD-10-CM

## 2022-07-07 DIAGNOSIS — Z94.0 KIDNEY TRANSPLANT STATUS: ICD-10-CM

## 2022-07-07 DIAGNOSIS — D63.8 ANEMIA IN OTHER CHRONIC DISEASES CLASSIFIED ELSEWHERE: ICD-10-CM

## 2022-07-07 DIAGNOSIS — Z79.899 OTHER LONG TERM (CURRENT) DRUG THERAPY: ICD-10-CM

## 2022-07-07 LAB
ANION GAP SERPL CALCULATED.3IONS-SCNC: 11 MMOL/L (ref 7–15)
BUN SERPL-MCNC: 41.2 MG/DL (ref 8–23)
CALCIUM SERPL-MCNC: 9.7 MG/DL (ref 8.6–10)
CHLORIDE SERPL-SCNC: 104 MMOL/L (ref 98–107)
CREAT SERPL-MCNC: 3.09 MG/DL (ref 0.67–1.17)
DEPRECATED HCO3 PLAS-SCNC: 19 MMOL/L (ref 22–29)
ERYTHROCYTE [DISTWIDTH] IN BLOOD BY AUTOMATED COUNT: 13.2 % (ref 10–15)
GFR SERPL CREATININE-BSD FRML MDRD: 22 ML/MIN/1.73M2
GLUCOSE SERPL-MCNC: 127 MG/DL (ref 70–99)
HCT VFR BLD AUTO: 26.7 % (ref 40–53)
HGB BLD-MCNC: 8.6 G/DL (ref 13.3–17.7)
MAGNESIUM SERPL-MCNC: 1.2 MG/DL (ref 1.7–2.3)
MCH RBC QN AUTO: 32.8 PG (ref 26.5–33)
MCHC RBC AUTO-ENTMCNC: 32.2 G/DL (ref 31.5–36.5)
MCV RBC AUTO: 102 FL (ref 78–100)
PHOSPHATE SERPL-MCNC: 1.8 MG/DL (ref 2.5–4.5)
PLATELET # BLD AUTO: 180 10E3/UL (ref 150–450)
POTASSIUM SERPL-SCNC: 4.7 MMOL/L (ref 3.4–5.3)
RBC # BLD AUTO: 2.62 10E6/UL (ref 4.4–5.9)
SODIUM SERPL-SCNC: 134 MMOL/L (ref 136–145)
TACROLIMUS BLD-MCNC: 9.8 UG/L (ref 5–15)
TME LAST DOSE: NORMAL H
TME LAST DOSE: NORMAL H
WBC # BLD AUTO: 5.5 10E3/UL (ref 4–11)

## 2022-07-07 PROCEDURE — 250N000011 HC RX IP 250 OP 636: Performed by: NURSE PRACTITIONER

## 2022-07-07 PROCEDURE — 80197 ASSAY OF TACROLIMUS: CPT | Mod: ORL | Performed by: INTERNAL MEDICINE

## 2022-07-07 PROCEDURE — 83735 ASSAY OF MAGNESIUM: CPT | Mod: ORL | Performed by: INTERNAL MEDICINE

## 2022-07-07 PROCEDURE — 250N000011 HC RX IP 250 OP 636: Performed by: INTERNAL MEDICINE

## 2022-07-07 PROCEDURE — 96372 THER/PROPH/DIAG INJ SC/IM: CPT | Performed by: INTERNAL MEDICINE

## 2022-07-07 PROCEDURE — G0463 HOSPITAL OUTPT CLINIC VISIT: HCPCS | Mod: 25

## 2022-07-07 PROCEDURE — 84100 ASSAY OF PHOSPHORUS: CPT | Mod: ORL | Performed by: INTERNAL MEDICINE

## 2022-07-07 PROCEDURE — 80048 BASIC METABOLIC PNL TOTAL CA: CPT | Mod: ORL | Performed by: INTERNAL MEDICINE

## 2022-07-07 PROCEDURE — 99213 OFFICE O/P EST LOW 20 MIN: CPT | Performed by: NURSE PRACTITIONER

## 2022-07-07 RX ORDER — EPINEPHRINE 1 MG/ML
0.3 INJECTION, SOLUTION INTRAMUSCULAR; SUBCUTANEOUS EVERY 5 MIN PRN
Status: CANCELLED | OUTPATIENT
Start: 2022-07-07

## 2022-07-07 RX ORDER — ALBUTEROL SULFATE 0.83 MG/ML
2.5 SOLUTION RESPIRATORY (INHALATION)
Status: CANCELLED | OUTPATIENT
Start: 2022-07-07

## 2022-07-07 RX ORDER — HEPARIN SODIUM 1000 [USP'U]/ML
3000 INJECTION, SOLUTION INTRAVENOUS; SUBCUTANEOUS WEEKLY
Status: DISCONTINUED | OUTPATIENT
Start: 2022-07-07 | End: 2022-07-07 | Stop reason: HOSPADM

## 2022-07-07 RX ORDER — DIPHENHYDRAMINE HYDROCHLORIDE 50 MG/ML
50 INJECTION INTRAMUSCULAR; INTRAVENOUS
Status: CANCELLED
Start: 2022-07-07

## 2022-07-07 RX ORDER — HEPARIN SODIUM 1000 [USP'U]/ML
3000 INJECTION, SOLUTION INTRAVENOUS; SUBCUTANEOUS WEEKLY
Status: CANCELLED
Start: 2022-07-13

## 2022-07-07 RX ORDER — MEPERIDINE HYDROCHLORIDE 25 MG/ML
25 INJECTION INTRAMUSCULAR; INTRAVENOUS; SUBCUTANEOUS EVERY 30 MIN PRN
Status: CANCELLED | OUTPATIENT
Start: 2022-07-07

## 2022-07-07 RX ORDER — METHYLPREDNISOLONE SODIUM SUCCINATE 125 MG/2ML
125 INJECTION, POWDER, LYOPHILIZED, FOR SOLUTION INTRAMUSCULAR; INTRAVENOUS
Status: CANCELLED
Start: 2022-07-07

## 2022-07-07 RX ORDER — ALBUTEROL SULFATE 90 UG/1
1-2 AEROSOL, METERED RESPIRATORY (INHALATION)
Status: CANCELLED
Start: 2022-07-07

## 2022-07-07 RX ADMIN — DARBEPOETIN ALFA 40 MCG: 40 INJECTION, SOLUTION INTRAVENOUS; SUBCUTANEOUS at 15:10

## 2022-07-07 RX ADMIN — HEPARIN SODIUM 3200 UNITS: 1000 INJECTION INTRAVENOUS; SUBCUTANEOUS at 15:07

## 2022-07-07 NOTE — LETTER
7/7/2022         RE: Donald Blanco  [address]  [place] [address]        Dear Colleague,    Thank you for referring your patient, Donald Blanco, to the [facility]. Please see a copy of my visit note below.    Nursing Note  Donald Blanco presents today to [facility] for:   Chief Complaint   Patient presents with     Imm/Inj     CVC dressing change & Aranesp     During today's [facility] appointment, orders from Xin Mustafa NP and Dr. Cr were completed.  Frequency: dressing changes weekly. Aranesp PRN every 14 days.    Progress note:  Patient identification verified by name and date of birth.  Assessment completed.  Vitals recorded in Doc Flowsheets.  Patient was provided with education regarding medication/procedure and possible side effects.  Patient verbalized understanding.     present during visit today: Not Applicable.    Treatment Conditions: Patient's hemoglobin of 8.6 today; meets parameters for aranesp.     Premedications: were not ordered.    Drug Waste Record: Yes      Drug Name: Heparin 1000unit/mL; 10mL vial  Dose: 1.6mLs instilled in each lumen of pt's CVC  Route administered: CVC instillation/intracatheter  Amount of waste(mL):6.8mL  Reason for waste: Single use vial    Aranesp given via subcutaneous injection in back side of left upper arm.    Labs: drawn today, prior to [facility] with home care    Vascular access: tunneled CVC line cares completed today; including sterile dressing and cap change. 1.6mLs heparin intracatheter to each lumen per lumen length.    Post Infusion Assessment:  Patient tolerated line cares and aranesp injection without incident.     Discharge Plan:   Follow up plan of care with: ongoing infusions at [facility]., transplant coordinator., ordering provider as scheduled. and AVS to [facility]  Discharge instructions were  reviewed with patient.  Patient/representative verbalized understanding of discharge instructions and all questions answered.  Patient discharged from [facility] in stable condition.    Sylvia Graham RN       Administrations This Visit     darbepoetin liam-polysorbate (ARANESP) injection 40 mcg     Admin Date  07/07/2022 Action  Given Dose  40 mcg Route  Subcutaneous Administered By  Sylvia Graham RN          heparin Lock (1000 units/mL High concentration) 3,000 Units     Admin Date  07/07/2022 Action  Given Dose  3,200 Units Route  Intracatheter Administered By  Sylvia Graham RN                /76   Pulse 82   Temp 98.1  F (36.7  C) (Oral)   Resp 16   SpO2 98%         Again, thank you for allowing me to participate in the care of your patient.        Sincerely,        [facility] [place]

## 2022-07-07 NOTE — LETTER
7/7/2022         RE: Donald Blanco  [address] [facility]  [place] [address]        Dear Colleague,    Thank you for referring your patient, Donald Blanco, to the [facility]. Please see a copy of my visit note below.    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 S/p living donor kidney transplant without stent on 5/17/22 (imported kidney with vasospasm in OR) with DGF. Recent drain removed accidentally a few days prior 6/18. 6/20/22 CT A/P with large twin-transplant fluid collection, admit for percutaneous drain placement.     Was discharged 6/23/22.  Noacute complaints.  No fevers chills nausea or vomiting.  No hematuria, dysuria or frequency.        Transplant History:    Transplant Type:  LDKT  Donor Type: Living   Transplant Date:  5/17/2022 (Kidney)   Ureteral Stent:  No   Crossmatch:  negative   DSA at Tx:  No  Baseline Cr: TBD   DeNovo DSA: No    Acute Rejection Hx:  No    Present Maintenance Immunosuppression:  Tacrolimus and Mycophenolate mofetil    CMV IgG Ab Discordance:  No  EBV IgG Ab Discordance:  No    BK Viremia:  No  EBV Viremia:  No    Transplant Coordinator: Alicia Valentin     Transplant Office Phone Number: [phone]     Immunosuppressant Medications     Immunosuppressive Agents Disp Start End     mycophenolate (GENERIC EQUIVALENT) 250 MG capsule    180 capsule 5/27/2022     Sig - Route: Take 3 capsules (750 mg) by mouth 2 times daily - Oral    Class: E-Prescribe     tacrolimus (GENERIC EQUIVALENT) 0.5 MG capsule    60 capsule 6/23/2022     Sig: Total dose of 4.5 mg by mouth twice daily.    Class: No Print Out     tacrolimus (GENERIC EQUIVALENT) 1 MG capsule    300 capsule 6/23/2022     Sig: Total dose 4.5 mg BID.     Class: No Print Out          Possible Immunosuppression-related side effects:   []             headache  []             vivid dreams  []             irritability  []             cognitive difficuties  []             fine tremor  []             nausea  []             diarrhea  []             neuropathy      []             edema  []             renal calcineurin toxicity  []             hyperkalemia  []             post-transplant diabetes  []             decreased appetite  []             increased appetite  []             other:  []             none    Prescription Medications as of 7/7/2022       Rx Number Disp Refills Start End Last Dispensed Date Next Fill Date Owning Pharmacy    acetaminophen (TYLENOL) 325 MG tablet  100 tablet 0 5/27/2022    [address]    Sig: Take 1-2 tablets (325-650 mg) by mouth every 4 hours as needed for mild pain or fever    Class: E-Prescribe    Route: Oral    aspirin (ASA) 325 MG tablet  30 tablet 11 5/28/2022    [address]    Sig: Take 1 tablet (325 mg) by mouth daily    Class: E-Prescribe    Route: Oral    atorvastatin (LIPITOR) 10 MG tablet  30 tablet 1 5/28/2022    [address]    Sig: Take 1 tablet (10 mg) by mouth daily    Class: E-Prescribe    Route: Oral    B Complex-C-Folic Acid (VIRT-CAPS) 1 MG CAPS  30 capsule 1 5/28/2022    [address]    Sig: Take 1 mg by mouth daily    Class: E-Prescribe    Route: Oral    bumetanide (BUMEX) 1 MG tablet  60 tablet 1 6/29/2022    [facility] [address]    Sig: Take 1 tablet (1 mg) by mouth 2 times daily    Class: No Print Out    Route: Oral    Lidocaine (LIDOCARE) 4 % Patch  10 patch 0 5/27/2022    [facility]  [address]    Sig: Place 1-2 patches onto the skin every 24 hours To prevent lidocaine toxicity, patient should be patch free for 12 hrs daily.    Class: E-Prescribe    Route: Transdermal    mycophenolate (GENERIC EQUIVALENT) 250 MG capsule  180 capsule 11 2022    [address]    Sig: Take 3 capsules (750 mg) by mouth 2 times daily    Class: E-Prescribe    Route: Oral    oxyCODONE (ROXICODONE) 5 MG tablet  10 tablet 0 2022    [address]    Sig: Take 1 tablet (5 mg) by mouth every 6 hours as needed for moderate to severe pain    Class: E-Prescribe    Earliest Fill Date: 2022    Route: Oral    pantoprazole (PROTONIX) 40 MG EC tablet    2022        Sig: Take 1 tablet (40 mg) by mouth daily    Class: No Print Out    Route: Oral    polyethylene glycol (MIRALAX) 17 GM/Dose powder        [place] [address] [address], [address]    Sig: Take 1 capful by mouth daily PRN    Class: Historical    Route: Oral    senna-docusate (SENOKOT-S/PERICOLACE) 8.6-50 MG tablet  100 tablet 0 2022    [address]    Si-2 tablets by Oral or Feeding Tube route 2 times daily as needed for constipation Take while taking oxycodone, hold for loose stools.    Class: E-Prescribe    Route: Oral or Feeding Tube    sodium bicarbonate 650 MG tablet  180 tablet 11 2022        Sig: Take 3 tablets (1,950 mg) by mouth 3 times daily    Class: No Print Out    Route: Oral    sulfamethoxazole-trimethoprim (BACTRIM) 400-80 MG tablet  30 tablet 11 2022    [address]    Sig: Take 1 tablet by mouth Every Mon, Wed, Fri Morning Increase to one tab by mouth daily when directed by transplant team    Class: E-Prescribe    Route: Oral    tacrolimus  (GENERIC EQUIVALENT) 0.5 MG capsule  60 capsule 11 6/23/2022    [facility] Discharge - [place], [place] - [address]    Sig: Total dose of 4.5 mg by mouth twice daily.    Class: No Print Out    tacrolimus (GENERIC EQUIVALENT) 1 MG capsule  300 capsule 11 6/23/2022    [facility] Discharge - [place], [place] - [address]    Sig: Total dose 4.5 mg BID.    Class: No Print Out    valGANciclovir (VALCYTE) 450 MG tablet    6/25/2022        Sig: Take 1 tablet (450 mg) by mouth every other day    Class: No Print Out    Route: Oral    Vitamin D3 (CHOLECALCIFEROL) 25 mcg (1000 units) tablet            Sig: Take 1 capsule by mouth every morning     Class: Historical    Route: Oral          O:   Pulse:  [84] 84  BP: (127)/(71) 127/71  SpO2:  [97 %] 97 %  General Appearance: in no apparent distress.   Skin: Normal, no rashes or jaundice  Heart: regular rate and rhythm  Lungs: easy respirations, no audible wheezing.  Abdomen: rounded and obese, The wound is dry and intact, without hernia. The abdomen is non-tender. The kidney graft is not tender.  There is no ascites.  Extremities: Tremor absent.   Edema: absent.     Transplant Immunosuppression Labs Latest Ref Rng & Units 7/7/2022 7/4/2022 6/30/2022 6/27/2022 6/23/2022   Creat 0.66 - 1.25 mg/dL - 2.68(H) 2.81(H) 2.71(H) 3.46(H)   BUN 7 - 30 mg/dL - 38(H) 36.1(H) 33.6(H) 53.2(H)   WBC 4.0 - 11.0 10e3/uL 5.5 5.3 6.4 6.6 5.9   Neutrophil % - - 90 88 83   ANEU 1.6 - 8.3 10e9/L - - - - -       Chemistries:   Recent Labs   Lab Test 07/04/22  0800   BUN 38*   CR 2.68*   GFRESTIMATED 27*   *     Lab Results   Component Value Date    A1C 5.1 06/03/2022     Recent Labs   Lab Test 06/03/22  1048   ALBUMIN 3.3*   BILITOTAL 0.5   ALKPHOS 83   AST 9   ALT 23     Urine Studies:  Recent Labs   Lab Test 05/09/22  0957   COLOR Yellow   APPEARANCE Clear   URINEGLC Negative   URINEBILI Negative   URINEKETONE Negative   SG 1.015   UBLD Moderate*   URINEPH  6.0   PROTEIN 300 *   NITRITE Negative   LEUKEST Small*   RBCU 0   WBCU 6*     No lab results found.  Hematology:   Recent Labs   Lab Test 07/07/22  0815 07/04/22  0800 06/30/22  0830   HGB 8.6* 8.1* 7.6*    155 152   WBC 5.5 5.3 6.4     Coags:   Recent Labs   Lab Test 06/21/22  1227 05/09/22  0959   INR 1.12 0.98     HLA antibodies:   SA1 Hi Risk Ramin   Date Value Ref Range Status   09/03/2020 None  Final     SA1 HI RISK RAMIN   Date Value Ref Range Status   06/22/2022 None  Final     SA1 Mod Risk Ramin   Date Value Ref Range Status   09/03/2020 None  Final     SA1 MOD RISK RAMIN   Date Value Ref Range Status   06/22/2022 None  Final     SA2 Hi Risk Ramin   Date Value Ref Range Status   09/03/2020 None  Final     SA2 HI RISK RAMIN   Date Value Ref Range Status   06/22/2022 None  Final     SA2 Mod Risk Ramin   Date Value Ref Range Status   09/03/2020 DR:1  Final     SA2 MOD RISK RAMIN   Date Value Ref Range Status   06/22/2022 None  Final       Assessment: Donald Blanco is doing fairly well s/p LDKT:  Issues we addressed during his visit include:    Plan:    1. Graft function: stable.  Slight trend up  2. Immunosuppression Management: No change continue MMF and tacro .  Complexity of management:Medium.  Contributing factors: organ dysfunction  3. Healing well.  No evidence of fluid collection on exam.  Will follow up with surgeon next week   Followup: as directed    Total Time: 20 min,   Counselling Time: 10 min.            Again, thank you for allowing me to participate in the care of your patient.        Sincerely,        Katie Sanders NP

## 2022-07-07 NOTE — PROGRESS NOTES
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 S/p living donor kidney transplant without stent on 5/17/22 (imported kidney with vasospasm in OR) with DGF. Recent drain removed accidentally a few days prior 6/18. 6/20/22 CT A/P with large twin-transplant fluid collection, admit for percutaneous drain placement.     Was discharged 6/23/22.  Noacute complaints.  No fevers chills nausea or vomiting.  No hematuria, dysuria or frequency.        Transplant History:    Transplant Type:  LDKT  Donor Type: Living   Transplant Date:  5/17/2022 (Kidney)   Ureteral Stent:  No   Crossmatch:  negative   DSA at Tx:  No  Baseline Cr: TBD   DeNovo DSA: No    Acute Rejection Hx:  No    Present Maintenance Immunosuppression:  Tacrolimus and Mycophenolate mofetil    CMV IgG Ab Discordance:  No  EBV IgG Ab Discordance:  No    BK Viremia:  No  EBV Viremia:  No    Transplant Coordinator: Alicia Valentin     Transplant Office Phone Number: [phone]     Immunosuppressant Medications     Immunosuppressive Agents Disp Start End     mycophenolate (GENERIC EQUIVALENT) 250 MG capsule    180 capsule 5/27/2022     Sig - Route: Take 3 capsules (750 mg) by mouth 2 times daily - Oral    Class: E-Prescribe     tacrolimus (GENERIC EQUIVALENT) 0.5 MG capsule    60 capsule 6/23/2022     Sig: Total dose of 4.5 mg by mouth twice daily.    Class: No Print Out     tacrolimus (GENERIC EQUIVALENT) 1 MG capsule    300 capsule 6/23/2022     Sig: Total dose 4.5 mg BID.    Class: No Print Out          Possible Immunosuppression-related side effects:   []             headache  []             vivid dreams  []             irritability  []             cognitive difficuties  []             fine tremor  []              nausea  []             diarrhea  []             neuropathy      []             edema  []             renal calcineurin toxicity  []             hyperkalemia  []             post-transplant diabetes  []             decreased appetite  []             increased appetite  []             other:  []             none    Prescription Medications as of 7/7/2022       Rx Number Disp Refills Start End Last Dispensed Date Next Fill Date Owning Pharmacy    acetaminophen (TYLENOL) 325 MG tablet  100 tablet 0 5/27/2022    [address]    Sig: Take 1-2 tablets (325-650 mg) by mouth every 4 hours as needed for mild pain or fever    Class: E-Prescribe    Route: Oral    aspirin (ASA) 325 MG tablet  30 tablet 11 5/28/2022    [address]    Sig: Take 1 tablet (325 mg) by mouth daily    Class: E-Prescribe    Route: Oral    atorvastatin (LIPITOR) 10 MG tablet  30 tablet 1 5/28/2022    [address]    Sig: Take 1 tablet (10 mg) by mouth daily    Class: E-Prescribe    Route: Oral    B Complex-C-Folic Acid (VIRT-CAPS) 1 MG CAPS  30 capsule 1 5/28/2022    [address]    Sig: Take 1 mg by mouth daily    Class: E-Prescribe    Route: Oral    bumetanide (BUMEX) 1 MG tablet  60 tablet 1 6/29/2022    [facility] [address]    Sig: Take 1 tablet (1 mg) by mouth 2 times daily    Class: No Print Out    Route: Oral    Lidocaine (LIDOCARE) 4 % Patch  10 patch 0 5/27/2022    [address]    Sig: Place 1-2 patches onto the skin every 24 hours To prevent lidocaine toxicity, patient should be patch free for 12 hrs daily.    Class: E-Prescribe    Route: Transdermal    mycophenolate (GENERIC  EQUIVALENT) 250 MG capsule  180 capsule 11 2022    [address]    Sig: Take 3 capsules (750 mg) by mouth 2 times daily    Class: E-Prescribe    Route: Oral    oxyCODONE (ROXICODONE) 5 MG tablet  10 tablet 0 2022    [address]    Sig: Take 1 tablet (5 mg) by mouth every 6 hours as needed for moderate to severe pain    Class: E-Prescribe    Earliest Fill Date: 2022    Route: Oral    pantoprazole (PROTONIX) 40 MG EC tablet    2022        Sig: Take 1 tablet (40 mg) by mouth daily    Class: No Print Out    Route: Oral    polyethylene glycol (MIRALAX) 17 GM/Dose powder        [address], [address]    Sig: Take 1 capful by mouth daily PRN    Class: Historical    Route: Oral    senna-docusate (SENOKOT-S/PERICOLACE) 8.6-50 MG tablet  100 tablet 0 2022    [address]    Si-2 tablets by Oral or Feeding Tube route 2 times daily as needed for constipation Take while taking oxycodone, hold for loose stools.    Class: E-Prescribe    Route: Oral or Feeding Tube    sodium bicarbonate 650 MG tablet  180 tablet 11 2022        Sig: Take 3 tablets (1,950 mg) by mouth 3 times daily    Class: No Print Out    Route: Oral    sulfamethoxazole-trimethoprim (BACTRIM) 400-80 MG tablet  30 tablet 11 2022    [address]    Sig: Take 1 tablet by mouth Every Mon, Wed, Fri Morning Increase to one tab by mouth daily when directed by transplant team    Class: E-Prescribe    Route: Oral    tacrolimus (GENERIC EQUIVALENT) 0.5 MG capsule  60 capsule 11 2022    [address]    Sig: Total dose of 4.5 mg by mouth twice daily.    Class: No Print Out    tacrolimus (GENERIC  EQUIVALENT) 1 MG capsule  300 capsule 11 6/23/2022    [facility] [place], [place] - [address] [facility]    Sig: Total dose 4.5 mg BID.    Class: No Print Out    valGANciclovir (VALCYTE) 450 MG tablet    6/25/2022        Sig: Take 1 tablet (450 mg) by mouth every other day    Class: No Print Out    Route: Oral    Vitamin D3 (CHOLECALCIFEROL) 25 mcg (1000 units) tablet            Sig: Take 1 capsule by mouth every morning     Class: Historical    Route: Oral          O:   Pulse:  [84] 84  BP: (127)/(71) 127/71  SpO2:  [97 %] 97 %  General Appearance: in no apparent distress.   Skin: Normal, no rashes or jaundice  Heart: regular rate and rhythm  Lungs: easy respirations, no audible wheezing.  Abdomen: rounded and obese, The wound is dry and intact, without hernia. The abdomen is non-tender. The kidney graft is not tender.  There is no ascites.  Extremities: Tremor absent.   Edema: absent.     Transplant Immunosuppression Labs Latest Ref Rng & Units 7/7/2022 7/4/2022 6/30/2022 6/27/2022 6/23/2022   Creat 0.66 - 1.25 mg/dL - 2.68(H) 2.81(H) 2.71(H) 3.46(H)   BUN 7 - 30 mg/dL - 38(H) 36.1(H) 33.6(H) 53.2(H)   WBC 4.0 - 11.0 10e3/uL 5.5 5.3 6.4 6.6 5.9   Neutrophil % - - 90 88 83   ANEU 1.6 - 8.3 10e9/L - - - - -       Chemistries:   Recent Labs   Lab Test 07/04/22  0800   BUN 38*   CR 2.68*   GFRESTIMATED 27*   *     Lab Results   Component Value Date    A1C 5.1 06/03/2022     Recent Labs   Lab Test 06/03/22  1048   ALBUMIN 3.3*   BILITOTAL 0.5   ALKPHOS 83   AST 9   ALT 23     Urine Studies:  Recent Labs   Lab Test 05/09/22  0957   COLOR Yellow   APPEARANCE Clear   URINEGLC Negative   URINEBILI Negative   URINEKETONE Negative   SG 1.015   UBLD Moderate*   URINEPH 6.0   PROTEIN 300 *   NITRITE Negative   LEUKEST Small*   RBCU 0   WBCU 6*     No lab results found.  Hematology:   Recent Labs   Lab Test 07/07/22  0815 07/04/22  0800 06/30/22  0830   HGB 8.6* 8.1* 7.6*    155 152   WBC 5.5  5.3 6.4     Coags:   Recent Labs   Lab Test 06/21/22  1227 05/09/22  0959   INR 1.12 0.98     HLA antibodies:   SA1 Hi Risk Ramin   Date Value Ref Range Status   09/03/2020 None  Final     SA1 HI RISK RAMIN   Date Value Ref Range Status   06/22/2022 None  Final     SA1 Mod Risk Ramin   Date Value Ref Range Status   09/03/2020 None  Final     SA1 MOD RISK RAMIN   Date Value Ref Range Status   06/22/2022 None  Final     SA2 Hi Risk Ramin   Date Value Ref Range Status   09/03/2020 None  Final     SA2 HI RISK RAMIN   Date Value Ref Range Status   06/22/2022 None  Final     SA2 Mod Risk Ramin   Date Value Ref Range Status   09/03/2020 DR:1  Final     SA2 MOD RISK RAMIN   Date Value Ref Range Status   06/22/2022 None  Final       Assessment: Donald Blanco is doing fairly well s/p LDKT:  Issues we addressed during his visit include:    Plan:    1. Graft function: stable.  Slight trend up  2. Immunosuppression Management: No change continue MMF and tacro .  Complexity of management:Medium.  Contributing factors: organ dysfunction  3. Healing well.  No evidence of fluid collection on exam.  Will follow up with surgeon next week   Followup: as directed    Total Time: 20 min,   Counselling Time: 10 min.

## 2022-07-07 NOTE — PATIENT INSTRUCTIONS
Dear Donald Blanco    Thank you for choosing [facility] ([facility]) for your line care and aranesp injection.  The following information is a summary of our appointment as well as important reminders.      We look forward in seeing you on your next appointment here at [facility] ([facility]).  Please don t hesitate to call us at [phone] to reschedule any of your appointments or to speak with one of the [facility] registered nurses.  It was a pleasure taking care of you today.    Sincerely,    [facility]  [facility]  [address] [place] [address]  [id number]  Phone:  [phone]

## 2022-07-07 NOTE — PROGRESS NOTES
Nursing Note  Donald Blanco presents [facility] for:   Chief Complaint   Patient presents with     Imm/Inj     CVC dressing change & Aranesp     During today's [facility] appointment, orders from Xin Mustafa NP and Dr. Cr were completed.  Frequency: dressing changes weekly. Aranesp PRN every 14 days.    Progress note:  Patient identification verified by name and date of birth.  Assessment completed.  Vitals recorded in Doc Flowsheets.  Patient was provided with education regarding medication/procedure and possible side effects.  Patient verbalized understanding.     present during visit today: Not Applicable.    Treatment Conditions: Patient's hemoglobin of 8.6 today; meets parameters for aranesp.     Premedications: were not ordered.    Drug Waste Record: Yes      Drug Name: Heparin 1000unit/mL; 10mL vial  Dose: 1.6mLs instilled in each lumen of pt's CVC  Route administered: CVC instillation/intracatheter  Amount of waste(mL):6.8mL  Reason for waste: Single use vial    Aranesp given via subcutaneous injection in back side of left upper arm.    Labs: drawn today, prior to [facility] with home care    Vascular access: tunneled CVC line cares completed today; including sterile dressing and cap change. 1.6mLs heparin intracatheter to each lumen per lumen length.    Post Infusion Assessment:  Patient tolerated line cares and aranesp injection without incident.     Discharge Plan:   Follow up plan of care with: ongoing infusions at [facility]., transplant coordinator., ordering provider as scheduled. and [facility]  Discharge instructions were reviewed with patient.  Patient/representative verbalized understanding of discharge instructions and all questions answered.  Patient discharged from [facility] in stable condition.    Sylvia Stauffer RN       Administrations This Visit      darbepoetin liam-polysorbate (ARANESP) injection 40 mcg     Admin Date  07/07/2022 Action  Given Dose  40 mcg Route  Subcutaneous Administered By  Sylvia Graham RN          heparin Lock (1000 units/mL High concentration) 3,000 Units     Admin Date  07/07/2022 Action  Given Dose  3,200 Units Route  Intracatheter Administered By  Sylvia Graham, RN                /76   Pulse 82   Temp 98.1  F (36.7  C) (Oral)   Resp 16   SpO2 98%

## 2022-07-08 ENCOUNTER — TELEPHONE (OUTPATIENT)
Dept: PHARMACY | Facility: CLINIC | Age: 57
End: 2022-07-08

## 2022-07-08 ENCOUNTER — ANCILLARY PROCEDURE (OUTPATIENT)
Dept: ULTRASOUND IMAGING | Facility: CLINIC | Age: 57
End: 2022-07-08
Attending: INTERNAL MEDICINE
Payer: COMMERCIAL

## 2022-07-08 ENCOUNTER — TELEPHONE (OUTPATIENT)
Dept: TRANSPLANT | Facility: CLINIC | Age: 57
End: 2022-07-08

## 2022-07-08 DIAGNOSIS — Z48.298 AFTERCARE FOLLOWING ORGAN TRANSPLANT: ICD-10-CM

## 2022-07-08 DIAGNOSIS — R79.89 ELEVATED SERUM CREATININE: Primary | ICD-10-CM

## 2022-07-08 DIAGNOSIS — R79.89 ELEVATED SERUM CREATININE: ICD-10-CM

## 2022-07-08 PROCEDURE — 76776 US EXAM K TRANSPL W/DOPPLER: CPT | Performed by: RADIOLOGY

## 2022-07-08 NOTE — TELEPHONE ENCOUNTER
Woodrow Cr MD Colianni, Lauren, URIAH  Recommend ultrasound and urinalysis.  If no improvement by labs next week, would look to do a biopsy.     Jovanni Paulino scheduled for today at 1516

## 2022-07-08 NOTE — TELEPHONE ENCOUNTER
Anemia Management Note  SUBJECTIVE/OBJECTIVE:    Referred by Dr. Yong Friedman on 2022  Primary Diagnosis: Anemia of Other Chronic Illness (D63.8)     Secondary Diagnosis:  Organ or tissue replaced by transplant, kidney (Z94.0)  Kidney Tx: 2022  Hgb goal range:  9-10  Epo/Darbo: Aranesp 40mcg every 14 days for Hgb <10. In Clinic.  *dosed at 0.45mcg/kg  Iron regimen:  NA  Labs : 2023  Recent YUDI use, transfusion, IV iron: NA  RX/TX plans : TBD     No history of stroke, MI and blood clots. Hx of Prostate Cancer. Had Prostectomy & radiation Dec 2020.      Contact: OK to speak with Sana Blanco (wife) regarding Scheduling, Medical, and Billing Inforamtion per consent to communicate scanned on 2020.    Anemia Latest Ref Rng & Units 2022   YUDI Dose - - - - - - - 40mcg   Hemoglobin 13.3 - 17.7 g/dL 6.2(LL) 6.7(LL) 8.3(L) 7.9(LL) 7.6(LL) 8.1(L) 8.6(L)   TSAT 15 - 46 % - - - - - 24 -   Ferritin 26 - 388 ng/mL - - - - - 570(H) -     BP Readings from Last 3 Encounters:   22 128/76   22 127/71   22 120/68     Wt Readings from Last 2 Encounters:   22 238 lb 8 oz (108.2 kg)   22 239 lb 10.2 oz (108.7 kg)           ASSESSMENT:  Hgb:Not at goal/Initiation of therapy  TSat: not at goal (>30%) but ferritin >500ng/mL.  IV iron not indicated at this time per anemia protocol. Ferritin: At goal (>100ng/mL)    PLAN:  Dose with aranesp and RTC for hgb then aranesp if needed in 2 week(s). MD appt on 22. Ok to give Aranesp at that appt on day 11.     Orders needed to be renewed (for next follow-up date) in EPIC: None    Iron labs due:  Aug 2022    Plan discussed with:  No call, chart review      NEXT FOLLOW-UP DATE:  22  11:30a appt    Camille Tsang RN   Anemia Services  [address] [address]   [email or web address]   Office : [phone]  Fax: [phone]

## 2022-07-11 ENCOUNTER — OFFICE VISIT (OUTPATIENT)
Dept: TRANSPLANT | Facility: CLINIC | Age: 57
End: 2022-07-11
Attending: SURGERY
Payer: COMMERCIAL

## 2022-07-11 ENCOUNTER — LAB (OUTPATIENT)
Dept: LAB | Facility: CLINIC | Age: 57
End: 2022-07-11
Payer: COMMERCIAL

## 2022-07-11 VITALS
BODY MASS INDEX: 34.05 KG/M2 | HEART RATE: 86 BPM | WEIGHT: 237.3 LBS | SYSTOLIC BLOOD PRESSURE: 139 MMHG | DIASTOLIC BLOOD PRESSURE: 72 MMHG | OXYGEN SATURATION: 98 %

## 2022-07-11 DIAGNOSIS — D63.8 ANEMIA IN OTHER CHRONIC DISEASES CLASSIFIED ELSEWHERE: ICD-10-CM

## 2022-07-11 DIAGNOSIS — Z94.0 KIDNEY REPLACED BY TRANSPLANT: ICD-10-CM

## 2022-07-11 DIAGNOSIS — Z94.0 KIDNEY REPLACED BY TRANSPLANT: Primary | ICD-10-CM

## 2022-07-11 DIAGNOSIS — Z48.298 AFTERCARE FOLLOWING ORGAN TRANSPLANT: ICD-10-CM

## 2022-07-11 DIAGNOSIS — Z79.899 ENCOUNTER FOR LONG-TERM CURRENT USE OF MEDICATION: ICD-10-CM

## 2022-07-11 LAB
ALBUMIN UR-MCNC: 30 MG/DL
ANION GAP SERPL CALCULATED.3IONS-SCNC: 6 MMOL/L (ref 3–14)
APPEARANCE UR: CLEAR
BILIRUB UR QL STRIP: NEGATIVE
BUN SERPL-MCNC: 44 MG/DL (ref 7–30)
CALCIUM SERPL-MCNC: 10 MG/DL (ref 8.5–10.1)
CHLORIDE BLD-SCNC: 112 MMOL/L (ref 94–109)
CO2 SERPL-SCNC: 24 MMOL/L (ref 20–32)
COLOR UR AUTO: YELLOW
CREAT SERPL-MCNC: 2.96 MG/DL (ref 0.66–1.25)
ERYTHROCYTE [DISTWIDTH] IN BLOOD BY AUTOMATED COUNT: 13.2 % (ref 10–15)
GFR SERPL CREATININE-BSD FRML MDRD: 24 ML/MIN/1.73M2
GLUCOSE BLD-MCNC: 114 MG/DL (ref 70–99)
GLUCOSE UR STRIP-MCNC: NEGATIVE MG/DL
HCT VFR BLD AUTO: 27.5 % (ref 40–53)
HGB BLD-MCNC: 8.8 G/DL (ref 13.3–17.7)
HGB UR QL STRIP: NEGATIVE
KETONES UR STRIP-MCNC: NEGATIVE MG/DL
LEUKOCYTE ESTERASE UR QL STRIP: NEGATIVE
MCH RBC QN AUTO: 32.7 PG (ref 26.5–33)
MCHC RBC AUTO-ENTMCNC: 32 G/DL (ref 31.5–36.5)
MCV RBC AUTO: 102 FL (ref 78–100)
NITRATE UR QL: NEGATIVE
PH UR STRIP: 6 [PH] (ref 5–7)
PLATELET # BLD AUTO: 186 10E3/UL (ref 150–450)
POTASSIUM BLD-SCNC: 4.8 MMOL/L (ref 3.4–5.3)
RBC # BLD AUTO: 2.69 10E6/UL (ref 4.4–5.9)
RBC #/AREA URNS AUTO: NORMAL /HPF
SODIUM SERPL-SCNC: 142 MMOL/L (ref 133–144)
SP GR UR STRIP: 1.01 (ref 1–1.03)
TACROLIMUS BLD-MCNC: 10.3 UG/L (ref 5–15)
TME LAST DOSE: NORMAL H
TME LAST DOSE: NORMAL H
UROBILINOGEN UR STRIP-ACNC: 0.2 E.U./DL
WBC # BLD AUTO: 4.7 10E3/UL (ref 4–11)
WBC #/AREA URNS AUTO: NORMAL /HPF

## 2022-07-11 PROCEDURE — 80197 ASSAY OF TACROLIMUS: CPT

## 2022-07-11 PROCEDURE — 85027 COMPLETE CBC AUTOMATED: CPT

## 2022-07-11 PROCEDURE — 99213 OFFICE O/P EST LOW 20 MIN: CPT | Performed by: SURGERY

## 2022-07-11 PROCEDURE — 81001 URINALYSIS AUTO W/SCOPE: CPT

## 2022-07-11 PROCEDURE — 80048 BASIC METABOLIC PNL TOTAL CA: CPT

## 2022-07-11 PROCEDURE — 36415 COLL VENOUS BLD VENIPUNCTURE: CPT

## 2022-07-11 ASSESSMENT — PAIN SCALES - GENERAL: PAINLEVEL: NO PAIN (0)

## 2022-07-11 NOTE — LETTER
7/11/2022          RE: Donald Blanco  [address] [facility]  [place] [address]        Dear Colleague,    Thank you for referring your patient, Donald Blanco, to the [facility]. Please see a copy of my visit note below.    Transplant Surgery Progress Note    Transplants:  5/17/2022 (Kidney); Postoperative day:  55  S: no complaints  Transplant History:    Transplant Type:  LDKT  Donor Type: Living   Transplant Date:  5/17/2022 (Kidney)   Ureteral Stent:  No   Crossmatch:  negative   DSA at Tx:  No  Baseline Cr: 2.5 - 3   DeNovo DSA: No    Acute Rejection Hx:  No    Present Maintenance Immunosuppression:  Tacrolimus and Mycophenolate mofetil      Transplant Coordinator: Alicia Valentin     Transplant Office Phone Number: [phone]     Immunosuppressant Medications     Immunosuppressive Agents Disp Start End     mycophenolate (GENERIC EQUIVALENT) 250 MG capsule    180 capsule 5/27/2022     Sig - Route: Take 3 capsules (750 mg) by mouth 2 times daily - Oral    Class: E-Prescribe     tacrolimus (GENERIC EQUIVALENT) 0.5 MG capsule    60 capsule 6/23/2022     Sig: Total dose of 4.5 mg by mouth twice daily.    Class: No Print Out     tacrolimus (GENERIC EQUIVALENT) 1 MG capsule    300 capsule 6/23/2022     Sig: Total dose 4.5 mg BID.    Class: No Print Out          Possible Immunosuppression-related side effects:   []             headache  []             vivid dreams  []             irritability  []             cognitive difficuties  []             fine tremor  []             nausea  []             diarrhea  []             neuropathy      []             edema  []             renal calcineurin toxicity  []             hyperkalemia  []             post-transplant diabetes  []             decreased appetite  []             increased appetite  []             other:  []             none    Prescription Medications as of 7/11/2022       Rx Number Disp Refills Start End Last Dispensed Date Next  Fill Date Owning Pharmacy    acetaminophen (TYLENOL) 325 MG tablet  100 tablet 0 5/27/2022    [address]    Sig: Take 1-2 tablets (325-650 mg) by mouth every 4 hours as needed for mild pain or fever    Class: E-Prescribe    Route: Oral    aspirin (ASA) 325 MG tablet  30 tablet 11 5/28/2022    [address]    Sig: Take 1 tablet (325 mg) by mouth daily    Class: E-Prescribe    Route: Oral    atorvastatin (LIPITOR) 10 MG tablet  30 tablet 1 5/28/2022    [address]    Sig: Take 1 tablet (10 mg) by mouth daily    Class: E-Prescribe    Route: Oral    B Complex-C-Folic Acid (VIRT-CAPS) 1 MG CAPS  30 capsule 1 5/28/2022    [address]    Sig: Take 1 mg by mouth daily    Class: E-Prescribe    Route: Oral    bumetanide (BUMEX) 1 MG tablet  60 tablet 1 6/29/2022    [facility] [place], [place] - [address] [place] [address]    Sig: Take 1 tablet (1 mg) by mouth 2 times daily    Class: No Print Out    Route: Oral    mycophenolate (GENERIC EQUIVALENT) 250 MG capsule  180 capsule 11 5/27/2022    [address]    Sig: Take 3 capsules (750 mg) by mouth 2 times daily    Class: E-Prescribe    Route: Oral    oxyCODONE (ROXICODONE) 5 MG tablet  10 tablet 0 5/27/2022    [address]    Sig: Take 1 tablet (5 mg) by mouth every 6 hours as needed for moderate to severe pain    Class: E-Prescribe    Earliest Fill Date: 5/27/2022    Route: Oral    pantoprazole (PROTONIX) 40 MG EC tablet    6/24/2022        Sig: Take 1 tablet (40 mg) by mouth daily    Class: No Print Out    Route: Oral    polyethylene glycol (MIRALAX) 17 GM/Dose powder        [facility] [address] [address] [facility], [address]    Sig: Take 1 capful by mouth daily PRN    Class: Historical    Route: Oral    senna-docusate (SENOKOT-S/PERICOLACE) 8.6-50 MG tablet  100 tablet 0 2022    [address]    Si-2 tablets by Oral or Feeding Tube route 2 times daily as needed for constipation Take while taking oxycodone, hold for loose stools.    Class: E-Prescribe    Route: Oral or Feeding Tube    sodium bicarbonate 650 MG tablet  180 tablet 11 2022        Sig: Take 3 tablets (1,950 mg) by mouth 3 times daily    Class: No Print Out    Route: Oral    sulfamethoxazole-trimethoprim (BACTRIM) 400-80 MG tablet  30 tablet 11 2022    [address]    Sig: Take 1 tablet by mouth Every Mon, Wed, Fri Morning Increase to one tab by mouth daily when directed by transplant team    Class: E-Prescribe    Route: Oral    tacrolimus (GENERIC EQUIVALENT) 0.5 MG capsule  60 capsule 11 2022    [address]    Sig: Total dose of 4.5 mg by mouth twice daily.    Class: No Print Out    tacrolimus (GENERIC EQUIVALENT) 1 MG capsule  300 capsule 11 2022    [address]    Sig: Total dose 4.5 mg BID.    Class: No Print Out    valGANciclovir (VALCYTE) 450 MG tablet    2022        Sig: Take 1 tablet (450 mg) by mouth every other day    Class: No Print Out    Route: Oral    Vitamin D3 (CHOLECALCIFEROL) 25 mcg (1000 units) tablet            Sig: Take 1 capsule by mouth every morning     Class: Historical    Route: Oral    Lidocaine (LIDOCARE) 4 % Patch  10 patch 0 2022    [address]    Sig: Place 1-2 patches onto the skin every 24 hours To prevent lidocaine toxicity, patient should be patch free for 12 hrs  daily.    Class: E-Prescribe    Route: Transdermal          O:   Pulse:  [86] 86  BP: (139)/(72) 139/72  SpO2:  [98 %] 98 %  General Appearance: in no apparent distress.   Skin: Normal, no rashes or jaundice  Heart: regular rate and rhythm  Lungs: easy respirations, no audible wheezing.  Abdomen: rounded, The wound is dry and intact, without hernia. The abdomen is non-tender. The kidney graft is not tender.  There is no ascites.  Extremities: Tremor absent.   Edema: present on the right side. 1+    Transplant Immunosuppression Labs Latest Ref Rng & Units 7/11/2022 7/7/2022 7/4/2022 6/30/2022 6/27/2022   Creat 0.66 - 1.25 mg/dL 2.96(H) 3.09(H) 2.68(H) 2.81(H) 2.71(H)   BUN 7 - 30 mg/dL 44(H) 41.2(H) 38(H) 36.1(H) 33.6(H)   WBC 4.0 - 11.0 10e3/uL 4.7 5.5 5.3 6.4 6.6   Neutrophil % - - - 90 88   ANEU 1.6 - 8.3 10e9/L - - - - -       Chemistries:   Recent Labs   Lab Test 07/11/22  0712   BUN 44*   CR 2.96*   GFRESTIMATED 24*   *     Lab Results   Component Value Date    A1C 5.1 06/03/2022     Recent Labs   Lab Test 06/03/22  1048   ALBUMIN 3.3*   BILITOTAL 0.5   ALKPHOS 83   AST 9   ALT 23     Urine Studies:  Recent Labs   Lab Test 07/11/22  0714   COLOR Yellow   APPEARANCE Clear   URINEGLC Negative   URINEBILI Negative   URINEKETONE Negative   SG 1.015   UBLD Negative   URINEPH 6.0   PROTEIN 30 *   NITRITE Negative   LEUKEST Negative   RBCU None Seen   WBCU None Seen     No lab results found.  Hematology:   Recent Labs   Lab Test 07/11/22  0712 07/07/22  0815 07/04/22  0800   HGB 8.8* 8.6* 8.1*    180 155   WBC 4.7 5.5 5.3     Coags:   Recent Labs   Lab Test 06/21/22  1227 05/09/22  0959   INR 1.12 0.98     HLA antibodies:   SA1 Hi Risk Ramin   Date Value Ref Range Status   09/03/2020 None  Final     SA1 HI RISK RAMIN   Date Value Ref Range Status   06/22/2022 None  Final     SA1 Mod Risk Ramin   Date Value Ref Range Status   09/03/2020 None  Final     SA1 MOD RISK RAMIN   Date Value Ref Range Status   06/22/2022  None  Final     SA2 Hi Risk Ramin   Date Value Ref Range Status   09/03/2020 None  Final     SA2 HI RISK RAMIN   Date Value Ref Range Status   06/22/2022 None  Final     SA2 Mod Risk Ramin   Date Value Ref Range Status   09/03/2020 DR:1  Final     SA2 MOD RISK RAMIN   Date Value Ref Range Status   06/22/2022 None  Final       Assessment: Donald Blanco is doing well s/p LDKT:  Issues we addressed during his visit include:  Increased creat, now down to 4.09 from the upper 4s. Creat seems to be correlated with high Tac levels. WIll await normalization of Tac to reevaluate baseline creat  Rpt U/S last week shows not much change in fluid around kidney even though report suggests it. Will continue to watch this unless creat rises further. In which case we will consider a catheter drain.     Plan:    1. Graft function: fair  2. Immunosuppression Management: No change no change .  Complexity of management:Medium.  Contributing factors: organ dysfunction    Followup: 1 month    Immunosuppressive regimen, management and long term risks discussed in detail. Changes, when applicable made as per orders.      Total Time: 20 min,   Counselling Time: 10 min.    .

## 2022-07-11 NOTE — PROGRESS NOTES
Transplant Surgery Progress Note    Transplants:  5/17/2022 (Kidney); Postoperative day:  55  S: no complaints  Transplant History:    Transplant Type:  LDKT  Donor Type: Living   Transplant Date:  5/17/2022 (Kidney)   Ureteral Stent:  No   Crossmatch:  negative   DSA at Tx:  No  Baseline Cr: 2.5 - 3   DeNovo DSA: No    Acute Rejection Hx:  No    Present Maintenance Immunosuppression:  Tacrolimus and Mycophenolate mofetil      Transplant Coordinator: Alicia Valentin     Transplant Office Phone Number: [phone]     Immunosuppressant Medications     Immunosuppressive Agents Disp Start End     mycophenolate (GENERIC EQUIVALENT) 250 MG capsule    180 capsule 5/27/2022     Sig - Route: Take 3 capsules (750 mg) by mouth 2 times daily - Oral    Class: E-Prescribe     tacrolimus (GENERIC EQUIVALENT) 0.5 MG capsule    60 capsule 6/23/2022     Sig: Total dose of 4.5 mg by mouth twice daily.    Class: No Print Out     tacrolimus (GENERIC EQUIVALENT) 1 MG capsule    300 capsule 6/23/2022     Sig: Total dose 4.5 mg BID.    Class: No Print Out          Possible Immunosuppression-related side effects:   []             headache  []             vivid dreams  []             irritability  []             cognitive difficuties  []             fine tremor  []             nausea  []             diarrhea  []             neuropathy      []             edema  []             renal calcineurin toxicity  []             hyperkalemia  []             post-transplant diabetes  []             decreased appetite  []             increased appetite  []             other:  []             none    Prescription Medications as of 7/11/2022       Rx Number Disp Refills Start End Last Dispensed Date Next Fill Date Owning Pharmacy    acetaminophen (TYLENOL) 325 MG tablet  100 tablet 0 5/27/2022    [facility] - [place], [place] - [address] [facility]    Sig: Take 1-2 tablets (325-650 mg) by mouth every 4 hours as needed for mild pain or  fever    Class: E-Prescribe    Route: Oral    aspirin (ASA) 325 MG tablet  30 tablet 11 5/28/2022    [place], [place] - [address] [place] [address]    Sig: Take 1 tablet (325 mg) by mouth daily    Class: E-Prescribe    Route: Oral    atorvastatin (LIPITOR) 10 MG tablet  30 tablet 1 5/28/2022    [address]    Sig: Take 1 tablet (10 mg) by mouth daily    Class: E-Prescribe    Route: Oral    B Complex-C-Folic Acid (VIRT-CAPS) 1 MG CAPS  30 capsule 1 5/28/2022    [place], [place] - [address] [facility] [address]    Sig: Take 1 mg by mouth daily    Class: E-Prescribe    Route: Oral    bumetanide (BUMEX) 1 MG tablet  60 tablet 1 6/29/2022    [facility] [address]    Sig: Take 1 tablet (1 mg) by mouth 2 times daily    Class: No Print Out    Route: Oral    mycophenolate (GENERIC EQUIVALENT) 250 MG capsule  180 capsule 11 5/27/2022    [place], [place] - [address] [place] [address]    Sig: Take 3 capsules (750 mg) by mouth 2 times daily    Class: E-Prescribe    Route: Oral    oxyCODONE (ROXICODONE) 5 MG tablet  10 tablet 0 5/27/2022    [address]    Sig: Take 1 tablet (5 mg) by mouth every 6 hours as needed for moderate to severe pain    Class: E-Prescribe    Earliest Fill Date: 5/27/2022    Route: Oral    pantoprazole (PROTONIX) 40 MG EC tablet    6/24/2022        Sig: Take 1 tablet (40 mg) by mouth daily    Class: No Print Out    Route: Oral    polyethylene glycol (MIRALAX) 17 GM/Dose powder        [facility] [address] [address], [address]    Sig: Take 1 capful by mouth daily PRN    Class: Historical    Route: Oral    senna-docusate (SENOKOT-S/PERICOLACE) 8.6-50 MG tablet  100 tablet 0 5/27/2022    [facility]  [address]    Si-2 tablets by Oral or Feeding Tube route 2 times daily as needed for constipation Take while taking oxycodone, hold for loose stools.    Class: E-Prescribe    Route: Oral or Feeding Tube    sodium bicarbonate 650 MG tablet  180 tablet 11 2022        Sig: Take 3 tablets (1,950 mg) by mouth 3 times daily    Class: No Print Out    Route: Oral    sulfamethoxazole-trimethoprim (BACTRIM) 400-80 MG tablet  30 tablet 11 2022    [address]    Sig: Take 1 tablet by mouth Every Mon, Wed, Fri Morning Increase to one tab by mouth daily when directed by transplant team    Class: E-Prescribe    Route: Oral    tacrolimus (GENERIC EQUIVALENT) 0.5 MG capsule  60 capsule 11 2022    [address]    Sig: Total dose of 4.5 mg by mouth twice daily.    Class: No Print Out    tacrolimus (GENERIC EQUIVALENT) 1 MG capsule  300 capsule 11 2022    [address]    Sig: Total dose 4.5 mg BID.    Class: No Print Out    valGANciclovir (VALCYTE) 450 MG tablet    2022        Sig: Take 1 tablet (450 mg) by mouth every other day    Class: No Print Out    Route: Oral    Vitamin D3 (CHOLECALCIFEROL) 25 mcg (1000 units) tablet            Sig: Take 1 capsule by mouth every morning     Class: Historical    Route: Oral    Lidocaine (LIDOCARE) 4 % Patch  10 patch 0 2022    [address]    Sig: Place 1-2 patches onto the skin every 24 hours To prevent lidocaine toxicity, patient should be patch free for 12 hrs daily.    Class: E-Prescribe    Route: Transdermal          O:   Pulse:  [86] 86  BP: (139)/(72) 139/72  SpO2:  [98 %] 98 %  General Appearance: in no apparent distress.   Skin: Normal, no rashes or jaundice  Heart: regular rate and rhythm  Lungs: easy  respirations, no audible wheezing.  Abdomen: rounded, The wound is dry and intact, without hernia. The abdomen is non-tender. The kidney graft is not tender.  There is no ascites.  Extremities: Tremor absent.   Edema: present on the right side. 1+    Transplant Immunosuppression Labs Latest Ref Rng & Units 7/11/2022 7/7/2022 7/4/2022 6/30/2022 6/27/2022   Creat 0.66 - 1.25 mg/dL 2.96(H) 3.09(H) 2.68(H) 2.81(H) 2.71(H)   BUN 7 - 30 mg/dL 44(H) 41.2(H) 38(H) 36.1(H) 33.6(H)   WBC 4.0 - 11.0 10e3/uL 4.7 5.5 5.3 6.4 6.6   Neutrophil % - - - 90 88   ANEU 1.6 - 8.3 10e9/L - - - - -       Chemistries:   Recent Labs   Lab Test 07/11/22  0712   BUN 44*   CR 2.96*   GFRESTIMATED 24*   *     Lab Results   Component Value Date    A1C 5.1 06/03/2022     Recent Labs   Lab Test 06/03/22  1048   ALBUMIN 3.3*   BILITOTAL 0.5   ALKPHOS 83   AST 9   ALT 23     Urine Studies:  Recent Labs   Lab Test 07/11/22  0714   COLOR Yellow   APPEARANCE Clear   URINEGLC Negative   URINEBILI Negative   URINEKETONE Negative   SG 1.015   UBLD Negative   URINEPH 6.0   PROTEIN 30 *   NITRITE Negative   LEUKEST Negative   RBCU None Seen   WBCU None Seen     No lab results found.  Hematology:   Recent Labs   Lab Test 07/11/22  0712 07/07/22  0815 07/04/22  0800   HGB 8.8* 8.6* 8.1*    180 155   WBC 4.7 5.5 5.3     Coags:   Recent Labs   Lab Test 06/21/22  1227 05/09/22  0959   INR 1.12 0.98     HLA antibodies:   SA1 Hi Risk Ramin   Date Value Ref Range Status   09/03/2020 None  Final     SA1 HI RISK RAMIN   Date Value Ref Range Status   06/22/2022 None  Final     SA1 Mod Risk Ramin   Date Value Ref Range Status   09/03/2020 None  Final     SA1 MOD RISK RAMIN   Date Value Ref Range Status   06/22/2022 None  Final     SA2 Hi Risk Ramin   Date Value Ref Range Status   09/03/2020 None  Final     SA2 HI RISK RAMIN   Date Value Ref Range Status   06/22/2022 None  Final     SA2 Mod Risk Ramin   Date Value Ref Range Status   09/03/2020 DR:1  Final     SA2 MOD  RISK RAMIN   Date Value Ref Range Status   06/22/2022 None  Final       Assessment: Donald Blanco is doing well s/p LDKT:  Issues we addressed during his visit include:  Increased creat, now down to 4.09 from the upper 4s. Creat seems to be correlated with high Tac levels. WIll await normalization of Tac to reevaluate baseline creat  Rpt U/S last week shows not much change in fluid around kidney even though report suggests it. Will continue to watch this unless creat rises further. In which case we will consider a catheter drain.     Plan:    1. Graft function: fair  2. Immunosuppression Management: No change no change .  Complexity of management:Medium.  Contributing factors: organ dysfunction    Followup: 1 month    Immunosuppressive regimen, management and long term risks discussed in detail. Changes, when applicable made as per orders.      Total Time: 20 min,   Counselling Time: 10 min.    .

## 2022-07-12 ENCOUNTER — VIRTUAL VISIT (OUTPATIENT)
Dept: UROLOGY | Facility: CLINIC | Age: 57
End: 2022-07-12
Payer: COMMERCIAL

## 2022-07-12 VITALS — WEIGHT: 237 LBS | BODY MASS INDEX: 35.1 KG/M2 | HEIGHT: 69 IN

## 2022-07-12 DIAGNOSIS — Z94.0 KIDNEY REPLACED BY TRANSPLANT: Chronic | ICD-10-CM

## 2022-07-12 DIAGNOSIS — C61 PROSTATE CANCER (H): Primary | ICD-10-CM

## 2022-07-12 DIAGNOSIS — E87.8 LOW BICARBONATE: ICD-10-CM

## 2022-07-12 PROCEDURE — 99213 OFFICE O/P EST LOW 20 MIN: CPT | Mod: 95 | Performed by: UROLOGY

## 2022-07-12 RX ORDER — TACROLIMUS 1 MG/1
4 CAPSULE ORAL 2 TIMES DAILY
Qty: 240 CAPSULE | Refills: 11 | Status: SHIPPED | OUTPATIENT
Start: 2022-07-12 | End: 2022-07-19

## 2022-07-12 RX ORDER — SODIUM BICARBONATE 650 MG/1
1950 TABLET ORAL 3 TIMES DAILY
Qty: 180 TABLET | Refills: 11 | Status: SHIPPED | OUTPATIENT
Start: 2022-07-12 | End: 2023-03-09

## 2022-07-12 RX ORDER — TACROLIMUS 0.5 MG/1
0.5 CAPSULE ORAL 2 TIMES DAILY
Qty: 60 CAPSULE | Refills: 11 | Status: SHIPPED | OUTPATIENT
Start: 2022-07-12 | End: 2022-07-19

## 2022-07-12 ASSESSMENT — PAIN SCALES - GENERAL: PAINLEVEL: NO PAIN (0)

## 2022-07-12 NOTE — TELEPHONE ENCOUNTER
Medication/Refill approved per CPA:    [facility] COLLABORATIVE AGREEMENT FOR  IMMUNOSUPPRESSENT PRESCRIPTION MODIFICATION.      Routing encounter to Transplant as an FYI.    Thanks,  Shantel Xavier, PharmD  Specialty Pharmacist/Transplant  [facility]  [phone]

## 2022-07-12 NOTE — PROGRESS NOTES
Donald Blanco is a 57 year old male who is being evaluated via a billable video visit.      How would you like to obtain your AVS? MyChart  If the video visit is dropped, the invitation should be resent by: Text to cell phone: [phone]  Will anyone else be joining your video visit? No    Video Start Time: 1:39 PM    CHIEF COMPLAINT   It was my pleasure to see Donald Blanco who is a 57 year old male for follow-up of Prostate Cancer.      HPI  Donald Blanco is a very pleasant 57 year old male who presents with a history of Prostate Cancer. [place] 4+3 disease, stage pT3b with RALP 12/14/2020 with focal positive margin at site of NORBERTO. He recovered without complication. Tolerated adjuvant radiation without complication. PSA stable.     Kidney transplant completed 5/17/2022. Noting some mild leakage since transplant, but not terribly bothersome.     PSA  6/1/2022 - 0.04  3/1/2022 - 0.05  2/21/2022 - 0.06  11/15/2021 - 0.04  8/9/2021 - 0.03  3/25/2021 - <0.1    RALP 12/14/2020  TUMOR     Histologic Type:         - Acinar adenocarcinoma     Histologic Grade       Grade Group and [place] Score:           - Grade group 3 (Clark Score 4 + 3 = 7)         Percentage of Pattern 4: 60%     Extraprostatic Extension (EPE):         - Present, nonfocal       Location of Extraprostatic Extension:           - Left posterior     Urinary Bladder Neck Invasion:         - Not identified     Seminal Vesicle Invasion:         - Present         - Left     MARGINS     Margins:         - Involved by invasive carcinoma         - Limited (< 3 mm)       Linear Length of Positive Margin(s) (Millimeters): 2 mm       Focality:           - Unifocal       Location of Positive Margin(s):           - Left posterior       Margin Positivity in Area of Extraprostatic Extension (EPE):           - Present         Location(s): Left seminal vesicle       Clark Pattern at Positive Margin(s):           - Pattern 4     LYMPH NODES     Number of  Lymph Nodes Involved:         - 0     Number of Lymph Nodes Examined:         8     PATHOLOGIC STAGE CLASSIFICATION (PTNM, AJCC 8TH EDITION)     Primary Tumor (pT):         - pT3b     Regional Lymph Nodes (pN):         - pN0          Allergies:   Patient has no known allergies.         Review of Systems:  From intake questionnaire     Skin: negative  Eyes: negative  Ears/Nose/Throat: negative  Respiratory: No shortness of breath, dyspnea on exertion, cough, or hemoptysis  Cardiovascular: No chest pain or palpitations  Gastrointestinal: negative; no nausea/vomiting, constipation or diarrhea  Genitourinary: as per HPI  Musculoskeletal: negative  Neurologic: negative  Psychiatric: negative  Hematologic/Lymphatic/Immunologic: negative  Endocrine: negative         Physical Exam:     Vitals:  No vitals were obtained today due to virtual visit.    Physical Exam   GENERAL: Healthy, alert and no distress  EYES: Eyes grossly normal to inspection.  No discharge or erythema, or obvious scleral/conjunctival abnormalities.  RESP: No audible wheeze, cough, or visible cyanosis.  No visible retractions or increased work of breathing.    SKIN: Visible skin clear. No significant rash, abnormal pigmentation or lesions.  NEURO: Cranial nerves grossly intact.  Mentation and speech appropriate for age.  PSYCH: Mentation appears normal, affect normal/bright, judgement and insight intact, normal speech and appearance well-groomed.      Outside and Past Medical records:    Review of the result(s) of each unique test - PSA         Assessment and Plan:     57 year old male with stage pT3b, Clark 4+3=7 prostate cancer, s/p RALP completed 12/14/2020. Focal positive margins at site of NORBERTO. SVI noted as well. High-risk by Decipher. Completed adjuvant radiotherapy with Dr. Alexander. Did not receive hormones. PSA detectable, but stable. From a urologic perspective, there is no further treatment planned at this point for prostate cancer, and will  continue with observation.      - Follow-up 6 months with PSA    Orders  Orders Placed This Encounter   Procedures     PSA tumor marker     Video-Visit Details    Type of service:  Video Visit    Video End Time:1:48 PM    Originating Location (pt. Location): Home    Distant Location (provider location):  [facility] UROLOGY AND [facility] FOR PROSTATE AND UROLOGIC CANCERS    Platform used for Video Visit: [employer]    10 minutes spent on the date of the encounter including direct interaction with the patient, performing chart review, history and exam, documentation and further activities as noted above.    Nabil Vásquez MD  Urology  [facility]

## 2022-07-14 ENCOUNTER — LAB (OUTPATIENT)
Dept: LAB | Facility: CLINIC | Age: 57
End: 2022-07-14
Payer: COMMERCIAL

## 2022-07-14 DIAGNOSIS — Z79.899 ENCOUNTER FOR LONG-TERM CURRENT USE OF MEDICATION: ICD-10-CM

## 2022-07-14 DIAGNOSIS — Z94.0 KIDNEY REPLACED BY TRANSPLANT: ICD-10-CM

## 2022-07-14 DIAGNOSIS — Z48.298 AFTERCARE FOLLOWING ORGAN TRANSPLANT: ICD-10-CM

## 2022-07-14 LAB
ANION GAP SERPL CALCULATED.3IONS-SCNC: 5 MMOL/L (ref 3–14)
BUN SERPL-MCNC: 48 MG/DL (ref 7–30)
CALCIUM SERPL-MCNC: 9.8 MG/DL (ref 8.5–10.1)
CHLORIDE BLD-SCNC: 114 MMOL/L (ref 94–109)
CO2 SERPL-SCNC: 25 MMOL/L (ref 20–32)
CREAT SERPL-MCNC: 3.55 MG/DL (ref 0.66–1.25)
ERYTHROCYTE [DISTWIDTH] IN BLOOD BY AUTOMATED COUNT: 13.4 % (ref 10–15)
GFR SERPL CREATININE-BSD FRML MDRD: 19 ML/MIN/1.73M2
GLUCOSE BLD-MCNC: 113 MG/DL (ref 70–99)
HCT VFR BLD AUTO: 28.6 % (ref 40–53)
HGB BLD-MCNC: 9.1 G/DL (ref 13.3–17.7)
MAGNESIUM SERPL-MCNC: 1.3 MG/DL (ref 1.6–2.3)
MCH RBC QN AUTO: 32.4 PG (ref 26.5–33)
MCHC RBC AUTO-ENTMCNC: 31.8 G/DL (ref 31.5–36.5)
MCV RBC AUTO: 102 FL (ref 78–100)
PHOSPHATE SERPL-MCNC: 2.4 MG/DL (ref 2.5–4.5)
PLATELET # BLD AUTO: 192 10E3/UL (ref 150–450)
POTASSIUM BLD-SCNC: 4.6 MMOL/L (ref 3.4–5.3)
RBC # BLD AUTO: 2.81 10E6/UL (ref 4.4–5.9)
SODIUM SERPL-SCNC: 144 MMOL/L (ref 133–144)
TACROLIMUS BLD-MCNC: 11.9 UG/L (ref 5–15)
TME LAST DOSE: NORMAL H
TME LAST DOSE: NORMAL H
WBC # BLD AUTO: 5 10E3/UL (ref 4–11)

## 2022-07-14 PROCEDURE — 83735 ASSAY OF MAGNESIUM: CPT

## 2022-07-14 PROCEDURE — 80048 BASIC METABOLIC PNL TOTAL CA: CPT

## 2022-07-14 PROCEDURE — 36415 COLL VENOUS BLD VENIPUNCTURE: CPT

## 2022-07-14 PROCEDURE — 84100 ASSAY OF PHOSPHORUS: CPT

## 2022-07-14 PROCEDURE — 80180 DRUG SCRN QUAN MYCOPHENOLATE: CPT

## 2022-07-14 PROCEDURE — 85027 COMPLETE CBC AUTOMATED: CPT

## 2022-07-14 PROCEDURE — 80197 ASSAY OF TACROLIMUS: CPT

## 2022-07-15 LAB
MYCOPHENOLATE SERPL LC/MS/MS-MCNC: 1.19 MG/L (ref 1–3.5)
MYCOPHENOLATE-G SERPL LC/MS/MS-MCNC: 113.8 MG/L (ref 30–95)
TME LAST DOSE: ABNORMAL H
TME LAST DOSE: ABNORMAL H

## 2022-07-18 ENCOUNTER — INFUSION THERAPY VISIT (OUTPATIENT)
Dept: INFUSION THERAPY | Facility: CLINIC | Age: 57
End: 2022-07-18
Attending: NURSE PRACTITIONER
Payer: COMMERCIAL

## 2022-07-18 ENCOUNTER — TELEPHONE (OUTPATIENT)
Dept: PHARMACY | Facility: CLINIC | Age: 57
End: 2022-07-18

## 2022-07-18 ENCOUNTER — OFFICE VISIT (OUTPATIENT)
Dept: NEPHROLOGY | Facility: CLINIC | Age: 57
End: 2022-07-18
Attending: INTERNAL MEDICINE
Payer: COMMERCIAL

## 2022-07-18 ENCOUNTER — LAB (OUTPATIENT)
Dept: LAB | Facility: CLINIC | Age: 57
End: 2022-07-18
Payer: COMMERCIAL

## 2022-07-18 VITALS
SYSTOLIC BLOOD PRESSURE: 129 MMHG | DIASTOLIC BLOOD PRESSURE: 73 MMHG | OXYGEN SATURATION: 99 % | WEIGHT: 242 LBS | HEART RATE: 72 BPM | BODY MASS INDEX: 35.74 KG/M2

## 2022-07-18 DIAGNOSIS — Z48.298 AFTERCARE FOLLOWING ORGAN TRANSPLANT: ICD-10-CM

## 2022-07-18 DIAGNOSIS — D84.9 IMMUNOSUPPRESSION (H): ICD-10-CM

## 2022-07-18 DIAGNOSIS — Z94.0 KIDNEY REPLACED BY TRANSPLANT: ICD-10-CM

## 2022-07-18 DIAGNOSIS — Z94.0 KIDNEY REPLACED BY TRANSPLANT: Primary | ICD-10-CM

## 2022-07-18 DIAGNOSIS — D63.8 ANEMIA IN OTHER CHRONIC DISEASES CLASSIFIED ELSEWHERE: ICD-10-CM

## 2022-07-18 DIAGNOSIS — Z79.899 ENCOUNTER FOR LONG-TERM CURRENT USE OF MEDICATION: ICD-10-CM

## 2022-07-18 DIAGNOSIS — R79.89 ELEVATED SERUM CREATININE: ICD-10-CM

## 2022-07-18 DIAGNOSIS — T86.19 DELAYED GRAFT FUNCTION OF KIDNEY: ICD-10-CM

## 2022-07-18 LAB
ANION GAP SERPL CALCULATED.3IONS-SCNC: 6 MMOL/L (ref 3–14)
BUN SERPL-MCNC: 44 MG/DL (ref 7–30)
CALCIUM SERPL-MCNC: 9.6 MG/DL (ref 8.5–10.1)
CHLORIDE BLD-SCNC: 115 MMOL/L (ref 94–109)
CO2 SERPL-SCNC: 24 MMOL/L (ref 20–32)
CREAT SERPL-MCNC: 3.4 MG/DL (ref 0.66–1.25)
ERYTHROCYTE [DISTWIDTH] IN BLOOD BY AUTOMATED COUNT: 13 % (ref 10–15)
GFR SERPL CREATININE-BSD FRML MDRD: 20 ML/MIN/1.73M2
GLUCOSE BLD-MCNC: 114 MG/DL (ref 70–99)
HCT VFR BLD AUTO: 27.4 % (ref 40–53)
HGB BLD-MCNC: 8.7 G/DL (ref 13.3–17.7)
MAGNESIUM SERPL-MCNC: 1.3 MG/DL (ref 1.6–2.3)
MCH RBC QN AUTO: 32.6 PG (ref 26.5–33)
MCHC RBC AUTO-ENTMCNC: 31.8 G/DL (ref 31.5–36.5)
MCV RBC AUTO: 103 FL (ref 78–100)
PHOSPHATE SERPL-MCNC: 2.3 MG/DL (ref 2.5–4.5)
PLATELET # BLD AUTO: 184 10E3/UL (ref 150–450)
POTASSIUM BLD-SCNC: 5.2 MMOL/L (ref 3.4–5.3)
RBC # BLD AUTO: 2.67 10E6/UL (ref 4.4–5.9)
SODIUM SERPL-SCNC: 145 MMOL/L (ref 133–144)
TACROLIMUS BLD-MCNC: 16.6 UG/L (ref 5–15)
TME LAST DOSE: ABNORMAL H
TME LAST DOSE: ABNORMAL H
WBC # BLD AUTO: 5.1 10E3/UL (ref 4–11)

## 2022-07-18 PROCEDURE — 36415 COLL VENOUS BLD VENIPUNCTURE: CPT

## 2022-07-18 PROCEDURE — 87799 DETECT AGENT NOS DNA QUANT: CPT

## 2022-07-18 PROCEDURE — 83735 ASSAY OF MAGNESIUM: CPT

## 2022-07-18 PROCEDURE — G0463 HOSPITAL OUTPT CLINIC VISIT: HCPCS | Mod: 25

## 2022-07-18 PROCEDURE — 250N000011 HC RX IP 250 OP 636: Performed by: INTERNAL MEDICINE

## 2022-07-18 PROCEDURE — 96372 THER/PROPH/DIAG INJ SC/IM: CPT | Performed by: INTERNAL MEDICINE

## 2022-07-18 PROCEDURE — 80048 BASIC METABOLIC PNL TOTAL CA: CPT

## 2022-07-18 PROCEDURE — 84100 ASSAY OF PHOSPHORUS: CPT

## 2022-07-18 PROCEDURE — 99215 OFFICE O/P EST HI 40 MIN: CPT | Mod: 24 | Performed by: INTERNAL MEDICINE

## 2022-07-18 PROCEDURE — 85027 COMPLETE CBC AUTOMATED: CPT

## 2022-07-18 PROCEDURE — 250N000011 HC RX IP 250 OP 636: Performed by: NURSE PRACTITIONER

## 2022-07-18 PROCEDURE — 80197 ASSAY OF TACROLIMUS: CPT

## 2022-07-18 RX ORDER — EPINEPHRINE 1 MG/ML
0.3 INJECTION, SOLUTION, CONCENTRATE INTRAVENOUS EVERY 5 MIN PRN
Status: CANCELLED | OUTPATIENT
Start: 2022-07-18

## 2022-07-18 RX ORDER — HEPARIN SODIUM 1000 [USP'U]/ML
3000 INJECTION, SOLUTION INTRAVENOUS; SUBCUTANEOUS WEEKLY
Status: CANCELLED
Start: 2022-07-25

## 2022-07-18 RX ORDER — HEPARIN SODIUM 1000 [USP'U]/ML
3000 INJECTION, SOLUTION INTRAVENOUS; SUBCUTANEOUS WEEKLY
Status: DISCONTINUED | OUTPATIENT
Start: 2022-07-18 | End: 2022-07-18 | Stop reason: HOSPADM

## 2022-07-18 RX ORDER — METHYLPREDNISOLONE SODIUM SUCCINATE 125 MG/2ML
125 INJECTION, POWDER, LYOPHILIZED, FOR SOLUTION INTRAMUSCULAR; INTRAVENOUS
Status: CANCELLED
Start: 2022-07-18

## 2022-07-18 RX ORDER — ALBUTEROL SULFATE 0.83 MG/ML
2.5 SOLUTION RESPIRATORY (INHALATION)
Status: CANCELLED | OUTPATIENT
Start: 2022-07-18

## 2022-07-18 RX ORDER — ALBUTEROL SULFATE 90 UG/1
1-2 AEROSOL, METERED RESPIRATORY (INHALATION)
Status: CANCELLED
Start: 2022-07-18

## 2022-07-18 RX ORDER — DIPHENHYDRAMINE HYDROCHLORIDE 50 MG/ML
50 INJECTION INTRAMUSCULAR; INTRAVENOUS
Status: CANCELLED
Start: 2022-07-18

## 2022-07-18 RX ORDER — MEPERIDINE HYDROCHLORIDE 25 MG/ML
25 INJECTION INTRAMUSCULAR; INTRAVENOUS; SUBCUTANEOUS EVERY 30 MIN PRN
Status: CANCELLED | OUTPATIENT
Start: 2022-07-18

## 2022-07-18 RX ADMIN — HEPARIN SODIUM 1600 UNITS: 1000 INJECTION INTRAVENOUS; SUBCUTANEOUS at 12:26

## 2022-07-18 RX ADMIN — HEPARIN SODIUM 1600 UNITS: 1000 INJECTION INTRAVENOUS; SUBCUTANEOUS at 12:25

## 2022-07-18 RX ADMIN — DARBEPOETIN ALFA 40 MCG: 40 INJECTION, SOLUTION INTRAVENOUS; SUBCUTANEOUS at 12:01

## 2022-07-18 ASSESSMENT — PAIN SCALES - GENERAL: PAINLEVEL: NO PAIN (0)

## 2022-07-18 NOTE — PROGRESS NOTES
Nursing Note  Donald Blanco [facility] for:   Chief Complaint   Patient presents with     CVC line care     CVC line care     Patient identification verified by name and date of birth.    CVC line care completed using sterile technique. Caps changed and lumens flushed with high dose heparin.    Discharge Plan:   Follow up plan of care with: ordering provider as scheduled.  Discharge instructions were reviewed with patient.  Patient/representative verbalized understanding of discharge instructions and all questions answered.  Patient discharged from [facility] in stable condition.    Iram Aguilar, URIAH    Administrations This Visit     heparin Lock (1000 units/mL High concentration) 3,000 Units     Admin Date  07/18/2022 Action  Given Dose  1,600 Units Route  Intracatheter Administered By  Iram Aguilar RN           Admin Date  07/18/2022 Action  Given Dose  1,600 Units Route  Intracatheter Administered By  Iram Aguilar, RN

## 2022-07-18 NOTE — TELEPHONE ENCOUNTER
Anemia Management Note  SUBJECTIVE/OBJECTIVE:  Referred by Dr. Yong Friedman on 2022  Primary Diagnosis: Anemia of Other Chronic Illness (D63.8)     Secondary Diagnosis:  Organ or tissue replaced by transplant, kidney (Z94.0)  Kidney Tx: 2022  Hgb goal range:  9-10  Epo/Darbo: Aranesp 40mcg every 14 days for Hgb <10. In Clinic.  *dosed at 0.45mcg/kg  Iron regimen:  NA  Labs : 2023  Recent YUDI use, transfusion, IV iron: NA  RX/TX plans : TBD     No history of stroke, MI and blood clots. Hx of Prostate Cancer. Had Prostectomy & radiation Dec 2020.      Contact: OK to speak with Sana Blanco (wife) regarding Scheduling, Medical, and Billing Inforamtion per consent to communicate scanned on 2020.    Anemia Latest Ref Rng & Units 2022   YUDI Dose - - - - 40mcg - - 40mcg   Hemoglobin 13.3 - 17.7 g/dL 7.9(LL) 7.6(LL) 8.1(L) 8.6(L) 8.8(L) 9.1(L) 8.7(L)   TSAT 15 - 46 % - - 24 - - - -   Ferritin 26 - 388 ng/mL - - 570(H) - - - -     BP Readings from Last 3 Encounters:   22 129/73   22 139/72   22 128/76     Wt Readings from Last 2 Encounters:   22 242 lb (109.8 kg)   22 237 lb (107.5 kg)           ASSESSMENT:  Hgb:Not at goal/Known  TSat: not at goal (>30%) but ferritin >500ng/mL.  IV iron not indicated at this time per anemia protocol. Ferritin: At goal (>100ng/mL)    PLAN:  Dose with aranesp and RTC for hgb then aranesp if needed in 2 week(s)    Orders needed to be renewed (for next follow-up date) in EPIC: None    Iron labs due:  Aug 2022    Plan discussed with:  No call, chart review      NEXT FOLLOW-UP DATE:  22 7am labs, review Hgb     Camille Tsang RN   Anemia Services  [address] [address]   [email or web address]   Office : [phone]  Fax: [phone]

## 2022-07-18 NOTE — NURSING NOTE
Frequency: Every 14 days  Most recent or today's HGB: 8.7   Date: 2022  Date of lat dose: 2022  HGB associated with last dose given: 8.6    Blood Pressure:129/73    Diagnosis: Anemia in other chronic disease, Kidney replace by transplant    Ordered by: Yong Friedman MD  VIS Offered: Yes, declined    Double Checked by: Anders CROWELL    See MAR for administration details    Pt's first name, last name and  verified prior to medication administration, injection given without complications or questions.

## 2022-07-18 NOTE — LETTER
7/18/2022         RE: Donald Blanco  [address] [facility]  [place] [address]        Dear Colleague,    Thank you for referring your patient, Donald Blanco, to the [facility]. Please see a copy of my visit note below.    Nursing Note  Donald Blanco [facility] for:   Chief Complaint   Patient presents with     CVC line care     CVC line care     Patient identification verified by name and date of birth.    CVC line care completed using sterile technique. Caps changed and lumens flushed with high dose heparin.    Discharge Plan:   Follow up plan of care with: ordering provider as scheduled.  Discharge instructions were reviewed with patient.  Patient/representative verbalized understanding of discharge instructions and all questions answered.  Patient discharged from [facility] in stable condition.    Iram Aguilar RN    Administrations This Visit     heparin Lock (1000 units/mL High concentration) 3,000 Units     Admin Date  07/18/2022 Action  Given Dose  1,600 Units Route  Intracatheter Administered By  Iram Aguilar RN           Admin Date  07/18/2022 Action  Given Dose  1,600 Units Route  Intracatheter Administered By  Iram Aguilar RN                    Again, thank you for allowing me to participate in the care of your patient.        Sincerely,        [facility] [address]

## 2022-07-18 NOTE — PROGRESS NOTES
TRANSPLANT NEPHROLOGY EARLY POST TRANSPLANT VISIT  2 month visit    Assessment & Plan   # LDKT: slight downtrend but still elevated will repeat US kidney+doppler, f/up FK trough, discuss with tx surgery drainage of fluid collections and decide on kidney tx bx if FK within therapeutic range    initial DGF, now PETER with concern for fluid collection causing compression on the graft after drain fell out on 6/18/22 ### awaiting repeat labs today &  decide if repeat US/drain needed per tx surgery. KPDimported kidney with vasospasm in OR. CIT ~10 hrs   - Baseline Creatinine: ~ TBD. Rocky Scr 3.44 on 6/16/22   - Proteinuria: Not checked post transplant   - Date DSA Last Checked: May/2022      Latest DSA: No DSA at time of transplant, recheck now    - BK Viremia: No   - Kidney Tx Biopsy: May 24, 2022; Result: ATI   - Transplant Ureteral Stent: No   -Last HD on 6/2/22   -HD access: RIJ tunneled line, but one port is not functional according to [facility]. Will need to be removed in house after IR drainage of fluid.  Remove   - HD line removal this week if labs Thurs show improved kidney function-last HD 6/2    # Immunosuppression: Tacrolimus immediate release (goal 8-10) and Mycophenolate mofetil (dose 750 mg every 12 hours)   - Induction with Recent Transplant:  High Intensity due to DGF after initial dose of simulect   - Continue with intensive monitoring of immunosuppression for efficacy and toxicity.   - Changes: Not at this time    # Infection Prophylaxis:   - PJP: Sulfa/TMP (Bactrim) MWF  - CMV: Valganciclovir (Valcyte) twice weekly. CMV -/-    # Blood Pressure: Controlled;  Goal BP: < 140/90   - Volume status: Euvolemic     - Changes: Not at this time    # Anemia in Chronic Renal Disease: Hgb: Stable, low      YUDI: No   - Iron studies: Replete    -Consider anemia clinic after drain replaced    # Mineral Bone Disorder:   - Secondary renal hyperparathyroidism; PTH level: Significantly elevated (601-1200 pg/ml)        On  treatment: None  - Vitamin D; level: Normal        On supplement: Yes  - Calcium; level: Normal        On supplement: No  - Phosphorus; level: Normal        On supplement: No    # Electrolytes:   - Potassium; level: High normal        On supplement: No  - Magnesium; level: Normal        On supplement: No  - Bicarbonate; level: Low        On supplement: No     # Twin-transplant Fluid collections: repeat US kidney Tx and decide if drainage indicated per tx surgery  - Perinephric fluid collection:s/p IR guided fluid collection aspiration on 6/22, drained 80 mL clear yellow fluid, cultures pending. Followed up with Dr. Flores 7/11. Fluid CR~ serum & Cx negative   Imaging:  -6/20 CT: 2 large twin-nephric fluid collections noted  -6/22 US: Normal, perinephric fluid collections.   -6/22 IR: 80 mL clear yellow fluid aspirated.  -6/23 US: Recurrent fluid collections similar to previous  -7/10 Fluid collections around the transplant are slightly bigger and  similar in morphology to previous studies. Collections #2 and #3  appear to communicate with each other. Differential includes hematoma,  seroma, urinoma, and lymphocele    # History of prostate CA:   -Stage T3b s/p RALP 12/14/2020 with focal positive margin, received adjuvant radiation. Moderate risk of recurrence in 10-15y.    -Follows with urology.     # Medical Compliance: Yes    # COVID-19 Virus Review: Discussed COVID-19 virus and the potential medical risks.  Reviewed preventative health recommendations, including wearing a mask where appropriate.  Recommended COVID vaccination should be up to date with either an initial vaccination or booster shot when appropriate.  Asked the patient to inform the transplant center if they are exposed or diagnosed with this virus.    # COVID Vaccination Up To Date: No, due for next dose at 3m post txp    # Transplant History:  Etiology of Kidney Failure: IgA nephropathy  Tx: LDKT  Transplant: 5/17/2022 (Kidney)  Donor Type:  Living Donor Class:   Crossmatch at time of Tx: negative  DSA at time of Tx: No  Significant changes in immunosuppression: None  CMV IgG Ab High Risk Discordance (D+/R-): No  EBV IgG Ab High Risk Discordance (D+/R-): No  Significant transplant-related complications: DGF    Transplant Office Phone Number: [phone]    Assessment and plan was discussed with the patient and he voiced his understanding and agreement.    Return visit: 1 week  Donte Duff MD    Chief Complaint   Mr. Blanco is a 57 year old here for routine follow up, kidney transplant, immunosuppression management and concern for increasing fluid collection.     History of Present Illness   He feels good overall but notes some weight gain of 3 lbs since last week currently 10 lbs above EDW-223 lbs). He denies any shortness of breath, chest pain but has some fatigue. No graft pain or urinary symptoms. No fevers or chills. He still has HD line which was exchanged during his last hospitalization 6/21-6/23    Home BP: 110s-120s systolic    Problem List   Patient Active Problem List   Diagnosis     HTN, kidney transplant related     Gout     Hyperlipidemia     Hyperparathyroidism due to renal insufficiency (H)     IgA nephropathy     Obesity     Umbilical hernia     Prostate cancer (H)     Morbid obesity (H)     Kidney replaced by transplant     Immunosuppression (H)     Hyperkalemia     Perinephric fluid collection of kidney transplant     PETER (acute kidney injury) (H)     Abdominal fluid collection     Delayed graft function of kidney     Anemia     Hypervolemia     Anemia in other chronic diseases classified elsewhere       Allergies   No Known Allergies    Medications   Current Outpatient Medications   Medication Sig     acetaminophen (TYLENOL) 325 MG tablet Take 1-2 tablets (325-650 mg) by mouth every 4 hours as needed for mild pain or fever     aspirin (ASA) 325 MG tablet Take 1 tablet (325 mg) by mouth daily     atorvastatin (LIPITOR) 10 MG  tablet Take 1 tablet (10 mg) by mouth daily (Patient taking differently: Take 10 mg by mouth every evening)     B Complex-C-Folic Acid (VIRT-CAPS) 1 MG CAPS Take 1 mg by mouth daily     bumetanide (BUMEX) 1 MG tablet Take 1 tablet (1 mg) by mouth 2 times daily     Lidocaine (LIDOCARE) 4 % Patch Place 1-2 patches onto the skin every 24 hours To prevent lidocaine toxicity, patient should be patch free for 12 hrs daily. (Patient taking differently: Place 1-2 patches onto the skin daily as needed To prevent lidocaine toxicity, patient should be patch free for 12 hrs daily.)     mycophenolate (GENERIC EQUIVALENT) 250 MG capsule Take 3 capsules (750 mg) by mouth 2 times daily     oxyCODONE (ROXICODONE) 5 MG tablet Take 1 tablet (5 mg) by mouth every 6 hours as needed for moderate to severe pain     pantoprazole (PROTONIX) 40 MG EC tablet Take 1 tablet (40 mg) by mouth daily     polyethylene glycol (MIRALAX) 17 GM/Dose powder Take 1 capful by mouth daily PRN     senna-docusate (SENOKOT-S/PERICOLACE) 8.6-50 MG tablet 1-2 tablets by Oral or Feeding Tube route 2 times daily as needed for constipation Take while taking oxycodone, hold for loose stools. (Patient taking differently: 1-2 tablets by Oral or Feeding Tube route daily Take while taking oxycodone, hold for loose stools.)     sodium bicarbonate 650 MG tablet Take 3 tablets (1,950 mg) by mouth 3 times daily     sulfamethoxazole-trimethoprim (BACTRIM) 400-80 MG tablet Take 1 tablet by mouth Every Mon, Wed, Fri Morning Increase to one tab by mouth daily when directed by transplant team     tacrolimus (GENERIC EQUIVALENT) 0.5 MG capsule Take 1 capsule (0.5 mg) by mouth 2 times daily Total dose of 4.5 mg by mouth twice daily.     tacrolimus (GENERIC EQUIVALENT) 1 MG capsule Take 4 capsules (4 mg) by mouth 2 times daily Total dose 4.5 mg BID.     valGANciclovir (VALCYTE) 450 MG tablet Take 1 tablet (450 mg) by mouth every other day     Vitamin D3 (CHOLECALCIFEROL) 25 mcg  (1000 units) tablet Take 1 capsule by mouth every morning      No current facility-administered medications for this visit.     There are no discontinued medications.    Physical Exam   Vital Signs: There were no vitals taken for this visit.    GENERAL APPEARANCE: alert and no distress  HENT: mouth without ulcers or lesions  LYMPHATICS: no cervical or supraclavicular nodes  RESP: lungs clear to auscultation - no rales, rhonchi or wheezes  CV: regular rhythm, normal rate, no rub, no murmur  EDEMA: no LE edema bilaterally  ABDOMEN: soft, nondistended, nontender, bowel sounds normal  MS: extremities normal - no gross deformities noted, no evidence of inflammation in joints, no muscle tenderness  SKIN: no rash  NEURO: normal strength and tone, sensory exam grossly normal, mentation intact and speech normal  PSYCH: mentation appears normal and affect normal/bright  TX KIDNEY: bulging over RLQ  DIALYSIS ACCESS:  Right IJ tunneled catheter    Data     Renal Latest Ref Rng & Units 7/14/2022 7/11/2022 7/7/2022   Na 133 - 144 mmol/L 144 142 134(L)   Na (external) 135 - 145 mmol/L - - -   K 3.4 - 5.3 mmol/L 4.6 4.8 4.7   K (external) 3.5 - 5.0 mmol/L - - -   Cl 94 - 109 mmol/L 114(H) 112(H) 104   CO2 20 - 32 mmol/L 25 24 19(L)   CO2 (external) 21 - 31 mmol/L - - -   BUN 7 - 30 mg/dL 48(H) 44(H) 41.2(H)   BUN (external) 8 - 25 mg/dL - - -   Cr 0.66 - 1.25 mg/dL 3.55(H) 2.96(H) 3.09(H)   Cr (external) 0.57 - 1.11 mg/dL - - -   Glucose 70 - 99 mg/dL 113(H) 114(H) 127(H)   Glucose (external) 65 - 100 mg/dL - - -   Ca  8.5 - 10.1 mg/dL 9.8 10.0 9.7   Ca (external) 8.5 - 10.5 mg/dL - - -   Mg 1.6 - 2.3 mg/dL 1.3(L) - 1.2(L)   Mg (external) 1.6 - 2.6 mg/dL - - -     Bone Health Latest Ref Rng & Units 7/14/2022 7/7/2022 7/4/2022   Phos 2.5 - 4.5 mg/dL 2.4(L) 1.8(L) 2.2(L)   Phos (external) 2.3 - 4.7 mg/dL - - -   PTHi 15 - 65 pg/mL - - -   Vit D Def 20 - 75 ug/L - - -     Heme Latest Ref Rng & Units 7/18/2022 7/14/2022 7/11/2022   WBC  4.0 - 11.0 10e3/uL 5.1 5.0 4.7   WBC (external) 4.5 - 11.0 thou/cu mm - - -   Hgb 13.3 - 17.7 g/dL 8.7(L) 9.1(L) 8.8(L)   Hgb (external) 12.0 - 16.0 g/dL - - -   Plt 150 - 450 10e3/uL 184 192 186   Plt (external) 140 - 440 thou/cu mm - - -   ABSOLUTE NEUTROPHIL 1.6 - 8.3 10e9/L - - -   ABSOLUTE LYMPHOCYTES 0.8 - 5.3 10e9/L - - -   ABSOLUTE MONOCYTES 0.0 - 1.3 10e9/L - - -   ABSOLUTE EOSINOPHILS 0.0 - 0.7 10e9/L - - -   ABSOLUTE BASOPHILS 0.0 - 0.2 10e9/L - - -   ABS IMMATURE GRANULOCYTES 0 - 0.4 10e9/L - - -   ABSOLUTE NUCLEATED RBC - - - -     Liver Latest Ref Rng & Units 6/3/2022 6/2/2022 5/22/2022   AP 40 - 150 U/L 83 - -   TBili 0.2 - 1.3 mg/dL 0.5 - -   DBili 0.0 - 0.2 mg/dL 0.2 - -   ALT 0 - 70 U/L 23 - -   AST 0 - 45 U/L 9 - -   Tot Protein 6.8 - 8.8 g/dL 6.6(L) - -   Albumin 3.4 - 5.0 g/dL 3.3(L) 3.2(L) 2.7(L)     Pancreas Latest Ref Rng & Units 6/3/2022 5/9/2022   A1C 0.0 - 5.6 % 5.1 5.0     Iron studies Latest Ref Rng & Units 7/4/2022 6/23/2022 5/9/2022   Iron 35 - 180 ug/dL 77 57(L) 79   Iron sat 15 - 46 % 24 25 24   Ferritin 26 - 388 ng/mL 570(H) - 1,002(H)     [facility] Txp Virology Latest Ref Rng & Units 5/17/2022 5/9/2022 9/3/2020   CMV QUANT IU/ML Not Detected IU/mL Not Detected - -   EBV CAPSID ANTIBODY IGG No detectable antibody. - Positive(A) >8.0(H)   Hep B Core NR:Nonreactive - - Nonreactive        Recent Labs   Lab Test 07/07/22  0815 07/11/22  0712 07/14/22  0704   DOSTAC 7/6/2022 7/10/2022 7/13/2022   TACROL 9.8 10.3 11.9     Recent Labs   Lab Test 05/30/22  0736 06/01/22  0726 07/14/22  0704   DOSMPA 5/29/2022   7:45 PM  --  7/13/2022   8:00 PM   MPACID 2.69 3.80* 1.19   MPAG 193.3* 192.2* 113.8*

## 2022-07-18 NOTE — LETTER
7/18/2022       RE: Donald Blanco  [id number]   [place] [address]     Dear Colleague,    Thank you for referring your patient, Donald Blanco, to the [facility] [place] at [facility]. Please see a copy of my visit note below.    TRANSPLANT NEPHROLOGY EARLY POST TRANSPLANT VISIT  2 month visit    Assessment & Plan   # LDKT: slight downtrend but still elevated will repeat US kidney+doppler, f/up FK trough, discuss with tx surgery drainage of fluid collections and decide on kidney tx bx if FK within therapeutic range    initial DGF, now PETER with concern for fluid collection causing compression on the graft after drain fell out on 6/18/22 ### awaiting repeat labs today &  decide if repeat US/drain needed per tx surgery. KPDimported kidney with vasospasm in OR. CIT ~10 hrs   - Baseline Creatinine: ~ TBD. Rocky Scr 3.44 on 6/16/22   - Proteinuria: Not checked post transplant   - Date DSA Last Checked: May/2022      Latest DSA: No DSA at time of transplant, recheck now    - BK Viremia: No   - Kidney Tx Biopsy: May 24, 2022; Result: ATI   - Transplant Ureteral Stent: No   -Last HD on 6/2/22   -HD access: RIJ tunneled line, but one port is not functional according to [facility]. Will need to be removed in house after IR drainage of fluid.  Remove   - HD line removal this week if labs Thurs show improved kidney function-last HD 6/2    # Immunosuppression: Tacrolimus immediate release (goal 8-10) and Mycophenolate mofetil (dose 750 mg every 12 hours)   - Induction with Recent Transplant:  High Intensity due to DGF after initial dose of simulect   - Continue with intensive monitoring of immunosuppression for efficacy and toxicity.   - Changes: Not at this time    # Infection Prophylaxis:   - PJP: Sulfa/TMP (Bactrim) MWF  - CMV: Valganciclovir (Valcyte) twice weekly. CMV -/-    # Blood Pressure: Controlled;  Goal BP: < 140/90   - Volume status:  Euvolemic     - Changes: Not at this time    # Anemia in Chronic Renal Disease: Hgb: Stable, low      YUDI: No   - Iron studies: Replete    -Consider anemia clinic after drain replaced    # Mineral Bone Disorder:   - Secondary renal hyperparathyroidism; PTH level: Significantly elevated (601-1200 pg/ml)        On treatment: None  - Vitamin D; level: Normal        On supplement: Yes  - Calcium; level: Normal        On supplement: No  - Phosphorus; level: Normal        On supplement: No    # Electrolytes:   - Potassium; level: High normal        On supplement: No  - Magnesium; level: Normal        On supplement: No  - Bicarbonate; level: Low        On supplement: No     # Twin-transplant Fluid collections: repeat US kidney Tx and decide if drainage indicated per tx surgery  - Perinephric fluid collection:s/p IR guided fluid collection aspiration on 6/22, drained 80 mL clear yellow fluid, cultures pending. Followed up with Dr. Flores 7/11. Fluid CR~ serum & Cx negative   Imaging:  -6/20 CT: 2 large twin-nephric fluid collections noted  -6/22 US: Normal, perinephric fluid collections.   -6/22 IR: 80 mL clear yellow fluid aspirated.  -6/23 US: Recurrent fluid collections similar to previous  -7/10 Fluid collections around the transplant are slightly bigger and  similar in morphology to previous studies. Collections #2 and #3  appear to communicate with each other. Differential includes hematoma,  seroma, urinoma, and lymphocele    # History of prostate CA:   -Stage T3b s/p RALP 12/14/2020 with focal positive margin, received adjuvant radiation. Moderate risk of recurrence in 10-15y.    -Follows with urology.     # Medical Compliance: Yes    # COVID-19 Virus Review: Discussed COVID-19 virus and the potential medical risks.  Reviewed preventative health recommendations, including wearing a mask where appropriate.  Recommended COVID vaccination should be up to date with either an initial vaccination or booster shot when  appropriate.  Asked the patient to inform the transplant center if they are exposed or diagnosed with this virus.    # COVID Vaccination Up To Date: No, due for next dose at 3m post txp    # Transplant History:  Etiology of Kidney Failure: IgA nephropathy  Tx: LDKT  Transplant: 5/17/2022 (Kidney)  Donor Type: Living Donor Class:   Crossmatch at time of Tx: negative  DSA at time of Tx: No  Significant changes in immunosuppression: None  CMV IgG Ab High Risk Discordance (D+/R-): No  EBV IgG Ab High Risk Discordance (D+/R-): No  Significant transplant-related complications: DGF    Transplant Office Phone Number: [phone]    Assessment and plan was discussed with the patient and he voiced his understanding and agreement.    Return visit: 1 week  Donte Duff MD    Chief Complaint   Mr. Blanco is a 57 year old here for routine follow up, kidney transplant, immunosuppression management and concern for increasing fluid collection.     History of Present Illness   He feels good overall but notes some weight gain of 3 lbs since last week currently 10 lbs above EDW-223 lbs). He denies any shortness of breath, chest pain but has some fatigue. No graft pain or urinary symptoms. No fevers or chills. He still has HD line which was exchanged during his last hospitalization 6/21-6/23    Home BP: 110s-120s systolic    Problem List   Patient Active Problem List   Diagnosis     HTN, kidney transplant related     Gout     Hyperlipidemia     Hyperparathyroidism due to renal insufficiency (H)     IgA nephropathy     Obesity     Umbilical hernia     Prostate cancer (H)     Morbid obesity (H)     Kidney replaced by transplant     Immunosuppression (H)     Hyperkalemia     Perinephric fluid collection of kidney transplant     PETER (acute kidney injury) (H)     Abdominal fluid collection     Delayed graft function of kidney     Anemia     Hypervolemia     Anemia in other chronic diseases classified elsewhere       Allergies   No Known  Allergies    Medications   Current Outpatient Medications   Medication Sig     acetaminophen (TYLENOL) 325 MG tablet Take 1-2 tablets (325-650 mg) by mouth every 4 hours as needed for mild pain or fever     aspirin (ASA) 325 MG tablet Take 1 tablet (325 mg) by mouth daily     atorvastatin (LIPITOR) 10 MG tablet Take 1 tablet (10 mg) by mouth daily (Patient taking differently: Take 10 mg by mouth every evening)     B Complex-C-Folic Acid (VIRT-CAPS) 1 MG CAPS Take 1 mg by mouth daily     bumetanide (BUMEX) 1 MG tablet Take 1 tablet (1 mg) by mouth 2 times daily     Lidocaine (LIDOCARE) 4 % Patch Place 1-2 patches onto the skin every 24 hours To prevent lidocaine toxicity, patient should be patch free for 12 hrs daily. (Patient taking differently: Place 1-2 patches onto the skin daily as needed To prevent lidocaine toxicity, patient should be patch free for 12 hrs daily.)     mycophenolate (GENERIC EQUIVALENT) 250 MG capsule Take 3 capsules (750 mg) by mouth 2 times daily     oxyCODONE (ROXICODONE) 5 MG tablet Take 1 tablet (5 mg) by mouth every 6 hours as needed for moderate to severe pain     pantoprazole (PROTONIX) 40 MG EC tablet Take 1 tablet (40 mg) by mouth daily     polyethylene glycol (MIRALAX) 17 GM/Dose powder Take 1 capful by mouth daily PRN     senna-docusate (SENOKOT-S/PERICOLACE) 8.6-50 MG tablet 1-2 tablets by Oral or Feeding Tube route 2 times daily as needed for constipation Take while taking oxycodone, hold for loose stools. (Patient taking differently: 1-2 tablets by Oral or Feeding Tube route daily Take while taking oxycodone, hold for loose stools.)     sodium bicarbonate 650 MG tablet Take 3 tablets (1,950 mg) by mouth 3 times daily     sulfamethoxazole-trimethoprim (BACTRIM) 400-80 MG tablet Take 1 tablet by mouth Every Mon, Wed, Fri Morning Increase to one tab by mouth daily when directed by transplant team     tacrolimus (GENERIC EQUIVALENT) 0.5 MG capsule Take 1 capsule (0.5 mg) by mouth 2  times daily Total dose of 4.5 mg by mouth twice daily.     tacrolimus (GENERIC EQUIVALENT) 1 MG capsule Take 4 capsules (4 mg) by mouth 2 times daily Total dose 4.5 mg BID.     valGANciclovir (VALCYTE) 450 MG tablet Take 1 tablet (450 mg) by mouth every other day     Vitamin D3 (CHOLECALCIFEROL) 25 mcg (1000 units) tablet Take 1 capsule by mouth every morning      No current facility-administered medications for this visit.     There are no discontinued medications.    Physical Exam   Vital Signs: There were no vitals taken for this visit.    GENERAL APPEARANCE: alert and no distress  HENT: mouth without ulcers or lesions  LYMPHATICS: no cervical or supraclavicular nodes  RESP: lungs clear to auscultation - no rales, rhonchi or wheezes  CV: regular rhythm, normal rate, no rub, no murmur  EDEMA: no LE edema bilaterally  ABDOMEN: soft, nondistended, nontender, bowel sounds normal  MS: extremities normal - no gross deformities noted, no evidence of inflammation in joints, no muscle tenderness  SKIN: no rash  NEURO: normal strength and tone, sensory exam grossly normal, mentation intact and speech normal  PSYCH: mentation appears normal and affect normal/bright  TX KIDNEY: bulging over RLQ  DIALYSIS ACCESS:  Right IJ tunneled catheter    Data     Renal Latest Ref Rng & Units 7/14/2022 7/11/2022 7/7/2022   Na 133 - 144 mmol/L 144 142 134(L)   Na (external) 135 - 145 mmol/L - - -   K 3.4 - 5.3 mmol/L 4.6 4.8 4.7   K (external) 3.5 - 5.0 mmol/L - - -   Cl 94 - 109 mmol/L 114(H) 112(H) 104   CO2 20 - 32 mmol/L 25 24 19(L)   CO2 (external) 21 - 31 mmol/L - - -   BUN 7 - 30 mg/dL 48(H) 44(H) 41.2(H)   BUN (external) 8 - 25 mg/dL - - -   Cr 0.66 - 1.25 mg/dL 3.55(H) 2.96(H) 3.09(H)   Cr (external) 0.57 - 1.11 mg/dL - - -   Glucose 70 - 99 mg/dL 113(H) 114(H) 127(H)   Glucose (external) 65 - 100 mg/dL - - -   Ca  8.5 - 10.1 mg/dL 9.8 10.0 9.7   Ca (external) 8.5 - 10.5 mg/dL - - -   Mg 1.6 - 2.3 mg/dL 1.3(L) - 1.2(L)   Mg  (external) 1.6 - 2.6 mg/dL - - -     Bone Health Latest Ref Rng & Units 7/14/2022 7/7/2022 7/4/2022   Phos 2.5 - 4.5 mg/dL 2.4(L) 1.8(L) 2.2(L)   Phos (external) 2.3 - 4.7 mg/dL - - -   PTHi 15 - 65 pg/mL - - -   Vit D Def 20 - 75 ug/L - - -     Heme Latest Ref Rng & Units 7/18/2022 7/14/2022 7/11/2022   WBC 4.0 - 11.0 10e3/uL 5.1 5.0 4.7   WBC (external) 4.5 - 11.0 thou/cu mm - - -   Hgb 13.3 - 17.7 g/dL 8.7(L) 9.1(L) 8.8(L)   Hgb (external) 12.0 - 16.0 g/dL - - -   Plt 150 - 450 10e3/uL 184 192 186   Plt (external) 140 - 440 thou/cu mm - - -   ABSOLUTE NEUTROPHIL 1.6 - 8.3 10e9/L - - -   ABSOLUTE LYMPHOCYTES 0.8 - 5.3 10e9/L - - -   ABSOLUTE MONOCYTES 0.0 - 1.3 10e9/L - - -   ABSOLUTE EOSINOPHILS 0.0 - 0.7 10e9/L - - -   ABSOLUTE BASOPHILS 0.0 - 0.2 10e9/L - - -   ABS IMMATURE GRANULOCYTES 0 - 0.4 10e9/L - - -   ABSOLUTE NUCLEATED RBC - - - -     Liver Latest Ref Rng & Units 6/3/2022 6/2/2022 5/22/2022   AP 40 - 150 U/L 83 - -   TBili 0.2 - 1.3 mg/dL 0.5 - -   DBili 0.0 - 0.2 mg/dL 0.2 - -   ALT 0 - 70 U/L 23 - -   AST 0 - 45 U/L 9 - -   Tot Protein 6.8 - 8.8 g/dL 6.6(L) - -   Albumin 3.4 - 5.0 g/dL 3.3(L) 3.2(L) 2.7(L)     Pancreas Latest Ref Rng & Units 6/3/2022 5/9/2022   A1C 0.0 - 5.6 % 5.1 5.0     Iron studies Latest Ref Rng & Units 7/4/2022 6/23/2022 5/9/2022   Iron 35 - 180 ug/dL 77 57(L) 79   Iron sat 15 - 46 % 24 25 24   Ferritin 26 - 388 ng/mL 570(H) - 1,002(H)     [facility] Txp Virology Latest Ref Rng & Units 5/17/2022 5/9/2022 9/3/2020   CMV QUANT IU/ML Not Detected IU/mL Not Detected - -   EBV CAPSID ANTIBODY IGG No detectable antibody. - Positive(A) >8.0(H)   Hep B Core NR:Nonreactive - - Nonreactive        Recent Labs   Lab Test 07/07/22  0815 07/11/22  0712 07/14/22  0704   DOSTAC 7/6/2022 7/10/2022 7/13/2022   TACROL 9.8 10.3 11.9     Recent Labs   Lab Test 05/30/22  0736 06/01/22  0726 07/14/22  0704   DOSMPA 5/29/2022   7:45 PM  --  7/13/2022   8:00 PM   MPACID 2.69 3.80* 1.19   MPAG 193.3* 192.2*  113.8*       Again, thank you for allowing me to participate in the care of your patient.      Sincerely,    Donte Duff MD

## 2022-07-18 NOTE — NURSING NOTE
Chief Complaint   Patient presents with     RECHECK     S/p kidney tx     Blood pressure 129/73, pulse 72, weight 109.8 kg (242 lb), SpO2 99 %.    Anders Sears on 7/18/2022 at 11:20 AM

## 2022-07-18 NOTE — PROGRESS NOTES
"Chronic Transplant Nephrology Clinic Visit with Care Coordinator    Overdue for labs: no  Current lab schedule: weekly   Patient voiced understanding: yes   Labs up to date in Epic and / or lab letter: yes    Medication compliant: yes  Medication refill questions / concerns: needs Tacrolimus refilled. States it was \"under review\" when he tried to pick it up. Has enough for this week at home. Order current. Message sent to pharmacist to see if there is any issues with the current Tac orders.    Subsequent visit scheduled:     Patient specific needs: Would like to have dialysis access removed. Will discuss with provider today. Will message patient coordinator to follow up.    1) Appt made in [facility] for HD cath flush and dressing change.  2) Renal tx US scheduled for Tuesday, July 19 at 3:30-patient aware.  3) Lab orders placed for repeat labs on Thursday. Patient will make lab appointment at [place] lab.    Tentative plan for HD cath removal if creatinine < 3 this week. Message sent to coordinator.      ADDENDUM:  Creatinine 3.4. Discussed with Dr. Duff, continue with plan for US tomorrow and labs Thursday. My chart message sent to Joy Solorio RN          "

## 2022-07-19 ENCOUNTER — ANCILLARY PROCEDURE (OUTPATIENT)
Dept: ULTRASOUND IMAGING | Facility: CLINIC | Age: 57
End: 2022-07-19
Attending: INTERNAL MEDICINE
Payer: COMMERCIAL

## 2022-07-19 ENCOUNTER — VIRTUAL VISIT (OUTPATIENT)
Dept: PHARMACY | Facility: CLINIC | Age: 57
End: 2022-07-19
Payer: COMMERCIAL

## 2022-07-19 DIAGNOSIS — Z48.298 AFTERCARE FOLLOWING ORGAN TRANSPLANT: Primary | ICD-10-CM

## 2022-07-19 DIAGNOSIS — Z94.0 KIDNEY REPLACED BY TRANSPLANT: Chronic | ICD-10-CM

## 2022-07-19 DIAGNOSIS — R79.89 ELEVATED SERUM CREATININE: ICD-10-CM

## 2022-07-19 DIAGNOSIS — Z94.0 KIDNEY REPLACED BY TRANSPLANT: ICD-10-CM

## 2022-07-19 DIAGNOSIS — E78.5 DYSLIPIDEMIA: ICD-10-CM

## 2022-07-19 DIAGNOSIS — Z48.298 AFTERCARE FOLLOWING ORGAN TRANSPLANT: ICD-10-CM

## 2022-07-19 DIAGNOSIS — T88.8XXS FLUID COLLECTION AT SURGICAL SITE, SEQUELA: ICD-10-CM

## 2022-07-19 DIAGNOSIS — Z94.0 KIDNEY REPLACED BY TRANSPLANT: Primary | ICD-10-CM

## 2022-07-19 LAB — BKV DNA # SPEC NAA+PROBE: NOT DETECTED COPIES/ML

## 2022-07-19 PROCEDURE — 99606 MTMS BY PHARM EST 15 MIN: CPT | Performed by: PHARMACIST

## 2022-07-19 PROCEDURE — 76776 US EXAM K TRANSPL W/DOPPLER: CPT | Performed by: RADIOLOGY

## 2022-07-19 RX ORDER — TACROLIMUS 0.5 MG/1
0.5 CAPSULE ORAL 2 TIMES DAILY
Qty: 60 CAPSULE | Refills: 11
Start: 2022-07-19 | End: 2022-07-25

## 2022-07-19 RX ORDER — TACROLIMUS 1 MG/1
3 CAPSULE ORAL 2 TIMES DAILY
Qty: 240 CAPSULE | Refills: 11
Start: 2022-07-19 | End: 2022-07-25

## 2022-07-19 RX ORDER — MAGNESIUM OXIDE 400 MG/1
400 TABLET ORAL DAILY
Qty: 90 TABLET | Refills: 2 | Status: SHIPPED | OUTPATIENT
Start: 2022-07-19 | End: 2022-09-27

## 2022-07-19 RX ORDER — ATORVASTATIN CALCIUM 10 MG/1
10 TABLET, FILM COATED ORAL EVERY EVENING
Qty: 30 TABLET | Refills: 3 | Status: SHIPPED | OUTPATIENT
Start: 2022-07-19 | End: 2022-10-20

## 2022-07-19 NOTE — PATIENT INSTRUCTIONS
"Recommendations from today's MTM visit:                                                       1. Valcyte due to discontinue on 8/17.   2. Stop Pantoprazole, let me know if you have any rebound heartburn.    3. Start Magnesium oxide 400mg once daily. Separate at least 2 hours from Mycophenolate Mofetil if you can.     Follow-up: 2 devon    It was great speaking with you today.  I value your experience and would be very thankful for your time in providing feedback in our clinic survey. In the next few days, you may receive an email or text message from [employer] with a link to a survey related to your  clinical pharmacist.\"     To schedule another MTM appointment, please call the clinic directly or you may call the MTM scheduling line at [phone] or toll-free at [phone].     My Clinical Pharmacist's contact information:                                                      Please feel free to contact me with any questions or concerns you have.      Alejandro Garces, PharmD  MTM Pharmacist    Phone: [phone]     "

## 2022-07-19 NOTE — PROGRESS NOTES
Medication Therapy Management (MTM) Encounter    ASSESSMENT:                            Medication Adherence/Access: No issues identified. Sent over refill for atorvastatin per patient request.     Renal Transplant: Magnesium levels are low, will start mag oxide 400mg once daily. Discussed Valcyte quit date, should be 8/17. Pt denies GERD sx, he can stop Pantoprazole.    Hyperlipidemia: Stable.    Fluid collection: Stable.    PLAN:                            1. Valcyte due to discontinue on 8/17.   2. Stop Pantoprazole, let me know if you have any rebound heartburn.    3. Start Magnesium oxide 400mg once daily. Separate at least 2 hours from Mycophenolate Mofetil if you can.     Follow-up: 2 months    SUBJECTIVE/OBJECTIVE:                          Donald Blanco is a 57 year old male called for a transitions of care visit. He was discharged from [facility] on 6/23 for drain which fell out. Aspirated it instead of replacing drain.     Reason for visit: 2 months post transplant.    Allergies/ADRs: Reviewed in chart  Past Medical History: Reviewed in chart  Tobacco: He reports that he has never smoked. He has never used smokeless tobacco.  Alcohol: not currently using    Medication Adherence/Access: no issues reported    Renal Transplant:  Current immunosuppressants include Tacrolimus 4.5mg twice daily and Mycophenolate Mofetil 750mg twice daily.  Pt reports no issues.   Vascular prophylaxis: Aspirin 325mg daily. Denies GIB sx.  Transplant date: 5/17/22  Estimated Creatinine Clearance: 28.6 mL/min (A) (based on SCr of 3.4 mg/dL (H)).  CMV prophylaxis: Valcyte 450 mg every other day. Treat 3 months post tx   PCP prophylaxis: Bactrim S S three times per week  PPI use: Pantoprazole 40mg daily. Denies GERD sx.   Supplements: Vitamin D3 1000 units daily, B complex folic acid and C daily, Sodium Bicarb 1950mg TID.   Tx Coordinator: Katie Moore, Using Med Card: Yes  Immunizations: annual flu shot 2021; Pneumovax 23:  2020;  Prevnar 13: 2021; TDaP:  2020; Shingrix: unknown, HBV: immunity per last titer, COVID: Pfizer-BioNTSafeMedia 2021x2   Latest Reference Range & Units 07/14/22 07:04 07/18/22 07:10   Magnesium 1.6 - 2.3 mg/dL 1.3 (L) 1.3 (L)   Phosphorus 2.5 - 4.5 mg/dL 2.4 (L) 2.3 (L)   (L): Data is abnormally low   Latest Reference Range & Units 07/11/22 07:12 07/14/22 07:04 07/18/22 07:10   Carbon Dioxide 20 - 32 mmol/L 24 25 24       Hyperlipidemia: Current therapy includes Atorvastatin 10mg daily.  Patient reports no significant myalgias or other side effects.  The ASCVD Risk score (Michelnavjot JOHN Jr., et al., 2013) failed to calculate for the following reasons:    The valid total cholesterol range is 130 to 320 mg/dL  Recent Labs   Lab Test 06/03/22  1048   CHOL 85   HDL 35*   LDL 28   TRIG 112     Fluid collection: Pt is taking Bumex 1mg twice daily for swelling in right leg and fluid collection around kidney. Weights increasing the last week or so. Increased 3-4 lbs in the last 2 weeks. 236.6lb currently ranging from 233-240lb.     Today's Vitals: There were no vitals taken for this visit.  ----------------  Post Discharge Medication Reconciliation Status: discharge medications reconciled and changed, per note/orders.    I spent 15 minutes with this patient today. All changes were made via collaborative practice agreement with Dr. uDff. A copy of the visit note was provided to the patient's provider(s).    The patient was sent via [employer] a summary of these recommendations.     Alejandro Garces, [place]  [facility] Pharmacist    Phone: [phone]     Telemedicine Visit Details  Type of service:  Telephone visit  Start Time: 9:58 AM  End Time: 10:13 AM  Originating Location (patient location): Home  Distant Location (provider location):  [facility]     Medication Therapy Recommendations  Kidney replaced by transplant    Current Medication: pantoprazole (PROTONIX) 40 MG EC tablet (Discontinued)   Rationale: No medical  indication at this time - Unnecessary medication therapy - Indication   Recommendation: Discontinue Medication   Status: Accepted per CPA          Current Medication: valGANciclovir (VALCYTE) 450 MG tablet   Rationale: Does not understand instructions - Adherence - Adherence   Recommendation: Provide Education   Status: Patient Agreed - Adherence/Education          Rationale: Untreated condition - Needs additional medication therapy - Indication   Recommendation: Start Medication - MAGnesium-Oxide 400 (240 Mg) MG Tabs   Status: Accepted per CPA

## 2022-07-19 NOTE — PROGRESS NOTES
Donte Grove MD Bregman, Adam, MD; Alicia Valentin, RN  Please schedule line removal Friday or next Mon as long as Thurs labs stable

## 2022-07-20 ENCOUNTER — TELEPHONE (OUTPATIENT)
Dept: TRANSPLANT | Facility: CLINIC | Age: 57
End: 2022-07-20

## 2022-07-20 LAB — BACTERIA FLD CULT: NO GROWTH

## 2022-07-20 NOTE — PATIENT INSTRUCTIONS
You will be scheduled for US kidney transplant    You will be contacted about tacrolimus dose change based on level today    Your HD line will be removed if repeat labs Thurs show improved kidney function

## 2022-07-20 NOTE — TELEPHONE ENCOUNTER
Post Kidney and Pancreas Transplant Team Conference  Date: 7/20/2022  Transplant Coordinator: Alicia Valentin     Attendees:  []  Dr. Cr [x] Alicia Valentin, RN [x] Hanny Simmons LPN     []  Dr. Capellan [x] Rut Pickens RN [] Ester Bragg LPN   [x]  Dr. Duff [] Phoebe Howard, URIAH    [x]  Dr. Friedman [] Jazzmine Arzate RN [x] Alejandro Gacres, PharmD   [] Dr. Mckenna [] Shruthi Reyes RN    [] Dr. Flores [] Campos Falcon RN    [x] Dr. Frank [] Karina Martinez RN [] Aurora Hollingsworth RN   [] Dr. Yu [] Sara Pablo, URIAH    []  Dr. Cohen [] Carolyn Vasquez RN    [] Dr. Rust [x] Xin Venegas, URIAH    [x] Katie Sanders, MARIO [] Rossy Solorio RN        Verbal Plan Read Back:   Repeat txp labs      Routed to RN Coordinator   Hanny Simmons LPN

## 2022-07-21 ENCOUNTER — LAB (OUTPATIENT)
Dept: LAB | Facility: CLINIC | Age: 57
End: 2022-07-21
Payer: COMMERCIAL

## 2022-07-21 DIAGNOSIS — Z48.298 AFTERCARE FOLLOWING ORGAN TRANSPLANT: ICD-10-CM

## 2022-07-21 DIAGNOSIS — R79.89 ELEVATED SERUM CREATININE: ICD-10-CM

## 2022-07-21 DIAGNOSIS — Z94.0 KIDNEY REPLACED BY TRANSPLANT: ICD-10-CM

## 2022-07-21 LAB
ANION GAP SERPL CALCULATED.3IONS-SCNC: 6 MMOL/L (ref 3–14)
BUN SERPL-MCNC: 36 MG/DL (ref 7–30)
CALCIUM SERPL-MCNC: 9.8 MG/DL (ref 8.5–10.1)
CHLORIDE BLD-SCNC: 114 MMOL/L (ref 94–109)
CO2 SERPL-SCNC: 24 MMOL/L (ref 20–32)
CREAT SERPL-MCNC: 2.88 MG/DL (ref 0.66–1.25)
ERYTHROCYTE [DISTWIDTH] IN BLOOD BY AUTOMATED COUNT: 13.1 % (ref 10–15)
GFR SERPL CREATININE-BSD FRML MDRD: 24 ML/MIN/1.73M2
GLUCOSE BLD-MCNC: 116 MG/DL (ref 70–99)
HCT VFR BLD AUTO: 29.2 % (ref 40–53)
HGB BLD-MCNC: 9.3 G/DL (ref 13.3–17.7)
MCH RBC QN AUTO: 32.4 PG (ref 26.5–33)
MCHC RBC AUTO-ENTMCNC: 31.8 G/DL (ref 31.5–36.5)
MCV RBC AUTO: 102 FL (ref 78–100)
PLATELET # BLD AUTO: 175 10E3/UL (ref 150–450)
POTASSIUM BLD-SCNC: 4.9 MMOL/L (ref 3.4–5.3)
RBC # BLD AUTO: 2.87 10E6/UL (ref 4.4–5.9)
SODIUM SERPL-SCNC: 144 MMOL/L (ref 133–144)
TACROLIMUS BLD-MCNC: 11.8 UG/L (ref 5–15)
TME LAST DOSE: NORMAL H
TME LAST DOSE: NORMAL H
WBC # BLD AUTO: 5 10E3/UL (ref 4–11)

## 2022-07-21 PROCEDURE — 85027 COMPLETE CBC AUTOMATED: CPT

## 2022-07-21 PROCEDURE — 36415 COLL VENOUS BLD VENIPUNCTURE: CPT

## 2022-07-21 PROCEDURE — 80197 ASSAY OF TACROLIMUS: CPT

## 2022-07-21 PROCEDURE — 80048 BASIC METABOLIC PNL TOTAL CA: CPT

## 2022-07-22 DIAGNOSIS — Z94.0 KIDNEY REPLACED BY TRANSPLANT: Primary | Chronic | ICD-10-CM

## 2022-07-22 RX ORDER — NEPHROCAP 1 MG
1 CAP ORAL DAILY
Qty: 30 CAPSULE | Refills: 1 | Status: SHIPPED | OUTPATIENT
Start: 2022-07-22 | End: 2022-09-27

## 2022-07-22 NOTE — TELEPHONE ENCOUNTER
ISSUE:   Tacrolimus IR level 11.6 on 7/22, goal 8-10, dose 3.5 mg BID.    PLAN:   Please call patient and confirm this was an accurate 12-hour trough. Verify Tacrolimus IR dose 3.5 mg BID. Confirm no new medications or illness. Confirm no missed doses. If accurate trough and accurate dose, decrease Tacrolimus IR dose to 3 mg BID and repeat labs in 3 days

## 2022-07-25 ENCOUNTER — LAB (OUTPATIENT)
Dept: LAB | Facility: CLINIC | Age: 57
End: 2022-07-25
Payer: COMMERCIAL

## 2022-07-25 ENCOUNTER — TELEPHONE (OUTPATIENT)
Dept: TRANSPLANT | Facility: CLINIC | Age: 57
End: 2022-07-25

## 2022-07-25 DIAGNOSIS — Z94.0 KIDNEY REPLACED BY TRANSPLANT: ICD-10-CM

## 2022-07-25 DIAGNOSIS — Z48.298 AFTERCARE FOLLOWING ORGAN TRANSPLANT: ICD-10-CM

## 2022-07-25 DIAGNOSIS — Z79.899 ENCOUNTER FOR LONG-TERM CURRENT USE OF MEDICATION: ICD-10-CM

## 2022-07-25 DIAGNOSIS — Z48.298 AFTERCARE FOLLOWING ORGAN TRANSPLANT: Primary | ICD-10-CM

## 2022-07-25 LAB
ALBUMIN MFR UR ELPH: 148 MG/DL
ANION GAP SERPL CALCULATED.3IONS-SCNC: 7 MMOL/L (ref 3–14)
BUN SERPL-MCNC: 49 MG/DL (ref 7–30)
CALCIUM SERPL-MCNC: 9.7 MG/DL (ref 8.5–10.1)
CHLORIDE BLD-SCNC: 110 MMOL/L (ref 94–109)
CO2 SERPL-SCNC: 23 MMOL/L (ref 20–32)
CREAT SERPL-MCNC: 4.19 MG/DL (ref 0.66–1.25)
CREAT UR-MCNC: 184 MG/DL
ERYTHROCYTE [DISTWIDTH] IN BLOOD BY AUTOMATED COUNT: 12.7 % (ref 10–15)
GFR SERPL CREATININE-BSD FRML MDRD: 16 ML/MIN/1.73M2
GLUCOSE BLD-MCNC: 118 MG/DL (ref 70–99)
HCT VFR BLD AUTO: 26.8 % (ref 40–53)
HGB BLD-MCNC: 8.5 G/DL (ref 13.3–17.7)
MAGNESIUM SERPL-MCNC: 1.6 MG/DL (ref 1.6–2.3)
MCH RBC QN AUTO: 31.7 PG (ref 26.5–33)
MCHC RBC AUTO-ENTMCNC: 31.7 G/DL (ref 31.5–36.5)
MCV RBC AUTO: 100 FL (ref 78–100)
MYCOPHENOLATE SERPL LC/MS/MS-MCNC: 2.66 MG/L (ref 1–3.5)
MYCOPHENOLATE-G SERPL LC/MS/MS-MCNC: 171.4 MG/L (ref 30–95)
PHOSPHATE SERPL-MCNC: 2.8 MG/DL (ref 2.5–4.5)
PLATELET # BLD AUTO: 170 10E3/UL (ref 150–450)
POTASSIUM BLD-SCNC: 4 MMOL/L (ref 3.4–5.3)
PROT/CREAT 24H UR: 0.8 MG/MG CR (ref 0–0.2)
RBC # BLD AUTO: 2.68 10E6/UL (ref 4.4–5.9)
SODIUM SERPL-SCNC: 140 MMOL/L (ref 133–144)
TACROLIMUS BLD-MCNC: 17.9 UG/L (ref 5–15)
TME LAST DOSE: ABNORMAL H
WBC # BLD AUTO: 7 10E3/UL (ref 4–11)

## 2022-07-25 PROCEDURE — 84100 ASSAY OF PHOSPHORUS: CPT

## 2022-07-25 PROCEDURE — 86828 HLA CLASS I&II ANTIBODY QUAL: CPT | Mod: 59

## 2022-07-25 PROCEDURE — 87799 DETECT AGENT NOS DNA QUANT: CPT

## 2022-07-25 PROCEDURE — 86833 HLA CLASS II HIGH DEFIN QUAL: CPT

## 2022-07-25 PROCEDURE — 85027 COMPLETE CBC AUTOMATED: CPT

## 2022-07-25 PROCEDURE — 86832 HLA CLASS I HIGH DEFIN QUAL: CPT

## 2022-07-25 PROCEDURE — 80197 ASSAY OF TACROLIMUS: CPT

## 2022-07-25 PROCEDURE — 36415 COLL VENOUS BLD VENIPUNCTURE: CPT

## 2022-07-25 PROCEDURE — 80180 DRUG SCRN QUAN MYCOPHENOLATE: CPT

## 2022-07-25 PROCEDURE — 83735 ASSAY OF MAGNESIUM: CPT

## 2022-07-25 PROCEDURE — 80048 BASIC METABOLIC PNL TOTAL CA: CPT

## 2022-07-25 PROCEDURE — 84156 ASSAY OF PROTEIN URINE: CPT

## 2022-07-25 RX ORDER — TACROLIMUS 1 MG/1
3 CAPSULE ORAL 2 TIMES DAILY
Qty: 180 CAPSULE | Refills: 11 | Status: SHIPPED | OUTPATIENT
Start: 2022-07-25 | End: 2022-07-26

## 2022-07-25 RX ORDER — TACROLIMUS 0.5 MG/1
CAPSULE ORAL
Qty: 60 CAPSULE | Refills: 11
Start: 2022-07-25 | End: 2022-08-23

## 2022-07-25 NOTE — TELEPHONE ENCOUNTER
Spoke to patient who confirms this was an accurate 12-hour trough. Verified Tacrolimus IR dose 3.5 mg BID. Confirmed no new medications or illness. Confirmed no missed doses. Patient confirms decrease Tacrolimus IR dose to 3 mg BID and repeat labs in 3 days.

## 2022-07-25 NOTE — TELEPHONE ENCOUNTER
Yong Friedman MD Colianni, Lauren, URIAH; Donte Grove MD  Can we postpone until later this week please? I would like to have him hydrate, await tac level. Please repeat labs on Thursday, plan tentatively if posisble to have line out on Friday as long as Weds labs improved. With these labs please get U/A, Ucx. Does he have any symptoms of fever, UTI, obstruction? This is a pretty big increase in Scr in just a few days

## 2022-07-25 NOTE — TELEPHONE ENCOUNTER
Donte Grove MD Colianni, Lauren, URIAH  Cc: Yong Friedman MD  Agree hold off on line removal   Clinic visit acute tomorrow , f/up FK & setup for IVF if weight down or not drinking enough   Will discuss with ceasar fluid collection drainage if FK within range (recent US)

## 2022-07-26 ENCOUNTER — TELEPHONE (OUTPATIENT)
Dept: TRANSPLANT | Facility: CLINIC | Age: 57
End: 2022-07-26

## 2022-07-26 ENCOUNTER — ANCILLARY PROCEDURE (OUTPATIENT)
Dept: ULTRASOUND IMAGING | Facility: CLINIC | Age: 57
End: 2022-07-26
Attending: INTERNAL MEDICINE
Payer: COMMERCIAL

## 2022-07-26 ENCOUNTER — INFUSION THERAPY VISIT (OUTPATIENT)
Dept: INFUSION THERAPY | Facility: CLINIC | Age: 57
End: 2022-07-26
Attending: INTERNAL MEDICINE
Payer: COMMERCIAL

## 2022-07-26 ENCOUNTER — OFFICE VISIT (OUTPATIENT)
Dept: NEPHROLOGY | Facility: CLINIC | Age: 57
End: 2022-07-26
Attending: INTERNAL MEDICINE
Payer: COMMERCIAL

## 2022-07-26 VITALS
HEART RATE: 88 BPM | WEIGHT: 236 LBS | DIASTOLIC BLOOD PRESSURE: 71 MMHG | BODY MASS INDEX: 34.85 KG/M2 | RESPIRATION RATE: 16 BRPM | TEMPERATURE: 98.1 F | OXYGEN SATURATION: 98 % | SYSTOLIC BLOOD PRESSURE: 109 MMHG

## 2022-07-26 VITALS — SYSTOLIC BLOOD PRESSURE: 125 MMHG | DIASTOLIC BLOOD PRESSURE: 76 MMHG | HEART RATE: 78 BPM

## 2022-07-26 DIAGNOSIS — N17.9 ACUTE KIDNEY INJURY (H): ICD-10-CM

## 2022-07-26 DIAGNOSIS — Z94.0 KIDNEY REPLACED BY TRANSPLANT: Chronic | ICD-10-CM

## 2022-07-26 DIAGNOSIS — I15.1 HTN, KIDNEY TRANSPLANT RELATED: ICD-10-CM

## 2022-07-26 DIAGNOSIS — Z48.298 AFTERCARE FOLLOWING ORGAN TRANSPLANT: ICD-10-CM

## 2022-07-26 DIAGNOSIS — Z48.298 AFTERCARE FOLLOWING ORGAN TRANSPLANT: Primary | ICD-10-CM

## 2022-07-26 DIAGNOSIS — Z94.0 KIDNEY REPLACED BY TRANSPLANT: ICD-10-CM

## 2022-07-26 DIAGNOSIS — D84.9 IMMUNOSUPPRESSION (H): ICD-10-CM

## 2022-07-26 DIAGNOSIS — N02.B9 IGA NEPHROPATHY: ICD-10-CM

## 2022-07-26 DIAGNOSIS — Z79.899 ENCOUNTER FOR LONG-TERM CURRENT USE OF MEDICATION: ICD-10-CM

## 2022-07-26 DIAGNOSIS — Z94.0 HTN, KIDNEY TRANSPLANT RELATED: ICD-10-CM

## 2022-07-26 DIAGNOSIS — N28.89 PERINEPHRIC FLUID COLLECTION OF KIDNEY TRANSPLANT: ICD-10-CM

## 2022-07-26 DIAGNOSIS — T86.19 PERINEPHRIC FLUID COLLECTION OF KIDNEY TRANSPLANT: ICD-10-CM

## 2022-07-26 DIAGNOSIS — T86.19 DELAYED GRAFT FUNCTION OF KIDNEY: ICD-10-CM

## 2022-07-26 LAB
ALBUMIN UR-MCNC: 100 MG/DL
ANION GAP SERPL CALCULATED.3IONS-SCNC: 9 MMOL/L (ref 3–14)
APPEARANCE UR: CLEAR
BILIRUB UR QL STRIP: NEGATIVE
BKV DNA # SPEC NAA+PROBE: NOT DETECTED COPIES/ML
BUN SERPL-MCNC: 60 MG/DL (ref 7–30)
CALCIUM SERPL-MCNC: 9.6 MG/DL (ref 8.5–10.1)
CHLORIDE BLD-SCNC: 110 MMOL/L (ref 94–109)
CO2 SERPL-SCNC: 23 MMOL/L (ref 20–32)
COLOR UR AUTO: YELLOW
CREAT SERPL-MCNC: 4.16 MG/DL (ref 0.66–1.25)
ERYTHROCYTE [DISTWIDTH] IN BLOOD BY AUTOMATED COUNT: 13 % (ref 10–15)
GFR SERPL CREATININE-BSD FRML MDRD: 16 ML/MIN/1.73M2
GLUCOSE BLD-MCNC: 115 MG/DL (ref 70–99)
GLUCOSE UR STRIP-MCNC: NEGATIVE MG/DL
HCT VFR BLD AUTO: 26.4 % (ref 40–53)
HGB BLD-MCNC: 8.4 G/DL (ref 13.3–17.7)
HGB UR QL STRIP: NEGATIVE
HYALINE CASTS: 3 /LPF
KETONES UR STRIP-MCNC: NEGATIVE MG/DL
LEUKOCYTE ESTERASE UR QL STRIP: NEGATIVE
MCH RBC QN AUTO: 31.1 PG (ref 26.5–33)
MCHC RBC AUTO-ENTMCNC: 31.8 G/DL (ref 31.5–36.5)
MCV RBC AUTO: 98 FL (ref 78–100)
MUCOUS THREADS #/AREA URNS LPF: PRESENT /LPF
NITRATE UR QL: NEGATIVE
PH UR STRIP: 6 [PH] (ref 5–7)
PLATELET # BLD AUTO: 195 10E3/UL (ref 150–450)
POTASSIUM BLD-SCNC: 4.2 MMOL/L (ref 3.4–5.3)
RBC # BLD AUTO: 2.7 10E6/UL (ref 4.4–5.9)
RBC URINE: <1 /HPF
SARS-COV-2 RNA RESP QL NAA+PROBE: NEGATIVE
SODIUM SERPL-SCNC: 142 MMOL/L (ref 133–144)
SP GR UR STRIP: 1.02 (ref 1–1.03)
TACROLIMUS BLD-MCNC: 13.7 UG/L (ref 5–15)
TME LAST DOSE: NORMAL H
TME LAST DOSE: NORMAL H
UROBILINOGEN UR STRIP-MCNC: NORMAL MG/DL
WBC # BLD AUTO: 7.5 10E3/UL (ref 4–11)
WBC URINE: 1 /HPF

## 2022-07-26 PROCEDURE — 258N000003 HC RX IP 258 OP 636: Performed by: INTERNAL MEDICINE

## 2022-07-26 PROCEDURE — 99215 OFFICE O/P EST HI 40 MIN: CPT | Mod: 24 | Performed by: INTERNAL MEDICINE

## 2022-07-26 PROCEDURE — 81001 URINALYSIS AUTO W/SCOPE: CPT

## 2022-07-26 PROCEDURE — 80197 ASSAY OF TACROLIMUS: CPT

## 2022-07-26 PROCEDURE — 96360 HYDRATION IV INFUSION INIT: CPT

## 2022-07-26 PROCEDURE — U0003 INFECTIOUS AGENT DETECTION BY NUCLEIC ACID (DNA OR RNA); SEVERE ACUTE RESPIRATORY SYNDROME CORONAVIRUS 2 (SARS-COV-2) (CORONAVIRUS DISEASE [COVID-19]), AMPLIFIED PROBE TECHNIQUE, MAKING USE OF HIGH THROUGHPUT TECHNOLOGIES AS DESCRIBED BY CMS-2020-01-R: HCPCS

## 2022-07-26 PROCEDURE — 250N000011 HC RX IP 250 OP 636: Performed by: NURSE PRACTITIONER

## 2022-07-26 PROCEDURE — 76776 US EXAM K TRANSPL W/DOPPLER: CPT | Mod: GC | Performed by: STUDENT IN AN ORGANIZED HEALTH CARE EDUCATION/TRAINING PROGRAM

## 2022-07-26 PROCEDURE — G0463 HOSPITAL OUTPT CLINIC VISIT: HCPCS

## 2022-07-26 PROCEDURE — 36592 COLLECT BLOOD FROM PICC: CPT

## 2022-07-26 PROCEDURE — 85027 COMPLETE CBC AUTOMATED: CPT

## 2022-07-26 PROCEDURE — 80048 BASIC METABOLIC PNL TOTAL CA: CPT

## 2022-07-26 RX ORDER — HEPARIN SODIUM 1000 [USP'U]/ML
3000 INJECTION, SOLUTION INTRAVENOUS; SUBCUTANEOUS WEEKLY
Status: DISCONTINUED | OUTPATIENT
Start: 2022-07-26 | End: 2022-07-26 | Stop reason: HOSPADM

## 2022-07-26 RX ORDER — HEPARIN SODIUM 1000 [USP'U]/ML
3000 INJECTION, SOLUTION INTRAVENOUS; SUBCUTANEOUS WEEKLY
Status: CANCELLED
Start: 2022-08-01

## 2022-07-26 RX ORDER — TACROLIMUS 1 MG/1
2 CAPSULE ORAL 2 TIMES DAILY
Qty: 90 CAPSULE | Refills: 11
Start: 2022-07-26 | End: 2022-07-27

## 2022-07-26 RX ADMIN — SODIUM CHLORIDE 1000 ML: 9 INJECTION, SOLUTION INTRAVENOUS at 09:41

## 2022-07-26 RX ADMIN — HEPARIN SODIUM 3000 UNITS: 1000 INJECTION INTRAVENOUS; SUBCUTANEOUS at 10:47

## 2022-07-26 ASSESSMENT — PAIN SCALES - GENERAL: PAINLEVEL: NO PAIN (0)

## 2022-07-26 NOTE — LETTER
7/26/2022         RE: Donald Blanco  [address] [facility]  [place] [address]        Dear Colleague,    Thank you for referring your patient, Donald Blanco, to the [facility]. Please see a copy of my visit note below.    Infusion Nursing Note:  Donald Blanco presents today for IVF and labs.    Patient seen by provider today: Yes in transplant clinic   present during visit today: Not Applicable.    Note:  Dressing change completed. Blue and red lumens hep locked.     Intravenous Access:  CVC - orders per  to use.  Labs drawn without difficulty  COVID test and UA collected  Patient unable to leave stool sample    Treatment Conditions:  Not Applicable.    Post Infusion Assessment:  Patient tolerated infusion without incident.  Blood return noted pre and post infusion.  Site patent and intact, free from redness, edema or discomfort.  No evidence of extravasations.  Access discontinued per protocol.     Discharge Plan:   Discharge instructions reviewed with: Patient.  Patient and/or family verbalized understanding of discharge instructions and all questions answered.  AVS to patient via [employer]. Patient will return at 0900 tomorrow for labs and IVF   Patient discharged in stable condition accompanied by: self.  Departure Mode: Ambulatory.    Administrations This Visit     0.9% sodium chloride BOLUS     Admin Date  07/26/2022 Action  New Bag Dose  1,000 mL Route  Intravenous Administered By  Sharron Cardona, RN          heparin Lock (1000 units/mL High concentration) 3,000 Units     Admin Date  07/26/2022 Action  Given Dose  3,000 Units Route  Intracatheter Administered By  Sharron Cardona, URIAH Cardona, URIAH                        Again, thank you for allowing me to participate in the care of your patient.        Sincerely,        [facility]

## 2022-07-26 NOTE — TELEPHONE ENCOUNTER
ISSUE: Renal US today per Dr Erick Thomas.    Patient scheduled at 1400, check in at 1345. Patient aware.    Shruthi Reyes RN   Transplant Coordinator  [phone]

## 2022-07-26 NOTE — PROGRESS NOTES
TRANSPLANT NEPHROLOGY EARLY POST TRANSPLANT VISIT  2 month visit    Assessment & Plan   # LDKT: uptrend suspect CNI+prerenal azotemia. IVF and reduce tacrolimus as below. US kidney tx shows obscured renal v and decreased flows, elevated renal a velocities, decreased perinephric fluid collections ,discussed with  holding off on interventions for now (angio or drainage) as long as renal function improving with IVF & tacrolimus dose reduction   - post tx course complicated by DGF (KPD-NY) vasospasm in OR. CIT ~10 hrs   - Baseline Creatinine: ~ TBD. Rocky Scr 2.6 on 7/4/22   - Proteinuria: Not checked post transplant   - Date DSA Last Checked: May/2022      Latest DSA: No DSA at time of transplant, recheck now    - BK Viremia: No   - Kidney Tx Biopsy: May 24, 2022; Result: ATI   - Transplant Ureteral Stent: No   -Last HD on 6/2/22   -HD access: RIJ tunneled line, but one port is not functional according to [facility]. Will need to be removed in house after IR drainage of fluid.     - HD line removal this week if labs Thurs show improved kidney function-last HD 6/2    # Immunosuppression: Tacrolimus immediate release (goal 8-10) and Mycophenolate mofetil (dose 750 mg every 12 hours)   - Induction with Recent Transplant:  High Intensity due to DGF after initial dose of simulect   - Continue with intensive monitoring of immunosuppression for efficacy and toxicity.   - Changes: supraTx tacrolimus likely in the setting of diarrhea, Reduce tacrolimus 1mg in am then 2mg in pm (prior dose 3/3)    # Infection Prophylaxis:   - PJP: Sulfa/TMP (Bactrim) MWF  - CMV: Valganciclovir (Valcyte) twice weekly. Adjust dose if CrCl improves    # Blood Pressure: Controlled;  Goal BP: < 140/90   - Volume status: Euvolemic     - Changes: Not at this time    # Anemia in Chronic Renal Disease: Hgb: Stable, low      YUDI: No   - Iron studies: Replete    -Consider anemia clinic after drain replaced    # Mineral Bone Disorder:   - Secondary  renal hyperparathyroidism; PTH level: Significantly elevated (601-1200 pg/ml)        On treatment: None  - Vitamin D; level: Normal        On supplement: Yes  - Calcium; level: Normal        On supplement: No  - Phosphorus; level: Normal        On supplement: No    # Electrolytes:   - Potassium; level: High normal        On supplement: No  - Magnesium; level: Normal        On supplement: No  - Bicarbonate; level: Low        On supplement: No     # Twin-transplant Fluid collections: repeat US kidney Tx and decide if drainage indicated per tx surgery  - Perinephric fluid collection:s/p IR guided fluid collection aspiration on 6/22, drained 80 mL clear yellow fluid, cultures pending. Followed up with Dr. Flores 7/11. Fluid CR~ serum & Cx negative   Imaging:  -6/20 CT: 2 large twin-nephric fluid collections noted  -6/22 US: Normal, perinephric fluid collections.   -6/22 IR: 80 mL clear yellow fluid aspirated.  -6/23 US: Recurrent fluid collections similar to previous  -7/10 Fluid collections around the transplant are slightly bigger and  similar in morphology to previous studies. Collections #2 and #3  appear to communicate with each other. Differential includes hematoma,  seroma, urinoma, and lymphocele    # Diarrhea: Stool studies cdiff, MIREYA, MPA level, COVID screen    # History of prostate CA:   -Stage T3b s/p RALP 12/14/2020 with focal positive margin, received adjuvant radiation. Moderate risk of recurrence in 10-15y.    -Follows with urology. Last PSA in 6/2022 normal    # Medical Compliance: Yes    # COVID-19 Virus Review: Discussed COVID-19 virus and the potential medical risks.  Reviewed preventative health recommendations, including wearing a mask where appropriate.  Recommended COVID vaccination should be up to date with either an initial vaccination or booster shot when appropriate.  Asked the patient to inform the transplant center if they are exposed or diagnosed with this virus.    # COVID Vaccination  Up To Date: No, due for next dose at 3m post txp    # Transplant History:  Etiology of Kidney Failure: IgA nephropathy  Tx: LDKT  Transplant: 5/17/2022 (Kidney)  Donor Type: Living Donor Class:   Crossmatch at time of Tx: negative  DSA at time of Tx: No  Significant changes in immunosuppression: None  CMV IgG Ab High Risk Discordance (D+/R-): No  EBV IgG Ab High Risk Discordance (D+/R-): No  Significant transplant-related complications: DGF    Transplant Office Phone Number: [phone]    Assessment and plan was discussed with the patient and he voiced his understanding and agreement.    Return visit: 1 week  Donte Duff MD    Chief Complaint   Mr. Blanco is a 57 year old here for routine follow up, kidney transplant, immunosuppression management and concern for increasing fluid collection.     History of Present Illness     Mr. Blanco felt sick over the weekend with diarrhea and low grade fevers ~100 F for 3 days. He did home test for COVID and was neg x 2. Diarrhea has subsided since and appetite and fluid intake improving but still less than baseline. BP has been well controlled. No graft pain or urinary symptoms.    Labs show worsening renal function Cr up to 4 from huang of 2.7. FK trough supra-therapeutic  US kidney showed elevated renal a velocities, obscured renal vein and reduced flows, decreased perinephric fluid collections    Problem List   Patient Active Problem List   Diagnosis     HTN, kidney transplant related     Gout     Hyperlipidemia     Hyperparathyroidism due to renal insufficiency (H)     IgA nephropathy     Obesity     Umbilical hernia     Prostate cancer (H)     Morbid obesity (H)     Kidney replaced by transplant     Immunosuppression (H)     Hyperkalemia     Perinephric fluid collection of kidney transplant     PETER (acute kidney injury) (H)     Abdominal fluid collection     Delayed graft function of kidney     Anemia     Hypervolemia     Anemia in other chronic diseases classified  elsewhere       Allergies   No Known Allergies    Medications   Current Outpatient Medications   Medication Sig     acetaminophen (TYLENOL) 325 MG tablet Take 1-2 tablets (325-650 mg) by mouth every 4 hours as needed for mild pain or fever     aspirin (ASA) 325 MG tablet Take 1 tablet (325 mg) by mouth daily     atorvastatin (LIPITOR) 10 MG tablet Take 1 tablet (10 mg) by mouth every evening     B Complex-C-Folic Acid (VIRT-CAPS) 1 MG CAPS Take 1 mg by mouth daily     magnesium oxide (MAG-OX) 400 MG tablet Take 1 tablet (400 mg) by mouth daily     mycophenolate (GENERIC EQUIVALENT) 250 MG capsule Take 3 capsules (750 mg) by mouth 2 times daily     sodium bicarbonate 650 MG tablet Take 3 tablets (1,950 mg) by mouth 3 times daily     sulfamethoxazole-trimethoprim (BACTRIM) 400-80 MG tablet Take 1 tablet by mouth Every Mon, Wed, Fri Morning Increase to one tab by mouth daily when directed by transplant team     valGANciclovir (VALCYTE) 450 MG tablet Take 1 tablet (450 mg) by mouth every other day     Vitamin D3 (CHOLECALCIFEROL) 25 mcg (1000 units) tablet Take 1 capsule by mouth every morning      bumetanide (BUMEX) 1 MG tablet Take 1mg, twice daily on Monday's, Wednesday's, and Friday's     tacrolimus (GENERIC EQUIVALENT) 0.5 MG capsule HOLD (Patient not taking: Reported on 7/26/2022)     tacrolimus (GENERIC EQUIVALENT) 1 MG capsule Take 4 capsules (4 mg) by mouth 2 times daily     No current facility-administered medications for this visit.     Medications Discontinued During This Encounter   Medication Reason     bumetanide (BUMEX) 1 MG tablet        Physical Exam   Vital Signs: /71   Pulse 88   Temp 98.1  F (36.7  C)   Resp 16   Wt 107 kg (236 lb)   SpO2 98%   BMI 34.85 kg/m      GENERAL APPEARANCE: alert and no distress  HENT: mouth without ulcers or lesions  LYMPHATICS: no cervical or supraclavicular nodes  RESP: lungs clear to auscultation - no rales, rhonchi or wheezes  CV: regular rhythm, normal  rate, no rub, no murmur  EDEMA: no LE edema bilaterally  ABDOMEN: soft, nondistended, nontender, bowel sounds normal  MS: extremities normal - no gross deformities noted, no evidence of inflammation in joints, no muscle tenderness  SKIN: no rash  NEURO: normal strength and tone, sensory exam grossly normal, mentation intact and speech normal  PSYCH: mentation appears normal and affect normal/bright  TX KIDNEY: bulging over RLQ  DIALYSIS ACCESS:  Right IJ tunneled catheter    Data     Renal Latest Ref Rng & Units 8/1/2022 7/29/2022 7/27/2022   Na 133 - 144 mmol/L 145(H) 143 142   Na (external) 135 - 145 mmol/L - - -   K 3.4 - 5.3 mmol/L 4.6 4.4 4.0   K (external) 3.5 - 5.0 mmol/L - - -   Cl 94 - 109 mmol/L 117(H) 114(H) 111(H)   CO2 20 - 32 mmol/L 22 24 21   CO2 (external) 21 - 31 mmol/L - - -   BUN 7 - 30 mg/dL 37(H) 39(H) 56(H)   BUN (external) 8 - 25 mg/dL - - -   Cr 0.66 - 1.25 mg/dL 2.38(H) 2.71(H) 3.52(H)   Cr (external) 0.57 - 1.11 mg/dL - - -   Glucose 70 - 99 mg/dL 107(H) 117(H) 129(H)   Glucose (external) 65 - 100 mg/dL - - -   Ca  8.5 - 10.1 mg/dL 9.5 9.4 9.4   Ca (external) 8.5 - 10.5 mg/dL - - -   Mg 1.6 - 2.3 mg/dL 1.9 - -   Mg (external) 1.6 - 2.6 mg/dL - - -     Bone Health Latest Ref Rng & Units 8/1/2022 7/25/2022 7/18/2022   Phos 2.5 - 4.5 mg/dL 2.3(L) 2.8 2.3(L)   Phos (external) 2.3 - 4.7 mg/dL - - -   PTHi 15 - 65 pg/mL - - -   Vit D Def 20 - 75 ug/L - - -     Heme Latest Ref Rng & Units 8/1/2022 7/29/2022 7/27/2022   WBC 4.0 - 11.0 10e3/uL 6.2 7.2 6.8   WBC (external) 4.5 - 11.0 thou/cu mm - - -   Hgb 13.3 - 17.7 g/dL 7.4(LL) 7.8(LL) 7.9(L)   Hgb (external) 12.0 - 16.0 g/dL - - -   Plt 150 - 450 10e3/uL 256 228 197   Plt (external) 140 - 440 thou/cu mm - - -   ABSOLUTE NEUTROPHIL 1.6 - 8.3 10e9/L - - -   ABSOLUTE LYMPHOCYTES 0.8 - 5.3 10e9/L - - -   ABSOLUTE MONOCYTES 0.0 - 1.3 10e9/L - - -   ABSOLUTE EOSINOPHILS 0.0 - 0.7 10e9/L - - -   ABSOLUTE BASOPHILS 0.0 - 0.2 10e9/L - - -   ABS IMMATURE  GRANULOCYTES 0 - 0.4 10e9/L - - -   ABSOLUTE NUCLEATED RBC - - - -     Liver Latest Ref Rng & Units 6/3/2022 6/2/2022 5/22/2022   AP 40 - 150 U/L 83 - -   TBili 0.2 - 1.3 mg/dL 0.5 - -   DBili 0.0 - 0.2 mg/dL 0.2 - -   ALT 0 - 70 U/L 23 - -   AST 0 - 45 U/L 9 - -   Tot Protein 6.8 - 8.8 g/dL 6.6(L) - -   Albumin 3.4 - 5.0 g/dL 3.3(L) 3.2(L) 2.7(L)     Pancreas Latest Ref Rng & Units 6/3/2022 5/9/2022   A1C 0.0 - 5.6 % 5.1 5.0     Iron studies Latest Ref Rng & Units 7/4/2022 6/23/2022 5/9/2022   Iron 35 - 180 ug/dL 77 57(L) 79   Iron sat 15 - 46 % 24 25 24   Ferritin 26 - 388 ng/mL 570(H) - 1,002(H)     UMP Txp Virology Latest Ref Rng & Units 5/17/2022 5/9/2022 9/3/2020   CMV QUANT IU/ML Not Detected IU/mL Not Detected - -   EBV CAPSID ANTIBODY IGG No detectable antibody. - Positive(A) >8.0(H)   Hep B Core NR:Nonreactive - - Nonreactive        Recent Labs   Lab Test 07/27/22  0858 07/29/22  0740 08/01/22  0706   DOSTAC 7/26/2022 7/28/2022 7/31/2022   TACROL 9.3 5.2 3.4*     Recent Labs   Lab Test 07/25/22  0656 07/29/22  0740 08/01/22  0706   DOSMPA  --  7/28/2022   8:00 PM 7/31/2022   8:00 PM   MPACID 2.66 6.07* 2.63   MPAG 171.4* 91.6 90.6

## 2022-07-26 NOTE — PATIENT INSTRUCTIONS
Labs now    Reduce tacrolimus to 1 mg in am and 2 mg in pm    You will get IV fluids today    Submit stool sample to the lab    COVID     Hold bumex

## 2022-07-26 NOTE — NURSING NOTE
Chief Complaint   Patient presents with     RECHECK     Follow Up - Kidney Tx   /71   Pulse 88   Temp 98.1  F (36.7  C)   Resp 16   Wt 107 kg (236 lb)   SpO2 98%   BMI 34.85 kg/m    Kojo Tierney on 7/26/2022 at 8:29 AM

## 2022-07-26 NOTE — PATIENT INSTRUCTIONS
Dear Donald Blanco    Thank you for choosing [facility] ([facility]) for your infusion.  The following information is a summary of our appointment as well as important reminders.      We look forward in seeing you on your next appointment here at [facility]).  Please don t hesitate to call us at [phone] to reschedule any of your appointments or to speak with one of the [facility] registered nurses.  It was a pleasure taking care of you today.    Sincerely,    [facility]  [address] [place] [address]  [address]  Phone:  [phone]

## 2022-07-27 ENCOUNTER — INFUSION THERAPY VISIT (OUTPATIENT)
Dept: INFUSION THERAPY | Facility: CLINIC | Age: 57
End: 2022-07-27
Attending: INTERNAL MEDICINE
Payer: COMMERCIAL

## 2022-07-27 VITALS
DIASTOLIC BLOOD PRESSURE: 71 MMHG | HEART RATE: 82 BPM | RESPIRATION RATE: 16 BRPM | SYSTOLIC BLOOD PRESSURE: 117 MMHG | OXYGEN SATURATION: 97 % | TEMPERATURE: 98 F

## 2022-07-27 DIAGNOSIS — Z94.0 KIDNEY REPLACED BY TRANSPLANT: Primary | ICD-10-CM

## 2022-07-27 DIAGNOSIS — Z79.899 ENCOUNTER FOR LONG-TERM CURRENT USE OF MEDICATION: ICD-10-CM

## 2022-07-27 DIAGNOSIS — Z48.298 AFTERCARE FOLLOWING ORGAN TRANSPLANT: ICD-10-CM

## 2022-07-27 DIAGNOSIS — Z94.0 KIDNEY REPLACED BY TRANSPLANT: Chronic | ICD-10-CM

## 2022-07-27 LAB
ANION GAP SERPL CALCULATED.3IONS-SCNC: 10 MMOL/L (ref 3–14)
BUN SERPL-MCNC: 56 MG/DL (ref 7–30)
CALCIUM SERPL-MCNC: 9.4 MG/DL (ref 8.5–10.1)
CHLORIDE BLD-SCNC: 111 MMOL/L (ref 94–109)
CO2 SERPL-SCNC: 21 MMOL/L (ref 20–32)
CREAT SERPL-MCNC: 3.52 MG/DL (ref 0.66–1.25)
ERYTHROCYTE [DISTWIDTH] IN BLOOD BY AUTOMATED COUNT: 13.1 % (ref 10–15)
GFR SERPL CREATININE-BSD FRML MDRD: 19 ML/MIN/1.73M2
GLUCOSE BLD-MCNC: 129 MG/DL (ref 70–99)
HCT VFR BLD AUTO: 25.2 % (ref 40–53)
HGB BLD-MCNC: 7.9 G/DL (ref 13.3–17.7)
MCH RBC QN AUTO: 31.1 PG (ref 26.5–33)
MCHC RBC AUTO-ENTMCNC: 31.3 G/DL (ref 31.5–36.5)
MCV RBC AUTO: 99 FL (ref 78–100)
PLATELET # BLD AUTO: 197 10E3/UL (ref 150–450)
POTASSIUM BLD-SCNC: 4 MMOL/L (ref 3.4–5.3)
RBC # BLD AUTO: 2.54 10E6/UL (ref 4.4–5.9)
SODIUM SERPL-SCNC: 142 MMOL/L (ref 133–144)
TACROLIMUS BLD-MCNC: 9.3 UG/L (ref 5–15)
TME LAST DOSE: NORMAL H
TME LAST DOSE: NORMAL H
WBC # BLD AUTO: 6.8 10E3/UL (ref 4–11)

## 2022-07-27 PROCEDURE — 82310 ASSAY OF CALCIUM: CPT

## 2022-07-27 PROCEDURE — 258N000003 HC RX IP 258 OP 636: Performed by: INTERNAL MEDICINE

## 2022-07-27 PROCEDURE — 36592 COLLECT BLOOD FROM PICC: CPT

## 2022-07-27 PROCEDURE — 85027 COMPLETE CBC AUTOMATED: CPT

## 2022-07-27 PROCEDURE — 250N000011 HC RX IP 250 OP 636: Performed by: NURSE PRACTITIONER

## 2022-07-27 PROCEDURE — 80197 ASSAY OF TACROLIMUS: CPT

## 2022-07-27 PROCEDURE — 96360 HYDRATION IV INFUSION INIT: CPT

## 2022-07-27 RX ORDER — TACROLIMUS 1 MG/1
1 CAPSULE ORAL 2 TIMES DAILY
Qty: 90 CAPSULE | Refills: 11
Start: 2022-07-27 | End: 2022-08-01

## 2022-07-27 RX ORDER — HEPARIN SODIUM 1000 [USP'U]/ML
3000 INJECTION, SOLUTION INTRAVENOUS; SUBCUTANEOUS WEEKLY
Status: DISCONTINUED | OUTPATIENT
Start: 2022-07-27 | End: 2022-07-27 | Stop reason: HOSPADM

## 2022-07-27 RX ORDER — HEPARIN SODIUM 1000 [USP'U]/ML
3000 INJECTION, SOLUTION INTRAVENOUS; SUBCUTANEOUS WEEKLY
Status: CANCELLED
Start: 2022-08-03

## 2022-07-27 RX ADMIN — SODIUM CHLORIDE 1000 ML: 9 INJECTION, SOLUTION INTRAVENOUS at 08:59

## 2022-07-27 RX ADMIN — HEPARIN SODIUM 1600 UNITS: 1000 INJECTION INTRAVENOUS; SUBCUTANEOUS at 10:08

## 2022-07-27 NOTE — PROGRESS NOTES
Nursing Note  Donald Blanco presents [facility] for:   Chief Complaint   Patient presents with     Infusion     IVF       During today's [facility] appointment, orders from Dr. Duff were completed.  Frequency: once    Progress note:  Patient identification verified by name and date of birth.  Assessment completed.  Vitals recorded in Doc Flowsheets.  Patient was provided with education regarding medication/procedure and possible side effects.  Patient verbalized understanding.     present during visit today: Not Applicable.    Patient reported diarrhea is better and this morning stool was more formed.     Premedications: were not ordered.    Drug Waste Record: No    Infusion length and rate:  infusion given over approximately 60 minutes    Labs: were drawn per orders.     Vascular access: Central Venous Catheter accessed today.    Is the next appt scheduled? prn  Asymptomatic COVID test completed? no    Post Infusion Assessment:  Patient tolerated infusion without incident.     Discharge Plan:   Follow up plan of care with: ordering provider as scheduled.  Discharge instructions were reviewed with patient.  Patient/representative verbalized understanding of discharge instructions and all questions answered.  Patient discharged from [facility] in stable condition.    Iram Aguilar RN    Administrations This Visit     0.9% sodium chloride BOLUS     Admin Date  07/27/2022 Action  New Bag Dose  1,000 mL Route  Intravenous Administered By  Iram Aguilar RN          heparin Lock (1000 units/mL High concentration) 3,000 Units     Admin Date  07/27/2022 Action  Given Dose  1,600 Units Route  Intracatheter Administered By  Iram Aguilar RN                /71 (BP Location: Left arm, Patient Position: Semi-Del Angel's, Cuff Size: Adult Regular)   Pulse 82   Temp 98  F (36.7  C) (Oral)   Resp 16   SpO2  97%

## 2022-07-27 NOTE — LETTER
7/27/2022         RE: Donald Blanco  [address] [facility]  [place] [address]        Dear Colleague,    Thank you for referring your patient, Donald Blanco, to the [facility]. Please see a copy of my visit note below.    Nursing Note  Donald Blanco [facility] for:   Chief Complaint   Patient presents with     Infusion     IVF       During [facility]'s [facility] appointment, orders from Dr. Duff were completed.  Frequency: once    Progress note:  Patient identification verified by name and date of birth.  Assessment completed.  Vitals recorded in Doc Flowsheets.  Patient was provided with education regarding medication/procedure and possible side effects.  Patient verbalized understanding.     present during visit today: Not Applicable.    Patient reported diarrhea is better and this morning stool was more formed.     Premedications: were not ordered.    Drug Waste Record: No    Infusion length and rate:  infusion given over approximately 60 minutes    Labs: were drawn per orders.     Vascular access: Central Venous Catheter accessed today.    Is the next appt scheduled? prn  Asymptomatic COVID test completed? no    Post Infusion Assessment:  Patient tolerated infusion without incident.     Discharge Plan:   Follow up plan of care with: ordering provider as scheduled.  Discharge instructions were reviewed with patient.  Patient/representative verbalized understanding of discharge instructions and all questions answered.  Patient discharged from [facility] in stable condition.    Iram Aguilar RN    Administrations This Visit     0.9% sodium chloride BOLUS     Admin Date  07/27/2022 Action  New Bag Dose  1,000 mL Route  Intravenous Administered By  Iram Aguilar, URIAH          heparin Lock (1000 units/mL High concentration) 3,000 Units     Admin  Date  07/27/2022 Action  Given Dose  1,600 Units Route  Intracatheter Administered By  Iram Aguilar RN                /71 (BP Location: Left arm, Patient Position: Semi-Del Angel's, Cuff Size: Adult Regular)   Pulse 82   Temp 98  F (36.7  C) (Oral)   Resp 16   SpO2 97%           Again, thank you for allowing me to participate in the care of your patient.        Sincerely,        [facility]

## 2022-07-28 ENCOUNTER — TELEPHONE (OUTPATIENT)
Dept: TRANSPLANT | Facility: CLINIC | Age: 57
End: 2022-07-28

## 2022-07-28 DIAGNOSIS — R60.9 FLUID RETENTION: Primary | ICD-10-CM

## 2022-07-28 LAB
DONOR IDENTIFICATION: NORMAL
DSA COMMENTS: NORMAL
DSA PRESENT: NO
DSA TEST METHOD: NORMAL
INT SUB RESULT: NORMAL
INTERF SUBSTANCE: NORMAL
INTSUB TEST METHOD: NORMAL
ORGAN: NORMAL
SA 1 CELL: NORMAL
SA 1 TEST METHOD: NORMAL
SA 2 CELL: NORMAL
SA 2 TEST METHOD: NORMAL
SA1 HI RISK ABY: NORMAL
SA1 MOD RISK ABY: NORMAL
SA2 HI RISK ABY: NORMAL
SA2 MOD RISK ABY: NORMAL
UNACCEPTABLE ANTIGENS: NORMAL
UNOS CPRA: 19
ZZZINT SUB COMMENTS: NORMAL
ZZZSA 1  COMMENTS: NORMAL
ZZZSA 2 COMMENTS: NORMAL

## 2022-07-28 NOTE — TELEPHONE ENCOUNTER
Patient calling regarding swelling in Right leg, they discontinued Bumex  and that's when Right leg started swelling and would like a call back.

## 2022-07-29 ENCOUNTER — TELEPHONE (OUTPATIENT)
Dept: TRANSPLANT | Facility: CLINIC | Age: 57
End: 2022-07-29

## 2022-07-29 ENCOUNTER — LAB (OUTPATIENT)
Dept: LAB | Facility: CLINIC | Age: 57
End: 2022-07-29
Payer: COMMERCIAL

## 2022-07-29 DIAGNOSIS — Z79.899 ENCOUNTER FOR LONG-TERM CURRENT USE OF MEDICATION: ICD-10-CM

## 2022-07-29 DIAGNOSIS — Z94.0 KIDNEY REPLACED BY TRANSPLANT: ICD-10-CM

## 2022-07-29 DIAGNOSIS — Z48.298 AFTERCARE FOLLOWING ORGAN TRANSPLANT: ICD-10-CM

## 2022-07-29 LAB
ANION GAP SERPL CALCULATED.3IONS-SCNC: 5 MMOL/L (ref 3–14)
BUN SERPL-MCNC: 39 MG/DL (ref 7–30)
CALCIUM SERPL-MCNC: 9.4 MG/DL (ref 8.5–10.1)
CHLORIDE BLD-SCNC: 114 MMOL/L (ref 94–109)
CO2 SERPL-SCNC: 24 MMOL/L (ref 20–32)
CREAT SERPL-MCNC: 2.71 MG/DL (ref 0.66–1.25)
ERYTHROCYTE [DISTWIDTH] IN BLOOD BY AUTOMATED COUNT: 12.5 % (ref 10–15)
GFR SERPL CREATININE-BSD FRML MDRD: 26 ML/MIN/1.73M2
GLUCOSE BLD-MCNC: 117 MG/DL (ref 70–99)
HCT VFR BLD AUTO: 25.2 % (ref 40–53)
HGB BLD-MCNC: 7.8 G/DL (ref 13.3–17.7)
MCH RBC QN AUTO: 31.3 PG (ref 26.5–33)
MCHC RBC AUTO-ENTMCNC: 31 G/DL (ref 31.5–36.5)
MCV RBC AUTO: 101 FL (ref 78–100)
MYCOPHENOLATE SERPL LC/MS/MS-MCNC: 6.07 MG/L (ref 1–3.5)
MYCOPHENOLATE-G SERPL LC/MS/MS-MCNC: 91.6 MG/L (ref 30–95)
PLATELET # BLD AUTO: 228 10E3/UL (ref 150–450)
POTASSIUM BLD-SCNC: 4.4 MMOL/L (ref 3.4–5.3)
RBC # BLD AUTO: 2.49 10E6/UL (ref 4.4–5.9)
SODIUM SERPL-SCNC: 143 MMOL/L (ref 133–144)
TACROLIMUS BLD-MCNC: 5.2 UG/L (ref 5–15)
TME LAST DOSE: ABNORMAL H
TME LAST DOSE: ABNORMAL H
TME LAST DOSE: NORMAL H
TME LAST DOSE: NORMAL H
WBC # BLD AUTO: 7.2 10E3/UL (ref 4–11)

## 2022-07-29 PROCEDURE — 80197 ASSAY OF TACROLIMUS: CPT

## 2022-07-29 PROCEDURE — 80048 BASIC METABOLIC PNL TOTAL CA: CPT

## 2022-07-29 PROCEDURE — 36415 COLL VENOUS BLD VENIPUNCTURE: CPT

## 2022-07-29 PROCEDURE — 80180 DRUG SCRN QUAN MYCOPHENOLATE: CPT

## 2022-07-29 PROCEDURE — 85027 COMPLETE CBC AUTOMATED: CPT

## 2022-07-29 RX ORDER — BUMETANIDE 1 MG/1
1 TABLET ORAL
Qty: 30 TABLET | Refills: 3
Start: 2022-07-29 | End: 2022-07-29

## 2022-07-29 RX ORDER — BUMETANIDE 1 MG/1
TABLET ORAL
Qty: 30 TABLET | Refills: 3 | Status: SHIPPED | OUTPATIENT
Start: 2022-07-29 | End: 2022-08-17

## 2022-07-29 NOTE — TELEPHONE ENCOUNTER
DATE:  7/29/2022     TIME OF RECEIPT FROM LAB:  8:13    ORDERING PROVIDER: Woodrow Cr    LAB TEST:  hgb    LAB VALUE:  7.8    RESULTS GIVEN WITH READ-BACK TO (PROVIDER):  Alicia Valentin    TIME LAB VALUE REPORTED TO PROVIDER:   8:19

## 2022-07-29 NOTE — TELEPHONE ENCOUNTER
Patient reports weight gain of 4 pounds this week. Spoke with Dr. Erick Thomas regarding swelling. She wants patient to take Bumex Regime (1mg, twice daily) 3 times per week (M, W, F) to see if this controls swelling. Follow up appointment requested.

## 2022-08-01 ENCOUNTER — TELEPHONE (OUTPATIENT)
Dept: PHARMACY | Facility: CLINIC | Age: 57
End: 2022-08-01

## 2022-08-01 ENCOUNTER — TELEPHONE (OUTPATIENT)
Dept: TRANSPLANT | Facility: CLINIC | Age: 57
End: 2022-08-01

## 2022-08-01 ENCOUNTER — LAB (OUTPATIENT)
Dept: LAB | Facility: CLINIC | Age: 57
End: 2022-08-01
Payer: COMMERCIAL

## 2022-08-01 DIAGNOSIS — Z79.899 ENCOUNTER FOR LONG-TERM CURRENT USE OF MEDICATION: ICD-10-CM

## 2022-08-01 DIAGNOSIS — Z94.0 KIDNEY REPLACED BY TRANSPLANT: Chronic | ICD-10-CM

## 2022-08-01 DIAGNOSIS — Z48.298 AFTERCARE FOLLOWING ORGAN TRANSPLANT: ICD-10-CM

## 2022-08-01 DIAGNOSIS — Z94.0 KIDNEY REPLACED BY TRANSPLANT: ICD-10-CM

## 2022-08-01 DIAGNOSIS — D63.8 ANEMIA IN OTHER CHRONIC DISEASES CLASSIFIED ELSEWHERE: ICD-10-CM

## 2022-08-01 LAB
ANION GAP SERPL CALCULATED.3IONS-SCNC: 6 MMOL/L (ref 3–14)
BUN SERPL-MCNC: 37 MG/DL (ref 7–30)
CALCIUM SERPL-MCNC: 9.5 MG/DL (ref 8.5–10.1)
CHLORIDE BLD-SCNC: 117 MMOL/L (ref 94–109)
CO2 SERPL-SCNC: 22 MMOL/L (ref 20–32)
CREAT SERPL-MCNC: 2.38 MG/DL (ref 0.66–1.25)
ERYTHROCYTE [DISTWIDTH] IN BLOOD BY AUTOMATED COUNT: 12.7 % (ref 10–15)
GFR SERPL CREATININE-BSD FRML MDRD: 31 ML/MIN/1.73M2
GLUCOSE BLD-MCNC: 107 MG/DL (ref 70–99)
HCT VFR BLD AUTO: 24.1 % (ref 40–53)
HGB BLD-MCNC: 7.4 G/DL (ref 13.3–17.7)
MAGNESIUM SERPL-MCNC: 1.9 MG/DL (ref 1.6–2.3)
MCH RBC QN AUTO: 31.8 PG (ref 26.5–33)
MCHC RBC AUTO-ENTMCNC: 30.7 G/DL (ref 31.5–36.5)
MCV RBC AUTO: 103 FL (ref 78–100)
PHOSPHATE SERPL-MCNC: 2.3 MG/DL (ref 2.5–4.5)
PLATELET # BLD AUTO: 256 10E3/UL (ref 150–450)
POTASSIUM BLD-SCNC: 4.6 MMOL/L (ref 3.4–5.3)
RBC # BLD AUTO: 2.33 10E6/UL (ref 4.4–5.9)
SODIUM SERPL-SCNC: 145 MMOL/L (ref 133–144)
TACROLIMUS BLD-MCNC: 3.4 UG/L (ref 5–15)
TME LAST DOSE: ABNORMAL H
TME LAST DOSE: ABNORMAL H
WBC # BLD AUTO: 6.2 10E3/UL (ref 4–11)

## 2022-08-01 PROCEDURE — 85027 COMPLETE CBC AUTOMATED: CPT

## 2022-08-01 PROCEDURE — 83735 ASSAY OF MAGNESIUM: CPT

## 2022-08-01 PROCEDURE — 80048 BASIC METABOLIC PNL TOTAL CA: CPT

## 2022-08-01 PROCEDURE — 36415 COLL VENOUS BLD VENIPUNCTURE: CPT

## 2022-08-01 PROCEDURE — 80197 ASSAY OF TACROLIMUS: CPT

## 2022-08-01 PROCEDURE — 84100 ASSAY OF PHOSPHORUS: CPT

## 2022-08-01 PROCEDURE — 80180 DRUG SCRN QUAN MYCOPHENOLATE: CPT

## 2022-08-01 RX ORDER — TACROLIMUS 1 MG/1
2 CAPSULE ORAL 2 TIMES DAILY
Qty: 90 CAPSULE | Refills: 11
Start: 2022-08-01 | End: 2022-08-02

## 2022-08-01 NOTE — TELEPHONE ENCOUNTER
Pat returned call. He was sick last weekend.  Took 2 home COVID tests and a PCR test and they were all negative.  Feeling much better. He will just plan on getting Aranesp on 8/8/22 at his MD appt.     Camille Tsang RN   Anemia Services  [address] [address]   [email or web address]   Office : [phone]  Fax: [phone]

## 2022-08-01 NOTE — TELEPHONE ENCOUNTER
Follow-up with anemia management service:    M for  Donald to remind him that he needs to call and scheduled Aranes.  Gave him the # for the [facility] on the VM.     Anemia Latest Ref Rng & Units 7/18/2022 7/21/2022 7/25/2022 7/26/2022 7/27/2022 7/29/2022 8/1/2022   YUDI Dose - 40mcg - - - - - -   Hemoglobin 13.3 - 17.7 g/dL 8.7(L) 9.3(L) 8.5(L) 8.4(L) 7.9(L) 7.8(LL) 7.4(LL)   TSAT 15 - 46 % - - - - - - -   Ferritin 26 - 388 ng/mL - - - - - - -         Follow-up call date: 8/8/22     Camille Tsang RN   Anemia Services  [address] [address]   [email or web address]   Office : [phone]  Fax: [phone]

## 2022-08-01 NOTE — TELEPHONE ENCOUNTER
DATE:  8/1/2022     TIME OF RECEIPT FROM LAB: 0755    LAB TEST:  hgb    LAB VALUE: 7.4    RESULTS GIVEN WITH READ-BACK TO:Alicia Valentin    TIME LAB VALUE REPORTED TO PROVIDER:0800  Lab phone #[phone]

## 2022-08-02 DIAGNOSIS — Z94.0 KIDNEY REPLACED BY TRANSPLANT: Chronic | ICD-10-CM

## 2022-08-02 LAB
MYCOPHENOLATE SERPL LC/MS/MS-MCNC: 2.63 MG/L (ref 1–3.5)
MYCOPHENOLATE-G SERPL LC/MS/MS-MCNC: 90.6 MG/L (ref 30–95)
TME LAST DOSE: NORMAL H
TME LAST DOSE: NORMAL H

## 2022-08-02 RX ORDER — TACROLIMUS 1 MG/1
4 CAPSULE ORAL 2 TIMES DAILY
Qty: 120 CAPSULE | Refills: 11
Start: 2022-08-02 | End: 2022-08-16

## 2022-08-03 ENCOUNTER — TELEPHONE (OUTPATIENT)
Dept: TRANSPLANT | Facility: CLINIC | Age: 57
End: 2022-08-03

## 2022-08-03 DIAGNOSIS — Z48.298 AFTERCARE FOLLOWING ORGAN TRANSPLANT: Primary | ICD-10-CM

## 2022-08-03 DIAGNOSIS — E66.01 MORBID OBESITY (H): ICD-10-CM

## 2022-08-03 RX ORDER — VALGANCICLOVIR 450 MG/1
450 TABLET, FILM COATED ORAL DAILY
Qty: 90 TABLET | Refills: 3 | Status: SHIPPED | OUTPATIENT
Start: 2022-08-03 | End: 2022-09-27

## 2022-08-03 NOTE — TELEPHONE ENCOUNTER
Updated patient that if creatinine continues to improve on next draw that he is able to get his CV line out. Gave number to schedule.

## 2022-08-03 NOTE — TELEPHONE ENCOUNTER
Post Kidney and Pancreas Transplant Team Conference  Date: 8/3/2022  Transplant Coordinator: Alicia Valentin     Attendees:  [x]  Dr. Cr [x] Alicia Valentin, RN [x] Hanny Simmons LPN     []  Dr. Capellan [x] Rut Pickens RN [] Ester Bragg LPN   [x]  Dr. Duff [] Phoebe Howard, URIAH    [x]  Dr. Friedman [] Jazzmine Arzate RN [x] Alejandro Garces, PharmD   [x] Dr. Mckenna [] Shruthi Reyes, URIAH    [] Dr. Flores [] Campos Falcon RN    [] Dr. Frank [] Karina Martinez, URIAH [] Aurora Hollingsworth RN   [] Dr. Yu [] Sara Pablo, URIAH    [x]  Dr. Cohen [] Carolyn Vasquez RN    [] Dr. Rust [x] Xin Venegas, URIAH    [x] Katie Sanders, MARIO [] Rossy Solorio RN        Verbal Plan Read Back:   Ok to remove line if creatinine is good at tomorrows lab draw    Routed to RN Coordinator   Hanny Simmons LPN

## 2022-08-06 DIAGNOSIS — Z94.0 KIDNEY REPLACED BY TRANSPLANT: Primary | Chronic | ICD-10-CM

## 2022-08-08 ENCOUNTER — LAB (OUTPATIENT)
Dept: LAB | Facility: CLINIC | Age: 57
End: 2022-08-08
Payer: COMMERCIAL

## 2022-08-08 ENCOUNTER — OFFICE VISIT (OUTPATIENT)
Dept: NEPHROLOGY | Facility: CLINIC | Age: 57
End: 2022-08-08
Attending: INTERNAL MEDICINE
Payer: COMMERCIAL

## 2022-08-08 ENCOUNTER — TELEPHONE (OUTPATIENT)
Dept: NEPHROLOGY | Facility: CLINIC | Age: 57
End: 2022-08-08

## 2022-08-08 ENCOUNTER — TELEPHONE (OUTPATIENT)
Dept: PHARMACY | Facility: CLINIC | Age: 57
End: 2022-08-08

## 2022-08-08 VITALS
BODY MASS INDEX: 34.7 KG/M2 | HEART RATE: 74 BPM | DIASTOLIC BLOOD PRESSURE: 78 MMHG | OXYGEN SATURATION: 99 % | WEIGHT: 235 LBS | SYSTOLIC BLOOD PRESSURE: 136 MMHG

## 2022-08-08 DIAGNOSIS — Z94.0 KIDNEY REPLACED BY TRANSPLANT: ICD-10-CM

## 2022-08-08 DIAGNOSIS — Z94.0 KIDNEY REPLACED BY TRANSPLANT: Primary | ICD-10-CM

## 2022-08-08 DIAGNOSIS — R60.9 FLUID RETENTION: ICD-10-CM

## 2022-08-08 DIAGNOSIS — Z79.899 ENCOUNTER FOR LONG-TERM CURRENT USE OF MEDICATION: ICD-10-CM

## 2022-08-08 DIAGNOSIS — D63.8 ANEMIA IN OTHER CHRONIC DISEASES CLASSIFIED ELSEWHERE: ICD-10-CM

## 2022-08-08 DIAGNOSIS — Z48.298 AFTERCARE FOLLOWING ORGAN TRANSPLANT: ICD-10-CM

## 2022-08-08 DIAGNOSIS — D84.9 IMMUNOSUPPRESSION (H): ICD-10-CM

## 2022-08-08 LAB
ABO/RH(D): NORMAL
ANION GAP SERPL CALCULATED.3IONS-SCNC: 4 MMOL/L (ref 3–14)
ANION GAP SERPL CALCULATED.3IONS-SCNC: 7 MMOL/L (ref 3–14)
ANTIBODY SCREEN: NEGATIVE
BUN SERPL-MCNC: 27 MG/DL (ref 7–30)
BUN SERPL-MCNC: 29 MG/DL (ref 7–30)
CALCIUM SERPL-MCNC: 10 MG/DL (ref 8.5–10.1)
CALCIUM SERPL-MCNC: 9.9 MG/DL (ref 8.5–10.1)
CHLORIDE BLD-SCNC: 114 MMOL/L (ref 94–109)
CHLORIDE BLD-SCNC: 117 MMOL/L (ref 94–109)
CO2 SERPL-SCNC: 22 MMOL/L (ref 20–32)
CO2 SERPL-SCNC: 22 MMOL/L (ref 20–32)
CREAT SERPL-MCNC: 2.11 MG/DL (ref 0.66–1.25)
CREAT SERPL-MCNC: 2.18 MG/DL (ref 0.66–1.25)
ERYTHROCYTE [DISTWIDTH] IN BLOOD BY AUTOMATED COUNT: 12.5 % (ref 10–15)
FERRITIN SERPL-MCNC: 722 NG/ML (ref 26–388)
GFR SERPL CREATININE-BSD FRML MDRD: 34 ML/MIN/1.73M2
GFR SERPL CREATININE-BSD FRML MDRD: 35 ML/MIN/1.73M2
GLUCOSE BLD-MCNC: 105 MG/DL (ref 70–99)
GLUCOSE BLD-MCNC: 97 MG/DL (ref 70–99)
HCT VFR BLD AUTO: 27.2 % (ref 40–53)
HGB BLD-MCNC: 8.2 G/DL (ref 13.3–17.7)
IRON SATN MFR SERPL: 14 % (ref 15–46)
IRON SERPL-MCNC: 35 UG/DL (ref 35–180)
MCH RBC QN AUTO: 31.2 PG (ref 26.5–33)
MCHC RBC AUTO-ENTMCNC: 30.1 G/DL (ref 31.5–36.5)
MCV RBC AUTO: 103 FL (ref 78–100)
PLATELET # BLD AUTO: 278 10E3/UL (ref 150–450)
POTASSIUM BLD-SCNC: 5.6 MMOL/L (ref 3.4–5.3)
POTASSIUM BLD-SCNC: 5.8 MMOL/L (ref 3.4–5.3)
RBC # BLD AUTO: 2.63 10E6/UL (ref 4.4–5.9)
SODIUM SERPL-SCNC: 143 MMOL/L (ref 133–144)
SODIUM SERPL-SCNC: 143 MMOL/L (ref 133–144)
SPECIMEN EXPIRATION DATE: NORMAL
TACROLIMUS BLD-MCNC: 11.7 UG/L (ref 5–15)
TIBC SERPL-MCNC: 256 UG/DL (ref 240–430)
TME LAST DOSE: NORMAL H
TME LAST DOSE: NORMAL H
WBC # BLD AUTO: 4.6 10E3/UL (ref 4–11)

## 2022-08-08 PROCEDURE — 36415 COLL VENOUS BLD VENIPUNCTURE: CPT

## 2022-08-08 PROCEDURE — 86850 RBC ANTIBODY SCREEN: CPT

## 2022-08-08 PROCEDURE — 250N000011 HC RX IP 250 OP 636: Performed by: INTERNAL MEDICINE

## 2022-08-08 PROCEDURE — G0463 HOSPITAL OUTPT CLINIC VISIT: HCPCS | Mod: 25

## 2022-08-08 PROCEDURE — 80048 BASIC METABOLIC PNL TOTAL CA: CPT | Performed by: PATHOLOGY

## 2022-08-08 PROCEDURE — 96372 THER/PROPH/DIAG INJ SC/IM: CPT | Performed by: INTERNAL MEDICINE

## 2022-08-08 PROCEDURE — 80048 BASIC METABOLIC PNL TOTAL CA: CPT

## 2022-08-08 PROCEDURE — 82728 ASSAY OF FERRITIN: CPT

## 2022-08-08 PROCEDURE — 85027 COMPLETE CBC AUTOMATED: CPT

## 2022-08-08 PROCEDURE — 80197 ASSAY OF TACROLIMUS: CPT

## 2022-08-08 PROCEDURE — 86900 BLOOD TYPING SEROLOGIC ABO: CPT

## 2022-08-08 PROCEDURE — 87799 DETECT AGENT NOS DNA QUANT: CPT

## 2022-08-08 PROCEDURE — 83550 IRON BINDING TEST: CPT

## 2022-08-08 PROCEDURE — 99214 OFFICE O/P EST MOD 30 MIN: CPT | Mod: 24 | Performed by: INTERNAL MEDICINE

## 2022-08-08 PROCEDURE — 86901 BLOOD TYPING SEROLOGIC RH(D): CPT

## 2022-08-08 RX ORDER — EPINEPHRINE 1 MG/ML
0.3 INJECTION, SOLUTION, CONCENTRATE INTRAVENOUS EVERY 5 MIN PRN
Status: CANCELLED | OUTPATIENT
Start: 2022-08-08

## 2022-08-08 RX ORDER — MEPERIDINE HYDROCHLORIDE 25 MG/ML
25 INJECTION INTRAMUSCULAR; INTRAVENOUS; SUBCUTANEOUS EVERY 30 MIN PRN
Status: CANCELLED | OUTPATIENT
Start: 2022-08-08

## 2022-08-08 RX ORDER — DIPHENHYDRAMINE HYDROCHLORIDE 50 MG/ML
50 INJECTION INTRAMUSCULAR; INTRAVENOUS
Status: CANCELLED
Start: 2022-08-08

## 2022-08-08 RX ORDER — ALBUTEROL SULFATE 0.83 MG/ML
2.5 SOLUTION RESPIRATORY (INHALATION)
Status: CANCELLED | OUTPATIENT
Start: 2022-08-08

## 2022-08-08 RX ORDER — SULFAMETHOXAZOLE AND TRIMETHOPRIM 400; 80 MG/1; MG/1
1 TABLET ORAL
Qty: 40 TABLET | Refills: 3 | Status: SHIPPED | OUTPATIENT
Start: 2022-08-08 | End: 2023-01-02

## 2022-08-08 RX ORDER — METHYLPREDNISOLONE SODIUM SUCCINATE 125 MG/2ML
125 INJECTION, POWDER, LYOPHILIZED, FOR SOLUTION INTRAMUSCULAR; INTRAVENOUS
Status: CANCELLED
Start: 2022-08-08

## 2022-08-08 RX ORDER — ALBUTEROL SULFATE 90 UG/1
1-2 AEROSOL, METERED RESPIRATORY (INHALATION)
Status: CANCELLED
Start: 2022-08-08

## 2022-08-08 RX ADMIN — DARBEPOETIN ALFA 40 MCG: 40 INJECTION, SOLUTION INTRAVENOUS; SUBCUTANEOUS at 10:47

## 2022-08-08 ASSESSMENT — PAIN SCALES - GENERAL: PAINLEVEL: NO PAIN (0)

## 2022-08-08 NOTE — NURSING NOTE
Frequency: every 14 days  Most recent or today's HGB: 8.2   Date: 2022  Date of lat dose: 2022  HGB associated with last dose given: 7.4    Blood Pressure:136/78    Diagnosis: CKD of other chronic disease s/p kidney Tx    Ordered by: MD Elder  VIS Offered: yes, declined    Double Checked by: Melissa SUAREZ    See MAR for administration details    Pt's first name, last name and  verified prior to medication administration, injection given without complications or questions.

## 2022-08-08 NOTE — PROGRESS NOTES
TRANSPLANT NEPHROLOGY EARLY POST TRANSPLANT VISIT  3 month visit    Assessment & Plan   # LDKT: Pending today's labs. Previous uptrend suspect CNI+prerenal azotemia. IVF and reduce tacrolimus as below. US kidney tx shows obscured renal v and decreased flows, elevated renal a velocities, decreased perinephric fluid collections ,discussed with  holding off on interventions for now (angio or drainage) as long as renal function improving with IVF & tacrolimus dose reduction   - post tx course complicated by DGF (KPD-NY) vasospasm in OR. CIT ~10 hrs   - Baseline Creatinine: ~ TBD. Rocky Scr 2.6 on 7/4/22   - Proteinuria: Not checked post transplant   - Date DSA Last Checked: May/2022      Latest DSA: No DSA at time of transplant, recheck now    - BK Viremia: No   - Kidney Tx Biopsy: May 24, 2022; Result: ATI   - Transplant Ureteral Stent: No   -Last HD on 6/2/22   -HD access: RIJ tunneled line, but one port is not functional according to [facility]. Will need to be removed in house after IR drainage of fluid.     - HD line removal this week (Wednesday) if labs show improved kidney function              - Last HD 6/2    # Immunosuppression: Tacrolimus immediate release (goal 8-10) and Mycophenolate mofetil (dose 750 mg every 12 hours)   - Induction with Recent Transplant:  High Intensity due to DGF after initial dose of simulect   - Continue with intensive monitoring of immunosuppression for efficacy and toxicity.   - Changes: Pending labs. Previous supraTx tacrolimus likely in the setting of diarrhea. Now again on tacrolimus 4mg twice daily.    # Infection Prophylaxis:   - PJP: Sulfa/TMP (Bactrim) MWF  - CMV: Valganciclovir (Valcyte) twice weekly. Adjust dose if CrCl improves             Changes: Will stop Valcyte this month (3 months)    # Blood Pressure: Controlled;  Goal BP: < 140/90   - Volume status: Euvolemic     - Changes: Not at this time    # Anemia in Chronic Renal Disease: Hgb: Stable, low      YUDI:  No   - Iron studies: Replete     -Consider anemia clinic after drain replaced              -Changes: Will have another dose of Arenesp this week.    # Mineral Bone Disorder:   - Secondary renal hyperparathyroidism; PTH level: Significantly elevated (601-1200 pg/ml)        On treatment: None  - Vitamin D; level: Normal        On supplement: Yes  - Calcium; level: Normal        On supplement: No  - Phosphorus; level: Normal        On supplement: No    # Electrolytes:   - Potassium; level: High normal        On supplement: No  - Magnesium; level: Normal        On supplement: No  - Bicarbonate; level: Low        On supplement: No     # Twin-transplant Fluid collections: repeat US kidney Tx and decide if drainage indicated per tx surgery  - Perinephric fluid collection:s/p IR guided fluid collection aspiration on 6/22, drained 80 mL clear yellow fluid, cultures pending. Followed up with Dr. Flores 7/11. Fluid CR~ serum & Cx negative   Imaging:  -6/20 CT: 2 large twin-nephric fluid collections noted  -6/22 US: Normal, perinephric fluid collections.   -6/22 IR: 80 mL clear yellow fluid aspirated.  -6/23 US: Recurrent fluid collections similar to previous  -7/10 Fluid collections around the transplant are slightly bigger and  similar in morphology to previous studies. Collections #2 and #3  appear to communicate with each other. Differential includes hematoma,  seroma, urinoma, and lymphocele    # Diarrhea: Stool studies cdiff, MIREYA, MPA level, COVID screen    # History of prostate CA:   -Stage T3b s/p RALP 12/14/2020 with focal positive margin, received adjuvant radiation. Moderate risk of recurrence in 10-15y.    -Follows with urology. Last PSA in 6/2022 normal    # Medical Compliance: Yes    # COVID-19 Virus Review: Discussed COVID-19 virus and the potential medical risks.  Reviewed preventative health recommendations, including wearing a mask where appropriate.  Recommended COVID vaccination should be up to date with  either an initial vaccination or booster shot when appropriate.  Asked the patient to inform the transplant center if they are exposed or diagnosed with this virus.    # COVID Vaccination Up To Date: No, due for next dose at 3m post txp    # Transplant History:  Etiology of Kidney Failure: IgA nephropathy  Tx: LDKT  Transplant: 5/17/2022 (Kidney)  Donor Type: Living Donor Class:   Crossmatch at time of Tx: negative  DSA at time of Tx: No  Significant changes in immunosuppression: None  CMV IgG Ab High Risk Discordance (D+/R-): No  EBV IgG Ab High Risk Discordance (D+/R-): No  Significant transplant-related complications: DGF    Transplant Office Phone Number: [phone]    Assessment and plan was discussed with the patient and he voiced his understanding and agreement.    Return visit: 1 month   Collette Cooper MD    Physician Attestation   I, Tucker Mckenna MD, personally examined and evaluated this patient.  I discussed the patient with the resident/fellow and care team, and agree with the assessment and plan of care as documented in the note on 08/08/22 .    I personally reviewed vital signs, medications, labs and imaging.  Overall Donald is doing well. K is a little high/we discussed low K diet. He is on Bactrim thrice weekly. He is plugged in with anemia services and received 40mcg Aranesp as well. Recommend we see him again in 1 months time. If creatinine fails to come down below 2, may warrant kidney biopsy.    ADDENDUM:   Discussed in committee as he has had labile creatinine. This is likely related to labile fluid/volume situation with diarrhea, varying bumetanide doses, labile tacrolimus levels.    In meeting, recommended decreasing his bumex to 1mg daily, repeating ultrasound, utilizing compression stockings and seeing how his creatinine responds.     Tucker Mckenna MD  Date of Service (when I saw the patient): 08/08/22          Chief Complaint   Mr. Blanco is a 57 year old here for routine follow  up, kidney transplant, immunosuppression management and concern for increasing fluid collection.     History of Present Illness     Mr. Blanco felt sick over the weekend with diarrhea and low grade fevers ~100 F for 3 days. He did home test for COVID and was neg x 2. Diarrhea has continued to improve. Appetite and fluid intake near baseline. Not checking BP at home. No graft pain or urinary symptoms. FRANDY CARIAS has improved since starting Bumex.     Previous labs on 8/1/22 showed worsening renal function Cr up to 4 from huang of 2.7. FK trough supra-therapeutic. US kidney on 7/26/22 showed elevated renal a velocities, obscured renal vein and reduced flows, decreased perinephric fluid collections.    Pending today's labs.     Problem List   Patient Active Problem List   Diagnosis     HTN, kidney transplant related     Gout     Hyperlipidemia     Hyperparathyroidism due to renal insufficiency (H)     IgA nephropathy     Obesity     Umbilical hernia     Prostate cancer (H)     Morbid obesity (H)     Kidney replaced by transplant     Immunosuppression (H)     Hyperkalemia     Perinephric fluid collection of kidney transplant     PETER (acute kidney injury) (H)     Abdominal fluid collection     Delayed graft function of kidney     Anemia     Hypervolemia     Anemia in other chronic diseases classified elsewhere       Allergies   No Known Allergies    Medications   Current Outpatient Medications   Medication Sig     acetaminophen (TYLENOL) 325 MG tablet Take 1-2 tablets (325-650 mg) by mouth every 4 hours as needed for mild pain or fever     aspirin (ASA) 325 MG tablet Take 1 tablet (325 mg) by mouth daily     atorvastatin (LIPITOR) 10 MG tablet Take 1 tablet (10 mg) by mouth every evening     B Complex-C-Folic Acid (VIRT-CAPS) 1 MG CAPS Take 1 mg by mouth daily     bumetanide (BUMEX) 1 MG tablet Take 1mg, twice daily on Monday's, Wednesday's, and Friday's     magnesium oxide (MAG-OX) 400 MG tablet Take 1 tablet (400 mg) by  mouth daily     mycophenolate (GENERIC EQUIVALENT) 250 MG capsule Take 3 capsules (750 mg) by mouth 2 times daily     sodium bicarbonate 650 MG tablet Take 3 tablets (1,950 mg) by mouth 3 times daily     sulfamethoxazole-trimethoprim (BACTRIM) 400-80 MG tablet Take 1 tablet by mouth Every Mon, Wed, Fri Morning Increase to one tab by mouth daily when directed by transplant team     tacrolimus (GENERIC EQUIVALENT) 0.5 MG capsule HOLD (Patient not taking: Reported on 7/26/2022)     tacrolimus (GENERIC EQUIVALENT) 1 MG capsule Take 4 capsules (4 mg) by mouth 2 times daily     valGANciclovir (VALCYTE) 450 MG tablet Take 1 tablet (450 mg) by mouth daily     Vitamin D3 (CHOLECALCIFEROL) 25 mcg (1000 units) tablet Take 1 capsule by mouth every morning      No current facility-administered medications for this visit.     There are no discontinued medications.    Physical Exam   Vital Signs: /78   Pulse 74   Wt 106.6 kg (235 lb)   SpO2 99%   BMI 34.70 kg/m      GENERAL APPEARANCE: alert and no distress  HENT: mouth without ulcers or lesions  LYMPHATICS: no cervical or supraclavicular nodes  RESP: lungs clear to auscultation - no rales, rhonchi or wheezes  CV: regular rhythm, normal rate, no rub, no murmur  EDEMA: trace AERTHA in L leg up to knee. R 1+ ARETHA up to thighs.  ABDOMEN: soft, nondistended, nontender, bowel sounds normal  MS: extremities normal - no gross deformities noted, no evidence of inflammation in joints, no muscle tenderness  SKIN: no rash  NEURO: normal strength and tone, sensory exam grossly normal, mentation intact and speech normal  PSYCH: mentation appears normal and affect normal/bright  TX KIDNEY: Incisions are clean, non erythematous and non tender.   DIALYSIS ACCESS:  Right IJ tunneled catheter    Data     Renal Latest Ref Rng & Units 8/1/2022 7/29/2022 7/27/2022   Na 133 - 144 mmol/L 145(H) 143 142   Na (external) 135 - 145 mmol/L - - -   K 3.4 - 5.3 mmol/L 4.6 4.4 4.0   K (external) 3.5 - 5.0  mmol/L - - -   Cl 94 - 109 mmol/L 117(H) 114(H) 111(H)   CO2 20 - 32 mmol/L 22 24 21   CO2 (external) 21 - 31 mmol/L - - -   BUN 7 - 30 mg/dL 37(H) 39(H) 56(H)   BUN (external) 8 - 25 mg/dL - - -   Cr 0.66 - 1.25 mg/dL 2.38(H) 2.71(H) 3.52(H)   Cr (external) 0.57 - 1.11 mg/dL - - -   Glucose 70 - 99 mg/dL 107(H) 117(H) 129(H)   Glucose (external) 65 - 100 mg/dL - - -   Ca  8.5 - 10.1 mg/dL 9.5 9.4 9.4   Ca (external) 8.5 - 10.5 mg/dL - - -   Mg 1.6 - 2.3 mg/dL 1.9 - -   Mg (external) 1.6 - 2.6 mg/dL - - -     Bone Health Latest Ref Rng & Units 8/1/2022 7/25/2022 7/18/2022   Phos 2.5 - 4.5 mg/dL 2.3(L) 2.8 2.3(L)   Phos (external) 2.3 - 4.7 mg/dL - - -   PTHi 15 - 65 pg/mL - - -   Vit D Def 20 - 75 ug/L - - -     Heme Latest Ref Rng & Units 8/8/2022 8/1/2022 7/29/2022   WBC 4.0 - 11.0 10e3/uL 4.6 6.2 7.2   WBC (external) 4.5 - 11.0 thou/cu mm - - -   Hgb 13.3 - 17.7 g/dL 8.2(L) 7.4(LL) 7.8(LL)   Hgb (external) 12.0 - 16.0 g/dL - - -   Plt 150 - 450 10e3/uL 278 256 228   Plt (external) 140 - 440 thou/cu mm - - -   ABSOLUTE NEUTROPHIL 1.6 - 8.3 10e9/L - - -   ABSOLUTE LYMPHOCYTES 0.8 - 5.3 10e9/L - - -   ABSOLUTE MONOCYTES 0.0 - 1.3 10e9/L - - -   ABSOLUTE EOSINOPHILS 0.0 - 0.7 10e9/L - - -   ABSOLUTE BASOPHILS 0.0 - 0.2 10e9/L - - -   ABS IMMATURE GRANULOCYTES 0 - 0.4 10e9/L - - -   ABSOLUTE NUCLEATED RBC - - - -     Liver Latest Ref Rng & Units 6/3/2022 6/2/2022 5/22/2022   AP 40 - 150 U/L 83 - -   TBili 0.2 - 1.3 mg/dL 0.5 - -   DBili 0.0 - 0.2 mg/dL 0.2 - -   ALT 0 - 70 U/L 23 - -   AST 0 - 45 U/L 9 - -   Tot Protein 6.8 - 8.8 g/dL 6.6(L) - -   Albumin 3.4 - 5.0 g/dL 3.3(L) 3.2(L) 2.7(L)     Pancreas Latest Ref Rng & Units 6/3/2022 5/9/2022   A1C 0.0 - 5.6 % 5.1 5.0     Iron studies Latest Ref Rng & Units 7/4/2022 6/23/2022 5/9/2022   Iron 35 - 180 ug/dL 77 57(L) 79   Iron sat 15 - 46 % 24 25 24   Ferritin 26 - 388 ng/mL 570(H) - 1,002(H)     UMP Txp Virology Latest Ref Rng & Units 5/17/2022 5/9/2022 9/3/2020   CMV  QUANT IU/ML Not Detected IU/mL Not Detected - -   EBV CAPSID ANTIBODY IGG No detectable antibody. - Positive(A) >8.0(H)   Hep B Core NR:Nonreactive - - Nonreactive        Recent Labs   Lab Test 07/27/22  0858 07/29/22  0740 08/01/22  0706   DOSTAC 7/26/2022 7/28/2022 7/31/2022   TACROL 9.3 5.2 3.4*     Recent Labs   Lab Test 07/25/22  0656 07/29/22  0740 08/01/22  0706   DOSMPA  --  7/28/2022   8:00 PM 7/31/2022   8:00 PM   MPACID 2.66 6.07* 2.63   MPAG 171.4* 91.6 90.6       I have discussed patient and plan with Dr. Mckenna.     Collette Belle MD  Nephrology Fellow

## 2022-08-08 NOTE — TELEPHONE ENCOUNTER
Anemia Management Note  SUBJECTIVE/OBJECTIVE:  Referred by Dr. Yong Friedman on 2022  Primary Diagnosis: Anemia of Other Chronic Illness (D63.8)     Secondary Diagnosis:  Organ or tissue replaced by transplant, kidney (Z94.0)  Kidney Tx: 2022  Hgb goal range:  9-10  Epo/Darbo: Aranesp 40mcg every 14 days for Hgb <10. In Clinic.  *dosed at 0.45mcg/kg  Iron regimen:  NA  Labs : 2023  Recent YUDI use, transfusion, IV iron: NA  RX/TX plans : TBD     No history of stroke, MI and blood clots. Hx of Prostate Cancer. Had Prostectomy & radiation Dec 2020.      Contact: OK to speak with Sana Blanco (wife) regarding Scheduling, Medical, and Billing Inforamtion per consent to communicate scanned on 2020.    Anemia Latest Ref Rng & Units 2022   YUDI Dose - - - - - - - 40mcg   Hemoglobin 13.3 - 17.7 g/dL 9.3(L) 8.5(L) 8.4(L) 7.9(L) 7.8(LL) 7.4(LL) 8.2(L)   TSAT 15 - 46 % - - - - - - -   Ferritin 26 - 388 ng/mL - - - - - - -     BP Readings from Last 3 Encounters:   22 136/78   22 117/71   22 125/76     Wt Readings from Last 2 Encounters:   22 235 lb (106.6 kg)   22 236 lb (107 kg)           ASSESSMENT:  Hgb:Not at goal/Known  TSat: Due for labs Ferritin: Due for labs    PLAN:  Dose with aranesp and RTC for hgb then aranesp if needed in 2 week(s)    Orders needed to be renewed (for next follow-up date) in EPIC: None    Iron labs due:  Pending    Plan discussed with: No call, chart reivew      NEXT FOLLOW-UP DATE:  22 7am labs     Camille Tsang RN   Anemia Services  [address] [place] [address]   [email or web address]   Office : [phone]  Fax: [phone]

## 2022-08-08 NOTE — NURSING NOTE
Chief Complaint   Patient presents with     RECHECK     Follow up     Blood pressure 136/78, pulse 74, weight 106.6 kg (235 lb), SpO2 99 %.    Airam Chand

## 2022-08-08 NOTE — LETTER
8/8/2022       RE: Donald Blanco  [address]   [place] [address]     Dear Colleague,    Thank you for referring your patient, Donald Blanco, to the [facility] [place] [facility]. Please see a copy of my visit note below.    TRANSPLANT NEPHROLOGY EARLY POST TRANSPLANT VISIT  3 month visit    Assessment & Plan   # LDKT: Pending today's labs. Previous uptrend suspect CNI+prerenal azotemia. IVF and reduce tacrolimus as below. US kidney tx shows obscured renal v and decreased flows, elevated renal a velocities, decreased perinephric fluid collections ,discussed with  holding off on interventions for now (angio or drainage) as long as renal function improving with IVF & tacrolimus dose reduction   - post tx course complicated by DGF (D-NY) vasospasm in OR. CIT ~10 hrs   - Baseline Creatinine: ~ TBD. Rocky Scr 2.6 on 7/4/22   - Proteinuria: Not checked post transplant   - Date DSA Last Checked: May/2022      Latest DSA: No DSA at time of transplant, recheck now    - BK Viremia: No   - Kidney Tx Biopsy: May 24, 2022; Result: ATI   - Transplant Ureteral Stent: No   -Last HD on 6/2/22   -HD access: RIJ tunneled line, but one port is not functional according to [facility]. Will need to be removed in house after IR drainage of fluid.     - HD line removal this week (Wednesday) if labs show improved kidney function              - Last HD 6/2    # Immunosuppression: Tacrolimus immediate release (goal 8-10) and Mycophenolate mofetil (dose 750 mg every 12 hours)   - Induction with Recent Transplant:  High Intensity due to DGF after initial dose of simulect   - Continue with intensive monitoring of immunosuppression for efficacy and toxicity.   - Changes: Pending labs. Previous supraTx tacrolimus likely in the setting of diarrhea. Now again on tacrolimus 4mg twice daily.    # Infection Prophylaxis:   - PJP: Sulfa/TMP (Bactrim) MWF  -  CMV: Valganciclovir (Valcyte) twice weekly. Adjust dose if CrCl improves             Changes: Will stop Valcyte this month (3 months)    # Blood Pressure: Controlled;  Goal BP: < 140/90   - Volume status: Euvolemic     - Changes: Not at this time    # Anemia in Chronic Renal Disease: Hgb: Stable, low      YUDI: No   - Iron studies: Replete     -Consider anemia clinic after drain replaced              -Changes: Will have another dose of Arenesp this week.    # Mineral Bone Disorder:   - Secondary renal hyperparathyroidism; PTH level: Significantly elevated (601-1200 pg/ml)        On treatment: None  - Vitamin D; level: Normal        On supplement: Yes  - Calcium; level: Normal        On supplement: No  - Phosphorus; level: Normal        On supplement: No    # Electrolytes:   - Potassium; level: High normal        On supplement: No  - Magnesium; level: Normal        On supplement: No  - Bicarbonate; level: Low        On supplement: No     # Twin-transplant Fluid collections: repeat US kidney Tx and decide if drainage indicated per tx surgery  - Perinephric fluid collection:s/p IR guided fluid collection aspiration on 6/22, drained 80 mL clear yellow fluid, cultures pending. Followed up with Dr. Flores 7/11. Fluid CR~ serum & Cx negative   Imaging:  -6/20 CT: 2 large twin-nephric fluid collections noted  -6/22 US: Normal, perinephric fluid collections.   -6/22 IR: 80 mL clear yellow fluid aspirated.  -6/23 US: Recurrent fluid collections similar to previous  -7/10 Fluid collections around the transplant are slightly bigger and  similar in morphology to previous studies. Collections #2 and #3  appear to communicate with each other. Differential includes hematoma,  seroma, urinoma, and lymphocele    # Diarrhea: Stool studies cdiff, MIREYA, MPA level, COVID screen    # History of prostate CA:   -Stage T3b s/p RALP 12/14/2020 with focal positive margin, received adjuvant radiation. Moderate risk of recurrence in 10-15y.     -Follows with urology. Last PSA in 6/2022 normal    # Medical Compliance: Yes    # COVID-19 Virus Review: Discussed COVID-19 virus and the potential medical risks.  Reviewed preventative health recommendations, including wearing a mask where appropriate.  Recommended COVID vaccination should be up to date with either an initial vaccination or booster shot when appropriate.  Asked the patient to inform the transplant center if they are exposed or diagnosed with this virus.    # COVID Vaccination Up To Date: No, due for next dose at 3m post txp    # Transplant History:  Etiology of Kidney Failure: IgA nephropathy  Tx: LDKT  Transplant: 5/17/2022 (Kidney)  Donor Type: Living Donor Class:   Crossmatch at time of Tx: negative  DSA at time of Tx: No  Significant changes in immunosuppression: None  CMV IgG Ab High Risk Discordance (D+/R-): No  EBV IgG Ab High Risk Discordance (D+/R-): No  Significant transplant-related complications: DGF    Transplant Office Phone Number: [phone]    Assessment and plan was discussed with the patient and he voiced his understanding and agreement.    Return visit: 1 week  Collette Cooper MD    Chief Complaint   Mr. Blanco is a 57 year old here for routine follow up, kidney transplant, immunosuppression management and concern for increasing fluid collection.     History of Present Illness     Mr. Blanco felt sick over the weekend with diarrhea and low grade fevers ~100 F for 3 days. He did home test for COVID and was neg x 2. Diarrhea has continued to improve. Appetite and fluid intake near baseline. Not checking BP at home. No graft pain or urinary symptoms. FRANDY CARIAS has improved since starting Bumex.     Previous labs on 8/1/22 showed worsening renal function Cr up to 4 from huang of 2.7. FK trough supra-therapeutic. US kidney on 7/26/22 showed elevated renal a velocities, obscured renal vein and reduced flows, decreased perinephric fluid collections.    Pending today's labs.      Problem List   Patient Active Problem List   Diagnosis     HTN, kidney transplant related     Gout     Hyperlipidemia     Hyperparathyroidism due to renal insufficiency (H)     IgA nephropathy     Obesity     Umbilical hernia     Prostate cancer (H)     Morbid obesity (H)     Kidney replaced by transplant     Immunosuppression (H)     Hyperkalemia     Perinephric fluid collection of kidney transplant     PETER (acute kidney injury) (H)     Abdominal fluid collection     Delayed graft function of kidney     Anemia     Hypervolemia     Anemia in other chronic diseases classified elsewhere       Allergies   No Known Allergies    Medications   Current Outpatient Medications   Medication Sig     acetaminophen (TYLENOL) 325 MG tablet Take 1-2 tablets (325-650 mg) by mouth every 4 hours as needed for mild pain or fever     aspirin (ASA) 325 MG tablet Take 1 tablet (325 mg) by mouth daily     atorvastatin (LIPITOR) 10 MG tablet Take 1 tablet (10 mg) by mouth every evening     B Complex-C-Folic Acid (VIRT-CAPS) 1 MG CAPS Take 1 mg by mouth daily     bumetanide (BUMEX) 1 MG tablet Take 1mg, twice daily on Monday's, Wednesday's, and Friday's     magnesium oxide (MAG-OX) 400 MG tablet Take 1 tablet (400 mg) by mouth daily     mycophenolate (GENERIC EQUIVALENT) 250 MG capsule Take 3 capsules (750 mg) by mouth 2 times daily     sodium bicarbonate 650 MG tablet Take 3 tablets (1,950 mg) by mouth 3 times daily     sulfamethoxazole-trimethoprim (BACTRIM) 400-80 MG tablet Take 1 tablet by mouth Every Mon, Wed, Fri Morning Increase to one tab by mouth daily when directed by transplant team     tacrolimus (GENERIC EQUIVALENT) 0.5 MG capsule HOLD (Patient not taking: Reported on 7/26/2022)     tacrolimus (GENERIC EQUIVALENT) 1 MG capsule Take 4 capsules (4 mg) by mouth 2 times daily     valGANciclovir (VALCYTE) 450 MG tablet Take 1 tablet (450 mg) by mouth daily     Vitamin D3 (CHOLECALCIFEROL) 25 mcg (1000 units) tablet Take 1  capsule by mouth every morning      No current facility-administered medications for this visit.     There are no discontinued medications.    Physical Exam   Vital Signs: There were no vitals taken for this visit.    GENERAL APPEARANCE: alert and no distress  HENT: mouth without ulcers or lesions  LYMPHATICS: no cervical or supraclavicular nodes  RESP: lungs clear to auscultation - no rales, rhonchi or wheezes  CV: regular rhythm, normal rate, no rub, no murmur  EDEMA: trace ARETHA in L leg up to knee. R 1+ ARETHA up to thighs.  ABDOMEN: soft, nondistended, nontender, bowel sounds normal  MS: extremities normal - no gross deformities noted, no evidence of inflammation in joints, no muscle tenderness  SKIN: no rash  NEURO: normal strength and tone, sensory exam grossly normal, mentation intact and speech normal  PSYCH: mentation appears normal and affect normal/bright  TX KIDNEY: Incisions are clean, non erythematous and non tender.   DIALYSIS ACCESS:  Right IJ tunneled catheter    Data     Renal Latest Ref Rng & Units 8/1/2022 7/29/2022 7/27/2022   Na 133 - 144 mmol/L 145(H) 143 142   Na (external) 135 - 145 mmol/L - - -   K 3.4 - 5.3 mmol/L 4.6 4.4 4.0   K (external) 3.5 - 5.0 mmol/L - - -   Cl 94 - 109 mmol/L 117(H) 114(H) 111(H)   CO2 20 - 32 mmol/L 22 24 21   CO2 (external) 21 - 31 mmol/L - - -   BUN 7 - 30 mg/dL 37(H) 39(H) 56(H)   BUN (external) 8 - 25 mg/dL - - -   Cr 0.66 - 1.25 mg/dL 2.38(H) 2.71(H) 3.52(H)   Cr (external) 0.57 - 1.11 mg/dL - - -   Glucose 70 - 99 mg/dL 107(H) 117(H) 129(H)   Glucose (external) 65 - 100 mg/dL - - -   Ca  8.5 - 10.1 mg/dL 9.5 9.4 9.4   Ca (external) 8.5 - 10.5 mg/dL - - -   Mg 1.6 - 2.3 mg/dL 1.9 - -   Mg (external) 1.6 - 2.6 mg/dL - - -     Bone Health Latest Ref Rng & Units 8/1/2022 7/25/2022 7/18/2022   Phos 2.5 - 4.5 mg/dL 2.3(L) 2.8 2.3(L)   Phos (external) 2.3 - 4.7 mg/dL - - -   PTHi 15 - 65 pg/mL - - -   Vit D Def 20 - 75 ug/L - - -     Heme Latest Ref Rng & Units  8/8/2022 8/1/2022 7/29/2022   WBC 4.0 - 11.0 10e3/uL 4.6 6.2 7.2   WBC (external) 4.5 - 11.0 thou/cu mm - - -   Hgb 13.3 - 17.7 g/dL 8.2(L) 7.4(LL) 7.8(LL)   Hgb (external) 12.0 - 16.0 g/dL - - -   Plt 150 - 450 10e3/uL 278 256 228   Plt (external) 140 - 440 thou/cu mm - - -   ABSOLUTE NEUTROPHIL 1.6 - 8.3 10e9/L - - -   ABSOLUTE LYMPHOCYTES 0.8 - 5.3 10e9/L - - -   ABSOLUTE MONOCYTES 0.0 - 1.3 10e9/L - - -   ABSOLUTE EOSINOPHILS 0.0 - 0.7 10e9/L - - -   ABSOLUTE BASOPHILS 0.0 - 0.2 10e9/L - - -   ABS IMMATURE GRANULOCYTES 0 - 0.4 10e9/L - - -   ABSOLUTE NUCLEATED RBC - - - -     Liver Latest Ref Rng & Units 6/3/2022 6/2/2022 5/22/2022   AP 40 - 150 U/L 83 - -   TBili 0.2 - 1.3 mg/dL 0.5 - -   DBili 0.0 - 0.2 mg/dL 0.2 - -   ALT 0 - 70 U/L 23 - -   AST 0 - 45 U/L 9 - -   Tot Protein 6.8 - 8.8 g/dL 6.6(L) - -   Albumin 3.4 - 5.0 g/dL 3.3(L) 3.2(L) 2.7(L)     Pancreas Latest Ref Rng & Units 6/3/2022 5/9/2022   A1C 0.0 - 5.6 % 5.1 5.0     Iron studies Latest Ref Rng & Units 7/4/2022 6/23/2022 5/9/2022   Iron 35 - 180 ug/dL 77 57(L) 79   Iron sat 15 - 46 % 24 25 24   Ferritin 26 - 388 ng/mL 570(H) - 1,002(H)     [facility] Txp Virology Latest Ref Rng & Units 5/17/2022 5/9/2022 9/3/2020   CMV QUANT IU/ML Not Detected IU/mL Not Detected - -   EBV CAPSID ANTIBODY IGG No detectable antibody. - Positive(A) >8.0(H)   Hep B Core NR:Nonreactive - - Nonreactive        Recent Labs   Lab Test 07/27/22  0858 07/29/22  0740 08/01/22  0706   DOSTAC 7/26/2022 7/28/2022 7/31/2022   TACROL 9.3 5.2 3.4*     Recent Labs   Lab Test 07/25/22  0656 07/29/22  0740 08/01/22  0706   DOSMPA  --  7/28/2022   8:00 PM 7/31/2022   8:00 PM   MPACID 2.66 6.07* 2.63   MPAG 171.4* 91.6 90.6       I have discussed patient and plan with Dr. Mckenna.     Collette Belle MD  Nephrology Fellow         Again, thank you for allowing me to participate in the care of your patient.      Sincerely,    Tucker Mckenna MD

## 2022-08-08 NOTE — TELEPHONE ENCOUNTER
TELEPHONE ENCOUNTER     Labs:     K 5.6  Creat 2.11  Bicarb 22    Plan:   Hyperkalemia  - Continue hydration with at least 60-80 oz of water   - Start taking 1 mg Bumex daily for 1 week  - Stop Bactrim until next recheck     I have discussed plan with Dr. Clarisa Belle MD  Nephrology Fellow

## 2022-08-09 LAB
BKV DNA # SPEC NAA+PROBE: NOT DETECTED COPIES/ML
C DIFF TOX B STL QL: NEGATIVE

## 2022-08-09 PROCEDURE — 99000 SPECIMEN HANDLING OFFICE-LAB: CPT | Performed by: PATHOLOGY

## 2022-08-09 PROCEDURE — 87493 C DIFF AMPLIFIED PROBE: CPT | Mod: 90 | Performed by: PATHOLOGY

## 2022-08-09 PROCEDURE — 87506 IADNA-DNA/RNA PROBE TQ 6-11: CPT | Mod: 90 | Performed by: PATHOLOGY

## 2022-08-10 ENCOUNTER — ANCILLARY PROCEDURE (OUTPATIENT)
Dept: ULTRASOUND IMAGING | Facility: CLINIC | Age: 57
End: 2022-08-10
Attending: INTERNAL MEDICINE
Payer: COMMERCIAL

## 2022-08-10 DIAGNOSIS — Z48.298 AFTERCARE FOLLOWING ORGAN TRANSPLANT: ICD-10-CM

## 2022-08-10 PROCEDURE — 36589 REMOVAL TUNNELED CV CATH: CPT | Performed by: PHYSICIAN ASSISTANT

## 2022-08-10 RX ORDER — LIDOCAINE HYDROCHLORIDE 10 MG/ML
5 INJECTION, SOLUTION EPIDURAL; INFILTRATION; INTRACAUDAL; PERINEURAL ONCE
Status: COMPLETED | OUTPATIENT
Start: 2022-08-10 | End: 2022-08-10

## 2022-08-10 RX ADMIN — LIDOCAINE HYDROCHLORIDE 10 ML: 10 INJECTION, SOLUTION EPIDURAL; INFILTRATION; INTRACAUDAL; PERINEURAL at 14:45

## 2022-08-10 NOTE — DISCHARGE INSTRUCTIONS
A collaboration between [facility] and [facility]  Experts in minimally invasive, targeted treatments performed using imaging guidance    Tunneled Central Venous Catheter Removal    Today you had your existing tunneled central venous catheter removed because it was no longer needed for treatment.    Your catheter was removed by Susan Feliz PA-C    After you go home:  Avoid lying flat or bending at the waist for 2 hours following removal of the catheter to reduce risk of bleeding from catheter site.  Keep any applied tape/gauze dressings clean and dry. Change tape/gauze dressings if they get wet or soiled.  You may shower following the procedure, however cover and protect from moisture any tape/gauze dressings.   You may remove tape/gauze dressings after 3 days if the site looks like it is in the process of healing or scabbing over.  Avoid strenuous activities (elevating heart rate) or lifting more than 10 pounds for the next 3 days. Walking, elliptical and golf are examples of acceptable activities.  If there is bleeding or oozing from the procedure site, apply firm pressure to the area above your collar bone (where the catheter entered the bloodstream) for 10 minutes.  If the bleeding continues, continue to hold pressure and seek medical attention at the phone numbers below.  Mild procedure site discomfort can be treated with an ice pack and over-the-counter pain relievers.           Call our Interventional Radiology (IR) service if:  If you start bleeding from the procedure site. Our radiology provider can help you decide if you need to return to the hospital.  If you have new or worsening pain related to the procedure.  If you have concerning swelling at the procedure site.  If you develop hives or a rash or any unexplained itching.  If you have a fever of greater than 100.5  F and chills in the first 5 days after procedure.  Any other concerns related to your  procedure.    Contact Number:  [phone]  (IR control desk)  Monday - Friday 8:00 am - 4:30 pm    After hours for urgent concerns:  [phone]  After 4:30 pm Monday - Friday, Weekends and Holidays.   Ask for Interventional Radiology on-call.  Someone is available 24 hours a day.  [facility] toll free number:  [phone]

## 2022-08-10 NOTE — PROGRESS NOTES
Interventional Radiology Brief Post Procedure Note    Procedure: IR Tunneled Central Venous Line Removal-RIGHT    Proceduralist: Susan Feliz PA-C     Time Out: Prior to the start of the procedure and with procedural staff participation, I verbally confirmed the patient s identity using two indicators, relevant allergies, that the procedure was appropriate and matched the consent or emergent situation, and that the correct equipment/implants were available. Immediately prior to starting the procedure I conducted the Time Out with the procedural staff and re-confirmed the patient s name, procedure, and site/side. (The Joint Commission universal protocol was followed.)  Yes    Sedation: None. Local Anesthestic 1% lidocaine    Findings: Removal of RIGHT tunneled catheter in its entirety.      Estimated Blood Loss: Minimal    SPECIMENS: None    Complications: 1. None     Condition: Stable    Plan: Discharge    Comments: See dictated procedure note for full details.    Susan Feliz PA-C

## 2022-08-15 ENCOUNTER — LAB (OUTPATIENT)
Dept: LAB | Facility: CLINIC | Age: 57
End: 2022-08-15
Payer: COMMERCIAL

## 2022-08-15 DIAGNOSIS — Z79.899 ENCOUNTER FOR LONG-TERM CURRENT USE OF MEDICATION: ICD-10-CM

## 2022-08-15 DIAGNOSIS — Z94.0 KIDNEY REPLACED BY TRANSPLANT: ICD-10-CM

## 2022-08-15 DIAGNOSIS — Z48.298 AFTERCARE FOLLOWING ORGAN TRANSPLANT: ICD-10-CM

## 2022-08-15 DIAGNOSIS — D63.8 ANEMIA IN OTHER CHRONIC DISEASES CLASSIFIED ELSEWHERE: ICD-10-CM

## 2022-08-15 LAB
ANION GAP SERPL CALCULATED.3IONS-SCNC: 4 MMOL/L (ref 3–14)
BUN SERPL-MCNC: 40 MG/DL (ref 7–30)
CALCIUM SERPL-MCNC: 10 MG/DL (ref 8.5–10.1)
CHLORIDE BLD-SCNC: 112 MMOL/L (ref 94–109)
CO2 SERPL-SCNC: 23 MMOL/L (ref 20–32)
CREAT SERPL-MCNC: 2.87 MG/DL (ref 0.66–1.25)
ERYTHROCYTE [DISTWIDTH] IN BLOOD BY AUTOMATED COUNT: 12.9 % (ref 10–15)
FERRITIN SERPL-MCNC: 717 NG/ML (ref 26–388)
GFR SERPL CREATININE-BSD FRML MDRD: 24 ML/MIN/1.73M2
GLUCOSE BLD-MCNC: 101 MG/DL (ref 70–99)
HCT VFR BLD AUTO: 28.1 % (ref 40–53)
HGB BLD-MCNC: 8.6 G/DL (ref 13.3–17.7)
IRON SATN MFR SERPL: 20 % (ref 15–46)
IRON SERPL-MCNC: 49 UG/DL (ref 35–180)
MAGNESIUM SERPL-MCNC: 1.7 MG/DL (ref 1.6–2.3)
MCH RBC QN AUTO: 30.9 PG (ref 26.5–33)
MCHC RBC AUTO-ENTMCNC: 30.6 G/DL (ref 31.5–36.5)
MCV RBC AUTO: 101 FL (ref 78–100)
PHOSPHATE SERPL-MCNC: 2.2 MG/DL (ref 2.5–4.5)
PLATELET # BLD AUTO: 248 10E3/UL (ref 150–450)
POTASSIUM BLD-SCNC: 5.2 MMOL/L (ref 3.4–5.3)
RBC # BLD AUTO: 2.78 10E6/UL (ref 4.4–5.9)
SODIUM SERPL-SCNC: 139 MMOL/L (ref 133–144)
TACROLIMUS BLD-MCNC: 13.4 UG/L (ref 5–15)
TIBC SERPL-MCNC: 244 UG/DL (ref 240–430)
TME LAST DOSE: NORMAL H
TME LAST DOSE: NORMAL H
WBC # BLD AUTO: 4.7 10E3/UL (ref 4–11)

## 2022-08-15 PROCEDURE — 36415 COLL VENOUS BLD VENIPUNCTURE: CPT

## 2022-08-15 PROCEDURE — 80048 BASIC METABOLIC PNL TOTAL CA: CPT

## 2022-08-15 PROCEDURE — 84100 ASSAY OF PHOSPHORUS: CPT

## 2022-08-15 PROCEDURE — 83550 IRON BINDING TEST: CPT

## 2022-08-15 PROCEDURE — 83735 ASSAY OF MAGNESIUM: CPT

## 2022-08-15 PROCEDURE — 85027 COMPLETE CBC AUTOMATED: CPT

## 2022-08-15 PROCEDURE — 82728 ASSAY OF FERRITIN: CPT

## 2022-08-15 PROCEDURE — 80197 ASSAY OF TACROLIMUS: CPT

## 2022-08-16 DIAGNOSIS — Z94.0 KIDNEY REPLACED BY TRANSPLANT: Primary | Chronic | ICD-10-CM

## 2022-08-16 RX ORDER — TACROLIMUS 1 MG/1
3 CAPSULE ORAL 2 TIMES DAILY
Qty: 180 CAPSULE | Refills: 11 | Status: SHIPPED | OUTPATIENT
Start: 2022-08-16 | End: 2022-08-23

## 2022-08-16 NOTE — TELEPHONE ENCOUNTER
Spoke to patient who confirms this was an accurate 12-hour trough. Verifed Tacrolimus IR dose 4 mg BID. Confirmed no new medications or illness. Confirmed no missed doses. Patient confirms decrease Tacrolimus IR dose to 3 mg BID and repeat labs in 1 weeks

## 2022-08-16 NOTE — TELEPHONE ENCOUNTER
ISSUE:   Tacrolimus IR level 13.4 on 8/16, goal 8-10, dose 4 mg BID.    PLAN:   Please call patient and confirm this was an accurate 12-hour trough. Verify Tacrolimus IR dose 4 mg BID. Confirm no new medications or illness. Confirm no missed doses. If accurate trough and accurate dose, decrease Tacrolimus IR dose to 3 mg BID and repeat labs in 1 weeks

## 2022-08-17 ENCOUNTER — TELEPHONE (OUTPATIENT)
Dept: TRANSPLANT | Facility: CLINIC | Age: 57
End: 2022-08-17

## 2022-08-17 DIAGNOSIS — Z48.298 AFTERCARE FOLLOWING ORGAN TRANSPLANT: Primary | ICD-10-CM

## 2022-08-17 RX ORDER — BUMETANIDE 1 MG/1
TABLET ORAL
Qty: 30 TABLET | Refills: 3 | COMMUNITY
Start: 2022-08-17 | End: 2022-08-26

## 2022-08-17 NOTE — TELEPHONE ENCOUNTER
Post Kidney and Pancreas Transplant Team Conference  Date: 8/17/2022  Transplant Coordinator:Ailcia Valentin RN     Attendees:  [x]  Dr. Cr [x] Alicia Valentin, RN [x] Hanny Simmons LPN     []  Dr. Capellan [] Rut Pickens, URIAH [] Ester Bragg LPN   [x]  Dr. Duff [x] Phoebe Howard, URIAH    [x]  Dr. Friedman [] Jazzmine Arzate RN [x] Alejandro Garces, PharmD   [x] Dr. Mckenna [] Shruthi Reyes, URIAH    [] Dr. Florse [] Campos Falcon RN    [] Dr. Frank [] Karina Martinez, URIAH [x] Aurora Hollingsworth RN   [] Dr. Yu [] Sara Pablo, URIAH    [x]  Dr. Cohen [] Carolyn Vasquez RN    [] Dr. Rust [x] Xin Venegas RN    [x] Katie Sanders NP [] Rossy Solorio RN        Verbal Plan Read Back:   Repeat ultrasound next week  Recommend wearing compression stockings  Reduce Bumex to 1 mg daily    Routed to RN Coordinator   Hanny Simmons LPN

## 2022-08-17 NOTE — TELEPHONE ENCOUNTER
Is This A New Presentation, Or A Follow-Up?: Evaluation for Isotretinoin Spoke with patient regarding compression socks, decreasing Bumex, and scheduling his repeat renal US for next patient. Patient agreeable to plan.   Additional History: Patient presents for isotretinoin initiation.

## 2022-08-18 ENCOUNTER — LAB (OUTPATIENT)
Dept: LAB | Facility: CLINIC | Age: 57
End: 2022-08-18
Payer: COMMERCIAL

## 2022-08-18 ENCOUNTER — TELEPHONE (OUTPATIENT)
Dept: NEPHROLOGY | Facility: CLINIC | Age: 57
End: 2022-08-18

## 2022-08-18 DIAGNOSIS — Z48.298 AFTERCARE FOLLOWING ORGAN TRANSPLANT: ICD-10-CM

## 2022-08-18 DIAGNOSIS — Z48.298 AFTERCARE FOLLOWING ORGAN TRANSPLANT: Primary | ICD-10-CM

## 2022-08-18 LAB
ANION GAP SERPL CALCULATED.3IONS-SCNC: 3 MMOL/L (ref 3–14)
BUN SERPL-MCNC: 30 MG/DL (ref 7–30)
CALCIUM SERPL-MCNC: 10.2 MG/DL (ref 8.5–10.1)
CHLORIDE BLD-SCNC: 116 MMOL/L (ref 94–109)
CO2 SERPL-SCNC: 24 MMOL/L (ref 20–32)
CREAT SERPL-MCNC: 2.27 MG/DL (ref 0.66–1.25)
ERYTHROCYTE [DISTWIDTH] IN BLOOD BY AUTOMATED COUNT: 12.9 % (ref 10–15)
GFR SERPL CREATININE-BSD FRML MDRD: 32 ML/MIN/1.73M2
GLUCOSE BLD-MCNC: 108 MG/DL (ref 70–99)
HCT VFR BLD AUTO: 29.3 % (ref 40–53)
HGB BLD-MCNC: 8.9 G/DL (ref 13.3–17.7)
MCH RBC QN AUTO: 31 PG (ref 26.5–33)
MCHC RBC AUTO-ENTMCNC: 30.4 G/DL (ref 31.5–36.5)
MCV RBC AUTO: 102 FL (ref 78–100)
PLATELET # BLD AUTO: 234 10E3/UL (ref 150–450)
POTASSIUM BLD-SCNC: 5.6 MMOL/L (ref 3.4–5.3)
RBC # BLD AUTO: 2.87 10E6/UL (ref 4.4–5.9)
SODIUM SERPL-SCNC: 143 MMOL/L (ref 133–144)
TACROLIMUS BLD-MCNC: 11.2 UG/L (ref 5–15)
TME LAST DOSE: NORMAL H
TME LAST DOSE: NORMAL H
WBC # BLD AUTO: 5 10E3/UL (ref 4–11)

## 2022-08-18 PROCEDURE — 36415 COLL VENOUS BLD VENIPUNCTURE: CPT

## 2022-08-18 PROCEDURE — 80048 BASIC METABOLIC PNL TOTAL CA: CPT

## 2022-08-18 PROCEDURE — 85027 COMPLETE CBC AUTOMATED: CPT

## 2022-08-18 PROCEDURE — 80197 ASSAY OF TACROLIMUS: CPT

## 2022-08-18 RX ORDER — PATIROMER 8.4 G/1
8.4 POWDER, FOR SUSPENSION ORAL DAILY
Qty: 60 PACKET | Refills: 0 | Status: SHIPPED | OUTPATIENT
Start: 2022-08-18 | End: 2022-10-17

## 2022-08-18 NOTE — TELEPHONE ENCOUNTER
Hello,    I saw some documentation that it may have been expected that the [employer] would require a prior authorization. Fortunately, that was not the case.    This is going through the patient's insurance just fine, and he has no copay.    The specialty pharmacy will be contacting him shortly to set up delivery. Thank you

## 2022-08-22 ENCOUNTER — TELEPHONE (OUTPATIENT)
Dept: PHARMACY | Facility: CLINIC | Age: 57
End: 2022-08-22

## 2022-08-22 ENCOUNTER — LAB (OUTPATIENT)
Dept: LAB | Facility: CLINIC | Age: 57
End: 2022-08-22
Payer: COMMERCIAL

## 2022-08-22 DIAGNOSIS — Z94.0 KIDNEY REPLACED BY TRANSPLANT: ICD-10-CM

## 2022-08-22 DIAGNOSIS — Z48.298 AFTERCARE FOLLOWING ORGAN TRANSPLANT: ICD-10-CM

## 2022-08-22 DIAGNOSIS — D63.8 ANEMIA IN OTHER CHRONIC DISEASES CLASSIFIED ELSEWHERE: ICD-10-CM

## 2022-08-22 DIAGNOSIS — Z79.899 ENCOUNTER FOR LONG-TERM CURRENT USE OF MEDICATION: ICD-10-CM

## 2022-08-22 LAB
ANION GAP SERPL CALCULATED.3IONS-SCNC: 4 MMOL/L (ref 3–14)
BUN SERPL-MCNC: 36 MG/DL (ref 7–30)
CALCIUM SERPL-MCNC: 10 MG/DL (ref 8.5–10.1)
CHLORIDE BLD-SCNC: 112 MMOL/L (ref 94–109)
CO2 SERPL-SCNC: 24 MMOL/L (ref 20–32)
CREAT SERPL-MCNC: 2.36 MG/DL (ref 0.66–1.25)
ERYTHROCYTE [DISTWIDTH] IN BLOOD BY AUTOMATED COUNT: 13.3 % (ref 10–15)
GFR SERPL CREATININE-BSD FRML MDRD: 31 ML/MIN/1.73M2
GLUCOSE BLD-MCNC: 118 MG/DL (ref 70–99)
HCT VFR BLD AUTO: 29.6 % (ref 40–53)
HGB BLD-MCNC: 9.1 G/DL (ref 13.3–17.7)
MAGNESIUM SERPL-MCNC: 1.6 MG/DL (ref 1.6–2.3)
MCH RBC QN AUTO: 31 PG (ref 26.5–33)
MCHC RBC AUTO-ENTMCNC: 30.7 G/DL (ref 31.5–36.5)
MCV RBC AUTO: 101 FL (ref 78–100)
PHOSPHATE SERPL-MCNC: 1.8 MG/DL (ref 2.5–4.5)
PLATELET # BLD AUTO: 204 10E3/UL (ref 150–450)
POTASSIUM BLD-SCNC: 5.2 MMOL/L (ref 3.4–5.3)
RBC # BLD AUTO: 2.94 10E6/UL (ref 4.4–5.9)
SODIUM SERPL-SCNC: 140 MMOL/L (ref 133–144)
TACROLIMUS BLD-MCNC: 11.1 UG/L (ref 5–15)
TME LAST DOSE: NORMAL H
TME LAST DOSE: NORMAL H
WBC # BLD AUTO: 5.6 10E3/UL (ref 4–11)

## 2022-08-22 PROCEDURE — 87799 DETECT AGENT NOS DNA QUANT: CPT

## 2022-08-22 PROCEDURE — 80048 BASIC METABOLIC PNL TOTAL CA: CPT

## 2022-08-22 PROCEDURE — 83735 ASSAY OF MAGNESIUM: CPT

## 2022-08-22 PROCEDURE — 85027 COMPLETE CBC AUTOMATED: CPT

## 2022-08-22 PROCEDURE — 36415 COLL VENOUS BLD VENIPUNCTURE: CPT

## 2022-08-22 PROCEDURE — 84100 ASSAY OF PHOSPHORUS: CPT

## 2022-08-22 PROCEDURE — 80197 ASSAY OF TACROLIMUS: CPT

## 2022-08-22 NOTE — TELEPHONE ENCOUNTER
Follow-up with anemia management service:    Thotzt message sent to Donald to remind him that he needs to schedule a Aranesp injection.     Anemia Latest Ref Rng & Units 7/27/2022 7/29/2022 8/1/2022 8/8/2022 8/15/2022 8/18/2022 8/22/2022   YUDI Dose - - - - 40mcg - - -   Hemoglobin 13.3 - 17.7 g/dL 7.9(L) 7.8(LL) 7.4(LL) 8.2(L) 8.6(L) 8.9(L) 9.1(L)   TSAT 15 - 46 % - - - 14(L) 20 - -   Ferritin 26 - 388 ng/mL - - - 722(H) 717(H) - -           Follow-up call date: 8/29/22    Camille Tsang RN   Anemia Services  [address] [address]   [email or web address]   Office : [phone]  Fax: [phone]

## 2022-08-23 ENCOUNTER — ANCILLARY PROCEDURE (OUTPATIENT)
Dept: ULTRASOUND IMAGING | Facility: CLINIC | Age: 57
End: 2022-08-23
Attending: INTERNAL MEDICINE
Payer: COMMERCIAL

## 2022-08-23 ENCOUNTER — ALLIED HEALTH/NURSE VISIT (OUTPATIENT)
Dept: TRANSPLANT | Facility: CLINIC | Age: 57
End: 2022-08-23
Payer: COMMERCIAL

## 2022-08-23 VITALS — SYSTOLIC BLOOD PRESSURE: 142 MMHG | DIASTOLIC BLOOD PRESSURE: 75 MMHG

## 2022-08-23 DIAGNOSIS — Z94.0 KIDNEY REPLACED BY TRANSPLANT: Chronic | ICD-10-CM

## 2022-08-23 DIAGNOSIS — Z48.298 AFTERCARE FOLLOWING ORGAN TRANSPLANT: ICD-10-CM

## 2022-08-23 DIAGNOSIS — D63.8 ANEMIA IN OTHER CHRONIC DISEASES CLASSIFIED ELSEWHERE: Primary | ICD-10-CM

## 2022-08-23 DIAGNOSIS — Z94.0 KIDNEY REPLACED BY TRANSPLANT: ICD-10-CM

## 2022-08-23 LAB — BKV DNA # SPEC NAA+PROBE: NOT DETECTED COPIES/ML

## 2022-08-23 PROCEDURE — 250N000011 HC RX IP 250 OP 636: Performed by: INTERNAL MEDICINE

## 2022-08-23 PROCEDURE — 96372 THER/PROPH/DIAG INJ SC/IM: CPT | Performed by: INTERNAL MEDICINE

## 2022-08-23 PROCEDURE — 76776 US EXAM K TRANSPL W/DOPPLER: CPT | Performed by: RADIOLOGY

## 2022-08-23 RX ORDER — DIPHENHYDRAMINE HYDROCHLORIDE 50 MG/ML
50 INJECTION INTRAMUSCULAR; INTRAVENOUS
Status: CANCELLED
Start: 2022-08-23

## 2022-08-23 RX ORDER — TACROLIMUS 1 MG/1
2 CAPSULE ORAL 2 TIMES DAILY
Qty: 180 CAPSULE | Refills: 11 | Status: SHIPPED | OUTPATIENT
Start: 2022-08-23 | End: 2022-09-27

## 2022-08-23 RX ORDER — ALBUTEROL SULFATE 0.83 MG/ML
2.5 SOLUTION RESPIRATORY (INHALATION)
Status: CANCELLED | OUTPATIENT
Start: 2022-08-23

## 2022-08-23 RX ORDER — ALBUTEROL SULFATE 90 UG/1
1-2 AEROSOL, METERED RESPIRATORY (INHALATION)
Status: CANCELLED
Start: 2022-08-23

## 2022-08-23 RX ORDER — TACROLIMUS 0.5 MG/1
0.5 CAPSULE ORAL 2 TIMES DAILY
Qty: 60 CAPSULE | Refills: 11 | Status: SHIPPED | OUTPATIENT
Start: 2022-08-23 | End: 2022-09-27

## 2022-08-23 RX ORDER — EPINEPHRINE 1 MG/ML
0.3 INJECTION, SOLUTION, CONCENTRATE INTRAVENOUS EVERY 5 MIN PRN
Status: CANCELLED | OUTPATIENT
Start: 2022-08-23

## 2022-08-23 RX ORDER — MEPERIDINE HYDROCHLORIDE 25 MG/ML
25 INJECTION INTRAMUSCULAR; INTRAVENOUS; SUBCUTANEOUS EVERY 30 MIN PRN
Status: CANCELLED | OUTPATIENT
Start: 2022-08-23

## 2022-08-23 RX ORDER — METHYLPREDNISOLONE SODIUM SUCCINATE 125 MG/2ML
125 INJECTION, POWDER, LYOPHILIZED, FOR SOLUTION INTRAMUSCULAR; INTRAVENOUS
Status: CANCELLED
Start: 2022-08-23

## 2022-08-23 RX ADMIN — DARBEPOETIN ALFA 40 MCG: 40 INJECTION, SOLUTION INTRAVENOUS; SUBCUTANEOUS at 13:21

## 2022-08-23 NOTE — NURSING NOTE
Frequency: Every 14 days  Most recent or today's HGB: 9.1   Date: 2022  Date of lat dose: 2022  HGB associated with last dose given: 8.2    Blood Pressure:142/75    Diagnosis: Anemia in other chronic disease classified elsewhere, Kidney replaced by transplant    Ordered by: Yong Friedman MD  VIS Offered: Yes, declined    Double Checked by: Tea GRIFFIN CMA    See MAR for administration details    Pt's first name, last name and  verified prior to medication administration, injection given without complications or questions.

## 2022-08-24 ENCOUNTER — TELEPHONE (OUTPATIENT)
Dept: TRANSPLANT | Facility: CLINIC | Age: 57
End: 2022-08-24

## 2022-08-24 NOTE — TELEPHONE ENCOUNTER
Reviewed this morning with transplant team. They would like patient to have biopsy with potential draining of fluid. They would like to send fluid for creatinine testing. Should hold ASA for 5 days prior to biopsy.

## 2022-08-26 ENCOUNTER — TELEPHONE (OUTPATIENT)
Dept: PHARMACY | Facility: CLINIC | Age: 57
End: 2022-08-26

## 2022-08-26 DIAGNOSIS — R60.9 FLUID RETENTION: ICD-10-CM

## 2022-08-26 RX ORDER — BUMETANIDE 1 MG/1
TABLET ORAL
Qty: 30 TABLET | Refills: 3 | Status: SHIPPED | OUTPATIENT
Start: 2022-08-26 | End: 2023-01-13

## 2022-08-26 NOTE — TELEPHONE ENCOUNTER
Clinical Pharmacy Consult:                                                      Transplant Specific: 3 Month Post Transplant medication review  Date of Transplant: 05/17/2022  Type of Transplant: kidney  First Transplant: yes  History of rejection: no    Immunosuppression Regimen   TAC 2.5mg qAM & 2.5mg qPM and MMF 750mg qAM & 750mg qPM  Patient specific goal: 8-10  Most recent level: 11.1, date 08/22/22  Immunosuppressant Levels:  supraTherapeutic dose decreased  Pt adherent to lab draws: yes  Scr:   Lab Results   Component Value Date    CR 2.71 06/27/2022    CR 6.13 12/15/2020     Side effects: no side effects    Prophylactic Medications  Antibacterial:  Bactrim ss 3 times a week  Scheduled Discontinue Date: Lifelong    Antifungal: Not needed thus far  Scheduled Discontinue Date: N/A    Antiviral: CrCl 10 to 24 mL/minute: Valcyte 450 mg twice weekly   Scheduled Discontinue Date: completed    Acid Reducer: Protonix (pantoprazole)  Scheduled Reviewed Date:      Thrombosis Prevention: Aspirin 325 mg PO daily  Scheduled Discontinue Date: monitored by clinic    Blood Pressure Management  Frequency of home Blood Pressure checks: not taking  Most recent home BP: last in clinic was 142/75  Patient Blood pressure goal: <140/90  Patient blood pressure at goal:  no    Hospitalizations/ER visits since last assessment: 0    Med rec/DUR performed: yes  Med Rec Discrepancies: no    Medication adherence flowsheet 8/26/2022   Patient medication administration: Responsible for own medications   Patient estimated adherence level: %   Pharmacist assessment of adherence: Good   Patient reported doses missed per week: 0-1   Pharmacy MPR: -   Facilitators to medication adherence  Medication dosing chart;Pill box;Cell phone   Patient reported barriers to medication adherence  -   Adherence intervention(s): -      Medication access flowsheet 8/26/2022   Number of pharmacies used: 1   Pharmacy: [facility]   Enrolled in  [facility]? Yes   Patient reported barriers to accessing medications: -   Medication access interventions: -        Donald reports feeling good with no side effects.    He uses a med list, pill box, alarms on phone and his wife helps him manage his meds.  Last tacro level was high and dose was decreased.    Not checking blood pressure, last in clinic was high.    Follow up in 3 months    Jaime Santos [facility]

## 2022-08-29 ENCOUNTER — LAB (OUTPATIENT)
Dept: LAB | Facility: CLINIC | Age: 57
End: 2022-08-29
Payer: COMMERCIAL

## 2022-08-29 ENCOUNTER — TELEPHONE (OUTPATIENT)
Dept: PHARMACY | Facility: CLINIC | Age: 57
End: 2022-08-29

## 2022-08-29 DIAGNOSIS — Z79.899 ENCOUNTER FOR LONG-TERM CURRENT USE OF MEDICATION: ICD-10-CM

## 2022-08-29 DIAGNOSIS — R60.9 FLUID RETENTION: ICD-10-CM

## 2022-08-29 DIAGNOSIS — Z48.298 AFTERCARE FOLLOWING ORGAN TRANSPLANT: ICD-10-CM

## 2022-08-29 DIAGNOSIS — D63.8 ANEMIA IN OTHER CHRONIC DISEASES CLASSIFIED ELSEWHERE: ICD-10-CM

## 2022-08-29 DIAGNOSIS — Z94.0 KIDNEY REPLACED BY TRANSPLANT: ICD-10-CM

## 2022-08-29 LAB
ANION GAP SERPL CALCULATED.3IONS-SCNC: 5 MMOL/L (ref 3–14)
BUN SERPL-MCNC: 40 MG/DL (ref 7–30)
CALCIUM SERPL-MCNC: 10.4 MG/DL (ref 8.5–10.1)
CHLORIDE BLD-SCNC: 117 MMOL/L (ref 94–109)
CO2 SERPL-SCNC: 20 MMOL/L (ref 20–32)
CREAT SERPL-MCNC: 2.18 MG/DL (ref 0.66–1.25)
ERYTHROCYTE [DISTWIDTH] IN BLOOD BY AUTOMATED COUNT: 13.7 % (ref 10–15)
GFR SERPL CREATININE-BSD FRML MDRD: 34 ML/MIN/1.73M2
GLUCOSE BLD-MCNC: 120 MG/DL (ref 70–99)
HCT VFR BLD AUTO: 33.8 % (ref 40–53)
HGB BLD-MCNC: 10.4 G/DL (ref 13.3–17.7)
MCH RBC QN AUTO: 30.9 PG (ref 26.5–33)
MCHC RBC AUTO-ENTMCNC: 30.8 G/DL (ref 31.5–36.5)
MCV RBC AUTO: 100 FL (ref 78–100)
PLATELET # BLD AUTO: 208 10E3/UL (ref 150–450)
POTASSIUM BLD-SCNC: 5.7 MMOL/L (ref 3.4–5.3)
RBC # BLD AUTO: 3.37 10E6/UL (ref 4.4–5.9)
SODIUM SERPL-SCNC: 142 MMOL/L (ref 133–144)
TACROLIMUS BLD-MCNC: 10.1 UG/L (ref 5–15)
TME LAST DOSE: NORMAL H
TME LAST DOSE: NORMAL H
WBC # BLD AUTO: 6.4 10E3/UL (ref 4–11)

## 2022-08-29 PROCEDURE — 85027 COMPLETE CBC AUTOMATED: CPT

## 2022-08-29 PROCEDURE — 80197 ASSAY OF TACROLIMUS: CPT

## 2022-08-29 PROCEDURE — 36415 COLL VENOUS BLD VENIPUNCTURE: CPT

## 2022-08-29 PROCEDURE — 80048 BASIC METABOLIC PNL TOTAL CA: CPT

## 2022-08-29 NOTE — TELEPHONE ENCOUNTER
Anemia Management Note  SUBJECTIVE/OBJECTIVE:  Referred by Dr. Yong Friedman on 2022  Primary Diagnosis: Anemia of Other Chronic Illness (D63.8)     Secondary Diagnosis:  Organ or tissue replaced by transplant, kidney (Z94.0)  Kidney Tx: 2022  Hgb goal range:  9-10  Epo/Darbo: Aranesp 40mcg every 14 days for Hgb <10. In Clinic.  *dosed at 0.45mcg/kg  Iron regimen:  NA  Labs : 2023  Recent YUDI use, transfusion, IV iron: NA  RX/TX plans : TBD     No history of stroke, MI and blood clots. Hx of Prostate Cancer. Had Prostectomy & radiation Dec 2020.      Contact: OK to speak with Sana Blanco (wife) regarding Scheduling, Medical, and Billing Inforamtion per consent to communicate scanned on 2020.    Anemia Latest Ref Rng & Units 2022 2022 8/15/2022 2022 2022 2022 2022   YUDI Dose - - 40mcg - - - 40mcg -   Hemoglobin 13.3 - 17.7 g/dL 7.4(LL) 8.2(L) 8.6(L) 8.9(L) 9.1(L) - 10.4(L)   TSAT 15 - 46 % - 14(L) 20 - - - -   Ferritin 26 - 388 ng/mL - 722(H) 717(H) - - - -     BP Readings from Last 3 Encounters:   22 (!) 142/75   22 136/78   22 117/71     Wt Readings from Last 2 Encounters:   22 235 lb (106.6 kg)   22 236 lb (107 kg)           ASSESSMENT:  Hgb:at goal - continue to monitor  TSat: not at goal (>30%) but ferritin >500ng/mL.  IV iron not indicated at this time per anemia protocol. Ferritin: At goal (>100ng/mL)    PLAN:  Dosed with aranesp 22. RTC for hgb then aranesp if needed in 1 week(s)    Orders needed to be renewed (for next follow-up date) in EPIC: None    Iron labs due:  2022    Plan discussed with:  No call, chart review      NEXT FOLLOW-UP DATE:  22 7am labs    Camille Tsang RN   Anemia Services  [address] [address]   [email or web address]   Office : [phone]  Fax: [phone]

## 2022-08-30 NOTE — TELEPHONE ENCOUNTER
Yong Friedman MD Colianni, Lauren, RN  I don't think we need a biopsy but please bring up for discussion on Weds

## 2022-08-31 ENCOUNTER — TELEPHONE (OUTPATIENT)
Dept: TRANSPLANT | Facility: CLINIC | Age: 57
End: 2022-08-31

## 2022-08-31 NOTE — TELEPHONE ENCOUNTER
Post Kidney and Pancreas Transplant Team Conference  Date: 8/31/2022  Transplant Coordinator: Alicia Valentin RN     Attendees:  [x]  Dr. Cr [x] Alicia Valentin, RN [x] Hanny Simmons LPN     []  Dr. Capellan [] Rut Pickens, URIAH [] Ester Bragg LPN   []  Dr. Duff [x] Phoebe Howard, URIAH    [x]  Dr. Friedman [] Jazzmine Arzate RN [x] Alejandro Garces, PharmD   [] Dr. Mckenna [] Shruthi Reyes, URIAH    [] Dr. Flores [] Campos Falcon RN    [] Dr. Frank [] Karina Martinez, URIAH [] Aurora Hollingsworth RN   [] Dr. Yu [] Sara Pablo, URIAH    []  Dr. Cohen [x] Tammy Hughes, URIAH    [] Dr. Ruts [x] Xin Venegas RN    [] Katie Sanders, NP [] Rossy Solorio RN        Verbal Plan Read Back:   Hold on biopsy for now      Routed to RN Coordinator   Hanny Simmons LPN

## 2022-09-06 ENCOUNTER — LAB (OUTPATIENT)
Dept: LAB | Facility: CLINIC | Age: 57
End: 2022-09-06
Payer: COMMERCIAL

## 2022-09-06 ENCOUNTER — TELEPHONE (OUTPATIENT)
Dept: PHARMACY | Facility: CLINIC | Age: 57
End: 2022-09-06

## 2022-09-06 DIAGNOSIS — Z48.298 AFTERCARE FOLLOWING ORGAN TRANSPLANT: ICD-10-CM

## 2022-09-06 DIAGNOSIS — Z94.0 KIDNEY REPLACED BY TRANSPLANT: ICD-10-CM

## 2022-09-06 DIAGNOSIS — Z79.899 ENCOUNTER FOR LONG-TERM CURRENT USE OF MEDICATION: ICD-10-CM

## 2022-09-06 LAB
ANION GAP SERPL CALCULATED.3IONS-SCNC: 4 MMOL/L (ref 3–14)
BUN SERPL-MCNC: 35 MG/DL (ref 7–30)
CALCIUM SERPL-MCNC: 9.8 MG/DL (ref 8.5–10.1)
CHLORIDE BLD-SCNC: 115 MMOL/L (ref 94–109)
CO2 SERPL-SCNC: 24 MMOL/L (ref 20–32)
CREAT SERPL-MCNC: 2.27 MG/DL (ref 0.66–1.25)
ERYTHROCYTE [DISTWIDTH] IN BLOOD BY AUTOMATED COUNT: 13.5 % (ref 10–15)
GFR SERPL CREATININE-BSD FRML MDRD: 32 ML/MIN/1.73M2
GLUCOSE BLD-MCNC: 113 MG/DL (ref 70–99)
HCT VFR BLD AUTO: 31.5 % (ref 40–53)
HGB BLD-MCNC: 9.5 G/DL (ref 13.3–17.7)
MCH RBC QN AUTO: 30.4 PG (ref 26.5–33)
MCHC RBC AUTO-ENTMCNC: 30.2 G/DL (ref 31.5–36.5)
MCV RBC AUTO: 101 FL (ref 78–100)
PLATELET # BLD AUTO: 209 10E3/UL (ref 150–450)
POTASSIUM BLD-SCNC: 5.3 MMOL/L (ref 3.4–5.3)
RBC # BLD AUTO: 3.12 10E6/UL (ref 4.4–5.9)
SODIUM SERPL-SCNC: 143 MMOL/L (ref 133–144)
TACROLIMUS BLD-MCNC: 8.2 UG/L (ref 5–15)
TME LAST DOSE: NORMAL H
TME LAST DOSE: NORMAL H
WBC # BLD AUTO: 6.9 10E3/UL (ref 4–11)

## 2022-09-06 PROCEDURE — 36415 COLL VENOUS BLD VENIPUNCTURE: CPT

## 2022-09-06 PROCEDURE — 85027 COMPLETE CBC AUTOMATED: CPT

## 2022-09-06 PROCEDURE — 80048 BASIC METABOLIC PNL TOTAL CA: CPT

## 2022-09-06 PROCEDURE — 80197 ASSAY OF TACROLIMUS: CPT

## 2022-09-06 NOTE — TELEPHONE ENCOUNTER
Follow-up with anemia management service:    [employer] message sent to Donald to remind him that he needs to schedule Aranesp     Anemia Latest Ref Rng & Units 8/8/2022 8/15/2022 8/18/2022 8/22/2022 8/23/2022 8/29/2022 9/6/2022   YUDI Dose - 40mcg - - - 40mcg - -   Hemoglobin 13.3 - 17.7 g/dL 8.2(L) 8.6(L) 8.9(L) 9.1(L) - 10.4(L) 9.5(L)   TSAT 15 - 46 % 14(L) 20 - - - - -   Ferritin 26 - 388 ng/mL 722(H) 717(H) - - - - -       Follow-up call date: 9/13/22    Camille Tsang RN   Anemia Services  [address] [place] [address]   [email or web address]   Office : [phone]  Fax: [phone]

## 2022-09-09 ENCOUNTER — LAB (OUTPATIENT)
Dept: LAB | Facility: CLINIC | Age: 57
End: 2022-09-09
Payer: COMMERCIAL

## 2022-09-09 DIAGNOSIS — Z94.0 KIDNEY REPLACED BY TRANSPLANT: ICD-10-CM

## 2022-09-09 DIAGNOSIS — Z48.298 AFTERCARE FOLLOWING ORGAN TRANSPLANT: ICD-10-CM

## 2022-09-09 DIAGNOSIS — Z79.899 ENCOUNTER FOR LONG-TERM CURRENT USE OF MEDICATION: ICD-10-CM

## 2022-09-09 LAB
ANION GAP SERPL CALCULATED.3IONS-SCNC: 5 MMOL/L (ref 3–14)
BUN SERPL-MCNC: 31 MG/DL (ref 7–30)
CALCIUM SERPL-MCNC: 10 MG/DL (ref 8.5–10.1)
CHLORIDE BLD-SCNC: 114 MMOL/L (ref 94–109)
CO2 SERPL-SCNC: 23 MMOL/L (ref 20–32)
CREAT SERPL-MCNC: 2.14 MG/DL (ref 0.66–1.25)
ERYTHROCYTE [DISTWIDTH] IN BLOOD BY AUTOMATED COUNT: 13.2 % (ref 10–15)
GFR SERPL CREATININE-BSD FRML MDRD: 35 ML/MIN/1.73M2
GLUCOSE BLD-MCNC: 107 MG/DL (ref 70–99)
HCT VFR BLD AUTO: 33.4 % (ref 40–53)
HGB BLD-MCNC: 10.3 G/DL (ref 13.3–17.7)
MAGNESIUM SERPL-MCNC: 1.8 MG/DL (ref 1.6–2.3)
MCH RBC QN AUTO: 30.8 PG (ref 26.5–33)
MCHC RBC AUTO-ENTMCNC: 30.8 G/DL (ref 31.5–36.5)
MCV RBC AUTO: 100 FL (ref 78–100)
PHOSPHATE SERPL-MCNC: 1.5 MG/DL (ref 2.5–4.5)
PLATELET # BLD AUTO: 193 10E3/UL (ref 150–450)
POTASSIUM BLD-SCNC: 5.8 MMOL/L (ref 3.4–5.3)
RBC # BLD AUTO: 3.34 10E6/UL (ref 4.4–5.9)
SODIUM SERPL-SCNC: 142 MMOL/L (ref 133–144)
TACROLIMUS BLD-MCNC: 8.7 UG/L (ref 5–15)
TME LAST DOSE: NORMAL H
TME LAST DOSE: NORMAL H
WBC # BLD AUTO: 6.5 10E3/UL (ref 4–11)

## 2022-09-09 PROCEDURE — 36415 COLL VENOUS BLD VENIPUNCTURE: CPT

## 2022-09-09 PROCEDURE — 85027 COMPLETE CBC AUTOMATED: CPT

## 2022-09-09 PROCEDURE — 83735 ASSAY OF MAGNESIUM: CPT

## 2022-09-09 PROCEDURE — 80048 BASIC METABOLIC PNL TOTAL CA: CPT

## 2022-09-09 PROCEDURE — 80197 ASSAY OF TACROLIMUS: CPT

## 2022-09-09 PROCEDURE — 84100 ASSAY OF PHOSPHORUS: CPT

## 2022-09-09 PROCEDURE — 87799 DETECT AGENT NOS DNA QUANT: CPT

## 2022-09-12 LAB — BKV DNA # SPEC NAA+PROBE: NOT DETECTED COPIES/ML

## 2022-09-16 ENCOUNTER — LAB (OUTPATIENT)
Dept: LAB | Facility: CLINIC | Age: 57
End: 2022-09-16
Payer: COMMERCIAL

## 2022-09-16 ENCOUNTER — TELEPHONE (OUTPATIENT)
Dept: PHARMACY | Facility: CLINIC | Age: 57
End: 2022-09-16

## 2022-09-16 DIAGNOSIS — Z79.899 ENCOUNTER FOR LONG-TERM CURRENT USE OF MEDICATION: ICD-10-CM

## 2022-09-16 DIAGNOSIS — Z48.298 AFTERCARE FOLLOWING ORGAN TRANSPLANT: ICD-10-CM

## 2022-09-16 DIAGNOSIS — D63.8 ANEMIA IN OTHER CHRONIC DISEASES CLASSIFIED ELSEWHERE: ICD-10-CM

## 2022-09-16 DIAGNOSIS — Z94.0 KIDNEY REPLACED BY TRANSPLANT: ICD-10-CM

## 2022-09-16 LAB
ANION GAP SERPL CALCULATED.3IONS-SCNC: 7 MMOL/L (ref 3–14)
BUN SERPL-MCNC: 35 MG/DL (ref 7–30)
CALCIUM SERPL-MCNC: 9.7 MG/DL (ref 8.5–10.1)
CHLORIDE BLD-SCNC: 111 MMOL/L (ref 94–109)
CO2 SERPL-SCNC: 20 MMOL/L (ref 20–32)
CREAT SERPL-MCNC: 2.04 MG/DL (ref 0.66–1.25)
ERYTHROCYTE [DISTWIDTH] IN BLOOD BY AUTOMATED COUNT: 13 % (ref 10–15)
FERRITIN SERPL-MCNC: 602 NG/ML (ref 26–388)
GFR SERPL CREATININE-BSD FRML MDRD: 37 ML/MIN/1.73M2
GLUCOSE BLD-MCNC: 110 MG/DL (ref 70–99)
HCT VFR BLD AUTO: 34.6 % (ref 40–53)
HGB BLD-MCNC: 10.7 G/DL (ref 13.3–17.7)
MCH RBC QN AUTO: 30.4 PG (ref 26.5–33)
MCHC RBC AUTO-ENTMCNC: 30.9 G/DL (ref 31.5–36.5)
MCV RBC AUTO: 98 FL (ref 78–100)
PLATELET # BLD AUTO: 193 10E3/UL (ref 150–450)
POTASSIUM BLD-SCNC: 5 MMOL/L (ref 3.4–5.3)
RBC # BLD AUTO: 3.52 10E6/UL (ref 4.4–5.9)
SODIUM SERPL-SCNC: 138 MMOL/L (ref 133–144)
TACROLIMUS BLD-MCNC: 9.7 UG/L (ref 5–15)
TME LAST DOSE: NORMAL H
TME LAST DOSE: NORMAL H
WBC # BLD AUTO: 6.2 10E3/UL (ref 4–11)

## 2022-09-16 PROCEDURE — 85027 COMPLETE CBC AUTOMATED: CPT

## 2022-09-16 PROCEDURE — 36415 COLL VENOUS BLD VENIPUNCTURE: CPT

## 2022-09-16 PROCEDURE — 80197 ASSAY OF TACROLIMUS: CPT

## 2022-09-16 PROCEDURE — 80048 BASIC METABOLIC PNL TOTAL CA: CPT

## 2022-09-16 PROCEDURE — 82728 ASSAY OF FERRITIN: CPT

## 2022-09-16 NOTE — TELEPHONE ENCOUNTER
Anemia Management Note  SUBJECTIVE/OBJECTIVE:  Referred by Dr. Yong Friedman on 2022  Primary Diagnosis: Anemia of Other Chronic Illness (D63.8)     Secondary Diagnosis:  Organ or tissue replaced by transplant, kidney (Z94.0)  Kidney Tx: 2022  Hgb goal range:  9-10  Epo/Darbo: Aranesp 40mcg every 14 days for Hgb <10. In Clinic.  *dosed at 0.45mcg/kg  Iron regimen:  NA  Labs : 2023  Recent YUDI use, transfusion, IV iron: NA  RX/TX plans : TBD     No history of stroke, MI and blood clots. Hx of Prostate Cancer. Had Prostectomy & radiation Dec 2020.      Contact: OK to speak with Sana Blanco (wife) regarding Scheduling, Medical, and Billing Inforamtion per consent to communicate scanned on 2020.    Anemia Latest Ref Rng & Units 2022   YUDI Dose - - - 40mcg - - - -   Hemoglobin 13.3 - 17.7 g/dL 8.9(L) 9.1(L) - 10.4(L) 9.5(L) 10.3(L) 10.7(L)   TSAT 15 - 46 % - - - - - - -   Ferritin 26 - 388 ng/mL - - - - - - -     BP Readings from Last 3 Encounters:   22 (!) 142/75   22 136/78   22 117/71     Wt Readings from Last 2 Encounters:   22 235 lb (106.6 kg)   22 236 lb (107 kg)           ASSESSMENT:  Hgb:Above goal - recommend hold dose  TSat: pending Ferritin: Pending    PLAN:  Hold Aranesp and RTC for hgb then aranesp if needed in 2-4  week(s)    Orders needed to be renewed (for next follow-up date) in EPIC: None    Iron labs due:  pending    Plan discussed with:  No call, chart review      NEXT FOLLOW-UP DATE:  10/10/22 7am labs     Camille Tsang RN   Anemia Services  [address] [address]   [email or web address]   Office : [phone]  Fax: [phone]

## 2022-09-23 ENCOUNTER — VIRTUAL VISIT (OUTPATIENT)
Dept: TRANSPLANT | Facility: CLINIC | Age: 57
End: 2022-09-23
Attending: INTERNAL MEDICINE
Payer: COMMERCIAL

## 2022-09-23 DIAGNOSIS — Z94.0 KIDNEY REPLACED BY TRANSPLANT: Primary | ICD-10-CM

## 2022-09-23 PROCEDURE — 99207 PR NO CHARGE COORDINATED CARE PS: CPT

## 2022-09-23 NOTE — LETTER
2022         RE: Donald Blanco  [address] [facility] [address]        Dear Colleague,    Thank you for referring your patient, Donald Blanco, to the [facility] TRANSPL[facility]. Please see a copy of my visit note below.    Post Transplant Patient Social Work Assessment -Outpatient    Patient Name: Donald Blanco  : 1965  Age: 57 year old  MRN: [id number]  Date of transplant: 2022    Patient known to this writer from follow up in the transplant program. Seen today to update assessment.      Presenting Information   Living Situation: Pat lives in [facility] with his spouse Sana.   Functional Status: Independent ADLS   Cultural/Language/Spiritual Considerations: none identified     Support System  Primary Support Person Spouse Sana   Other support:  Adult children- Sonu and Mayra. He has 8 siblings.     Health Care Directive  Decision Maker: Self   Alternate Decision Maker: Spouse-KRYSTAL  Health Care Directive: Provided education    Mental Health/Coping:   History of Mental Health: no concerns identified   History of Chemical Health: no concerns identifed   Current status: Reports that things have been going well since post transplant   Coping: Pat is coping appropriately   Services Needed/Recommended: NA     Financial   Income: Full time sales position   Impact of transplant on income: 6-8 weeks off of work   Insurance and medication coverage: Enrolled in ESRD Medicare part A and spouse's [employer] employer insurance. Will  part B in January.   Financial concerns: NA   Resources needed: NA      Education provided by : Social Work role outpatient setting and psychosocial support.     Marcella Farley   [facility]  Outpatient Kidney/Pancreas/Auto Islet Transplant Program   [address] [place] [address] [place] [id number]  [email or web address]  [email or web address]  Office: [phone] I Fax: [phone]              Again, thank you for allowing me to  participate in the care of your patient.        Sincerely,        ANSELMO HidalgoSW

## 2022-09-23 NOTE — PROGRESS NOTES
Post Transplant Patient Social Work Assessment -Outpatient    Patient Name: Donald Blanco  : 1965  Age: 57 year old  MRN: [id number]  Date of transplant: 2022    Patient known to this writer from follow up in the transplant program. Seen today to update assessment.      Presenting Information   Living Situation: Pat lives in [facility] with his spouse Sana.   Functional Status: Independent ADLS   Cultural/Language/Spiritual Considerations: none identified     Support System  Primary Support Person Spouse Sana   Other support:  Adult children- Sonu and Mayra. He has 8 siblings.     Health Care Directive  Decision Maker: Self   Alternate Decision Maker: Spouse-KRYSTAL  Health Care Directive: Provided education    Mental Health/Coping:   History of Mental Health: no concerns identified   History of Chemical Health: no concerns identifed   Current status: Reports that things have been going well since post transplant   Coping: Pat is coping appropriately   Services Needed/Recommended: NA     Financial   Income: Full time sales position   Impact of transplant on income: 6-8 weeks off of work   Insurance and medication coverage: Enrolled in ESRD Medicare part A and spouse's [employer] employer insurance. Will  part B in January.   Financial concerns: NA   Resources needed: NA      Education provided by : Social Work role outpatient setting and psychosocial support.     Marcella GOINS [facility]  Outpatient Kidney/Pancreas/Auto Islet Transplant Program   [address] [facility] [address]  [place] [address]  [email or web address]  [email or web address]  Office: [phone] I Fax: [phone]

## 2022-09-26 ENCOUNTER — LAB (OUTPATIENT)
Dept: LAB | Facility: CLINIC | Age: 57
End: 2022-09-26
Payer: COMMERCIAL

## 2022-09-26 DIAGNOSIS — Z79.899 ENCOUNTER FOR LONG-TERM CURRENT USE OF MEDICATION: ICD-10-CM

## 2022-09-26 DIAGNOSIS — Z48.298 AFTERCARE FOLLOWING ORGAN TRANSPLANT: Primary | ICD-10-CM

## 2022-09-26 DIAGNOSIS — Z48.298 AFTERCARE FOLLOWING ORGAN TRANSPLANT: ICD-10-CM

## 2022-09-26 DIAGNOSIS — Z94.0 KIDNEY REPLACED BY TRANSPLANT: ICD-10-CM

## 2022-09-26 LAB
ALBUMIN MFR UR ELPH: 19.8 MG/DL
ANION GAP SERPL CALCULATED.3IONS-SCNC: 4 MMOL/L (ref 3–14)
BUN SERPL-MCNC: 31 MG/DL (ref 7–30)
CALCIUM SERPL-MCNC: 10 MG/DL (ref 8.5–10.1)
CHLORIDE BLD-SCNC: 114 MMOL/L (ref 94–109)
CO2 SERPL-SCNC: 23 MMOL/L (ref 20–32)
CREAT SERPL-MCNC: 1.79 MG/DL (ref 0.66–1.25)
CREAT UR-MCNC: 112 MG/DL
ERYTHROCYTE [DISTWIDTH] IN BLOOD BY AUTOMATED COUNT: 13.2 % (ref 10–15)
GFR SERPL CREATININE-BSD FRML MDRD: 43 ML/MIN/1.73M2
GLUCOSE BLD-MCNC: 114 MG/DL (ref 70–99)
HCT VFR BLD AUTO: 35.1 % (ref 40–53)
HGB BLD-MCNC: 10.8 G/DL (ref 13.3–17.7)
MCH RBC QN AUTO: 30.4 PG (ref 26.5–33)
MCHC RBC AUTO-ENTMCNC: 30.8 G/DL (ref 31.5–36.5)
MCV RBC AUTO: 99 FL (ref 78–100)
PLATELET # BLD AUTO: 177 10E3/UL (ref 150–450)
POTASSIUM BLD-SCNC: 4.9 MMOL/L (ref 3.4–5.3)
PROT/CREAT 24H UR: 0.18 MG/MG CR (ref 0–0.2)
PTH-INTACT SERPL-MCNC: 205 PG/ML (ref 15–65)
RBC # BLD AUTO: 3.55 10E6/UL (ref 4.4–5.9)
SODIUM SERPL-SCNC: 141 MMOL/L (ref 133–144)
TACROLIMUS BLD-MCNC: 7.4 UG/L (ref 5–15)
TME LAST DOSE: NORMAL H
TME LAST DOSE: NORMAL H
WBC # BLD AUTO: 5.8 10E3/UL (ref 4–11)

## 2022-09-26 PROCEDURE — 86833 HLA CLASS II HIGH DEFIN QUAL: CPT

## 2022-09-26 PROCEDURE — 84156 ASSAY OF PROTEIN URINE: CPT

## 2022-09-26 PROCEDURE — 80197 ASSAY OF TACROLIMUS: CPT

## 2022-09-26 PROCEDURE — 80048 BASIC METABOLIC PNL TOTAL CA: CPT

## 2022-09-26 PROCEDURE — 85027 COMPLETE CBC AUTOMATED: CPT

## 2022-09-26 PROCEDURE — 83970 ASSAY OF PARATHORMONE: CPT

## 2022-09-26 PROCEDURE — 86832 HLA CLASS I HIGH DEFIN QUAL: CPT

## 2022-09-26 PROCEDURE — 36415 COLL VENOUS BLD VENIPUNCTURE: CPT

## 2022-09-27 ENCOUNTER — OFFICE VISIT (OUTPATIENT)
Dept: NEPHROLOGY | Facility: CLINIC | Age: 57
End: 2022-09-27
Attending: INTERNAL MEDICINE
Payer: COMMERCIAL

## 2022-09-27 ENCOUNTER — OFFICE VISIT (OUTPATIENT)
Dept: PHARMACY | Facility: CLINIC | Age: 57
End: 2022-09-27
Payer: COMMERCIAL

## 2022-09-27 ENCOUNTER — LAB (OUTPATIENT)
Dept: LAB | Facility: CLINIC | Age: 57
End: 2022-09-27

## 2022-09-27 VITALS
DIASTOLIC BLOOD PRESSURE: 79 MMHG | HEART RATE: 72 BPM | OXYGEN SATURATION: 98 % | BODY MASS INDEX: 36.27 KG/M2 | WEIGHT: 245.6 LBS | SYSTOLIC BLOOD PRESSURE: 146 MMHG

## 2022-09-27 DIAGNOSIS — Z48.298 AFTERCARE FOLLOWING ORGAN TRANSPLANT: ICD-10-CM

## 2022-09-27 DIAGNOSIS — Z94.0 KIDNEY REPLACED BY TRANSPLANT: Primary | ICD-10-CM

## 2022-09-27 DIAGNOSIS — T88.8XXS FLUID COLLECTION AT SURGICAL SITE, SEQUELA: ICD-10-CM

## 2022-09-27 DIAGNOSIS — D84.9 IMMUNOSUPPRESSION (H): ICD-10-CM

## 2022-09-27 DIAGNOSIS — Z94.0 KIDNEY REPLACED BY TRANSPLANT: Primary | Chronic | ICD-10-CM

## 2022-09-27 DIAGNOSIS — E83.42 HYPOMAGNESEMIA: ICD-10-CM

## 2022-09-27 DIAGNOSIS — Z94.0 HTN, KIDNEY TRANSPLANT RELATED: ICD-10-CM

## 2022-09-27 DIAGNOSIS — E87.5 HYPERKALEMIA: ICD-10-CM

## 2022-09-27 DIAGNOSIS — I15.1 HTN, KIDNEY TRANSPLANT RELATED: ICD-10-CM

## 2022-09-27 DIAGNOSIS — E87.20 METABOLIC ACIDOSIS: ICD-10-CM

## 2022-09-27 DIAGNOSIS — E78.5 DYSLIPIDEMIA: ICD-10-CM

## 2022-09-27 PROBLEM — D64.9 ANEMIA: Status: RESOLVED | Noted: 2022-06-23 | Resolved: 2022-09-27

## 2022-09-27 PROBLEM — R18.8 ABDOMINAL FLUID COLLECTION: Status: RESOLVED | Noted: 2022-06-21 | Resolved: 2022-09-27

## 2022-09-27 PROBLEM — T86.19 DELAYED GRAFT FUNCTION OF KIDNEY: Status: RESOLVED | Noted: 2022-06-23 | Resolved: 2022-09-27

## 2022-09-27 PROBLEM — D63.1 ANEMIA IN STAGE 3B CHRONIC KIDNEY DISEASE (H): Status: ACTIVE | Noted: 2022-06-23

## 2022-09-27 PROBLEM — E66.01 MORBID OBESITY (H): Status: RESOLVED | Noted: 2021-05-14 | Resolved: 2022-09-27

## 2022-09-27 PROBLEM — N17.9 AKI (ACUTE KIDNEY INJURY) (H): Status: RESOLVED | Noted: 2022-06-21 | Resolved: 2022-09-27

## 2022-09-27 PROBLEM — N18.32 ANEMIA IN STAGE 3B CHRONIC KIDNEY DISEASE (H): Status: ACTIVE | Noted: 2022-06-23

## 2022-09-27 LAB — SARS-COV-2 RNA RESP QL NAA+PROBE: NEGATIVE

## 2022-09-27 PROCEDURE — 99607 MTMS BY PHARM ADDL 15 MIN: CPT | Performed by: PHARMACIST

## 2022-09-27 PROCEDURE — U0005 INFEC AGEN DETEC AMPLI PROBE: HCPCS | Mod: 90 | Performed by: PATHOLOGY

## 2022-09-27 PROCEDURE — 99214 OFFICE O/P EST MOD 30 MIN: CPT | Performed by: INTERNAL MEDICINE

## 2022-09-27 PROCEDURE — 99000 SPECIMEN HANDLING OFFICE-LAB: CPT | Performed by: PATHOLOGY

## 2022-09-27 PROCEDURE — 99606 MTMS BY PHARM EST 15 MIN: CPT | Performed by: PHARMACIST

## 2022-09-27 PROCEDURE — G0463 HOSPITAL OUTPT CLINIC VISIT: HCPCS

## 2022-09-27 PROCEDURE — U0003 INFECTIOUS AGENT DETECTION BY NUCLEIC ACID (DNA OR RNA); SEVERE ACUTE RESPIRATORY SYNDROME CORONAVIRUS 2 (SARS-COV-2) (CORONAVIRUS DISEASE [COVID-19]), AMPLIFIED PROBE TECHNIQUE, MAKING USE OF HIGH THROUGHPUT TECHNOLOGIES AS DESCRIBED BY CMS-2020-01-R: HCPCS | Mod: 90 | Performed by: PATHOLOGY

## 2022-09-27 RX ORDER — NEPHROCAP 1 MG
1 CAP ORAL DAILY
Qty: 90 CAPSULE | Refills: 3 | Status: SHIPPED | OUTPATIENT
Start: 2022-09-27 | End: 2023-09-26

## 2022-09-27 RX ORDER — TACROLIMUS 1 MG/1
2 CAPSULE ORAL 2 TIMES DAILY
Qty: 360 CAPSULE | Refills: 3 | Status: SHIPPED | OUTPATIENT
Start: 2022-09-27 | End: 2022-12-06

## 2022-09-27 RX ORDER — MAGNESIUM OXIDE 400 MG/1
400 TABLET ORAL DAILY
Qty: 90 TABLET | Refills: 1 | Status: SHIPPED | OUTPATIENT
Start: 2022-09-27 | End: 2022-12-26

## 2022-09-27 RX ORDER — TACROLIMUS 0.5 MG/1
0.5 CAPSULE ORAL 2 TIMES DAILY
Qty: 180 CAPSULE | Refills: 3 | Status: SHIPPED | OUTPATIENT
Start: 2022-09-27 | End: 2022-12-06

## 2022-09-27 NOTE — PROGRESS NOTES
TRANSPLANT NEPHROLOGY EARLY POST TRANSPLANT VISIT    Assessment & Plan   # LDKT: Trend down, Scr 1.79 on 9/26/22 is huang thus far post transplant   - Baseline Creatinine: ~ TBD   - Proteinuria: Normal (<0.2 grams)   - Date DSA Last Checked: Jul/2022      Latest DSA: No   - BK Viremia: No   - Kidney Tx Biopsy: Jun 22, 2022; Result: ATI, negative for rejection              May 24, 2022; Result: negative for rejection   - Transplant Ureteral Stent: No    # Immunosuppression: Tacrolimus immediate release (goal 8-10) and Mycophenolate mofetil (dose 750 mg every 12 hours)   - Induction with Recent Transplant:  High Intensity due to DGF after initially receiving basiliximab   - Continue with intensive monitoring of immunosuppression for efficacy and toxicity.   - Changes: Not at this time    # Infection Prophylaxis:   - PJP: Sulfa/TMP (Bactrim) MWF  - CMV: None, prophylaxis completed    # Hypertension: Controlled;  Goal BP: < 130/80   - Volume status: Euvolemic    - Changes: Not at this time. Continue bumex 1mg daily     # Anemia in Chronic Renal Disease: Hgb: Trend up      YUDI: Yes, last dose darbe 8/23/22   - Iron studies: Replete    # Mineral Bone Disorder:   - Secondary renal hyperparathyroidism; PTH level: Mildly elevated (151-300 pg/ml)        On treatment: None  - Vitamin D; level: Normal        On supplement: Yes  - Calcium; level: Normal        On supplement: No  - Phosphorus; level: Normal        On supplement: No    # Electrolytes:   - Potassium; level: Normal        On binder: Yes, patiromer 8.4g daily   - Magnesium; level: Normal        On supplement: Yes, mag ox 400mg daily   - Bicarbonate; level: Normal        On supplement: Yes, bicarb 1950mg tid     # Hyperkalemia:   -Continue bactrim MWF, patiromer 8.4g daily, bumex 1mg daily     # Peritransplant fluid collections:    -IR guided fluid collection aspiration on 6/22/22. Small collections last seen on imaging 8/23/22   -No plan for biopsy or drainage given  improving SCr    # Medical Compliance: Yes    # COVID-19 Virus Review: Discussed COVID-19 virus and the potential medical risks.  Reviewed preventative health recommendations, including wearing a mask where appropriate.  Recommended COVID vaccination should be up to date with either an initial vaccination or booster shot when appropriate.  Asked the patient to inform the transplant center if they are exposed or diagnosed with this virus.    # COVID Vaccination Up To Date: No, due for next dose after testing today for COVID    # Transplant History:  Etiology of Kidney Failure: IgA nephropathy  Tx: LDKT  Transplant: 5/17/2022 (Kidney)  Donor Type: Living Donor Class:   Crossmatch at time of Tx: negative  DSA at time of Tx: No  Significant changes in immunosuppression: None  CMV IgG Ab High Risk Discordance (D+/R-): No  EBV IgG Ab High Risk Discordance (D+/R-): No  Significant transplant-related complications: DGF and Perinephric fluid collections    Transplant Office Phone Number: [phone]    Assessment and plan was discussed with the patient and he voiced his understanding and agreement.    Return visit: Return in 8 weeks (on 11/21/2022).    Yong Friedman MD    Chief Complaint   Mr. Blanco is a 57 year old here for routine follow up, kidney transplant and immunosuppression management.     History of Present Illness      The patient overall feels well. He went fishing last week for [place]. He denies any recent hospitalizations. He denies nausea, vomiting, diarrhea, fever, chills, shortness of breath, chest pain, LE edema, unintentional weight loss, nights sweats, dysuria, hematuria.     His wife was diagnosed with COVID but he feels well. He is isolating from her and plans to test today.      Home BP: Not checked    Problem List   Patient Active Problem List   Diagnosis     HTN, kidney transplant related     Gout     Hyperlipidemia     Hyperparathyroidism due to renal insufficiency (H)     IgA nephropathy      Obesity     Umbilical hernia     Prostate cancer (H)     Morbid obesity (H)     Kidney replaced by transplant     Immunosuppression (H)     Hyperkalemia     Perinephric fluid collection of kidney transplant     PETER (acute kidney injury) (H)     Abdominal fluid collection     Delayed graft function of kidney     Anemia     Hypervolemia     Anemia in other chronic diseases classified elsewhere       Allergies   No Known Allergies    Medications   Current Outpatient Medications   Medication Sig     aspirin (ASA) 325 MG tablet Take 1 tablet (325 mg) by mouth daily     atorvastatin (LIPITOR) 10 MG tablet Take 1 tablet (10 mg) by mouth every evening     B Complex-C-Folic Acid (VIRT-CAPS) 1 MG CAPS Take 1 mg by mouth daily     bumetanide (BUMEX) 1 MG tablet Take 1mg daily     magnesium oxide (MAG-OX) 400 MG tablet Take 1 tablet (400 mg) by mouth daily for 90 days     mycophenolate (GENERIC EQUIVALENT) 250 MG capsule Take 3 capsules (750 mg) by mouth 2 times daily     patiromer (VELTASSA) 8.4 g packet Take 8.4 g by mouth daily for 60 days     sodium bicarbonate 650 MG tablet Take 3 tablets (1,950 mg) by mouth 3 times daily     sulfamethoxazole-trimethoprim (BACTRIM) 400-80 MG tablet Take 1 tablet by mouth Every Mon, Wed, Fri Morning Increase to one tab by mouth daily when directed by transplant team     tacrolimus (GENERIC EQUIVALENT) 0.5 MG capsule Take 1 capsule (0.5 mg) by mouth 2 times daily for 90 days Total dose: 2.5mg twice daily     tacrolimus (GENERIC EQUIVALENT) 1 MG capsule Take 2 capsules (2 mg) by mouth 2 times daily for 90 days Total dose: 2.5mg twice daily     Vitamin D3 (CHOLECALCIFEROL) 25 mcg (1000 units) tablet Take 1 capsule by mouth every morning      No current facility-administered medications for this visit.     Medications Discontinued During This Encounter   Medication Reason     acetaminophen (TYLENOL) 325 MG tablet      valGANciclovir (VALCYTE) 450 MG tablet      magnesium oxide (MAG-OX) 400 MG  tablet Reorder     B Complex-C-Folic Acid (VIRT-CAPS) 1 MG CAPS Reorder     tacrolimus (GENERIC EQUIVALENT) 1 MG capsule Reorder     tacrolimus (GENERIC EQUIVALENT) 0.5 MG capsule Reorder       Physical Exam   Vital Signs: BP (!) 146/79 (BP Location: Right arm, Patient Position: Sitting, Cuff Size: Adult Large)   Pulse 72   Wt 111.4 kg (245 lb 9.6 oz)   SpO2 98%   BMI 36.27 kg/m      GENERAL APPEARANCE: alert and no distress  HENT: mouth without ulcers or lesions  LYMPHATICS: no cervical or supraclavicular nodes  RESP: lungs clear to auscultation - no rales, rhonchi or wheezes  CV: regular rhythm, normal rate, no rub, no murmur  EDEMA: no LE edema bilaterally  ABDOMEN: soft, nondistended, nontender, bowel sounds normal  MS: extremities normal - no gross deformities noted, no evidence of inflammation in joints, no muscle tenderness  SKIN: no rash  NEURO: normal strength and tone, sensory exam grossly normal, mentation intact and speech normal  PSYCH: mentation appears normal and affect normal/bright  TX KIDNEY: normal    Data     Renal Latest Ref Rng & Units 9/26/2022 9/16/2022 9/9/2022   Na 133 - 144 mmol/L 141 138 142   Na (external) 135 - 145 mmol/L - - -   K 3.4 - 5.3 mmol/L 4.9 5.0 5.8(H)   K (external) 3.5 - 5.0 mmol/L - - -   Cl 94 - 109 mmol/L 114(H) 111(H) 114(H)   CO2 20 - 32 mmol/L 23 20 23   CO2 (external) 21 - 31 mmol/L - - -   BUN 7 - 30 mg/dL 31(H) 35(H) 31(H)   BUN (external) 8 - 25 mg/dL - - -   Cr 0.66 - 1.25 mg/dL 1.79(H) 2.04(H) 2.14(H)   Cr (external) 0.57 - 1.11 mg/dL - - -   Glucose 70 - 99 mg/dL 114(H) 110(H) 107(H)   Glucose (external) 65 - 100 mg/dL - - -   Ca  8.5 - 10.1 mg/dL 10.0 9.7 10.0   Ca (external) 8.5 - 10.5 mg/dL - - -   Mg 1.6 - 2.3 mg/dL - - 1.8   Mg (external) 1.6 - 2.6 mg/dL - - -     Bone Health Latest Ref Rng & Units 9/26/2022 9/9/2022 8/22/2022   Phos 2.5 - 4.5 mg/dL - 1.5(L) 1.8(L)   Phos (external) 2.3 - 4.7 mg/dL - - -   PTHi 15 - 65 pg/mL 205(H) - -   Vit D Def 20 -  75 ug/L - - -     Heme Latest Ref Rng & Units 9/26/2022 9/16/2022 9/9/2022   WBC 4.0 - 11.0 10e3/uL 5.8 6.2 6.5   WBC (external) 4.5 - 11.0 thou/cu mm - - -   Hgb 13.3 - 17.7 g/dL 10.8(L) 10.7(L) 10.3(L)   Hgb (external) 12.0 - 16.0 g/dL - - -   Plt 150 - 450 10e3/uL 177 193 193   Plt (external) 140 - 440 thou/cu mm - - -   ABSOLUTE NEUTROPHIL 1.6 - 8.3 10e9/L - - -   ABSOLUTE LYMPHOCYTES 0.8 - 5.3 10e9/L - - -   ABSOLUTE MONOCYTES 0.0 - 1.3 10e9/L - - -   ABSOLUTE EOSINOPHILS 0.0 - 0.7 10e9/L - - -   ABSOLUTE BASOPHILS 0.0 - 0.2 10e9/L - - -   ABS IMMATURE GRANULOCYTES 0 - 0.4 10e9/L - - -   ABSOLUTE NUCLEATED RBC - - - -     Liver Latest Ref Rng & Units 6/3/2022 6/2/2022 5/22/2022   AP 40 - 150 U/L 83 - -   TBili 0.2 - 1.3 mg/dL 0.5 - -   DBili 0.0 - 0.2 mg/dL 0.2 - -   ALT 0 - 70 U/L 23 - -   AST 0 - 45 U/L 9 - -   Tot Protein 6.8 - 8.8 g/dL 6.6(L) - -   Albumin 3.4 - 5.0 g/dL 3.3(L) 3.2(L) 2.7(L)     Pancreas Latest Ref Rng & Units 6/3/2022 5/9/2022   A1C 0.0 - 5.6 % 5.1 5.0     Iron studies Latest Ref Rng & Units 9/16/2022 8/15/2022 8/8/2022   Iron 35 - 180 ug/dL - 49 35   Iron sat 15 - 46 % - 20 14(L)   Ferritin 26 - 388 ng/mL 602(H) 717(H) 722(H)     UMP Txp Virology Latest Ref Rng & Units 5/17/2022 5/9/2022 9/3/2020   CMV QUANT IU/ML Not Detected IU/mL Not Detected - -   EBV CAPSID ANTIBODY IGG No detectable antibody. - Positive(A) >8.0(H)   Hep B Core NR:Nonreactive - - Nonreactive        Recent Labs   Lab Test 09/09/22  0738 09/16/22  0707 09/26/22  0702   DOSTAC 9/8/2022 9/15/2022 9/25/2022   TACROL 8.7 9.7 7.4     Recent Labs   Lab Test 07/25/22  0656 07/29/22  0740 08/01/22  0706   DOSMPA  --  7/28/2022   8:00 PM 7/31/2022   8:00 PM   MPACID 2.66 6.07* 2.63   MPAG 171.4* 91.6 90.6

## 2022-09-27 NOTE — PATIENT INSTRUCTIONS
Patient Recommendations:  -If your COVID test is negative I recommend either Pfizer of Moderna booster.   -1 month after that you should get EVUSHELD. Please call us and we can help you to schedule it close to home.     Transplant Patient Information  Your Post Transplant Coordinator is: Alicia Valentin  You and your care team can contact your transplant coordinator Monday - Friday, 8am - 5pm at [phone] (Option 2 to reach the coordinator or Option 4 to schedule an appointment).  You can also reach your care team online via [employer].  After hours for urgent matters, please call [facility] at [phone].

## 2022-09-27 NOTE — LETTER
9/27/2022       RE: Donald Blanco  [address]   [place] [address]     Dear Colleague,    Thank you for referring your patient, Donald Blanco, to the [facility] [place] at [facility]. Please see a copy of my visit note below.    TRANSPLANT NEPHROLOGY EARLY POST TRANSPLANT VISIT    Assessment & Plan   # LDKT: Trend down, Scr 1.79 on 9/26/22 is huang thus far post transplant   - Baseline Creatinine: ~ TBD   - Proteinuria: Normal (<0.2 grams)   - Date DSA Last Checked: Jul/2022      Latest DSA: No   - BK Viremia: No   - Kidney Tx Biopsy: Jun 22, 2022; Result: ATI, negative for rejection              May 24, 2022; Result: negative for rejection   - Transplant Ureteral Stent: No    # Immunosuppression: Tacrolimus immediate release (goal 8-10) and Mycophenolate mofetil (dose 750 mg every 12 hours)   - Induction with Recent Transplant:  High Intensity due to DGF after initially receiving basiliximab   - Continue with intensive monitoring of immunosuppression for efficacy and toxicity.   - Changes: Not at this time    # Infection Prophylaxis:   - PJP: Sulfa/TMP (Bactrim) MWF  - CMV: None, prophylaxis completed    # Hypertension: Controlled;  Goal BP: < 130/80   - Volume status: Euvolemic    - Changes: Not at this time. Continue bumex 1mg daily     # Anemia in Chronic Renal Disease: Hgb: Trend up      YUDI: Yes, last dose darbe 8/23/22   - Iron studies: Replete    # Mineral Bone Disorder:   - Secondary renal hyperparathyroidism; PTH level: Mildly elevated (151-300 pg/ml)        On treatment: None  - Vitamin D; level: Normal        On supplement: Yes  - Calcium; level: Normal        On supplement: No  - Phosphorus; level: Normal        On supplement: No    # Electrolytes:   - Potassium; level: Normal        On binder: Yes, patiromer 8.4g daily   - Magnesium; level: Normal        On supplement: Yes, mag ox 400mg daily   - Bicarbonate; level:  Normal        On supplement: Yes, bicarb 1950mg tid     # Hyperkalemia:   -Continue bactrim MWF, patiromer 8.4g daily, bumex 1mg daily     # Peritransplant fluid collections:    -IR guided fluid collection aspiration on 6/22/22. Small collections last seen on imaging 8/23/22   -No plan for biopsy or drainage given improving SCr    # Medical Compliance: Yes    # COVID-19 Virus Review: Discussed COVID-19 virus and the potential medical risks.  Reviewed preventative health recommendations, including wearing a mask where appropriate.  Recommended COVID vaccination should be up to date with either an initial vaccination or booster shot when appropriate.  Asked the patient to inform the transplant center if they are exposed or diagnosed with this virus.    # COVID Vaccination Up To Date: No, due for next dose after testing today for COVID    # Transplant History:  Etiology of Kidney Failure: IgA nephropathy  Tx: LDKT  Transplant: 5/17/2022 (Kidney)  Donor Type: Living Donor Class:   Crossmatch at time of Tx: negative  DSA at time of Tx: No  Significant changes in immunosuppression: None  CMV IgG Ab High Risk Discordance (D+/R-): No  EBV IgG Ab High Risk Discordance (D+/R-): No  Significant transplant-related complications: DGF and Perinephric fluid collections    Transplant Office Phone Number: [phone]    Assessment and plan was discussed with the patient and he voiced his understanding and agreement.    Return visit: Return in 8 weeks (on 11/21/2022).    Yong Friedman MD    Chief Complaint   Mr. Blanco is a 57 year old here for routine follow up, kidney transplant and immunosuppression management.     History of Present Illness      The patient overall feels well. He went fishing last week for [employer]. He denies any recent hospitalizations. He denies nausea, vomiting, diarrhea, fever, chills, shortness of breath, chest pain, LE edema, unintentional weight loss, nights sweats, dysuria, hematuria.     His wife was  diagnosed with COVID but he feels well. He is isolating from her and plans to test today.      Home BP: Not checked    Problem List   Patient Active Problem List   Diagnosis     HTN, kidney transplant related     Gout     Hyperlipidemia     Hyperparathyroidism due to renal insufficiency (H)     IgA nephropathy     Obesity     Umbilical hernia     Prostate cancer (H)     Morbid obesity (H)     Kidney replaced by transplant     Immunosuppression (H)     Hyperkalemia     Perinephric fluid collection of kidney transplant     PETER (acute kidney injury) (H)     Abdominal fluid collection     Delayed graft function of kidney     Anemia     Hypervolemia     Anemia in other chronic diseases classified elsewhere       Allergies   No Known Allergies    Medications   Current Outpatient Medications   Medication Sig     aspirin (ASA) 325 MG tablet Take 1 tablet (325 mg) by mouth daily     atorvastatin (LIPITOR) 10 MG tablet Take 1 tablet (10 mg) by mouth every evening     B Complex-C-Folic Acid (VIRT-CAPS) 1 MG CAPS Take 1 mg by mouth daily     bumetanide (BUMEX) 1 MG tablet Take 1mg daily     magnesium oxide (MAG-OX) 400 MG tablet Take 1 tablet (400 mg) by mouth daily for 90 days     mycophenolate (GENERIC EQUIVALENT) 250 MG capsule Take 3 capsules (750 mg) by mouth 2 times daily     patiromer (VELTASSA) 8.4 g packet Take 8.4 g by mouth daily for 60 days     sodium bicarbonate 650 MG tablet Take 3 tablets (1,950 mg) by mouth 3 times daily     sulfamethoxazole-trimethoprim (BACTRIM) 400-80 MG tablet Take 1 tablet by mouth Every Mon, Wed, Fri Morning Increase to one tab by mouth daily when directed by transplant team     tacrolimus (GENERIC EQUIVALENT) 0.5 MG capsule Take 1 capsule (0.5 mg) by mouth 2 times daily for 90 days Total dose: 2.5mg twice daily     tacrolimus (GENERIC EQUIVALENT) 1 MG capsule Take 2 capsules (2 mg) by mouth 2 times daily for 90 days Total dose: 2.5mg twice daily     Vitamin D3 (CHOLECALCIFEROL) 25 mcg  (1000 units) tablet Take 1 capsule by mouth every morning      No current facility-administered medications for this visit.     Medications Discontinued During This Encounter   Medication Reason     acetaminophen (TYLENOL) 325 MG tablet      valGANciclovir (VALCYTE) 450 MG tablet      magnesium oxide (MAG-OX) 400 MG tablet Reorder     B Complex-C-Folic Acid (VIRT-CAPS) 1 MG CAPS Reorder     tacrolimus (GENERIC EQUIVALENT) 1 MG capsule Reorder     tacrolimus (GENERIC EQUIVALENT) 0.5 MG capsule Reorder       Physical Exam   Vital Signs: BP (!) 146/79 (BP Location: Right arm, Patient Position: Sitting, Cuff Size: Adult Large)   Pulse 72   Wt 111.4 kg (245 lb 9.6 oz)   SpO2 98%   BMI 36.27 kg/m      GENERAL APPEARANCE: alert and no distress  HENT: mouth without ulcers or lesions  LYMPHATICS: no cervical or supraclavicular nodes  RESP: lungs clear to auscultation - no rales, rhonchi or wheezes  CV: regular rhythm, normal rate, no rub, no murmur  EDEMA: no LE edema bilaterally  ABDOMEN: soft, nondistended, nontender, bowel sounds normal  MS: extremities normal - no gross deformities noted, no evidence of inflammation in joints, no muscle tenderness  SKIN: no rash  NEURO: normal strength and tone, sensory exam grossly normal, mentation intact and speech normal  PSYCH: mentation appears normal and affect normal/bright  TX KIDNEY: normal    Data     Renal Latest Ref Rng & Units 9/26/2022 9/16/2022 9/9/2022   Na 133 - 144 mmol/L 141 138 142   Na (external) 135 - 145 mmol/L - - -   K 3.4 - 5.3 mmol/L 4.9 5.0 5.8(H)   K (external) 3.5 - 5.0 mmol/L - - -   Cl 94 - 109 mmol/L 114(H) 111(H) 114(H)   CO2 20 - 32 mmol/L 23 20 23   CO2 (external) 21 - 31 mmol/L - - -   BUN 7 - 30 mg/dL 31(H) 35(H) 31(H)   BUN (external) 8 - 25 mg/dL - - -   Cr 0.66 - 1.25 mg/dL 1.79(H) 2.04(H) 2.14(H)   Cr (external) 0.57 - 1.11 mg/dL - - -   Glucose 70 - 99 mg/dL 114(H) 110(H) 107(H)   Glucose (external) 65 - 100 mg/dL - - -   Ca  8.5 - 10.1  mg/dL 10.0 9.7 10.0   Ca (external) 8.5 - 10.5 mg/dL - - -   Mg 1.6 - 2.3 mg/dL - - 1.8   Mg (external) 1.6 - 2.6 mg/dL - - -     Bone Health Latest Ref Rng & Units 9/26/2022 9/9/2022 8/22/2022   Phos 2.5 - 4.5 mg/dL - 1.5(L) 1.8(L)   Phos (external) 2.3 - 4.7 mg/dL - - -   PTHi 15 - 65 pg/mL 205(H) - -   Vit D Def 20 - 75 ug/L - - -     Heme Latest Ref Rng & Units 9/26/2022 9/16/2022 9/9/2022   WBC 4.0 - 11.0 10e3/uL 5.8 6.2 6.5   WBC (external) 4.5 - 11.0 thou/cu mm - - -   Hgb 13.3 - 17.7 g/dL 10.8(L) 10.7(L) 10.3(L)   Hgb (external) 12.0 - 16.0 g/dL - - -   Plt 150 - 450 10e3/uL 177 193 193   Plt (external) 140 - 440 thou/cu mm - - -   ABSOLUTE NEUTROPHIL 1.6 - 8.3 10e9/L - - -   ABSOLUTE LYMPHOCYTES 0.8 - 5.3 10e9/L - - -   ABSOLUTE MONOCYTES 0.0 - 1.3 10e9/L - - -   ABSOLUTE EOSINOPHILS 0.0 - 0.7 10e9/L - - -   ABSOLUTE BASOPHILS 0.0 - 0.2 10e9/L - - -   ABS IMMATURE GRANULOCYTES 0 - 0.4 10e9/L - - -   ABSOLUTE NUCLEATED RBC - - - -     Liver Latest Ref Rng & Units 6/3/2022 6/2/2022 5/22/2022   AP 40 - 150 U/L 83 - -   TBili 0.2 - 1.3 mg/dL 0.5 - -   DBili 0.0 - 0.2 mg/dL 0.2 - -   ALT 0 - 70 U/L 23 - -   AST 0 - 45 U/L 9 - -   Tot Protein 6.8 - 8.8 g/dL 6.6(L) - -   Albumin 3.4 - 5.0 g/dL 3.3(L) 3.2(L) 2.7(L)     Pancreas Latest Ref Rng & Units 6/3/2022 5/9/2022   A1C 0.0 - 5.6 % 5.1 5.0     Iron studies Latest Ref Rng & Units 9/16/2022 8/15/2022 8/8/2022   Iron 35 - 180 ug/dL - 49 35   Iron sat 15 - 46 % - 20 14(L)   Ferritin 26 - 388 ng/mL 602(H) 717(H) 722(H)     [facility] Txp Virology Latest Ref Rng & Units 5/17/2022 5/9/2022 9/3/2020   CMV QUANT IU/ML Not Detected IU/mL Not Detected - -   EBV CAPSID ANTIBODY IGG No detectable antibody. - Positive(A) >8.0(H)   Hep B Core NR:Nonreactive - - Nonreactive        Recent Labs   Lab Test 09/09/22  0738 09/16/22  0707 09/26/22  0702   DOSTAC 9/8/2022 9/15/2022 9/25/2022   TACROL 8.7 9.7 7.4     Recent Labs   Lab Test 07/25/22  0656 07/29/22  0740 08/01/22  0706    BILLY  --  7/28/2022   8:00 PM 7/31/2022   8:00 PM   MPACID 2.66 6.07* 2.63   MPAG 171.4* 91.6 90.6       Again, thank you for allowing me to participate in the care of your patient.      Sincerely,    Yong Friedman MD

## 2022-09-27 NOTE — PATIENT INSTRUCTIONS
"Recommendations from today's MTM visit:                                                       1. At 6 months post transplant recommend a Shingrix Vaccine (2 doses weeks apart).   2. Due for COVID booster.     Follow-up: as needed    It was great speaking with you today.  I value your experience and would be very thankful for your time in providing feedback in our clinic survey. In the next few days, you may receive an email or text message from [employer] with a link to a survey related to your  clinical pharmacist.\"     To schedule another MTM appointment, please call the clinic directly or you may call the MTM scheduling line at [phone] or toll-free at [phone].     My Clinical Pharmacist's contact information:                                                      Please feel free to contact me with any questions or concerns you have.      Alejandro Garces, PharmD  MTM Pharmacist    Phone: [phone]     "

## 2022-09-27 NOTE — NURSING NOTE
Chief Complaint   Patient presents with     RECHECK     S/p kidney tx     Blood pressure (!) 146/79, pulse 72, weight 111.4 kg (245 lb 9.6 oz), SpO2 98 %.    Anders Sears on 9/27/2022 at 9:39 AM

## 2022-09-28 LAB
DONOR IDENTIFICATION: NORMAL
DSA COMMENTS: NORMAL
DSA PRESENT: NO
DSA TEST METHOD: NORMAL
ORGAN: NORMAL
SA 1 CELL: NORMAL
SA 1 TEST METHOD: NORMAL
SA 2 CELL: NORMAL
SA 2 TEST METHOD: NORMAL
SA1 HI RISK ABY: NORMAL
SA1 MOD RISK ABY: NORMAL
SA2 HI RISK ABY: NORMAL
SA2 MOD RISK ABY: NORMAL
UNACCEPTABLE ANTIGENS: NORMAL
UNOS CPRA: 23
ZZZSA 1  COMMENTS: NORMAL
ZZZSA 2 COMMENTS: NORMAL

## 2022-10-10 ENCOUNTER — DOCUMENTATION ONLY (OUTPATIENT)
Dept: PHARMACY | Facility: CLINIC | Age: 57
End: 2022-10-10

## 2022-10-10 ENCOUNTER — LAB (OUTPATIENT)
Dept: LAB | Facility: CLINIC | Age: 57
End: 2022-10-10
Payer: COMMERCIAL

## 2022-10-10 DIAGNOSIS — Z94.0 KIDNEY REPLACED BY TRANSPLANT: ICD-10-CM

## 2022-10-10 DIAGNOSIS — N18.6 ESRD (END STAGE RENAL DISEASE) (H): ICD-10-CM

## 2022-10-10 DIAGNOSIS — D63.8 ANEMIA IN OTHER CHRONIC DISEASES CLASSIFIED ELSEWHERE: ICD-10-CM

## 2022-10-10 DIAGNOSIS — Z79.899 ENCOUNTER FOR LONG-TERM CURRENT USE OF MEDICATION: ICD-10-CM

## 2022-10-10 DIAGNOSIS — Z76.82 ORGAN TRANSPLANT CANDIDATE: ICD-10-CM

## 2022-10-10 DIAGNOSIS — Z48.298 AFTERCARE FOLLOWING ORGAN TRANSPLANT: ICD-10-CM

## 2022-10-10 LAB
ANION GAP SERPL CALCULATED.3IONS-SCNC: 7 MMOL/L (ref 3–14)
BUN SERPL-MCNC: 41 MG/DL (ref 7–30)
CALCIUM SERPL-MCNC: 9.9 MG/DL (ref 8.5–10.1)
CHLORIDE BLD-SCNC: 112 MMOL/L (ref 94–109)
CO2 SERPL-SCNC: 21 MMOL/L (ref 20–32)
CREAT SERPL-MCNC: 2.05 MG/DL (ref 0.66–1.25)
ERYTHROCYTE [DISTWIDTH] IN BLOOD BY AUTOMATED COUNT: 12.7 % (ref 10–15)
FERRITIN SERPL-MCNC: 542 NG/ML (ref 26–388)
GFR SERPL CREATININE-BSD FRML MDRD: 37 ML/MIN/1.73M2
GLUCOSE BLD-MCNC: 116 MG/DL (ref 70–99)
HCT VFR BLD AUTO: 37.8 % (ref 40–53)
HGB BLD-MCNC: 11.8 G/DL (ref 13.3–17.7)
IRON SATN MFR SERPL: 31 % (ref 15–46)
IRON SERPL-MCNC: 102 UG/DL (ref 35–180)
MAGNESIUM SERPL-MCNC: 1.8 MG/DL (ref 1.6–2.3)
MCH RBC QN AUTO: 30.2 PG (ref 26.5–33)
MCHC RBC AUTO-ENTMCNC: 31.2 G/DL (ref 31.5–36.5)
MCV RBC AUTO: 97 FL (ref 78–100)
PHOSPHATE SERPL-MCNC: 1.8 MG/DL (ref 2.5–4.5)
PLATELET # BLD AUTO: 178 10E3/UL (ref 150–450)
POTASSIUM BLD-SCNC: 4.8 MMOL/L (ref 3.4–5.3)
RBC # BLD AUTO: 3.91 10E6/UL (ref 4.4–5.9)
SODIUM SERPL-SCNC: 140 MMOL/L (ref 133–144)
TACROLIMUS BLD-MCNC: 8.7 UG/L (ref 5–15)
TIBC SERPL-MCNC: 328 UG/DL (ref 240–430)
TME LAST DOSE: NORMAL H
TME LAST DOSE: NORMAL H
WBC # BLD AUTO: 5 10E3/UL (ref 4–11)

## 2022-10-10 PROCEDURE — 83735 ASSAY OF MAGNESIUM: CPT

## 2022-10-10 PROCEDURE — 83550 IRON BINDING TEST: CPT

## 2022-10-10 PROCEDURE — 84100 ASSAY OF PHOSPHORUS: CPT

## 2022-10-10 PROCEDURE — 85027 COMPLETE CBC AUTOMATED: CPT

## 2022-10-10 PROCEDURE — 87799 DETECT AGENT NOS DNA QUANT: CPT

## 2022-10-10 PROCEDURE — 80197 ASSAY OF TACROLIMUS: CPT

## 2022-10-10 PROCEDURE — 86832 HLA CLASS I HIGH DEFIN QUAL: CPT

## 2022-10-10 PROCEDURE — 82728 ASSAY OF FERRITIN: CPT

## 2022-10-10 PROCEDURE — 86833 HLA CLASS II HIGH DEFIN QUAL: CPT

## 2022-10-10 PROCEDURE — 80048 BASIC METABOLIC PNL TOTAL CA: CPT

## 2022-10-10 PROCEDURE — 36415 COLL VENOUS BLD VENIPUNCTURE: CPT

## 2022-10-10 NOTE — PROGRESS NOTES
Anemia Management Note  SUBJECTIVE/OBJECTIVE:  Referred by Dr. Yong Friedman on 2022  Primary Diagnosis: Anemia of Other Chronic Illness (D63.8)     Secondary Diagnosis:  Organ or tissue replaced by transplant, kidney (Z94.0)  Kidney Tx: 2022  Hgb goal range:  9-10  Epo/Darbo: Aranesp 40mcg every 14 days for Hgb <10. In Clinic.  *dosed at 0.45mcg/kg  Iron regimen:  NA  Labs : 2023  Recent YUDI use, transfusion, IV iron: NA  RX/TX plans : TBD     No history of stroke, MI and blood clots. Hx of Prostate Cancer. Had Prostectomy & radiation Dec 2020.      Contact: OK to speak with Sana Blanco (wife) regarding Scheduling, Medical, and Billing Inforamtion per consent to communicate scanned on 2020.    Anemia Latest Ref Rng & Units 2022 2022 2022 2022 2022 2022 10/10/2022   YUDI Dose - 40mcg - - - - - -   Hemoglobin 13.3 - 17.7 g/dL - 10.4(L) 9.5(L) 10.3(L) 10.7(L) 10.8(L) 11.8(L)   TSAT 15 - 46 % - - - - - - -   Ferritin 26 - 388 ng/mL - - - - 602(H) - -     BP Readings from Last 3 Encounters:   22 (!) 146/79   22 (!) 142/75   22 136/78     Wt Readings from Last 2 Encounters:   22 245 lb 9.6 oz (111.4 kg)   22 235 lb (106.6 kg)           ASSESSMENT:  Hgb:Above goal - recommend hold dose  TSat: pending Ferritin: Pending    PLAN:  Hold Aranesp and RTC for hgb then aranesp if needed in 2 week(s). Iron labs are pending.     Orders needed to be renewed (for next follow-up date) in EPIC: None    Iron labs due:  pending    Plan discussed with:  No call, chart review       NEXT FOLLOW-UP DATE:  10/24/22 7:15a labs     Camille Tsang RN   [facility]  [address] [address]   [email or web address]   Office : [phone]  Fax: [phone]

## 2022-10-11 LAB — BKV DNA # SPEC NAA+PROBE: NOT DETECTED COPIES/ML

## 2022-10-12 ENCOUNTER — TELEPHONE (OUTPATIENT)
Dept: TRANSPLANT | Facility: CLINIC | Age: 57
End: 2022-10-12

## 2022-10-12 NOTE — TELEPHONE ENCOUNTER
Post Kidney and Pancreas Transplant Team Conference  Date: 10/12/2022  Transplant Coordinator: Alicia Valentin     Attendees:  [x]  Dr. Cr [x] Alicia Valentin, RN [x] Hanny Simmons LPN     []  Dr. Capellan [x] Tammy Hughes, URIAH [] Ester Bragg LPN   [x]  Dr. Duff [x] Phoebe Howard, URIAH    [x]  Dr. Friedman [] Jazzmine Arzate RN [x] Alejandro Garces, PharmD   [] Dr. Mckenna [] Shruthi Reyes, URIAH    [] Dr. Flores [] Campos Falcon RN    [] Dr. Frank [] Karina Matrinez RN [] Aurora Hollingsworth RN   [] Dr. Yu [] Sara Pablo, URIAH    []  Dr. Cohen [] Carolyn Vasquez RN    [] Dr. Rust [x] Xin Venegas RN    [] Katie Sanders, NP [] Rossy Solorio RN        Verbal Plan Read Back:   Continue current treatment plan    Routed to RN Coordinator   Hanny Simmons LPN

## 2022-10-18 DIAGNOSIS — Z94.0 KIDNEY REPLACED BY TRANSPLANT: ICD-10-CM

## 2022-10-18 RX ORDER — ATORVASTATIN CALCIUM 10 MG/1
10 TABLET, FILM COATED ORAL EVERY EVENING
Qty: 30 TABLET | Refills: 3 | OUTPATIENT
Start: 2022-10-18

## 2022-10-20 DIAGNOSIS — Z94.0 KIDNEY REPLACED BY TRANSPLANT: ICD-10-CM

## 2022-10-20 RX ORDER — ATORVASTATIN CALCIUM 10 MG/1
10 TABLET, FILM COATED ORAL EVERY EVENING
Qty: 30 TABLET | Refills: 3 | Status: SHIPPED | OUTPATIENT
Start: 2022-10-20 | End: 2023-02-21

## 2022-10-21 DIAGNOSIS — Z48.298 AFTERCARE FOLLOWING ORGAN TRANSPLANT: Primary | ICD-10-CM

## 2022-10-21 RX ORDER — PATIROMER 8.4 G/1
8.4 POWDER, FOR SUSPENSION ORAL DAILY
Qty: 30 PACKET | Refills: 3 | Status: SHIPPED | OUTPATIENT
Start: 2022-10-21 | End: 2023-02-02

## 2022-10-24 ENCOUNTER — LAB (OUTPATIENT)
Dept: LAB | Facility: CLINIC | Age: 57
End: 2022-10-24
Payer: COMMERCIAL

## 2022-10-24 ENCOUNTER — DOCUMENTATION ONLY (OUTPATIENT)
Dept: PHARMACY | Facility: CLINIC | Age: 57
End: 2022-10-24

## 2022-10-24 DIAGNOSIS — D63.8 ANEMIA IN OTHER CHRONIC DISEASES CLASSIFIED ELSEWHERE: ICD-10-CM

## 2022-10-24 DIAGNOSIS — Z94.0 KIDNEY REPLACED BY TRANSPLANT: ICD-10-CM

## 2022-10-24 DIAGNOSIS — Z79.899 ENCOUNTER FOR LONG-TERM CURRENT USE OF MEDICATION: ICD-10-CM

## 2022-10-24 DIAGNOSIS — Z48.298 AFTERCARE FOLLOWING ORGAN TRANSPLANT: ICD-10-CM

## 2022-10-24 LAB
ANION GAP SERPL CALCULATED.3IONS-SCNC: 5 MMOL/L (ref 3–14)
BUN SERPL-MCNC: 38 MG/DL (ref 7–30)
CALCIUM SERPL-MCNC: 10.1 MG/DL (ref 8.5–10.1)
CHLORIDE BLD-SCNC: 113 MMOL/L (ref 94–109)
CO2 SERPL-SCNC: 24 MMOL/L (ref 20–32)
CREAT SERPL-MCNC: 2.2 MG/DL (ref 0.66–1.25)
ERYTHROCYTE [DISTWIDTH] IN BLOOD BY AUTOMATED COUNT: 12.8 % (ref 10–15)
GFR SERPL CREATININE-BSD FRML MDRD: 34 ML/MIN/1.73M2
GLUCOSE BLD-MCNC: 114 MG/DL (ref 70–99)
HCT VFR BLD AUTO: 40.6 % (ref 40–53)
HGB BLD-MCNC: 12.8 G/DL (ref 13.3–17.7)
MCH RBC QN AUTO: 30.3 PG (ref 26.5–33)
MCHC RBC AUTO-ENTMCNC: 31.5 G/DL (ref 31.5–36.5)
MCV RBC AUTO: 96 FL (ref 78–100)
PLATELET # BLD AUTO: 166 10E3/UL (ref 150–450)
POTASSIUM BLD-SCNC: 5.1 MMOL/L (ref 3.4–5.3)
RBC # BLD AUTO: 4.22 10E6/UL (ref 4.4–5.9)
SODIUM SERPL-SCNC: 142 MMOL/L (ref 133–144)
TACROLIMUS BLD-MCNC: 8.1 UG/L (ref 5–15)
TME LAST DOSE: NORMAL H
TME LAST DOSE: NORMAL H
WBC # BLD AUTO: 5.3 10E3/UL (ref 4–11)

## 2022-10-24 PROCEDURE — 85027 COMPLETE CBC AUTOMATED: CPT

## 2022-10-24 PROCEDURE — 87799 DETECT AGENT NOS DNA QUANT: CPT

## 2022-10-24 PROCEDURE — 36415 COLL VENOUS BLD VENIPUNCTURE: CPT

## 2022-10-24 PROCEDURE — 80048 BASIC METABOLIC PNL TOTAL CA: CPT

## 2022-10-24 PROCEDURE — 80197 ASSAY OF TACROLIMUS: CPT

## 2022-10-24 NOTE — PROGRESS NOTES
Anemia Management Note  SUBJECTIVE/OBJECTIVE:  Referred by Dr. Yong Friedman on 2022  Primary Diagnosis: Anemia of Other Chronic Illness (D63.8)     Secondary Diagnosis:  Organ or tissue replaced by transplant, kidney (Z94.0)  Kidney Tx: 2022  Hgb goal range:  9-10  Epo/Darbo: Aranesp 40mcg every 14 days for Hgb <10. In Clinic.  *dosed at 0.45mcg/kg  Iron regimen:  NA  Labs : 2023  Recent YUDI use, transfusion, IV iron: NA  RX/TX plans : TBD     No history of stroke, MI and blood clots. Hx of Prostate Cancer. Had Prostectomy & radiation Dec 2020.      Contact: OK to speak with Sana Blanco (wife) regarding Scheduling, Medical, and Billing Inforamtion per consent to communicate scanned on 2020.    Anemia Latest Ref Rng & Units 2022 2022 2022 2022 2022 10/10/2022 10/24/2022   YUDI Dose - - - - - - - -   Hemoglobin 13.3 - 17.7 g/dL 10.4(L) 9.5(L) 10.3(L) 10.7(L) 10.8(L) 11.8(L) 12.8(L)   TSAT 15 - 46 % - - - - - 31 -   Ferritin 26 - 388 ng/mL - - - 602(H) - 542(H) -     BP Readings from Last 3 Encounters:   22 (!) 146/79   22 (!) 142/75   22 136/78     Wt Readings from Last 2 Encounters:   22 245 lb 9.6 oz (111.4 kg)   22 235 lb (106.6 kg)           ASSESSMENT:  Hgb:Above goal - recommend hold dose  TSat: at goal >30% Ferritin: At goal (>100ng/mL)    PLAN:  Hold Aranesp and RTC for hgb then aranesp if needed in 2-4 week(s). If labs are stable the end of Nov close Anemia Services.     Orders needed to be renewed (for next follow-up date) in EPIC: None    Iron labs due:  2022    Plan discussed with:  No call, chart review      NEXT FOLLOW-UP DATE:  22     Camille Tsang RN   Anemia Services  [address] [address]   [email or web address]   Office : [phone]  Fax: [phone]

## 2022-10-25 LAB — BKV DNA # SPEC NAA+PROBE: NOT DETECTED COPIES/ML

## 2022-10-31 ENCOUNTER — INFUSION THERAPY VISIT (OUTPATIENT)
Dept: NURSING | Facility: CLINIC | Age: 57
End: 2022-10-31
Payer: COMMERCIAL

## 2022-10-31 DIAGNOSIS — Z29.89 PROPHYLACTIC IMMUNOTHERAPY: Primary | ICD-10-CM

## 2022-10-31 PROCEDURE — M0220 PR INJECTION TIXAGEVIMAB & CILGAVIMAB (EVUSHELD): HCPCS | Performed by: FAMILY MEDICINE

## 2022-10-31 NOTE — PROGRESS NOTES
EVUSHELD Administration Note:  Donald Blanco presents today for EVUSHELD.   present during visit today: Not Applicable.    Consent:   Evusheld Consent   Confirmed patient received the Emergency Use Authorization Fact Sheet for Patients, Parents and Caregivers prior to receiving medication. We discussed the following risks and benefits of receiving EVUSHELD, as well as alternative treatments and the patient wished to proceed with EVUSHELD.     Providers believe the benefits of Evusheld outweigh the risks for all moderately to severely immunocompromised patients.      Benefits:   Evusheld is a synthetic antibody that provides protection against COVID-19 for 6 months and is recommended for patients that have a weakened immune system that may not be able to make antibodies themselves.     Risks:    There is a very small risk of allergic reactions and you should notify us if you have any symptoms of an allergic reaction after the injection. There were also some extremely rare cardiac events reported in the initial trials in people that already had heart disease, but experts do not think these were caused by the medication. We will observe you for any chest pain or trouble breathing after the injections and you can reach out to your provider if any of these symptoms develop.     Alternatives:   Vaccines to prevent COVID-19 are approved or available under Emergency Use Authorization. Use of EVUSHELD does not replace vaccination against COVID-19. You can continue to mask and isolate to avoid infections. It is your choice to receive or not receive EVUSHELD. Should you decide not to receive EVUSHELD, it will not change your standard medical care.     Patient does consent to the injection.        Post Injection Assessment:   Patient tolerated injection without incident.  Patient observed for 60 minutes post injection per protocol.      Patient was observed in the room for a minimum of 10 minutes after injection  per standard, then remained in the buidling for a total 60 minute observation period.         Discharge Plan:    Patient and/or family verbalized understanding of discharge instructions and all questions answered.  Patient discharged in stable condition accompanied by: alex Lewis RN BSN  [facility]

## 2022-11-07 ENCOUNTER — LAB (OUTPATIENT)
Dept: LAB | Facility: CLINIC | Age: 57
End: 2022-11-07
Payer: COMMERCIAL

## 2022-11-07 DIAGNOSIS — Z48.298 AFTERCARE FOLLOWING ORGAN TRANSPLANT: ICD-10-CM

## 2022-11-07 DIAGNOSIS — D63.8 ANEMIA IN OTHER CHRONIC DISEASES CLASSIFIED ELSEWHERE: ICD-10-CM

## 2022-11-07 DIAGNOSIS — Z94.0 KIDNEY REPLACED BY TRANSPLANT: ICD-10-CM

## 2022-11-07 DIAGNOSIS — Z79.899 ENCOUNTER FOR LONG-TERM CURRENT USE OF MEDICATION: ICD-10-CM

## 2022-11-07 LAB
ANION GAP SERPL CALCULATED.3IONS-SCNC: 5 MMOL/L (ref 3–14)
BUN SERPL-MCNC: 33 MG/DL (ref 7–30)
CALCIUM SERPL-MCNC: 9.9 MG/DL (ref 8.5–10.1)
CHLORIDE BLD-SCNC: 112 MMOL/L (ref 94–109)
CO2 SERPL-SCNC: 25 MMOL/L (ref 20–32)
CREAT SERPL-MCNC: 2.24 MG/DL (ref 0.66–1.25)
ERYTHROCYTE [DISTWIDTH] IN BLOOD BY AUTOMATED COUNT: 12.8 % (ref 10–15)
FERRITIN SERPL-MCNC: 494 NG/ML (ref 26–388)
GFR SERPL CREATININE-BSD FRML MDRD: 33 ML/MIN/1.73M2
GLUCOSE BLD-MCNC: 131 MG/DL (ref 70–99)
HCT VFR BLD AUTO: 41.9 % (ref 40–53)
HGB BLD-MCNC: 13.3 G/DL (ref 13.3–17.7)
IRON SATN MFR SERPL: 26 % (ref 15–46)
IRON SERPL-MCNC: 81 UG/DL (ref 35–180)
MCH RBC QN AUTO: 30.6 PG (ref 26.5–33)
MCHC RBC AUTO-ENTMCNC: 31.7 G/DL (ref 31.5–36.5)
MCV RBC AUTO: 96 FL (ref 78–100)
PLATELET # BLD AUTO: 170 10E3/UL (ref 150–450)
POTASSIUM BLD-SCNC: 5 MMOL/L (ref 3.4–5.3)
RBC # BLD AUTO: 4.35 10E6/UL (ref 4.4–5.9)
SODIUM SERPL-SCNC: 142 MMOL/L (ref 133–144)
TACROLIMUS BLD-MCNC: 10 UG/L (ref 5–15)
TIBC SERPL-MCNC: 316 UG/DL (ref 240–430)
TME LAST DOSE: NORMAL H
TME LAST DOSE: NORMAL H
WBC # BLD AUTO: 5.5 10E3/UL (ref 4–11)

## 2022-11-07 PROCEDURE — 82728 ASSAY OF FERRITIN: CPT

## 2022-11-07 PROCEDURE — 83540 ASSAY OF IRON: CPT

## 2022-11-07 PROCEDURE — 80048 BASIC METABOLIC PNL TOTAL CA: CPT

## 2022-11-07 PROCEDURE — 36415 COLL VENOUS BLD VENIPUNCTURE: CPT

## 2022-11-07 PROCEDURE — 85027 COMPLETE CBC AUTOMATED: CPT

## 2022-11-07 PROCEDURE — 80197 ASSAY OF TACROLIMUS: CPT

## 2022-11-07 PROCEDURE — 83550 IRON BINDING TEST: CPT

## 2022-11-20 ENCOUNTER — TELEPHONE (OUTPATIENT)
Dept: PHARMACY | Facility: CLINIC | Age: 57
End: 2022-11-20

## 2022-11-20 NOTE — TELEPHONE ENCOUNTER
Referred by Dr. Yong Friedman on 06/29/2022    Hgb has been stable since 08/29/22 with no intervention. Last Aranesp dose was given on 8/23/2022.    Patient meets criteria for discharge from Anemia Clinic with at least 12 weeks without iron or YUDI therapy.    Anemia Latest Ref Rng & Units 9/6/2022 9/9/2022 9/16/2022 9/26/2022 10/10/2022 10/24/2022 11/7/2022   YUDI Dose - - - - - - - -   Hemoglobin 13.3 - 17.7 g/dL 9.5(L) 10.3(L) 10.7(L) 10.8(L) 11.8(L) 12.8(L) 13.3   TSAT 15 - 46 % - - - - 31 - 26   Ferritin 26 - 388 ng/mL - - 602(H) - 542(H) - 494(H)       Anemia labs discontinued, therapy plans cancelled, removed from active patient list, and referring provider notified.    Camille Tsang RN   Anemia Services  [address] [address]   [email or web address]   Office : [phone]  Fax: [phone]

## 2022-11-21 ENCOUNTER — LAB (OUTPATIENT)
Dept: LAB | Facility: CLINIC | Age: 57
End: 2022-11-21
Payer: COMMERCIAL

## 2022-11-21 ENCOUNTER — OFFICE VISIT (OUTPATIENT)
Dept: NEPHROLOGY | Facility: CLINIC | Age: 57
End: 2022-11-21
Attending: INTERNAL MEDICINE
Payer: COMMERCIAL

## 2022-11-21 VITALS
BODY MASS INDEX: 36.34 KG/M2 | OXYGEN SATURATION: 96 % | DIASTOLIC BLOOD PRESSURE: 78 MMHG | SYSTOLIC BLOOD PRESSURE: 116 MMHG | TEMPERATURE: 98 F | HEART RATE: 78 BPM | WEIGHT: 246.1 LBS

## 2022-11-21 DIAGNOSIS — E83.42 HYPOMAGNESEMIA: ICD-10-CM

## 2022-11-21 DIAGNOSIS — Z79.899 ENCOUNTER FOR LONG-TERM CURRENT USE OF MEDICATION: ICD-10-CM

## 2022-11-21 DIAGNOSIS — Z48.298 AFTERCARE FOLLOWING ORGAN TRANSPLANT: ICD-10-CM

## 2022-11-21 DIAGNOSIS — Z94.0 KIDNEY REPLACED BY TRANSPLANT: ICD-10-CM

## 2022-11-21 DIAGNOSIS — E87.5 HYPERKALEMIA: ICD-10-CM

## 2022-11-21 DIAGNOSIS — I15.1 HTN, KIDNEY TRANSPLANT RELATED: ICD-10-CM

## 2022-11-21 DIAGNOSIS — E55.9 VITAMIN D DEFICIENCY: ICD-10-CM

## 2022-11-21 DIAGNOSIS — E87.20 METABOLIC ACIDOSIS: ICD-10-CM

## 2022-11-21 DIAGNOSIS — N18.32 STAGE 3B CHRONIC KIDNEY DISEASE (H): ICD-10-CM

## 2022-11-21 DIAGNOSIS — Z94.0 HTN, KIDNEY TRANSPLANT RELATED: ICD-10-CM

## 2022-11-21 DIAGNOSIS — D84.9 IMMUNOSUPPRESSION (H): ICD-10-CM

## 2022-11-21 DIAGNOSIS — E66.01 MORBID OBESITY (H): Primary | ICD-10-CM

## 2022-11-21 DIAGNOSIS — Z29.89 NEED FOR PNEUMOCYSTIS PROPHYLAXIS: ICD-10-CM

## 2022-11-21 DIAGNOSIS — Z94.0 KIDNEY REPLACED BY TRANSPLANT: Chronic | ICD-10-CM

## 2022-11-21 LAB
ALBUMIN MFR UR ELPH: 20.1 MG/DL
ALBUMIN SERPL-MCNC: 4 G/DL (ref 3.4–5)
ALP SERPL-CCNC: 105 U/L (ref 40–150)
ALT SERPL W P-5'-P-CCNC: 26 U/L (ref 0–70)
ANION GAP SERPL CALCULATED.3IONS-SCNC: 5 MMOL/L (ref 3–14)
AST SERPL W P-5'-P-CCNC: 16 U/L (ref 0–45)
BILIRUB DIRECT SERPL-MCNC: 0.1 MG/DL (ref 0–0.2)
BILIRUB SERPL-MCNC: 0.6 MG/DL (ref 0.2–1.3)
BUN SERPL-MCNC: 43 MG/DL (ref 7–30)
CALCIUM SERPL-MCNC: 10.1 MG/DL (ref 8.5–10.1)
CHLORIDE BLD-SCNC: 114 MMOL/L (ref 94–109)
CHOLEST SERPL-MCNC: 142 MG/DL
CO2 SERPL-SCNC: 24 MMOL/L (ref 20–32)
CREAT SERPL-MCNC: 2.1 MG/DL (ref 0.66–1.25)
CREAT UR-MCNC: 180 MG/DL
ERYTHROCYTE [DISTWIDTH] IN BLOOD BY AUTOMATED COUNT: 12.7 % (ref 10–15)
FASTING STATUS PATIENT QL REPORTED: YES
GFR SERPL CREATININE-BSD FRML MDRD: 36 ML/MIN/1.73M2
GLUCOSE BLD-MCNC: 124 MG/DL (ref 70–99)
HBA1C MFR BLD: 5.6 % (ref 0–5.6)
HCT VFR BLD AUTO: 43.3 % (ref 40–53)
HDLC SERPL-MCNC: 39 MG/DL
HGB BLD-MCNC: 13.7 G/DL (ref 13.3–17.7)
LDLC SERPL CALC-MCNC: 65 MG/DL
MCH RBC QN AUTO: 30 PG (ref 26.5–33)
MCHC RBC AUTO-ENTMCNC: 31.6 G/DL (ref 31.5–36.5)
MCV RBC AUTO: 95 FL (ref 78–100)
NONHDLC SERPL-MCNC: 103 MG/DL
PLATELET # BLD AUTO: 169 10E3/UL (ref 150–450)
POTASSIUM BLD-SCNC: 5.2 MMOL/L (ref 3.4–5.3)
PROT SERPL-MCNC: 7.2 G/DL (ref 6.8–8.8)
PROT/CREAT 24H UR: 0.11 MG/MG CR (ref 0–0.2)
RBC # BLD AUTO: 4.57 10E6/UL (ref 4.4–5.9)
SODIUM SERPL-SCNC: 143 MMOL/L (ref 133–144)
TACROLIMUS BLD-MCNC: 10 UG/L (ref 5–15)
TME LAST DOSE: NORMAL H
TME LAST DOSE: NORMAL H
TRIGL SERPL-MCNC: 188 MG/DL
URATE SERPL-MCNC: 8.8 MG/DL (ref 3.5–7.2)
WBC # BLD AUTO: 5.7 10E3/UL (ref 4–11)

## 2022-11-21 PROCEDURE — 85027 COMPLETE CBC AUTOMATED: CPT

## 2022-11-21 PROCEDURE — G0463 HOSPITAL OUTPT CLINIC VISIT: HCPCS

## 2022-11-21 PROCEDURE — 36415 COLL VENOUS BLD VENIPUNCTURE: CPT

## 2022-11-21 PROCEDURE — 83036 HEMOGLOBIN GLYCOSYLATED A1C: CPT

## 2022-11-21 PROCEDURE — 80061 LIPID PANEL: CPT

## 2022-11-21 PROCEDURE — 84550 ASSAY OF BLOOD/URIC ACID: CPT

## 2022-11-21 PROCEDURE — 99214 OFFICE O/P EST MOD 30 MIN: CPT | Performed by: INTERNAL MEDICINE

## 2022-11-21 PROCEDURE — 84156 ASSAY OF PROTEIN URINE: CPT

## 2022-11-21 PROCEDURE — 80197 ASSAY OF TACROLIMUS: CPT

## 2022-11-21 PROCEDURE — 82248 BILIRUBIN DIRECT: CPT

## 2022-11-21 PROCEDURE — 80053 COMPREHEN METABOLIC PANEL: CPT

## 2022-11-21 ASSESSMENT — PAIN SCALES - GENERAL: PAINLEVEL: NO PAIN (0)

## 2022-11-21 NOTE — LETTER
11/21/2022      RE: Donald Blanco  [address] [facility]  [place] [address]       TRANSPLANT NEPHROLOGY EARLY POST TRANSPLANT VISIT    Assessment & Plan   # LDKT: Stable kidney function with baseline Cr ~ 2.0-2.3 for the last few months.  This living donor was part of paired exchange with early DGF and some fluid collections concerning for compression on the kidney allograft.  Early kidney transplant biopsy showed no acute rejection, but just acute tubular injury.  Repeat biopsy 6/22/22 again showed no acute rejection, but acute tubular injury.   - Baseline Creatinine: ~ 2.0-2.3   - Proteinuria: Not checked post transplant   - Date DSA Last Checked: Oct/2022      Latest DSA: No   - BK Viremia: No   - Kidney Tx Biopsy: Jun 22, 2022; Result: No diagnostic evidence of acute rejection.  Acute tubular injury.  Mild interstitial fibrosis and tubular atrophy.             May 24, 2022; Result: No diagnostic evidence of acute rejection.  Acute tubular injury.   - Transplant Ureteral Stent: No    # Immunosuppression: Tacrolimus immediate release (goal 6-8) and Mycophenolate mofetil (dose 750 mg every 12 hours)   - Induction with Recent Transplant:  High Intensity   - Continue with intensive monitoring of immunosuppression for efficacy and toxicity.   - Changes: No    # Infection Prophylaxis:   - PJP: Sulfa/TMP (Bactrim)  - CMV: None    # Hypertension: Controlled;  Goal BP: < 130/80   - Volume status: Euvolemic   - Changes: Not at this time; Recommend patient check blood pressure at home on a regular basis, such as once every week or two, and follow up with PCP if above goal.    # Elevated Blood Glucose: Glucose generally running ~ 110-130s Last HbA1c: 5.6%   - Management as per primary care.    # Anemia in Chronic Renal Disease: Hgb: Trend up, now normal      YUDI: No   - Iron studies: Low iron saturation, but high ferritin    # Mineral Bone Disorder:   - Secondary renal hyperparathyroidism; PTH level: Mildly elevated (151-300  pg/ml)        On treatment: None  - Vitamin D; level: Normal        On supplement: Yes  - Calcium; level: High normal        On supplement: No    # Electrolytes:   - Potassium; level: High normal        On binder: Yes with prn use of patiromer  - Magnesium; level: Normal        On supplement: Yes  - Bicarbonate; level: Normal        On supplement: Yes    # Obesity, Class II (BMI = 36.3): Patient is up ~ 20 lbs since transplant.   - Recommend weight loss for overall health by increasing exercise and watching caloric intake.    # Medical Compliance: Yes    # COVID-19 Virus Review: Discussed COVID-19 virus and the potential medical risks.  Reviewed preventative health recommendations, including wearing a mask where appropriate.  Recommended COVID vaccination should be up to date with either an initial vaccination or booster shot when appropriate.  Asked the patient to inform the transplant center if they are exposed or diagnosed with this virus.    # COVID Vaccination Up To Date: Yes    # Transplant History:  Etiology of Kidney Failure: IgA nephropathy  Tx: LDKT  Transplant: 5/17/2022 (Kidney)  Donor Type: Living Donor Class:   Crossmatch at time of Tx: negative  DSA at time of Tx: No  Significant changes in immunosuppression: None  CMV IgG Ab High Risk Discordance (D+/R-): No  EBV IgG Ab High Risk Discordance (D+/R-): No  Significant transplant-related complications: DGF    Transplant Office Phone Number: [phone]    Assessment and plan was discussed with the patient and he voiced his understanding and agreement.    Return visit: Return in about 3 months (around 2/21/2023).    Woodrwo Cr MD    Chief Complaint   Mr. Blanco is a 57 year old here for kidney transplant and immunosuppression management.     History of Present Illness    Mr. Blanco reports feeling good overall with some medical complaints.  Since last clinic visit, patient reports no hospitalizations or new medical complaints and has been  "doing well overall.  His energy level is good and remains normal.  He is active, but only get a little exercise.  Denies any chest pain or shortness of breath with exertion.  No leg swelling.    Appetite is \"too good\" with stable weight recently, but he is up ~ 20 lbs since transplant.  No nausea, vomiting or diarrhea.  No fever, sweats or chills.  No night sweats.    Home BP: Not checked    Problem List   Patient Active Problem List   Diagnosis     HTN, kidney transplant related     Gout     Dyslipidemia     Secondary renal hyperparathyroidism (H)     IgA nephropathy     Class 2 obesity due to excess calories without serious comorbidity with body mass index (BMI) of 36.0 to 36.9 in adult     Prostate cancer (H)     Kidney replaced by transplant     Immunosuppression (H)     Hyperkalemia     Hypomagnesemia     Metabolic acidosis     Aftercare following organ transplant     Morbid obesity (H)     Need for pneumocystis prophylaxis     Stage 3b chronic kidney disease (H)     Vitamin D deficiency       Allergies   No Known Allergies    Medications   Current Outpatient Medications   Medication Sig     aspirin (ASA) 325 MG tablet Take 1 tablet (325 mg) by mouth daily     atorvastatin (LIPITOR) 10 MG tablet Take 1 tablet (10 mg) by mouth every evening     B Complex-C-Folic Acid (VIRT-CAPS) 1 MG CAPS Take 1 mg by mouth daily     bumetanide (BUMEX) 1 MG tablet Take 1mg daily     magnesium oxide (MAG-OX) 400 MG tablet Take 1 tablet (400 mg) by mouth daily for 90 days     mycophenolate (GENERIC EQUIVALENT) 250 MG capsule Take 3 capsules (750 mg) by mouth 2 times daily     patiromer (VELTASSA) 8.4 g packet Take 8.4 g by mouth daily     sodium bicarbonate 650 MG tablet Take 3 tablets (1,950 mg) by mouth 3 times daily     sulfamethoxazole-trimethoprim (BACTRIM) 400-80 MG tablet Take 1 tablet by mouth Every Mon, Wed, Fri Morning Increase to one tab by mouth daily when directed by transplant team     tacrolimus (GENERIC EQUIVALENT) " 0.5 MG capsule Take 1 capsule (0.5 mg) by mouth 2 times daily for 90 days Total dose: 2.5mg twice daily     tacrolimus (GENERIC EQUIVALENT) 1 MG capsule Take 2 capsules (2 mg) by mouth 2 times daily for 90 days Total dose: 2.5mg twice daily     Vitamin D3 (CHOLECALCIFEROL) 25 mcg (1000 units) tablet Take 1 capsule by mouth every morning      No current facility-administered medications for this visit.     There are no discontinued medications.    Physical Exam   Vital Signs: /78   Pulse 78   Temp 98  F (36.7  C)   Wt 111.6 kg (246 lb 1.6 oz)   SpO2 96%   BMI 36.34 kg/m      GENERAL APPEARANCE: alert and no distress  HENT: mouth without ulcers or lesions  LYMPHATICS: no cervical or supraclavicular nodes  RESP: lungs clear to auscultation - no rales, rhonchi or wheezes  CV: regular rhythm, normal rate, no rub, no murmur  EDEMA: no LE edema bilaterally  ABDOMEN: soft, nondistended, nontender, bowel sounds normal, obese  MS: extremities normal - no gross deformities noted, no evidence of inflammation in joints, no muscle tenderness  SKIN: no rash  TX KIDNEY: normal  DIALYSIS ACCESS: none    Data     Renal Latest Ref Rng & Units 11/21/2022 11/7/2022 10/24/2022   Na 133 - 144 mmol/L 143 142 142   Na (external) 135 - 145 mmol/L - - -   K 3.4 - 5.3 mmol/L 5.2 5.0 5.1   K (external) 3.5 - 5.0 mmol/L - - -   Cl 94 - 109 mmol/L 114(H) 112(H) 113(H)   CO2 20 - 32 mmol/L 24 25 24   CO2 (external) 21 - 31 mmol/L - - -   BUN 7 - 30 mg/dL 43(H) 33(H) 38(H)   BUN (external) 8 - 25 mg/dL - - -   Cr 0.66 - 1.25 mg/dL 2.10(H) 2.24(H) 2.20(H)   Cr (external) 0.57 - 1.11 mg/dL - - -   Glucose 70 - 99 mg/dL 124(H) 131(H) 114(H)   Glucose (external) 65 - 100 mg/dL - - -   Ca  8.5 - 10.1 mg/dL 10.1 9.9 10.1   Ca (external) 8.5 - 10.5 mg/dL - - -   Mg 1.6 - 2.3 mg/dL - - -   Mg (external) 1.6 - 2.6 mg/dL - - -     Bone Health Latest Ref Rng & Units 10/10/2022 9/26/2022 9/9/2022   Phos 2.5 - 4.5 mg/dL 1.8(L) - 1.5(L)   Phos  (external) 2.3 - 4.7 mg/dL - - -   PTHi 15 - 65 pg/mL - 205(H) -   Vit D Def 20 - 75 ug/L - - -     Heme Latest Ref Rng & Units 11/21/2022 11/7/2022 10/24/2022   WBC 4.0 - 11.0 10e3/uL 5.7 5.5 5.3   WBC (external) 4.5 - 11.0 thou/cu mm - - -   Hgb 13.3 - 17.7 g/dL 13.7 13.3 12.8(L)   Hgb (external) 12.0 - 16.0 g/dL - - -   Plt 150 - 450 10e3/uL 169 170 166   Plt (external) 140 - 440 thou/cu mm - - -   ABSOLUTE NEUTROPHIL 1.6 - 8.3 10e9/L - - -   ABSOLUTE LYMPHOCYTES 0.8 - 5.3 10e9/L - - -   ABSOLUTE MONOCYTES 0.0 - 1.3 10e9/L - - -   ABSOLUTE EOSINOPHILS 0.0 - 0.7 10e9/L - - -   ABSOLUTE BASOPHILS 0.0 - 0.2 10e9/L - - -   ABS IMMATURE GRANULOCYTES 0 - 0.4 10e9/L - - -   ABSOLUTE NUCLEATED RBC - - - -     Liver Latest Ref Rng & Units 11/21/2022 6/3/2022 6/2/2022   AP 40 - 150 U/L 105 83 -   TBili 0.2 - 1.3 mg/dL 0.6 0.5 -   DBili 0.0 - 0.2 mg/dL 0.1 0.2 -   ALT 0 - 70 U/L 26 23 -   AST 0 - 45 U/L 16 9 -   Tot Protein 6.8 - 8.8 g/dL 7.2 6.6(L) -   Albumin 3.4 - 5.0 g/dL 4.0 3.3(L) 3.2(L)     Pancreas Latest Ref Rng & Units 11/21/2022 6/3/2022 5/9/2022   A1C 0.0 - 5.6 % 5.6 5.1 5.0     Iron studies Latest Ref Rng & Units 11/7/2022 10/10/2022 9/16/2022   Iron 35 - 180 ug/dL 81 102 -   Iron sat 15 - 46 % 26 31 -   Ferritin 26 - 388 ng/mL 494(H) 542(H) 602(H)     [facility] Txp Virology Latest Ref Rng & Units 5/17/2022 5/9/2022 9/3/2020   CMV QUANT IU/ML Not Detected IU/mL Not Detected - -   EBV CAPSID ANTIBODY IGG No detectable antibody. - Positive(A) >8.0(H)   Hep B Core NR:Nonreactive - - Nonreactive        Recent Labs   Lab Test 10/24/22  0720 11/07/22  0708 11/21/22  0700   DOSTAC 10/23/2022 11/6/2022 11/20/2022   TACROL 8.1 10.0 10.0     Recent Labs   Lab Test 07/25/22  0656 07/29/22  0740 08/01/22  0706   DOSMPA  --  7/28/2022   8:00 PM 7/31/2022   8:00 PM   MPACID 2.66 6.07* 2.63   MPAG 171.4* 91.6 90.6       Woodrow Cr MD

## 2022-11-21 NOTE — NURSING NOTE
Chief Complaint   Patient presents with     RECHECK     Return visit.     Blood pressure 116/78, pulse 78, temperature 98  F (36.7  C), weight 111.6 kg (246 lb 1.6 oz), SpO2 96 %.    ELIECER BEEBE

## 2022-11-21 NOTE — PATIENT INSTRUCTIONS
Patient Recommendations:  - Recommend checking blood pressure at home on a regular basis, such as once every week or two, with goal of less than 130/80.  - Recommend weight loss for overall health by increasing exercise and watching caloric intake.   - Possible substitution for sodium bicarbonate it baking soda.  1 tsp of baking soda equals about 7 tablets.   - Could start with 1 tsp of baking soda and will follow labs.    Transplant Patient Information  Your Post Transplant Coordinator is: Alicia Valentin  For non urgent items, we encourage you to contact your coordinator/care team online via [employer]  You and your care team can also contact your transplant coordinator Monday - Friday, 8am - 5pm at [phone] (Option 2 to reach the coordinator or Option 4 to schedule an appointment).  After hours for urgent matters, please call [facility] at [phone].

## 2022-11-21 NOTE — LETTER
11/21/2022       RE: Donald Blanco  [address] [facility]  [place] [address]     Dear Colleague,    Thank you for referring your patient, Donald Blanco, to the [facility] [place] at [facility]. Please see a copy of my visit note below.    TRANSPLANT NEPHROLOGY EARLY POST TRANSPLANT VISIT    Assessment & Plan   # LDKT: Stable kidney function with baseline Cr ~ 2.0-2.3 for the last few months.  This living donor was part of paired exchange with early DGF and some fluid collections concerning for compression on the kidney allograft.  Early kidney transplant biopsy showed no acute rejection, but just acute tubular injury.  Repeat biopsy 6/22/22 again showed no acute rejection, but acute tubular injury.   - Baseline Creatinine: ~ 2.0-2.3   - Proteinuria: Not checked post transplant   - Date DSA Last Checked: Oct/2022      Latest DSA: No   - BK Viremia: No   - Kidney Tx Biopsy: Jun 22, 2022; Result: No diagnostic evidence of acute rejection.  Acute tubular injury.  Mild interstitial fibrosis and tubular atrophy.             May 24, 2022; Result: No diagnostic evidence of acute rejection.  Acute tubular injury.   - Transplant Ureteral Stent: No    # Immunosuppression: Tacrolimus immediate release (goal 6-8) and Mycophenolate mofetil (dose 750 mg every 12 hours)   - Induction with Recent Transplant:  High Intensity   - Continue with intensive monitoring of immunosuppression for efficacy and toxicity.   - Changes: No    # Infection Prophylaxis:   - PJP: Sulfa/TMP (Bactrim)  - CMV: None    # Hypertension: Controlled;  Goal BP: < 130/80   - Volume status: Euvolemic   - Changes: Not at this time; Recommend patient check blood pressure at home on a regular basis, such as once every week or two, and follow up with PCP if above goal.    # Elevated Blood Glucose: Glucose generally running ~ 110-130s Last HbA1c: 5.6%   - Management as per primary care.    #  Anemia in Chronic Renal Disease: Hgb: Trend up, now normal      YUDI: No   - Iron studies: Low iron saturation, but high ferritin    # Mineral Bone Disorder:   - Secondary renal hyperparathyroidism; PTH level: Mildly elevated (151-300 pg/ml)        On treatment: None  - Vitamin D; level: Normal        On supplement: Yes  - Calcium; level: High normal        On supplement: No    # Electrolytes:   - Potassium; level: High normal        On binder: Yes with prn use of patiromer  - Magnesium; level: Normal        On supplement: Yes  - Bicarbonate; level: Normal        On supplement: Yes    # Obesity, Class II (BMI = 36.3): Patient is up ~ 20 lbs since transplant.   - Recommend weight loss for overall health by increasing exercise and watching caloric intake.    # Medical Compliance: Yes    # COVID-19 Virus Review: Discussed COVID-19 virus and the potential medical risks.  Reviewed preventative health recommendations, including wearing a mask where appropriate.  Recommended COVID vaccination should be up to date with either an initial vaccination or booster shot when appropriate.  Asked the patient to inform the transplant center if they are exposed or diagnosed with this virus.    # COVID Vaccination Up To Date: Yes    # Transplant History:  Etiology of Kidney Failure: IgA nephropathy  Tx: LDKT  Transplant: 5/17/2022 (Kidney)  Donor Type: Living Donor Class:   Crossmatch at time of Tx: negative  DSA at time of Tx: No  Significant changes in immunosuppression: None  CMV IgG Ab High Risk Discordance (D+/R-): No  EBV IgG Ab High Risk Discordance (D+/R-): No  Significant transplant-related complications: DGF    Transplant Office Phone Number: [phone]    Assessment and plan was discussed with the patient and he voiced his understanding and agreement.    Return visit: Return in about 3 months (around 2/21/2023).    Woodrow Cr MD    Chief Complaint   Mr. Blanco is a 57 year old here for kidney transplant and  "immunosuppression management.     History of Present Illness    Mr. Blanco reports feeling good overall with some medical complaints.  Since last clinic visit, patient reports no hospitalizations or new medical complaints and has been doing well overall.  His energy level is good and remains normal.  He is active, but only get a little exercise.  Denies any chest pain or shortness of breath with exertion.  No leg swelling.    Appetite is \"too good\" with stable weight recently, but he is up ~ 20 lbs since transplant.  No nausea, vomiting or diarrhea.  No fever, sweats or chills.  No night sweats.    Home BP: Not checked    Problem List   Patient Active Problem List   Diagnosis     HTN, kidney transplant related     Gout     Dyslipidemia     Secondary renal hyperparathyroidism (H)     IgA nephropathy     Class 2 obesity due to excess calories without serious comorbidity with body mass index (BMI) of 36.0 to 36.9 in adult     Prostate cancer (H)     Kidney replaced by transplant     Immunosuppression (H)     Hyperkalemia     Hypomagnesemia     Metabolic acidosis     Aftercare following organ transplant     Morbid obesity (H)     Need for pneumocystis prophylaxis     Stage 3b chronic kidney disease (H)     Vitamin D deficiency       Allergies   No Known Allergies    Medications   Current Outpatient Medications   Medication Sig     aspirin (ASA) 325 MG tablet Take 1 tablet (325 mg) by mouth daily     atorvastatin (LIPITOR) 10 MG tablet Take 1 tablet (10 mg) by mouth every evening     B Complex-C-Folic Acid (VIRT-CAPS) 1 MG CAPS Take 1 mg by mouth daily     bumetanide (BUMEX) 1 MG tablet Take 1mg daily     magnesium oxide (MAG-OX) 400 MG tablet Take 1 tablet (400 mg) by mouth daily for 90 days     mycophenolate (GENERIC EQUIVALENT) 250 MG capsule Take 3 capsules (750 mg) by mouth 2 times daily     patiromer (VELTASSA) 8.4 g packet Take 8.4 g by mouth daily     sodium bicarbonate 650 MG tablet Take 3 tablets (1,950 mg) by " mouth 3 times daily     sulfamethoxazole-trimethoprim (BACTRIM) 400-80 MG tablet Take 1 tablet by mouth Every Mon, Wed, Fri Morning Increase to one tab by mouth daily when directed by transplant team     tacrolimus (GENERIC EQUIVALENT) 0.5 MG capsule Take 1 capsule (0.5 mg) by mouth 2 times daily for 90 days Total dose: 2.5mg twice daily     tacrolimus (GENERIC EQUIVALENT) 1 MG capsule Take 2 capsules (2 mg) by mouth 2 times daily for 90 days Total dose: 2.5mg twice daily     Vitamin D3 (CHOLECALCIFEROL) 25 mcg (1000 units) tablet Take 1 capsule by mouth every morning      No current facility-administered medications for this visit.     There are no discontinued medications.    Physical Exam   Vital Signs: /78   Pulse 78   Temp 98  F (36.7  C)   Wt 111.6 kg (246 lb 1.6 oz)   SpO2 96%   BMI 36.34 kg/m      GENERAL APPEARANCE: alert and no distress  HENT: mouth without ulcers or lesions  LYMPHATICS: no cervical or supraclavicular nodes  RESP: lungs clear to auscultation - no rales, rhonchi or wheezes  CV: regular rhythm, normal rate, no rub, no murmur  EDEMA: no LE edema bilaterally  ABDOMEN: soft, nondistended, nontender, bowel sounds normal, obese  MS: extremities normal - no gross deformities noted, no evidence of inflammation in joints, no muscle tenderness  SKIN: no rash  TX KIDNEY: normal  DIALYSIS ACCESS: none    Data     Renal Latest Ref Rng & Units 11/21/2022 11/7/2022 10/24/2022   Na 133 - 144 mmol/L 143 142 142   Na (external) 135 - 145 mmol/L - - -   K 3.4 - 5.3 mmol/L 5.2 5.0 5.1   K (external) 3.5 - 5.0 mmol/L - - -   Cl 94 - 109 mmol/L 114(H) 112(H) 113(H)   CO2 20 - 32 mmol/L 24 25 24   CO2 (external) 21 - 31 mmol/L - - -   BUN 7 - 30 mg/dL 43(H) 33(H) 38(H)   BUN (external) 8 - 25 mg/dL - - -   Cr 0.66 - 1.25 mg/dL 2.10(H) 2.24(H) 2.20(H)   Cr (external) 0.57 - 1.11 mg/dL - - -   Glucose 70 - 99 mg/dL 124(H) 131(H) 114(H)   Glucose (external) 65 - 100 mg/dL - - -   Ca  8.5 - 10.1 mg/dL 10.1  9.9 10.1   Ca (external) 8.5 - 10.5 mg/dL - - -   Mg 1.6 - 2.3 mg/dL - - -   Mg (external) 1.6 - 2.6 mg/dL - - -     Bone Health Latest Ref Rng & Units 10/10/2022 9/26/2022 9/9/2022   Phos 2.5 - 4.5 mg/dL 1.8(L) - 1.5(L)   Phos (external) 2.3 - 4.7 mg/dL - - -   PTHi 15 - 65 pg/mL - 205(H) -   Vit D Def 20 - 75 ug/L - - -     Heme Latest Ref Rng & Units 11/21/2022 11/7/2022 10/24/2022   WBC 4.0 - 11.0 10e3/uL 5.7 5.5 5.3   WBC (external) 4.5 - 11.0 thou/cu mm - - -   Hgb 13.3 - 17.7 g/dL 13.7 13.3 12.8(L)   Hgb (external) 12.0 - 16.0 g/dL - - -   Plt 150 - 450 10e3/uL 169 170 166   Plt (external) 140 - 440 thou/cu mm - - -   ABSOLUTE NEUTROPHIL 1.6 - 8.3 10e9/L - - -   ABSOLUTE LYMPHOCYTES 0.8 - 5.3 10e9/L - - -   ABSOLUTE MONOCYTES 0.0 - 1.3 10e9/L - - -   ABSOLUTE EOSINOPHILS 0.0 - 0.7 10e9/L - - -   ABSOLUTE BASOPHILS 0.0 - 0.2 10e9/L - - -   ABS IMMATURE GRANULOCYTES 0 - 0.4 10e9/L - - -   ABSOLUTE NUCLEATED RBC - - - -     Liver Latest Ref Rng & Units 11/21/2022 6/3/2022 6/2/2022   AP 40 - 150 U/L 105 83 -   TBili 0.2 - 1.3 mg/dL 0.6 0.5 -   DBili 0.0 - 0.2 mg/dL 0.1 0.2 -   ALT 0 - 70 U/L 26 23 -   AST 0 - 45 U/L 16 9 -   Tot Protein 6.8 - 8.8 g/dL 7.2 6.6(L) -   Albumin 3.4 - 5.0 g/dL 4.0 3.3(L) 3.2(L)     Pancreas Latest Ref Rng & Units 11/21/2022 6/3/2022 5/9/2022   A1C 0.0 - 5.6 % 5.6 5.1 5.0     Iron studies Latest Ref Rng & Units 11/7/2022 10/10/2022 9/16/2022   Iron 35 - 180 ug/dL 81 102 -   Iron sat 15 - 46 % 26 31 -   Ferritin 26 - 388 ng/mL 494(H) 542(H) 602(H)     [facility] Txp Virology Latest Ref Rng & Units 5/17/2022 5/9/2022 9/3/2020   CMV QUANT IU/ML Not Detected IU/mL Not Detected - -   EBV CAPSID ANTIBODY IGG No detectable antibody. - Positive(A) >8.0(H)   Hep B Core NR:Nonreactive - - Nonreactive        Recent Labs   Lab Test 10/24/22  0720 11/07/22  0708 11/21/22  0700   DOSTAC 10/23/2022 11/6/2022 11/20/2022   TACROL 8.1 10.0 10.0     Recent Labs   Lab Test 07/25/22  0656 07/29/22  0740  08/01/22  0706   DOSMPA  --  7/28/2022   8:00 PM 7/31/2022   8:00 PM   MPACID 2.66 6.07* 2.63   MPAG 171.4* 91.6 90.6       Again, thank you for allowing me to participate in the care of your patient.      Sincerely,    Woodrow Cr MD

## 2022-11-23 ENCOUNTER — TELEPHONE (OUTPATIENT)
Dept: PHARMACY | Facility: CLINIC | Age: 57
End: 2022-11-23

## 2022-11-23 NOTE — TELEPHONE ENCOUNTER
Clinical Pharmacy Consult:                                                      Transplant Specific: 6 Month Post Transplant medication review  Date of Transplant: 05/17/2022  Type of Transplant: kidney  First Transplant: yes  History of rejection: no    Immunosuppression Regimen   TAC 2.5mg qAM & 2.5mg qPM and MMF 750mg qAM & 750mg qPM  Patient specific goal: 8-10  Most recent level: 10, date 11/21/22  Immunosuppressant Levels:  Therapeutic  Pt adherent to lab draws: yes  Scr:   Lab Results   Component Value Date    CR 2.71 06/27/2022    CR 6.13 12/15/2020     Side effects: no side effects    Prophylactic Medications  Antibacterial:  Bactrim ss 3 times a week  Scheduled Discontinue Date: Lifelong    Antifungal: Not needed thus far  Scheduled Discontinue Date: N/A    Antiviral: CrCl 10 to 24 mL/minute: Valcyte 450 mg twice weekly   Scheduled Discontinue Date: completed    Acid Reducer: Protonix (pantoprazole)  Scheduled Reviewed Date:      Thrombosis Prevention: Aspirin 325 mg PO daily  Scheduled Discontinue Date: monitored by clinic    Blood Pressure Management  Frequency of home Blood Pressure checks: not taking  Most recent home BP: last in clinic was 119/76  Patient Blood pressure goal: <130/80  Patient blood pressure at goal:  no    Hospitalizations/ER visits since last assessment: 0    Med rec/DUR performed: yes  Med Rec Discrepancies: no    Medication adherence flowsheet 11/23/2022   Patient medication administration: Responsible for own medications   Patient estimated adherence level: %   Pharmacist assessment of adherence: Excellent   Patient reported doses missed per week: 0-1   Pharmacy MPR: -   Facilitators to medication adherence  Pill box;Schedule/routine   Patient reported barriers to medication adherence  -   Adherence intervention(s): -      Medication access flowsheet 11/23/2022   Number of pharmacies used: 1   Pharmacy: [facility]   Enrolled in [facility] pharmacy? Yes    Patient reported barriers to accessing medications: -   Medication access interventions: -        Donald reported feeling good with no side effects. He sets up his own pill box and take as routine. He reported no missed dose or visit to ER/UC.    Not checking blood pressure, but last clinic dose was under controlled. He promised to start checking more often.    Donald has no other questions or concerns.    Follow up in 6 months    Albert Peterson, Pharm D  [facility]

## 2022-11-26 PROBLEM — T86.19 PERINEPHRIC FLUID COLLECTION OF KIDNEY TRANSPLANT: Status: RESOLVED | Noted: 2022-06-21 | Resolved: 2022-11-26

## 2022-11-26 PROBLEM — D63.1 ANEMIA IN STAGE 3B CHRONIC KIDNEY DISEASE (H): Status: RESOLVED | Noted: 2022-06-23 | Resolved: 2022-11-26

## 2022-11-26 PROBLEM — E55.9 VITAMIN D DEFICIENCY: Status: ACTIVE | Noted: 2022-11-26

## 2022-11-26 PROBLEM — N28.89 PERINEPHRIC FLUID COLLECTION OF KIDNEY TRANSPLANT: Status: RESOLVED | Noted: 2022-06-21 | Resolved: 2022-11-26

## 2022-11-26 PROBLEM — N18.32 ANEMIA IN STAGE 3B CHRONIC KIDNEY DISEASE (H): Status: RESOLVED | Noted: 2022-06-23 | Resolved: 2022-11-26

## 2022-11-26 PROBLEM — Z29.89 NEED FOR PNEUMOCYSTIS PROPHYLAXIS: Status: ACTIVE | Noted: 2022-11-26

## 2022-11-26 PROBLEM — E66.01 MORBID OBESITY (H): Status: ACTIVE | Noted: 2022-11-26

## 2022-11-26 PROBLEM — Z48.298 AFTERCARE FOLLOWING ORGAN TRANSPLANT: Status: ACTIVE | Noted: 2022-11-26

## 2022-11-26 PROBLEM — E87.70 HYPERVOLEMIA: Status: RESOLVED | Noted: 2022-06-23 | Resolved: 2022-11-26

## 2022-11-26 PROBLEM — D63.8 ANEMIA IN OTHER CHRONIC DISEASES CLASSIFIED ELSEWHERE: Status: RESOLVED | Noted: 2022-07-06 | Resolved: 2022-11-26

## 2022-11-26 PROBLEM — N18.32 STAGE 3B CHRONIC KIDNEY DISEASE (H): Status: ACTIVE | Noted: 2022-11-26

## 2022-11-26 NOTE — PROGRESS NOTES
TRANSPLANT NEPHROLOGY EARLY POST TRANSPLANT VISIT    Assessment & Plan   # LDKT: Stable kidney function with baseline Cr ~ 2.0-2.3 for the last few months.  This living donor was part of paired exchange with early DGF and some fluid collections concerning for compression on the kidney allograft.  Early kidney transplant biopsy showed no acute rejection, but just acute tubular injury.  Repeat biopsy 6/22/22 again showed no acute rejection, but acute tubular injury.   - Baseline Creatinine: ~ 2.0-2.3   - Proteinuria: Not checked post transplant   - Date DSA Last Checked: Oct/2022      Latest DSA: No   - BK Viremia: No   - Kidney Tx Biopsy: Jun 22, 2022; Result: No diagnostic evidence of acute rejection.  Acute tubular injury.  Mild interstitial fibrosis and tubular atrophy.             May 24, 2022; Result: No diagnostic evidence of acute rejection.  Acute tubular injury.   - Transplant Ureteral Stent: No    # Immunosuppression: Tacrolimus immediate release (goal 6-8) and Mycophenolate mofetil (dose 750 mg every 12 hours)   - Induction with Recent Transplant:  High Intensity   - Continue with intensive monitoring of immunosuppression for efficacy and toxicity.   - Changes: No    # Infection Prophylaxis:   - PJP: Sulfa/TMP (Bactrim)  - CMV: None    # Hypertension: Controlled;  Goal BP: < 130/80   - Volume status: Euvolemic   - Changes: Not at this time; Recommend patient check blood pressure at home on a regular basis, such as once every week or two, and follow up with PCP if above goal.    # Elevated Blood Glucose: Glucose generally running ~ 110-130s Last HbA1c: 5.6%   - Management as per primary care.    # Anemia in Chronic Renal Disease: Hgb: Trend up, now normal      YUDI: No   - Iron studies: Low iron saturation, but high ferritin    # Mineral Bone Disorder:   - Secondary renal hyperparathyroidism; PTH level: Mildly elevated (151-300 pg/ml)        On treatment: None  - Vitamin D; level: Normal        On  supplement: Yes  - Calcium; level: High normal        On supplement: No    # Electrolytes:   - Potassium; level: High normal        On binder: Yes with prn use of patiromer  - Magnesium; level: Normal        On supplement: Yes  - Bicarbonate; level: Normal        On supplement: Yes    # Obesity, Class II (BMI = 36.3): Patient is up ~ 20 lbs since transplant.   - Recommend weight loss for overall health by increasing exercise and watching caloric intake.    # Medical Compliance: Yes    # COVID-19 Virus Review: Discussed COVID-19 virus and the potential medical risks.  Reviewed preventative health recommendations, including wearing a mask where appropriate.  Recommended COVID vaccination should be up to date with either an initial vaccination or booster shot when appropriate.  Asked the patient to inform the transplant center if they are exposed or diagnosed with this virus.    # COVID Vaccination Up To Date: Yes    # Transplant History:  Etiology of Kidney Failure: IgA nephropathy  Tx: LDKT  Transplant: 5/17/2022 (Kidney)  Donor Type: Living Donor Class:   Crossmatch at time of Tx: negative  DSA at time of Tx: No  Significant changes in immunosuppression: None  CMV IgG Ab High Risk Discordance (D+/R-): No  EBV IgG Ab High Risk Discordance (D+/R-): No  Significant transplant-related complications: DGF    Transplant Office Phone Number: [phone]    Assessment and plan was discussed with the patient and he voiced his understanding and agreement.    Return visit: Return in about 3 months (around 2/21/2023).    Woodrow Cr MD    Chief Complaint   Mr. Blanco is a 57 year old here for kidney transplant and immunosuppression management.     History of Present Illness    Mr. Blanco reports feeling good overall with some medical complaints.  Since last clinic visit, patient reports no hospitalizations or new medical complaints and has been doing well overall.  His energy level is good and remains normal.  He is  "active, but only get a little exercise.  Denies any chest pain or shortness of breath with exertion.  No leg swelling.    Appetite is \"too good\" with stable weight recently, but he is up ~ 20 lbs since transplant.  No nausea, vomiting or diarrhea.  No fever, sweats or chills.  No night sweats.    Home BP: Not checked    Problem List   Patient Active Problem List   Diagnosis     HTN, kidney transplant related     Gout     Dyslipidemia     Secondary renal hyperparathyroidism (H)     IgA nephropathy     Class 2 obesity due to excess calories without serious comorbidity with body mass index (BMI) of 36.0 to 36.9 in adult     Prostate cancer (H)     Kidney replaced by transplant     Immunosuppression (H)     Hyperkalemia     Hypomagnesemia     Metabolic acidosis     Aftercare following organ transplant     Morbid obesity (H)     Need for pneumocystis prophylaxis     Stage 3b chronic kidney disease (H)     Vitamin D deficiency       Allergies   No Known Allergies    Medications   Current Outpatient Medications   Medication Sig     aspirin (ASA) 325 MG tablet Take 1 tablet (325 mg) by mouth daily     atorvastatin (LIPITOR) 10 MG tablet Take 1 tablet (10 mg) by mouth every evening     B Complex-C-Folic Acid (VIRT-CAPS) 1 MG CAPS Take 1 mg by mouth daily     bumetanide (BUMEX) 1 MG tablet Take 1mg daily     magnesium oxide (MAG-OX) 400 MG tablet Take 1 tablet (400 mg) by mouth daily for 90 days     mycophenolate (GENERIC EQUIVALENT) 250 MG capsule Take 3 capsules (750 mg) by mouth 2 times daily     patiromer (VELTASSA) 8.4 g packet Take 8.4 g by mouth daily     sodium bicarbonate 650 MG tablet Take 3 tablets (1,950 mg) by mouth 3 times daily     sulfamethoxazole-trimethoprim (BACTRIM) 400-80 MG tablet Take 1 tablet by mouth Every Mon, Wed, Fri Morning Increase to one tab by mouth daily when directed by transplant team     tacrolimus (GENERIC EQUIVALENT) 0.5 MG capsule Take 1 capsule (0.5 mg) by mouth 2 times daily for 90 " days Total dose: 2.5mg twice daily     tacrolimus (GENERIC EQUIVALENT) 1 MG capsule Take 2 capsules (2 mg) by mouth 2 times daily for 90 days Total dose: 2.5mg twice daily     Vitamin D3 (CHOLECALCIFEROL) 25 mcg (1000 units) tablet Take 1 capsule by mouth every morning      No current facility-administered medications for this visit.     There are no discontinued medications.    Physical Exam   Vital Signs: /78   Pulse 78   Temp 98  F (36.7  C)   Wt 111.6 kg (246 lb 1.6 oz)   SpO2 96%   BMI 36.34 kg/m      GENERAL APPEARANCE: alert and no distress  HENT: mouth without ulcers or lesions  LYMPHATICS: no cervical or supraclavicular nodes  RESP: lungs clear to auscultation - no rales, rhonchi or wheezes  CV: regular rhythm, normal rate, no rub, no murmur  EDEMA: no LE edema bilaterally  ABDOMEN: soft, nondistended, nontender, bowel sounds normal, obese  MS: extremities normal - no gross deformities noted, no evidence of inflammation in joints, no muscle tenderness  SKIN: no rash  TX KIDNEY: normal  DIALYSIS ACCESS: none    Data     Renal Latest Ref Rng & Units 11/21/2022 11/7/2022 10/24/2022   Na 133 - 144 mmol/L 143 142 142   Na (external) 135 - 145 mmol/L - - -   K 3.4 - 5.3 mmol/L 5.2 5.0 5.1   K (external) 3.5 - 5.0 mmol/L - - -   Cl 94 - 109 mmol/L 114(H) 112(H) 113(H)   CO2 20 - 32 mmol/L 24 25 24   CO2 (external) 21 - 31 mmol/L - - -   BUN 7 - 30 mg/dL 43(H) 33(H) 38(H)   BUN (external) 8 - 25 mg/dL - - -   Cr 0.66 - 1.25 mg/dL 2.10(H) 2.24(H) 2.20(H)   Cr (external) 0.57 - 1.11 mg/dL - - -   Glucose 70 - 99 mg/dL 124(H) 131(H) 114(H)   Glucose (external) 65 - 100 mg/dL - - -   Ca  8.5 - 10.1 mg/dL 10.1 9.9 10.1   Ca (external) 8.5 - 10.5 mg/dL - - -   Mg 1.6 - 2.3 mg/dL - - -   Mg (external) 1.6 - 2.6 mg/dL - - -     Bone Health Latest Ref Rng & Units 10/10/2022 9/26/2022 9/9/2022   Phos 2.5 - 4.5 mg/dL 1.8(L) - 1.5(L)   Phos (external) 2.3 - 4.7 mg/dL - - -   PTHi 15 - 65 pg/mL - 205(H) -   Vit D Def  20 - 75 ug/L - - -     Heme Latest Ref Rng & Units 11/21/2022 11/7/2022 10/24/2022   WBC 4.0 - 11.0 10e3/uL 5.7 5.5 5.3   WBC (external) 4.5 - 11.0 thou/cu mm - - -   Hgb 13.3 - 17.7 g/dL 13.7 13.3 12.8(L)   Hgb (external) 12.0 - 16.0 g/dL - - -   Plt 150 - 450 10e3/uL 169 170 166   Plt (external) 140 - 440 thou/cu mm - - -   ABSOLUTE NEUTROPHIL 1.6 - 8.3 10e9/L - - -   ABSOLUTE LYMPHOCYTES 0.8 - 5.3 10e9/L - - -   ABSOLUTE MONOCYTES 0.0 - 1.3 10e9/L - - -   ABSOLUTE EOSINOPHILS 0.0 - 0.7 10e9/L - - -   ABSOLUTE BASOPHILS 0.0 - 0.2 10e9/L - - -   ABS IMMATURE GRANULOCYTES 0 - 0.4 10e9/L - - -   ABSOLUTE NUCLEATED RBC - - - -     Liver Latest Ref Rng & Units 11/21/2022 6/3/2022 6/2/2022   AP 40 - 150 U/L 105 83 -   TBili 0.2 - 1.3 mg/dL 0.6 0.5 -   DBili 0.0 - 0.2 mg/dL 0.1 0.2 -   ALT 0 - 70 U/L 26 23 -   AST 0 - 45 U/L 16 9 -   Tot Protein 6.8 - 8.8 g/dL 7.2 6.6(L) -   Albumin 3.4 - 5.0 g/dL 4.0 3.3(L) 3.2(L)     Pancreas Latest Ref Rng & Units 11/21/2022 6/3/2022 5/9/2022   A1C 0.0 - 5.6 % 5.6 5.1 5.0     Iron studies Latest Ref Rng & Units 11/7/2022 10/10/2022 9/16/2022   Iron 35 - 180 ug/dL 81 102 -   Iron sat 15 - 46 % 26 31 -   Ferritin 26 - 388 ng/mL 494(H) 542(H) 602(H)     [facility] Txp Virology Latest Ref Rng & Units 5/17/2022 5/9/2022 9/3/2020   CMV QUANT IU/ML Not Detected IU/mL Not Detected - -   EBV CAPSID ANTIBODY IGG No detectable antibody. - Positive(A) >8.0(H)   Hep B Core NR:Nonreactive - - Nonreactive        Recent Labs   Lab Test 10/24/22  0720 11/07/22  0708 11/21/22  0700   DOSTAC 10/23/2022 11/6/2022 11/20/2022   TACROL 8.1 10.0 10.0     Recent Labs   Lab Test 07/25/22  0656 07/29/22  0740 08/01/22  0706   DOSMPA  --  7/28/2022   8:00 PM 7/31/2022   8:00 PM   MPACID 2.66 6.07* 2.63   MPAG 171.4* 91.6 90.6

## 2022-11-27 DIAGNOSIS — Z48.298 AFTERCARE FOLLOWING ORGAN TRANSPLANT: Primary | ICD-10-CM

## 2022-12-01 DIAGNOSIS — Z48.298 AFTERCARE FOLLOWING ORGAN TRANSPLANT: Primary | ICD-10-CM

## 2022-12-01 RX ORDER — FUROSEMIDE 20 MG
20 TABLET ORAL 2 TIMES DAILY
Qty: 60 TABLET | Refills: 3 | Status: SHIPPED | OUTPATIENT
Start: 2022-12-01 | End: 2023-03-13

## 2022-12-05 ENCOUNTER — LAB (OUTPATIENT)
Dept: LAB | Facility: CLINIC | Age: 57
End: 2022-12-05
Payer: COMMERCIAL

## 2022-12-05 DIAGNOSIS — Z79.899 ENCOUNTER FOR LONG-TERM CURRENT USE OF MEDICATION: ICD-10-CM

## 2022-12-05 DIAGNOSIS — Z48.298 AFTERCARE FOLLOWING ORGAN TRANSPLANT: ICD-10-CM

## 2022-12-05 DIAGNOSIS — Z94.0 KIDNEY REPLACED BY TRANSPLANT: ICD-10-CM

## 2022-12-05 LAB
ALBUMIN MFR UR ELPH: 17.2 MG/DL
ANION GAP SERPL CALCULATED.3IONS-SCNC: 2 MMOL/L (ref 3–14)
BUN SERPL-MCNC: 38 MG/DL (ref 7–30)
CALCIUM SERPL-MCNC: 10.2 MG/DL (ref 8.5–10.1)
CHLORIDE BLD-SCNC: 114 MMOL/L (ref 94–109)
CO2 SERPL-SCNC: 27 MMOL/L (ref 20–32)
CREAT SERPL-MCNC: 1.99 MG/DL (ref 0.66–1.25)
CREAT UR-MCNC: 138 MG/DL
ERYTHROCYTE [DISTWIDTH] IN BLOOD BY AUTOMATED COUNT: 12.8 % (ref 10–15)
GFR SERPL CREATININE-BSD FRML MDRD: 38 ML/MIN/1.73M2
GLUCOSE BLD-MCNC: 115 MG/DL (ref 70–99)
HCT VFR BLD AUTO: 43.6 % (ref 40–53)
HGB BLD-MCNC: 13.7 G/DL (ref 13.3–17.7)
MCH RBC QN AUTO: 30.1 PG (ref 26.5–33)
MCHC RBC AUTO-ENTMCNC: 31.4 G/DL (ref 31.5–36.5)
MCV RBC AUTO: 96 FL (ref 78–100)
PLATELET # BLD AUTO: 165 10E3/UL (ref 150–450)
POTASSIUM BLD-SCNC: 5.2 MMOL/L (ref 3.4–5.3)
PROT/CREAT 24H UR: 0.12 MG/MG CR (ref 0–0.2)
RBC # BLD AUTO: 4.55 10E6/UL (ref 4.4–5.9)
SODIUM SERPL-SCNC: 143 MMOL/L (ref 133–144)
TACROLIMUS BLD-MCNC: 11.6 UG/L (ref 5–15)
TME LAST DOSE: NORMAL H
TME LAST DOSE: NORMAL H
WBC # BLD AUTO: 5.9 10E3/UL (ref 4–11)

## 2022-12-05 PROCEDURE — 36415 COLL VENOUS BLD VENIPUNCTURE: CPT

## 2022-12-05 PROCEDURE — 80048 BASIC METABOLIC PNL TOTAL CA: CPT

## 2022-12-05 PROCEDURE — 80197 ASSAY OF TACROLIMUS: CPT

## 2022-12-05 PROCEDURE — 84156 ASSAY OF PROTEIN URINE: CPT

## 2022-12-05 PROCEDURE — 85027 COMPLETE CBC AUTOMATED: CPT

## 2022-12-05 PROCEDURE — 87799 DETECT AGENT NOS DNA QUANT: CPT

## 2022-12-06 DIAGNOSIS — Z94.0 KIDNEY TRANSPLANTED: Primary | ICD-10-CM

## 2022-12-06 DIAGNOSIS — Z94.0 KIDNEY REPLACED BY TRANSPLANT: Chronic | ICD-10-CM

## 2022-12-06 LAB — BKV DNA # SPEC NAA+PROBE: NOT DETECTED COPIES/ML

## 2022-12-06 RX ORDER — TACROLIMUS 0.5 MG/1
CAPSULE ORAL
Qty: 180 CAPSULE | Refills: 3
Start: 2022-12-06 | End: 2023-03-07

## 2022-12-06 RX ORDER — TACROLIMUS 1 MG/1
2 CAPSULE ORAL 2 TIMES DAILY
Qty: 360 CAPSULE | Refills: 3 | Status: SHIPPED | OUTPATIENT
Start: 2022-12-06 | End: 2023-03-06

## 2022-12-06 NOTE — TELEPHONE ENCOUNTER
Patient confirms this was an accurate 12-hour trough. Verified Tacrolimus IR dose 2.5 mg BID. Confirmed no new medications or illness. Confirmed no missed doses. Patient confirms decrease Tacrolimus IR dose to 2 mg BID and repeat labs in 1-2 weeks

## 2022-12-06 NOTE — TELEPHONE ENCOUNTER
ISSUE:   Tacrolimus IR level 11.2 on 12/6, goal 6-8, dose 2.5 mg BID.  PLAN:   Please call patient and confirm this was an accurate 12-hour trough. Verify Tacrolimus IR dose 2.5 mg BID. Confirm no new medications or illness. Confirm no missed doses. If accurate trough and accurate dose, decrease Tacrolimus IR dose to 2 mg BID and repeat labs in 1-2 weeks

## 2022-12-06 NOTE — TELEPHONE ENCOUNTER
Left message and sent [employer] message to patient regarding:  Tacrolimus IR level 11.2 on 12/6, goal 6-8, dose 2.5 mg BID.  PLAN:   Please call patient and confirm this was an accurate 12-hour trough. Verify Tacrolimus IR dose 2.5 mg BID. Confirm no new medications or illness. Confirm no missed doses. If accurate trough and accurate dose, decrease Tacrolimus IR dose to 2 mg BID and repeat labs in 1-2 weeks

## 2022-12-19 ENCOUNTER — LAB (OUTPATIENT)
Dept: LAB | Facility: CLINIC | Age: 57
End: 2022-12-19
Payer: COMMERCIAL

## 2022-12-19 DIAGNOSIS — Z94.0 KIDNEY TRANSPLANTED: ICD-10-CM

## 2022-12-19 DIAGNOSIS — C61 PROSTATE CANCER (H): ICD-10-CM

## 2022-12-19 LAB
ANION GAP SERPL CALCULATED.3IONS-SCNC: 9 MMOL/L (ref 3–14)
BUN SERPL-MCNC: 35 MG/DL (ref 7–30)
CALCIUM SERPL-MCNC: 10.2 MG/DL (ref 8.5–10.1)
CHLORIDE BLD-SCNC: 109 MMOL/L (ref 94–109)
CO2 SERPL-SCNC: 22 MMOL/L (ref 20–32)
CREAT SERPL-MCNC: 2.04 MG/DL (ref 0.66–1.25)
ERYTHROCYTE [DISTWIDTH] IN BLOOD BY AUTOMATED COUNT: 12.7 % (ref 10–15)
GFR SERPL CREATININE-BSD FRML MDRD: 37 ML/MIN/1.73M2
GLUCOSE BLD-MCNC: 130 MG/DL (ref 70–99)
HCT VFR BLD AUTO: 42.6 % (ref 40–53)
HGB BLD-MCNC: 13.5 G/DL (ref 13.3–17.7)
MCH RBC QN AUTO: 29.9 PG (ref 26.5–33)
MCHC RBC AUTO-ENTMCNC: 31.7 G/DL (ref 31.5–36.5)
MCV RBC AUTO: 95 FL (ref 78–100)
PLATELET # BLD AUTO: 152 10E3/UL (ref 150–450)
POTASSIUM BLD-SCNC: 5 MMOL/L (ref 3.4–5.3)
PSA SERPL-MCNC: 0.07 UG/L (ref 0–4)
RBC # BLD AUTO: 4.51 10E6/UL (ref 4.4–5.9)
SODIUM SERPL-SCNC: 140 MMOL/L (ref 133–144)
TACROLIMUS BLD-MCNC: 7.5 UG/L (ref 5–15)
TME LAST DOSE: NORMAL H
TME LAST DOSE: NORMAL H
WBC # BLD AUTO: 6 10E3/UL (ref 4–11)

## 2022-12-19 PROCEDURE — 80048 BASIC METABOLIC PNL TOTAL CA: CPT

## 2022-12-19 PROCEDURE — 85027 COMPLETE CBC AUTOMATED: CPT

## 2022-12-19 PROCEDURE — 36415 COLL VENOUS BLD VENIPUNCTURE: CPT

## 2022-12-19 PROCEDURE — 84153 ASSAY OF PSA TOTAL: CPT

## 2022-12-19 PROCEDURE — 80197 ASSAY OF TACROLIMUS: CPT

## 2022-12-22 ENCOUNTER — PRE VISIT (OUTPATIENT)
Dept: UROLOGY | Facility: CLINIC | Age: 57
End: 2022-12-22

## 2022-12-23 NOTE — TELEPHONE ENCOUNTER
Reason for visit: PSA recheck     Relevant information: Prostate cancer s/p RALP 12/2020    Records/imaging/labs/orders: PSA on 12/19    Pt called: N/A    At Rooming: Video    
warm

## 2023-01-02 DIAGNOSIS — Z94.0 KIDNEY REPLACED BY TRANSPLANT: Primary | Chronic | ICD-10-CM

## 2023-01-02 RX ORDER — SULFAMETHOXAZOLE AND TRIMETHOPRIM 400; 80 MG/1; MG/1
1 TABLET ORAL
Qty: 40 TABLET | Refills: 3 | Status: SHIPPED | OUTPATIENT
Start: 2023-01-02 | End: 2023-11-07

## 2023-01-04 ENCOUNTER — LAB (OUTPATIENT)
Dept: LAB | Facility: CLINIC | Age: 58
End: 2023-01-04
Payer: COMMERCIAL

## 2023-01-04 DIAGNOSIS — N18.6 ESRD (END STAGE RENAL DISEASE) (H): ICD-10-CM

## 2023-01-04 DIAGNOSIS — Z94.0 KIDNEY REPLACED BY TRANSPLANT: ICD-10-CM

## 2023-01-04 DIAGNOSIS — Z76.82 ORGAN TRANSPLANT CANDIDATE: ICD-10-CM

## 2023-01-04 DIAGNOSIS — Z48.298 AFTERCARE FOLLOWING ORGAN TRANSPLANT: ICD-10-CM

## 2023-01-04 DIAGNOSIS — Z79.899 ENCOUNTER FOR LONG-TERM CURRENT USE OF MEDICATION: ICD-10-CM

## 2023-01-04 LAB
ANION GAP SERPL CALCULATED.3IONS-SCNC: 6 MMOL/L (ref 3–14)
BUN SERPL-MCNC: 30 MG/DL (ref 7–30)
CALCIUM SERPL-MCNC: 10.2 MG/DL (ref 8.5–10.1)
CHLORIDE BLD-SCNC: 110 MMOL/L (ref 94–109)
CO2 SERPL-SCNC: 25 MMOL/L (ref 20–32)
CREAT SERPL-MCNC: 1.84 MG/DL (ref 0.66–1.25)
ERYTHROCYTE [DISTWIDTH] IN BLOOD BY AUTOMATED COUNT: 13.2 % (ref 10–15)
GFR SERPL CREATININE-BSD FRML MDRD: 42 ML/MIN/1.73M2
GLUCOSE BLD-MCNC: 123 MG/DL (ref 70–99)
HCT VFR BLD AUTO: 41.6 % (ref 40–53)
HGB BLD-MCNC: 13.1 G/DL (ref 13.3–17.7)
MCH RBC QN AUTO: 29.9 PG (ref 26.5–33)
MCHC RBC AUTO-ENTMCNC: 31.5 G/DL (ref 31.5–36.5)
MCV RBC AUTO: 95 FL (ref 78–100)
PLATELET # BLD AUTO: 156 10E3/UL (ref 150–450)
POTASSIUM BLD-SCNC: 4.4 MMOL/L (ref 3.4–5.3)
RBC # BLD AUTO: 4.38 10E6/UL (ref 4.4–5.9)
SODIUM SERPL-SCNC: 141 MMOL/L (ref 133–144)
TACROLIMUS BLD-MCNC: 6.8 UG/L (ref 5–15)
TME LAST DOSE: NORMAL H
TME LAST DOSE: NORMAL H
WBC # BLD AUTO: 4.9 10E3/UL (ref 4–11)

## 2023-01-04 PROCEDURE — 85027 COMPLETE CBC AUTOMATED: CPT

## 2023-01-04 PROCEDURE — 80197 ASSAY OF TACROLIMUS: CPT

## 2023-01-04 PROCEDURE — 86833 HLA CLASS II HIGH DEFIN QUAL: CPT

## 2023-01-04 PROCEDURE — 87799 DETECT AGENT NOS DNA QUANT: CPT

## 2023-01-04 PROCEDURE — 36415 COLL VENOUS BLD VENIPUNCTURE: CPT

## 2023-01-04 PROCEDURE — 86832 HLA CLASS I HIGH DEFIN QUAL: CPT

## 2023-01-04 PROCEDURE — 80048 BASIC METABOLIC PNL TOTAL CA: CPT

## 2023-01-05 LAB
BKV DNA # SPEC NAA+PROBE: NOT DETECTED COPIES/ML
DONOR IDENTIFICATION: NORMAL
DSA COMMENTS: NORMAL
DSA PRESENT: NO
DSA TEST METHOD: NORMAL
ORGAN: NORMAL
SA 1 CELL: NORMAL
SA 1 TEST METHOD: NORMAL
SA 2 CELL: NORMAL
SA 2 TEST METHOD: NORMAL
SA1 HI RISK ABY: NORMAL
SA1 MOD RISK ABY: NORMAL
SA2 HI RISK ABY: NORMAL
SA2 MOD RISK ABY: NORMAL
UNACCEPTABLE ANTIGENS: NORMAL
UNOS CPRA: 23
ZZZSA 1  COMMENTS: NORMAL
ZZZSA 2 COMMENTS: NORMAL

## 2023-01-10 ENCOUNTER — VIRTUAL VISIT (OUTPATIENT)
Dept: UROLOGY | Facility: CLINIC | Age: 58
End: 2023-01-10
Payer: COMMERCIAL

## 2023-01-10 DIAGNOSIS — C61 PROSTATE CANCER (H): Primary | ICD-10-CM

## 2023-01-10 DIAGNOSIS — N52.31 ERECTILE DYSFUNCTION AFTER RADICAL PROSTATECTOMY: ICD-10-CM

## 2023-01-10 PROCEDURE — 99213 OFFICE O/P EST LOW 20 MIN: CPT | Mod: GT | Performed by: UROLOGY

## 2023-01-10 RX ORDER — TADALAFIL 20 MG/1
TABLET ORAL
Refills: 11 | Status: CANCELLED | OUTPATIENT
Start: 2023-01-10

## 2023-01-10 NOTE — PROGRESS NOTES
Donald Blanco is a 57 year old male who is being evaluated via a billable video visit.      How would you like to obtain your AVS? MyChart  If the video visit is dropped, the invitation should be resent by: Text to cell phone: [phone]  Will anyone else be joining your video visit? No    Video Start Time: 2:04 PM    CHIEF COMPLAINT   It was my pleasure to see Donald Blanco who is a 57 year old male for follow-up of Prostate Cancer.      HPI  Donald Blanco is a very pleasant 57 year old male who presents with a history of Prostate Cancer. [place] 4+3 disease, stage pT3b with RALP 12/14/2020 with focal positive margin at site of NORBERTO. He recovered without complication. Tolerated adjuvant radiation without complication. PSA stable.      Kidney transplant completed 5/17/2022. Noting some mild leakage since transplant, but not terribly bothersome.     PSA  12/19/2022 - 0.07  6/1/2022 - 0.04  3/1/2022 - 0.05  2/21/2022 - 0.06  11/15/2021 - 0.04  8/9/2021 - 0.03  3/25/2021 - <0.1    RALP 12/14/2020  TUMOR     Histologic Type:         - Acinar adenocarcinoma     Histologic Grade       Grade Group and [place] Score:           - Grade group 3 ([place] Score 4 + 3 = 7)         Percentage of Pattern 4: 60%     Extraprostatic Extension (EPE):         - Present, nonfocal       Location of Extraprostatic Extension:           - Left posterior     Urinary Bladder Neck Invasion:         - Not identified     Seminal Vesicle Invasion:         - Present         - Left     MARGINS     Margins:         - Involved by invasive carcinoma         - Limited (< 3 mm)       Linear Length of Positive Margin(s) (Millimeters): 2 mm       Focality:           - Unifocal       Location of Positive Margin(s):           - Left posterior       Margin Positivity in Area of Extraprostatic Extension (EPE):           - Present         Location(s): Left seminal vesicle       [place] Pattern at Positive Margin(s):           - Pattern 4     LYMPH  NODES     Number of Lymph Nodes Involved:         - 0     Number of Lymph Nodes Examined:         8     PATHOLOGIC STAGE CLASSIFICATION (PTNM, AJCC 8TH EDITION)     Primary Tumor (pT):         - pT3b     Regional Lymph Nodes (pN):         - pN0          Allergies:   Patient has no known allergies.         Review of Systems:  From intake questionnaire     Skin: negative  Eyes: negative  Ears/Nose/Throat: negative  Respiratory: No shortness of breath, dyspnea on exertion, cough, or hemoptysis  Cardiovascular: No chest pain or palpitations  Gastrointestinal: negative; no nausea/vomiting, constipation or diarrhea  Genitourinary: as per HPI  Musculoskeletal: negative  Neurologic: negative  Psychiatric: negative  Hematologic/Lymphatic/Immunologic: negative  Endocrine: negative         Physical Exam:     Vitals:  No vitals were obtained today due to virtual visit.    Physical Exam   GENERAL: Healthy, alert and no distress  EYES: Eyes grossly normal to inspection.  No discharge or erythema, or obvious scleral/conjunctival abnormalities.  RESP: No audible wheeze, cough, or visible cyanosis.  No visible retractions or increased work of breathing.    SKIN: Visible skin clear. No significant rash, abnormal pigmentation or lesions.  NEURO: Cranial nerves grossly intact.  Mentation and speech appropriate for age.  PSYCH: Mentation appears normal, affect normal/bright, judgement and insight intact, normal speech and appearance well-groomed.      Outside and Past Medical records:    Review of the result(s) of each unique test - PSA         Assessment and Plan:     57 year old male with stage pT3b, [place] 4+3=7 prostate cancer, s/p RALP completed 12/14/2020. Focal positive margins at site of NORBERTO. SVI noted as well. High-risk by Decipher. Completed adjuvant radiotherapy with Dr. Alexander. Did not receive hormones. PSA detectable, but stable. From a urologic perspective, there is no further treatment planned at this point for prostate  cancer, and will continue with observation.   He has had some difficulty with erections after surgery. Interested in tadalafil. Risks and side effects discussed today.     - Follow-up 6 months with PSA  - Start tadalafil    Orders  Orders Placed This Encounter   Procedures     PSA tumor marker     Video-Visit Details    Type of service:  Video Visit    Video End Time:2:16 PM    Originating Location (pt. Location): Home    Distant Location (provider location):  On-site    Platform used for Video Visit: [employer]    15 minutes spent on the date of the encounter including direct interaction with the patient, performing chart review, history and exam, documentation and further activities as noted above.    Nabil Vásquez MD  Urology  [facility]

## 2023-01-10 NOTE — NURSING NOTE
Patient taking 1 tablet of magnesium oxide 400 mg once daily.     Shantel Kidd,   Virtual Visit Facilitator

## 2023-01-13 ENCOUNTER — MYC MEDICAL ADVICE (OUTPATIENT)
Dept: UROLOGY | Facility: CLINIC | Age: 58
End: 2023-01-13

## 2023-01-13 RX ORDER — TADALAFIL 10 MG/1
20 TABLET ORAL DAILY PRN
Qty: 20 TABLET | Refills: 3 | Status: SHIPPED | OUTPATIENT
Start: 2023-01-13 | End: 2023-03-06

## 2023-01-18 ENCOUNTER — TELEPHONE (OUTPATIENT)
Dept: ONCOLOGY | Facility: CLINIC | Age: 58
End: 2023-01-18

## 2023-01-18 NOTE — TELEPHONE ENCOUNTER
Prior Authorization Retail Medication Request    Medication/Dose: tadalafil (CIALIS) 10 MG tablet  ICD code (if different than what is on RX):    Previously Tried and Failed:    Rationale:      Insurance Name:    Insurance ID:        Pharmacy Information (if different than what is on RX)  Name:    Phone:

## 2023-01-19 NOTE — TELEPHONE ENCOUNTER
Central Prior Authorization Team   Phone: [phone]      PA Initiation    Medication: tadalafil (CIALIS) 10 MG tablet-PA initiated  Insurance Company: [employer] - Phone [phone] Fax [phone]  Pharmacy Filling the Rx: [facility] - [place], [place] - [address]  Filling Pharmacy Phone:    Filling Pharmacy Fax:    Start Date: 1/19/2023

## 2023-01-20 NOTE — TELEPHONE ENCOUNTER
PRIOR AUTHORIZATION DENIED    Medication: tadalafil (CIALIS) 10 MG tablet-PA denied    Denial Date: 1/20/2023    Denial Rational: Dx is not covered            Appeal Information:

## 2023-01-23 ENCOUNTER — TELEPHONE (OUTPATIENT)
Dept: UROLOGY | Facility: CLINIC | Age: 58
End: 2023-01-23
Payer: COMMERCIAL

## 2023-01-23 NOTE — TELEPHONE ENCOUNTER
Called and spoke with patient who stated that he had gone to [facility] and was able to fill the Rx for $17 at [facility].    Xavier Christopher, RN  RN Care Coordinator - Urology

## 2023-01-24 DIAGNOSIS — Z48.298 AFTERCARE FOLLOWING ORGAN TRANSPLANT: Primary | ICD-10-CM

## 2023-02-01 ENCOUNTER — MYC MEDICAL ADVICE (OUTPATIENT)
Dept: UROLOGY | Facility: CLINIC | Age: 58
End: 2023-02-01
Payer: COMMERCIAL

## 2023-02-02 DIAGNOSIS — Z48.298 AFTERCARE FOLLOWING ORGAN TRANSPLANT: Primary | ICD-10-CM

## 2023-02-02 RX ORDER — PATIROMER 8.4 G/1
8.4 POWDER, FOR SUSPENSION ORAL DAILY
Qty: 30 PACKET | Refills: 3 | Status: SHIPPED | OUTPATIENT
Start: 2023-02-02 | End: 2023-02-20

## 2023-02-06 ENCOUNTER — LAB (OUTPATIENT)
Dept: LAB | Facility: CLINIC | Age: 58
End: 2023-02-06
Payer: COMMERCIAL

## 2023-02-06 DIAGNOSIS — Z94.0 KIDNEY REPLACED BY TRANSPLANT: ICD-10-CM

## 2023-02-06 DIAGNOSIS — Z48.298 AFTERCARE FOLLOWING ORGAN TRANSPLANT: ICD-10-CM

## 2023-02-06 DIAGNOSIS — Z79.899 ENCOUNTER FOR LONG-TERM CURRENT USE OF MEDICATION: ICD-10-CM

## 2023-02-06 LAB
ANION GAP SERPL CALCULATED.3IONS-SCNC: 5 MMOL/L (ref 3–14)
BUN SERPL-MCNC: 46 MG/DL (ref 7–30)
CALCIUM SERPL-MCNC: 10.1 MG/DL (ref 8.5–10.1)
CHLORIDE BLD-SCNC: 114 MMOL/L (ref 94–109)
CO2 SERPL-SCNC: 24 MMOL/L (ref 20–32)
CREAT SERPL-MCNC: 1.94 MG/DL (ref 0.66–1.25)
ERYTHROCYTE [DISTWIDTH] IN BLOOD BY AUTOMATED COUNT: 13.3 % (ref 10–15)
GFR SERPL CREATININE-BSD FRML MDRD: 39 ML/MIN/1.73M2
GLUCOSE BLD-MCNC: 115 MG/DL (ref 70–99)
HCT VFR BLD AUTO: 41.5 % (ref 40–53)
HGB BLD-MCNC: 13.2 G/DL (ref 13.3–17.7)
MCH RBC QN AUTO: 30.3 PG (ref 26.5–33)
MCHC RBC AUTO-ENTMCNC: 31.8 G/DL (ref 31.5–36.5)
MCV RBC AUTO: 95 FL (ref 78–100)
PLATELET # BLD AUTO: 159 10E3/UL (ref 150–450)
POTASSIUM BLD-SCNC: 5.4 MMOL/L (ref 3.4–5.3)
RBC # BLD AUTO: 4.35 10E6/UL (ref 4.4–5.9)
SODIUM SERPL-SCNC: 143 MMOL/L (ref 133–144)
TACROLIMUS BLD-MCNC: 6.3 UG/L (ref 5–15)
TME LAST DOSE: NORMAL H
TME LAST DOSE: NORMAL H
WBC # BLD AUTO: 6.7 10E3/UL (ref 4–11)

## 2023-02-06 PROCEDURE — 85027 COMPLETE CBC AUTOMATED: CPT

## 2023-02-06 PROCEDURE — 87799 DETECT AGENT NOS DNA QUANT: CPT

## 2023-02-06 PROCEDURE — 80048 BASIC METABOLIC PNL TOTAL CA: CPT

## 2023-02-06 PROCEDURE — 80197 ASSAY OF TACROLIMUS: CPT

## 2023-02-06 PROCEDURE — 36415 COLL VENOUS BLD VENIPUNCTURE: CPT

## 2023-02-07 ENCOUNTER — VIRTUAL VISIT (OUTPATIENT)
Dept: FAMILY MEDICINE | Facility: CLINIC | Age: 58
End: 2023-02-07
Payer: COMMERCIAL

## 2023-02-07 DIAGNOSIS — J02.9 SORE THROAT: ICD-10-CM

## 2023-02-07 DIAGNOSIS — D84.9 IMMUNOSUPPRESSION (H): ICD-10-CM

## 2023-02-07 DIAGNOSIS — Z20.818 STREPTOCOCCUS EXPOSURE: Primary | ICD-10-CM

## 2023-02-07 DIAGNOSIS — Z94.0 KIDNEY REPLACED BY TRANSPLANT: Chronic | ICD-10-CM

## 2023-02-07 DIAGNOSIS — N18.32 STAGE 3B CHRONIC KIDNEY DISEASE (H): ICD-10-CM

## 2023-02-07 LAB — BKV DNA # SPEC NAA+PROBE: NOT DETECTED COPIES/ML

## 2023-02-07 PROCEDURE — 99203 OFFICE O/P NEW LOW 30 MIN: CPT | Mod: GT | Performed by: FAMILY MEDICINE

## 2023-02-07 RX ORDER — PENICILLIN V POTASSIUM 500 MG/1
500 TABLET, FILM COATED ORAL 2 TIMES DAILY
Qty: 20 TABLET | Refills: 0 | Status: SHIPPED | OUTPATIENT
Start: 2023-02-07 | End: 2023-02-17

## 2023-02-07 NOTE — PROGRESS NOTES
Donald is a 57 year old who is being evaluated via a billable video visit.      How would you like to obtain your AVS? MyChart  If the video visit is dropped, the invitation should be resent by: Text to cell phone: [phone]  Will anyone else be joining your video visit? No        Assessment & Plan       ICD-10-CM    1. Streptococcus exposure  Z20.818 penicillin V (VEETID) 500 MG tablet      2. Sore throat  J02.9 penicillin V (VEETID) 500 MG tablet      3. Immunosuppression (H)  D84.9       4. Kidney replaced by transplant  Z94.0       5. Stage 3b chronic kidney disease (H)  N18.32          56yo immunosuppressed due to kidney transplantation, with CKD.  Pt has sore/scratchy throat without other sx's after significant exposure to grandson this weekend who tested positive for strep yesterday.  Discussed options.  Can test him and treat if positive, or go ahead and treat given sx's and exposures.  Will treat with Penicillin V- rx sent.  Risks and benefits of medication(s) including potential side effects reviewed with patient.  Questions answered.   Discussed infectivity timing, can be prolonged a bit with immunosuppression.   Call/RTC if symptoms persist or worsen.       Alize Brasher MD  [facility]   Donald is a 57 year old presenting for the following health issues:  Pharyngitis      HPI   Patient is concerned because he has a Kidney Transplant in May. Would like to discuss what he needs to do before symptoms worsen.     Acute Illness  Acute illness concerns: Pharyngitis  Onset/Duration: 2/7/23  Symptoms:  Fever: No  Chills/Sweats: No  Headache (location?): No  Sinus Pressure: No  Conjunctivitis:  No  Ear Pain: no  Rhinorrhea: No  Congestion: No  Sore Throat: YES (much different than his usual, scratchy, lower voice)  Cough: YES - Dry Cough   Wheeze: No  Decreased Appetite: No  Nausea: No  Vomiting: No  Diarrhea: No  Dysuria/Freq.: No  Dysuria or Hematuria:  No  Fatigue/Achiness: No  Sick/Strep Exposure: YES  Therapies tried and outcome: None    Kidney transplant last May, immunosuppressed, on multiple meds.    Now with sore throat.    Madison tested positive for strep yesterday (he was neg last week),  Pt had sx's start yesterday.  Pt was around him on Friday and Sunday, good amount those days.      Review of Systems   Constitutional, HEENT, cardiovascular, pulmonary, gi and gu systems are negative, except as otherwise noted.        Objective     Vitals:  No vitals were obtained today due to virtual visit.    Physical Exam   GENERAL: Healthy, alert and no distress  EYES: Eyes grossly normal to inspection.  No discharge or erythema, or obvious scleral/conjunctival abnormalities.  RESP: No audible wheeze, cough, or visible cyanosis.  No visible retractions or increased work of breathing.    SKIN: Visible skin clear. No significant rash, abnormal pigmentation or lesions.  NEURO: Cranial nerves grossly intact.  Mentation and speech appropriate for age.  PSYCH: Mentation appears normal, affect normal/bright, judgement and insight intact, normal speech and appearance well-groomed.    Lab on 02/06/2023   Component Date Value Ref Range Status     Tacrolimus by [employer] Mass Spectrom* 02/06/2023 6.3  5.0 - 15.0 ug/L Final    Comment: Tacrolimus Reference Range (ug/L):    Kidney Transplant:  Pediatric  0-3 months post transplant: 10-12  3-6 months post transplant: 8-10  6-12 months post transplant: 6-8  >12 months post transplant: 4-7    Adult  0-6 months post transplant: 8-10  6-12 months post transplant: 6-8  >12 months post transplant: 4-6  >5 years post transplant: 3-5    Heart Transplant:  Pediatric  0-12 months post transplant: 10-15  >12 months post transplant: 5-10    Adult  0-3 months post transplant: 10-15  3-6 months post transplant: 8-12  6-12 months post transplant: 6-12  >12 months post transplant: 6-10    Lung Transplant:  0-12 months post transplant: 10-15  >12  months post transplant: 8-12    Liver Transplant:  Pediatric  0-3 months post transplant: 10-15  3-6 months post transplant: 8-10  6 months-5 years post transplant: 6-8   >5 years post transplant: 1-3    Adult  0-3 months post transplant: 10-12  3-6 months post transplant: 8-10  >6 months post transplant: 6-8    Pancreas Transplant:  0-6                            months post transplant: 8-10  >6 months post transplant: 5-8     Tacrolimus Last Dose Date 02/06/2023 2/5/2023   Final     Tacrolimus Last Dose Time 02/06/2023  8:00 PM   Final     Sodium 02/06/2023 143  133 - 144 mmol/L Final     Potassium 02/06/2023 5.4 (H)  3.4 - 5.3 mmol/L Final     Chloride 02/06/2023 114 (H)  94 - 109 mmol/L Final     Carbon Dioxide (CO2) 02/06/2023 24  20 - 32 mmol/L Final     Anion Gap 02/06/2023 5  3 - 14 mmol/L Final     Urea Nitrogen 02/06/2023 46 (H)  7 - 30 mg/dL Final     Creatinine 02/06/2023 1.94 (H)  0.66 - 1.25 mg/dL Final     Calcium 02/06/2023 10.1  8.5 - 10.1 mg/dL Final     Glucose 02/06/2023 115 (H)  70 - 99 mg/dL Final     GFR Estimate 02/06/2023 39 (L)  >60 mL/min/1.73m2 Final    eGFR calculated using 2021 CKD-EPI equation.     WBC Count 02/06/2023 6.7  4.0 - 11.0 10e3/uL Final     RBC Count 02/06/2023 4.35 (L)  4.40 - 5.90 10e6/uL Final     Hemoglobin 02/06/2023 13.2 (L)  13.3 - 17.7 g/dL Final     Hematocrit 02/06/2023 41.5  40.0 - 53.0 % Final     MCV 02/06/2023 95  78 - 100 fL Final     MCH 02/06/2023 30.3  26.5 - 33.0 pg Final     MCHC 02/06/2023 31.8  31.5 - 36.5 g/dL Final     RDW 02/06/2023 13.3  10.0 - 15.0 % Final     Platelet Count 02/06/2023 159  150 - 450 10e3/uL Final               Video-Visit Details    Type of service:  Video Visit     Originating Location (pt. Location): Home  Distant Location (provider location):  On-site  Platform used for Video Visit: [employer]

## 2023-02-13 NOTE — TELEPHONE ENCOUNTER
I called pt to set up an appointment with Dr Delgadillo. He didn't know that was the plan from Dr Vásquez. After explaining what was going on I set up the next in person visit with Dr Delgadillo.

## 2023-02-20 DIAGNOSIS — E87.5 HYPERKALEMIA: Primary | ICD-10-CM

## 2023-02-20 DIAGNOSIS — Z94.0 KIDNEY REPLACED BY TRANSPLANT: Primary | ICD-10-CM

## 2023-02-20 DIAGNOSIS — Z94.0 KIDNEY REPLACED BY TRANSPLANT: Chronic | ICD-10-CM

## 2023-02-21 ENCOUNTER — TELEPHONE (OUTPATIENT)
Dept: TRANSPLANT | Facility: CLINIC | Age: 58
End: 2023-02-21
Payer: COMMERCIAL

## 2023-02-21 RX ORDER — ATORVASTATIN CALCIUM 10 MG/1
10 TABLET, FILM COATED ORAL EVERY EVENING
Qty: 30 TABLET | Refills: 3 | Status: SHIPPED | OUTPATIENT
Start: 2023-02-21 | End: 2023-06-20

## 2023-02-21 NOTE — TELEPHONE ENCOUNTER
Received call from pt. H/o kidney transplant in May of 2022.   Bought a sous vide machine and wants to get clarification on if this is safe with having a higher risk of food borne illness s/p txp.     An example of what he is interested in trying: sous vide steak @ 135 x 2-4 hours    Per Lit review:  Sous vide products are exposed to the same risks as other foods during preparation, cooking, cooling and reheating. Risks that could lead to food poisoning include: food held in the temperature danger zone (5 C-60 C) for long periods could allow harmful bacteria to grow.    Nonintact meat and poultry must be cooked to at least 63 C (145 F) for 3 min, and intact meat may be cooked to as low as 54.4 C (130 F) for 112 min    Suggest pt ok with cooking a steak at 135, but stick with a 2 hour cook time to avoid entering into the danger zone (>2 hours).

## 2023-03-06 ENCOUNTER — OFFICE VISIT (OUTPATIENT)
Dept: FAMILY MEDICINE | Facility: CLINIC | Age: 58
End: 2023-03-06
Payer: COMMERCIAL

## 2023-03-06 ENCOUNTER — LAB (OUTPATIENT)
Dept: LAB | Facility: CLINIC | Age: 58
End: 2023-03-06
Payer: COMMERCIAL

## 2023-03-06 VITALS
TEMPERATURE: 98.7 F | SYSTOLIC BLOOD PRESSURE: 130 MMHG | HEART RATE: 75 BPM | WEIGHT: 257 LBS | RESPIRATION RATE: 18 BRPM | BODY MASS INDEX: 38.06 KG/M2 | HEIGHT: 69 IN | OXYGEN SATURATION: 100 % | DIASTOLIC BLOOD PRESSURE: 74 MMHG

## 2023-03-06 DIAGNOSIS — N25.81 SECONDARY HYPERPARATHYROIDISM OF RENAL ORIGIN (H): ICD-10-CM

## 2023-03-06 DIAGNOSIS — Z79.899 ENCOUNTER FOR LONG-TERM CURRENT USE OF MEDICATION: ICD-10-CM

## 2023-03-06 DIAGNOSIS — E66.01 MORBID OBESITY (H): ICD-10-CM

## 2023-03-06 DIAGNOSIS — E78.5 DYSLIPIDEMIA: ICD-10-CM

## 2023-03-06 DIAGNOSIS — C61 PROSTATE CANCER (H): ICD-10-CM

## 2023-03-06 DIAGNOSIS — D84.9 IMMUNOSUPPRESSION (H): Primary | ICD-10-CM

## 2023-03-06 DIAGNOSIS — N18.32 STAGE 3B CHRONIC KIDNEY DISEASE (H): ICD-10-CM

## 2023-03-06 DIAGNOSIS — Z94.0 KIDNEY REPLACED BY TRANSPLANT: ICD-10-CM

## 2023-03-06 DIAGNOSIS — Z48.298 AFTERCARE FOLLOWING ORGAN TRANSPLANT: ICD-10-CM

## 2023-03-06 LAB
ANION GAP SERPL CALCULATED.3IONS-SCNC: 5 MMOL/L (ref 3–14)
BUN SERPL-MCNC: 39 MG/DL (ref 7–30)
CALCIUM SERPL-MCNC: 10.5 MG/DL (ref 8.5–10.1)
CHLORIDE BLD-SCNC: 110 MMOL/L (ref 94–109)
CO2 SERPL-SCNC: 24 MMOL/L (ref 20–32)
CREAT SERPL-MCNC: 1.75 MG/DL (ref 0.66–1.25)
ERYTHROCYTE [DISTWIDTH] IN BLOOD BY AUTOMATED COUNT: 13.6 % (ref 10–15)
GFR SERPL CREATININE-BSD FRML MDRD: 44 ML/MIN/1.73M2
GLUCOSE BLD-MCNC: 120 MG/DL (ref 70–99)
HCT VFR BLD AUTO: 43.4 % (ref 40–53)
HGB BLD-MCNC: 14 G/DL (ref 13.3–17.7)
MCH RBC QN AUTO: 29.6 PG (ref 26.5–33)
MCHC RBC AUTO-ENTMCNC: 32.3 G/DL (ref 31.5–36.5)
MCV RBC AUTO: 92 FL (ref 78–100)
PLATELET # BLD AUTO: 150 10E3/UL (ref 150–450)
POTASSIUM BLD-SCNC: 4.8 MMOL/L (ref 3.4–5.3)
RBC # BLD AUTO: 4.73 10E6/UL (ref 4.4–5.9)
SODIUM SERPL-SCNC: 139 MMOL/L (ref 133–144)
TACROLIMUS BLD-MCNC: 9 UG/L (ref 5–15)
TME LAST DOSE: NORMAL H
TME LAST DOSE: NORMAL H
WBC # BLD AUTO: 5.7 10E3/UL (ref 4–11)

## 2023-03-06 PROCEDURE — 85027 COMPLETE CBC AUTOMATED: CPT

## 2023-03-06 PROCEDURE — 80048 BASIC METABOLIC PNL TOTAL CA: CPT

## 2023-03-06 PROCEDURE — 36415 COLL VENOUS BLD VENIPUNCTURE: CPT

## 2023-03-06 PROCEDURE — 80197 ASSAY OF TACROLIMUS: CPT

## 2023-03-06 PROCEDURE — 99214 OFFICE O/P EST MOD 30 MIN: CPT | Performed by: PHYSICIAN ASSISTANT

## 2023-03-06 ASSESSMENT — PAIN SCALES - GENERAL: PAINLEVEL: NO PAIN (0)

## 2023-03-06 NOTE — PROGRESS NOTES
"  Assessment & Plan     Immunosuppression (H)  Ongoing with tacrolimus use  Kidney transplant 5/17/22. Ongoing use of Tacrolimus at 0.5 mg dosing pattern. Managed by transplant team    Secondary hyperparathyroidism of renal origin (H)  Stable  Calcium, magnesium labels stable and controlled    Prostate cancer (H)  Resolved   Found it originally in workup for kidney transplant. Full resection of prostate with 33 rounds of radiation to site. PSA checked q 6 months. Last check 0.07 value on 1/13/23    Stage 3b chronic kidney disease (H)  Stable  Improved after transplant and is hovering around 3b vs 3a range. Unclear of original etiology of kidney disease but possible IGA. Got transplant in paired exchange with niece and other donor.  Managed through nephrology and most recent GFR 39. Labs taken today as well.  CBC improvement today     Dyslipidemia  Stable  Taking statin, continue    Morbid obesity (H)  Stable  Gained some weight after the surgery and plans to lose some more again this year. Prefers to use bike when able and follows low sodium diet.          BMI:   Estimated body mass index is 37.95 kg/m  as calculated from the following:    Height as of this encounter: 1.753 m (5' 9\").    Weight as of this encounter: 116.6 kg (257 lb).   Weight management plan: Discussed healthy diet and exercise guidelines        Return in about 1 year (around 3/6/2024) for Routine preventive, with me.    CAROL ANN TOLEDO [facility]    Saira Bennett is a 58 year old, presenting for the following health issues:  Establish Care      Mass    History of Present Illness       Reason for visit:  Establish primary    He eats 2-3 servings of fruits and vegetables daily.He consumes 0 sweetened beverage(s) daily.He exercises with enough effort to increase his heart rate 9 or less minutes per day.  He exercises with enough effort to increase his heart rate 3 or less days per week.              Review of Systems " "  Constitutional, HEENT, cardiovascular, pulmonary, gi and gu systems are negative, except as otherwise noted.      Objective    /74   Pulse 75   Temp 98.7  F (37.1  C) (Tympanic)   Resp 18   Ht 1.753 m (5' 9\")   Wt 116.6 kg (257 lb)   SpO2 100%   BMI 37.95 kg/m    Body mass index is 37.95 kg/m .  Physical Exam   GENERAL: healthy, alert and no distress  HENT: ear canals and TM's normal, nose and mouth without ulcers or lesions  NECK: no adenopathy, no asymmetry, masses, or scars and thyroid normal to palpation  RESP: lungs clear to auscultation - no rales, rhonchi or wheezes  CV: regular rate and rhythm, normal S1 S2, no S3 or S4, no murmur, click or rub, no peripheral edema and peripheral pulses strong  ABDOMEN: soft, nontender, no hepatosplenomegaly, no masses and bowel sounds normal  PSYCH: mentation appears normal, affect normal/bright                    "

## 2023-03-07 DIAGNOSIS — Z94.0 KIDNEY TRANSPLANTED: ICD-10-CM

## 2023-03-07 DIAGNOSIS — Z94.0 KIDNEY REPLACED BY TRANSPLANT: Chronic | ICD-10-CM

## 2023-03-07 RX ORDER — TACROLIMUS 1 MG/1
1 CAPSULE ORAL 2 TIMES DAILY
Qty: 180 CAPSULE | Refills: 3 | Status: SHIPPED | OUTPATIENT
Start: 2023-03-07 | End: 2023-06-07

## 2023-03-07 RX ORDER — TACROLIMUS 0.5 MG/1
0.5 CAPSULE ORAL 2 TIMES DAILY
Qty: 180 CAPSULE | Refills: 3 | Status: SHIPPED | OUTPATIENT
Start: 2023-03-07 | End: 2023-06-07

## 2023-03-09 DIAGNOSIS — E87.8 LOW BICARBONATE: Primary | ICD-10-CM

## 2023-03-09 RX ORDER — SODIUM BICARBONATE 650 MG/1
1950 TABLET ORAL 3 TIMES DAILY
Qty: 270 TABLET | Refills: 11 | Status: SHIPPED | OUTPATIENT
Start: 2023-03-09 | End: 2023-05-17

## 2023-03-13 DIAGNOSIS — Z48.298 AFTERCARE FOLLOWING ORGAN TRANSPLANT: Primary | ICD-10-CM

## 2023-03-13 RX ORDER — FUROSEMIDE 20 MG
20 TABLET ORAL 2 TIMES DAILY
Qty: 60 TABLET | Refills: 3 | Status: SHIPPED | OUTPATIENT
Start: 2023-03-13 | End: 2023-07-25

## 2023-03-14 ENCOUNTER — OFFICE VISIT (OUTPATIENT)
Dept: NEPHROLOGY | Facility: CLINIC | Age: 58
End: 2023-03-14
Attending: INTERNAL MEDICINE
Payer: COMMERCIAL

## 2023-03-14 VITALS
WEIGHT: 256 LBS | DIASTOLIC BLOOD PRESSURE: 76 MMHG | HEART RATE: 75 BPM | OXYGEN SATURATION: 97 % | BODY MASS INDEX: 37.8 KG/M2 | SYSTOLIC BLOOD PRESSURE: 120 MMHG

## 2023-03-14 DIAGNOSIS — E87.20 METABOLIC ACIDOSIS: ICD-10-CM

## 2023-03-14 DIAGNOSIS — Z94.0 HTN, KIDNEY TRANSPLANT RELATED: ICD-10-CM

## 2023-03-14 DIAGNOSIS — Z94.0 KIDNEY REPLACED BY TRANSPLANT: ICD-10-CM

## 2023-03-14 DIAGNOSIS — Z29.89 NEED FOR PNEUMOCYSTIS PROPHYLAXIS: ICD-10-CM

## 2023-03-14 DIAGNOSIS — N18.32 STAGE 3B CHRONIC KIDNEY DISEASE (H): ICD-10-CM

## 2023-03-14 DIAGNOSIS — Z48.298 AFTERCARE FOLLOWING ORGAN TRANSPLANT: Primary | ICD-10-CM

## 2023-03-14 DIAGNOSIS — I15.1 HTN, KIDNEY TRANSPLANT RELATED: ICD-10-CM

## 2023-03-14 DIAGNOSIS — N25.81 SECONDARY RENAL HYPERPARATHYROIDISM (H): ICD-10-CM

## 2023-03-14 DIAGNOSIS — N02.B9 IGA NEPHROPATHY: ICD-10-CM

## 2023-03-14 DIAGNOSIS — D84.9 IMMUNOSUPPRESSION (H): ICD-10-CM

## 2023-03-14 PROCEDURE — G0463 HOSPITAL OUTPT CLINIC VISIT: HCPCS | Performed by: INTERNAL MEDICINE

## 2023-03-14 PROCEDURE — 99214 OFFICE O/P EST MOD 30 MIN: CPT | Performed by: INTERNAL MEDICINE

## 2023-03-14 NOTE — LETTER
3/14/2023       RE: Donald Blanco  [id number]   [place] [address]     Dear Colleague,    Thank you for referring your patient, Donald Blanco, to the [facility] [place] at [facility]. Please see a copy of my visit note below.    TRANSPLANT NEPHROLOGY EARLY POST TRANSPLANT VISIT     10 month post LDKTx    Assessment & Plan   # LDKT: Stable based on last check 3/6, needs repeat labs to f/up FK trough after recent dose reduction   downtrend iver the past month Cr below 2 mg/dl more recently ~1.7 mg/dl.kidney function with prior baseline Cr ~ 2.0-2.3 .This living donor was part of paired exchange with early DGF and some fluid collections concerning for  compression on the kidney allograft.  Early kidney transplant biopsy showed no acute rejection, but just acute tubular injury.  Repeat biopsy 6/22/22 again showed no acute rejection, but acute tubular injury.   - Baseline Creatinine: ~ 1.7-2 down from prior baseline of 2.0-2.3   - Proteinuria: Not checked post transplant   - Date DSA Last Checked: Oct/2022      Latest DSA: No   - BK Viremia: No   - Kidney Tx Biopsy: Jun 22, 2022; Result: No diagnostic evidence of acute rejection.  Acute tubular injury.  Mild interstitial fibrosis and tubular atrophy.             May 24, 2022; Result: No diagnostic evidence of acute rejection.  Acute tubular injury.   - Transplant Ureteral Stent: No    # Immunosuppression: Tacrolimus immediate release (goal 6-8) and Mycophenolate mofetil (dose 750 mg every 12 hours)   - Induction with Recent Transplant:  High Intensity   - Continue with intensive monitoring of immunosuppression for efficacy and toxicity.   - Changes: yes recent dose decrease F/up FK (dose downt to 1.5/1.5 for trough of 9)      # Infection Prophylaxis:   - PJP: Sulfa/TMP (Bactrim)-check CD4 with 1 year labs  - CMV: None    # Hypertension: Controlled;  Goal BP: < 130/80   - Volume status:  Euvolemic   - Changes: Not at this time; Recommend patient check blood pressure at home on a regular basis, such as once every week or two, and follow up with PCP if above goal.    # Elevated Blood Glucose:  Last HbA1c: 5.6%   - Management as per primary care.    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  - Secondary renal hyperparathyroidism; PTH level: Mildly elevated (151-300 pg/ml)        On treatment: None  - Vitamin D; level: Normal        On supplement: Yes  - Calcium; level: High normal        On supplement: No update PTH labs    # Electrolytes:   - Potassium; level: High normal        On binder: Yes lokelma 10 g po every day, if CD4>200 on next check could see if he uses prn  - Magnesium; level: Normal        On supplement: Yes  - Bicarbonate; level: Normal        On supplement: Yes Ok to switch to baking soda 1 1/4 teasp daily currently on sodium bicarb 1300 mg po tid    # Obesity, Class II (BMI = 36.3): Patient is up ~ 20 lbs since transplant.   - Recommend weight loss for overall health by increasing exercise and watching caloric intake.    # Medical Compliance: Yes    # COVID-19 Virus Review: Discussed COVID-19 virus and the potential medical risks.  Reviewed preventative health recommendations, including wearing a mask where appropriate.  Recommended COVID vaccination should be up to date with either an initial vaccination or booster shot when appropriate.  Asked the patient to inform the transplant center if they are exposed or diagnosed with this virus.    # COVID Vaccination Up To Date: Yes    # Transplant History:  Etiology of Kidney Failure: IgA nephropathy  Tx: LDKT  Transplant: 5/17/2022 (Kidney)  Donor Type: Living Donor Class:   Crossmatch at time of Tx: negative  DSA at time of Tx: No  Significant changes in immunosuppression: None  CMV IgG Ab High Risk Discordance (D+/R-): No  EBV IgG Ab High Risk Discordance (D+/R-): No  Significant transplant-related complications:  "[facility]    Transplant Office Phone Number: [phone]    Assessment and plan was discussed with the patient and he voiced his understanding and agreement.    Return visit: 2 months for 1 year visit    Donte Duff MD    Chief Complaint   Mr. Blanco is a 58 year old here for kidney transplant and immunosuppression management.     History of Present Illness    Feels good overall.  +covid infection based on home test In Sep 2022 , his wife had COVID at the time. He had mild symptoms at the time, \"sore throat\", no shortness of breath or fevers.   No graft pain or urinary symptoms  Labs show stable renal function,  Cr-1.7 mild hypercalcemia and FK supraTx-dose reduced to 1.5/1.5 no repeat since    Current IS FK 1.5/1.5 [id number]    Primary Nephrologist:     Problem List   Patient Active Problem List   Diagnosis     HTN, kidney transplant related     Gout     Dyslipidemia     Secondary renal hyperparathyroidism (H)     IgA nephropathy     Class 2 obesity due to excess calories without serious comorbidity with body mass index (BMI) of 36.0 to 36.9 in adult     Prostate cancer (H)     Kidney replaced by transplant     Immunosuppression (H)     Hyperkalemia     Hypomagnesemia     Metabolic acidosis     Aftercare following organ transplant     Morbid obesity (H)     Need for pneumocystis prophylaxis     Stage 3b chronic kidney disease (H)     Vitamin D deficiency       Allergies   No Known Allergies    Medications   Current Outpatient Medications   Medication Sig     ACE/ARB/ARNI NOT PRESCRIBED (INTENTIONAL) Please choose reason not prescribed from choices below.     aspirin (ASA) 325 MG tablet Take 1 tablet (325 mg) by mouth daily     atorvastatin (LIPITOR) 10 MG tablet Take 1 tablet (10 mg) by mouth every evening     B Complex-C-Folic Acid (VIRT-CAPS) 1 MG CAPS Take 1 mg by mouth daily     furosemide (LASIX) 20 MG tablet Take 1 tablet (20 mg) by mouth 2 times daily     mycophenolate (GENERIC EQUIVALENT) 250 MG " capsule Take 3 capsules (750 mg) by mouth 2 times daily     sodium bicarbonate 650 MG tablet Take 3 tablets (1,950 mg) by mouth 3 times daily     sodium zirconium cyclosilicate (LOKELMA) 10 g PACK packet Take 1 packet (10 g) by mouth daily     sulfamethoxazole-trimethoprim (BACTRIM) 400-80 MG tablet Take 1 tablet by mouth Every Mon, Wed, Fri Morning Increase to one tab by mouth daily when directed by transplant team     tacrolimus (GENERIC EQUIVALENT) 0.5 MG capsule Take 1 capsule (0.5 mg) by mouth 2 times daily Total dose: 1.5mg twice daily     tacrolimus (GENERIC EQUIVALENT) 1 MG capsule Take 1 capsule (1 mg) by mouth 2 times daily Total dose: 1.5mg twice daily     Vitamin D3 (CHOLECALCIFEROL) 25 mcg (1000 units) tablet Take 1 capsule by mouth every morning      No current facility-administered medications for this visit.     There are no discontinued medications.    Physical Exam   Vital Signs: /76 (BP Location: Right arm, Patient Position: Sitting, Cuff Size: Adult Large)   Pulse 75   Wt 116.1 kg (256 lb)   SpO2 97%   BMI 37.80 kg/m      GENERAL APPEARANCE: alert and no distress  HENT: mouth without ulcers or lesions  LYMPHATICS: no cervical or supraclavicular nodes  RESP: lungs clear to auscultation - no rales, rhonchi or wheezes  CV: regular rhythm, normal rate, no rub, no murmur  EDEMA: no LE edema bilaterally  ABDOMEN: soft, nondistended, nontender, bowel sounds normal, obese  MS: extremities normal - no gross deformities noted, no evidence of inflammation in joints, no muscle tenderness  SKIN: no rash  TX KIDNEY: normal  DIALYSIS ACCESS: none    Data     Renal Latest Ref Rng & Units 3/6/2023 2/6/2023 1/4/2023   SODIUM 133 - 144 mmol/L 139 143 141   SODIUM POCT 133 - 144 mmol/L - - -   Na (external) 135 - 145 mmol/L - - -   K 3.4 - 5.3 mmol/L 4.8 5.4(H) 4.4   K (external) 3.5 - 5.0 mmol/L - - -   Cl 94 - 109 mmol/L 110(H) 114(H) 110(H)   Cl (external) 94 - 109 mmol/L 110(H) 114(H) 110(H)   CO2 20 -  32 mmol/L 24 24 25   CO2 (external) 21 - 31 mmol/L - - -   UREA NITROGEN 7 - 30 mg/dL 39(H) 46(H) 30   UREA NITROGEN (R) 8.0 - 23.0 mg/dL - - -   BUN (external) 8 - 25 mg/dL - - -   CREATININE 0.66 - 1.25 mg/dL 1.75(H) 1.94(H) 1.84(H)   Cr (external) 0.57 - 1.11 mg/dL - - -   Glucose 70 - 99 mg/dL 120(H) 115(H) 123(H)   Glucose (external) 65 - 100 mg/dL - - -   CALCIUM, TOTAL 8.5 - 10.1 mg/dL 10.5(H) 10.1 10.2(H)   Ca (external) 8.5 - 10.5 mg/dL - - -   MAGNESIUM 1.6 - 2.3 mg/dL - - -   Mg (external) 1.6 - 2.6 mg/dL - - -     Bone Health Latest Ref Rng & Units 10/10/2022 9/26/2022 9/9/2022   PHOSPHORUS 2.5 - 4.5 mg/dL 1.8(L) - 1.5(L)   Phos (external) 2.3 - 4.7 mg/dL - - -   PARATHYROID HORMONE INTACT 15 - 65 pg/mL - 205(H) -   Vit D Def 20 - 75 ug/L - - -     Heme Latest Ref Rng & Units 3/6/2023 2/6/2023 1/4/2023   WBC 4.0 - 11.0 10e3/uL 5.7 6.7 4.9   WBC (external) 4.5 - 11.0 thou/cu mm - - -   Hgb 13.3 - 17.7 g/dL 14.0 13.2(L) 13.1(L)   Hgb (external) 12.0 - 16.0 g/dL - - -   Plt 150 - 450 10e3/uL 150 159 156   Plt (external) 140 - 440 thou/cu mm - - -   ABSOLUTE NEUTROPHIL 1.6 - 8.3 10e9/L - - -   ABSOLUTE LYMPHOCYTES 0.8 - 5.3 10e9/L - - -   ABSOLUTE MONOCYTES 0.0 - 1.3 10e9/L - - -   ABSOLUTE EOSINOPHILS 0.0 - 0.7 10e9/L - - -   ABSOLUTE BASOPHILS 0.0 - 0.2 10e9/L - - -   ABS IMMATURE GRANULOCYTES 0 - 0.4 10e9/L - - -   ABSOLUTE NUCLEATED RBC - - - -     Liver Latest Ref Rng & Units 11/21/2022 6/3/2022 6/2/2022   AP 40 - 150 U/L 105 83 -   TBili 0.2 - 1.3 mg/dL 0.6 0.5 -   BILIRUBIN, DIRECT 0.0 - 0.2 mg/dL 0.1 0.2 -   ALT 0 - 70 U/L 26 23 -   AST 0 - 45 U/L 16 9 -   Tot Protein 6.8 - 8.8 g/dL 7.2 6.6(L) -   ALBUMIN 3.4 - 5.0 g/dL 4.0 3.3(L) 3.2(L)     Pancreas Latest Ref Rng & Units 11/21/2022 6/3/2022 5/9/2022   A1C 0.0 - 5.6 % 5.6 5.1 5.0     Iron studies Latest Ref Rng & Units 11/7/2022 10/10/2022 9/16/2022   Iron 35 - 180 ug/dL 81 102 -   IRON SATURATION INDEX 15 - 46 % 26 31 -   IRON SATURATION INDEX (R)  15 - 46 % - - -   FERRITIN 26 - 388 ng/mL 494(H) 542(H) 602(H)     [facility] Txp Virology Latest Ref Rng & Units 5/17/2022 5/9/2022 9/3/2020   CMV QUANT IU/ML Not Detected IU/mL Not Detected - -   EBV CAPSID ANTIBODY IGG No detectable antibody. - Positive(A) >8.0(H)   Hep B Core NR:Nonreactive - - Nonreactive        Recent Labs   Lab Test 01/04/23  0704 02/06/23  0659 03/06/23  0659   DOSTAC 1/3/2023 2/5/2023 3/5/2023   TACROL 6.8 6.3 9.0     Recent Labs   Lab Test 07/25/22  0656 07/29/22  0740 08/01/22  0706   DOSMPA  --  7/28/2022   8:00 PM 7/31/2022   8:00 PM   MPACID 2.66 6.07* 2.63   MPAG 171.4* 91.6 90.6       Donte Duff MD

## 2023-03-14 NOTE — PROGRESS NOTES
TRANSPLANT NEPHROLOGY EARLY POST TRANSPLANT VISIT     10 month post LDKTx    Assessment & Plan   # LDKT: Stable based on last check 3/6, needs repeat labs to f/up FK trough after recent dose reduction   downtrend iver the past month Cr below 2 mg/dl more recently ~1.7 mg/dl.kidney function with prior baseline Cr ~ 2.0-2.3 .This living donor was part of paired exchange with early DGF and some fluid collections concerning for  compression on the kidney allograft.  Early kidney transplant biopsy showed no acute rejection, but just acute tubular injury.  Repeat biopsy 6/22/22 again showed no acute rejection, but acute tubular injury.   - Baseline Creatinine: ~ 1.7-2 down from prior baseline of 2.0-2.3   - Proteinuria: Not checked post transplant   - Date DSA Last Checked: Oct/2022      Latest DSA: No   - BK Viremia: No   - Kidney Tx Biopsy: Jun 22, 2022; Result: No diagnostic evidence of acute rejection.  Acute tubular injury.  Mild interstitial fibrosis and tubular atrophy.             May 24, 2022; Result: No diagnostic evidence of acute rejection.  Acute tubular injury.   - Transplant Ureteral Stent: No    # Immunosuppression: Tacrolimus immediate release (goal 6-8) and Mycophenolate mofetil (dose 750 mg every 12 hours)   - Induction with Recent Transplant:  High Intensity   - Continue with intensive monitoring of immunosuppression for efficacy and toxicity.   - Changes: yes recent dose decrease F/up FK (dose downt to 1.5/1.5 for trough of 9)      # Infection Prophylaxis:   - PJP: Sulfa/TMP (Bactrim)-check CD4 with 1 year labs  - CMV: None    # Hypertension: Controlled;  Goal BP: < 130/80   - Volume status: Euvolemic   - Changes: Not at this time; Recommend patient check blood pressure at home on a regular basis, such as once every week or two, and follow up with PCP if above goal.    # Elevated Blood Glucose:  Last HbA1c: 5.6%   - Management as per primary care.    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  Ca  - Secondary renal hyperparathyroidism; PTH level: Mildly elevated (151-300 pg/ml)        On treatment: None  - Vitamin D; level: Normal        On supplement: Yes  - Calcium; level: High normal        On supplement: No update PTH labs    # Electrolytes:   - Potassium; level: High normal        On binder: Yes lokelma 10 g po every day, if CD4>200 on next check could see if he uses prn  - Magnesium; level: Normal        On supplement: Yes  - Bicarbonate; level: Normal        On supplement: Yes Ok to switch to baking soda 1 1/4 teasp daily currently on sodium bicarb 1300 mg po tid    # Obesity, Class II (BMI = 36.3): Patient is up ~ 20 lbs since transplant.   - Recommend weight loss for overall health by increasing exercise and watching caloric intake.    # Medical Compliance: Yes    # COVID-19 Virus Review: Discussed COVID-19 virus and the potential medical risks.  Reviewed preventative health recommendations, including wearing a mask where appropriate.  Recommended COVID vaccination should be up to date with either an initial vaccination or booster shot when appropriate.  Asked the patient to inform the transplant center if they are exposed or diagnosed with this virus.    # COVID Vaccination Up To Date: Yes    # Transplant History:  Etiology of Kidney Failure: IgA nephropathy  Tx: LDKT  Transplant: 5/17/2022 (Kidney)  Donor Type: Living Donor Class:   Crossmatch at time of Tx: negative  DSA at time of Tx: No  Significant changes in immunosuppression: None  CMV IgG Ab High Risk Discordance (D+/R-): No  EBV IgG Ab High Risk Discordance (D+/R-): No  Significant transplant-related complications: DGF    Transplant Office Phone Number: [phone]    Assessment and plan was discussed with the patient and he voiced his understanding and agreement.    Return visit: 2 months for 1 year visit    Donte Duff MD    Chief Complaint   Mr. Blanco is a 58 year old here for kidney transplant and immunosuppression management.  "    History of Present Illness    Feels good overall.  +covid infection based on home test In Sep 2022 , his wife had COVID at the time. He had mild symptoms at the time, \"sore throat\", no shortness of breath or fevers.   No graft pain or urinary symptoms  Labs show stable renal function,  Cr-1.7 mild hypercalcemia and FK supraTx-dose reduced to 1.5/1.5 no repeat since    Current IS FK 1.5/1.5 [id number]    Primary Nephrologist:     Problem List   Patient Active Problem List   Diagnosis     HTN, kidney transplant related     Gout     Dyslipidemia     Secondary renal hyperparathyroidism (H)     IgA nephropathy     Class 2 obesity due to excess calories without serious comorbidity with body mass index (BMI) of 36.0 to 36.9 in adult     Prostate cancer (H)     Kidney replaced by transplant     Immunosuppression (H)     Hyperkalemia     Hypomagnesemia     Metabolic acidosis     Aftercare following organ transplant     Morbid obesity (H)     Need for pneumocystis prophylaxis     Stage 3b chronic kidney disease (H)     Vitamin D deficiency       Allergies   No Known Allergies    Medications   Current Outpatient Medications   Medication Sig     ACE/ARB/ARNI NOT PRESCRIBED (INTENTIONAL) Please choose reason not prescribed from choices below.     aspirin (ASA) 325 MG tablet Take 1 tablet (325 mg) by mouth daily     atorvastatin (LIPITOR) 10 MG tablet Take 1 tablet (10 mg) by mouth every evening     B Complex-C-Folic Acid (VIRT-CAPS) 1 MG CAPS Take 1 mg by mouth daily     furosemide (LASIX) 20 MG tablet Take 1 tablet (20 mg) by mouth 2 times daily     mycophenolate (GENERIC EQUIVALENT) 250 MG capsule Take 3 capsules (750 mg) by mouth 2 times daily     sodium bicarbonate 650 MG tablet Take 3 tablets (1,950 mg) by mouth 3 times daily     sodium zirconium cyclosilicate (LOKELMA) 10 g PACK packet Take 1 packet (10 g) by mouth daily     sulfamethoxazole-trimethoprim (BACTRIM) 400-80 MG tablet Take 1 tablet by mouth Every " Mon, Wed, Fri Morning Increase to one tab by mouth daily when directed by transplant team     tacrolimus (GENERIC EQUIVALENT) 0.5 MG capsule Take 1 capsule (0.5 mg) by mouth 2 times daily Total dose: 1.5mg twice daily     tacrolimus (GENERIC EQUIVALENT) 1 MG capsule Take 1 capsule (1 mg) by mouth 2 times daily Total dose: 1.5mg twice daily     Vitamin D3 (CHOLECALCIFEROL) 25 mcg (1000 units) tablet Take 1 capsule by mouth every morning      No current facility-administered medications for this visit.     There are no discontinued medications.    Physical Exam   Vital Signs: /76 (BP Location: Right arm, Patient Position: Sitting, Cuff Size: Adult Large)   Pulse 75   Wt 116.1 kg (256 lb)   SpO2 97%   BMI 37.80 kg/m      GENERAL APPEARANCE: alert and no distress  HENT: mouth without ulcers or lesions  LYMPHATICS: no cervical or supraclavicular nodes  RESP: lungs clear to auscultation - no rales, rhonchi or wheezes  CV: regular rhythm, normal rate, no rub, no murmur  EDEMA: no LE edema bilaterally  ABDOMEN: soft, nondistended, nontender, bowel sounds normal, obese  MS: extremities normal - no gross deformities noted, no evidence of inflammation in joints, no muscle tenderness  SKIN: no rash  TX KIDNEY: normal  DIALYSIS ACCESS: none    Data     Renal Latest Ref Rng & Units 3/6/2023 2/6/2023 1/4/2023   SODIUM 133 - 144 mmol/L 139 143 141   SODIUM POCT 133 - 144 mmol/L - - -   Na (external) 135 - 145 mmol/L - - -   K 3.4 - 5.3 mmol/L 4.8 5.4(H) 4.4   K (external) 3.5 - 5.0 mmol/L - - -   Cl 94 - 109 mmol/L 110(H) 114(H) 110(H)   Cl (external) 94 - 109 mmol/L 110(H) 114(H) 110(H)   CO2 20 - 32 mmol/L 24 24 25   CO2 (external) 21 - 31 mmol/L - - -   UREA NITROGEN 7 - 30 mg/dL 39(H) 46(H) 30   UREA NITROGEN (R) 8.0 - 23.0 mg/dL - - -   BUN (external) 8 - 25 mg/dL - - -   CREATININE 0.66 - 1.25 mg/dL 1.75(H) 1.94(H) 1.84(H)   Cr (external) 0.57 - 1.11 mg/dL - - -   Glucose 70 - 99 mg/dL 120(H) 115(H) 123(H)   Glucose  (external) 65 - 100 mg/dL - - -   CALCIUM, TOTAL 8.5 - 10.1 mg/dL 10.5(H) 10.1 10.2(H)   Ca (external) 8.5 - 10.5 mg/dL - - -   MAGNESIUM 1.6 - 2.3 mg/dL - - -   Mg (external) 1.6 - 2.6 mg/dL - - -     Bone Health Latest Ref Rng & Units 10/10/2022 9/26/2022 9/9/2022   PHOSPHORUS 2.5 - 4.5 mg/dL 1.8(L) - 1.5(L)   Phos (external) 2.3 - 4.7 mg/dL - - -   PARATHYROID HORMONE INTACT 15 - 65 pg/mL - 205(H) -   Vit D Def 20 - 75 ug/L - - -     Heme Latest Ref Rng & Units 3/6/2023 2/6/2023 1/4/2023   WBC 4.0 - 11.0 10e3/uL 5.7 6.7 4.9   WBC (external) 4.5 - 11.0 thou/cu mm - - -   Hgb 13.3 - 17.7 g/dL 14.0 13.2(L) 13.1(L)   Hgb (external) 12.0 - 16.0 g/dL - - -   Plt 150 - 450 10e3/uL 150 159 156   Plt (external) 140 - 440 thou/cu mm - - -   ABSOLUTE NEUTROPHIL 1.6 - 8.3 10e9/L - - -   ABSOLUTE LYMPHOCYTES 0.8 - 5.3 10e9/L - - -   ABSOLUTE MONOCYTES 0.0 - 1.3 10e9/L - - -   ABSOLUTE EOSINOPHILS 0.0 - 0.7 10e9/L - - -   ABSOLUTE BASOPHILS 0.0 - 0.2 10e9/L - - -   ABS IMMATURE GRANULOCYTES 0 - 0.4 10e9/L - - -   ABSOLUTE NUCLEATED RBC - - - -     Liver Latest Ref Rng & Units 11/21/2022 6/3/2022 6/2/2022   AP 40 - 150 U/L 105 83 -   TBili 0.2 - 1.3 mg/dL 0.6 0.5 -   BILIRUBIN, DIRECT 0.0 - 0.2 mg/dL 0.1 0.2 -   ALT 0 - 70 U/L 26 23 -   AST 0 - 45 U/L 16 9 -   Tot Protein 6.8 - 8.8 g/dL 7.2 6.6(L) -   ALBUMIN 3.4 - 5.0 g/dL 4.0 3.3(L) 3.2(L)     Pancreas Latest Ref Rng & Units 11/21/2022 6/3/2022 5/9/2022   A1C 0.0 - 5.6 % 5.6 5.1 5.0     Iron studies Latest Ref Rng & Units 11/7/2022 10/10/2022 9/16/2022   Iron 35 - 180 ug/dL 81 102 -   IRON SATURATION INDEX 15 - 46 % 26 31 -   IRON SATURATION INDEX (R) 15 - 46 % - - -   FERRITIN 26 - 388 ng/mL 494(H) 542(H) 602(H)     [facility] Txp Virology Latest Ref Rng & Units 5/17/2022 5/9/2022 9/3/2020   CMV QUANT IU/ML Not Detected IU/mL Not Detected - -   EBV CAPSID ANTIBODY IGG No detectable antibody. - Positive(A) >8.0(H)   Hep B Core NR:Nonreactive - - Nonreactive        Recent Labs   Lab  Test 01/04/23  0704 02/06/23  0659 03/06/23  0659   DOSTAC 1/3/2023 2/5/2023 3/5/2023   TACROL 6.8 6.3 9.0     Recent Labs   Lab Test 07/25/22  0656 07/29/22  0740 08/01/22  0706   DOSMPA  --  7/28/2022   8:00 PM 7/31/2022   8:00 PM   MPACID 2.66 6.07* 2.63   MPAG 171.4* 91.6 90.6

## 2023-03-14 NOTE — PATIENT INSTRUCTIONS
Patient Recommendations:    Labs this week to recheck tacrolimus level    Labs monthly after this result if your tacrolimus level is within goals and stable kidney function      Transplant Patient Information  Your Post Transplant Coordinator is: Alicia Valentin  You and your care team can contact your transplant coordinator Monday - Friday, 8am - 5pm at [phone] (Option 2 to reach the coordinator or Option 4 to schedule an appointment).  You can also reach your care team online via [employer].  After hours for urgent matters, please call [facility] at [phone].

## 2023-03-14 NOTE — NURSING NOTE
Chief Complaint   Patient presents with     RECHECK     S/p kidney tx     Blood pressure 120/76, pulse 75, weight 116.1 kg (256 lb), SpO2 97 %.    Anders Sears on 3/14/2023 at 3:04 PM

## 2023-03-16 ENCOUNTER — LAB (OUTPATIENT)
Dept: LAB | Facility: CLINIC | Age: 58
End: 2023-03-16
Payer: COMMERCIAL

## 2023-03-16 DIAGNOSIS — Z48.298 AFTERCARE FOLLOWING ORGAN TRANSPLANT: ICD-10-CM

## 2023-03-16 LAB
ANION GAP SERPL CALCULATED.3IONS-SCNC: 3 MMOL/L (ref 3–14)
BASOPHILS # BLD AUTO: 0 10E3/UL (ref 0–0.2)
BASOPHILS NFR BLD AUTO: 0 %
BUN SERPL-MCNC: 34 MG/DL (ref 7–30)
CALCIUM SERPL-MCNC: 10.6 MG/DL (ref 8.5–10.1)
CHLORIDE BLD-SCNC: 107 MMOL/L (ref 94–109)
CO2 SERPL-SCNC: 28 MMOL/L (ref 20–32)
CREAT SERPL-MCNC: 2.3 MG/DL (ref 0.66–1.25)
EOSINOPHIL # BLD AUTO: 0.1 10E3/UL (ref 0–0.7)
EOSINOPHIL NFR BLD AUTO: 2 %
ERYTHROCYTE [DISTWIDTH] IN BLOOD BY AUTOMATED COUNT: 13.5 % (ref 10–15)
GFR SERPL CREATININE-BSD FRML MDRD: 32 ML/MIN/1.73M2
GLUCOSE BLD-MCNC: 125 MG/DL (ref 70–99)
HCT VFR BLD AUTO: 43.9 % (ref 40–53)
HGB BLD-MCNC: 14 G/DL (ref 13.3–17.7)
LYMPHOCYTES # BLD AUTO: 0.7 10E3/UL (ref 0.8–5.3)
LYMPHOCYTES NFR BLD AUTO: 11 %
MCH RBC QN AUTO: 29.8 PG (ref 26.5–33)
MCHC RBC AUTO-ENTMCNC: 31.9 G/DL (ref 31.5–36.5)
MCV RBC AUTO: 93 FL (ref 78–100)
MONOCYTES # BLD AUTO: 0.6 10E3/UL (ref 0–1.3)
MONOCYTES NFR BLD AUTO: 9 %
NEUTROPHILS # BLD AUTO: 5 10E3/UL (ref 1.6–8.3)
NEUTROPHILS NFR BLD AUTO: 78 %
PLATELET # BLD AUTO: 153 10E3/UL (ref 150–450)
POTASSIUM BLD-SCNC: 4.6 MMOL/L (ref 3.4–5.3)
PTH-INTACT SERPL-MCNC: 176 PG/ML (ref 15–65)
RBC # BLD AUTO: 4.7 10E6/UL (ref 4.4–5.9)
SODIUM SERPL-SCNC: 138 MMOL/L (ref 133–144)
TACROLIMUS BLD-MCNC: 7.8 UG/L (ref 5–15)
TME LAST DOSE: NORMAL H
TME LAST DOSE: NORMAL H
WBC # BLD AUTO: 6.4 10E3/UL (ref 4–11)

## 2023-03-16 PROCEDURE — 80197 ASSAY OF TACROLIMUS: CPT

## 2023-03-16 PROCEDURE — 80048 BASIC METABOLIC PNL TOTAL CA: CPT

## 2023-03-16 PROCEDURE — 83970 ASSAY OF PARATHORMONE: CPT

## 2023-03-16 PROCEDURE — 85025 COMPLETE CBC W/AUTO DIFF WBC: CPT

## 2023-03-16 PROCEDURE — 36415 COLL VENOUS BLD VENIPUNCTURE: CPT

## 2023-03-16 PROCEDURE — 87799 DETECT AGENT NOS DNA QUANT: CPT

## 2023-03-16 PROCEDURE — 82306 VITAMIN D 25 HYDROXY: CPT

## 2023-03-17 ENCOUNTER — LAB (OUTPATIENT)
Dept: LAB | Facility: CLINIC | Age: 58
End: 2023-03-17
Payer: COMMERCIAL

## 2023-03-17 ENCOUNTER — TELEPHONE (OUTPATIENT)
Dept: TRANSPLANT | Facility: CLINIC | Age: 58
End: 2023-03-17

## 2023-03-17 DIAGNOSIS — Z48.298 AFTERCARE FOLLOWING ORGAN TRANSPLANT: ICD-10-CM

## 2023-03-17 DIAGNOSIS — Z48.298 AFTERCARE FOLLOWING ORGAN TRANSPLANT: Primary | ICD-10-CM

## 2023-03-17 LAB
BASOPHILS # BLD AUTO: 0 10E3/UL (ref 0–0.2)
BASOPHILS NFR BLD AUTO: 0 %
BKV DNA # SPEC NAA+PROBE: NOT DETECTED COPIES/ML
DEPRECATED CALCIDIOL+CALCIFEROL SERPL-MC: 53 UG/L (ref 20–75)
EOSINOPHIL # BLD AUTO: 0.1 10E3/UL (ref 0–0.7)
EOSINOPHIL NFR BLD AUTO: 1 %
ERYTHROCYTE [DISTWIDTH] IN BLOOD BY AUTOMATED COUNT: 13.6 % (ref 10–15)
HCT VFR BLD AUTO: 42.5 % (ref 40–53)
HGB BLD-MCNC: 13.6 G/DL (ref 13.3–17.7)
LYMPHOCYTES # BLD AUTO: 0.8 10E3/UL (ref 0.8–5.3)
LYMPHOCYTES NFR BLD AUTO: 10 %
MCH RBC QN AUTO: 29.9 PG (ref 26.5–33)
MCHC RBC AUTO-ENTMCNC: 32 G/DL (ref 31.5–36.5)
MCV RBC AUTO: 93 FL (ref 78–100)
MONOCYTES # BLD AUTO: 0.6 10E3/UL (ref 0–1.3)
MONOCYTES NFR BLD AUTO: 8 %
NEUTROPHILS # BLD AUTO: 6.3 10E3/UL (ref 1.6–8.3)
NEUTROPHILS NFR BLD AUTO: 81 %
PLATELET # BLD AUTO: 161 10E3/UL (ref 150–450)
RBC # BLD AUTO: 4.55 10E6/UL (ref 4.4–5.9)
WBC # BLD AUTO: 7.7 10E3/UL (ref 4–11)

## 2023-03-17 PROCEDURE — 86833 HLA CLASS II HIGH DEFIN QUAL: CPT

## 2023-03-17 PROCEDURE — 86832 HLA CLASS I HIGH DEFIN QUAL: CPT

## 2023-03-17 PROCEDURE — 85025 COMPLETE CBC W/AUTO DIFF WBC: CPT

## 2023-03-17 PROCEDURE — 36415 COLL VENOUS BLD VENIPUNCTURE: CPT

## 2023-03-17 PROCEDURE — 80048 BASIC METABOLIC PNL TOTAL CA: CPT

## 2023-03-17 NOTE — TELEPHONE ENCOUNTER
Donte Grove MD Colianni, Lauren, RN  Please advise to repeat labs tomorrow with US kidney +doppler UA Ucx & DSA   BK pending

## 2023-03-18 LAB
ANION GAP SERPL CALCULATED.3IONS-SCNC: 10 MMOL/L (ref 7–15)
BUN SERPL-MCNC: 34.8 MG/DL (ref 8–23)
CALCIUM SERPL-MCNC: 10.3 MG/DL (ref 8.8–10.2)
CHLORIDE SERPL-SCNC: 108 MMOL/L (ref 98–107)
CREAT SERPL-MCNC: 1.99 MG/DL (ref 0.67–1.17)
DEPRECATED HCO3 PLAS-SCNC: 25 MMOL/L (ref 22–29)
GFR SERPL CREATININE-BSD FRML MDRD: 38 ML/MIN/1.73M2
GLUCOSE SERPL-MCNC: 117 MG/DL (ref 70–99)
POTASSIUM SERPL-SCNC: 4.5 MMOL/L (ref 3.4–5.3)
SODIUM SERPL-SCNC: 143 MMOL/L (ref 136–145)

## 2023-03-29 ENCOUNTER — TELEPHONE (OUTPATIENT)
Dept: TRANSPLANT | Facility: CLINIC | Age: 58
End: 2023-03-29
Payer: COMMERCIAL

## 2023-03-29 DIAGNOSIS — Z79.899 ENCOUNTER FOR LONG-TERM CURRENT USE OF MEDICATION: ICD-10-CM

## 2023-03-29 DIAGNOSIS — Z48.298 AFTERCARE FOLLOWING ORGAN TRANSPLANT: ICD-10-CM

## 2023-03-29 DIAGNOSIS — Z94.0 KIDNEY REPLACED BY TRANSPLANT: Primary | ICD-10-CM

## 2023-03-29 NOTE — TELEPHONE ENCOUNTER
[facility] calling and needing updated labs put into Epic patient has a upcoming appointment on Monday 4-3-23 LPN will put orders in no call back needed

## 2023-04-03 ENCOUNTER — LAB (OUTPATIENT)
Dept: LAB | Facility: CLINIC | Age: 58
End: 2023-04-03
Payer: COMMERCIAL

## 2023-04-03 DIAGNOSIS — Z79.899 ENCOUNTER FOR LONG-TERM CURRENT USE OF MEDICATION: ICD-10-CM

## 2023-04-03 DIAGNOSIS — Z48.298 AFTERCARE FOLLOWING ORGAN TRANSPLANT: ICD-10-CM

## 2023-04-03 DIAGNOSIS — Z94.0 KIDNEY REPLACED BY TRANSPLANT: ICD-10-CM

## 2023-04-03 LAB
ANION GAP SERPL CALCULATED.3IONS-SCNC: 2 MMOL/L (ref 3–14)
BUN SERPL-MCNC: 42 MG/DL (ref 7–30)
CALCIUM SERPL-MCNC: 10.4 MG/DL (ref 8.5–10.1)
CHLORIDE BLD-SCNC: 112 MMOL/L (ref 94–109)
CO2 SERPL-SCNC: 26 MMOL/L (ref 20–32)
CREAT SERPL-MCNC: 1.9 MG/DL (ref 0.66–1.25)
ERYTHROCYTE [DISTWIDTH] IN BLOOD BY AUTOMATED COUNT: 13.5 % (ref 10–15)
GFR SERPL CREATININE-BSD FRML MDRD: 40 ML/MIN/1.73M2
GLUCOSE BLD-MCNC: 116 MG/DL (ref 70–99)
HCT VFR BLD AUTO: 44.2 % (ref 40–53)
HGB BLD-MCNC: 14.2 G/DL (ref 13.3–17.7)
MCH RBC QN AUTO: 29.8 PG (ref 26.5–33)
MCHC RBC AUTO-ENTMCNC: 32.1 G/DL (ref 31.5–36.5)
MCV RBC AUTO: 93 FL (ref 78–100)
PLATELET # BLD AUTO: 180 10E3/UL (ref 150–450)
POTASSIUM BLD-SCNC: 4.9 MMOL/L (ref 3.4–5.3)
RBC # BLD AUTO: 4.76 10E6/UL (ref 4.4–5.9)
SODIUM SERPL-SCNC: 140 MMOL/L (ref 133–144)
TACROLIMUS BLD-MCNC: 6.9 UG/L (ref 5–15)
TME LAST DOSE: NORMAL H
TME LAST DOSE: NORMAL H
WBC # BLD AUTO: 6.2 10E3/UL (ref 4–11)

## 2023-04-03 PROCEDURE — 36415 COLL VENOUS BLD VENIPUNCTURE: CPT

## 2023-04-03 PROCEDURE — 80197 ASSAY OF TACROLIMUS: CPT

## 2023-04-03 PROCEDURE — 87799 DETECT AGENT NOS DNA QUANT: CPT

## 2023-04-03 PROCEDURE — 80048 BASIC METABOLIC PNL TOTAL CA: CPT

## 2023-04-03 PROCEDURE — 85027 COMPLETE CBC AUTOMATED: CPT

## 2023-04-04 ENCOUNTER — TELEPHONE (OUTPATIENT)
Dept: NEPHROLOGY | Facility: CLINIC | Age: 58
End: 2023-04-04

## 2023-04-04 LAB — BKV DNA # SPEC NAA+PROBE: NOT DETECTED COPIES/ML

## 2023-04-04 NOTE — TELEPHONE ENCOUNTER
Pt is requesting new rx for    Magnesium oxide 400 mg tabs    Did not see on active med list please verify and send new rx    [facility]  [phone]

## 2023-04-14 ENCOUNTER — PRE VISIT (OUTPATIENT)
Dept: UROLOGY | Facility: CLINIC | Age: 58
End: 2023-04-14
Payer: COMMERCIAL

## 2023-04-14 NOTE — TELEPHONE ENCOUNTER
Reason for Visit: Consult    Diagnosis: Erectile Dysfunction    Orders/Procedures/Records: in system    Contact Patient: n/a    Rooming Requirements: normal      Ivana Green LPN  04/14/23  8:48 AM

## 2023-04-18 DIAGNOSIS — Z48.298 AFTERCARE FOLLOWING ORGAN TRANSPLANT: Primary | ICD-10-CM

## 2023-04-19 ENCOUNTER — OFFICE VISIT (OUTPATIENT)
Dept: DERMATOLOGY | Facility: CLINIC | Age: 58
End: 2023-04-19
Payer: COMMERCIAL

## 2023-04-19 DIAGNOSIS — D84.9 IMMUNOSUPPRESSION (H): ICD-10-CM

## 2023-04-19 DIAGNOSIS — L81.4 SOLAR LENTIGO: ICD-10-CM

## 2023-04-19 DIAGNOSIS — D18.01 CHERRY ANGIOMA: ICD-10-CM

## 2023-04-19 DIAGNOSIS — Z94.0 HISTORY OF RENAL TRANSPLANT: ICD-10-CM

## 2023-04-19 DIAGNOSIS — D22.9 MULTIPLE MELANOCYTIC NEVI: ICD-10-CM

## 2023-04-19 DIAGNOSIS — L57.0 ACTINIC KERATOSIS: Primary | ICD-10-CM

## 2023-04-19 DIAGNOSIS — L82.1 SEBORRHEIC KERATOSIS: ICD-10-CM

## 2023-04-19 DIAGNOSIS — L73.8 SENILE SEBACEOUS GLAND HYPERPLASIA: ICD-10-CM

## 2023-04-19 DIAGNOSIS — L82.0 INFLAMED SEBORRHEIC KERATOSIS: ICD-10-CM

## 2023-04-19 PROCEDURE — 17110 DESTRUCTION B9 LES UP TO 14: CPT | Performed by: DERMATOLOGY

## 2023-04-19 PROCEDURE — 99214 OFFICE O/P EST MOD 30 MIN: CPT | Mod: 25 | Performed by: DERMATOLOGY

## 2023-04-19 PROCEDURE — 17000 DESTRUCT PREMALG LESION: CPT | Mod: XS | Performed by: DERMATOLOGY

## 2023-04-19 NOTE — PROGRESS NOTES
[facility] Dermatology Note    Encounter Date: Apr 19, 2023    Dermatology Problem List:  1. Renal transplant on mycophenolate and tacrolimus  2. Actinic keratosis s/p cryotherapy    ______________________________________    Impression/Plan:  1. Actinic keratoses: x1 on the left cheek, cryotherapy.  - Cryotherapy procedure note: After verbal consent and discussion of risks and benefits including, but not limited to, dyspigmentation/scar, blister, and pain, 1 was(were) treated with 1-2 mm freeze border for 1-2 cycles with liquid nitrogen. Post cryotherapy instructions were provided.     2. Inflamed seborrheic keratoses: x8 on the back, cryotherapy.  - Cryotherapy procedure note: After verbal consent and discussion of risks and benefits including, but not limited to, dyspigmentation/scar, blister, and pain, 8 was(were) treated with 1-2 mm freeze border for 1-2 cycles with liquid nitrogen. Post cryotherapy instructions were provided.     3. Immunosuppression for renal transplant: Discussed sun protective behaviors including OTC spf 30+ sunscreen use and sun avoidance strategies. Reassurance provided for benign lesions not treated today including cherry angiomata, solar lentigines, seborrheic keratoses, sebaceous hyperplasia, and banal-appearing melanocytic nevi.      Follow-up in 1 year.       Staff Involved:  Staff Only    Raul Montilla MD   of Dermatology  Department of Dermatology  [facility]      CC:   Chief Complaint   Patient presents with     Skin Check     FBSC. Kidney transplant. No personal or family hx of skin cancers.       History of Present Illness:  Mr. Donald Blanco is a 58 year old male who presents as a return patient.    Red spots on the scalp, right shoulder  - a few spots on the upper back    Renal transplant 5/2022 - on tacrolimus and mycophenolate    Labs:  N/A    Physical exam:  Vitals: There were no vitals taken for  this visit.  GEN: This is a well developed, well-nourished male in no acute distress, in a pleasant mood.    SKIN: Good phototype II  - Full skin, which includes the head/face, both arms, chest, back, abdomen,both legs, genitalia and/or groin buttocks, digits and/or nails, was examined.  - There is an erythematous macule with overyling adherent scale on the left cheek.  There are yellow oily papules with central umbilication located on the face.  There are tan to brown waxy stuck on papules with surrounding erythema on the back x8  - There are dome shaped bright red papules on the head/neck, trunk, extremities.   - Multiple regular brown pigmented macules and papules are identified on the head/neck, trunk, extremities.   - Scattered brown macules on sun exposed areas.  - There are waxy stuck on tan to brown papules on the head/neck, trunk, extremities.   - No other lesions of concern on areas examined.     Past Medical History:   Past Medical History:   Diagnosis Date     CKD (chronic kidney disease) stage 4, GFR 15-29 ml/min (H)      Elevated PSA      Essential hypertension 05/21/2020     Hyperlipidemia      IgA nephropathy      Obesity      Prostate cancer (H) 10/15/2020     S/P radiation therapy     6,600 cGy to pelvis completed on 6/25/2021 - [facility]     Past Surgical History:   Procedure Laterality Date     BACK SURGERY      Back Surgey 20+ Years Ago      BIOPSY      [facility] 2010     COLONOSCOPY      10+ Years ago at [facility]      DAVINCI PROSTATECTOMY, LYMPHADENECTOMY N/A 12/14/2020    Procedure: PROSTATECTOMY, ROBOT-ASSISTED, WITH PELVIC LYMPHADENECTOMY;  Surgeon: Nabil Vásquez MD;  Location: [facility] OR     IR CVC TUNNEL REMOVAL RIGHT  8/10/2022     IR CVC TUNNEL REVISION RIGHT  6/22/2022     IR RENAL BIOPSY RIGHT  5/24/2022     IR RENAL BIOPSY RIGHT  6/22/2022       Social History:   reports that he has never smoked. He has never used smokeless  tobacco. He reports current alcohol use. He reports that he does not use drugs.    Family History:  Family History   Problem Relation Age of Onset     Kidney Disease Father      Hypertension Father      Cancer Mother      No Known Problems Brother      No Known Problems Sister      No Known Problems Son      No Known Problems Daughter      No Known Problems Brother      No Known Problems Brother      No Known Problems Brother      No Known Problems Sister      No Known Problems Sister      No Known Problems Sister        Medications:  Current Outpatient Medications   Medication Sig Dispense Refill     ACE/ARB/ARNI NOT PRESCRIBED (INTENTIONAL) Please choose reason not prescribed from choices below.       aspirin (ASA) 325 MG tablet Take 1 tablet (325 mg) by mouth daily 30 tablet 11     atorvastatin (LIPITOR) 10 MG tablet Take 1 tablet (10 mg) by mouth every evening 30 tablet 3     B Complex-C-Folic Acid (VIRT-CAPS) 1 MG CAPS Take 1 mg by mouth daily 90 capsule 3     furosemide (LASIX) 20 MG tablet Take 1 tablet (20 mg) by mouth 2 times daily 60 tablet 3     mycophenolate (GENERIC EQUIVALENT) 250 MG capsule Take 3 capsules (750 mg) by mouth 2 times daily 180 capsule 11     sodium bicarbonate 650 MG tablet Take 3 tablets (1,950 mg) by mouth 3 times daily 270 tablet 11     sodium zirconium cyclosilicate (LOKELMA) 10 g PACK packet Take 1 packet (10 g) by mouth daily 30 packet 3     sulfamethoxazole-trimethoprim (BACTRIM) 400-80 MG tablet Take 1 tablet by mouth Every Mon, Wed, Fri Morning Increase to one tab by mouth daily when directed by transplant team 40 tablet 3     tacrolimus (GENERIC EQUIVALENT) 0.5 MG capsule Take 1 capsule (0.5 mg) by mouth 2 times daily Total dose: 1.5mg twice daily 180 capsule 3     tacrolimus (GENERIC EQUIVALENT) 1 MG capsule Take 1 capsule (1 mg) by mouth 2 times daily Total dose: 1.5mg twice daily 180 capsule 3     No Known Allergies

## 2023-04-19 NOTE — NURSING NOTE
Donald Blanco's chief complaint for this visit includes:    Chief Complaint   Patient presents with     Skin Check     FBSC. Kidney transplant. No personal or family hx of skin cancers.       PCP: Emerson Grayson         Referring Provider:    Yong Friedman MD  [address] [place] [address]         There were no vitals taken for this visit.    Data Unavailable          No Known Allergies              Do you need any medication refills at today's visit? No    Margaret Acosta LPN on 4/19/2023 at 11:45 AM

## 2023-04-19 NOTE — LETTER
4/19/2023         RE: Donald Blanco  [id number] [facility]  [place] [address]        Dear Colleague,    Thank you for referring your patient, Donald Blanco, to the [facility]. Please see a copy of my visit note below.    [facility] Dermatology Note    Encounter Date: Apr 19, 2023    Dermatology Problem List:  1. Renal transplant on mycophenolate and tacrolimus  2. Actinic keratosis s/p cryotherapy    ______________________________________    Impression/Plan:  1. Actinic keratoses: x1 on the left cheek, cryotherapy.  - Cryotherapy procedure note: After verbal consent and discussion of risks and benefits including, but not limited to, dyspigmentation/scar, blister, and pain, 1 was(were) treated with 1-2 mm freeze border for 1-2 cycles with liquid nitrogen. Post cryotherapy instructions were provided.     2. Inflamed seborrheic keratoses: x8 on the back, cryotherapy.  - Cryotherapy procedure note: After verbal consent and discussion of risks and benefits including, but not limited to, dyspigmentation/scar, blister, and pain, 8 was(were) treated with 1-2 mm freeze border for 1-2 cycles with liquid nitrogen. Post cryotherapy instructions were provided.     3. Immunosuppression for renal transplant: Discussed sun protective behaviors including OTC spf 30+ sunscreen use and sun avoidance strategies. Reassurance provided for benign lesions not treated today including cherry angiomata, solar lentigines, seborrheic keratoses, sebaceous hyperplasia, and banal-appearing melanocytic nevi.      Follow-up in 1 year.       Staff Involved:  Staff Only    Raul Montilla MD   of Dermatology  Department of Dermatology  [facility]      CC:   Chief Complaint   Patient presents with     Skin Check     FBSC. Kidney transplant. No personal or family hx of skin cancers.       History of Present Illness:  Mr. Donald Blanco is a 58 year  old male who presents as a return patient.    Red spots on the scalp, right shoulder  - a few spots on the upper back    Renal transplant 5/2022 - on tacrolimus and mycophenolate    Labs:  N/A    Physical exam:  Vitals: There were no vitals taken for this visit.  GEN: This is a well developed, well-nourished male in no acute distress, in a pleasant mood.    SKIN: Good phototype II  - Full skin, which includes the head/face, both arms, chest, back, abdomen,both legs, genitalia and/or groin buttocks, digits and/or nails, was examined.  - There is an erythematous macule with overyling adherent scale on the left cheek.  There are yellow oily papules with central umbilication located on the face.  There are tan to brown waxy stuck on papules with surrounding erythema on the back x8  - There are dome shaped bright red papules on the head/neck, trunk, extremities.   - Multiple regular brown pigmented macules and papules are identified on the head/neck, trunk, extremities.   - Scattered brown macules on sun exposed areas.  - There are waxy stuck on tan to brown papules on the head/neck, trunk, extremities.   - No other lesions of concern on areas examined.     Past Medical History:   Past Medical History:   Diagnosis Date     CKD (chronic kidney disease) stage 4, GFR 15-29 ml/min (H)      Elevated PSA      Essential hypertension 05/21/2020     Hyperlipidemia      IgA nephropathy      Obesity      Prostate cancer (H) 10/15/2020     S/P radiation therapy     6,600 cGy to pelvis completed on 6/25/2021 - [facility]     Past Surgical History:   Procedure Laterality Date     BACK SURGERY      Back Surgey 20+ Years Ago      BIOPSY      [facility] 2010     COLONOSCOPY      10+ Years ago at [facility]      DAVINCI PROSTATECTOMY, LYMPHADENECTOMY N/A 12/14/2020    Procedure: PROSTATECTOMY, ROBOT-ASSISTED, WITH PELVIC LYMPHADENECTOMY;  Surgeon: Nabil Vásquez MD;  Location:  OR      IR CVC TUNNEL REMOVAL RIGHT  8/10/2022     IR CVC TUNNEL REVISION RIGHT  6/22/2022     IR RENAL BIOPSY RIGHT  5/24/2022     IR RENAL BIOPSY RIGHT  6/22/2022       Social History:   reports that he has never smoked. He has never used smokeless tobacco. He reports current alcohol use. He reports that he does not use drugs.    Family History:  Family History   Problem Relation Age of Onset     Kidney Disease Father      Hypertension Father      Cancer Mother      No Known Problems Brother      No Known Problems Sister      No Known Problems Son      No Known Problems Daughter      No Known Problems Brother      No Known Problems Brother      No Known Problems Brother      No Known Problems Sister      No Known Problems Sister      No Known Problems Sister        Medications:  Current Outpatient Medications   Medication Sig Dispense Refill     ACE/ARB/ARNI NOT PRESCRIBED (INTENTIONAL) Please choose reason not prescribed from choices below.       aspirin (ASA) 325 MG tablet Take 1 tablet (325 mg) by mouth daily 30 tablet 11     atorvastatin (LIPITOR) 10 MG tablet Take 1 tablet (10 mg) by mouth every evening 30 tablet 3     B Complex-C-Folic Acid (VIRT-CAPS) 1 MG CAPS Take 1 mg by mouth daily 90 capsule 3     furosemide (LASIX) 20 MG tablet Take 1 tablet (20 mg) by mouth 2 times daily 60 tablet 3     mycophenolate (GENERIC EQUIVALENT) 250 MG capsule Take 3 capsules (750 mg) by mouth 2 times daily 180 capsule 11     sodium bicarbonate 650 MG tablet Take 3 tablets (1,950 mg) by mouth 3 times daily 270 tablet 11     sodium zirconium cyclosilicate (LOKELMA) 10 g PACK packet Take 1 packet (10 g) by mouth daily 30 packet 3     sulfamethoxazole-trimethoprim (BACTRIM) 400-80 MG tablet Take 1 tablet by mouth Every Mon, Wed, Fri Morning Increase to one tab by mouth daily when directed by transplant team 40 tablet 3     tacrolimus (GENERIC EQUIVALENT) 0.5 MG capsule Take 1 capsule (0.5 mg) by mouth 2 times daily Total dose:  1.5mg twice daily 180 capsule 3     tacrolimus (GENERIC EQUIVALENT) 1 MG capsule Take 1 capsule (1 mg) by mouth 2 times daily Total dose: 1.5mg twice daily 180 capsule 3     No Known Allergies                Again, thank you for allowing me to participate in the care of your patient.        Sincerely,        Raul Montilla MD

## 2023-04-20 ENCOUNTER — OFFICE VISIT (OUTPATIENT)
Dept: UROLOGY | Facility: CLINIC | Age: 58
End: 2023-04-20
Payer: COMMERCIAL

## 2023-04-20 VITALS
BODY MASS INDEX: 37.92 KG/M2 | WEIGHT: 256 LBS | HEART RATE: 64 BPM | DIASTOLIC BLOOD PRESSURE: 78 MMHG | HEIGHT: 69 IN | SYSTOLIC BLOOD PRESSURE: 147 MMHG

## 2023-04-20 DIAGNOSIS — N52.31 ERECTILE DYSFUNCTION AFTER RADICAL PROSTATECTOMY: Primary | ICD-10-CM

## 2023-04-20 DIAGNOSIS — Z94.0 KIDNEY REPLACED BY TRANSPLANT: Chronic | ICD-10-CM

## 2023-04-20 DIAGNOSIS — C61 PROSTATE CANCER (H): ICD-10-CM

## 2023-04-20 PROCEDURE — 99214 OFFICE O/P EST MOD 30 MIN: CPT | Mod: 25 | Performed by: UROLOGY

## 2023-04-20 RX ORDER — MYCOPHENOLATE MOFETIL 250 MG/1
750 CAPSULE ORAL 2 TIMES DAILY
Qty: 180 CAPSULE | Refills: 11 | Status: SHIPPED | OUTPATIENT
Start: 2023-04-20 | End: 2023-08-24

## 2023-04-20 ASSESSMENT — PAIN SCALES - GENERAL: PAINLEVEL: NO PAIN (0)

## 2023-04-20 NOTE — PROGRESS NOTES
I am seeing Donald Blanco in consultation from Dr. Vásquez for evaluation of ED .    HPI:  Pt seen in consultation for erectile dysfunction.  History of the erectile dysfunction is as follows: He had normal erections until he had his prostatectomy in 2020. Now he is s/p adjuvant radiotherapy as well. Since this time he has tried tadalafil, but this has been ineffective. He is unable to get an erection. He presents today to discuss other options.       REVIEW OF SYSTEMS:  General: negative  Skin: negative  Eyes: negative  Ears/Nose/Throat: negative  Respiratory: negative  Cardiovascular: negative  Gastrointestinal: negative  Genitourinary: see HPI  Musculoskeletal: negative  Neurologic: negative  Psychiatric: negative  Hematologic/Lymphatic/Immunologic: negative  Endocrine: negative    PAST MEDICAL HX:  Past Medical History:   Diagnosis Date     CKD (chronic kidney disease) stage 4, GFR 15-29 ml/min (H)      Elevated PSA      Essential hypertension 05/21/2020     Hyperlipidemia      IgA nephropathy      Obesity      Prostate cancer (H) 10/15/2020     S/P radiation therapy     6,600 cGy to pelvis completed on 6/25/2021 [facility]       PAST SURG HX:  Past Surgical History:   Procedure Laterality Date     BACK SURGERY      Back Surgey 20+ Years Ago      BIOPSY      [facility] 2010     COLONOSCOPY      10+ Years ago at [facility]      DAVINCI PROSTATECTOMY, LYMPHADENECTOMY N/A 12/14/2020    Procedure: PROSTATECTOMY, ROBOT-ASSISTED, WITH PELVIC LYMPHADENECTOMY;  Surgeon: Nabil Vásquez MD;  Location: [facility] OR     IR CVC TUNNEL REMOVAL RIGHT  8/10/2022     IR CVC TUNNEL REVISION RIGHT  6/22/2022     IR RENAL BIOPSY RIGHT  5/24/2022     IR RENAL BIOPSY RIGHT  6/22/2022        FAMILY HX:  Family History   Problem Relation Age of Onset     Kidney Disease Father      Hypertension Father      Cancer Mother      No Known Problems Brother      No Known Problems Sister      No  "Known Problems Son      No Known Problems Daughter      No Known Problems Brother      No Known Problems Brother      No Known Problems Brother      No Known Problems Sister      No Known Problems Sister      No Known Problems Sister        SOCIAL HX:  Social History     Tobacco Use     Smoking status: Never     Smokeless tobacco: Never   Substance Use Topics     Alcohol use: Yes     Comment: rare use     Drug use: Never       MEDICATIONS:  Current Outpatient Medications   Medication Sig     ACE/ARB/ARNI NOT PRESCRIBED (INTENTIONAL) Please choose reason not prescribed from choices below.     aspirin (ASA) 325 MG tablet Take 1 tablet (325 mg) by mouth daily     atorvastatin (LIPITOR) 10 MG tablet Take 1 tablet (10 mg) by mouth every evening     B Complex-C-Folic Acid (VIRT-CAPS) 1 MG CAPS Take 1 mg by mouth daily     furosemide (LASIX) 20 MG tablet Take 1 tablet (20 mg) by mouth 2 times daily     mycophenolate (GENERIC EQUIVALENT) 250 MG capsule Take 3 capsules (750 mg) by mouth 2 times daily     sodium bicarbonate 650 MG tablet Take 3 tablets (1,950 mg) by mouth 3 times daily     sodium zirconium cyclosilicate (LOKELMA) 10 g PACK packet Take 1 packet (10 g) by mouth daily     sulfamethoxazole-trimethoprim (BACTRIM) 400-80 MG tablet Take 1 tablet by mouth Every Mon, Wed, Fri Morning Increase to one tab by mouth daily when directed by transplant team     tacrolimus (GENERIC EQUIVALENT) 0.5 MG capsule Take 1 capsule (0.5 mg) by mouth 2 times daily Total dose: 1.5mg twice daily     tacrolimus (GENERIC EQUIVALENT) 1 MG capsule Take 1 capsule (1 mg) by mouth 2 times daily Total dose: 1.5mg twice daily     No current facility-administered medications for this visit.       ALLERGIES:  Patient has no known allergies.      GENERAL PHYSICAL EXAM:     BP (!) 147/78   Pulse 64   Ht 1.753 m (5' 9\")   Wt 116.1 kg (256 lb)   BMI 37.80 kg/m     Constitutional: No acute distress. Well nourished.   PSYCH: normal mood and " affect.  NEURO: normal gait, no focal deficits.   EYES: anicteric, EOMI, PERR.  ENT: neck supple, no lymphadenopathy, mucosae moist, no thrush.  CARDIOPULMONARY: breathing non-labored, pulse regular rate/rhythm, no peripheral edema.  GI: Abdomen soft, non-tender, prostatectomy surgical scars, no organomegaly.  MUSCULOSKELETAL: normal limb proportions, no muscle wasting, no contractures.  SKIN: Normal virilized hair distribution, no lesions, warts or rashes over genitalia, abdomen extremities or face.  HEME/LYMPH: no echymosis, no lymphadenopathy in groin or neck, no lymphedema.     EXAM:  Phallus circumcised, meatus adequate, no plaques palpated.   Left testis descended , size is 20mL, consistency is normal. No intra-testicular masses.   Right testis descended , size is 20mL , consistency is  normal No intra-testicular masses.     Imaging/labs:  Lab Results   Component Value Date    CR 1.90 04/03/2023    CR 1.99 03/17/2023    CR 2.30 03/16/2023    CR 6.13 12/15/2020    CR 6.27 12/14/2020    CR 6.64 12/10/2020     Lab Results   Component Value Date    PSA 0.07 12/19/2022    PSA 0.04 06/01/2022    PSA 0.05 03/01/2022    PSA 0.06 02/21/2022    PSA 0.04 11/15/2021    PSA 0.03 08/09/2021    PSA 13.50 09/03/2020         ASSESSMENT:   - Erectile Dysfunction (ED): dicussed options including VCD, ICI, MUSE, PDE5, and IPP with Mr. Blanco .  -Prostate cancer, status post prostatectomy and adjuvant radiation therapy.  PSA detectable but low and stable.  He follows with Dr. Vásquez for his prostate cancer surveillance.    PLAN:  I discussed that after prostatectomy and radiation his ED is likely neuropathic, as his sexual function was normal prior to his prostatectomy.  Discussed men in this situation tend to be exquisitely responsive to ICI.  His ED is refractory to oral medications. Discussed options with him and he would like to try Edex injections.  Discussed alternative therapies of Muse, vacuum device, and penile  prosthesis implantation.      Will have him have nursing teaching and trial Edex injection today.. He can follow-up as needed in case he needs a dosage change, to trial Trimix, or is interested in IPP placement.     Armando Gonzalez MD  Urology Resident    I saw and examined the patient with the resident today.  I agree with the resident note and plan of care as above.   Edex teaching today, 10 mcg dose recommended.    Damir Delgadillo MD  Urology Staff          Additional Coding Information:    Problems:  4 -- one or more chronic illnesses with exacerbation or side effects  (ED secondary to sequela of prostate cancer treatment)    Data Reviewed  3 or more studies reviewed, as listed above     Tests ordered/pending: N/A     Level of risk:  4 -- prescription drug management    Time spent:  11 minutes spent on the date of the encounter doing chart review, history and exam, documentation and further activities per the note

## 2023-04-20 NOTE — NURSING NOTE
"Chief Complaint   Patient presents with     Consult     Erectile dysfunction       Blood pressure (!) 147/78, pulse 64, height 1.753 m (5' 9\"), weight 116.1 kg (256 lb). Body mass index is 37.8 kg/m .    Patient Active Problem List   Diagnosis     HTN, kidney transplant related     Gout     Dyslipidemia     Secondary renal hyperparathyroidism (H)     IgA nephropathy     Class 2 obesity due to excess calories without serious comorbidity with body mass index (BMI) of 36.0 to 36.9 in adult     Prostate cancer (H)     Kidney replaced by transplant     Immunosuppression (H)     Hyperkalemia     Hypomagnesemia     Metabolic acidosis     Aftercare following organ transplant     Morbid obesity (H)     Need for pneumocystis prophylaxis     Stage 3b chronic kidney disease (H)     Vitamin D deficiency       No Known Allergies    Current Outpatient Medications   Medication Sig Dispense Refill     ACE/ARB/ARNI NOT PRESCRIBED (INTENTIONAL) Please choose reason not prescribed from choices below.       aspirin (ASA) 325 MG tablet Take 1 tablet (325 mg) by mouth daily 30 tablet 11     atorvastatin (LIPITOR) 10 MG tablet Take 1 tablet (10 mg) by mouth every evening 30 tablet 3     B Complex-C-Folic Acid (VIRT-CAPS) 1 MG CAPS Take 1 mg by mouth daily 90 capsule 3     furosemide (LASIX) 20 MG tablet Take 1 tablet (20 mg) by mouth 2 times daily 60 tablet 3     mycophenolate (GENERIC EQUIVALENT) 250 MG capsule Take 3 capsules (750 mg) by mouth 2 times daily 180 capsule 11     sodium bicarbonate 650 MG tablet Take 3 tablets (1,950 mg) by mouth 3 times daily 270 tablet 11     sodium zirconium cyclosilicate (LOKELMA) 10 g PACK packet Take 1 packet (10 g) by mouth daily 30 packet 3     sulfamethoxazole-trimethoprim (BACTRIM) 400-80 MG tablet Take 1 tablet by mouth Every Mon, Wed, Fri Morning Increase to one tab by mouth daily when directed by transplant team 40 tablet 3     tacrolimus (GENERIC EQUIVALENT) 0.5 MG capsule Take 1 capsule (0.5 " mg) by mouth 2 times daily Total dose: 1.5mg twice daily 180 capsule 3     tacrolimus (GENERIC EQUIVALENT) 1 MG capsule Take 1 capsule (1 mg) by mouth 2 times daily Total dose: 1.5mg twice daily 180 capsule 3       Social History     Tobacco Use     Smoking status: Never     Smokeless tobacco: Never   Substance Use Topics     Alcohol use: Yes     Comment: rare use     Drug use: Never       Olenacandida Lynn  4/20/2023  2:50 PM

## 2023-04-20 NOTE — LETTER
4/20/2023       RE: Donald Blanco  [address] [facility]  [place] [address]     Dear Colleague,    Thank you for referring your patient, Donald Blanco, to the [facility] [place] at [facility]. Please see a copy of my visit note below.    I am seeing Donald Blanco in consultation from Dr. Vásquez for evaluation of ED .    HPI:  Pt seen in consultation for erectile dysfunction.  History of the erectile dysfunction is as follows: He had normal erections until he had his prostatectomy in 2020. Now he is s/p adjuvant radiotherapy as well. Since this time he has tried tadalafil, but this has been ineffective. He is unable to get an erection. He presents today to discuss other options.       REVIEW OF SYSTEMS:  General: negative  Skin: negative  Eyes: negative  Ears/Nose/Throat: negative  Respiratory: negative  Cardiovascular: negative  Gastrointestinal: negative  Genitourinary: see HPI  Musculoskeletal: negative  Neurologic: negative  Psychiatric: negative  Hematologic/Lymphatic/Immunologic: negative  Endocrine: negative    PAST MEDICAL HX:  Past Medical History:   Diagnosis Date    CKD (chronic kidney disease) stage 4, GFR 15-29 ml/min (H)     Elevated PSA     Essential hypertension 05/21/2020    Hyperlipidemia     IgA nephropathy     Obesity     Prostate cancer (H) 10/15/2020    S/P radiation therapy     6,600 cGy to pelvis completed on 6/25/2021 - [facility]       PAST SURG HX:  Past Surgical History:   Procedure Laterality Date    BACK SURGERY      Back Surgey 20+ Years Ago     BIOPSY      [facility] 2010    COLONOSCOPY      10+ Years ago at [facility]     DAVINCI PROSTATECTOMY, LYMPHADENECTOMY N/A 12/14/2020    Procedure: PROSTATECTOMY, ROBOT-ASSISTED, WITH PELVIC LYMPHADENECTOMY;  Surgeon: Nabil Vásquez MD;  Location: [facility] OR    IR CVC TUNNEL REMOVAL RIGHT  8/10/2022    IR CVC TUNNEL REVISION  RIGHT  6/22/2022    IR RENAL BIOPSY RIGHT  5/24/2022    IR RENAL BIOPSY RIGHT  6/22/2022        FAMILY HX:  Family History   Problem Relation Age of Onset    Kidney Disease Father     Hypertension Father     Cancer Mother     No Known Problems Brother     No Known Problems Sister     No Known Problems Son     No Known Problems Daughter     No Known Problems Brother     No Known Problems Brother     No Known Problems Brother     No Known Problems Sister     No Known Problems Sister     No Known Problems Sister        SOCIAL HX:  Social History     Tobacco Use    Smoking status: Never    Smokeless tobacco: Never   Substance Use Topics    Alcohol use: Yes     Comment: rare use    Drug use: Never       MEDICATIONS:  Current Outpatient Medications   Medication Sig    ACE/ARB/ARNI NOT PRESCRIBED (INTENTIONAL) Please choose reason not prescribed from choices below.    aspirin (ASA) 325 MG tablet Take 1 tablet (325 mg) by mouth daily    atorvastatin (LIPITOR) 10 MG tablet Take 1 tablet (10 mg) by mouth every evening    B Complex-C-Folic Acid (VIRT-CAPS) 1 MG CAPS Take 1 mg by mouth daily    furosemide (LASIX) 20 MG tablet Take 1 tablet (20 mg) by mouth 2 times daily    mycophenolate (GENERIC EQUIVALENT) 250 MG capsule Take 3 capsules (750 mg) by mouth 2 times daily    sodium bicarbonate 650 MG tablet Take 3 tablets (1,950 mg) by mouth 3 times daily    sodium zirconium cyclosilicate (LOKELMA) 10 g PACK packet Take 1 packet (10 g) by mouth daily    sulfamethoxazole-trimethoprim (BACTRIM) 400-80 MG tablet Take 1 tablet by mouth Every Mon, Wed, Fri Morning Increase to one tab by mouth daily when directed by transplant team    tacrolimus (GENERIC EQUIVALENT) 0.5 MG capsule Take 1 capsule (0.5 mg) by mouth 2 times daily Total dose: 1.5mg twice daily    tacrolimus (GENERIC EQUIVALENT) 1 MG capsule Take 1 capsule (1 mg) by mouth 2 times daily Total dose: 1.5mg twice daily     No current facility-administered medications for this  "visit.       ALLERGIES:  Patient has no known allergies.      GENERAL PHYSICAL EXAM:     BP (!) 147/78   Pulse 64   Ht 1.753 m (5' 9\")   Wt 116.1 kg (256 lb)   BMI 37.80 kg/m     Constitutional: No acute distress. Well nourished.   PSYCH: normal mood and affect.  NEURO: normal gait, no focal deficits.   EYES: anicteric, EOMI, PERR.  ENT: neck supple, no lymphadenopathy, mucosae moist, no thrush.  CARDIOPULMONARY: breathing non-labored, pulse regular rate/rhythm, no peripheral edema.  GI: Abdomen soft, non-tender, prostatectomy surgical scars, no organomegaly.  MUSCULOSKELETAL: normal limb proportions, no muscle wasting, no contractures.  SKIN: Normal virilized hair distribution, no lesions, warts or rashes over genitalia, abdomen extremities or face.  HEME/LYMPH: no echymosis, no lymphadenopathy in groin or neck, no lymphedema.     EXAM:  Phallus circumcised, meatus adequate, no plaques palpated.   Left testis descended , size is 20mL, consistency is normal. No intra-testicular masses.   Right testis descended , size is 20mL , consistency is  normal No intra-testicular masses.     Imaging/labs:  Lab Results   Component Value Date    CR 1.90 04/03/2023    CR 1.99 03/17/2023    CR 2.30 03/16/2023    CR 6.13 12/15/2020    CR 6.27 12/14/2020    CR 6.64 12/10/2020     Lab Results   Component Value Date    PSA 0.07 12/19/2022    PSA 0.04 06/01/2022    PSA 0.05 03/01/2022    PSA 0.06 02/21/2022    PSA 0.04 11/15/2021    PSA 0.03 08/09/2021    PSA 13.50 09/03/2020         ASSESSMENT:   - Erectile Dysfunction (ED): dicussed options including VCD, ICI, MUSE, PDE5, and IPP with Mr. Blanco .  -Prostate cancer, status post prostatectomy and adjuvant radiation therapy.  PSA detectable but low and stable.  He follows with Dr. Vásquez for his prostate cancer surveillance.    PLAN:  I discussed that after prostatectomy and radiation his ED is likely neuropathic, as his sexual function was normal prior to his prostatectomy.  " Discussed men in this situation tend to be exquisitely responsive to ICI.  His ED is refractory to oral medications. Discussed options with him and he would like to try Edex injections.  Discussed alternative therapies of Muse, vacuum device, and penile prosthesis implantation.      Will have him have nursing teaching and trial Edex injection today.. He can follow-up as needed in case he needs a dosage change, to trial Trimix, or is interested in IPP placement.     Armando Gonzalez MD  Urology Resident    I saw and examined the patient with the resident today.  I agree with the resident note and plan of care as above.   Edex teaching today, 10 mcg dose recommended.    Damir Delgadillo MD  Urology Staff          Additional Coding Information:    Problems:  4 -- one or more chronic illnesses with exacerbation or side effects  (ED secondary to sequela of prostate cancer treatment)    Data Reviewed  3 or more studies reviewed, as listed above     Tests ordered/pending: N/A     Level of risk:  4 -- prescription drug management    Time spent:  11 minutes spent on the date of the encounter doing chart review, history and exam, documentation and further activities per the note

## 2023-04-26 NOTE — PROGRESS NOTES
----- Message from Bethany Nguyễn CMA sent at 4/25/2023  2:40 PM CDT -----  Patient needs 6 month follow up with labs day before ( Gloria guallpa )     SURGERY POST-OP CHECK NOTE  05/18/2022 3:55 AM    Patient: Donald Blanco  MRN: [id number]    Subjective  No acute events postoperatively. Pain well controlled. Denies nausea, vomiting, chest pain, shortness of breath. Patient reports some incisional pain/tenderness and difficulty sleeping. No other complaints.    Objective  Temp:  [97.7  F (36.5  C)-98.6  F (37  C)] 98  F (36.7  C)  Pulse:  [] 81  Resp:  [8-19] 18  BP: ()/(69-84) 117/70  SpO2:  [94 %-100 %] 94 %    General: AAOx4, NAD, lying in bed, appears comfortable  CV: RRR, no murmurs, no visible JVD  Pulm: CTAB, breathing comfortably on 2L NC, equal chest rise, no retractions/no accessory muscle use  Abd: soft, obese, appropriately tender to palpation over RLQ -- guarding without rebound tenderness, non-tender over LLQ, RUQ, LUQ, non-distended, no fluid-wave, no pulsatile force, incisions c/d/i with overlying gauze  Extremities: warm, well-perfused without edema  Neuro: facial movement grossly symmetric, moving all extremities spontaneously without apparent deficit    Labs:  Recent Labs   Lab 05/17/22 2326 05/17/22 2046   WBC  --  12.1*   RBC  --  3.44*   HGB 11.2* 11.3*   HCT  --  34.4*   MCV  --  100   MCH  --  32.8   MCHC  --  32.8   RDW  --  13.0   PLT  --  180       Recent Labs   Lab 05/18/22  0307 05/18/22  0216 05/17/22 2326 05/17/22 2046   NA  --   --   --  132*   POTASSIUM  --   --  5.9* 5.6*   CHLORIDE  --   --   --  95   CO2  --   --   --  28   BUN  --   --   --  48*   CR  --   --   --  8.48*   *   < >  --  122*   YEE  --   --   --  8.6   MAG  --   --   --  2.2   PHOS  --   --   --  3.3    < > = values in this interval not displayed.       Assessment/Plan  Donald Blanco is a 57 year old male with a h/o ESRD 2/2 IgA Nephropathy POD#0 s/p LDKT, doing well and recovering as expected in the immediate post-op period.  No immediate concerns, no indications for intervention overnight.  Continue with plan as outlined previously,  remainder of cares per primary team.      Angel Ferro MD, MILKA  PGY1 General Surgery

## 2023-05-01 ENCOUNTER — LAB (OUTPATIENT)
Dept: LAB | Facility: CLINIC | Age: 58
End: 2023-05-01
Payer: COMMERCIAL

## 2023-05-01 DIAGNOSIS — Z79.899 ENCOUNTER FOR LONG-TERM CURRENT USE OF MEDICATION: ICD-10-CM

## 2023-05-01 DIAGNOSIS — Z48.298 AFTERCARE FOLLOWING ORGAN TRANSPLANT: ICD-10-CM

## 2023-05-01 DIAGNOSIS — Z94.0 KIDNEY REPLACED BY TRANSPLANT: ICD-10-CM

## 2023-05-01 DIAGNOSIS — C61 PROSTATE CANCER (H): ICD-10-CM

## 2023-05-01 LAB
ANION GAP SERPL CALCULATED.3IONS-SCNC: 3 MMOL/L (ref 3–14)
BUN SERPL-MCNC: 33 MG/DL (ref 7–30)
CALCIUM SERPL-MCNC: 10.6 MG/DL (ref 8.5–10.1)
CHLORIDE BLD-SCNC: 113 MMOL/L (ref 94–109)
CO2 SERPL-SCNC: 26 MMOL/L (ref 20–32)
CREAT SERPL-MCNC: 1.75 MG/DL (ref 0.66–1.25)
ERYTHROCYTE [DISTWIDTH] IN BLOOD BY AUTOMATED COUNT: 13.7 % (ref 10–15)
GFR SERPL CREATININE-BSD FRML MDRD: 44 ML/MIN/1.73M2
GLUCOSE BLD-MCNC: 121 MG/DL (ref 70–99)
HCT VFR BLD AUTO: 44.1 % (ref 40–53)
HGB BLD-MCNC: 14 G/DL (ref 13.3–17.7)
MAGNESIUM SERPL-MCNC: 1.8 MG/DL (ref 1.6–2.3)
MCH RBC QN AUTO: 29.8 PG (ref 26.5–33)
MCHC RBC AUTO-ENTMCNC: 31.7 G/DL (ref 31.5–36.5)
MCV RBC AUTO: 94 FL (ref 78–100)
PLATELET # BLD AUTO: 170 10E3/UL (ref 150–450)
POTASSIUM BLD-SCNC: 4.7 MMOL/L (ref 3.4–5.3)
PSA SERPL-MCNC: 0.14 UG/L (ref 0–4)
RBC # BLD AUTO: 4.7 10E6/UL (ref 4.4–5.9)
SODIUM SERPL-SCNC: 142 MMOL/L (ref 133–144)
TACROLIMUS BLD-MCNC: 6.7 UG/L (ref 5–15)
TME LAST DOSE: NORMAL H
TME LAST DOSE: NORMAL H
WBC # BLD AUTO: 6 10E3/UL (ref 4–11)

## 2023-05-01 PROCEDURE — 85027 COMPLETE CBC AUTOMATED: CPT

## 2023-05-01 PROCEDURE — 83735 ASSAY OF MAGNESIUM: CPT

## 2023-05-01 PROCEDURE — 36415 COLL VENOUS BLD VENIPUNCTURE: CPT

## 2023-05-01 PROCEDURE — 80048 BASIC METABOLIC PNL TOTAL CA: CPT

## 2023-05-01 PROCEDURE — 80197 ASSAY OF TACROLIMUS: CPT

## 2023-05-01 PROCEDURE — 87799 DETECT AGENT NOS DNA QUANT: CPT

## 2023-05-01 PROCEDURE — 84153 ASSAY OF PSA TOTAL: CPT

## 2023-05-02 LAB — BKV DNA # SPEC NAA+PROBE: NOT DETECTED COPIES/ML

## 2023-05-17 ENCOUNTER — LAB (OUTPATIENT)
Dept: LAB | Facility: CLINIC | Age: 58
End: 2023-05-17
Payer: COMMERCIAL

## 2023-05-17 ENCOUNTER — OFFICE VISIT (OUTPATIENT)
Dept: NEPHROLOGY | Facility: CLINIC | Age: 58
End: 2023-05-17
Attending: INTERNAL MEDICINE
Payer: COMMERCIAL

## 2023-05-17 ENCOUNTER — TELEPHONE (OUTPATIENT)
Dept: TRANSPLANT | Facility: CLINIC | Age: 58
End: 2023-05-17

## 2023-05-17 VITALS
TEMPERATURE: 97.9 F | SYSTOLIC BLOOD PRESSURE: 145 MMHG | BODY MASS INDEX: 38.35 KG/M2 | WEIGHT: 259.7 LBS | DIASTOLIC BLOOD PRESSURE: 83 MMHG | OXYGEN SATURATION: 93 % | HEART RATE: 68 BPM

## 2023-05-17 DIAGNOSIS — N25.81 SECONDARY RENAL HYPERPARATHYROIDISM (H): ICD-10-CM

## 2023-05-17 DIAGNOSIS — N18.32 STAGE 3B CHRONIC KIDNEY DISEASE (H): Primary | ICD-10-CM

## 2023-05-17 DIAGNOSIS — Z79.899 ENCOUNTER FOR LONG-TERM CURRENT USE OF MEDICATION: ICD-10-CM

## 2023-05-17 DIAGNOSIS — Z48.298 AFTERCARE FOLLOWING ORGAN TRANSPLANT: ICD-10-CM

## 2023-05-17 DIAGNOSIS — Z94.0 KIDNEY REPLACED BY TRANSPLANT: ICD-10-CM

## 2023-05-17 DIAGNOSIS — E87.20 METABOLIC ACIDOSIS: ICD-10-CM

## 2023-05-17 DIAGNOSIS — N02.B9 IGA NEPHROPATHY: ICD-10-CM

## 2023-05-17 DIAGNOSIS — I15.1 HTN, KIDNEY TRANSPLANT RELATED: ICD-10-CM

## 2023-05-17 DIAGNOSIS — D84.9 IMMUNOSUPPRESSION (H): ICD-10-CM

## 2023-05-17 DIAGNOSIS — N18.32 STAGE 3B CHRONIC KIDNEY DISEASE (H): ICD-10-CM

## 2023-05-17 DIAGNOSIS — E87.8 LOW BICARBONATE: ICD-10-CM

## 2023-05-17 DIAGNOSIS — R79.89 ELEVATED PARATHYROID HORMONE: Primary | ICD-10-CM

## 2023-05-17 DIAGNOSIS — Z94.0 HTN, KIDNEY TRANSPLANT RELATED: ICD-10-CM

## 2023-05-17 DIAGNOSIS — Z29.89 NEED FOR PNEUMOCYSTIS PROPHYLAXIS: ICD-10-CM

## 2023-05-17 LAB
ALBUMIN SERPL BCG-MCNC: 4.4 G/DL (ref 3.5–5.2)
ALP SERPL-CCNC: 91 U/L (ref 40–129)
ALT SERPL W P-5'-P-CCNC: 24 U/L (ref 10–50)
ANION GAP SERPL CALCULATED.3IONS-SCNC: 11 MMOL/L (ref 7–15)
AST SERPL W P-5'-P-CCNC: 24 U/L (ref 10–50)
BILIRUB DIRECT SERPL-MCNC: 0.22 MG/DL (ref 0–0.3)
BILIRUB SERPL-MCNC: 0.9 MG/DL
BUN SERPL-MCNC: 17.6 MG/DL (ref 8–23)
CALCIUM SERPL-MCNC: 10.6 MG/DL (ref 8.8–10.2)
CD3 CELLS # BLD: 262 CELLS/UL (ref 603–2990)
CD3 CELLS NFR BLD: 40 % (ref 49–84)
CD3+CD4+ CELLS # BLD: 169 CELLS/UL (ref 441–2156)
CD3+CD4+ CELLS NFR BLD: 26 % (ref 28–63)
CD3+CD4+ CELLS/CD3+CD8+ CLL BLD: 1.81 % (ref 1.4–2.6)
CD3+CD8+ CELLS # BLD: 93 CELLS/UL (ref 125–1312)
CD3+CD8+ CELLS NFR BLD: 14 % (ref 10–40)
CHLORIDE SERPL-SCNC: 106 MMOL/L (ref 98–107)
CHOLEST SERPL-MCNC: 147 MG/DL
CREAT SERPL-MCNC: 1.77 MG/DL (ref 0.67–1.17)
DEPRECATED CALCIDIOL+CALCIFEROL SERPL-MC: 48 UG/L (ref 20–75)
DEPRECATED HCO3 PLAS-SCNC: 25 MMOL/L (ref 22–29)
GFR SERPL CREATININE-BSD FRML MDRD: 43 ML/MIN/1.73M2
GLUCOSE SERPL-MCNC: 115 MG/DL (ref 70–99)
HBA1C MFR BLD: 5.8 %
HDLC SERPL-MCNC: 38 MG/DL
LDLC SERPL CALC-MCNC: 74 MG/DL
NONHDLC SERPL-MCNC: 109 MG/DL
POTASSIUM SERPL-SCNC: 5 MMOL/L (ref 3.4–5.3)
PROT SERPL-MCNC: 7.3 G/DL (ref 6.4–8.3)
PTH-INTACT SERPL-MCNC: 259 PG/ML (ref 15–65)
SODIUM SERPL-SCNC: 142 MMOL/L (ref 136–145)
T CELL COMMENT: ABNORMAL
TRIGL SERPL-MCNC: 174 MG/DL
URATE SERPL-MCNC: 8.6 MG/DL (ref 3.4–7)

## 2023-05-17 PROCEDURE — G0463 HOSPITAL OUTPT CLINIC VISIT: HCPCS | Performed by: INTERNAL MEDICINE

## 2023-05-17 PROCEDURE — 80053 COMPREHEN METABOLIC PANEL: CPT | Performed by: PATHOLOGY

## 2023-05-17 PROCEDURE — 86360 T CELL ABSOLUTE COUNT/RATIO: CPT | Mod: 90 | Performed by: PATHOLOGY

## 2023-05-17 PROCEDURE — 82306 VITAMIN D 25 HYDROXY: CPT | Mod: 90 | Performed by: PATHOLOGY

## 2023-05-17 PROCEDURE — 82248 BILIRUBIN DIRECT: CPT | Performed by: PATHOLOGY

## 2023-05-17 PROCEDURE — 99214 OFFICE O/P EST MOD 30 MIN: CPT | Mod: GC | Performed by: INTERNAL MEDICINE

## 2023-05-17 PROCEDURE — 36415 COLL VENOUS BLD VENIPUNCTURE: CPT | Performed by: PATHOLOGY

## 2023-05-17 PROCEDURE — 83036 HEMOGLOBIN GLYCOSYLATED A1C: CPT | Mod: 90 | Performed by: PATHOLOGY

## 2023-05-17 PROCEDURE — 80061 LIPID PANEL: CPT | Performed by: PATHOLOGY

## 2023-05-17 PROCEDURE — 83970 ASSAY OF PARATHORMONE: CPT | Performed by: PATHOLOGY

## 2023-05-17 PROCEDURE — 86359 T CELLS TOTAL COUNT: CPT | Mod: 90 | Performed by: PATHOLOGY

## 2023-05-17 PROCEDURE — 99000 SPECIMEN HANDLING OFFICE-LAB: CPT | Performed by: PATHOLOGY

## 2023-05-17 PROCEDURE — 84550 ASSAY OF BLOOD/URIC ACID: CPT | Performed by: PATHOLOGY

## 2023-05-17 RX ORDER — ASPIRIN 81 MG/1
81 TABLET ORAL DAILY
COMMUNITY
Start: 2023-05-17

## 2023-05-17 RX ORDER — SODIUM BICARBONATE
POWDER (GRAM) MISCELLANEOUS
Qty: 1 G | Refills: 0 | COMMUNITY
Start: 2023-05-17

## 2023-05-17 RX ORDER — SODIUM BICARBONATE
POWDER (GRAM) MISCELLANEOUS
Qty: 1 G | Refills: 0 | COMMUNITY
Start: 2023-05-17 | End: 2023-05-17

## 2023-05-17 ASSESSMENT — PAIN SCALES - GENERAL: PAINLEVEL: NO PAIN (0)

## 2023-05-17 NOTE — NURSING NOTE
Chief Complaint   Patient presents with     RECHECK     Return visit.     Blood pressure (!) 145/83, pulse 68, temperature 97.9  F (36.6  C), weight 117.8 kg (259 lb 11.2 oz), SpO2 93 %.    ELIECER BEEBE

## 2023-05-17 NOTE — PROGRESS NOTES
TRANSPLANT NEPHROLOGY EARLY POST TRANSPLANT VISIT     Assessment & Plan   # LDKT: Trend down, .   - Baseline Creatinine: ~ 1.7-2 down from prior baseline of 2.0-2.3   - Proteinuria: Normal (<0.2 grams)   - Date DSA Last Checked: Mar/2023      Latest DSA: No   - BK Viremia: No   - Kidney Tx Biopsy: Jun 22, 2022; Result: No diagnostic evidence of acute rejection.  Acute tubular injury.  Mild interstitial fibrosis and tubular atrophy.             May 24, 2022; Result: No diagnostic evidence of acute rejection.  Acute tubular injury.   - Transplant Ureteral Stent: No    # Immunosuppression: Tacrolimus immediate release (goal 4-6) and Mycophenolate mofetil (dose 750 mg every 12 hours)   - Induction with Recent Transplant:  High Intensity   - Continue with intensive monitoring of immunosuppression for efficacy and toxicity.   - Changes: yes would not change the  dose as trough was 6.7  but change the goal; to 4-6      # Infection Prophylaxis:   - PJP: Sulfa/TMP (Bactrim) M,W,F-will check CD4 and decide whether to continue the Bactrim or not  - CMV: None    # Hypertension: Borderline control;  Goal BP: < 130/80   - Volume status: Euvolemic   - Changes: Not at this time; continue checking blood pressure at home    # Elevated Blood Glucose:  Last HbA1c: 5.8%   - Management as per primary care.    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  - Secondary renal hyperparathyroidism; PTH level: Mildly elevated (151-300 pg/ml)        On treatment: None  - Vitamin D; level: Normal        On supplement: Yes  - Calcium; level: High        On supplement: No will check if sensipar is cover by insurance.than will start sensipar 30 mg daily    # Electrolytes:   - Potassium; level: High normal        On binder: Yes lokelma 10 g po every day,  - Magnesium; level: Normal        On supplement: Yes  - Bicarbonate; level: Normal        On supplement: Yes Ok to decrease baking soda 1 teasp daily from 1.5 teaspoon    # Obesity, Class II  (BMI = 36.3): Patient is up ~ 25 lbs since transplant.   - Recommend weight loss for overall health by increasing exercise and watching caloric intake.    # Medical Compliance: Yes    # COVID-19 Virus Review: Discussed COVID-19 virus and the potential medical risks.  Reviewed preventative health recommendations, including wearing a mask where appropriate.  Recommended COVID vaccination should be up to date with either an initial vaccination or booster shot when appropriate.  Asked the patient to inform the transplant center if they are exposed or diagnosed with this virus.    # COVID Vaccination Up To Date: Yes    # Transplant History:  Etiology of Kidney Failure: IgA nephropathy  Tx: LDKT  Transplant: 5/17/2022 (Kidney)  Donor Type: Living Donor Class:   Crossmatch at time of Tx: negative  DSA at time of Tx: No  Significant changes in immunosuppression: None  CMV IgG Ab High Risk Discordance (D+/R-): No  EBV IgG Ab High Risk Discordance (D+/R-): No  Significant transplant-related complications: DGF    Transplant Office Phone Number: [phone]    Assessment and plan was discussed with the patient and he voiced his understanding and agreement.    Return visit: 4 months    Ludin Lima MD    Chief Complaint   Mr. Blanco is a 58 year old here for kidney transplant and immunosuppression management.     History of Present Illness    Feels good overall.  Patient reports weight gain of 25 lb since transplant indicated him the importance of losing weight.He has history of hyperkalemia and on lokelma so will check CD4 count to see if he can be taken of bacterim and eventually lokelma. His SBP is 130-140    No peripheral edema,nausea, vomiting, fever, chills, headache, numbness, tingling, cough, dyspnea, chest pain, abdominal pain, diarrhea, constipation,NSAID use, dysuria, and rash. Normal appetite.      Primary Nephrologist:     Problem List   Patient Active Problem List   Diagnosis     HTN, kidney transplant  related     Gout     Dyslipidemia     Secondary renal hyperparathyroidism (H)     IgA nephropathy     Class 2 obesity due to excess calories without serious comorbidity with body mass index (BMI) of 36.0 to 36.9 in adult     Prostate cancer (H)     Kidney replaced by transplant     Immunosuppression (H)     Hyperkalemia     Hypomagnesemia     Metabolic acidosis     Aftercare following organ transplant     Morbid obesity (H)     Need for pneumocystis prophylaxis     Stage 3b chronic kidney disease (H)     Vitamin D deficiency       Allergies   No Known Allergies    Medications   Current Outpatient Medications   Medication Sig     ACE/ARB/ARNI NOT PRESCRIBED (INTENTIONAL) Please choose reason not prescribed from choices below.     alprostadil (EDEX) 10 MCG kit 10 mcg by Intracavitary route as needed for erectile dysfunction use no more than 3 times per week     aspirin (ASA) 325 MG tablet Take 1 tablet (325 mg) by mouth daily     atorvastatin (LIPITOR) 10 MG tablet Take 1 tablet (10 mg) by mouth every evening     B Complex-C-Folic Acid (VIRT-CAPS) 1 MG CAPS Take 1 mg by mouth daily     COMPOUNDED NON-CONTROLLED SUBSTANCE (CMPD RX) - PHARMACY TO MIX COMPOUNDED MEDICATION Alprostadil 50 mcg inject 0.2 mL intracavitary daily no more than three times weekly with twenty-four hours between injections     furosemide (LASIX) 20 MG tablet Take 1 tablet (20 mg) by mouth 2 times daily     mycophenolate (GENERIC EQUIVALENT) 250 MG capsule Take 3 capsules (750 mg) by mouth 2 times daily     sodium bicarbonate 650 MG tablet Take 3 tablets (1,950 mg) by mouth 3 times daily     sodium zirconium cyclosilicate (LOKELMA) 10 g PACK packet Take 1 packet (10 g) by mouth daily     sulfamethoxazole-trimethoprim (BACTRIM) 400-80 MG tablet Take 1 tablet by mouth Every Mon, Wed, Fri Morning Increase to one tab by mouth daily when directed by transplant team     tacrolimus (GENERIC EQUIVALENT) 0.5 MG capsule Take 1 capsule (0.5 mg) by mouth 2  times daily Total dose: 1.5mg twice daily     tacrolimus (GENERIC EQUIVALENT) 1 MG capsule Take 1 capsule (1 mg) by mouth 2 times daily Total dose: 1.5mg twice daily     No current facility-administered medications for this visit.     There are no discontinued medications.    Physical Exam   Vital Signs: BP (!) 145/83   Pulse 68   Temp 97.9  F (36.6  C)   Wt 117.8 kg (259 lb 11.2 oz)   SpO2 93%   BMI 38.35 kg/m      GENERAL APPEARANCE: alert and no distress  HENT: mouth without ulcers or lesions  LYMPHATICS: no cervical or supraclavicular nodes  RESP: lungs clear to auscultation - no rales, rhonchi or wheezes  CV: regular rhythm, normal rate, no rub, no murmur  EDEMA: no LE edema bilaterally  ABDOMEN: soft, nondistended, nontender, bowel sounds normal, obese  MS: extremities normal - no gross deformities noted, no evidence of inflammation in joints, no muscle tenderness  SKIN: no rash  TX KIDNEY: normal  DIALYSIS ACCESS: none    Data         Latest Ref Rng & Units 5/1/2023     6:58 AM 4/3/2023     6:58 AM 3/17/2023     6:08 PM   Renal   Sodium 133 - 144 mmol/L 142   140   143     K 3.4 - 5.3 mmol/L 4.7   4.9   4.5     Cl 94 - 109 mmol/L 113   112   108     Cl (external) 94 - 109 mmol/L 113   112   108     CO2 20 - 32 mmol/L 26   26   25     Urea Nitrogen 7 - 30 mg/dL 33   42      Urea Nitrogen 8.0 - 23.0 mg/dL   34.8     Creatinine 0.66 - 1.25 mg/dL 1.75   1.90   1.99     Glucose 70 - 99 mg/dL 121   116   117     Calcium 8.5 - 10.1 mg/dL 10.6   10.4   10.3     Magnesium 1.6 - 2.3 mg/dL 1.8             Latest Ref Rng & Units 5/17/2023     9:42 AM 3/16/2023     7:07 AM 10/10/2022     7:07 AM   Bone Health   Phosphorus 2.5 - 4.5 mg/dL   1.8     Parathyroid Hormone Intact 15 - 65 pg/mL 259   176      Vit D Def 20 - 75 ug/L  53            Latest Ref Rng & Units 5/1/2023     6:58 AM 4/3/2023     6:58 AM 3/17/2023     6:08 PM   Heme   WBC 4.0 - 11.0 10e3/uL 6.0   6.2   7.7     Hgb 13.3 - 17.7 g/dL 14.0   14.2   13.6      Plt 150 - 450 10e3/uL 170   180   161           Latest Ref Rng & Units 5/17/2023     9:42 AM 11/21/2022     7:00 AM 6/3/2022    10:48 AM   Liver   AP 40 - 129 U/L 91   105   83     TBili <=1.2 mg/dL 0.9   0.6   0.5     Bilirubin Direct 0.0 - 0.2 mg/dL  0.1   0.2     Bilirubin Direct 0.00 - 0.30 mg/dL 0.22       ALT 10 - 50 U/L 24   26   23     AST 10 - 50 U/L 24   16   9     Tot Protein 6.4 - 8.3 g/dL 7.3   7.2   6.6     Albumin 3.4 - 5.0 g/dL  4.0   3.3     Albumin 3.5 - 5.2 g/dL 4.4             Latest Ref Rng & Units 11/21/2022     7:00 AM 6/3/2022    10:48 AM 5/9/2022    10:00 AM   Pancreas   A1C 0.0 - 5.6 % 5.6   5.1   5.0           Latest Ref Rng & Units 11/7/2022     7:08 AM 10/10/2022     7:07 AM 9/16/2022     7:07 AM   Iron studies   Iron 35 - 180 ug/dL 81   102      Iron Saturation Index 15 - 46 % 26   31      Ferritin 26 - 388 ng/mL 494   542   602           Latest Ref Rng & Units 5/17/2022     1:35 PM 5/9/2022    10:00 AM 9/3/2020    11:31 AM   [facility] Txp Virology   CMV QUANT IU/ML Not Detected IU/mL Not Detected       EBV CAPSID ANTIBODY IGG No detectable antibody.  Positive   >8.0     Hep B Core NR^Nonreactive   Nonreactive          Recent Labs   Lab Test 03/16/23  0707 04/03/23  0658 05/01/23  0658   DOSTAC 3/15/2023 4/2/2023 4/30/2023   TACROL 7.8 6.9 6.7     Recent Labs   Lab Test 07/25/22  0656 07/29/22  0740 08/01/22  0706   DOSMPA  --  7/28/2022   8:00 PM 7/31/2022   8:00 PM   MPACID 2.66 6.07* 2.63   MPAG 171.4* 91.6 90.6     This patient has been seen and evaluated by me, Donte Duff MD.  I have reviewed the note and agree with plan of care as documented by the fellow.

## 2023-05-17 NOTE — PATIENT INSTRUCTIONS
-will check sensipar coverage to help with high calcium level    - decrease baking soda to 1 teaspoon    - will do a CD4 count  and if its high, will discontinue the Bactrim       Transplant Patient Information  Your Post Transplant Coordinator is: Alicia Valentin  You and your care team can contact your transplant coordinator Monday - Friday, 8am - 5pm at [phone] (Option 2 to reach the coordinator or Option 4 to schedule an appointment).  You can also reach your care team online via [employer].  After hours for urgent matters, please call [facility] at [phone].

## 2023-05-17 NOTE — LETTER
5/17/2023       RE: Donald Blanco  [address] [place] [address]     Dear Colleague,    Thank you for referring your patient, Donald Blanco, to the [facility] NEPHROLOGY [facility] [employer] at [facility]. Please see a copy of my visit note below.    TRANSPLANT NEPHROLOGY EARLY POST TRANSPLANT VISIT     Assessment & Plan   # LDKT: Trend down, .   - Baseline Creatinine: ~ 1.7-2 down from prior baseline of 2.0-2.3   - Proteinuria: Normal (<0.2 grams)   - Date DSA Last Checked: Mar/2023      Latest DSA: No   - BK Viremia: No   - Kidney Tx Biopsy: Jun 22, 2022; Result: No diagnostic evidence of acute rejection.  Acute tubular injury.  Mild interstitial fibrosis and tubular atrophy.             May 24, 2022; Result: No diagnostic evidence of acute rejection.  Acute tubular injury.   - Transplant Ureteral Stent: No    # Immunosuppression: Tacrolimus immediate release (goal 4-6) and Mycophenolate mofetil (dose 750 mg every 12 hours)   - Induction with Recent Transplant:  High Intensity   - Continue with intensive monitoring of immunosuppression for efficacy and toxicity.   - Changes: yes would not change the  dose as trough was 6.7  but change the goal; to 4-6      # Infection Prophylaxis:   - PJP: Sulfa/TMP (Bactrim) M,W,F-will check CD4 and decide whether to continue the Bactrim or not  - CMV: None    # Hypertension: Borderline control;  Goal BP: < 130/80   - Volume status: Euvolemic   - Changes: Not at this time; continue checking blood pressure at home    # Elevated Blood Glucose:  Last HbA1c: 5.8%   - Management as per primary care.    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3): Patient is up ~ 25 lbs since transplant.   - Recommend weight loss for overall health by increasing exercise and watching caloric intake.    # Medical Compliance: Yes    # COVID-19 Virus Review: Discussed COVID-19 virus and the potential medical risks.  Reviewed preventative health recommendations, including wearing a mask where appropriate.  Recommended COVID vaccination should be up to date with either an initial vaccination or booster shot when appropriate.  Asked the patient to inform the transplant center if they are exposed or diagnosed with this virus.    # COVID Vaccination Up To Date: Yes    # Transplant History:  Etiology of Kidney Failure: IgA nephropathy  Tx: LDKT  Transplant: 5/17/2022 (Kidney)  Donor Type: Living Donor Class:   Crossmatch at time of Tx: negative  DSA at time of Tx: No  Significant changes in immunosuppression: None  CMV IgG Ab High Risk Discordance (D+/R-): No  EBV IgG Ab High Risk Discordance (D+/R-): No  Significant transplant-related complications: DGF    Transplant Office Phone Number: [phone]    Assessment and plan was discussed with the patient and he voiced his understanding and agreement.    Return visit: 4 months    Ludin Lima MD    Chief Complaint   Mr. Blanco is a 58 year old here for kidney transplant and immunosuppression management.     History of Present Illness    Feels good overall.  Patient reports weight gain of 25 lb since transplant indicated him the importance of losing weight.He has history of hyperkalemia and on lokelma so will check CD4 count to see if he can be taken of bacterim and eventually lokelma. His SBP is 130-140    No  peripheral edema,nausea, vomiting, fever, chills, headache, numbness, tingling, cough, dyspnea, chest pain, abdominal pain, diarrhea, constipation,NSAID use, dysuria, and rash. Normal appetite.      Primary Nephrologist:     Problem List   Patient Active Problem List   Diagnosis    HTN, kidney transplant related    Gout    Dyslipidemia    Secondary renal hyperparathyroidism (H)    IgA nephropathy    Class 2 obesity due to excess calories without serious comorbidity with body mass index (BMI) of 36.0 to 36.9 in adult    Prostate cancer (H)    Kidney replaced by transplant    Immunosuppression (H)    Hyperkalemia    Hypomagnesemia    Metabolic acidosis    Aftercare following organ transplant    Morbid obesity (H)    Need for pneumocystis prophylaxis    Stage 3b chronic kidney disease (H)    Vitamin D deficiency       Allergies   No Known Allergies    Medications   Current Outpatient Medications   Medication Sig    ACE/ARB/ARNI NOT PRESCRIBED (INTENTIONAL) Please choose reason not prescribed from choices below.    alprostadil (EDEX) 10 MCG kit 10 mcg by Intracavitary route as needed for erectile dysfunction use no more than 3 times per week    aspirin (ASA) 325 MG tablet Take 1 tablet (325 mg) by mouth daily    atorvastatin (LIPITOR) 10 MG tablet Take 1 tablet (10 mg) by mouth every evening    B Complex-C-Folic Acid (VIRT-CAPS) 1 MG CAPS Take 1 mg by mouth daily    COMPOUNDED NON-CONTROLLED SUBSTANCE (CMPD RX) - PHARMACY TO MIX COMPOUNDED MEDICATION Alprostadil 50 mcg inject 0.2 mL intracavitary daily no more than three times weekly with twenty-four hours between injections    furosemide (LASIX) 20 MG tablet Take 1 tablet (20 mg) by mouth 2 times daily    mycophenolate (GENERIC EQUIVALENT) 250 MG capsule Take 3 capsules (750 mg) by mouth 2 times daily    sodium bicarbonate 650 MG tablet Take 3 tablets (1,950 mg) by mouth 3 times daily    sodium zirconium cyclosilicate (LOKELMA) 10 g PACK packet Take 1 packet  (10 g) by mouth daily    sulfamethoxazole-trimethoprim (BACTRIM) 400-80 MG tablet Take 1 tablet by mouth Every Mon, Wed, Fri Morning Increase to one tab by mouth daily when directed by transplant team    tacrolimus (GENERIC EQUIVALENT) 0.5 MG capsule Take 1 capsule (0.5 mg) by mouth 2 times daily Total dose: 1.5mg twice daily    tacrolimus (GENERIC EQUIVALENT) 1 MG capsule Take 1 capsule (1 mg) by mouth 2 times daily Total dose: 1.5mg twice daily     No current facility-administered medications for this visit.     There are no discontinued medications.    Physical Exam   Vital Signs: BP (!) 145/83   Pulse 68   Temp 97.9  F (36.6  C)   Wt 117.8 kg (259 lb 11.2 oz)   SpO2 93%   BMI 38.35 kg/m      GENERAL APPEARANCE: alert and no distress  HENT: mouth without ulcers or lesions  LYMPHATICS: no cervical or supraclavicular nodes  RESP: lungs clear to auscultation - no rales, rhonchi or wheezes  CV: regular rhythm, normal rate, no rub, no murmur  EDEMA: no LE edema bilaterally  ABDOMEN: soft, nondistended, nontender, bowel sounds normal, obese  MS: extremities normal - no gross deformities noted, no evidence of inflammation in joints, no muscle tenderness  SKIN: no rash  TX KIDNEY: normal  DIALYSIS ACCESS: none    Data         Latest Ref Rng & Units 5/1/2023     6:58 AM 4/3/2023     6:58 AM 3/17/2023     6:08 PM   Renal   Sodium 133 - 144 mmol/L 142   140   143     K 3.4 - 5.3 mmol/L 4.7   4.9   4.5     Cl 94 - 109 mmol/L 113   112   108     Cl (external) 94 - 109 mmol/L 113   112   108     CO2 20 - 32 mmol/L 26   26   25     Urea Nitrogen 7 - 30 mg/dL 33   42      Urea Nitrogen 8.0 - 23.0 mg/dL   34.8     Creatinine 0.66 - 1.25 mg/dL 1.75   1.90   1.99     Glucose 70 - 99 mg/dL 121   116   117     Calcium 8.5 - 10.1 mg/dL 10.6   10.4   10.3     Magnesium 1.6 - 2.3 mg/dL 1.8             Latest Ref Rng & Units 5/17/2023     9:42 AM 3/16/2023     7:07 AM 10/10/2022     7:07 AM   Bone Health   Phosphorus 2.5 - 4.5 mg/dL    1.8     Parathyroid Hormone Intact 15 - 65 pg/mL 259   176      Vit D Def 20 - 75 ug/L  53            Latest Ref Rng & Units 5/1/2023     6:58 AM 4/3/2023     6:58 AM 3/17/2023     6:08 PM   Heme   WBC 4.0 - 11.0 10e3/uL 6.0   6.2   7.7     Hgb 13.3 - 17.7 g/dL 14.0   14.2   13.6     Plt 150 - 450 10e3/uL 170   180   161           Latest Ref Rng & Units 5/17/2023     9:42 AM 11/21/2022     7:00 AM 6/3/2022    10:48 AM   Liver   AP 40 - 129 U/L 91   105   83     TBili <=1.2 mg/dL 0.9   0.6   0.5     Bilirubin Direct 0.0 - 0.2 mg/dL  0.1   0.2     Bilirubin Direct 0.00 - 0.30 mg/dL 0.22       ALT 10 - 50 U/L 24   26   23     AST 10 - 50 U/L 24   16   9     Tot Protein 6.4 - 8.3 g/dL 7.3   7.2   6.6     Albumin 3.4 - 5.0 g/dL  4.0   3.3     Albumin 3.5 - 5.2 g/dL 4.4             Latest Ref Rng & Units 11/21/2022     7:00 AM 6/3/2022    10:48 AM 5/9/2022    10:00 AM   Pancreas   A1C 0.0 - 5.6 % 5.6   5.1   5.0           Latest Ref Rng & Units 11/7/2022     7:08 AM 10/10/2022     7:07 AM 9/16/2022     7:07 AM   Iron studies   Iron 35 - 180 ug/dL 81   102      Iron Saturation Index 15 - 46 % 26   31      Ferritin 26 - 388 ng/mL 494   542   602           Latest Ref Rng & Units 5/17/2022     1:35 PM 5/9/2022    10:00 AM 9/3/2020    11:31 AM   [facility] Txp Virology   CMV QUANT IU/ML Not Detected IU/mL Not Detected       EBV CAPSID ANTIBODY IGG No detectable antibody.  Positive   >8.0     Hep B Core NR^Nonreactive   Nonreactive          Recent Labs   Lab Test 03/16/23  0707 04/03/23  0658 05/01/23  0658   DOSTAC 3/15/2023 4/2/2023 4/30/2023   TACROL 7.8 6.9 6.7     Recent Labs   Lab Test 07/25/22  0656 07/29/22  0740 08/01/22  0706   DOSMPA  --  7/28/2022   8:00 PM 7/31/2022   8:00 PM   MPACID 2.66 6.07* 2.63   MPAG 171.4* 91.6 90.6     This patient has been seen and evaluated by me, Donte Duff MD.  I have reviewed the note and agree with plan of care as documented by the fellow.

## 2023-05-17 NOTE — TELEPHONE ENCOUNTER
Ludin Lima MD Colianni, Lauren, RN  Cc: Donte Grove MD  Hi,     We were thinking of starting him on Sensipar 30 mg but he wanted to run it with his insurance before it started. Can you please check it with his insurance? Also we send CD4 count to see if we can discontinue the bactrim.

## 2023-05-19 RX ORDER — CINACALCET 30 MG/1
30 TABLET, FILM COATED ORAL DAILY
Qty: 90 TABLET | Refills: 3 | Status: SHIPPED | OUTPATIENT
Start: 2023-05-19 | End: 2024-03-15

## 2023-05-24 ENCOUNTER — TELEPHONE (OUTPATIENT)
Dept: PHARMACY | Facility: CLINIC | Age: 58
End: 2023-05-24
Payer: COMMERCIAL

## 2023-05-24 NOTE — TELEPHONE ENCOUNTER
Clinical Pharmacy Consult:                                                      Transplant Specific: 12 Month Post Transplant medication review  Date of Transplant: 05/17/2022  Type of Transplant: kidney  First Transplant: yes  History of rejection: no    Immunosuppression Regimen   TAC 1.5mg qAM & 1.5mg qPM and MMF 750mg qAM & 750mg qPM  Patient specific goal: 6-8  Most recent level: 6.7, date 5/1/23  Immunosuppressant Levels:  Therapeutic  Pt adherent to lab draws: yes  Scr:   Lab Results   Component Value Date    CR 2.71 06/27/2022    CR 6.13 12/15/2020     Side effects: no side effects    Prophylactic Medications  Antibacterial:  Bactrim ss 3 times a week  Scheduled Discontinue Date: Lifelong    Antifungal: Not needed thus far  Scheduled Discontinue Date: N/A    Antiviral: CrCl 10 to 24 mL/minute: Valcyte 450 mg twice weekly   Scheduled Discontinue Date: completed    Acid Reducer: Protonix (pantoprazole)  Scheduled Reviewed Date:      Thrombosis Prevention: Aspirin 325 mg PO daily  Scheduled Discontinue Date: monitored by clinic    Blood Pressure Management  Frequency of home Blood Pressure checks: checking once a week  Most recent home BP: 130's/70's  Patient Blood pressure goal: <130/80  Patient blood pressure at goal:  borderline    Hospitalizations/ER visits since last assessment: 0    Med rec/DUR performed: yes  Med Rec Discrepancies: no        5/24/2023     2:00 PM   Medication adherence flowsheet   Patient medication administration: Responsible for own medications   Patient estimated adherence level: %   Pharmacist assessment of adherence: Good   Patient reported doses missed per week: 0-1   Facilitators to medication adherence  Pill box;Schedule/routine          5/24/2023     2:00 PM   Medication access flowsheet   Number of pharmacies used: 1   Pharmacy: [facility]   Enrolled in [facility] pharmacy? Yes        Donald reported feeling really good with no side effects. He sets up  his own pill box and take as routine. He has not missed any doses in the past 6 months. Adherance with medication refills. He was just started on cinacalet and will be receiving order from us today.    He is checking his PRESTON about once weekly now. Numbers have been 130's/70's, goal is <130/80.    Donald has no other questions or concerns.    Follow up in 12 months    Albert Peterson, Pharm D  [facility]

## 2023-06-05 ENCOUNTER — LAB (OUTPATIENT)
Dept: LAB | Facility: CLINIC | Age: 58
End: 2023-06-05
Payer: COMMERCIAL

## 2023-06-05 DIAGNOSIS — Z79.899 ENCOUNTER FOR LONG-TERM CURRENT USE OF MEDICATION: ICD-10-CM

## 2023-06-05 DIAGNOSIS — Z48.298 AFTERCARE FOLLOWING ORGAN TRANSPLANT: ICD-10-CM

## 2023-06-05 DIAGNOSIS — Z94.0 KIDNEY REPLACED BY TRANSPLANT: ICD-10-CM

## 2023-06-05 LAB
ANION GAP SERPL CALCULATED.3IONS-SCNC: 11 MMOL/L (ref 7–15)
BUN SERPL-MCNC: 26.4 MG/DL (ref 8–23)
CALCIUM SERPL-MCNC: 10.1 MG/DL (ref 8.8–10.2)
CHLORIDE SERPL-SCNC: 108 MMOL/L (ref 98–107)
CREAT SERPL-MCNC: 1.83 MG/DL (ref 0.67–1.17)
DEPRECATED HCO3 PLAS-SCNC: 24 MMOL/L (ref 22–29)
ERYTHROCYTE [DISTWIDTH] IN BLOOD BY AUTOMATED COUNT: 13 % (ref 10–15)
GFR SERPL CREATININE-BSD FRML MDRD: 42 ML/MIN/1.73M2
GLUCOSE SERPL-MCNC: 114 MG/DL (ref 70–99)
HCT VFR BLD AUTO: 44.3 % (ref 40–53)
HGB BLD-MCNC: 14.2 G/DL (ref 13.3–17.7)
MCH RBC QN AUTO: 29.6 PG (ref 26.5–33)
MCHC RBC AUTO-ENTMCNC: 32.1 G/DL (ref 31.5–36.5)
MCV RBC AUTO: 92 FL (ref 78–100)
PLATELET # BLD AUTO: 171 10E3/UL (ref 150–450)
POTASSIUM SERPL-SCNC: 4.9 MMOL/L (ref 3.4–5.3)
RBC # BLD AUTO: 4.8 10E6/UL (ref 4.4–5.9)
SODIUM SERPL-SCNC: 143 MMOL/L (ref 136–145)
TACROLIMUS BLD-MCNC: 6.9 UG/L (ref 5–15)
TME LAST DOSE: NORMAL H
TME LAST DOSE: NORMAL H
WBC # BLD AUTO: 6.2 10E3/UL (ref 4–11)

## 2023-06-05 PROCEDURE — 85027 COMPLETE CBC AUTOMATED: CPT

## 2023-06-05 PROCEDURE — 36415 COLL VENOUS BLD VENIPUNCTURE: CPT

## 2023-06-05 PROCEDURE — 80048 BASIC METABOLIC PNL TOTAL CA: CPT

## 2023-06-05 PROCEDURE — 80197 ASSAY OF TACROLIMUS: CPT

## 2023-06-05 PROCEDURE — 87799 DETECT AGENT NOS DNA QUANT: CPT

## 2023-06-06 LAB — BKV DNA # SPEC NAA+PROBE: NOT DETECTED COPIES/ML

## 2023-06-07 ENCOUNTER — TELEPHONE (OUTPATIENT)
Dept: TRANSPLANT | Facility: CLINIC | Age: 58
End: 2023-06-07
Payer: COMMERCIAL

## 2023-06-07 DIAGNOSIS — Z94.0 KIDNEY TRANSPLANTED: Primary | ICD-10-CM

## 2023-06-07 DIAGNOSIS — Z94.0 KIDNEY REPLACED BY TRANSPLANT: Chronic | ICD-10-CM

## 2023-06-07 RX ORDER — TACROLIMUS 0.5 MG/1
CAPSULE ORAL
Qty: 180 CAPSULE | Refills: 3 | Status: SHIPPED | OUTPATIENT
Start: 2023-06-07

## 2023-06-07 RX ORDER — TACROLIMUS 1 MG/1
1 CAPSULE ORAL 2 TIMES DAILY
Qty: 180 CAPSULE | Refills: 3 | Status: SHIPPED | OUTPATIENT
Start: 2023-06-07 | End: 2024-06-05

## 2023-06-07 NOTE — TELEPHONE ENCOUNTER
ISSUE:   Tacrolimus IR level 6.8 on 6/7, goal 4-6, dose 1.5 mg BID.    PLAN:   Please call patient and confirm this was an accurate 12-hour trough. Verify Tacrolimus IR dose 1.5 mg BID. Confirm no new medications or illness. Confirm no missed doses. If accurate trough and accurate dose, decrease Tacrolimus IR dose to 1 mg BID and repeat labs in 1 months

## 2023-06-07 NOTE — TELEPHONE ENCOUNTER
Spoke to patient who confirms this was an accurate 12-hour trough. Verified Tacrolimus IR dose 1.5 mg BID. Confirmed no new medications or illness. Confirmed no missed doses. Patient confirms decrease Tacrolimus IR dose to 1 mg BID and repeat labs in 1 months

## 2023-06-20 DIAGNOSIS — Z94.0 KIDNEY REPLACED BY TRANSPLANT: Primary | ICD-10-CM

## 2023-06-20 RX ORDER — ATORVASTATIN CALCIUM 10 MG/1
10 TABLET, FILM COATED ORAL EVERY EVENING
Qty: 30 TABLET | Refills: 0 | Status: SHIPPED | OUTPATIENT
Start: 2023-06-20 | End: 2023-07-03

## 2023-06-26 DIAGNOSIS — E87.5 HYPERKALEMIA: Primary | ICD-10-CM

## 2023-06-30 ENCOUNTER — PRE VISIT (OUTPATIENT)
Dept: UROLOGY | Facility: CLINIC | Age: 58
End: 2023-06-30
Payer: COMMERCIAL

## 2023-06-30 NOTE — TELEPHONE ENCOUNTER
Reason for visit: follow up     Dx/Hx/Sx: prostate cancer    Records/imaging/labs/orders: in epic    At Rooming: virtual visit

## 2023-07-03 DIAGNOSIS — Z94.0 KIDNEY REPLACED BY TRANSPLANT: ICD-10-CM

## 2023-07-05 ENCOUNTER — LAB (OUTPATIENT)
Dept: LAB | Facility: CLINIC | Age: 58
End: 2023-07-05
Payer: COMMERCIAL

## 2023-07-05 DIAGNOSIS — Z48.298 AFTERCARE FOLLOWING ORGAN TRANSPLANT: ICD-10-CM

## 2023-07-05 DIAGNOSIS — Z79.899 ENCOUNTER FOR LONG-TERM CURRENT USE OF MEDICATION: ICD-10-CM

## 2023-07-05 DIAGNOSIS — Z94.0 KIDNEY REPLACED BY TRANSPLANT: ICD-10-CM

## 2023-07-05 LAB
ANION GAP SERPL CALCULATED.3IONS-SCNC: 12 MMOL/L (ref 7–15)
BUN SERPL-MCNC: 35.4 MG/DL (ref 8–23)
CALCIUM SERPL-MCNC: 10.1 MG/DL (ref 8.8–10.2)
CHLORIDE SERPL-SCNC: 107 MMOL/L (ref 98–107)
CREAT SERPL-MCNC: 2.11 MG/DL (ref 0.67–1.17)
DEPRECATED HCO3 PLAS-SCNC: 23 MMOL/L (ref 22–29)
ERYTHROCYTE [DISTWIDTH] IN BLOOD BY AUTOMATED COUNT: 12.9 % (ref 10–15)
GFR SERPL CREATININE-BSD FRML MDRD: 35 ML/MIN/1.73M2
GLUCOSE SERPL-MCNC: 109 MG/DL (ref 70–99)
HCT VFR BLD AUTO: 45.7 % (ref 40–53)
HGB BLD-MCNC: 14.5 G/DL (ref 13.3–17.7)
MCH RBC QN AUTO: 29.5 PG (ref 26.5–33)
MCHC RBC AUTO-ENTMCNC: 31.7 G/DL (ref 31.5–36.5)
MCV RBC AUTO: 93 FL (ref 78–100)
PLATELET # BLD AUTO: 173 10E3/UL (ref 150–450)
POTASSIUM SERPL-SCNC: 4.8 MMOL/L (ref 3.4–5.3)
RBC # BLD AUTO: 4.92 10E6/UL (ref 4.4–5.9)
SODIUM SERPL-SCNC: 142 MMOL/L (ref 136–145)
WBC # BLD AUTO: 6.4 10E3/UL (ref 4–11)

## 2023-07-05 PROCEDURE — 80197 ASSAY OF TACROLIMUS: CPT

## 2023-07-05 PROCEDURE — 85027 COMPLETE CBC AUTOMATED: CPT

## 2023-07-05 PROCEDURE — 80048 BASIC METABOLIC PNL TOTAL CA: CPT

## 2023-07-05 PROCEDURE — 36415 COLL VENOUS BLD VENIPUNCTURE: CPT

## 2023-07-05 PROCEDURE — 87799 DETECT AGENT NOS DNA QUANT: CPT

## 2023-07-05 RX ORDER — ATORVASTATIN CALCIUM 10 MG/1
10 TABLET, FILM COATED ORAL EVERY EVENING
Qty: 90 TABLET | Refills: 0 | Status: SHIPPED | OUTPATIENT
Start: 2023-07-05 | End: 2023-10-12

## 2023-07-06 LAB
BKV DNA # SPEC NAA+PROBE: NOT DETECTED COPIES/ML
TACROLIMUS BLD-MCNC: 5.1 UG/L (ref 5–15)
TME LAST DOSE: NORMAL H
TME LAST DOSE: NORMAL H

## 2023-07-11 ENCOUNTER — VIRTUAL VISIT (OUTPATIENT)
Dept: UROLOGY | Facility: CLINIC | Age: 58
End: 2023-07-11
Payer: COMMERCIAL

## 2023-07-11 DIAGNOSIS — C61 PROSTATE CANCER (H): Primary | ICD-10-CM

## 2023-07-11 PROCEDURE — 99213 OFFICE O/P EST LOW 20 MIN: CPT | Mod: VID | Performed by: UROLOGY

## 2023-07-11 NOTE — NURSING NOTE
Is the patient currently in the state of [place]? YES    Visit mode:VIDEO    If the visit is dropped, the patient can be reconnected by: VIDEO VISIT: Text to cell phone: [phone]    Will anyone else be joining the visit? NO      How would you like to obtain your AVS? MyChart    Are changes needed to the allergy or medication list? NO    Reason for visit: RECHECK (6 mo follow up)      PT states he takes melatonin 10 mg at night.     Yennifer Dixon on 7/11/2023 at 2:49 PM

## 2023-07-11 NOTE — Clinical Note
7/11/2023       RE: Donald Blanco  [address] [facility]  [place] [address]     Dear Colleague,    Thank you for referring your patient, Donald Blanco, to the [facility]OLOG[facility] [place] at [facility]. Please see a copy of my visit note below.    Donald Blanco is a 58 year old male who is being evaluated via a billable video visit.      How would you like to obtain your AVS? MyChart  If the video visit is dropped, the invitation should be resent by: Text to cell phone: [phone]  Will anyone else be joining your video visit? No    Video Start Time: 3:13 PM    CHIEF COMPLAINT   It was my pleasure to see Donald Blanco who is a 58 year old male for follow-up of Prostate Cancer.      HPI  Donald Blanco is a very pleasant 58 year old male who presents with a history of Prostate Cancer. Clark 4+3 disease, stage pT3b with RALP 12/14/2020 with focal positive margin at site of NORBERTO. He recovered without complication. Tolerated adjuvant radiation without complication. PSA remains very low, but has been slowly rising over the past 2 years.     Kidney transplant completed 5/17/2022. Noting some mild leakage since transplant, but not terribly bothersome.    He has seen Dr. Delgadillo regarding ED and has tried ICI with some success.    PSA  5/1/2023 - 0.14  12/19/2022 - 0.07  6/1/2022 - 0.04  3/1/2022 - 0.05  2/21/2022 - 0.06  11/15/2021 - 0.04  8/9/2021 - 0.03  3/25/2021 - <0.1     RALP 12/14/2020  TUMOR     Histologic Type:         - Acinar adenocarcinoma     Histologic Grade       Grade Group and Clark Score:           - Grade group 3 (Clark Score 4 + 3 = 7)         Percentage of Pattern 4: 60%     Extraprostatic Extension (EPE):         - Present, nonfocal       Location of Extraprostatic Extension:           - Left posterior     Urinary Bladder Neck Invasion:         - Not identified     Seminal Vesicle Invasion:         - Present         - Left      MARGINS     Margins:         - Involved by invasive carcinoma         - Limited (< 3 mm)       Linear Length of Positive Margin(s) (Millimeters): 2 mm       Focality:           - Unifocal       Location of Positive Margin(s):           - Left posterior       Margin Positivity in Area of Extraprostatic Extension (EPE):           - Present         Location(s): Left seminal vesicle       Clark Pattern at Positive Margin(s):           - Pattern 4     LYMPH NODES     Number of Lymph Nodes Involved:         - 0     Number of Lymph Nodes Examined:         8     PATHOLOGIC STAGE CLASSIFICATION (PTNM, AJCC 8TH EDITION)     Primary Tumor (pT):         - pT3b     Regional Lymph Nodes (pN):         - pN0          Allergies:   Patient has no known allergies.         Review of Systems:  From intake questionnaire     Skin: negative  Eyes: negative  Ears/Nose/Throat: negative  Respiratory: No shortness of breath, dyspnea on exertion, cough, or hemoptysis  Cardiovascular: No chest pain or palpitations  Gastrointestinal: negative; no nausea/vomiting, constipation or diarrhea  Genitourinary: as per HPI  Musculoskeletal: negative  Neurologic: negative  Psychiatric: negative  Hematologic/Lymphatic/Immunologic: negative  Endocrine: negative         Physical Exam:     Vitals:  No vitals were obtained today due to virtual visit.    Physical Exam   GENERAL: Healthy, alert and no distress  EYES: Eyes grossly normal to inspection.  No discharge or erythema, or obvious scleral/conjunctival abnormalities.  RESP: No audible wheeze, cough, or visible cyanosis.  No visible retractions or increased work of breathing.    SKIN: Visible skin clear. No significant rash, abnormal pigmentation or lesions.  NEURO: Cranial nerves grossly intact.  Mentation and speech appropriate for age.  PSYCH: Mentation appears normal, affect normal/bright, judgement and insight intact, normal speech and appearance well-groomed.      Outside and Past Medical  records:    Review of the result(s) of each unique test - PSA         Assessment and Plan:     58 year old male with stage pT3b, Clark 4+3=7 prostate cancer, s/p RALP completed 12/14/2020. Focal positive margins at site of NORBERTO. SVI noted as well. High-risk by Decipher. Completed adjuvant radiotherapy with Dr. Alexander. Did not receive hormones. PSA detectable. This has risen slowly over the past 2 years. We reviewed this could represent an early biochemical recurrence, however given slow PSA kinetics, there is no indication for further intervention at this time. We briefly discussed the potential role for PSMA PET scan in the future, and possibly ADT should his PSA continue to rise. For now, from a urologic perspective, there is no further treatment planned at this point for prostate cancer, and will continue with observation.    He will continue ICI. We also discussed renewal of Kegel exercises to help with any residual incontinence he may be noting at this time.    - Follow-up 6 months with PSA    Orders  Orders Placed This Encounter   Procedures     PSA tumor marker     Video-Visit Details    Type of service:  Video Visit    Video End Time:3:24 PM    Originating Location (pt. Location): Home    Distant Location (provider location):  On-site    Platform used for Video Visit: DocuSign    11 minutes spent on the date of the encounter including direct interaction with the patient, performing chart review, history and exam, documentation and further activities as noted above.    Nabil Vásquez MD  Urology  [facility]          Again, thank you for allowing me to participate in the care of your patient.      Sincerely,    Nabil Vásquez MD

## 2023-07-11 NOTE — PROGRESS NOTES
Donald Blanco is a 58 year old male who is being evaluated via a billable video visit.      How would you like to obtain your AVS? MyChart  If the video visit is dropped, the invitation should be resent by: Text to cell phone: [phone]  Will anyone else be joining your video visit? No    Video Start Time: 3:13 PM    CHIEF COMPLAINT   It was my pleasure to see Donald Blanco who is a 58 year old male for follow-up of Prostate Cancer.      HPI  Donald Blanco is a very pleasant 58 year old male who presents with a history of Prostate Cancer. Wilmot 4+3 disease, stage pT3b with RALP 12/14/2020 with focal positive margin at site of NORBERTO. He recovered without complication. Tolerated adjuvant radiation without complication. PSA remains very low, but has been slowly rising over the past 2 years.     Kidney transplant completed 5/17/2022. Noting some mild leakage since transplant, but not terribly bothersome.    He has seen Dr. Delgadillo regarding ED and has tried ICI with some success.    PSA  5/1/2023 - 0.14  12/19/2022 - 0.07  6/1/2022 - 0.04  3/1/2022 - 0.05  2/21/2022 - 0.06  11/15/2021 - 0.04  8/9/2021 - 0.03  3/25/2021 - <0.1     RALP 12/14/2020  TUMOR     Histologic Type:         - Acinar adenocarcinoma     Histologic Grade       Grade Group and Clark Score:           - Grade group 3 (Clark Score 4 + 3 = 7)         Percentage of Pattern 4: 60%     Extraprostatic Extension (EPE):         - Present, nonfocal       Location of Extraprostatic Extension:           - Left posterior     Urinary Bladder Neck Invasion:         - Not identified     Seminal Vesicle Invasion:         - Present         - Left     MARGINS     Margins:         - Involved by invasive carcinoma         - Limited (< 3 mm)       Linear Length of Positive Margin(s) (Millimeters): 2 mm       Focality:           - Unifocal       Location of Positive Margin(s):           - Left posterior       Margin Positivity in Area of Extraprostatic  Extension (EPE):           - Present         Location(s): Left seminal vesicle       Oxford Pattern at Positive Margin(s):           - Pattern 4     LYMPH NODES     Number of Lymph Nodes Involved:         - 0     Number of Lymph Nodes Examined:         8     PATHOLOGIC STAGE CLASSIFICATION (PTNM, AJCC 8TH EDITION)     Primary Tumor (pT):         - pT3b     Regional Lymph Nodes (pN):         - pN0          Allergies:   Patient has no known allergies.         Review of Systems:  From intake questionnaire     Skin: negative  Eyes: negative  Ears/Nose/Throat: negative  Respiratory: No shortness of breath, dyspnea on exertion, cough, or hemoptysis  Cardiovascular: No chest pain or palpitations  Gastrointestinal: negative; no nausea/vomiting, constipation or diarrhea  Genitourinary: as per HPI  Musculoskeletal: negative  Neurologic: negative  Psychiatric: negative  Hematologic/Lymphatic/Immunologic: negative  Endocrine: negative         Physical Exam:     Vitals:  No vitals were obtained today due to virtual visit.    Physical Exam   GENERAL: Healthy, alert and no distress  EYES: Eyes grossly normal to inspection.  No discharge or erythema, or obvious scleral/conjunctival abnormalities.  RESP: No audible wheeze, cough, or visible cyanosis.  No visible retractions or increased work of breathing.    SKIN: Visible skin clear. No significant rash, abnormal pigmentation or lesions.  NEURO: Cranial nerves grossly intact.  Mentation and speech appropriate for age.  PSYCH: Mentation appears normal, affect normal/bright, judgement and insight intact, normal speech and appearance well-groomed.      Outside and Past Medical records:    Review of the result(s) of each unique test - PSA         Assessment and Plan:     58 year old male with stage pT3b, Oxford 4+3=7 prostate cancer, s/p RALP completed 12/14/2020. Focal positive margins at site of NORBERTO. SVI noted as well. High-risk by Decipher. Completed adjuvant radiotherapy with   Cho. Did not receive hormones. PSA detectable. This has risen slowly over the past 2 years. We reviewed this could represent an early biochemical recurrence, however given slow PSA kinetics, there is no indication for further intervention at this time. We briefly discussed the potential role for PSMA PET scan in the future, and possibly ADT should his PSA continue to rise. For now, from a urologic perspective, there is no further treatment planned at this point for prostate cancer, and will continue with observation.    He will continue ICI. We also discussed renewal of Kegel exercises to help with any residual incontinence he may be noting at this time.    - Follow-up 6 months with PSA    Orders  Orders Placed This Encounter   Procedures     PSA tumor marker     Video-Visit Details    Type of service:  Video Visit    Video End Time:3:24 PM    Originating Location (pt. Location): Home    Distant Location (provider location):  On-site    Platform used for Video Visit: [employer]    11 minutes spent on the date of the encounter including direct interaction with the patient, performing chart review, history and exam, documentation and further activities as noted above.    Nabil Vásquez MD  Urology  [facility]

## 2023-07-25 ENCOUNTER — OFFICE VISIT (OUTPATIENT)
Dept: FAMILY MEDICINE | Facility: CLINIC | Age: 58
End: 2023-07-25
Payer: COMMERCIAL

## 2023-07-25 VITALS
SYSTOLIC BLOOD PRESSURE: 136 MMHG | HEART RATE: 79 BPM | BODY MASS INDEX: 38.89 KG/M2 | DIASTOLIC BLOOD PRESSURE: 74 MMHG | WEIGHT: 262.6 LBS | RESPIRATION RATE: 16 BRPM | TEMPERATURE: 97.9 F | HEIGHT: 69 IN | OXYGEN SATURATION: 96 %

## 2023-07-25 DIAGNOSIS — H61.21 IMPACTED CERUMEN OF RIGHT EAR: ICD-10-CM

## 2023-07-25 DIAGNOSIS — Z48.298 AFTERCARE FOLLOWING ORGAN TRANSPLANT: Primary | ICD-10-CM

## 2023-07-25 DIAGNOSIS — J02.9 SORE THROAT: Primary | ICD-10-CM

## 2023-07-25 LAB
DEPRECATED S PYO AG THROAT QL EIA: NEGATIVE
GROUP A STREP BY PCR: NOT DETECTED

## 2023-07-25 PROCEDURE — 87651 STREP A DNA AMP PROBE: CPT | Performed by: PHYSICIAN ASSISTANT

## 2023-07-25 PROCEDURE — 99213 OFFICE O/P EST LOW 20 MIN: CPT | Performed by: PHYSICIAN ASSISTANT

## 2023-07-25 RX ORDER — FUROSEMIDE 20 MG
20 TABLET ORAL 2 TIMES DAILY
Qty: 60 TABLET | Refills: 0 | Status: SHIPPED | OUTPATIENT
Start: 2023-07-25 | End: 2023-08-31

## 2023-07-25 ASSESSMENT — PAIN SCALES - GENERAL: PAINLEVEL: NO PAIN (0)

## 2023-07-25 NOTE — PROGRESS NOTES
Assessment & Plan     (J02.9) Sore throat  (primary encounter diagnosis)  Comment: Patient with sore throat that has been improving.  We will peritonsillar abscess evidence on examination.  Normal phonation.  Rapid strep is negative.  Strep PCR in process.  Plan: Streptococcus A Rapid Screen w/Reflex to PCR -         Clinic Collect, Group A Streptococcus PCR         Throat Swab          (H61.21) Impacted cerumen of right ear  Comment: Impacted cerumen right ear.  This was irrigated by staff.  Patient had improvement of symptoms.  Continue to monitor and return with any worsening or new concerns.  Discussed over-the-counter medication such as Debrox in the future.       Nabil Clayton PA-C  [facility]   Pat is a 58 year old, presenting for the following health issues:  Plugged Ears (Both ears plugged for about 2-3 weeks. )       No data to display              History of Present Illness       Reason for visit:  Plugged ears/sore throat  Symptom onset:  1-2 weeks ago  Symptoms include:  Plugged ears/sore throat  Symptom intensity:  Moderate  Symptom progression:  Worsening  Had these symptoms before:  Yes  Has tried/received treatment for these symptoms:  No    He eats 2-3 servings of fruits and vegetables daily.He consumes 1 sweetened beverage(s) daily.He exercises with enough effort to increase his heart rate 9 or less minutes per day.  He exercises with enough effort to increase his heart rate 4 days per week.   He is taking medications regularly.       Patient with plugged sensation in the right ear.  Yesterday he did have a mildly sore throat.  This has improved.  Was around his grandkids who have been intermittently ill with URI symptoms.  Patient denies any nasal congestion, cough, nausea, vomiting, diarrhea.  No other symptoms with this episode.  Patient is immune compromised status post renal transplant.         Review of Systems   Constitutional, HEENT,  "cardiovascular, pulmonary, gi and gu systems are negative, except as otherwise noted.      Objective    /74   Pulse 79   Temp 97.9  F (36.6  C) (Tympanic)   Resp 16   Ht 1.753 m (5' 9\")   Wt 119.1 kg (262 lb 9.6 oz)   SpO2 96%   BMI 38.78 kg/m    Body mass index is 38.78 kg/m .  Physical Exam   GENERAL: healthy, alert and no distress  HENT: normal cephalic/atraumatic, right ear: occluded with wax, left ear: normal: no effusions, no erythema, normal landmarks, nose and mouth without ulcers or lesions, oropharynx clear, and oral mucous membranes moist  NECK: no adenopathy, no asymmetry, masses, or scars and thyroid normal to palpation  RESP: lungs clear to auscultation - no rales, rhonchi or wheezes  CV: regular rate and rhythm, normal S1 S2, no S3 or S4, no murmur, click or rub, no peripheral edema and peripheral pulses strong  MS: no gross musculoskeletal defects noted, no edema                      "

## 2023-07-27 DIAGNOSIS — E87.5 HYPERKALEMIA: Primary | ICD-10-CM

## 2023-08-07 ENCOUNTER — LAB (OUTPATIENT)
Dept: LAB | Facility: CLINIC | Age: 58
End: 2023-08-07
Payer: COMMERCIAL

## 2023-08-07 DIAGNOSIS — Z48.298 AFTERCARE FOLLOWING ORGAN TRANSPLANT: ICD-10-CM

## 2023-08-07 DIAGNOSIS — Z94.0 KIDNEY REPLACED BY TRANSPLANT: ICD-10-CM

## 2023-08-07 DIAGNOSIS — Z79.899 ENCOUNTER FOR LONG-TERM CURRENT USE OF MEDICATION: ICD-10-CM

## 2023-08-07 LAB
ANION GAP SERPL CALCULATED.3IONS-SCNC: 12 MMOL/L (ref 7–15)
BUN SERPL-MCNC: 24.9 MG/DL (ref 8–23)
CALCIUM SERPL-MCNC: 9.8 MG/DL (ref 8.8–10.2)
CHLORIDE SERPL-SCNC: 105 MMOL/L (ref 98–107)
CREAT SERPL-MCNC: 1.72 MG/DL (ref 0.67–1.17)
DEPRECATED HCO3 PLAS-SCNC: 25 MMOL/L (ref 22–29)
ERYTHROCYTE [DISTWIDTH] IN BLOOD BY AUTOMATED COUNT: 13 % (ref 10–15)
GFR SERPL CREATININE-BSD FRML MDRD: 45 ML/MIN/1.73M2
GLUCOSE SERPL-MCNC: 106 MG/DL (ref 70–99)
HCT VFR BLD AUTO: 44.4 % (ref 40–53)
HGB BLD-MCNC: 14.7 G/DL (ref 13.3–17.7)
MCH RBC QN AUTO: 30 PG (ref 26.5–33)
MCHC RBC AUTO-ENTMCNC: 33.1 G/DL (ref 31.5–36.5)
MCV RBC AUTO: 91 FL (ref 78–100)
PLATELET # BLD AUTO: 198 10E3/UL (ref 150–450)
POTASSIUM SERPL-SCNC: 4.6 MMOL/L (ref 3.4–5.3)
RBC # BLD AUTO: 4.9 10E6/UL (ref 4.4–5.9)
SODIUM SERPL-SCNC: 142 MMOL/L (ref 136–145)
TACROLIMUS BLD-MCNC: 4.8 UG/L (ref 5–15)
TME LAST DOSE: ABNORMAL H
TME LAST DOSE: ABNORMAL H
WBC # BLD AUTO: 6.9 10E3/UL (ref 4–11)

## 2023-08-07 PROCEDURE — 85027 COMPLETE CBC AUTOMATED: CPT

## 2023-08-07 PROCEDURE — 87799 DETECT AGENT NOS DNA QUANT: CPT

## 2023-08-07 PROCEDURE — 80197 ASSAY OF TACROLIMUS: CPT

## 2023-08-07 PROCEDURE — 36415 COLL VENOUS BLD VENIPUNCTURE: CPT

## 2023-08-07 PROCEDURE — 80048 BASIC METABOLIC PNL TOTAL CA: CPT

## 2023-08-09 ENCOUNTER — TELEPHONE (OUTPATIENT)
Dept: TRANSPLANT | Facility: CLINIC | Age: 58
End: 2023-08-09
Payer: COMMERCIAL

## 2023-08-09 ENCOUNTER — TELEPHONE (OUTPATIENT)
Dept: NEPHROLOGY | Facility: CLINIC | Age: 58
End: 2023-08-09
Payer: COMMERCIAL

## 2023-08-09 DIAGNOSIS — Z48.298 AFTERCARE FOLLOWING ORGAN TRANSPLANT: Primary | ICD-10-CM

## 2023-08-09 LAB
BK VIRUS DNA COPIES/ML, INSTRUMENT: 2646 COPIES/ML
BKV DNA SPEC NAA+PROBE-LOG#: 3.4 {LOG_COPIES}/ML

## 2023-08-09 NOTE — TELEPHONE ENCOUNTER
Yong Friedman MD Colianni, Lauren, RN  New BK. Please obtain IgG, MPA level, repeat BK in 2 weeks. If MPA level high would decrease to 500mg bid. Thanks

## 2023-08-09 NOTE — PROGRESS NOTES
The add-on test Mycophenolic Acid that was requested on 8/9/2023 is unable to be completed due to specimen processing requirements. Future order is available for collection. If labs are needed before next appointment, please arrange for collection.  Thank you,  Usha Goss, CMA

## 2023-08-15 NOTE — TELEPHONE ENCOUNTER
Post Kidney and Pancreas Transplant Team Conference  Date: 6/15/2022  Transplant Coordinator: Alicia Valentin     Attendees:  [x]  Dr. Cr [x] Alicia Valentin, RN [x] Hanny Simmons LPN     []  Dr. Capellan [] Rut Pickens, URIAH [] Ester Bragg LPN   [x]  Dr. Duff [x] Phoebe Howard, URIAH    [x]  Dr. Friedman [] Jazzmine Arzate RN [x] Alejandro Garces, PharmD   [x] Dr. Mckenna [] Shruthi Reyes, RN    [] Dr. Flores [] Campos Falcon RN    [x] Dr. Frank [] Karina Martinez, URIAH [] Aurora Hollingsworth RN   [] Dr. Yu [] Sara Pablo, URIAH    []  Dr. Cohen [] Carolyn Vasquez, RN    [] Dr. Rust [x] Xin Venegas, URIAH    [x] Katie Sanders, MARIO [] Rossy Solorio RN        Verbal Plan Read Back:   Before switching to Karis, consult with Dr. Flores regarding fluid  Ok to stop phosphorus    Routed to RN Coordinator   Hanny Simmons LPN      
No

## 2023-08-23 ENCOUNTER — LAB (OUTPATIENT)
Dept: LAB | Facility: CLINIC | Age: 58
End: 2023-08-23
Payer: COMMERCIAL

## 2023-08-23 DIAGNOSIS — Z48.298 AFTERCARE FOLLOWING ORGAN TRANSPLANT: ICD-10-CM

## 2023-08-23 LAB
ANION GAP SERPL CALCULATED.3IONS-SCNC: 12 MMOL/L (ref 7–15)
BUN SERPL-MCNC: 31.2 MG/DL (ref 8–23)
CALCIUM SERPL-MCNC: 10.1 MG/DL (ref 8.8–10.2)
CHLORIDE SERPL-SCNC: 106 MMOL/L (ref 98–107)
CREAT SERPL-MCNC: 1.81 MG/DL (ref 0.67–1.17)
DEPRECATED HCO3 PLAS-SCNC: 23 MMOL/L (ref 22–29)
ERYTHROCYTE [DISTWIDTH] IN BLOOD BY AUTOMATED COUNT: 12.5 % (ref 10–15)
GFR SERPL CREATININE-BSD FRML MDRD: 42 ML/MIN/1.73M2
GLUCOSE SERPL-MCNC: 107 MG/DL (ref 70–99)
HCT VFR BLD AUTO: 42.9 % (ref 40–53)
HGB BLD-MCNC: 14.1 G/DL (ref 13.3–17.7)
MCH RBC QN AUTO: 29.5 PG (ref 26.5–33)
MCHC RBC AUTO-ENTMCNC: 32.9 G/DL (ref 31.5–36.5)
MCV RBC AUTO: 90 FL (ref 78–100)
PLATELET # BLD AUTO: 192 10E3/UL (ref 150–450)
POTASSIUM SERPL-SCNC: 4.5 MMOL/L (ref 3.4–5.3)
RBC # BLD AUTO: 4.78 10E6/UL (ref 4.4–5.9)
SODIUM SERPL-SCNC: 141 MMOL/L (ref 136–145)
WBC # BLD AUTO: 6.4 10E3/UL (ref 4–11)

## 2023-08-23 PROCEDURE — 87799 DETECT AGENT NOS DNA QUANT: CPT

## 2023-08-23 PROCEDURE — 36415 COLL VENOUS BLD VENIPUNCTURE: CPT

## 2023-08-23 PROCEDURE — 85027 COMPLETE CBC AUTOMATED: CPT

## 2023-08-23 PROCEDURE — 80048 BASIC METABOLIC PNL TOTAL CA: CPT

## 2023-08-23 PROCEDURE — 82784 ASSAY IGA/IGD/IGG/IGM EACH: CPT

## 2023-08-23 PROCEDURE — 80180 DRUG SCRN QUAN MYCOPHENOLATE: CPT

## 2023-08-24 DIAGNOSIS — Z94.0 KIDNEY REPLACED BY TRANSPLANT: Chronic | ICD-10-CM

## 2023-08-24 LAB
BK VIRUS DNA COPIES/ML, INSTRUMENT: 4623 COPIES/ML
BKV DNA SPEC NAA+PROBE-LOG#: 3.7 {LOG_COPIES}/ML
IGG SERPL-MCNC: 749 MG/DL (ref 610–1616)

## 2023-08-24 RX ORDER — MYCOPHENOLATE MOFETIL 250 MG/1
500 CAPSULE ORAL 2 TIMES DAILY
Qty: 120 CAPSULE | Refills: 11 | Status: SHIPPED | OUTPATIENT
Start: 2023-08-24 | End: 2023-09-08

## 2023-08-25 LAB
MYCOPHENOLATE SERPL LC/MS/MS-MCNC: 5.58 MG/L (ref 1–3.5)
MYCOPHENOLATE-G SERPL LC/MS/MS-MCNC: 77.5 MG/L (ref 30–95)
TME LAST DOSE: ABNORMAL H
TME LAST DOSE: ABNORMAL H

## 2023-08-31 DIAGNOSIS — Z48.298 AFTERCARE FOLLOWING ORGAN TRANSPLANT: ICD-10-CM

## 2023-08-31 DIAGNOSIS — E87.5 HYPERKALEMIA: ICD-10-CM

## 2023-08-31 RX ORDER — FUROSEMIDE 20 MG
20 TABLET ORAL 2 TIMES DAILY
Qty: 60 TABLET | Refills: 0 | Status: SHIPPED | OUTPATIENT
Start: 2023-08-31 | End: 2023-09-26

## 2023-08-31 NOTE — TELEPHONE ENCOUNTER
Hello pt is requesting the lokelma 10gm pack and I do not see this on the med list can we please get this sent over

## 2023-09-06 NOTE — LETTER
[facility]  [address]  [place] [address]  [phone]    FACSIMILE TRANSMITTAL SHEET    TEST from Anna Marie  [phone]    _____URGENT _____REVIEW ONLY _____PLEASE COMMENT____PLEASE REPLY                                          IF YOU DID NOT RECEIVE THE CORRECT NUMBER OF PAGES OR THE FAX DID NOT COME THROUGH CLEARLY, PLEASE CALL THE SENDER     CONFIDENTIALITY STATEMENT: Confidential information that may accompany this transmission contains protected health information under state and federal law and is legally privileged. This information is intended only for the use of the individual or entity named above and may be used only for carrying out treatment, payment or other healthcare operations. The recipient or person responsible for delivering this information is prohibited by law from disclosing this information without proper authorization to any other party, unless required to do so by law or regulation. If you are not the intended recipient, you are hereby notified that any review, dissemination, distribution, or copying of this message is strictly prohibited. If you have received this communication in error, please destroy the materials and contact us immediately by calling the number listed above. No response indicates that the information was received by the appropriate authorized party    Please see prior documentation for specific counseling.      BMI 44    Recommendations:   Discussed that TWG goal is 11-20 lbs   Screen for signs/symptoms of obstructive sleep apnea   Nutritionist consult offered (this is to be ordered by primary OB provider)   Consider early 1 hr GCT (1hr-128; A1C 6.3); repeat at 24-28 weeks gestation (failed routine testing)   Start low dose aspirin 81 mg daily at 12-16 weeks for preeclampsia risk reduction (pt refuses)   Targeted anatomical survey scheduled at 18-20 weeks (completed)   Fetal growth ultrasound at 32 weeks if BMI >= 40   Weekly  testing at 32 weeks if pre-pregnancy BMI >= 45   Lovenox 40 mg BID for VTE prophylaxis while admitted to the hospital (antepartum or postpartum) if BMI >= 40.    Encouraged breastfeeding   Postpartum lifestyle modifications & weight loss

## 2023-09-07 ENCOUNTER — LAB (OUTPATIENT)
Dept: LAB | Facility: CLINIC | Age: 58
End: 2023-09-07
Payer: COMMERCIAL

## 2023-09-07 DIAGNOSIS — Z79.899 ENCOUNTER FOR LONG-TERM CURRENT USE OF MEDICATION: ICD-10-CM

## 2023-09-07 DIAGNOSIS — Z48.298 AFTERCARE FOLLOWING ORGAN TRANSPLANT: ICD-10-CM

## 2023-09-07 DIAGNOSIS — Z94.0 KIDNEY REPLACED BY TRANSPLANT: Primary | ICD-10-CM

## 2023-09-07 DIAGNOSIS — Z94.0 KIDNEY REPLACED BY TRANSPLANT: ICD-10-CM

## 2023-09-07 LAB
ANION GAP SERPL CALCULATED.3IONS-SCNC: 10 MMOL/L (ref 7–15)
BUN SERPL-MCNC: 22.9 MG/DL (ref 8–23)
CALCIUM SERPL-MCNC: 9.6 MG/DL (ref 8.8–10.2)
CHLORIDE SERPL-SCNC: 108 MMOL/L (ref 98–107)
CREAT SERPL-MCNC: 1.79 MG/DL (ref 0.67–1.17)
DEPRECATED HCO3 PLAS-SCNC: 26 MMOL/L (ref 22–29)
EGFRCR SERPLBLD CKD-EPI 2021: 43 ML/MIN/1.73M2
ERYTHROCYTE [DISTWIDTH] IN BLOOD BY AUTOMATED COUNT: 13 % (ref 10–15)
GLUCOSE SERPL-MCNC: 115 MG/DL (ref 70–99)
HCT VFR BLD AUTO: 42.8 % (ref 40–53)
HGB BLD-MCNC: 13.8 G/DL (ref 13.3–17.7)
MCH RBC QN AUTO: 29.6 PG (ref 26.5–33)
MCHC RBC AUTO-ENTMCNC: 32.2 G/DL (ref 31.5–36.5)
MCV RBC AUTO: 92 FL (ref 78–100)
PLATELET # BLD AUTO: 174 10E3/UL (ref 150–450)
POTASSIUM SERPL-SCNC: 4.7 MMOL/L (ref 3.4–5.3)
RBC # BLD AUTO: 4.66 10E6/UL (ref 4.4–5.9)
SODIUM SERPL-SCNC: 144 MMOL/L (ref 136–145)
TACROLIMUS BLD-MCNC: 5.2 UG/L (ref 5–15)
TME LAST DOSE: NORMAL H
TME LAST DOSE: NORMAL H
WBC # BLD AUTO: 6 10E3/UL (ref 4–11)

## 2023-09-07 PROCEDURE — 36415 COLL VENOUS BLD VENIPUNCTURE: CPT

## 2023-09-07 PROCEDURE — 85027 COMPLETE CBC AUTOMATED: CPT

## 2023-09-07 PROCEDURE — 87799 DETECT AGENT NOS DNA QUANT: CPT

## 2023-09-07 PROCEDURE — 80197 ASSAY OF TACROLIMUS: CPT

## 2023-09-07 PROCEDURE — 80048 BASIC METABOLIC PNL TOTAL CA: CPT

## 2023-09-08 DIAGNOSIS — Z94.0 KIDNEY REPLACED BY TRANSPLANT: Primary | Chronic | ICD-10-CM

## 2023-09-08 LAB
BK VIRUS DNA COPIES/ML, INSTRUMENT: 8077 COPIES/ML
BKV DNA SPEC NAA+PROBE-LOG#: 3.9 {LOG_COPIES}/ML

## 2023-09-08 RX ORDER — MYCOPHENOLATE MOFETIL 250 MG/1
250 CAPSULE ORAL 2 TIMES DAILY
Qty: 60 CAPSULE | Refills: 11 | Status: SHIPPED | OUTPATIENT
Start: 2023-09-08 | End: 2024-03-08

## 2023-09-13 ENCOUNTER — LAB (OUTPATIENT)
Dept: LAB | Facility: CLINIC | Age: 58
End: 2023-09-13
Payer: COMMERCIAL

## 2023-09-13 DIAGNOSIS — Z94.0 KIDNEY REPLACED BY TRANSPLANT: ICD-10-CM

## 2023-09-13 LAB
ANION GAP SERPL CALCULATED.3IONS-SCNC: 9 MMOL/L (ref 7–15)
BUN SERPL-MCNC: 30.1 MG/DL (ref 8–23)
CALCIUM SERPL-MCNC: 10.2 MG/DL (ref 8.8–10.2)
CHLORIDE SERPL-SCNC: 104 MMOL/L (ref 98–107)
CREAT SERPL-MCNC: 1.84 MG/DL (ref 0.67–1.17)
DEPRECATED HCO3 PLAS-SCNC: 27 MMOL/L (ref 22–29)
EGFRCR SERPLBLD CKD-EPI 2021: 41 ML/MIN/1.73M2
ERYTHROCYTE [DISTWIDTH] IN BLOOD BY AUTOMATED COUNT: 13.1 % (ref 10–15)
GLUCOSE SERPL-MCNC: 111 MG/DL (ref 70–99)
HCT VFR BLD AUTO: 45.6 % (ref 40–53)
HGB BLD-MCNC: 14.7 G/DL (ref 13.3–17.7)
MCH RBC QN AUTO: 29.6 PG (ref 26.5–33)
MCHC RBC AUTO-ENTMCNC: 32.2 G/DL (ref 31.5–36.5)
MCV RBC AUTO: 92 FL (ref 78–100)
PLATELET # BLD AUTO: 186 10E3/UL (ref 150–450)
POTASSIUM SERPL-SCNC: 4.9 MMOL/L (ref 3.4–5.3)
RBC # BLD AUTO: 4.97 10E6/UL (ref 4.4–5.9)
SODIUM SERPL-SCNC: 140 MMOL/L (ref 136–145)
TACROLIMUS BLD-MCNC: 4.5 UG/L (ref 5–15)
TME LAST DOSE: ABNORMAL H
TME LAST DOSE: ABNORMAL H
WBC # BLD AUTO: 5.9 10E3/UL (ref 4–11)

## 2023-09-13 PROCEDURE — 36415 COLL VENOUS BLD VENIPUNCTURE: CPT

## 2023-09-13 PROCEDURE — 80048 BASIC METABOLIC PNL TOTAL CA: CPT

## 2023-09-13 PROCEDURE — 85027 COMPLETE CBC AUTOMATED: CPT

## 2023-09-13 PROCEDURE — 87799 DETECT AGENT NOS DNA QUANT: CPT

## 2023-09-13 PROCEDURE — 80197 ASSAY OF TACROLIMUS: CPT

## 2023-09-14 DIAGNOSIS — B34.8 BK VIREMIA: Primary | ICD-10-CM

## 2023-09-14 LAB
BK VIRUS DNA COPIES/ML, INSTRUMENT: ABNORMAL COPIES/ML
BKV DNA SPEC NAA+PROBE-LOG#: 4.1 {LOG_COPIES}/ML

## 2023-09-25 ENCOUNTER — LAB (OUTPATIENT)
Dept: LAB | Facility: CLINIC | Age: 58
End: 2023-09-25
Payer: COMMERCIAL

## 2023-09-25 DIAGNOSIS — Z94.0 KIDNEY REPLACED BY TRANSPLANT: ICD-10-CM

## 2023-09-25 DIAGNOSIS — E87.5 HYPERKALEMIA: Primary | ICD-10-CM

## 2023-09-25 DIAGNOSIS — B34.8 BK VIREMIA: ICD-10-CM

## 2023-09-25 LAB
ANION GAP SERPL CALCULATED.3IONS-SCNC: 9 MMOL/L (ref 7–15)
BUN SERPL-MCNC: 30 MG/DL (ref 8–23)
CALCIUM SERPL-MCNC: 10 MG/DL (ref 8.8–10.2)
CHLORIDE SERPL-SCNC: 108 MMOL/L (ref 98–107)
CREAT SERPL-MCNC: 1.98 MG/DL (ref 0.67–1.17)
DEPRECATED HCO3 PLAS-SCNC: 26 MMOL/L (ref 22–29)
EGFRCR SERPLBLD CKD-EPI 2021: 38 ML/MIN/1.73M2
ERYTHROCYTE [DISTWIDTH] IN BLOOD BY AUTOMATED COUNT: 13 % (ref 10–15)
GLUCOSE SERPL-MCNC: 120 MG/DL (ref 70–99)
HCT VFR BLD AUTO: 44.7 % (ref 40–53)
HGB BLD-MCNC: 14.5 G/DL (ref 13.3–17.7)
MCH RBC QN AUTO: 29.7 PG (ref 26.5–33)
MCHC RBC AUTO-ENTMCNC: 32.4 G/DL (ref 31.5–36.5)
MCV RBC AUTO: 91 FL (ref 78–100)
PLATELET # BLD AUTO: 176 10E3/UL (ref 150–450)
POTASSIUM SERPL-SCNC: 4.6 MMOL/L (ref 3.4–5.3)
RBC # BLD AUTO: 4.89 10E6/UL (ref 4.4–5.9)
SODIUM SERPL-SCNC: 143 MMOL/L (ref 136–145)
TACROLIMUS BLD-MCNC: 4.7 UG/L (ref 5–15)
TME LAST DOSE: ABNORMAL H
TME LAST DOSE: ABNORMAL H
WBC # BLD AUTO: 6.7 10E3/UL (ref 4–11)

## 2023-09-25 PROCEDURE — 87799 DETECT AGENT NOS DNA QUANT: CPT

## 2023-09-25 PROCEDURE — 85027 COMPLETE CBC AUTOMATED: CPT

## 2023-09-25 PROCEDURE — 80048 BASIC METABOLIC PNL TOTAL CA: CPT

## 2023-09-25 PROCEDURE — 82784 ASSAY IGA/IGD/IGG/IGM EACH: CPT

## 2023-09-25 PROCEDURE — 36415 COLL VENOUS BLD VENIPUNCTURE: CPT

## 2023-09-25 PROCEDURE — 80180 DRUG SCRN QUAN MYCOPHENOLATE: CPT

## 2023-09-25 PROCEDURE — 80197 ASSAY OF TACROLIMUS: CPT

## 2023-09-26 DIAGNOSIS — Z48.298 AFTERCARE FOLLOWING ORGAN TRANSPLANT: ICD-10-CM

## 2023-09-26 DIAGNOSIS — Z94.0 KIDNEY REPLACED BY TRANSPLANT: Chronic | ICD-10-CM

## 2023-09-26 LAB
BK VIRUS DNA COPIES/ML, INSTRUMENT: 2556 COPIES/ML
BKV DNA SPEC NAA+PROBE-LOG#: 3.4 {LOG_COPIES}/ML
IGG SERPL-MCNC: 750 MG/DL (ref 610–1616)

## 2023-09-26 RX ORDER — NEPHROCAP 1 MG
1 CAP ORAL DAILY
Qty: 90 CAPSULE | Refills: 3 | Status: SHIPPED | OUTPATIENT
Start: 2023-09-26 | End: 2024-09-12

## 2023-09-26 RX ORDER — FUROSEMIDE 20 MG
20 TABLET ORAL 2 TIMES DAILY
Qty: 60 TABLET | Refills: 0 | Status: SHIPPED | OUTPATIENT
Start: 2023-09-26 | End: 2023-10-26

## 2023-09-27 DIAGNOSIS — Z94.0 KIDNEY REPLACED BY TRANSPLANT: Primary | ICD-10-CM

## 2023-09-27 LAB
MYCOPHENOLATE SERPL LC/MS/MS-MCNC: 1.33 MG/L (ref 1–3.5)
MYCOPHENOLATE-G SERPL LC/MS/MS-MCNC: 24.9 MG/L (ref 30–95)
TME LAST DOSE: ABNORMAL H
TME LAST DOSE: ABNORMAL H

## 2023-10-02 ENCOUNTER — LAB (OUTPATIENT)
Dept: LAB | Facility: CLINIC | Age: 58
End: 2023-10-02
Payer: COMMERCIAL

## 2023-10-02 DIAGNOSIS — Z94.0 KIDNEY REPLACED BY TRANSPLANT: ICD-10-CM

## 2023-10-02 LAB
ANION GAP SERPL CALCULATED.3IONS-SCNC: 11 MMOL/L (ref 7–15)
BUN SERPL-MCNC: 26.8 MG/DL (ref 8–23)
CALCIUM SERPL-MCNC: 9.9 MG/DL (ref 8.8–10.2)
CHLORIDE SERPL-SCNC: 105 MMOL/L (ref 98–107)
CREAT SERPL-MCNC: 1.77 MG/DL (ref 0.67–1.17)
DEPRECATED HCO3 PLAS-SCNC: 25 MMOL/L (ref 22–29)
EGFRCR SERPLBLD CKD-EPI 2021: 43 ML/MIN/1.73M2
ERYTHROCYTE [DISTWIDTH] IN BLOOD BY AUTOMATED COUNT: 13 % (ref 10–15)
GLUCOSE SERPL-MCNC: 118 MG/DL (ref 70–99)
HCT VFR BLD AUTO: 44.3 % (ref 40–53)
HGB BLD-MCNC: 14.3 G/DL (ref 13.3–17.7)
MCH RBC QN AUTO: 29.4 PG (ref 26.5–33)
MCHC RBC AUTO-ENTMCNC: 32.3 G/DL (ref 31.5–36.5)
MCV RBC AUTO: 91 FL (ref 78–100)
PLATELET # BLD AUTO: 179 10E3/UL (ref 150–450)
POTASSIUM SERPL-SCNC: 4.6 MMOL/L (ref 3.4–5.3)
RBC # BLD AUTO: 4.86 10E6/UL (ref 4.4–5.9)
SODIUM SERPL-SCNC: 141 MMOL/L (ref 135–145)
TACROLIMUS BLD-MCNC: 5.9 UG/L (ref 5–15)
TME LAST DOSE: NORMAL H
TME LAST DOSE: NORMAL H
WBC # BLD AUTO: 5.8 10E3/UL (ref 4–11)

## 2023-10-02 PROCEDURE — 86832 HLA CLASS I HIGH DEFIN QUAL: CPT

## 2023-10-02 PROCEDURE — 80048 BASIC METABOLIC PNL TOTAL CA: CPT

## 2023-10-02 PROCEDURE — 80180 DRUG SCRN QUAN MYCOPHENOLATE: CPT

## 2023-10-02 PROCEDURE — 80197 ASSAY OF TACROLIMUS: CPT

## 2023-10-02 PROCEDURE — 85027 COMPLETE CBC AUTOMATED: CPT

## 2023-10-02 PROCEDURE — 86833 HLA CLASS II HIGH DEFIN QUAL: CPT

## 2023-10-02 PROCEDURE — 36415 COLL VENOUS BLD VENIPUNCTURE: CPT

## 2023-10-02 PROCEDURE — 87799 DETECT AGENT NOS DNA QUANT: CPT

## 2023-10-03 LAB
BK VIRUS DNA COPIES/ML, INSTRUMENT: 1969 COPIES/ML
BKV DNA SPEC NAA+PROBE-LOG#: 3.3 {LOG_COPIES}/ML

## 2023-10-04 LAB
MYCOPHENOLATE SERPL LC/MS/MS-MCNC: 2.19 MG/L (ref 1–3.5)
MYCOPHENOLATE-G SERPL LC/MS/MS-MCNC: 24.8 MG/L (ref 30–95)
TME LAST DOSE: ABNORMAL H
TME LAST DOSE: ABNORMAL H

## 2023-10-11 LAB
DONOR IDENTIFICATION: NORMAL
DSA COMMENTS: NORMAL
DSA PRESENT: NO
DSA TEST METHOD: NORMAL
ORGAN: NORMAL
SA 1 CELL: NORMAL
SA 1 TEST METHOD: NORMAL
SA 2 CELL: NORMAL
SA 2 TEST METHOD: NORMAL
SA1 HI RISK ABY: NORMAL
SA1 MOD RISK ABY: NORMAL
SA2 HI RISK ABY: NORMAL
SA2 MOD RISK ABY: NORMAL
UNACCEPTABLE ANTIGENS: NORMAL
UNOS CPRA: 24
ZZZSA 1  COMMENTS: NORMAL
ZZZSA 2 COMMENTS: NORMAL

## 2023-10-12 DIAGNOSIS — Z94.0 KIDNEY REPLACED BY TRANSPLANT: ICD-10-CM

## 2023-10-12 RX ORDER — ATORVASTATIN CALCIUM 10 MG/1
10 TABLET, FILM COATED ORAL EVERY EVENING
Qty: 90 TABLET | Refills: 1 | Status: SHIPPED | OUTPATIENT
Start: 2023-10-12 | End: 2024-03-13

## 2023-10-16 ENCOUNTER — OFFICE VISIT (OUTPATIENT)
Dept: TRANSPLANT | Facility: CLINIC | Age: 58
End: 2023-10-16
Attending: INTERNAL MEDICINE
Payer: COMMERCIAL

## 2023-10-16 VITALS
BODY MASS INDEX: 39.31 KG/M2 | HEART RATE: 69 BPM | TEMPERATURE: 98.1 F | WEIGHT: 266.2 LBS | OXYGEN SATURATION: 97 % | DIASTOLIC BLOOD PRESSURE: 74 MMHG | SYSTOLIC BLOOD PRESSURE: 148 MMHG

## 2023-10-16 DIAGNOSIS — B34.8 BK VIREMIA: ICD-10-CM

## 2023-10-16 DIAGNOSIS — N18.32 STAGE 3B CHRONIC KIDNEY DISEASE (H): ICD-10-CM

## 2023-10-16 DIAGNOSIS — Z29.89 NEED FOR PNEUMOCYSTIS PROPHYLAXIS: ICD-10-CM

## 2023-10-16 DIAGNOSIS — Z94.0 HTN, KIDNEY TRANSPLANT RELATED: ICD-10-CM

## 2023-10-16 DIAGNOSIS — E66.01 MORBID OBESITY (H): ICD-10-CM

## 2023-10-16 DIAGNOSIS — I15.1 HTN, KIDNEY TRANSPLANT RELATED: ICD-10-CM

## 2023-10-16 DIAGNOSIS — E87.20 METABOLIC ACIDOSIS: ICD-10-CM

## 2023-10-16 DIAGNOSIS — Z48.298 AFTERCARE FOLLOWING ORGAN TRANSPLANT: Primary | ICD-10-CM

## 2023-10-16 DIAGNOSIS — E87.5 HYPERKALEMIA: ICD-10-CM

## 2023-10-16 DIAGNOSIS — E83.42 HYPOMAGNESEMIA: ICD-10-CM

## 2023-10-16 DIAGNOSIS — D84.9 IMMUNOSUPPRESSION (H): ICD-10-CM

## 2023-10-16 DIAGNOSIS — E87.8 LOW BICARBONATE: ICD-10-CM

## 2023-10-16 DIAGNOSIS — R79.89 ELEVATED PARATHYROID HORMONE: ICD-10-CM

## 2023-10-16 DIAGNOSIS — N02.B9 IGA NEPHROPATHY: ICD-10-CM

## 2023-10-16 DIAGNOSIS — Z94.0 KIDNEY REPLACED BY TRANSPLANT: ICD-10-CM

## 2023-10-16 PROCEDURE — 99215 OFFICE O/P EST HI 40 MIN: CPT | Performed by: INTERNAL MEDICINE

## 2023-10-16 PROCEDURE — 99213 OFFICE O/P EST LOW 20 MIN: CPT | Performed by: INTERNAL MEDICINE

## 2023-10-16 ASSESSMENT — PAIN SCALES - GENERAL: PAINLEVEL: NO PAIN (0)

## 2023-10-16 NOTE — PROGRESS NOTES
TRANSPLANT NEPHROLOGY EARLY POST TRANSPLANT VISIT     Assessment & Plan   # LDKT: stable   - Baseline Creatinine: ~ 1.7-2    - Proteinuria: Normal (<0.2 grams)   - Date DSA Last Checked: Mar/2023      Latest DSA: No   - BK Viremia: No   - Kidney Tx Biopsy: Jun 22, 2022; Result: No diagnostic evidence of acute rejection.  Acute tubular injury.  Mild interstitial fibrosis and tubular atrophy.             May 24, 2022; Result: No diagnostic evidence of acute rejection.  Acute tubular injury.   - Transplant Ureteral Stent: No    # Immunosuppression: Tacrolimus immediate release (goal 4-6) and Mycophenolate mofetil (dose 250 mg every 12 hours)   - Continue with intensive monitoring of immunosuppression for efficacy and toxicity.   - Induction with Recent Transplant:  High Intensity   - Continue with intensive monitoring of immunosuppression for efficacy and toxicity.   - Changes: yes on reduced dose /250 as of due to BK viremia, level 2.19 on reduced dose , will consider uptitrating MMF dose if BK neg x2, DSA neg 10/2023    # BK viremia:   - peak viral load 13K copies/ml Sep 2023 downtrending now at ~2k copies/ml   - on reduced IS as above   - Igg-750 Sep 2023    # Infection Prophylaxis:   - PJP: Sulfa/TMP (Bactrim) M,W,F-CD4 still<200, on reduced dose due to hyperK hx  - CMV: None    # Hypertension: Borderline control;  Goal BP: < 130/80   - Volume status: Euvolemic   - Changes: Not at this time; continue checking blood pressure at home    # Elevated Blood Glucose:  Last HbA1c: 5.8%   - Management as per primary care.    # Mineral Bone Disorder:  - Secondary renal hyperparathyroidism; PTH level: Mildly elevated (151-300 pg/ml)        On treatment: sensipar 30 mg daily, update PTH with next labs  - Vitamin D; level: Normal        On supplement: Yes  - Calcium; level: normal        On supplement: no    # Electrolytes:   - Potassium; level: normal      On binder: Yes lokelma 10 g po every day, discussed dose  reduction to MWF but he is concerned he would forget, will keep for now  - Magnesium; level: Normal        On supplement: Yes  - Bicarbonate; level: Normal        On supplement: Yes  baking soda 1 teasp daily -dicussed reducing to 1 tsp MWF but he prefers to continue daily dose    # Obesity, Class II (BMI = 39.3):    - Recommend weight loss for overall health by increasing exercise and watching caloric intake.    # Medical Compliance: Yes    # Immunization: due for COVID booster and Flu    # Transplant History:  Etiology of Kidney Failure: IgA nephropathy  Tx: LDKT  Transplant: 5/17/2022 (Kidney)  Donor Type: Living Donor Class:   Crossmatch at time of Tx: negative  DSA at time of Tx: No  Significant changes in immunosuppression: None  CMV IgG Ab High Risk Discordance (D+/R-): No  EBV IgG Ab High Risk Discordance (D+/R-): No  Significant transplant-related complications: DGF    Transplant Office Phone Number: [phone]    Assessment and plan was discussed with the patient and he voiced his understanding and agreement.    Return visit: 4 months    Donte Duff MD    Chief Complaint   Mr. Blanco is a 58 year old here for kidney transplant and immunosuppression management.     History of Present Illness    Feels good overall  Has not been very active due to low back pain which is similar to what he had for years prior to spine surgery, walking makes it worse, biking is better tolerated.no significant weight change over the past several months  discussed lokelma dose reduction to MWF & reducing baking soda (1tsp/d) as well but he prefers to take daily as he will likely forget, will continue both due to hyperK hx  started sensipar 30 tolerated so far and due for repeat pth  doesn't check home BP readings once a month-advised to monitor regularly as clinic readings above goal  UTD dermatology, colonoscopy, PSA    Current IS FK 1/1 /250 -level >2 on this dose with stable low grade BK viremia  Ppx: bactrim MWF  (hx of hyperK)  Home BP: not monitored     Primary Nephrologist:  (not followed with post transplant so far)  PCP: saw PA x 2    Problem List   Patient Active Problem List   Diagnosis    HTN, kidney transplant related    Gout    Dyslipidemia    Secondary renal hyperparathyroidism (H24)    IgA nephropathy    Class 2 obesity due to excess calories without serious comorbidity with body mass index (BMI) of 36.0 to 36.9 in adult    Prostate cancer (H)    Kidney replaced by transplant    Immunosuppression (H24)    Hyperkalemia    Hypomagnesemia    Metabolic acidosis    Aftercare following organ transplant    Morbid obesity (H)    Need for pneumocystis prophylaxis    Stage 3b chronic kidney disease (H)    Vitamin D deficiency       Allergies   No Known Allergies    Medications   Current Outpatient Medications   Medication Sig    ACE/ARB/ARNI NOT PRESCRIBED (INTENTIONAL) Please choose reason not prescribed from choices below.    alprostadil (EDEX) 10 MCG kit 10 mcg by Intracavitary route as needed for erectile dysfunction use no more than 3 times per week    aspirin 81 MG EC tablet Take 1 tablet (81 mg) by mouth daily    atorvastatin (LIPITOR) 10 MG tablet Take 1 tablet (10 mg) by mouth every evening    B Complex-C-Folic Acid (VIRT-CAPS) 1 MG CAPS Take 1 mg by mouth daily    cinacalcet (SENSIPAR) 30 MG tablet Take 1 tablet (30 mg) by mouth daily    COMPOUNDED NON-CONTROLLED SUBSTANCE (CMPD RX) - PHARMACY TO MIX COMPOUNDED MEDICATION Alprostadil 50 mcg inject 0.2 mL intracavitary daily no more than three times weekly with twenty-four hours between injections    furosemide (LASIX) 20 MG tablet Take 1 tablet (20 mg) by mouth 2 times daily    mycophenolate (GENERIC EQUIVALENT) 250 MG capsule Take 1 capsule (250 mg) by mouth 2 times daily    Sodium Bicarbonate, antacid, POWD Take 1 Teaspoon of baking soda once daily    sodium zirconium cyclosilicate (LOKELMA) 10 g PACK packet Take 1 packet (10 g) by mouth daily     sulfamethoxazole-trimethoprim (BACTRIM) 400-80 MG tablet Take 1 tablet by mouth Every Mon, Wed, Fri Morning Increase to one tab by mouth daily when directed by transplant team    tacrolimus (GENERIC EQUIVALENT) 0.5 MG capsule HOLD FOR FUTURE DOSE CHANGES.  Profile Rx: patient will contact pharmacy when needed    tacrolimus (GENERIC EQUIVALENT) 1 MG capsule Take 1 capsule (1 mg) by mouth 2 times daily     No current facility-administered medications for this visit.     There are no discontinued medications.    Physical Exam   Vital Signs: BP (!) 148/74   Pulse 69   Temp 98.1  F (36.7  C) (Oral)   Wt 120.7 kg (266 lb 3.2 oz)   SpO2 97%   BMI 39.31 kg/m      GENERAL APPEARANCE: alert and no distress  HENT: mouth without ulcers or lesions  LYMPHATICS: no cervical or supraclavicular nodes  RESP: lungs clear to auscultation - no rales, rhonchi or wheezes  CV: regular rhythm, normal rate, no rub, no murmur  EDEMA: no LE edema bilaterally  ABDOMEN: soft, nondistended, nontender, bowel sounds normal, obese  MS: extremities normal - no gross deformities noted, no evidence of inflammation in joints, no muscle tenderness  SKIN: no rash  TX KIDNEY: normal  DIALYSIS ACCESS: none    Data         Latest Ref Rng & Units 10/2/2023     7:09 AM 9/25/2023     7:14 AM 9/13/2023     7:02 AM   Renal   Sodium 135 - 145 mmol/L 141  143  140    K 3.4 - 5.3 mmol/L 4.6  4.6  4.9    Cl 98 - 107 mmol/L 105  108  104    Cl (external) 98 - 107 mmol/L 105  108  104    CO2 22 - 29 mmol/L 25  26  27    Urea Nitrogen 8.0 - 23.0 mg/dL 26.8  30.0  30.1    Creatinine 0.67 - 1.17 mg/dL 1.77  1.98  1.84    Glucose 70 - 99 mg/dL 118  120  111    Calcium 8.8 - 10.2 mg/dL 9.9  10.0  10.2          Latest Ref Rng & Units 5/17/2023     9:42 AM 3/16/2023     7:07 AM 10/10/2022     7:07 AM   Bone Health   Phosphorus 2.5 - 4.5 mg/dL   1.8    Parathyroid Hormone Intact 15 - 65 pg/mL 259  176     Vit D Def 20 - 75 ug/L 48  53           Latest Ref Rng & Units  10/2/2023     7:09 AM 9/25/2023     7:14 AM 9/13/2023     7:02 AM   Heme   WBC 4.0 - 11.0 10e3/uL 5.8  6.7  5.9    Hgb 13.3 - 17.7 g/dL 14.3  14.5  14.7    Plt 150 - 450 10e3/uL 179  176  186          Latest Ref Rng & Units 5/17/2023     9:42 AM 11/21/2022     7:00 AM 6/3/2022    10:48 AM   Liver   AP 40 - 129 U/L 91  105  83    TBili <=1.2 mg/dL 0.9  0.6  0.5    Bilirubin Direct 0.0 - 0.2 mg/dL  0.1  0.2    Bilirubin Direct 0.00 - 0.30 mg/dL 0.22      ALT 10 - 50 U/L 24  26  23    AST 10 - 50 U/L 24  16  9    Tot Protein 6.4 - 8.3 g/dL 7.3  7.2  6.6    Albumin 3.4 - 5.0 g/dL  4.0  3.3    Albumin 3.5 - 5.2 g/dL 4.4            Latest Ref Rng & Units 5/17/2023     9:42 AM 11/21/2022     7:00 AM 6/3/2022    10:48 AM   Pancreas   A1C <5.7 % 5.8  5.6  5.1          Latest Ref Rng & Units 11/7/2022     7:08 AM 10/10/2022     7:07 AM 9/16/2022     7:07 AM   Iron studies   Iron 35 - 180 ug/dL 81  102     Iron Saturation Index 15 - 46 % 26  31     Ferritin 26 - 388 ng/mL 494  542  602          Latest Ref Rng & Units 5/17/2022     1:35 PM 5/9/2022    10:00 AM 9/3/2020    11:31 AM   [facility] Txp Virology   CMV QUANT IU/ML Not Detected IU/mL Not Detected      EBV CAPSID ANTIBODY IGG No detectable antibody.  Positive  >8.0    Hep B Core NR^Nonreactive   Nonreactive        Recent Labs   Lab Test 09/13/23  0702 09/25/23  0714 10/02/23  0709   DOSTAC 9/12/2023 9/24/2023 10/1/2023   TACROL 4.5* 4.7* 5.9     Recent Labs   Lab Test 08/23/23  0717 09/25/23  0714 10/02/23  0709   DOSMPA 8/22/2023   8:00 PM 9/24/2023   8:00 PM 10/1/2023   8:00 PM   MPACID 5.58* 1.33 2.19   MPAG 77.5 24.9* 24.8*     I spent a total of 42 minutes on the date of the encounter doing chart review, performing a history and physical exam, completing documentation and any further activities as noted above.

## 2023-10-16 NOTE — PATIENT INSTRUCTIONS
Monitor blood pressure daily for the next 2 weeks, goal 130/80    Schedule COVID and Flu vaccines    Labs monthly

## 2023-10-16 NOTE — LETTER
10/16/2023         RE: Donald Blanco  [address] [place] [address]        Dear Colleague,    Thank you for referring your patient, Donald Blanco, to the [facility] TRANSPLANT [facility]INIC. Please see a copy of my visit note below.    TRANSPLANT NEPHROLOGY EARLY POST TRANSPLANT VISIT     Assessment & Plan  # LDKT: stable   - Baseline Creatinine: ~ 1.7-2    - Proteinuria: Normal (<0.2 grams)   - Date DSA Last Checked: Mar/2023      Latest DSA: No   - BK Viremia: No   - Kidney Tx Biopsy: Jun 22, 2022; Result: No diagnostic evidence of acute rejection.  Acute tubular injury.  Mild interstitial fibrosis and tubular atrophy.             May 24, 2022; Result: No diagnostic evidence of acute rejection.  Acute tubular injury.   - Transplant Ureteral Stent: No    # Immunosuppression: Tacrolimus immediate release (goal 4-6) and Mycophenolate mofetil (dose 250 mg every 12 hours)   - Continue with intensive monitoring of immunosuppression for efficacy and toxicity.   - Induction with Recent Transplant:  High Intensity   - Continue with intensive monitoring of immunosuppression for efficacy and toxicity.   - Changes: yes on reduced dose /250 as of due to BK viremia, level 2.19 on reduced dose , will consider uptitrating MMF dose if BK neg x2, DSA neg 10/2023    # BK viremia:   - peak viral load 13K copies/ml Sep 2023 downtrending now at ~2k copies/ml   - on reduced IS as above   - Igg-750 Sep 2023    # Infection Prophylaxis:   - PJP: Sulfa/TMP (Bactrim) M,W,F-CD4 still<200, on reduced dose due to hyperK hx  - CMV: None    # Hypertension: Borderline control;  Goal BP: < 130/80   - Volume status: Euvolemic   - Changes: Not at this time; continue checking blood pressure at home    # Elevated Blood Glucose:  Last HbA1c: 5.8%   - Management as per primary care.    # Mineral Bone Disorder:  - Secondary renal hyperparathyroidism; PTH level: Mildly elevated (151-300 pg/ml)        On treatment: sensipar 30 mg  daily, update PTH with next labs  - Vitamin D; level: Normal        On supplement: Yes  - Calcium; level: normal        On supplement: no    # Electrolytes:   - Potassium; level: normal      On binder: Yes lokelma 10 g po every day, discussed dose reduction to MWF but he is concerned he would forget, will keep for now  - Magnesium; level: Normal        On supplement: Yes  - Bicarbonate; level: Normal        On supplement: Yes  baking soda 1 teasp daily -dicussed reducing to 1 tsp MWF but he prefers to continue daily dose    # Obesity, Class II (BMI = 39.3):    - Recommend weight loss for overall health by increasing exercise and watching caloric intake.    # Medical Compliance: Yes    # Immunization: due for COVID booster and Flu    # Transplant History:  Etiology of Kidney Failure: IgA nephropathy  Tx: LDKT  Transplant: 5/17/2022 (Kidney)  Donor Type: Living Donor Class:   Crossmatch at time of Tx: negative  DSA at time of Tx: No  Significant changes in immunosuppression: None  CMV IgG Ab High Risk Discordance (D+/R-): No  EBV IgG Ab High Risk Discordance (D+/R-): No  Significant transplant-related complications: DGF    Transplant Office Phone Number: [phone]    Assessment and plan was discussed with the patient and he voiced his understanding and agreement.    Return visit: 4 months    Donte Duff MD    Chief Complaint  Mr. Blanco is a 58 year old here for kidney transplant and immunosuppression management.     History of Present Illness   Feels good overall  Has not been very active due to low back pain which is similar to what he had for years prior to spine surgery, walking makes it worse, biking is better tolerated.no significant weight change over the past several months  discussed lokelma dose reduction to MWF & reducing baking soda (1tsp/d) as well but he prefers to take daily as he will likely forget, will continue both due to hyperK hx  started sensipar 30 tolerated so far and due for repeat  pth  doesn't check home BP readings once a month-advised to monitor regularly as clinic readings above goal  UTD dermatology, colonoscopy, PSA    Current IS FK 1/1 /250 -level >2 on this dose with stable low grade BK viremia  Ppx: bactrim MWF (hx of hyperK)  Home BP: not monitored     Primary Nephrologist:  (not followed with post transplant so far)  PCP: saw PA x 2    Problem List  Patient Active Problem List   Diagnosis     HTN, kidney transplant related     Gout     Dyslipidemia     Secondary renal hyperparathyroidism (H24)     IgA nephropathy     Class 2 obesity due to excess calories without serious comorbidity with body mass index (BMI) of 36.0 to 36.9 in adult     Prostate cancer (H)     Kidney replaced by transplant     Immunosuppression (H24)     Hyperkalemia     Hypomagnesemia     Metabolic acidosis     Aftercare following organ transplant     Morbid obesity (H)     Need for pneumocystis prophylaxis     Stage 3b chronic kidney disease (H)     Vitamin D deficiency       Allergies  No Known Allergies    Medications  Current Outpatient Medications   Medication Sig     ACE/ARB/ARNI NOT PRESCRIBED (INTENTIONAL) Please choose reason not prescribed from choices below.     alprostadil (EDEX) 10 MCG kit 10 mcg by Intracavitary route as needed for erectile dysfunction use no more than 3 times per week     aspirin 81 MG EC tablet Take 1 tablet (81 mg) by mouth daily     atorvastatin (LIPITOR) 10 MG tablet Take 1 tablet (10 mg) by mouth every evening     B Complex-C-Folic Acid (VIRT-CAPS) 1 MG CAPS Take 1 mg by mouth daily     cinacalcet (SENSIPAR) 30 MG tablet Take 1 tablet (30 mg) by mouth daily     COMPOUNDED NON-CONTROLLED SUBSTANCE (CMPD RX) - PHARMACY TO MIX COMPOUNDED MEDICATION Alprostadil 50 mcg inject 0.2 mL intracavitary daily no more than three times weekly with twenty-four hours between injections     furosemide (LASIX) 20 MG tablet Take 1 tablet (20 mg) by mouth 2 times daily      mycophenolate (GENERIC EQUIVALENT) 250 MG capsule Take 1 capsule (250 mg) by mouth 2 times daily     Sodium Bicarbonate, antacid, POWD Take 1 Teaspoon of baking soda once daily     sodium zirconium cyclosilicate (LOKELMA) 10 g PACK packet Take 1 packet (10 g) by mouth daily     sulfamethoxazole-trimethoprim (BACTRIM) 400-80 MG tablet Take 1 tablet by mouth Every Mon, Wed, Fri Morning Increase to one tab by mouth daily when directed by transplant team     tacrolimus (GENERIC EQUIVALENT) 0.5 MG capsule HOLD FOR FUTURE DOSE CHANGES.  Profile Rx: patient will contact pharmacy when needed     tacrolimus (GENERIC EQUIVALENT) 1 MG capsule Take 1 capsule (1 mg) by mouth 2 times daily     No current facility-administered medications for this visit.     There are no discontinued medications.    Physical Exam  Vital Signs: BP (!) 148/74   Pulse 69   Temp 98.1  F (36.7  C) (Oral)   Wt 120.7 kg (266 lb 3.2 oz)   SpO2 97%   BMI 39.31 kg/m      GENERAL APPEARANCE: alert and no distress  HENT: mouth without ulcers or lesions  LYMPHATICS: no cervical or supraclavicular nodes  RESP: lungs clear to auscultation - no rales, rhonchi or wheezes  CV: regular rhythm, normal rate, no rub, no murmur  EDEMA: no LE edema bilaterally  ABDOMEN: soft, nondistended, nontender, bowel sounds normal, obese  MS: extremities normal - no gross deformities noted, no evidence of inflammation in joints, no muscle tenderness  SKIN: no rash  TX KIDNEY: normal  DIALYSIS ACCESS: none    Data        Latest Ref Rng & Units 10/2/2023     7:09 AM 9/25/2023     7:14 AM 9/13/2023     7:02 AM   Renal   Sodium 135 - 145 mmol/L 141  143  140    K 3.4 - 5.3 mmol/L 4.6  4.6  4.9    Cl 98 - 107 mmol/L 105  108  104    Cl (external) 98 - 107 mmol/L 105  108  104    CO2 22 - 29 mmol/L 25  26  27    Urea Nitrogen 8.0 - 23.0 mg/dL 26.8  30.0  30.1    Creatinine 0.67 - 1.17 mg/dL 1.77  1.98  1.84    Glucose 70 - 99 mg/dL 118  120  111    Calcium 8.8 - 10.2 mg/dL 9.9  10.0   10.2          Latest Ref Rng & Units 5/17/2023     9:42 AM 3/16/2023     7:07 AM 10/10/2022     7:07 AM   Bone Health   Phosphorus 2.5 - 4.5 mg/dL   1.8    Parathyroid Hormone Intact 15 - 65 pg/mL 259  176     Vit D Def 20 - 75 ug/L 48  53           Latest Ref Rng & Units 10/2/2023     7:09 AM 9/25/2023     7:14 AM 9/13/2023     7:02 AM   Heme   WBC 4.0 - 11.0 10e3/uL 5.8  6.7  5.9    Hgb 13.3 - 17.7 g/dL 14.3  14.5  14.7    Plt 150 - 450 10e3/uL 179  176  186          Latest Ref Rng & Units 5/17/2023     9:42 AM 11/21/2022     7:00 AM 6/3/2022    10:48 AM   Liver   AP 40 - 129 U/L 91  105  83    TBili <=1.2 mg/dL 0.9  0.6  0.5    Bilirubin Direct 0.0 - 0.2 mg/dL  0.1  0.2    Bilirubin Direct 0.00 - 0.30 mg/dL 0.22      ALT 10 - 50 U/L 24  26  23    AST 10 - 50 U/L 24  16  9    Tot Protein 6.4 - 8.3 g/dL 7.3  7.2  6.6    Albumin 3.4 - 5.0 g/dL  4.0  3.3    Albumin 3.5 - 5.2 g/dL 4.4            Latest Ref Rng & Units 5/17/2023     9:42 AM 11/21/2022     7:00 AM 6/3/2022    10:48 AM   Pancreas   A1C <5.7 % 5.8  5.6  5.1          Latest Ref Rng & Units 11/7/2022     7:08 AM 10/10/2022     7:07 AM 9/16/2022     7:07 AM   Iron studies   Iron 35 - 180 ug/dL 81  102     Iron Saturation Index 15 - 46 % 26  31     Ferritin 26 - 388 ng/mL 494  542  602          Latest Ref Rng & Units 5/17/2022     1:35 PM 5/9/2022    10:00 AM 9/3/2020    11:31 AM   [facility] Txp Virology   CMV QUANT IU/ML Not Detected IU/mL Not Detected      EBV CAPSID ANTIBODY IGG No detectable antibody.  Positive  >8.0    Hep B Core NR^Nonreactive   Nonreactive        Recent Labs   Lab Test 09/13/23  0702 09/25/23  0714 10/02/23  0709   DOSTAC 9/12/2023 9/24/2023 10/1/2023   TACROL 4.5* 4.7* 5.9     Recent Labs   Lab Test 08/23/23  0717 09/25/23  0714 10/02/23  0709   DOSMPA 8/22/2023   8:00 PM 9/24/2023   8:00 PM 10/1/2023   8:00 PM   MPACID 5.58* 1.33 2.19   MPAG 77.5 24.9* 24.8*     I spent a total of 42 minutes on the date of the encounter doing chart  review, performing a history and physical exam, completing documentation and any further activities as noted above.      duplicate      Again, thank you for allowing me to participate in the care of your patient.        Sincerely,        Donte Duff MD

## 2023-10-16 NOTE — NURSING NOTE
Chief Complaint   Patient presents with    RECHECK     5 mo f/u       BP (!) 148/74   Pulse 69   Temp 98.1  F (36.7  C) (Oral)   Wt 120.7 kg (266 lb 3.2 oz)   SpO2 97%   BMI 39.31 kg/m      Arnold Hughes on 10/16/2023 at 12:45 PM

## 2023-10-26 DIAGNOSIS — Z48.298 AFTERCARE FOLLOWING ORGAN TRANSPLANT: ICD-10-CM

## 2023-10-27 RX ORDER — FUROSEMIDE 20 MG
20 TABLET ORAL 2 TIMES DAILY
Qty: 60 TABLET | Refills: 0 | Status: SHIPPED | OUTPATIENT
Start: 2023-10-27 | End: 2023-11-28

## 2023-11-02 DIAGNOSIS — E87.5 HYPERKALEMIA: ICD-10-CM

## 2023-11-06 ENCOUNTER — LAB (OUTPATIENT)
Dept: LAB | Facility: CLINIC | Age: 58
End: 2023-11-06
Payer: COMMERCIAL

## 2023-11-06 DIAGNOSIS — Z94.0 KIDNEY REPLACED BY TRANSPLANT: ICD-10-CM

## 2023-11-06 DIAGNOSIS — Z48.298 AFTERCARE FOLLOWING ORGAN TRANSPLANT: ICD-10-CM

## 2023-11-06 DIAGNOSIS — Z29.89 NEED FOR PNEUMOCYSTIS PROPHYLAXIS: ICD-10-CM

## 2023-11-06 LAB
ANION GAP SERPL CALCULATED.3IONS-SCNC: 10 MMOL/L (ref 7–15)
BUN SERPL-MCNC: 28.1 MG/DL (ref 8–23)
CALCIUM SERPL-MCNC: 10.1 MG/DL (ref 8.8–10.2)
CD3 CELLS # BLD: 352 CELLS/UL (ref 603–2990)
CD3 CELLS NFR BLD: 43 % (ref 49–84)
CD3+CD4+ CELLS # BLD: 224 CELLS/UL (ref 441–2156)
CD3+CD4+ CELLS NFR BLD: 27 % (ref 28–63)
CD3+CD4+ CELLS/CD3+CD8+ CLL BLD: 1.79 % (ref 1.4–2.6)
CD3+CD8+ CELLS # BLD: 125 CELLS/UL (ref 125–1312)
CD3+CD8+ CELLS NFR BLD: 15 % (ref 10–40)
CHLORIDE SERPL-SCNC: 108 MMOL/L (ref 98–107)
CREAT SERPL-MCNC: 1.82 MG/DL (ref 0.67–1.17)
DEPRECATED HCO3 PLAS-SCNC: 24 MMOL/L (ref 22–29)
EGFRCR SERPLBLD CKD-EPI 2021: 42 ML/MIN/1.73M2
ERYTHROCYTE [DISTWIDTH] IN BLOOD BY AUTOMATED COUNT: 13.2 % (ref 10–15)
GLUCOSE SERPL-MCNC: 120 MG/DL (ref 70–99)
HCT VFR BLD AUTO: 45.3 % (ref 40–53)
HGB BLD-MCNC: 14.4 G/DL (ref 13.3–17.7)
MCH RBC QN AUTO: 29.4 PG (ref 26.5–33)
MCHC RBC AUTO-ENTMCNC: 31.8 G/DL (ref 31.5–36.5)
MCV RBC AUTO: 92 FL (ref 78–100)
PLATELET # BLD AUTO: 197 10E3/UL (ref 150–450)
POTASSIUM SERPL-SCNC: 5.1 MMOL/L (ref 3.4–5.3)
PTH-INTACT SERPL-MCNC: 160 PG/ML (ref 15–65)
RBC # BLD AUTO: 4.9 10E6/UL (ref 4.4–5.9)
SODIUM SERPL-SCNC: 142 MMOL/L (ref 135–145)
T CELL COMMENT: ABNORMAL
TACROLIMUS BLD-MCNC: 4.7 UG/L (ref 5–15)
TME LAST DOSE: ABNORMAL H
TME LAST DOSE: ABNORMAL H
WBC # BLD AUTO: 5.9 10E3/UL (ref 4–11)

## 2023-11-06 PROCEDURE — 86360 T CELL ABSOLUTE COUNT/RATIO: CPT

## 2023-11-06 PROCEDURE — 80048 BASIC METABOLIC PNL TOTAL CA: CPT

## 2023-11-06 PROCEDURE — 85027 COMPLETE CBC AUTOMATED: CPT

## 2023-11-06 PROCEDURE — 36415 COLL VENOUS BLD VENIPUNCTURE: CPT

## 2023-11-06 PROCEDURE — 83970 ASSAY OF PARATHORMONE: CPT

## 2023-11-06 PROCEDURE — 86359 T CELLS TOTAL COUNT: CPT

## 2023-11-06 PROCEDURE — 87799 DETECT AGENT NOS DNA QUANT: CPT

## 2023-11-06 PROCEDURE — 80197 ASSAY OF TACROLIMUS: CPT

## 2023-11-07 LAB
BKV DNA # SPEC NAA+PROBE: <500 COPIES/ML
BKV DNA SPEC NAA+PROBE-LOG#: <2.7 {LOG_COPIES}/ML

## 2023-11-28 DIAGNOSIS — Z48.298 AFTERCARE FOLLOWING ORGAN TRANSPLANT: ICD-10-CM

## 2023-11-28 DIAGNOSIS — E87.5 HYPERKALEMIA: Primary | ICD-10-CM

## 2023-11-28 RX ORDER — FUROSEMIDE 20 MG
20 TABLET ORAL 2 TIMES DAILY
Qty: 60 TABLET | Refills: 3 | Status: SHIPPED | OUTPATIENT
Start: 2023-11-28 | End: 2024-03-15

## 2023-12-04 ENCOUNTER — LAB (OUTPATIENT)
Dept: LAB | Facility: CLINIC | Age: 58
End: 2023-12-04
Payer: COMMERCIAL

## 2023-12-04 DIAGNOSIS — C61 PROSTATE CANCER (H): ICD-10-CM

## 2023-12-04 DIAGNOSIS — Z94.0 KIDNEY REPLACED BY TRANSPLANT: ICD-10-CM

## 2023-12-04 LAB
ANION GAP SERPL CALCULATED.3IONS-SCNC: 10 MMOL/L (ref 7–15)
BUN SERPL-MCNC: 31.8 MG/DL (ref 8–23)
CALCIUM SERPL-MCNC: 10 MG/DL (ref 8.8–10.2)
CHLORIDE SERPL-SCNC: 110 MMOL/L (ref 98–107)
CREAT SERPL-MCNC: 1.67 MG/DL (ref 0.67–1.17)
DEPRECATED HCO3 PLAS-SCNC: 24 MMOL/L (ref 22–29)
EGFRCR SERPLBLD CKD-EPI 2021: 47 ML/MIN/1.73M2
ERYTHROCYTE [DISTWIDTH] IN BLOOD BY AUTOMATED COUNT: 13.3 % (ref 10–15)
GLUCOSE SERPL-MCNC: 109 MG/DL (ref 70–99)
HCT VFR BLD AUTO: 46.3 % (ref 40–53)
HGB BLD-MCNC: 14.9 G/DL (ref 13.3–17.7)
MCH RBC QN AUTO: 30 PG (ref 26.5–33)
MCHC RBC AUTO-ENTMCNC: 32.2 G/DL (ref 31.5–36.5)
MCV RBC AUTO: 93 FL (ref 78–100)
PLATELET # BLD AUTO: 167 10E3/UL (ref 150–450)
POTASSIUM SERPL-SCNC: 4.9 MMOL/L (ref 3.4–5.3)
PSA SERPL DL<=0.01 NG/ML-MCNC: 0.39 NG/ML (ref 0–4.5)
RBC # BLD AUTO: 4.96 10E6/UL (ref 4.4–5.9)
SODIUM SERPL-SCNC: 144 MMOL/L (ref 135–145)
TACROLIMUS BLD-MCNC: 5.6 UG/L (ref 5–15)
TME LAST DOSE: NORMAL H
TME LAST DOSE: NORMAL H
WBC # BLD AUTO: 6.9 10E3/UL (ref 4–11)

## 2023-12-04 PROCEDURE — 36415 COLL VENOUS BLD VENIPUNCTURE: CPT

## 2023-12-04 PROCEDURE — 87799 DETECT AGENT NOS DNA QUANT: CPT

## 2023-12-04 PROCEDURE — 84153 ASSAY OF PSA TOTAL: CPT

## 2023-12-04 PROCEDURE — 80048 BASIC METABOLIC PNL TOTAL CA: CPT

## 2023-12-04 PROCEDURE — 80197 ASSAY OF TACROLIMUS: CPT

## 2023-12-04 PROCEDURE — 85027 COMPLETE CBC AUTOMATED: CPT

## 2023-12-05 LAB
BKV DNA # SPEC NAA+PROBE: <500 COPIES/ML
BKV DNA SPEC NAA+PROBE-LOG#: <2.7 {LOG_COPIES}/ML

## 2023-12-29 DIAGNOSIS — E87.5 HYPERKALEMIA: Primary | ICD-10-CM

## 2024-01-04 ENCOUNTER — LAB (OUTPATIENT)
Dept: LAB | Facility: CLINIC | Age: 59
End: 2024-01-04
Payer: COMMERCIAL

## 2024-01-04 ENCOUNTER — PRE VISIT (OUTPATIENT)
Dept: UROLOGY | Facility: CLINIC | Age: 59
End: 2024-01-04

## 2024-01-04 DIAGNOSIS — Z94.0 KIDNEY REPLACED BY TRANSPLANT: ICD-10-CM

## 2024-01-04 DIAGNOSIS — C61 PROSTATE CANCER (H): Primary | ICD-10-CM

## 2024-01-04 LAB
ANION GAP SERPL CALCULATED.3IONS-SCNC: 11 MMOL/L (ref 7–15)
BUN SERPL-MCNC: 23.8 MG/DL (ref 8–23)
CALCIUM SERPL-MCNC: 10 MG/DL (ref 8.8–10.2)
CHLORIDE SERPL-SCNC: 104 MMOL/L (ref 98–107)
CREAT SERPL-MCNC: 1.66 MG/DL (ref 0.67–1.17)
DEPRECATED HCO3 PLAS-SCNC: 25 MMOL/L (ref 22–29)
EGFRCR SERPLBLD CKD-EPI 2021: 47 ML/MIN/1.73M2
ERYTHROCYTE [DISTWIDTH] IN BLOOD BY AUTOMATED COUNT: 13.3 % (ref 10–15)
GLUCOSE SERPL-MCNC: 104 MG/DL (ref 70–99)
HCT VFR BLD AUTO: 48 % (ref 40–53)
HGB BLD-MCNC: 15.7 G/DL (ref 13.3–17.7)
HOLD SPECIMEN: NORMAL
MCH RBC QN AUTO: 29.9 PG (ref 26.5–33)
MCHC RBC AUTO-ENTMCNC: 32.7 G/DL (ref 31.5–36.5)
MCV RBC AUTO: 91 FL (ref 78–100)
PLATELET # BLD AUTO: 165 10E3/UL (ref 150–450)
POTASSIUM SERPL-SCNC: 4.6 MMOL/L (ref 3.4–5.3)
PSA SERPL DL<=0.01 NG/ML-MCNC: 0.46 NG/ML (ref 0–4.5)
RBC # BLD AUTO: 5.25 10E6/UL (ref 4.4–5.9)
SODIUM SERPL-SCNC: 140 MMOL/L (ref 135–145)
TACROLIMUS BLD-MCNC: 7 UG/L (ref 5–15)
TME LAST DOSE: NORMAL H
TME LAST DOSE: NORMAL H
WBC # BLD AUTO: 6.3 10E3/UL (ref 4–11)

## 2024-01-04 PROCEDURE — 84153 ASSAY OF PSA TOTAL: CPT | Performed by: UROLOGY

## 2024-01-04 PROCEDURE — 80048 BASIC METABOLIC PNL TOTAL CA: CPT

## 2024-01-04 PROCEDURE — 87799 DETECT AGENT NOS DNA QUANT: CPT

## 2024-01-04 PROCEDURE — 85027 COMPLETE CBC AUTOMATED: CPT

## 2024-01-04 PROCEDURE — 36415 COLL VENOUS BLD VENIPUNCTURE: CPT

## 2024-01-04 PROCEDURE — 80197 ASSAY OF TACROLIMUS: CPT

## 2024-01-04 NOTE — TELEPHONE ENCOUNTER
Reason for visit: follow up      Dx/Hx/Sx: prostate cancer     Records/imaging/labs/orders: scheduled for 1/4/24    At Rooming: virtual visit

## 2024-01-05 LAB — BKV DNA # SPEC NAA+PROBE: NOT DETECTED COPIES/ML

## 2024-01-09 ENCOUNTER — VIRTUAL VISIT (OUTPATIENT)
Dept: UROLOGY | Facility: CLINIC | Age: 59
End: 2024-01-09
Payer: COMMERCIAL

## 2024-01-09 DIAGNOSIS — C61 PROSTATE CANCER (H): Primary | ICD-10-CM

## 2024-01-09 PROCEDURE — 99213 OFFICE O/P EST LOW 20 MIN: CPT | Mod: 95 | Performed by: UROLOGY

## 2024-01-09 ASSESSMENT — PAIN SCALES - GENERAL: PAINLEVEL: NO PAIN (0)

## 2024-01-09 NOTE — PROGRESS NOTES
Donald Blanco is a 58 year old male who is being evaluated via a billable video visit.      How would you like to obtain your AVS? MyChart  If the video visit is dropped, the invitation should be resent by: Text to cell phone: [phone]  Will anyone else be joining your video visit? No    Video Start Time: 4:50 PM    CHIEF COMPLAINT   It was my pleasure to see Donald Blanco who is a 58 year old male for follow-up of Prostate Cancer.      HPI  Donald Blanco is a very pleasant 58 year old male who presents with a history of Prostate Cancer. [place] 4+3 disease, stage pT3b with RALP 12/14/2020 with focal positive margin at site of NORBERTO. He recovered without complication. Tolerated adjuvant radiation without complication. PSA remains low, but has been slowly rising over the past 2 years. It has taken more of a jump over the past 6 months, now to 0.46.     Kidney transplant completed 5/17/2022.      He has seen Dr. Delgadillo regarding ED and has tried ICI with some success.    PSA  1/4/2024 - 0.46  12/4/2023 - 0.39  5/1/2023 - 0.14  12/19/2022 - 0.07  6/1/2022 - 0.04  3/1/2022 - 0.05  2/21/2022 - 0.06  11/15/2021 - 0.04  8/9/2021 - 0.03  3/25/2021 - <0.1     RALP 12/14/2020  TUMOR     Histologic Type:         - Acinar adenocarcinoma     Histologic Grade       Grade Group and Clark Score:           - Grade group 3 (Clark Score 4 + 3 = 7)         Percentage of Pattern 4: 60%     Extraprostatic Extension (EPE):         - Present, nonfocal       Location of Extraprostatic Extension:           - Left posterior     Urinary Bladder Neck Invasion:         - Not identified     Seminal Vesicle Invasion:         - Present         - Left     MARGINS     Margins:         - Involved by invasive carcinoma         - Limited (< 3 mm)       Linear Length of Positive Margin(s) (Millimeters): 2 mm       Focality:           - Unifocal       Location of Positive Margin(s):           - Left posterior       Margin Positivity in Area  of Extraprostatic Extension (EPE):           - Present         Location(s): Left seminal vesicle       Clark Pattern at Positive Margin(s):           - Pattern 4     LYMPH NODES     Number of Lymph Nodes Involved:         - 0     Number of Lymph Nodes Examined:         8     PATHOLOGIC STAGE CLASSIFICATION (PTNM, AJCC 8TH EDITION)     Primary Tumor (pT):         - pT3b     Regional Lymph Nodes (pN):         - pN0          Allergies:   Patient has no known allergies.         Review of Systems:  From intake questionnaire     Skin: negative  Eyes: negative  Ears/Nose/Throat: negative  Respiratory: No shortness of breath, dyspnea on exertion, cough, or hemoptysis  Cardiovascular: No chest pain or palpitations  Gastrointestinal: negative; no nausea/vomiting, constipation or diarrhea  Genitourinary: as per HPI  Musculoskeletal: negative  Neurologic: negative  Psychiatric: negative  Hematologic/Lymphatic/Immunologic: negative  Endocrine: negative         Physical Exam:     Vitals:  No vitals were obtained today due to virtual visit.    Physical Exam   GENERAL: Healthy, alert and no distress  EYES: Eyes grossly normal to inspection.  No discharge or erythema, or obvious scleral/conjunctival abnormalities.  RESP: No audible wheeze, cough, or visible cyanosis.  No visible retractions or increased work of breathing.    SKIN: Visible skin clear. No significant rash, abnormal pigmentation or lesions.  NEURO: Cranial nerves grossly intact.  Mentation and speech appropriate for age.  PSYCH: Mentation appears normal, affect normal/bright, judgement and insight intact, normal speech and appearance well-groomed.      Outside and Past Medical records:    Review of the result(s) of each unique test - PSA         Assessment and Plan:     58 year old male with stage pT3b, [place] 4+3=7 prostate cancer, s/p RALP completed 12/14/2020. Focal positive margins at site of NORBERTO. SVI noted as well. High-risk by Decipher. Completed adjuvant  radiotherapy with Dr. Alexander. PSA detectable. This has risen slowly over the past 2 years.    PSA is not up to 0.46, and doubling time has increased. At this point, we discussed the role for PSMA PET scan, and medical oncology referral, to discuss the risk of distant disease and treatment options.     - PSMA PET  - Medical Oncology Referral    Orders  Orders Placed This Encounter   Procedures    PET PSMA Eyes to Thighs    Adult Oncology/Hematology  Referral     Video-Visit Details    Type of service:  Video Visit    Video End Time:4:58 PM    Originating Location (pt. Location): Home    Distant Location (provider location):  On-site    Platform used for Video Visit: [email or web address]    10 minutes spent on the date of the encounter including direct interaction with the patient, performing chart review, history and exam, documentation and further activities as noted above.    Nabil Vásquez MD  Urology  [facility]

## 2024-01-09 NOTE — LETTER
1/9/2024       RE: Donald Blanco  [address] [facility]  [place] [address]     Dear Colleague,    Thank you for referring your patient, Donald Blanco, to the [facility] UROLOGY CLINIC [place] [facility]. Please see a copy of my visit note below.    Donald Blanco is a 58 year old male who is being evaluated via a billable video visit.      How would you like to obtain your AVS? MyChart  If the video visit is dropped, the invitation should be resent by: Text to cell phone: [phone]  Will anyone else be joining your video visit? No    Video Start Time: 4:50 PM    CHIEF COMPLAINT   It was my pleasure to see Donald Blanco who is a 58 year old male for follow-up of Prostate Cancer.      HPI  Donald Blanco is a very pleasant 58 year old male who presents with a history of Prostate Cancer. [place] 4+3 disease, stage pT3b with RALP 12/14/2020 with focal positive margin at site of NORBERTO. He recovered without complication. Tolerated adjuvant radiation without complication. PSA remains low, but has been slowly rising over the past 2 years. It has taken more of a jump over the past 6 months, now to 0.46.     Kidney transplant completed 5/17/2022.      He has seen Dr. Delgadillo regarding ED and has tried ICI with some success.    PSA  1/4/2024 - 0.46  12/4/2023 - 0.39  5/1/2023 - 0.14  12/19/2022 - 0.07  6/1/2022 - 0.04  3/1/2022 - 0.05  2/21/2022 - 0.06  11/15/2021 - 0.04  8/9/2021 - 0.03  3/25/2021 - <0.1     RALP 12/14/2020  TUMOR     Histologic Type:         - Acinar adenocarcinoma     Histologic Grade       Grade Group and Clark Score:           - Grade group 3 ([place] Score 4 + 3 = 7)         Percentage of Pattern 4: 60%     Extraprostatic Extension (EPE):         - Present, nonfocal       Location of Extraprostatic Extension:           - Left posterior     Urinary Bladder Neck Invasion:         - Not identified     Seminal Vesicle Invasion:         -  Present         - Left     MARGINS     Margins:         - Involved by invasive carcinoma         - Limited (< 3 mm)       Linear Length of Positive Margin(s) (Millimeters): 2 mm       Focality:           - Unifocal       Location of Positive Margin(s):           - Left posterior       Margin Positivity in Area of Extraprostatic Extension (EPE):           - Present         Location(s): Left seminal vesicle       [place] Pattern at Positive Margin(s):           - Pattern 4     LYMPH NODES     Number of Lymph Nodes Involved:         - 0     Number of Lymph Nodes Examined:         8     PATHOLOGIC STAGE CLASSIFICATION (PTNM, AJCC 8TH EDITION)     Primary Tumor (pT):         - pT3b     Regional Lymph Nodes (pN):         - pN0          Allergies:   Patient has no known allergies.         Review of Systems:  From intake questionnaire     Skin: negative  Eyes: negative  Ears/Nose/Throat: negative  Respiratory: No shortness of breath, dyspnea on exertion, cough, or hemoptysis  Cardiovascular: No chest pain or palpitations  Gastrointestinal: negative; no nausea/vomiting, constipation or diarrhea  Genitourinary: as per HPI  Musculoskeletal: negative  Neurologic: negative  Psychiatric: negative  Hematologic/Lymphatic/Immunologic: negative  Endocrine: negative         Physical Exam:     Vitals:  No vitals were obtained today due to virtual visit.    Physical Exam   GENERAL: Healthy, alert and no distress  EYES: Eyes grossly normal to inspection.  No discharge or erythema, or obvious scleral/conjunctival abnormalities.  RESP: No audible wheeze, cough, or visible cyanosis.  No visible retractions or increased work of breathing.    SKIN: Visible skin clear. No significant rash, abnormal pigmentation or lesions.  NEURO: Cranial nerves grossly intact.  Mentation and speech appropriate for age.  PSYCH: Mentation appears normal, affect normal/bright, judgement and insight intact, normal speech and appearance well-groomed.      Outside  and Past Medical records:    Review of the result(s) of each unique test - PSA         Assessment and Plan:     58 year old male with stage pT3b, [place] 4+3=7 prostate cancer, s/p RALP completed 12/14/2020. Focal positive margins at site of NORBERTO. SVI noted as well. High-risk by Decipher. Completed adjuvant radiotherapy with Dr. Alexander. PSA detectable. This has risen slowly over the past 2 years.    PSA is not up to 0.46, and doubling time has increased. At this point, we discussed the role for PSMA PET scan, and medical oncology referral, to discuss the risk of distant disease and treatment options.     - PSMA PET  - Medical Oncology Referral    Orders  Orders Placed This Encounter   Procedures    PET PSMA Eyes to Thighs    Adult Oncology/Hematology  Referral     Video-Visit Details    Type of service:  Video Visit    Video End Time:4:58 PM    Originating Location (pt. Location): Home    Distant Location (provider location):  On-site    Platform used for Video Visit: Emergent Views    10 minutes spent on the date of the encounter including direct interaction with the patient, performing chart review, history and exam, documentation and further activities as noted above.    Nabil Vásquez MD  Urology  [facility]

## 2024-01-09 NOTE — NURSING NOTE
Is the patient currently in the state of [place]? YES    Visit mode:VIDEO    If the visit is dropped, the patient can be reconnected by: VIDEO VISIT: Text to cell phone:   Telephone Information:   Mobile [phone]       Will anyone else be joining the visit? NO  (If patient encounters technical issues they should call [phone]    How would you like to obtain your AVS? MyChart    Are changes needed to the allergy or medication list? No    Reason for visit: RECHECK (6 month follow-up )    Shabnam WATTS

## 2024-01-10 ENCOUNTER — PATIENT OUTREACH (OUTPATIENT)
Dept: ONCOLOGY | Facility: CLINIC | Age: 59
End: 2024-01-10
Payer: COMMERCIAL

## 2024-01-10 NOTE — PROGRESS NOTES
New Patient Oncology Nurse Navigator Note     Referring provider:   Nabil Vásquez MD [facility] [phone]     Referred to (specialty): Medical Oncology    Date Referral Received:   1/10/24     Evaluation for :   biochemical recurrence of prostate cancer - prior surgery and radiation - has kidney transplant     Clinical History (per Nurse review of records provided):    Patient with history of Prostate Cancer. [place] 4+3 disease, stage pT3b with RALP 12/14/2020; positive margin.    He received adjuvant radiation, with Dr. Alexander,  completed on 6/25/2021, 6600 cGy in 33 Fractions.    PSA remains very low, but has been slowly rising over the past 2 years.    PSMA PET ordered by urology    Pertinent urology notes, pathology/labs & imaging bookmarked**    Records Location (Care Everywhere, Media, etc.): Epic      Additional testing needed prior to consult:   PSMA PET    Writer received referral and chart reviewed.  I attempted to reach patient to discuss, but no answer so message left with new patient scheduling number.  Referral forwarded on to new patient scheduling   with the following plan:    1) SCHEDULE: PSMA PET - to be done prior to med onc consult  2) SCHEDULE: Med onc for prostate cancer @ pt choice of location, NEW, in person, first available within 2 weeks following PET scan

## 2024-01-23 ENCOUNTER — ANCILLARY ORDERS (OUTPATIENT)
Dept: UROLOGY | Facility: CLINIC | Age: 59
End: 2024-01-23

## 2024-01-23 ENCOUNTER — HOSPITAL ENCOUNTER (OUTPATIENT)
Dept: PET IMAGING | Facility: CLINIC | Age: 59
Setting detail: NUCLEAR MEDICINE
Discharge: HOME | End: 2024-01-23
Attending: UROLOGY | Admitting: UROLOGY
Payer: COMMERCIAL

## 2024-01-23 DIAGNOSIS — C61 PROSTATE CANCER (H): Primary | ICD-10-CM

## 2024-01-23 DIAGNOSIS — C61 PROSTATE CANCER (H): ICD-10-CM

## 2024-01-23 LAB
CREAT BLD-MCNC: 1.5 MG/DL (ref 0.7–1.3)
EGFRCR SERPLBLD CKD-EPI 2021: 53 ML/MIN/1.73M2

## 2024-01-23 PROCEDURE — 78815 PET IMAGE W/CT SKULL-THIGH: CPT | Mod: PI

## 2024-01-23 PROCEDURE — 71260 CT THORAX DX C+: CPT

## 2024-01-23 PROCEDURE — 82565 ASSAY OF CREATININE: CPT

## 2024-01-23 PROCEDURE — 343N000001 HC RX 343: Performed by: UROLOGY

## 2024-01-23 PROCEDURE — 78815 PET IMAGE W/CT SKULL-THIGH: CPT | Mod: 26 | Performed by: RADIOLOGY

## 2024-01-23 PROCEDURE — 71260 CT THORAX DX C+: CPT | Mod: 26 | Performed by: RADIOLOGY

## 2024-01-23 PROCEDURE — A9596 HC RX 343: HCPCS | Performed by: UROLOGY

## 2024-01-23 PROCEDURE — 74177 CT ABD & PELVIS W/CONTRAST: CPT | Mod: 26 | Performed by: RADIOLOGY

## 2024-01-23 PROCEDURE — 250N000011 HC RX IP 250 OP 636: Performed by: UROLOGY

## 2024-01-23 RX ORDER — IOPAMIDOL 755 MG/ML
20-135 INJECTION, SOLUTION INTRAVASCULAR ONCE
Status: COMPLETED | OUTPATIENT
Start: 2024-01-23 | End: 2024-01-23

## 2024-01-23 RX ADMIN — KIT FOR THE PREPARATION OF GALLIUM GA 68 GOZETOTIDE INJECTION 5.66 MILLICURIE: KIT INTRAVENOUS at 08:41

## 2024-01-23 RX ADMIN — IOPAMIDOL 135 ML: 755 INJECTION, SOLUTION INTRAVENOUS at 09:22

## 2024-01-30 DIAGNOSIS — E87.5 HYPERKALEMIA: ICD-10-CM

## 2024-01-30 RX ORDER — SODIUM ZIRCONIUM CYCLOSILICATE 10 G/10G
POWDER, FOR SUSPENSION ORAL
Qty: 30 PACKET | Refills: 0 | OUTPATIENT
Start: 2024-01-30

## 2024-02-05 ENCOUNTER — PATIENT OUTREACH (OUTPATIENT)
Dept: CARE COORDINATION | Facility: CLINIC | Age: 59
End: 2024-02-05
Payer: COMMERCIAL

## 2024-02-06 NOTE — PROGRESS NOTES
[facility] Medical Oncology Note  Date of visit: February 8, 2024  New Outpatient Clinic Note        Assessment:     Castrate sensitive oligometastatic disease after radical prostatectomy and adjuvant radiotherapy.  There is no evidence of widespread distant metastatic disease and so in this setting often we still can go for cure if the foci of oligo metastases can be radiated. However, after discussion with radiation oncology, the nodes are in an area that was previously radiated. Therefore, RT is not possible.  In other words, Jayla now has a disease for which we do not have a cure.  Therefore the goals of all treatment moving forward are twofold: We want Jayla to live as long as possible, but also have the highest quality of life.  As with other renal transplant patients it has been my privilege to care for, he wants to do everything possible to save his renal graft.  He does not want to go on dialysis and who can blame him?  There are multiple options for therapy here including androgen deprivation therapy, single agent bicalutamide or even abiraterone (which interact less with his immunosuppressant drugs than enzalutamide does).  But the truth is these therapies will work whenever we go to them, and I am not convinced going to treatment now will help him live longer or feel better than if we wait for his PSA to rise a bit more.  After discussing the risks and benefits of all of the above, Jayla is in agreement with holding off on treatment and rechecking a PSA, both today, as well as 3 months from now.  I will get better feel for the rate of rise of his PSA at that point.  He does want to hold off on androgen deprivation therapy if at all possible.  He has gained about 30 pounds back of a 60 pound weight loss he had to do before his renal transplant and is starting to exercise again.  Certainly turning off his testosterone would get in the way of his ability to do this.  Other comorbidities including morbid  obesity.  That said, do things like expectancy is 10 years or more.    I had a long and involved conversation with Jayla and his wife Marcelo today in clinic.  Many questions were asked and hopefully answered to their satisfaction.    Plan:     No intervention by me.  I did add a PSA to blood that was drawn yesterday since his last PSA was 5 weeks ago.  Return to clinic with me in 3 months with a PSA drawn just prior.  At that point we will understand a bit more about the pace of his metastatic prostate cancer.  Continue to follow-up with his renal transplant team   I do encourage him to continue exercising.    The case was also discussed with Dr. Delmar Alexander, radiation oncology.    Iván Cortez MD, MSc  Associate Professor of Medicine  [facility] [place] [address]  [phone]    __________________________________________________________________    DIANGOSIS       I was asked to see this patient in consultation to give an opinion regarding further evaluation and management of recently noted recurrent and oligometastatic prostate cancer.  IgA nephropathy with Stage V CKD, s/p renal transplant. He is on tacrolimus and mycophenolate.    History of Present Illness/therapy to date:     Radical prostatectomy 12/14/2020.  Final pathology showed [place] grade group 3 (4+3 equal 7) acinar adenocarcinoma of the prostate with nonfocal present extraprostatic extension at the left posterior.  Seminal vesicle invasion was noted on the left side.  There was a positive margin, with less than 3 mm noted in the left posterior.  It was positive in the area of extraprostatic extension.  Pattern 4 was noted at the margin.  8 regional lymph nodes were resected and none were involved. PSA prior to surgery was 13.5.  Adjuvant radiotherapy completed 6/25/2021.  PSA dropped to unmeasurable post-op and remained there until 11/15/21 when it was 0.04, then 0.14  5/1/2023. It hit 0.46,  1/4/2024.  PSMA scan was ordered 1/23/2024. This showed PSMA avid bilateral internal iliac nodes c/w recurrence.      Interval history:     Recent minimal spike in BK virus. Renal coordinator knows and there is no recommendation for change.  Has chronic urinary incontinence and needs to wear a pad  Has no ability to get erections.  Used papaverine injections a few times but it was not overly helpful since he continued to drip despite that.    Has gained 30 pounds back in the 60 pounds he lost prior to his transplant surgery.  Is now back on an elliptical machine and getting more exercise.  Denies pain.  Denies stool issues.      On ROS in addition to the above      Denies  fevers, chills, NS, HA, dysphagia/odynophagia, change in weight, change in appetite, cough, SOB, CP, n/v, abd pain, constipation/diarrhea, hematochezia, dysuria, hematuria, swelling, rashes, lymphadenopathy      Past Medical History:   I have reviewed this patient's past medical history   Past Medical History:   Diagnosis Date    CKD (chronic kidney disease) stage 4, GFR 15-29 ml/min (H)     Elevated PSA     Essential hypertension 05/21/2020    Hyperlipidemia     IgA nephropathy     Obesity     Prostate cancer (H) 10/15/2020    S/P radiation therapy     6,600 cGy to pelvis completed on 6/25/2021 [facility]          Past Surgical History:    I have reviewed this patient's past surgical history       Social History:   Tobacco, ETOH, and rec drugs reviewed and as noted below with the following exceptions:  Jayla grew up in [place] and graduated from high school in 1981.  He was a high school wrestler.  He then went to [employer] on a [email or web address] scholarship, and studied accounting.  This lasted for about a year and a half.  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  He is hoping to retire in the fall 2025.  He is  to Sana, someone who he went to high school  "with.  However they were dating other people.  2 years after high school he was wearing his brother's [employer] jacket and had grown a beard, and feeling pretty cool.  He then went to a bar where he saw Sana's best friend Mary, who told him \"Sana just broke up and you should call her!.\"  He gave her a call and the rest of his history.  They have 2 children, Sonu, also wrestling fan, and Mayra, who is an RN and recently in the neurosurgery division at the [employer] in [place].          Family History:     Family History   Problem Relation Age of Onset    Kidney Disease Father     Hypertension Father     Cancer Mother     No Known Problems Brother     No Known Problems Sister     No Known Problems Son     No Known Problems Daughter     No Known Problems Brother     No Known Problems Brother     No Known Problems Brother     No Known Problems Sister     No Known Problems Sister     No Known Problems Sister             Medications:     Current Outpatient Medications   Medication Sig Dispense Refill    ACE/ARB/ARNI NOT PRESCRIBED (INTENTIONAL) Please choose reason not prescribed from choices below.      alprostadil (EDEX) 10 MCG kit 10 mcg by Intracavitary route as needed for erectile dysfunction use no more than 3 times per week 1 kit 3    aspirin 81 MG EC tablet Take 1 tablet (81 mg) by mouth daily      atorvastatin (LIPITOR) 10 MG tablet Take 1 tablet (10 mg) by mouth every evening 90 tablet 1    B Complex-C-Folic Acid (VIRT-CAPS) 1 MG CAPS Take 1 mg by mouth daily 90 capsule 3    cinacalcet (SENSIPAR) 30 MG tablet Take 1 tablet (30 mg) by mouth daily 90 tablet 3    COMPOUNDED NON-CONTROLLED SUBSTANCE (CMPD RX) - PHARMACY TO MIX COMPOUNDED MEDICATION Alprostadil 50 mcg inject 0.2 mL intracavitary daily no more than three times weekly with twenty-four hours between injections 5 mL 4    furosemide (LASIX) 20 MG tablet Take 1 tablet (20 mg) by mouth 2 times daily 60 tablet 3    mycophenolate (GENERIC EQUIVALENT) " 250 MG capsule Take 1 capsule (250 mg) by mouth 2 times daily 60 capsule 11    Sodium Bicarbonate, antacid, POWD Take 1 Teaspoon of baking soda once daily 1 g 0    sodium zirconium cyclosilicate (LOKELMA) 10 g PACK packet Take 1 packet (10 g) by mouth daily 30 packet 11    tacrolimus (GENERIC EQUIVALENT) 0.5 MG capsule HOLD FOR FUTURE DOSE CHANGES.  Profile Rx: patient will contact pharmacy when needed 180 capsule 3    tacrolimus (GENERIC EQUIVALENT) 1 MG capsule Take 1 capsule (1 mg) by mouth 2 times daily 180 capsule 3              Physical Exam:   There were no vitals taken for this visit.    ECOG PS: 1  Constitutional: WDWN male in NAD, pleasant and appropriate  HEENT:  NC/AT, no icterus, OP clear, MMM  Skin: No jaundice nor ecchymoses  Lungs: CTAB, no w/r/r, nonlabored breathing  Cardiovascular: RRR, S1, S2, no m/r/g  Abdomen: +BS, soft, nontender, nondistended, no organomegaly nor masses though body habitus does obscure the exam somewhat  MSK/Extremities: Warm, well perfused. No edema  LN: no cervical, supraclavicular, axillary, nor inguinal lymphadenopathy  Neurologic: alert, answering questions appropriately, moving all extremities spontaneously. CN 2-12 grossly intact.  Psych: appropriate affect  Data:      Latest Reference Range & Units 12/04/23 07:00 01/04/24 06:59   PSA Tumor Marker 0.00 - 4.50 ng/mL 0.39 0.46         Recent Labs   Lab Test 01/04/24  0659 12/04/23  0700 11/06/23  0706 10/02/23  0709 09/25/23  0714 04/03/23  0658 03/17/23  1808 03/16/23  0707 07/04/22  0800 06/30/22  0830 06/27/22  0823 06/23/22  0817 06/23/22  0447   WBC 6.3 6.9 5.9 5.8 6.7   < > 7.7 6.4   < > 6.4 6.6  --  5.9   NEUTROPHIL  --   --   --   --   --   --  81 78  --  90 88  --  83   HGB 15.7 14.9 14.4 14.3 14.5   < > 13.6 14.0   < > 7.6* 7.9*   < > 6.2*    167 197 179 176   < > 161 153   < > 152 159  --  150    < > = values in this interval not displayed.     Recent Labs   Lab Test 01/23/24  0838 01/04/24  0659  12/04/23  0700 11/06/23  0706 10/02/23  0709 09/25/23  0714   NA  --  140 144 142 141 143   POTASSIUM  --  4.6 4.9 5.1 4.6 4.6   CHLORIDE  --  104 110* 108* 105 108*   CO2  --  25 24 24 25 26   ANIONGAP  --  11 10 10 11 9   BUN  --  23.8* 31.8* 28.1* 26.8* 30.0*   CR 1.5* 1.66* 1.67* 1.82* 1.77* 1.98*   YEE  --  10.0 10.0 10.1 9.9 10.0     Recent Labs   Lab Test 05/17/23  0942 05/01/23  0658 11/21/22  0700 10/10/22  0707 09/09/22  0738 08/22/22  0702 06/09/22  1014 06/03/22  1048   MAG  --  1.8  --  1.8 1.8 1.6   < >  --    PHOS  --   --   --  1.8* 1.5* 1.8*   < >  --    URIC 8.6*  --  8.8*  --   --   --   --  6.1    < > = values in this interval not displayed.     Recent Labs   Lab Test 05/17/23  0942 11/21/22  0700 06/03/22  1048   BILITOTAL 0.9 0.6 0.5   ALKPHOS 91 105 83   ALT 24 26 23   AST 24 16 9   ALBUMIN 4.4 4.0 3.3*     @LABRCNT(PSAtumormarker:7)    No results found for this or any previous visit (from the past 24 hour(s)).    Other Data     CT/PET 1/23/2024        IMPRESSION: In this patient with history of prostate cancer with  prostatectomy and radiation now with rising prostate-specific antigen:  1. Estimated avid right and left internal iliac lymph nodes consistent  with disease recurrence. miPSMA score 1  2. No evidence of disease in chest or abdomen.  3. Right lower quadrant renal transplant without hydronephrosis or  nephrolithiasis.         Labs, imaging and treatment plan reviewed with patient. All questions answered.        60 minutes spent on the date of the encounter doing chart review, review of outside records, review of test results, interpretation of tests, patient visit, documentation, discussion with other provider(s), and discussion with family

## 2024-02-07 ENCOUNTER — LAB (OUTPATIENT)
Dept: LAB | Facility: CLINIC | Age: 59
End: 2024-02-07
Payer: COMMERCIAL

## 2024-02-07 DIAGNOSIS — Z94.0 KIDNEY REPLACED BY TRANSPLANT: ICD-10-CM

## 2024-02-07 LAB
ANION GAP SERPL CALCULATED.3IONS-SCNC: 8 MMOL/L (ref 7–15)
BUN SERPL-MCNC: 26.5 MG/DL (ref 8–23)
CALCIUM SERPL-MCNC: 9.8 MG/DL (ref 8.8–10.2)
CHLORIDE SERPL-SCNC: 105 MMOL/L (ref 98–107)
CREAT SERPL-MCNC: 1.68 MG/DL (ref 0.67–1.17)
DEPRECATED HCO3 PLAS-SCNC: 26 MMOL/L (ref 22–29)
EGFRCR SERPLBLD CKD-EPI 2021: 46 ML/MIN/1.73M2
ERYTHROCYTE [DISTWIDTH] IN BLOOD BY AUTOMATED COUNT: 12.9 % (ref 10–15)
GLUCOSE SERPL-MCNC: 118 MG/DL (ref 70–99)
HCT VFR BLD AUTO: 46.7 % (ref 40–53)
HGB BLD-MCNC: 15 G/DL (ref 13.3–17.7)
MCH RBC QN AUTO: 29.6 PG (ref 26.5–33)
MCHC RBC AUTO-ENTMCNC: 32.1 G/DL (ref 31.5–36.5)
MCV RBC AUTO: 92 FL (ref 78–100)
PLATELET # BLD AUTO: 173 10E3/UL (ref 150–450)
POTASSIUM SERPL-SCNC: 4.9 MMOL/L (ref 3.4–5.3)
RBC # BLD AUTO: 5.07 10E6/UL (ref 4.4–5.9)
SODIUM SERPL-SCNC: 139 MMOL/L (ref 135–145)
TACROLIMUS BLD-MCNC: 4.7 UG/L (ref 5–15)
TME LAST DOSE: ABNORMAL H
TME LAST DOSE: ABNORMAL H
WBC # BLD AUTO: 6.2 10E3/UL (ref 4–11)

## 2024-02-07 PROCEDURE — 80048 BASIC METABOLIC PNL TOTAL CA: CPT

## 2024-02-07 PROCEDURE — 85027 COMPLETE CBC AUTOMATED: CPT

## 2024-02-07 PROCEDURE — 36415 COLL VENOUS BLD VENIPUNCTURE: CPT

## 2024-02-07 PROCEDURE — 87799 DETECT AGENT NOS DNA QUANT: CPT

## 2024-02-07 PROCEDURE — 80197 ASSAY OF TACROLIMUS: CPT

## 2024-02-08 ENCOUNTER — PRE VISIT (OUTPATIENT)
Dept: ONCOLOGY | Facility: CLINIC | Age: 59
End: 2024-02-08
Payer: COMMERCIAL

## 2024-02-08 ENCOUNTER — ONCOLOGY VISIT (OUTPATIENT)
Dept: ONCOLOGY | Facility: CLINIC | Age: 59
End: 2024-02-08
Attending: UROLOGY
Payer: COMMERCIAL

## 2024-02-08 VITALS
RESPIRATION RATE: 18 BRPM | SYSTOLIC BLOOD PRESSURE: 144 MMHG | BODY MASS INDEX: 39.1 KG/M2 | DIASTOLIC BLOOD PRESSURE: 71 MMHG | HEART RATE: 74 BPM | TEMPERATURE: 98 F | HEIGHT: 69 IN | WEIGHT: 264 LBS | OXYGEN SATURATION: 95 %

## 2024-02-08 DIAGNOSIS — C61 PROSTATE CANCER (H): ICD-10-CM

## 2024-02-08 LAB
BK VIRUS SPECIMEN TYPE: NORMAL
BKV DNA # SPEC NAA+PROBE: NOT DETECTED IU/ML

## 2024-02-08 PROCEDURE — 99213 OFFICE O/P EST LOW 20 MIN: CPT | Performed by: INTERNAL MEDICINE

## 2024-02-08 PROCEDURE — 99205 OFFICE O/P NEW HI 60 MIN: CPT | Performed by: INTERNAL MEDICINE

## 2024-02-08 ASSESSMENT — PAIN SCALES - GENERAL: PAINLEVEL: NO PAIN (0)

## 2024-02-08 NOTE — NURSING NOTE
"Oncology Rooming Note    February 8, 2024 8:55 AM   Donald Blanco is a 58 year old male who presents for:    Chief Complaint   Patient presents with    Oncology Clinic Visit     Prostate cancer      Initial Vitals: BP (!) 144/71   Pulse 74   Temp 98  F (36.7  C)   Resp 18   Ht 1.753 m (5' 9\")   Wt 119.7 kg (264 lb)   SpO2 95%   BMI 38.99 kg/m   Estimated body mass index is 38.99 kg/m  as calculated from the following:    Height as of this encounter: 1.753 m (5' 9\").    Weight as of this encounter: 119.7 kg (264 lb). Body surface area is 2.41 meters squared.  No Pain (0) Comment: Data Unavailable   No LMP for male patient.  Allergies reviewed: Yes  Medications reviewed: Yes    Medications: Medication refills not needed today.  Pharmacy name entered into EPIC:    [facility] - [place] [address] [facility] [place], [place] - [address], [address]  [facility] [place] [address]     Frailty Screening:   Is the patient here for a new oncology consult visit in cancer care? 1. Yes. Over the past month, have you experienced difficulty or required a caregiver to assist with:   1. Balance, walking or general mobility (including any falls)? NO  2. Completion of self-care tasks such as bathing, dressing, toileting, grooming/hygiene?  NO  3. Concentration or memory that affects your daily life?  NO       Clinical concerns: no other complaints      Davidson Quach"

## 2024-02-08 NOTE — LETTER
2/8/2024         RE: Donald Blanco  [address] [facility]  [place] [address]        Dear Colleague,    Thank you for referring your patient, Donald Blanco, to the [facility]. Please see a copy of my visit note below.      [facility] Medical Oncology Note  Date of visit: February 8, 2024  New Outpatient Clinic Note        Assessment:     Castrate sensitive oligometastatic disease after radical prostatectomy and adjuvant radiotherapy.  There is no evidence of widespread distant metastatic disease and so in this setting often we still can go for cure if the foci of oligo metastases can be radiated. However, after discussion with radiation oncology, the nodes are in an area that was previously radiated. Therefore, RT is not possible.  In other words, Jayla now has a disease for which we do not have a cure.  Therefore the goals of all treatment moving forward are twofold: We want Jayla to live as long as possible, but also have the highest quality of life.  As with other renal transplant patients it has been my privilege to care for, he wants to do everything possible to save his renal graft.  He does not want to go on dialysis and who can blame him?  There are multiple options for therapy here including androgen deprivation therapy, single agent bicalutamide or even abiraterone (which interact less with his immunosuppressant drugs than enzalutamide does).  But the truth is these therapies will work whenever we go to them, and I am not convinced going to treatment now will help him live longer or feel better than if we wait for his PSA to rise a bit more.  After discussing the risks and benefits of all of the above, Jayla is in agreement with holding off on treatment and rechecking a PSA, both today, as well as 3 months from now.  I will get better feel for the rate of rise of his PSA at that point.  He does want to hold off on androgen deprivation therapy if at all possible.  He has  gained about 30 pounds back of a 60 pound weight loss he had to do before his renal transplant and is starting to exercise again.  Certainly turning off his testosterone would get in the way of his ability to do this.  Other comorbidities including morbid obesity.  That said, do things like expectancy is 10 years or more.    I had a long and involved conversation with Jayla and his wife Marcelo today in clinic.  Many questions were asked and hopefully answered to their satisfaction.    Plan:     No intervention by me.  I did add a PSA to blood that was drawn yesterday since his last PSA was 5 weeks ago.  Return to clinic with me in 3 months with a PSA drawn just prior.  At that point we will understand a bit more about the pace of his metastatic prostate cancer.  Continue to follow-up with his renal transplant team   I do encourage him to continue exercising.    The case was also discussed with Dr. Delmar Alexander, radiation oncology.    Iván Cortez MD, MSc  Associate Professor of Medicine  [facility] [place] [address]  [phone]    __________________________________________________________________    DIANGOSIS       I was asked to see this patient in consultation to give an opinion regarding further evaluation and management of recently noted recurrent and oligometastatic prostate cancer.  IgA nephropathy with Stage V CKD, s/p renal transplant. He is on tacrolimus and mycophenolate.    History of Present Illness/therapy to date:     Radical prostatectomy 12/14/2020.  Final pathology showed [place] grade group 3 (4+3 equal 7) acinar adenocarcinoma of the prostate with nonfocal present extraprostatic extension at the left posterior.  Seminal vesicle invasion was noted on the left side.  There was a positive margin, with less than 3 mm noted in the left posterior.  It was positive in the area of extraprostatic extension.  Pattern 4 was noted  at the margin.  8 regional lymph nodes were resected and none were involved. PSA prior to surgery was 13.5.  Adjuvant radiotherapy completed 6/25/2021.  PSA dropped to unmeasurable post-op and remained there until 11/15/21 when it was 0.04, then 0.14 5/1/2023. It hit 0.46,  1/4/2024.  PSMA scan was ordered 1/23/2024. This showed PSMA avid bilateral internal iliac nodes c/w recurrence.      Interval history:     Recent minimal spike in BK virus. Renal coordinator knows and there is no recommendation for change.  Has chronic urinary incontinence and needs to wear a pad  Has no ability to get erections.  Used papaverine injections a few times but it was not overly helpful since he continued to drip despite that.    Has gained 30 pounds back in the 60 pounds he lost prior to his transplant surgery.  Is now back on an elliptical machine and getting more exercise.  Denies pain.  Denies stool issues.      On ROS in addition to the above      Denies  fevers, chills, NS, HA, dysphagia/odynophagia, change in weight, change in appetite, cough, SOB, CP, n/v, abd pain, constipation/diarrhea, hematochezia, dysuria, hematuria, swelling, rashes, lymphadenopathy      Past Medical History:   I have reviewed this patient's past medical history   Past Medical History:   Diagnosis Date    CKD (chronic kidney disease) stage 4, GFR 15-29 ml/min (H)     Elevated PSA     Essential hypertension 05/21/2020    Hyperlipidemia     IgA nephropathy     Obesity     Prostate cancer (H) 10/15/2020    S/P radiation therapy     6,600 cGy to pelvis completed on 6/25/2021 - [facility]          Past Surgical History:    I have reviewed this patient's past surgical history       Social History:   Tobacco, ETOH, and rec drugs reviewed and as noted below with the following exceptions:  Jayla grew up in [place] and graduated from high school in 1981.  He was a high school wrestler.  He then went to [employer] on a wrestling scholarship,  "and studied accounting.  This lasted for about a year and a half.  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  He is hoping to retire in the fall 2025.  He is  to Sana, someone who he went to high school with.  However they were dating other people.  2 years after high school he was wearing his brother's [employer] jacket and had grown a beard, and feeling pretty cool.  He then went to a bar where he saw Sana's best friend Mary, who told him \"Sana just broke up and you should call her!.\"  He gave her a call and the rest of his history.  They have 2 children, Sonu, also wrjamey rivera, and Mayra, who is an RN and recently in the neurosurgery division at the [employer] in [place].          Family History:     Family History   Problem Relation Age of Onset    Kidney Disease Father     Hypertension Father     Cancer Mother     No Known Problems Brother     No Known Problems Sister     No Known Problems Son     No Known Problems Daughter     No Known Problems Brother     No Known Problems Brother     No Known Problems Brother     No Known Problems Sister     No Known Problems Sister     No Known Problems Sister             Medications:     Current Outpatient Medications   Medication Sig Dispense Refill    ACE/ARB/ARNI NOT PRESCRIBED (INTENTIONAL) Please choose reason not prescribed from choices below.      alprostadil (EDEX) 10 MCG kit 10 mcg by Intracavitary route as needed for erectile dysfunction use no more than 3 times per week 1 kit 3    aspirin 81 MG EC tablet Take 1 tablet (81 mg) by mouth daily      atorvastatin (LIPITOR) 10 MG tablet Take 1 tablet (10 mg) by mouth every evening 90 tablet 1    B Complex-C-Folic Acid (VIRT-CAPS) 1 MG CAPS Take 1 mg by mouth daily 90 capsule 3    cinacalcet (SENSIPAR) 30 MG tablet Take 1 tablet (30 mg) by mouth daily 90 tablet 3    COMPOUNDED NON-CONTROLLED SUBSTANCE (CMPD RX) - PHARMACY TO MIX COMPOUNDED MEDICATION Alprostadil 50 " mcg inject 0.2 mL intracavitary daily no more than three times weekly with twenty-four hours between injections 5 mL 4    furosemide (LASIX) 20 MG tablet Take 1 tablet (20 mg) by mouth 2 times daily 60 tablet 3    mycophenolate (GENERIC EQUIVALENT) 250 MG capsule Take 1 capsule (250 mg) by mouth 2 times daily 60 capsule 11    Sodium Bicarbonate, antacid, POWD Take 1 Teaspoon of baking soda once daily 1 g 0    sodium zirconium cyclosilicate (LOKELMA) 10 g PACK packet Take 1 packet (10 g) by mouth daily 30 packet 11    tacrolimus (GENERIC EQUIVALENT) 0.5 MG capsule HOLD FOR FUTURE DOSE CHANGES.  Profile Rx: patient will contact pharmacy when needed 180 capsule 3    tacrolimus (GENERIC EQUIVALENT) 1 MG capsule Take 1 capsule (1 mg) by mouth 2 times daily 180 capsule 3              Physical Exam:   There were no vitals taken for this visit.    ECOG PS: 1  Constitutional: WDWN male in NAD, pleasant and appropriate  HEENT:  NC/AT, no icterus, OP clear, MMM  Skin: No jaundice nor ecchymoses  Lungs: CTAB, no w/r/r, nonlabored breathing  Cardiovascular: RRR, S1, S2, no m/r/g  Abdomen: +BS, soft, nontender, nondistended, no organomegaly nor masses though body habitus does obscure the exam somewhat  MSK/Extremities: Warm, well perfused. No edema  LN: no cervical, supraclavicular, axillary, nor inguinal lymphadenopathy  Neurologic: alert, answering questions appropriately, moving all extremities spontaneously. CN 2-12 grossly intact.  Psych: appropriate affect  Data:      Latest Reference Range & Units 12/04/23 07:00 01/04/24 06:59   PSA Tumor Marker 0.00 - 4.50 ng/mL 0.39 0.46         Recent Labs   Lab Test 01/04/24  0659 12/04/23  0700 11/06/23  0706 10/02/23  0709 09/25/23  0714 04/03/23  0658 03/17/23  1808 03/16/23  0707 07/04/22  0800 06/30/22  0830 06/27/22  0823 06/23/22  0817 06/23/22  0447   WBC 6.3 6.9 5.9 5.8 6.7   < > 7.7 6.4   < > 6.4 6.6  --  5.9   NEUTROPHIL  --   --   --   --   --   --  81 78  --  90 88  --  83    HGB 15.7 14.9 14.4 14.3 14.5   < > 13.6 14.0   < > 7.6* 7.9*   < > 6.2*    167 197 179 176   < > 161 153   < > 152 159  --  150    < > = values in this interval not displayed.     Recent Labs   Lab Test 01/23/24  0838 01/04/24  0659 12/04/23  0700 11/06/23  0706 10/02/23  0709 09/25/23  0714   NA  --  140 144 142 141 143   POTASSIUM  --  4.6 4.9 5.1 4.6 4.6   CHLORIDE  --  104 110* 108* 105 108*   CO2  --  25 24 24 25 26   ANIONGAP  --  11 10 10 11 9   BUN  --  23.8* 31.8* 28.1* 26.8* 30.0*   CR 1.5* 1.66* 1.67* 1.82* 1.77* 1.98*   YEE  --  10.0 10.0 10.1 9.9 10.0     Recent Labs   Lab Test 05/17/23  0942 05/01/23  0658 11/21/22  0700 10/10/22  0707 09/09/22  0738 08/22/22  0702 06/09/22  1014 06/03/22  1048   MAG  --  1.8  --  1.8 1.8 1.6   < >  --    PHOS  --   --   --  1.8* 1.5* 1.8*   < >  --    URIC 8.6*  --  8.8*  --   --   --   --  6.1    < > = values in this interval not displayed.     Recent Labs   Lab Test 05/17/23  0942 11/21/22  0700 06/03/22  1048   BILITOTAL 0.9 0.6 0.5   ALKPHOS 91 105 83   ALT 24 26 23   AST 24 16 9   ALBUMIN 4.4 4.0 3.3*     @LABRCNT(PSAtumormarker:7)    No results found for this or any previous visit (from the past 24 hour(s)).    Other Data     CT/PET 1/23/2024        IMPRESSION: In this patient with history of prostate cancer with  prostatectomy and radiation now with rising prostate-specific antigen:  1. Estimated avid right and left internal iliac lymph nodes consistent  with disease recurrence. miPSMA score 1  2. No evidence of disease in chest or abdomen.  3. Right lower quadrant renal transplant without hydronephrosis or  nephrolithiasis.         Labs, imaging and treatment plan reviewed with patient. All questions answered.        60 minutes spent on the date of the encounter doing chart review, review of outside records, review of test results, interpretation of tests, patient visit, documentation, discussion with other provider(s), and discussion with family

## 2024-02-19 ENCOUNTER — PATIENT OUTREACH (OUTPATIENT)
Dept: CARE COORDINATION | Facility: CLINIC | Age: 59
End: 2024-02-19
Payer: COMMERCIAL

## 2024-02-22 ENCOUNTER — OFFICE VISIT (OUTPATIENT)
Dept: TRANSPLANT | Facility: CLINIC | Age: 59
End: 2024-02-22
Attending: INTERNAL MEDICINE
Payer: COMMERCIAL

## 2024-02-22 VITALS
WEIGHT: 262 LBS | DIASTOLIC BLOOD PRESSURE: 80 MMHG | HEART RATE: 76 BPM | HEIGHT: 69 IN | SYSTOLIC BLOOD PRESSURE: 140 MMHG | BODY MASS INDEX: 38.8 KG/M2

## 2024-02-22 DIAGNOSIS — E87.20 METABOLIC ACIDOSIS: ICD-10-CM

## 2024-02-22 DIAGNOSIS — B34.8 BK VIREMIA: ICD-10-CM

## 2024-02-22 DIAGNOSIS — Z48.298 AFTERCARE FOLLOWING ORGAN TRANSPLANT: ICD-10-CM

## 2024-02-22 DIAGNOSIS — Z29.89 NEED FOR PNEUMOCYSTIS PROPHYLAXIS: ICD-10-CM

## 2024-02-22 DIAGNOSIS — I15.1 HTN, KIDNEY TRANSPLANT RELATED: ICD-10-CM

## 2024-02-22 DIAGNOSIS — Z94.0 HTN, KIDNEY TRANSPLANT RELATED: ICD-10-CM

## 2024-02-22 DIAGNOSIS — N25.81 SECONDARY RENAL HYPERPARATHYROIDISM (H): ICD-10-CM

## 2024-02-22 DIAGNOSIS — D84.9 IMMUNOSUPPRESSION (H): ICD-10-CM

## 2024-02-22 DIAGNOSIS — Z94.0 KIDNEY REPLACED BY TRANSPLANT: Primary | ICD-10-CM

## 2024-02-22 DIAGNOSIS — N02.B9 IGA NEPHROPATHY: ICD-10-CM

## 2024-02-22 PROCEDURE — 99213 OFFICE O/P EST LOW 20 MIN: CPT | Performed by: INTERNAL MEDICINE

## 2024-02-22 PROCEDURE — G2211 COMPLEX E/M VISIT ADD ON: HCPCS | Performed by: INTERNAL MEDICINE

## 2024-02-22 PROCEDURE — 99215 OFFICE O/P EST HI 40 MIN: CPT | Performed by: INTERNAL MEDICINE

## 2024-02-22 ASSESSMENT — PAIN SCALES - GENERAL: PAINLEVEL: NO PAIN (0)

## 2024-02-22 NOTE — LETTER
2/22/2024         RE: Donald Blanco  [address] [place] [address]        Dear Colleague,    Thank you for referring your patient, Donald Blanco, to the [facility]. Please see a copy of my visit note below.    TRANSPLANT NEPHROLOGY EARLY POST TRANSPLANT VISIT     Assessment & Plan  # LDKT: stable   - Baseline Creatinine: ~ 1.7-2    - Proteinuria: Normal (<0.2 grams)   - Date DSA Last Checked: Mar/2023      Latest DSA: No   - BK Viremia: No   - Kidney Tx Biopsy: Jun 22, 2022; Result: No diagnostic evidence of acute rejection.  Acute tubular injury.  Mild interstitial fibrosis and tubular atrophy.             May 24, 2022; Result: No diagnostic evidence of acute rejection.  Acute tubular injury.   - Transplant Ureteral Stent: No    # Immunosuppression: Tacrolimus immediate release (goal 4-6) and Mycophenolate mofetil (dose 250 mg every 12 hours)   - Continue with intensive monitoring of immunosuppression for efficacy and toxicity.   - Induction with Recent Transplant:  High Intensity   - Continue with intensive monitoring of immunosuppression for efficacy and toxicity.   - Changes: yes on reduced dose /250 as of due to BK viremia, level 2.19 on reduced dose 250/250. Update DSA with next labs    # BK viremia: resolved on lower IS    - peak viral load 13K copies/ml Sep 2023 , neg x 2 1/2024, 2/2024   - on reduced IS as above   - Igg-750 Sep 2023    # Infection Prophylaxis:   - PJP: none, CD4>200  - CMV: None    # Hypertension: Borderline control;  Goal BP: < 130/80   - Volume status: Euvolemic   - Changes: Not at this time; continue blood pressure monitoring at home    # Elevated Blood Glucose:  Last HbA1c: 5.8%   - Management as per primary care.    # Mineral Bone Disorder:  - Secondary renal hyperparathyroidism; PTH level: Mildly elevated (151-300 pg/ml)        On treatment: sensipar 30 mg daily, update PTH with next labs  - Vitamin D; level: Normal        On supplement:  Yes  - Calcium; level: normal        On supplement: no    # Electrolytes:   - Potassium; level: normal      On binder: Yes lokelma 10 g po every day, reduce to MWF   - Magnesium; level: Normal        On supplement: Yes  - Bicarbonate; level: Normal        On supplement: Yes  baking soda 1/2 teasp daily -reduce to MWF and would stop if K stable and     # Recurrent Metastatic Prostate Ca:   -  Final pathology showed [place] grade group 3 (4+3 equal 7) s/p radical prostatectomy 12/14/2020 s/p adjuvant radiotherapy completed 6/25/2021    - PSMA scan 1/23/2024. This showed PSMA avid bilateral internal iliac nodes c/w recurrence. Follows with    - Castrate sensitive oligometastatic disease after radical prostatectomy and adjuvant radiotherapy.    - close surveillance q3 months per discussions with oncologist ()    # Obesity, Class II (BMI = 38.69):    - Recommend weight loss for overall health by increasing exercise and watching caloric intake.    # Medical Compliance: Yes    # Immunization: UTD Flu+COVID    # Transplant History:  Etiology of Kidney Failure: IgA nephropathy  Tx: LDKT  Transplant: 5/17/2022 (Kidney)  Donor Type: Living Donor Class:   Crossmatch at time of Tx: negative  DSA at time of Tx: No  Significant changes in immunosuppression: None  CMV IgG Ab High Risk Discordance (D+/R-): No  EBV IgG Ab High Risk Discordance (D+/R-): No  Significant transplant-related complications: DGF    Transplant Office Phone Number: [phone]    Assessment and plan was discussed with the patient and he voiced his understanding and agreement.    Return visit: 4 months    Donte Duff MD    Chief Complaint  Mr. Blanco is a 59 year old here for kidney transplant and immunosuppression management.     History of Present Illness     Mr. Blanco feels ok overall. He was recently diagnosed with recurrent metastatic prostate Ca after a recent uptrend in PSA and CT a/p +internal iliac LN's consistent with metastatic  disease. He was seen by  this month who discussed treatment options,discussed risks and benefits and the plan is to continue conservative management, close monitoring q3 months.     He continues to work on lifestyle modifications, diet, and exercise to lose weight    Energy level is good. Denies any fevers, chills, weight loss, night sweats. No nausea, vomiting, abdominal pain, diarrhea. No chest pain, sob, leg swelling. No recent illness or hospitalization.       Current IS FK 1/1 /250 -prior level >2 on this dose now with resolve BK  Ppx: bactrim MWF (hx of hyperK)  Home BP: not monitored       Problem List  Patient Active Problem List   Diagnosis     HTN, kidney transplant related     Gout     Dyslipidemia     Secondary renal hyperparathyroidism (H24)     IgA nephropathy     Class 2 obesity due to excess calories without serious comorbidity with body mass index (BMI) of 36.0 to 36.9 in adult     Prostate cancer (H)     Kidney replaced by transplant     Immunosuppression (H24)     Hyperkalemia     Hypomagnesemia     Metabolic acidosis     Aftercare following organ transplant     Morbid obesity (H)     Need for pneumocystis prophylaxis     Stage 3b chronic kidney disease (H)     Vitamin D deficiency       Allergies  No Known Allergies    Medications  Current Outpatient Medications   Medication Sig     ACE/ARB/ARNI NOT PRESCRIBED (INTENTIONAL) Please choose reason not prescribed from choices below.     alprostadil (EDEX) 10 MCG kit 10 mcg by Intracavitary route as needed for erectile dysfunction use no more than 3 times per week     aspirin 81 MG EC tablet Take 1 tablet (81 mg) by mouth daily     atorvastatin (LIPITOR) 10 MG tablet Take 1 tablet (10 mg) by mouth every evening     B Complex-C-Folic Acid (VIRT-CAPS) 1 MG CAPS Take 1 mg by mouth daily     cinacalcet (SENSIPAR) 30 MG tablet Take 1 tablet (30 mg) by mouth daily     COMPOUNDED NON-CONTROLLED SUBSTANCE (CMPD RX) - PHARMACY TO MIX COMPOUNDED  "MEDICATION Alprostadil 50 mcg inject 0.2 mL intracavitary daily no more than three times weekly with twenty-four hours between injections     furosemide (LASIX) 20 MG tablet Take 1 tablet (20 mg) by mouth 2 times daily     mycophenolate (GENERIC EQUIVALENT) 250 MG capsule Take 1 capsule (250 mg) by mouth 2 times daily     Sodium Bicarbonate, antacid, POWD Take 1 Teaspoon of baking soda once daily     sodium zirconium cyclosilicate (LOKELMA) 10 g PACK packet Take 1 packet (10 g) by mouth daily     tacrolimus (GENERIC EQUIVALENT) 0.5 MG capsule HOLD FOR FUTURE DOSE CHANGES.  Profile Rx: patient will contact pharmacy when needed     tacrolimus (GENERIC EQUIVALENT) 1 MG capsule Take 1 capsule (1 mg) by mouth 2 times daily     No current facility-administered medications for this visit.     There are no discontinued medications.    Physical Exam  Vital Signs: BP (!) 151/79   Pulse 76   Ht 1.753 m (5' 9\")   Wt 118.8 kg (262 lb)   BMI 38.69 kg/m      GENERAL APPEARANCE: alert and no distress  HENT: mouth without ulcers or lesions  LYMPHATICS: no cervical or supraclavicular nodes  RESP: lungs clear to auscultation - no rales, rhonchi or wheezes  CV: regular rhythm, normal rate, no rub, no murmur  EDEMA: no LE edema bilaterally  ABDOMEN: soft, nondistended, nontender, bowel sounds normal, obese  MS: extremities normal - no gross deformities noted, no evidence of inflammation in joints, no muscle tenderness  SKIN: no rash  TX KIDNEY: normal  DIALYSIS ACCESS: none    Data        Latest Ref Rng & Units 2/7/2024     6:59 AM 1/23/2024     8:38 AM 1/4/2024     6:59 AM   Renal   Sodium 135 - 145 mmol/L 139   140    K 3.4 - 5.3 mmol/L 4.9   4.6    Cl 98 - 107 mmol/L 105   104    Cl (external) 98 - 107 mmol/L 105   104    CO2 22 - 29 mmol/L 26   25    Urea Nitrogen 8.0 - 23.0 mg/dL 26.5   23.8    Creatinine 0.67 - 1.17 mg/dL 1.68   1.66    Creatinine POCT 0.7 - 1.3 mg/dL  1.5     Glucose 70 - 99 mg/dL 118   104    Calcium 8.8 - " 10.2 mg/dL 9.8   10.0          Latest Ref Rng & Units 11/6/2023     7:06 AM 5/17/2023     9:42 AM 3/16/2023     7:07 AM   Bone Health   Parathyroid Hormone Intact 15 - 65 pg/mL 160  259  176    Vit D Def 20 - 75 ug/L  48  53          Latest Ref Rng & Units 2/7/2024     6:59 AM 1/4/2024     6:59 AM 12/4/2023     7:00 AM   Heme   WBC 4.0 - 11.0 10e3/uL 6.2  6.3  6.9    Hgb 13.3 - 17.7 g/dL 15.0  15.7  14.9    Plt 150 - 450 10e3/uL 173  165  167          Latest Ref Rng & Units 5/17/2023     9:42 AM 11/21/2022     7:00 AM 6/3/2022    10:48 AM   Liver   AP 40 - 129 U/L 91  105  83    TBili <=1.2 mg/dL 0.9  0.6  0.5    Bilirubin Direct 0.0 - 0.2 mg/dL  0.1  0.2    Bilirubin Direct 0.00 - 0.30 mg/dL 0.22      ALT 10 - 50 U/L 24  26  23    AST 10 - 50 U/L 24  16  9    Tot Protein 6.4 - 8.3 g/dL 7.3  7.2  6.6    Albumin 3.4 - 5.0 g/dL  4.0  3.3    Albumin 3.5 - 5.2 g/dL 4.4            Latest Ref Rng & Units 5/17/2023     9:42 AM 11/21/2022     7:00 AM 6/3/2022    10:48 AM   Pancreas   A1C <5.7 % 5.8  5.6  5.1          Latest Ref Rng & Units 11/7/2022     7:08 AM 10/10/2022     7:07 AM 9/16/2022     7:07 AM   Iron studies   Iron 35 - 180 ug/dL 81  102     Iron Saturation Index 15 - 46 % 26  31     Ferritin 26 - 388 ng/mL 494  542  602          Latest Ref Rng & Units 5/17/2022     1:35 PM 5/9/2022    10:00 AM 9/3/2020    11:31 AM   [facility] Txp Virology   CMV QUANT IU/ML Not Detected IU/mL Not Detected      EBV CAPSID ANTIBODY IGG No detectable antibody.  Positive  >8.0    Hep B Core NR^Nonreactive   Nonreactive      Failed to redirect to the Timeline version of the REVFS SmartLink.  Recent Labs   Lab Test 12/04/23  0700 01/04/24  0659 02/07/24  0659   DOSTAC 12/3/2023 1/3/2024 2/6/2024   TACROL 5.6 7.0 4.7*     Recent Labs   Lab Test 08/23/23  0717 09/25/23  0714 10/02/23  0709   DOSMPA 8/22/2023   8:00 PM 9/24/2023   8:00 PM 10/1/2023   8:00 PM   MPACID 5.58* 1.33 2.19   MPAG 77.5 24.9* 24.8*     I spent a total of 54 minutes  on the date of the encounter doing chart review, performing a history and physical exam, completing documentation and any further activities as noted above.    The longitudinal plan of care for kidney transplant as documented were addressed during this visit. Due to the added complexity in care, I will continue to support Pat in the subsequent management and with ongoing continuity of care.      Again, thank you for allowing me to participate in the care of your patient.        Sincerely,        Donte Duff MD

## 2024-02-22 NOTE — PROGRESS NOTES
TRANSPLANT NEPHROLOGY EARLY POST TRANSPLANT VISIT     Assessment & Plan   # LDKT: stable   - Baseline Creatinine: ~ 1.7-2    - Proteinuria: Normal (<0.2 grams)   - Date DSA Last Checked: Mar/2023      Latest DSA: No   - BK Viremia: No   - Kidney Tx Biopsy: Jun 22, 2022; Result: No diagnostic evidence of acute rejection.  Acute tubular injury.  Mild interstitial fibrosis and tubular atrophy.             May 24, 2022; Result: No diagnostic evidence of acute rejection.  Acute tubular injury.   - Transplant Ureteral Stent: No    # Immunosuppression: Tacrolimus immediate release (goal 4-6) and Mycophenolate mofetil (dose 250 mg every 12 hours)   - Continue with intensive monitoring of immunosuppression for efficacy and toxicity.   - Induction with Recent Transplant:  High Intensity   - Continue with intensive monitoring of immunosuppression for efficacy and toxicity.   - Changes: yes on reduced dose /250 as of due to BK viremia, level 2.19 on reduced dose 250/250. Update DSA with next labs    # BK viremia: resolved on lower IS    - peak viral load 13K copies/ml Sep 2023 , neg x 2 1/2024, 2/2024   - on reduced IS as above   - Igg-750 Sep 2023    # Infection Prophylaxis:   - PJP: none, CD4>200  - CMV: None    # Hypertension: Borderline control;  Goal BP: < 130/80   - Volume status: Euvolemic   - Changes: Not at this time; continue blood pressure monitoring at home    # Elevated Blood Glucose:  Last HbA1c: 5.8%   - Management as per primary care.    # Mineral Bone Disorder:  - Secondary renal hyperparathyroidism; PTH level: Mildly elevated (151-300 pg/ml)        On treatment: sensipar 30 mg daily, update PTH with next labs  - Vitamin D; level: Normal        On supplement: Yes  - Calcium; level: normal        On supplement: no    # Electrolytes:   - Potassium; level: normal      On binder: Yes lokelma 10 g po every day, reduce to MWF   - Magnesium; level: Normal        On supplement: Yes  - Bicarbonate; level: Normal         On supplement: Yes  baking soda 1/2 teasp daily -reduce to MWF and would stop if K stable and     # Recurrent Metastatic Prostate Ca:   -  Final pathology showed Clark grade group 3 (4+3 equal 7) s/p radical prostatectomy 12/14/2020 s/p adjuvant radiotherapy completed 6/25/2021    - PSMA scan 1/23/2024. This showed PSMA avid bilateral internal iliac nodes c/w recurrence. Follows with    - Castrate sensitive oligometastatic disease after radical prostatectomy and adjuvant radiotherapy.    - close surveillance q3 months per discussions with oncologist ()    # Obesity, Class II (BMI = 38.69):    - Recommend weight loss for overall health by increasing exercise and watching caloric intake.    # Medical Compliance: Yes    # Immunization: UTD Flu+COVID    # Transplant History:  Etiology of Kidney Failure: IgA nephropathy  Tx: LDKT  Transplant: 5/17/2022 (Kidney)  Donor Type: Living Donor Class:   Crossmatch at time of Tx: negative  DSA at time of Tx: No  Significant changes in immunosuppression: None  CMV IgG Ab High Risk Discordance (D+/R-): No  EBV IgG Ab High Risk Discordance (D+/R-): No  Significant transplant-related complications: DGF    Transplant Office Phone Number: [phone]    Assessment and plan was discussed with the patient and he voiced his understanding and agreement.    Return visit: 4 months    Donte Duff MD    Chief Complaint   Mr. Blanco is a 59 year old here for kidney transplant and immunosuppression management.     History of Present Illness      Mr. Blanco feels ok overall. He was recently diagnosed with recurrent metastatic prostate Ca after a recent uptrend in PSA and CT a/p +internal iliac LN's consistent with metastatic disease. He was seen by  this month who discussed treatment options,discussed risks and benefits and the plan is to continue conservative management, close monitoring q3 months.     He continues to work on lifestyle modifications, diet,  and exercise to lose weight    Energy level is good. Denies any fevers, chills, weight loss, night sweats. No nausea, vomiting, abdominal pain, diarrhea. No chest pain, sob, leg swelling. No recent illness or hospitalization.       Current IS FK 1/1 /250 -prior level >2 on this dose now with resolve BK  Ppx: bactrim MWF (hx of hyperK)  Home BP: not monitored       Problem List   Patient Active Problem List   Diagnosis    HTN, kidney transplant related    Gout    Dyslipidemia    Secondary renal hyperparathyroidism (H24)    IgA nephropathy    Class 2 obesity due to excess calories without serious comorbidity with body mass index (BMI) of 36.0 to 36.9 in adult    Prostate cancer (H)    Kidney replaced by transplant    Immunosuppression (H24)    Hyperkalemia    Hypomagnesemia    Metabolic acidosis    Aftercare following organ transplant    Morbid obesity (H)    Need for pneumocystis prophylaxis    Stage 3b chronic kidney disease (H)    Vitamin D deficiency       Allergies   No Known Allergies    Medications   Current Outpatient Medications   Medication Sig    ACE/ARB/ARNI NOT PRESCRIBED (INTENTIONAL) Please choose reason not prescribed from choices below.    alprostadil (EDEX) 10 MCG kit 10 mcg by Intracavitary route as needed for erectile dysfunction use no more than 3 times per week    aspirin 81 MG EC tablet Take 1 tablet (81 mg) by mouth daily    atorvastatin (LIPITOR) 10 MG tablet Take 1 tablet (10 mg) by mouth every evening    B Complex-C-Folic Acid (VIRT-CAPS) 1 MG CAPS Take 1 mg by mouth daily    cinacalcet (SENSIPAR) 30 MG tablet Take 1 tablet (30 mg) by mouth daily    COMPOUNDED NON-CONTROLLED SUBSTANCE (CMPD RX) - PHARMACY TO MIX COMPOUNDED MEDICATION Alprostadil 50 mcg inject 0.2 mL intracavitary daily no more than three times weekly with twenty-four hours between injections    furosemide (LASIX) 20 MG tablet Take 1 tablet (20 mg) by mouth 2 times daily    mycophenolate (GENERIC EQUIVALENT) 250 MG  "capsule Take 1 capsule (250 mg) by mouth 2 times daily    Sodium Bicarbonate, antacid, POWD Take 1 Teaspoon of baking soda once daily    sodium zirconium cyclosilicate (LOKELMA) 10 g PACK packet Take 1 packet (10 g) by mouth daily    tacrolimus (GENERIC EQUIVALENT) 0.5 MG capsule HOLD FOR FUTURE DOSE CHANGES.  Profile Rx: patient will contact pharmacy when needed    tacrolimus (GENERIC EQUIVALENT) 1 MG capsule Take 1 capsule (1 mg) by mouth 2 times daily     No current facility-administered medications for this visit.     There are no discontinued medications.    Physical Exam   Vital Signs: BP (!) 151/79   Pulse 76   Ht 1.753 m (5' 9\")   Wt 118.8 kg (262 lb)   BMI 38.69 kg/m      GENERAL APPEARANCE: alert and no distress  HENT: mouth without ulcers or lesions  LYMPHATICS: no cervical or supraclavicular nodes  RESP: lungs clear to auscultation - no rales, rhonchi or wheezes  CV: regular rhythm, normal rate, no rub, no murmur  EDEMA: no LE edema bilaterally  ABDOMEN: soft, nondistended, nontender, bowel sounds normal, obese  MS: extremities normal - no gross deformities noted, no evidence of inflammation in joints, no muscle tenderness  SKIN: no rash  TX KIDNEY: normal  DIALYSIS ACCESS: none    Data         Latest Ref Rng & Units 2/7/2024     6:59 AM 1/23/2024     8:38 AM 1/4/2024     6:59 AM   Renal   Sodium 135 - 145 mmol/L 139   140    K 3.4 - 5.3 mmol/L 4.9   4.6    Cl 98 - 107 mmol/L 105   104    Cl (external) 98 - 107 mmol/L 105   104    CO2 22 - 29 mmol/L 26   25    Urea Nitrogen 8.0 - 23.0 mg/dL 26.5   23.8    Creatinine 0.67 - 1.17 mg/dL 1.68   1.66    Creatinine POCT 0.7 - 1.3 mg/dL  1.5     Glucose 70 - 99 mg/dL 118   104    Calcium 8.8 - 10.2 mg/dL 9.8   10.0          Latest Ref Rng & Units 11/6/2023     7:06 AM 5/17/2023     9:42 AM 3/16/2023     7:07 AM   Bone Health   Parathyroid Hormone Intact 15 - 65 pg/mL 160  259  176    Vit D Def 20 - 75 ug/L  48  53          Latest Ref Rng & Units 2/7/2024    "  6:59 AM 1/4/2024     6:59 AM 12/4/2023     7:00 AM   Heme   WBC 4.0 - 11.0 10e3/uL 6.2  6.3  6.9    Hgb 13.3 - 17.7 g/dL 15.0  15.7  14.9    Plt 150 - 450 10e3/uL 173  165  167          Latest Ref Rng & Units 5/17/2023     9:42 AM 11/21/2022     7:00 AM 6/3/2022    10:48 AM   Liver   AP 40 - 129 U/L 91  105  83    TBili <=1.2 mg/dL 0.9  0.6  0.5    Bilirubin Direct 0.0 - 0.2 mg/dL  0.1  0.2    Bilirubin Direct 0.00 - 0.30 mg/dL 0.22      ALT 10 - 50 U/L 24  26  23    AST 10 - 50 U/L 24  16  9    Tot Protein 6.4 - 8.3 g/dL 7.3  7.2  6.6    Albumin 3.4 - 5.0 g/dL  4.0  3.3    Albumin 3.5 - 5.2 g/dL 4.4            Latest Ref Rng & Units 5/17/2023     9:42 AM 11/21/2022     7:00 AM 6/3/2022    10:48 AM   Pancreas   A1C <5.7 % 5.8  5.6  5.1          Latest Ref Rng & Units 11/7/2022     7:08 AM 10/10/2022     7:07 AM 9/16/2022     7:07 AM   Iron studies   Iron 35 - 180 ug/dL 81  102     Iron Saturation Index 15 - 46 % 26  31     Ferritin 26 - 388 ng/mL 494  542  602          Latest Ref Rng & Units 5/17/2022     1:35 PM 5/9/2022    10:00 AM 9/3/2020    11:31 AM   [facility] T[facility] Virology   CMV QUANT IU/ML Not Detected IU/mL Not Detected      EBV CAPSID ANTIBODY IGG No detectable antibody.  Positive  >8.0    Hep B Core NR^Nonreactive   Nonreactive      Failed to redirect to the Timeline version of the REVFS SmartLink.  Recent Labs   Lab Test 12/04/23  0700 01/04/24  0659 02/07/24  0659   DOSTAC 12/3/2023 1/3/2024 2/6/2024   TACROL 5.6 7.0 4.7*     Recent Labs   Lab Test 08/23/23  0717 09/25/23  0714 10/02/23  0709   DOSMPA 8/22/2023   8:00 PM 9/24/2023   8:00 PM 10/1/2023   8:00 PM   MPACID 5.58* 1.33 2.19   MPAG 77.5 24.9* 24.8*     I spent a total of 54 minutes on the date of the encounter doing chart review, performing a history and physical exam, completing documentation and any further activities as noted above.    The longitudinal plan of care for kidney transplant as documented were addressed during this visit. Due to  the added complexity in care, I will continue to support Pat in the subsequent management and with ongoing continuity of care.

## 2024-02-22 NOTE — PATIENT INSTRUCTIONS
Reduce lokelma to 1 tab every Mon Wed Fri    Reduce baking soda to 1/2 teasp to Mon Wed Fri, if K <5.5 on 2 checks (March April)    I added Mycophenolate level    Transplant Patient Information  Your Post Transplant Coordinator is: Alicia Valentin  You and your care team can contact your transplant coordinator Monday - Friday, 8am - 5pm at [phone] (Option 2 to reach the coordinator or Option 4 to schedule an appointment).  You can also reach your care team online via [employer].  After hours for urgent matters, please call [facility] at [phone].

## 2024-02-22 NOTE — NURSING NOTE
"Chief Complaint   Patient presents with    Follow Up     Post txp     BP (!) 151/79   Pulse 76   Ht 1.753 m (5' 9\")   Wt 118.8 kg (262 lb)   BMI 38.69 kg/m    Rachell Thurston MA on 2/22/2024 at 3:03 PM    "

## 2024-03-06 ENCOUNTER — LAB (OUTPATIENT)
Dept: LAB | Facility: CLINIC | Age: 59
End: 2024-03-06
Payer: COMMERCIAL

## 2024-03-06 DIAGNOSIS — Z94.0 KIDNEY REPLACED BY TRANSPLANT: ICD-10-CM

## 2024-03-06 DIAGNOSIS — C61 PROSTATE CANCER (H): ICD-10-CM

## 2024-03-06 DIAGNOSIS — Z48.298 AFTERCARE FOLLOWING ORGAN TRANSPLANT: ICD-10-CM

## 2024-03-06 DIAGNOSIS — N25.81 SECONDARY RENAL HYPERPARATHYROIDISM (H): ICD-10-CM

## 2024-03-06 LAB
ERYTHROCYTE [DISTWIDTH] IN BLOOD BY AUTOMATED COUNT: 13.3 % (ref 10–15)
HCT VFR BLD AUTO: 47.4 % (ref 40–53)
HGB BLD-MCNC: 15.6 G/DL (ref 13.3–17.7)
MCH RBC QN AUTO: 30.1 PG (ref 26.5–33)
MCHC RBC AUTO-ENTMCNC: 32.9 G/DL (ref 31.5–36.5)
MCV RBC AUTO: 92 FL (ref 78–100)
PLATELET # BLD AUTO: 182 10E3/UL (ref 150–450)
RBC # BLD AUTO: 5.18 10E6/UL (ref 4.4–5.9)
TACROLIMUS BLD-MCNC: 4.4 UG/L (ref 5–15)
TME LAST DOSE: ABNORMAL H
TME LAST DOSE: ABNORMAL H
WBC # BLD AUTO: 6 10E3/UL (ref 4–11)

## 2024-03-06 PROCEDURE — 80197 ASSAY OF TACROLIMUS: CPT

## 2024-03-06 PROCEDURE — 87799 DETECT AGENT NOS DNA QUANT: CPT

## 2024-03-06 PROCEDURE — 80180 DRUG SCRN QUAN MYCOPHENOLATE: CPT

## 2024-03-06 PROCEDURE — 82306 VITAMIN D 25 HYDROXY: CPT

## 2024-03-06 PROCEDURE — 84153 ASSAY OF PSA TOTAL: CPT

## 2024-03-06 PROCEDURE — 80048 BASIC METABOLIC PNL TOTAL CA: CPT

## 2024-03-06 PROCEDURE — 36415 COLL VENOUS BLD VENIPUNCTURE: CPT

## 2024-03-06 PROCEDURE — 83970 ASSAY OF PARATHORMONE: CPT

## 2024-03-06 PROCEDURE — 85027 COMPLETE CBC AUTOMATED: CPT

## 2024-03-07 LAB
ANION GAP SERPL CALCULATED.3IONS-SCNC: 10 MMOL/L (ref 7–15)
BK VIRUS SPECIMEN TYPE: NORMAL
BKV DNA # SPEC NAA+PROBE: NOT DETECTED IU/ML
BUN SERPL-MCNC: 34.3 MG/DL (ref 8–23)
CALCIUM SERPL-MCNC: 10.4 MG/DL (ref 8.8–10.2)
CHLORIDE SERPL-SCNC: 105 MMOL/L (ref 98–107)
CREAT SERPL-MCNC: 1.74 MG/DL (ref 0.67–1.17)
DEPRECATED HCO3 PLAS-SCNC: 26 MMOL/L (ref 22–29)
EGFRCR SERPLBLD CKD-EPI 2021: 44 ML/MIN/1.73M2
GLUCOSE SERPL-MCNC: 115 MG/DL (ref 70–99)
MYCOPHENOLATE SERPL LC/MS/MS-MCNC: 2.06 MG/L (ref 1–3.5)
MYCOPHENOLATE-G SERPL LC/MS/MS-MCNC: 23.4 MG/L (ref 30–95)
POTASSIUM SERPL-SCNC: 5.2 MMOL/L (ref 3.4–5.3)
PSA SERPL DL<=0.01 NG/ML-MCNC: 0.58 NG/ML (ref 0–4.5)
PTH-INTACT SERPL-MCNC: 135 PG/ML (ref 15–65)
SODIUM SERPL-SCNC: 141 MMOL/L (ref 135–145)
TME LAST DOSE: ABNORMAL H
TME LAST DOSE: ABNORMAL H
VIT D+METAB SERPL-MCNC: 30 NG/ML (ref 20–50)

## 2024-03-08 ENCOUNTER — TELEPHONE (OUTPATIENT)
Dept: TRANSPLANT | Facility: CLINIC | Age: 59
End: 2024-03-08
Payer: COMMERCIAL

## 2024-03-08 DIAGNOSIS — Z94.0 KIDNEY REPLACED BY TRANSPLANT: Primary | ICD-10-CM

## 2024-03-08 RX ORDER — MYCOPHENOLATE MOFETIL 250 MG/1
500 CAPSULE ORAL 2 TIMES DAILY
Qty: 120 CAPSULE | Refills: 11 | Status: SHIPPED | OUTPATIENT
Start: 2024-03-08

## 2024-03-13 DIAGNOSIS — Z94.0 KIDNEY REPLACED BY TRANSPLANT: ICD-10-CM

## 2024-03-14 RX ORDER — ATORVASTATIN CALCIUM 10 MG/1
10 TABLET, FILM COATED ORAL EVERY EVENING
Qty: 90 TABLET | Refills: 0 | Status: SHIPPED | OUTPATIENT
Start: 2024-03-14 | End: 2024-05-31

## 2024-03-15 DIAGNOSIS — Z48.298 AFTERCARE FOLLOWING ORGAN TRANSPLANT: Primary | ICD-10-CM

## 2024-03-15 DIAGNOSIS — R79.89 ELEVATED PARATHYROID HORMONE: Primary | ICD-10-CM

## 2024-03-18 RX ORDER — CINACALCET 30 MG/1
30 TABLET, FILM COATED ORAL DAILY
Qty: 90 TABLET | Refills: 0 | Status: SHIPPED | OUTPATIENT
Start: 2024-03-18 | End: 2024-05-31

## 2024-03-18 RX ORDER — FUROSEMIDE 20 MG
20 TABLET ORAL 2 TIMES DAILY
Qty: 180 TABLET | Refills: 0 | Status: SHIPPED | OUTPATIENT
Start: 2024-03-18 | End: 2024-05-31

## 2024-04-02 ENCOUNTER — LAB (OUTPATIENT)
Dept: LAB | Facility: CLINIC | Age: 59
End: 2024-04-02
Payer: COMMERCIAL

## 2024-04-02 DIAGNOSIS — Z94.0 KIDNEY REPLACED BY TRANSPLANT: ICD-10-CM

## 2024-04-02 LAB
ANION GAP SERPL CALCULATED.3IONS-SCNC: 12 MMOL/L (ref 7–15)
BK VIRUS SPECIMEN TYPE: ABNORMAL
BKV DNA # SPEC NAA+PROBE: <22 IU/ML
BKV DNA SPEC NAA+PROBE-LOG#: <1.3 {LOG_COPIES}/ML
BUN SERPL-MCNC: 26.8 MG/DL (ref 8–23)
CALCIUM SERPL-MCNC: 10 MG/DL (ref 8.8–10.2)
CHLORIDE SERPL-SCNC: 106 MMOL/L (ref 98–107)
CREAT SERPL-MCNC: 1.62 MG/DL (ref 0.67–1.17)
DEPRECATED HCO3 PLAS-SCNC: 21 MMOL/L (ref 22–29)
EGFRCR SERPLBLD CKD-EPI 2021: 48 ML/MIN/1.73M2
ERYTHROCYTE [DISTWIDTH] IN BLOOD BY AUTOMATED COUNT: 13.2 % (ref 10–15)
GLUCOSE SERPL-MCNC: 116 MG/DL (ref 70–99)
HCT VFR BLD AUTO: 46.6 % (ref 40–53)
HGB BLD-MCNC: 15.2 G/DL (ref 13.3–17.7)
MCH RBC QN AUTO: 29.8 PG (ref 26.5–33)
MCHC RBC AUTO-ENTMCNC: 32.6 G/DL (ref 31.5–36.5)
MCV RBC AUTO: 91 FL (ref 78–100)
PLATELET # BLD AUTO: 175 10E3/UL (ref 150–450)
POTASSIUM SERPL-SCNC: 4.5 MMOL/L (ref 3.4–5.3)
RBC # BLD AUTO: 5.1 10E6/UL (ref 4.4–5.9)
SODIUM SERPL-SCNC: 139 MMOL/L (ref 135–145)
TACROLIMUS BLD-MCNC: 4.5 UG/L (ref 5–15)
TME LAST DOSE: ABNORMAL H
TME LAST DOSE: ABNORMAL H
WBC # BLD AUTO: 6.9 10E3/UL (ref 4–11)

## 2024-04-02 PROCEDURE — 87799 DETECT AGENT NOS DNA QUANT: CPT

## 2024-04-02 PROCEDURE — 86833 HLA CLASS II HIGH DEFIN QUAL: CPT

## 2024-04-02 PROCEDURE — 36415 COLL VENOUS BLD VENIPUNCTURE: CPT

## 2024-04-02 PROCEDURE — 80197 ASSAY OF TACROLIMUS: CPT

## 2024-04-02 PROCEDURE — 80048 BASIC METABOLIC PNL TOTAL CA: CPT

## 2024-04-02 PROCEDURE — 85027 COMPLETE CBC AUTOMATED: CPT

## 2024-04-02 PROCEDURE — 86832 HLA CLASS I HIGH DEFIN QUAL: CPT

## 2024-04-03 DIAGNOSIS — E87.5 HYPERKALEMIA: Primary | ICD-10-CM

## 2024-04-21 ENCOUNTER — HEALTH MAINTENANCE LETTER (OUTPATIENT)
Age: 59
End: 2024-04-21

## 2024-04-24 ENCOUNTER — OFFICE VISIT (OUTPATIENT)
Dept: DERMATOLOGY | Facility: CLINIC | Age: 59
End: 2024-04-24
Payer: COMMERCIAL

## 2024-04-24 DIAGNOSIS — L81.4 SOLAR LENTIGO: ICD-10-CM

## 2024-04-24 DIAGNOSIS — L57.0 ACTINIC KERATOSIS: Primary | ICD-10-CM

## 2024-04-24 DIAGNOSIS — L82.1 SEBORRHEIC KERATOSIS: ICD-10-CM

## 2024-04-24 DIAGNOSIS — L82.0 INFLAMED SEBORRHEIC KERATOSIS: ICD-10-CM

## 2024-04-24 DIAGNOSIS — D18.01 CHERRY ANGIOMA: ICD-10-CM

## 2024-04-24 DIAGNOSIS — D22.9 MULTIPLE MELANOCYTIC NEVI: ICD-10-CM

## 2024-04-24 DIAGNOSIS — D84.9 IMMUNOSUPPRESSION (H): ICD-10-CM

## 2024-04-24 DIAGNOSIS — L73.8 SENILE SEBACEOUS GLAND HYPERPLASIA: ICD-10-CM

## 2024-04-24 DIAGNOSIS — Z94.0 HISTORY OF RENAL TRANSPLANT: ICD-10-CM

## 2024-04-24 PROCEDURE — 99213 OFFICE O/P EST LOW 20 MIN: CPT | Mod: 25 | Performed by: DERMATOLOGY

## 2024-04-24 PROCEDURE — 17000 DESTRUCT PREMALG LESION: CPT | Mod: XS | Performed by: DERMATOLOGY

## 2024-04-24 PROCEDURE — 17003 DESTRUCT PREMALG LES 2-14: CPT | Mod: XS | Performed by: DERMATOLOGY

## 2024-04-24 PROCEDURE — 17110 DESTRUCTION B9 LES UP TO 14: CPT | Performed by: DERMATOLOGY

## 2024-04-24 NOTE — LETTER
4/24/2024         RE: Donald Blanco  [address] [facility]  [place] [address]        Dear Colleague,    Thank you for referring your patient, Donald Blanco, to the [facility]. Please see a copy of my visit note below.    [facility] Dermatology Note    Encounter Date: Apr 24, 2024    Dermatology Problem List:  1. Renal transplant on mycophenolate and tacrolimus  2. Actinic keratosis s/p cryotherapy  3. ISKs, s/p cryo  ______________________________________    Impression/Plan:  1. Hx of renal transplant, on mycophenolate and tacrolimus  - continue annual skin checks    2. AK x8 on the face  - Cryotherapy procedure note: After verbal consent and discussion of risks and benefits including, but not limited to, dyspigmentation/scar, blister, and pain, 8 was(were) treated with 1-2 mm freeze border for 1-2 cycles with liquid nitrogen. Post cryotherapy instructions were provided.     3. ISK x14 on the back  - Cryotherapy procedure note: After verbal consent and discussion of risks and benefits including, but not limited to, dyspigmentation/scar, blister, and pain, 14 was(were) treated with 1-2 mm freeze border for 1-2 cycles with liquid nitrogen. Post cryotherapy instructions were provided.     4. Reassurance provided for benign lesions not treated today including sebaceous hyperplasia, cherry angiomata, solar lentigines, seborrheic keratoses, and banal-appearing melanocytic nevi.  - Discussed sun protective behaviors including OTC spf 30+ sunscreen use and sun avoidance strategies.      Follow-up in 1 year.       Staff Involved:  Staff and Scribe    I, Niki Jacobs, am serving as a scribe; to document services personally performed by Raul Montilla MD -based on data collection and the provider's statements to me.    Provider Disclosure:   The documentation recorded by the scribe accurately reflects the services I personally performed and the decisions made by  me.    Raul Montilla MD   of Dermatology  Department of Dermatology  [facility] [employer] [facility]      CC:   Chief Complaint   Patient presents with     Derm Problem     Skin check, no areas of concern       History of Present Illness:  Mr. Donald Blanco is a 59 year old male who presents as a return patient. He is here for a full body skin check today. Patient has no areas of concern other than a few spots on his back. He is on immunosuppressive medication post kidney transplant.     Patient is otherwise feeling well, with no additional skin concerns.     Labs:  N/A    Physical exam:  Vitals: There were no vitals taken for this visit.  GEN: This is a well developed, well-nourished male in no acute distress, in a pleasant mood.    SKIN: Good phototype II  - Full skin, which includes the head/face, both arms, chest, back, abdomen,both legs, genitalia and/or groin buttocks, digits and/or nails, was examined.  - There are dome shaped bright red papules on the head/neck, trunk, extremities.   - Multiple regular brown pigmented macules and papules are identified on the head/neck, trunk, extremities.   - Scattered brown macules on sun exposed areas.  - There are waxy stuck on tan to brown papules on the head/neck, trunk, extremities.  - 8 erythematous scaly macules on the face.  - 14 erythematous stuck on papules on the back.  - No other lesions of concern on areas examined.     Past Medical History:   Past Medical History:   Diagnosis Date     CKD (chronic kidney disease) stage 4, GFR 15-29 ml/min (H)      Elevated PSA      Essential hypertension 05/21/2020     Hyperlipidemia      IgA nephropathy      Obesity      Prostate cancer (H) 10/15/2020     S/P radiation therapy     6,600 cGy to pelvis completed on 6/25/2021 - [facility]     Past Surgical History:   Procedure Laterality Date     BACK SURGERY      Back Surgey 20+ Years Ago      BIOPSY      [place]  [facility] 2010     COLONOSCOPY      10+ Years ago at [facility]      DAVINCI PROSTATECTOMY, LYMPHADENECTOMY N/A 12/14/2020    Procedure: PROSTATECTOMY, ROBOT-ASSISTED, WITH PELVIC LYMPHADENECTOMY;  Surgeon: Nabil Vásquez MD;  Location: [facility] OR     IR CVC TUNNEL REMOVAL RIGHT  8/10/2022     IR CVC TUNNEL REVISION RIGHT  6/22/2022     IR RENAL BIOPSY RIGHT  5/24/2022     IR RENAL BIOPSY RIGHT  6/22/2022       Social History:   reports that he has never smoked. He has never used smokeless tobacco. He reports current alcohol use. He reports that he does not use drugs.    Family History:  Family History   Problem Relation Age of Onset     Kidney Disease Father      Hypertension Father      Cancer Mother      No Known Problems Brother      No Known Problems Sister      No Known Problems Son      No Known Problems Daughter      No Known Problems Brother      No Known Problems Brother      No Known Problems Brother      No Known Problems Sister      No Known Problems Sister      No Known Problems Sister        Medications:  Current Outpatient Medications   Medication Sig Dispense Refill     ACE/ARB/ARNI NOT PRESCRIBED (INTENTIONAL) Please choose reason not prescribed from choices below.       alprostadil (EDEX) 10 MCG kit 10 mcg by Intracavitary route as needed for erectile dysfunction use no more than 3 times per week 1 kit 3     aspirin 81 MG EC tablet Take 1 tablet (81 mg) by mouth daily       atorvastatin (LIPITOR) 10 MG tablet Take 1 tablet (10 mg) by mouth every evening 90 tablet 0     B Complex-C-Folic Acid (VIRT-CAPS) 1 MG CAPS Take 1 mg by mouth daily 90 capsule 3     cinacalcet (SENSIPAR) 30 MG tablet Take 1 tablet (30 mg) by mouth daily 90 tablet 0     COMPOUNDED NON-CONTROLLED SUBSTANCE (CMPD RX) - PHARMACY TO MIX COMPOUNDED MEDICATION Alprostadil 50 mcg inject 0.2 mL intracavitary daily no more than three times weekly with twenty-four hours between injections 5 mL 4     furosemide (LASIX)  20 MG tablet Take 1 tablet (20 mg) by mouth 2 times daily 180 tablet 0     mycophenolate (GENERIC EQUIVALENT) 250 MG capsule Take 2 capsules (500 mg) by mouth 2 times daily 120 capsule 11     Sodium Bicarbonate, antacid, POWD Take 1/2 Teaspoon of baking soda MWF 1 g 0     sodium zirconium cyclosilicate (LOKELMA) 10 g PACK packet Take 1 packet (10 g) by mouth daily 30 packet 11     tacrolimus (GENERIC EQUIVALENT) 0.5 MG capsule HOLD FOR FUTURE DOSE CHANGES.  Profile Rx: patient will contact pharmacy when needed 180 capsule 3     tacrolimus (GENERIC EQUIVALENT) 1 MG capsule Take 1 capsule (1 mg) by mouth 2 times daily 180 capsule 3     No Known Allergies                Again, thank you for allowing me to participate in the care of your patient.        Sincerely,        Raul Montilla MD

## 2024-04-24 NOTE — PROGRESS NOTES
[facility] Dermatology Note    Encounter Date: Apr 24, 2024    Dermatology Problem List:  1. Renal transplant on mycophenolate and tacrolimus  2. Actinic keratosis s/p cryotherapy  3. ISKs, s/p cryo  ______________________________________    Impression/Plan:  1. Hx of renal transplant, on mycophenolate and tacrolimus  - continue annual skin checks    2. AK x8 on the face  - Cryotherapy procedure note: After verbal consent and discussion of risks and benefits including, but not limited to, dyspigmentation/scar, blister, and pain, 8 was(were) treated with 1-2 mm freeze border for 1-2 cycles with liquid nitrogen. Post cryotherapy instructions were provided.     3. ISK x14 on the back  - Cryotherapy procedure note: After verbal consent and discussion of risks and benefits including, but not limited to, dyspigmentation/scar, blister, and pain, 14 was(were) treated with 1-2 mm freeze border for 1-2 cycles with liquid nitrogen. Post cryotherapy instructions were provided.     4. Reassurance provided for benign lesions not treated today including sebaceous hyperplasia, cherry angiomata, solar lentigines, seborrheic keratoses, and banal-appearing melanocytic nevi.  - Discussed sun protective behaviors including OTC spf 30+ sunscreen use and sun avoidance strategies.      Follow-up in 1 year.       Staff Involved:  Staff and Scribe    I, Niki Jacobs, am serving as a scribe; to document services personally performed by Raul Montilla MD -based on data collection and the provider's statements to me.    Provider Disclosure:   The documentation recorded by the scribe accurately reflects the services I personally performed and the decisions made by me.    Raul Montilla MD   of Dermatology  Department of Dermatology  [facility] [employer] [facility]      CC:   Chief Complaint   Patient presents with    Derm Problem     Skin check, no areas of concern       History of  Present Illness:  Mr. Donald Blanco is a 59 year old male who presents as a return patient. He is here for a full body skin check today. Patient has no areas of concern other than a few spots on his back. He is on immunosuppressive medication post kidney transplant.     Patient is otherwise feeling well, with no additional skin concerns.     Labs:  N/A    Physical exam:  Vitals: There were no vitals taken for this visit.  GEN: This is a well developed, well-nourished male in no acute distress, in a pleasant mood.    SKIN: Good phototype II  - Full skin, which includes the head/face, both arms, chest, back, abdomen,both legs, genitalia and/or groin buttocks, digits and/or nails, was examined.  - There are dome shaped bright red papules on the head/neck, trunk, extremities.   - Multiple regular brown pigmented macules and papules are identified on the head/neck, trunk, extremities.   - Scattered brown macules on sun exposed areas.  - There are waxy stuck on tan to brown papules on the head/neck, trunk, extremities.  - 8 erythematous scaly macules on the face.  - 14 erythematous stuck on papules on the back.  - No other lesions of concern on areas examined.     Past Medical History:   Past Medical History:   Diagnosis Date    CKD (chronic kidney disease) stage 4, GFR 15-29 ml/min (H)     Elevated PSA     Essential hypertension 05/21/2020    Hyperlipidemia     IgA nephropathy     Obesity     Prostate cancer (H) 10/15/2020    S/P radiation therapy     6,600 cGy to pelvis completed on 6/25/2021 [facility]     Past Surgical History:   Procedure Laterality Date    BACK SURGERY      Back Surgey 20+ Years Ago     BIOPSY      [facility] 2010    COLONOSCOPY      10+ Years ago at [facility]     DAVINCI PROSTATECTOMY, LYMPHADENECTOMY N/A 12/14/2020    Procedure: PROSTATECTOMY, ROBOT-ASSISTED, WITH PELVIC LYMPHADENECTOMY;  Surgeon: Nabil Vásquez MD;  Location:  OR     IR CVC TUNNEL REMOVAL RIGHT  8/10/2022    IR CVC TUNNEL REVISION RIGHT  6/22/2022    IR RENAL BIOPSY RIGHT  5/24/2022    IR RENAL BIOPSY RIGHT  6/22/2022       Social History:   reports that he has never smoked. He has never used smokeless tobacco. He reports current alcohol use. He reports that he does not use drugs.    Family History:  Family History   Problem Relation Age of Onset    Kidney Disease Father     Hypertension Father     Cancer Mother     No Known Problems Brother     No Known Problems Sister     No Known Problems Son     No Known Problems Daughter     No Known Problems Brother     No Known Problems Brother     No Known Problems Brother     No Known Problems Sister     No Known Problems Sister     No Known Problems Sister        Medications:  Current Outpatient Medications   Medication Sig Dispense Refill    ACE/ARB/ARNI NOT PRESCRIBED (INTENTIONAL) Please choose reason not prescribed from choices below.      alprostadil (EDEX) 10 MCG kit 10 mcg by Intracavitary route as needed for erectile dysfunction use no more than 3 times per week 1 kit 3    aspirin 81 MG EC tablet Take 1 tablet (81 mg) by mouth daily      atorvastatin (LIPITOR) 10 MG tablet Take 1 tablet (10 mg) by mouth every evening 90 tablet 0    B Complex-C-Folic Acid (VIRT-CAPS) 1 MG CAPS Take 1 mg by mouth daily 90 capsule 3    cinacalcet (SENSIPAR) 30 MG tablet Take 1 tablet (30 mg) by mouth daily 90 tablet 0    COMPOUNDED NON-CONTROLLED SUBSTANCE (CMPD RX) - PHARMACY TO MIX COMPOUNDED MEDICATION Alprostadil 50 mcg inject 0.2 mL intracavitary daily no more than three times weekly with twenty-four hours between injections 5 mL 4    furosemide (LASIX) 20 MG tablet Take 1 tablet (20 mg) by mouth 2 times daily 180 tablet 0    mycophenolate (GENERIC EQUIVALENT) 250 MG capsule Take 2 capsules (500 mg) by mouth 2 times daily 120 capsule 11    Sodium Bicarbonate, antacid, POWD Take 1/2 Teaspoon of baking soda MWF 1 g 0    sodium zirconium  cyclosilicate (LOKELMA) 10 g PACK packet Take 1 packet (10 g) by mouth daily 30 packet 11    tacrolimus (GENERIC EQUIVALENT) 0.5 MG capsule HOLD FOR FUTURE DOSE CHANGES.  Profile Rx: patient will contact pharmacy when needed 180 capsule 3    tacrolimus (GENERIC EQUIVALENT) 1 MG capsule Take 1 capsule (1 mg) by mouth 2 times daily 180 capsule 3     No Known Allergies

## 2024-04-24 NOTE — NURSING NOTE
Donald Blanco's goals for this visit include:   Chief Complaint   Patient presents with    Derm Problem     Skin check, no areas of concern       He requests these members of his care team be copied on today's visit information: no    PCP: Emerson Grayson    Referring Provider:  No referring provider defined for this encounter.    There were no vitals taken for this visit.    Do you need any medication refills at today's visit? No    Olimpia Michael LPN

## 2024-05-06 ENCOUNTER — LAB (OUTPATIENT)
Dept: LAB | Facility: CLINIC | Age: 59
End: 2024-05-06
Payer: COMMERCIAL

## 2024-05-06 DIAGNOSIS — Z94.0 KIDNEY REPLACED BY TRANSPLANT: ICD-10-CM

## 2024-05-06 DIAGNOSIS — C61 PROSTATE CANCER (H): ICD-10-CM

## 2024-05-06 LAB
ALBUMIN SERPL BCG-MCNC: 4.1 G/DL (ref 3.5–5.2)
ALP SERPL-CCNC: 74 U/L (ref 40–150)
ALT SERPL W P-5'-P-CCNC: 32 U/L (ref 0–70)
ANION GAP SERPL CALCULATED.3IONS-SCNC: 11 MMOL/L (ref 7–15)
AST SERPL W P-5'-P-CCNC: 27 U/L (ref 0–45)
BASOPHILS # BLD AUTO: 0 10E3/UL (ref 0–0.2)
BASOPHILS NFR BLD AUTO: 0 %
BILIRUB SERPL-MCNC: 0.7 MG/DL
BK VIRUS SPECIMEN TYPE: NORMAL
BKV DNA # SPEC NAA+PROBE: NOT DETECTED IU/ML
BUN SERPL-MCNC: 27.5 MG/DL (ref 8–23)
CALCIUM SERPL-MCNC: 9.7 MG/DL (ref 8.8–10.2)
CHLORIDE SERPL-SCNC: 107 MMOL/L (ref 98–107)
CREAT SERPL-MCNC: 1.65 MG/DL (ref 0.67–1.17)
DEPRECATED HCO3 PLAS-SCNC: 21 MMOL/L (ref 22–29)
EGFRCR SERPLBLD CKD-EPI 2021: 47 ML/MIN/1.73M2
EOSINOPHIL # BLD AUTO: 0.1 10E3/UL (ref 0–0.7)
EOSINOPHIL NFR BLD AUTO: 2 %
ERYTHROCYTE [DISTWIDTH] IN BLOOD BY AUTOMATED COUNT: 13.1 % (ref 10–15)
GLUCOSE SERPL-MCNC: 114 MG/DL (ref 70–99)
HCT VFR BLD AUTO: 44.5 % (ref 40–53)
HGB BLD-MCNC: 14.9 G/DL (ref 13.3–17.7)
IMM GRANULOCYTES # BLD: 0 10E3/UL
IMM GRANULOCYTES NFR BLD: 1 %
LYMPHOCYTES # BLD AUTO: 0.9 10E3/UL (ref 0.8–5.3)
LYMPHOCYTES NFR BLD AUTO: 14 %
MCH RBC QN AUTO: 30.2 PG (ref 26.5–33)
MCHC RBC AUTO-ENTMCNC: 33.5 G/DL (ref 31.5–36.5)
MCV RBC AUTO: 90 FL (ref 78–100)
MONOCYTES # BLD AUTO: 0.6 10E3/UL (ref 0–1.3)
MONOCYTES NFR BLD AUTO: 9 %
NEUTROPHILS # BLD AUTO: 4.5 10E3/UL (ref 1.6–8.3)
NEUTROPHILS NFR BLD AUTO: 73 %
PLATELET # BLD AUTO: 176 10E3/UL (ref 150–450)
POTASSIUM SERPL-SCNC: 4.4 MMOL/L (ref 3.4–5.3)
PROT SERPL-MCNC: 6.7 G/DL (ref 6.4–8.3)
PSA SERPL DL<=0.01 NG/ML-MCNC: 0.71 NG/ML (ref 0–4.5)
RBC # BLD AUTO: 4.94 10E6/UL (ref 4.4–5.9)
SODIUM SERPL-SCNC: 139 MMOL/L (ref 135–145)
TACROLIMUS BLD-MCNC: 4.3 UG/L (ref 5–15)
TME LAST DOSE: ABNORMAL H
TME LAST DOSE: ABNORMAL H
WBC # BLD AUTO: 6.1 10E3/UL (ref 4–11)

## 2024-05-06 PROCEDURE — 80197 ASSAY OF TACROLIMUS: CPT

## 2024-05-06 PROCEDURE — 84153 ASSAY OF PSA TOTAL: CPT

## 2024-05-06 PROCEDURE — 80053 COMPREHEN METABOLIC PANEL: CPT

## 2024-05-06 PROCEDURE — 87799 DETECT AGENT NOS DNA QUANT: CPT

## 2024-05-06 PROCEDURE — 85025 COMPLETE CBC W/AUTO DIFF WBC: CPT

## 2024-05-06 PROCEDURE — 84403 ASSAY OF TOTAL TESTOSTERONE: CPT

## 2024-05-06 PROCEDURE — 36415 COLL VENOUS BLD VENIPUNCTURE: CPT

## 2024-05-07 NOTE — PROGRESS NOTES
[facility] Medical Oncology Note  Date of visit: May 9, 2024  New Outpatient Clinic Note        Assessment:     Castrate sensitive oligometastatic disease after radical prostatectomy and adjuvant radiotherapy.  There is no evidence of widespread distant metastatic disease and so in this setting often we still can go for cure if the foci of oligo metastases can be radiated. However, after discussion with radiation oncology, the nodes are in an area that was previously radiated. Therefore, RT is not possible.  In other words, Jayla now has a disease for which we do not have a cure.  Therefore the goals of all treatment moving forward are twofold: We want Jayla to live as long as possible, but also have the highest quality of life.  As with other renal transplant patients it has been my privilege to care for, he wants to do everything possible to save his renal graft.  He does not want to go on dialysis and who can blame him?  There are multiple options for therapy here including androgen deprivation therapy, single agent bicalutamide or even abiraterone (which interact less with his immunosuppressant drugs than enzalutamide does).  But the truth is these therapies will work whenever we go to them, and I am not convinced going to treatment now will help him live longer or feel better than if we wait for his PSA to rise a bit more.  While his PSAs are rising, they are only doing so slowly.  His PSA went from 0.39 to 0 0.71 over 6-month span, but it is tough to know if this is doubling versus just going up at 0.4 linearly.  I do suspect the latter. But the only way to know this is to continue to follow for more data points. Therefore, I still suggest a conservative approach here.  Other comorbidities including morbid obesity.  That said, his life expectancy is 10 years or more.    I had a long and involved conversation with Jayla and his wife Marcelo today in clinic.  Many questions were asked and hopefully answered to  their satisfaction.    Plan:     Take a deep breath.  No intervention at the current point.  Return to clinic in 6 months with her usual labs drawn just prior.  Continue to follow-up with his renal transplant team   I do encourage him to continue exercising.    Iván Cortez MD, MSc  Associate Professor of Medicine  [facility] [employer]  [facility]  [address] [place] [address]  [phone]    __________________________________________________________________    DIANGOSIS       I was asked to see this patient in consultation to give an opinion regarding further evaluation and management of recently noted recurrent and oligometastatic prostate cancer.  IgA nephropathy with Stage V CKD, s/p renal transplant. He is on tacrolimus and mycophenolate.    History of Present Illness/therapy to date:     Radical prostatectomy 12/14/2020.  Final pathology showed Clark grade group 3 (4+3 equal 7) acinar adenocarcinoma of the prostate with nonfocal present extraprostatic extension at the left posterior.  Seminal vesicle invasion was noted on the left side.  There was a positive margin, with less than 3 mm noted in the left posterior.  It was positive in the area of extraprostatic extension.  Pattern 4 was noted at the margin.  8 regional lymph nodes were resected and none were involved. PSA prior to surgery was 13.5.  Adjuvant radiotherapy completed 6/25/2021.  PSA dropped to unmeasurable post-op and remained there until 11/15/21 when it was 0.04, then 0.14 5/1/2023. It hit 0.46,  1/4/2024.  PSMA scan was ordered 1/23/2024. This showed PSMA avid bilateral internal iliac nodes c/w recurrence.      Interval history:     Continued pike in BK virus. Renal coordinator knows and there is no recommendation for change.  Has chronic urinary incontinence and needs to wear a pad  Has no ability to get erections.  Used papaverine injections a few times but it was not overly helpful since he  "continued to drip despite that.    Worried about the rise in his PSA.  Or to put it another way, wants to know how worried to be about the rise in his PSA.  Still gaining weight.  Denies pain.  Denies stool issues.      On ROS in addition to the above      Denies  fevers, chills, NS, HA, dysphagia/odynophagia, change in weight, change in appetite, cough, SOB, CP, n/v, abd pain, constipation/diarrhea, hematochezia, dysuria, hematuria, swelling, rashes, lymphadenopathy      Past Medical History:   I have reviewed this patient's past medical history   Past Medical History:   Diagnosis Date    CKD (chronic kidney disease) stage 4, GFR 15-29 ml/min (H)     Elevated PSA     Essential hypertension 05/21/2020    Hyperlipidemia     IgA nephropathy     Obesity     Prostate cancer (H) 10/15/2020    S/P radiation therapy     6,600 cGy to pelvis completed on 6/25/2021 [facility]          Past Surgical History:    I have reviewed this patient's past surgical history       Social History:   Tobacco, ETOH, and rec drugs reviewed and as noted below with the following exceptions:  Jayla grew up in [place] and graduated from high school in 1981.  He was a high school wrestler.  He then went to [employer] on a [employer] scholarship, and studied accounting.  This lasted for about a year and a half.  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  He is  to Sana, someone who he went to high school with.  However they were dating other people.  2 years after high school he was wearing his brother's [employer] jacket and had grown a beard, and feeling pretty cool.  He then went to a bar where he saw Sana's best friend Mary, who told him \"Sana just broke up and you should call her!.\"  He gave her a call and the rest of his history.  They have 2 children, Sonu, also wrestling fan, and Mayra, who is an RN and recently in the " neurosurgery division at the [facility] in [place].          Family History:     Family History   Problem Relation Age of Onset    Kidney Disease Father     Hypertension Father     Cancer Mother     No Known Problems Brother     No Known Problems Sister     No Known Problems Son     No Known Problems Daughter     No Known Problems Brother     No Known Problems Brother     No Known Problems Brother     No Known Problems Sister     No Known Problems Sister     No Known Problems Sister             Medications:     Current Outpatient Medications   Medication Sig Dispense Refill    ACE/ARB/ARNI NOT PRESCRIBED (INTENTIONAL) Please choose reason not prescribed from choices below.      alprostadil (EDEX) 10 MCG kit 10 mcg by Intracavitary route as needed for erectile dysfunction use no more than 3 times per week 1 kit 3    aspirin 81 MG EC tablet Take 1 tablet (81 mg) by mouth daily      atorvastatin (LIPITOR) 10 MG tablet Take 1 tablet (10 mg) by mouth every evening 90 tablet 0    B Complex-C-Folic Acid (VIRT-CAPS) 1 MG CAPS Take 1 mg by mouth daily 90 capsule 3    cinacalcet (SENSIPAR) 30 MG tablet Take 1 tablet (30 mg) by mouth daily 90 tablet 0    COMPOUNDED NON-CONTROLLED SUBSTANCE (CMPD RX) - PHARMACY TO MIX COMPOUNDED MEDICATION Alprostadil 50 mcg inject 0.2 mL intracavitary daily no more than three times weekly with twenty-four hours between injections 5 mL 4    furosemide (LASIX) 20 MG tablet Take 1 tablet (20 mg) by mouth 2 times daily 180 tablet 0    mycophenolate (GENERIC EQUIVALENT) 250 MG capsule Take 2 capsules (500 mg) by mouth 2 times daily 120 capsule 11    Sodium Bicarbonate, antacid, POWD Take 1/2 Teaspoon of baking soda MWF 1 g 0    sodium zirconium cyclosilicate (LOKELMA) 10 g PACK packet Take 1 packet (10 g) by mouth daily 30 packet 11    tacrolimus (GENERIC EQUIVALENT) 0.5 MG capsule HOLD FOR FUTURE DOSE CHANGES.  Profile Rx: patient will contact pharmacy when needed 180 capsule 3    tacrolimus  (GENERIC EQUIVALENT) 1 MG capsule Take 1 capsule (1 mg) by mouth 2 times daily 180 capsule 3              Physical Exam:   There were no vitals taken for this visit.    ECOG PS: 1  Constitutional: WDWN male in NAD, pleasant and appropriate  HEENT:  NC/AT, no icterus, OP clear, MMM  Skin: No jaundice nor ecchymoses  Lungs: CTAB, no w/r/r, nonlabored breathing  Cardiovascular: RRR, S1, S2, no m/r/g  Abdomen: +BS, soft, nontender, nondistended, no organomegaly nor masses though body habitus does obscure the exam somewhat  MSK/Extremities: Warm, well perfused. No edema  LN: no cervical, supraclavicular, axillary, nor inguinal lymphadenopathy  Neurologic: alert, answering questions appropriately, moving all extremities spontaneously. CN 2-12 grossly intact.  Psych: appropriate affect  Data:        Latest Reference Range & Units 01/04/24 06:59 03/06/24 07:01 05/06/24 07:01   PSA Tumor Marker 0.00 - 4.50 ng/mL 0.46 0.58 0.71      Latest Reference Range & Units 03/06/24 07:01 04/02/24 07:02 05/06/24 07:01   Sodium 135 - 145 mmol/L 141 139 139   Potassium 3.4 - 5.3 mmol/L 5.2 4.5 4.4   Chloride 98 - 107 mmol/L 105 106 107   Carbon Dioxide (CO2) 22 - 29 mmol/L 26 21 (L) 21 (L)   Urea Nitrogen 8.0 - 23.0 mg/dL 34.3 (H) 26.8 (H) 27.5 (H)   Creatinine 0.67 - 1.17 mg/dL 1.74 (H) 1.62 (H) 1.65 (H)   GFR Estimate >60 mL/min/1.73m2 44 (L) 48 (L) 47 (L)   Calcium 8.8 - 10.2 mg/dL 10.4 (H) 10.0 9.7   Anion Gap 7 - 15 mmol/L 10 12 11   (L): Data is abnormally low  (H): Data is abnormally high        No results found for this or any previous visit (from the past 24 hour(s)).    Other Data     CT/PET 1/23/2024        IMPRESSION: In this patient with history of prostate cancer with  prostatectomy and radiation now with rising prostate-specific antigen:  1. Estimated avid right and left internal iliac lymph nodes consistent  with disease recurrence. miPSMA score 1  2. No evidence of disease in chest or abdomen.  3. Right lower quadrant  renal transplant without hydronephrosis or  nephrolithiasis.         Labs, imaging and treatment plan reviewed with patient. All questions answered.        60 minutes spent on the date of the encounter doing chart review, review of outside records, review of test results, interpretation of tests, patient visit, documentation, discussion with other provider(s), and discussion with family

## 2024-05-09 ENCOUNTER — ONCOLOGY VISIT (OUTPATIENT)
Dept: ONCOLOGY | Facility: CLINIC | Age: 59
End: 2024-05-09
Attending: INTERNAL MEDICINE
Payer: COMMERCIAL

## 2024-05-09 VITALS
HEART RATE: 78 BPM | RESPIRATION RATE: 16 BRPM | OXYGEN SATURATION: 93 % | BODY MASS INDEX: 40.02 KG/M2 | TEMPERATURE: 98.5 F | SYSTOLIC BLOOD PRESSURE: 142 MMHG | DIASTOLIC BLOOD PRESSURE: 72 MMHG | WEIGHT: 271 LBS

## 2024-05-09 DIAGNOSIS — C61 PROSTATE CANCER (H): Primary | ICD-10-CM

## 2024-05-09 LAB — TESTOST SERPL-MCNC: 273 NG/DL (ref 240–950)

## 2024-05-09 PROCEDURE — 99214 OFFICE O/P EST MOD 30 MIN: CPT | Performed by: INTERNAL MEDICINE

## 2024-05-09 ASSESSMENT — PAIN SCALES - GENERAL: PAINLEVEL: NO PAIN (0)

## 2024-05-09 NOTE — NURSING NOTE
"Oncology Rooming Note    May 9, 2024 1:47 PM   Donald Blanco is a 59 year old male who presents for:    Chief Complaint   Patient presents with    Oncology Clinic Visit     Prostate cancer      Initial Vitals: BP (!) 142/72   Pulse 78   Temp 98.5  F (36.9  C)   Resp 16   Wt 122.9 kg (271 lb)   SpO2 93%   BMI 40.02 kg/m   Estimated body mass index is 40.02 kg/m  as calculated from the following:    Height as of 2/22/24: 1.753 m (5' 9\").    Weight as of this encounter: 122.9 kg (271 lb). Body surface area is 2.45 meters squared.  No Pain (0) Comment: Data Unavailable   No LMP for male patient.  Allergies reviewed: Yes  Medications reviewed: Yes    Medications: Medication refills not needed today.  Pharmacy name entered into EPIC:    [facility] - [place], [place] - [address]  [facility] [place], [place] - [address] [facility], [address]  [facility] - [place], [place] - [facility] [address]    Frailty Screening:   Is the patient here for a new oncology consult visit in cancer care? 2. No      Clinical concerns: no other complaints      Davidson Quach"

## 2024-05-09 NOTE — LETTER
5/9/2024         RE: Donald Blanco  [address] [facility]  [place] [address]        Dear Colleague,    Thank you for referring your patient, Donald Blanco, to the [facility]. Please see a copy of my visit note below.      [facility] Medical Oncology Note  Date of visit: May 9, 2024  New Outpatient Clinic Note        Assessment:     Castrate sensitive oligometastatic disease after radical prostatectomy and adjuvant radiotherapy.  There is no evidence of widespread distant metastatic disease and so in this setting often we still can go for cure if the foci of oligo metastases can be radiated. However, after discussion with radiation oncology, the nodes are in an area that was previously radiated. Therefore, RT is not possible.  In other words, Jayla now has a disease for which we do not have a cure.  Therefore the goals of all treatment moving forward are twofold: We want Jayla to live as long as possible, but also have the highest quality of life.  As with other renal transplant patients it has been my privilege to care for, he wants to do everything possible to save his renal graft.  He does not want to go on dialysis and who can blame him?  There are multiple options for therapy here including androgen deprivation therapy, single agent bicalutamide or even abiraterone (which interact less with his immunosuppressant drugs than enzalutamide does).  But the truth is these therapies will work whenever we go to them, and I am not convinced going to treatment now will help him live longer or feel better than if we wait for his PSA to rise a bit more.  While his PSAs are rising, they are only doing so slowly.  His PSA went from 0.39 to 0 0.71 over 6-month span, but it is tough to know if this is doubling versus just going up at 0.4 linearly.  I do suspect the latter. But the only way to know this is to continue to follow for more data points. Therefore, I still suggest a conservative  approach here.  Other comorbidities including morbid obesity.  That said, his life expectancy is 10 years or more.    I had a long and involved conversation with Jayla and his wife Marcelo today in clinic.  Many questions were asked and hopefully answered to their satisfaction.    Plan:     Take a deep breath.  No intervention at the current point.  Return to clinic in 6 months with her usual labs drawn just prior.  Continue to follow-up with his renal transplant team   I do encourage him to continue exercising.    Iván Cortez MD, MSc  Associate Professor of Medicine  [facility] [employer]  [facility] [place] [address]  [phone]    __________________________________________________________________    DIANGOSIS       I was asked to see this patient in consultation to give an opinion regarding further evaluation and management of recently noted recurrent and oligometastatic prostate cancer.  IgA nephropathy with Stage V CKD, s/p renal transplant. He is on tacrolimus and mycophenolate.    History of Present Illness/therapy to date:     Radical prostatectomy 12/14/2020.  Final pathology showed [place] grade group 3 (4+3 equal 7) acinar adenocarcinoma of the prostate with nonfocal present extraprostatic extension at the left posterior.  Seminal vesicle invasion was noted on the left side.  There was a positive margin, with less than 3 mm noted in the left posterior.  It was positive in the area of extraprostatic extension.  Pattern 4 was noted at the margin.  8 regional lymph nodes were resected and none were involved. PSA prior to surgery was 13.5.  Adjuvant radiotherapy completed 6/25/2021.  PSA dropped to unmeasurable post-op and remained there until 11/15/21 when it was 0.04, then 0.14 5/1/2023. It hit 0.46,  1/4/2024.  PSMA scan was ordered 1/23/2024. This showed PSMA avid bilateral internal iliac nodes c/w recurrence.      Interval history:     Continued  jus in BK virus. Renal coordinator knows and there is no recommendation for change.  Has chronic urinary incontinence and needs to wear a pad  Has no ability to get erections.  Used papaverine injections a few times but it was not overly helpful since he continued to drip despite that.    Worried about the rise in his PSA.  Or to put it another way, wants to know how worried to be about the rise in his PSA.  Still gaining weight.  Denies pain.  Denies stool issues.      On ROS in addition to the above      Denies  fevers, chills, NS, HA, dysphagia/odynophagia, change in weight, change in appetite, cough, SOB, CP, n/v, abd pain, constipation/diarrhea, hematochezia, dysuria, hematuria, swelling, rashes, lymphadenopathy      Past Medical History:   I have reviewed this patient's past medical history   Past Medical History:   Diagnosis Date    CKD (chronic kidney disease) stage 4, GFR 15-29 ml/min (H)     Elevated PSA     Essential hypertension 05/21/2020    Hyperlipidemia     IgA nephropathy     Obesity     Prostate cancer (H) 10/15/2020    S/P radiation therapy     6,600 cGy to pelvis completed on 6/25/2021 [facility]          Past Surgical History:    I have reviewed this patient's past surgical history       Social History:   Tobacco, ETOH, and rec drugs reviewed and as noted below with the following exceptions:  Jayla grew up in [place] and graduated from high school in 1981.  He was a high school wrestler.  He then went to [employer] on a [employer] scholarship, and studied accounting.  This lasted for about a year and a half.  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  He is  to Sana, someone who he went to high school with.  However they were dating other people.  2 years after high school he was wearing his brother's [employer] jacket and had grown a beard, and feeling pretty cool.  He then went to a  "bar where he saw Sana's best friend Mary, who told him \"Sana just broke up and you should call her!.\"  He gave her a call and the rest of his history.  They have 2 children, Sonu, also wrestling fan, and Mayra, who is an RN and recently in the neurosurgery division at the [facility] in [place].          Family History:     Family History   Problem Relation Age of Onset    Kidney Disease Father     Hypertension Father     Cancer Mother     No Known Problems Brother     No Known Problems Sister     No Known Problems Son     No Known Problems Daughter     No Known Problems Brother     No Known Problems Brother     No Known Problems Brother     No Known Problems Sister     No Known Problems Sister     No Known Problems Sister             Medications:     Current Outpatient Medications   Medication Sig Dispense Refill    ACE/ARB/ARNI NOT PRESCRIBED (INTENTIONAL) Please choose reason not prescribed from choices below.      alprostadil (EDEX) 10 MCG kit 10 mcg by Intracavitary route as needed for erectile dysfunction use no more than 3 times per week 1 kit 3    aspirin 81 MG EC tablet Take 1 tablet (81 mg) by mouth daily      atorvastatin (LIPITOR) 10 MG tablet Take 1 tablet (10 mg) by mouth every evening 90 tablet 0    B Complex-C-Folic Acid (VIRT-CAPS) 1 MG CAPS Take 1 mg by mouth daily 90 capsule 3    cinacalcet (SENSIPAR) 30 MG tablet Take 1 tablet (30 mg) by mouth daily 90 tablet 0    COMPOUNDED NON-CONTROLLED SUBSTANCE (CMPD RX) - PHARMACY TO MIX COMPOUNDED MEDICATION Alprostadil 50 mcg inject 0.2 mL intracavitary daily no more than three times weekly with twenty-four hours between injections 5 mL 4    furosemide (LASIX) 20 MG tablet Take 1 tablet (20 mg) by mouth 2 times daily 180 tablet 0    mycophenolate (GENERIC EQUIVALENT) 250 MG capsule Take 2 capsules (500 mg) by mouth 2 times daily 120 capsule 11    Sodium Bicarbonate, antacid, POWD Take 1/2 Teaspoon of baking soda MWF 1 g 0    sodium zirconium " cyclosilicate (LOKELMA) 10 g PACK packet Take 1 packet (10 g) by mouth daily 30 packet 11    tacrolimus (GENERIC EQUIVALENT) 0.5 MG capsule HOLD FOR FUTURE DOSE CHANGES.  Profile Rx: patient will contact pharmacy when needed 180 capsule 3    tacrolimus (GENERIC EQUIVALENT) 1 MG capsule Take 1 capsule (1 mg) by mouth 2 times daily 180 capsule 3              Physical Exam:   There were no vitals taken for this visit.    ECOG PS: 1  Constitutional: WDWN male in NAD, pleasant and appropriate  HEENT:  NC/AT, no icterus, OP clear, MMM  Skin: No jaundice nor ecchymoses  Lungs: CTAB, no w/r/r, nonlabored breathing  Cardiovascular: RRR, S1, S2, no m/r/g  Abdomen: +BS, soft, nontender, nondistended, no organomegaly nor masses though body habitus does obscure the exam somewhat  MSK/Extremities: Warm, well perfused. No edema  LN: no cervical, supraclavicular, axillary, nor inguinal lymphadenopathy  Neurologic: alert, answering questions appropriately, moving all extremities spontaneously. CN 2-12 grossly intact.  Psych: appropriate affect  Data:        Latest Reference Range & Units 01/04/24 06:59 03/06/24 07:01 05/06/24 07:01   PSA Tumor Marker 0.00 - 4.50 ng/mL 0.46 0.58 0.71      Latest Reference Range & Units 03/06/24 07:01 04/02/24 07:02 05/06/24 07:01   Sodium 135 - 145 mmol/L 141 139 139   Potassium 3.4 - 5.3 mmol/L 5.2 4.5 4.4   Chloride 98 - 107 mmol/L 105 106 107   Carbon Dioxide (CO2) 22 - 29 mmol/L 26 21 (L) 21 (L)   Urea Nitrogen 8.0 - 23.0 mg/dL 34.3 (H) 26.8 (H) 27.5 (H)   Creatinine 0.67 - 1.17 mg/dL 1.74 (H) 1.62 (H) 1.65 (H)   GFR Estimate >60 mL/min/1.73m2 44 (L) 48 (L) 47 (L)   Calcium 8.8 - 10.2 mg/dL 10.4 (H) 10.0 9.7   Anion Gap 7 - 15 mmol/L 10 12 11   (L): Data is abnormally low  (H): Data is abnormally high        No results found for this or any previous visit (from the past 24 hour(s)).    Other Data     CT/PET 1/23/2024        IMPRESSION: In this patient with history of prostate cancer  with  prostatectomy and radiation now with rising prostate-specific antigen:  1. Estimated avid right and left internal iliac lymph nodes consistent  with disease recurrence. miPSMA score 1  2. No evidence of disease in chest or abdomen.  3. Right lower quadrant renal transplant without hydronephrosis or  nephrolithiasis.         Labs, imaging and treatment plan reviewed with patient. All questions answered.        60 minutes spent on the date of the encounter doing chart review, review of outside records, review of test results, interpretation of tests, patient visit, documentation, discussion with other provider(s), and discussion with family

## 2024-05-16 ENCOUNTER — OFFICE VISIT (OUTPATIENT)
Dept: TRANSPLANT | Facility: CLINIC | Age: 59
End: 2024-05-16
Attending: INTERNAL MEDICINE
Payer: COMMERCIAL

## 2024-05-16 VITALS
BODY MASS INDEX: 39.92 KG/M2 | SYSTOLIC BLOOD PRESSURE: 128 MMHG | WEIGHT: 270.3 LBS | DIASTOLIC BLOOD PRESSURE: 76 MMHG | HEART RATE: 79 BPM | OXYGEN SATURATION: 95 % | TEMPERATURE: 97.8 F

## 2024-05-16 DIAGNOSIS — E87.20 METABOLIC ACIDOSIS: ICD-10-CM

## 2024-05-16 DIAGNOSIS — D84.9 IMMUNOSUPPRESSION (H): ICD-10-CM

## 2024-05-16 DIAGNOSIS — Z94.0 HTN, KIDNEY TRANSPLANT RELATED: ICD-10-CM

## 2024-05-16 DIAGNOSIS — E66.01 MORBID OBESITY (H): ICD-10-CM

## 2024-05-16 DIAGNOSIS — N25.81 SECONDARY RENAL HYPERPARATHYROIDISM (H): ICD-10-CM

## 2024-05-16 DIAGNOSIS — Z29.89 NEED FOR PNEUMOCYSTIS PROPHYLAXIS: ICD-10-CM

## 2024-05-16 DIAGNOSIS — Z48.298 AFTERCARE FOLLOWING ORGAN TRANSPLANT: Primary | ICD-10-CM

## 2024-05-16 DIAGNOSIS — E87.5 HYPERKALEMIA: ICD-10-CM

## 2024-05-16 DIAGNOSIS — N02.B9 IGA NEPHROPATHY: ICD-10-CM

## 2024-05-16 DIAGNOSIS — Z94.0 KIDNEY REPLACED BY TRANSPLANT: ICD-10-CM

## 2024-05-16 DIAGNOSIS — I15.1 HTN, KIDNEY TRANSPLANT RELATED: ICD-10-CM

## 2024-05-16 DIAGNOSIS — B34.8 BK VIREMIA: ICD-10-CM

## 2024-05-16 PROBLEM — N18.32 STAGE 3B CHRONIC KIDNEY DISEASE (H): Status: RESOLVED | Noted: 2022-11-26 | Resolved: 2024-05-16

## 2024-05-16 PROCEDURE — G2211 COMPLEX E/M VISIT ADD ON: HCPCS | Performed by: INTERNAL MEDICINE

## 2024-05-16 PROCEDURE — 99215 OFFICE O/P EST HI 40 MIN: CPT | Performed by: INTERNAL MEDICINE

## 2024-05-16 PROCEDURE — 99213 OFFICE O/P EST LOW 20 MIN: CPT | Performed by: INTERNAL MEDICINE

## 2024-05-16 ASSESSMENT — PAIN SCALES - GENERAL: PAINLEVEL: NO PAIN (0)

## 2024-05-16 NOTE — LETTER
5/16/2024         RE: Donald Blanco  [address] [place] [address]        Dear Colleague,    Thank you for referring your patient, Donald Blanco, to the [facility]. Please see a copy of my visit note below.    TRANSPLANT NEPHROLOGY EARLY POST TRANSPLANT VISIT     Assessment & Plan  # LDKT: stable   - Baseline Creatinine: ~ 1.7-2    - Proteinuria: Normal (<0.2 grams)   - Date DSA Last Checked: Mar/2023      Latest DSA: No   - BK Viremia: No   - Kidney Tx Biopsy: Jun 22, 2022; Result: No diagnostic evidence of acute rejection.  Acute tubular injury.  Mild interstitial fibrosis and tubular atrophy.             May 24, 2022; Result: No diagnostic evidence of acute rejection.  Acute tubular injury.   - Transplant Ureteral Stent: No    # Immunosuppression: Tacrolimus immediate release (goal 4-6) and Mycophenolate mofetil (dose 500 mg every 12 hours)   - Continue with intensive monitoring of immunosuppression for efficacy and toxicity.   - Induction with Recent Transplant:  High Intensity   - Continue with intensive monitoring of immunosuppression for efficacy and toxicity.   - Changes: yes on reduced dose MMF due to BK viremia    # BK viremia: resolved on lower IS    - peak viral load 13K copies/ml Sep 2023 , neg since 1/2024, 2/2024   - on reduced IS as above   - Igg-750 Sep 2023    # Infection Prophylaxis:   - PJP: none, CD4>200  - CMV: None    # Hypertension: Controlled;  Goal BP: < 130/80   - Volume status: Euvolemic   - Changes: Not at this time    # Elevated Blood Glucose:  Last HbA1c: 5.8%   - Management as per primary care.    # Mineral Bone Disorder:  - Secondary renal hyperparathyroidism; PTH level: Mildly elevated (151-300 pg/ml)        On treatment: sensipar 30 mg daily,  - Vitamin D; level: Normal        On supplement: Yes  - Calcium; level: normal        On supplement: no    # Electrolytes:   - Potassium; level: normal      On binder: Yes lokelma 10 g po MWF   -  Magnesium; level: Normal        On supplement: Yes  - Bicarbonate; level: Normal        On supplement: Yes  baking soda 1/2 teasp daily -reduce to MWF and would stop if K stable and     # Recurrent Metastatic Prostate Ca:   -  Final pathology showed Superior grade group 3 (4+3 equal 7) s/p radical prostatectomy 12/14/2020 s/p adjuvant radiotherapy completed 6/25/2021    - PSMA scan 1/23/2024. This showed PSMA avid bilateral internal iliac nodes c/w recurrence. Follows with    - Castrate sensitive oligometastatic disease after radical prostatectomy and adjuvant radiotherapy.    - close surveillance q3 months per discussions with oncologist ()    # Obesity, Class II (BMI = 39.92):    - Recommend weight loss for overall health by increasing exercise and watching caloric intake.   - patient prefers to work on lifestyle modifications and would prefer to hold off on weight management referral    # Medical Compliance: Yes      # Transplant History:  Etiology of Kidney Failure: IgA nephropathy  Tx: LDKT  Transplant: 5/17/2022 (Kidney)  Donor Type: Living Donor Class:   Crossmatch at time of Tx: negative  DSA at time of Tx: No  Significant changes in immunosuppression: None  CMV IgG Ab High Risk Discordance (D+/R-): No  EBV IgG Ab High Risk Discordance (D+/R-): No  Significant transplant-related complications: DGF    Transplant Office Phone Number: [phone]    Assessment and plan was discussed with the patient and he voiced his understanding and agreement.    Return visit: 6 months    Donte Duff MD      The longitudinal plan of care for kidney transplant as documented were addressed during this visit. Due to the added complexity in care, I will continue to support Pat in the subsequent management and with ongoing continuity of care.    Chief Complaint  Mr. Blanco is a 59 year old here for kidney transplant and immunosuppression management.     History of Present Illness     Mr. Blanco was diagnosed  with recurrent metastatic prostate Ca Jan 2024 after a recent uptrend in PSA and CT a/p +internal iliac LN's consistent with metastatic disease. He was seen by  who discussed treatment options,discussed risks and benefits and the plan is to continue conservative management.     Interval Hx  Mr. Blanco feels well overall. Most recent visit with Oncology was on May.9, PSA incerased from 0.39-->0.71 over 6 month,  Dr. Cortez recommended to continue conservative management at this time. He continues to work on lifestyle modifications, diet, and exercise to lose weight  Energy level is good. Denies any fevers, chills, night sweats. No nausea, vomiting, abdominal pain, diarrhea. No chest pain, sob, leg swelling. NO urinary symptoms. No recent hospitalization.     Current IS FK 1/1 /500   Ppx:none     Home BP: not monitored regularly     Problem List  Patient Active Problem List   Diagnosis     HTN, kidney transplant related     Gout     Dyslipidemia     Secondary renal hyperparathyroidism (H24)     IgA nephropathy     Class 2 obesity due to excess calories without serious comorbidity with body mass index (BMI) of 36.0 to 36.9 in adult     Prostate cancer (H)     Kidney replaced by transplant     Immunosuppression (H24)     Hyperkalemia     Hypomagnesemia     Metabolic acidosis     Aftercare following organ transplant     Morbid obesity (H)     Need for pneumocystis prophylaxis     Stage 3b chronic kidney disease (H)     Vitamin D deficiency       Allergies  No Known Allergies    Medications  Current Outpatient Medications   Medication Sig Dispense Refill     ACE/ARB/ARNI NOT PRESCRIBED (INTENTIONAL) Please choose reason not prescribed from choices below.       alprostadil (EDEX) 10 MCG kit 10 mcg by Intracavitary route as needed for erectile dysfunction use no more than 3 times per week 1 kit 3     aspirin 81 MG EC tablet Take 1 tablet (81 mg) by mouth daily       atorvastatin (LIPITOR) 10 MG tablet Take 1  tablet (10 mg) by mouth every evening 90 tablet 0     B Complex-C-Folic Acid (VIRT-CAPS) 1 MG CAPS Take 1 mg by mouth daily 90 capsule 3     cinacalcet (SENSIPAR) 30 MG tablet Take 1 tablet (30 mg) by mouth daily 90 tablet 0     COMPOUNDED NON-CONTROLLED SUBSTANCE (CMPD RX) - PHARMACY TO MIX COMPOUNDED MEDICATION Alprostadil 50 mcg inject 0.2 mL intracavitary daily no more than three times weekly with twenty-four hours between injections 5 mL 4     furosemide (LASIX) 20 MG tablet Take 1 tablet (20 mg) by mouth 2 times daily 180 tablet 0     mycophenolate (GENERIC EQUIVALENT) 250 MG capsule Take 2 capsules (500 mg) by mouth 2 times daily 120 capsule 11     Sodium Bicarbonate, antacid, POWD Take 1/2 Teaspoon of baking soda MWF 1 g 0     sodium zirconium cyclosilicate (LOKELMA) 10 g PACK packet Take 1 packet (10 g) by mouth daily 30 packet 11     tacrolimus (GENERIC EQUIVALENT) 0.5 MG capsule HOLD FOR FUTURE DOSE CHANGES.  Profile Rx: patient will contact pharmacy when needed 180 capsule 3     tacrolimus (GENERIC EQUIVALENT) 1 MG capsule Take 1 capsule (1 mg) by mouth 2 times daily 180 capsule 3     No current facility-administered medications for this visit.     There are no discontinued medications.    Physical Exam  Vital Signs: There were no vitals taken for this visit.    GENERAL APPEARANCE: alert and no distress  HENT: mouth without ulcers or lesions  LYMPHATICS: no cervical or supraclavicular nodes  RESP: lungs clear to auscultation - no rales, rhonchi or wheezes  CV: regular rhythm, normal rate, no rub, no murmur  EDEMA: no LE edema bilaterally  ABDOMEN: soft, nondistended, nontender, bowel sounds normal  MS: extremities normal - no gross deformities noted, no evidence of inflammation in joints, no muscle tenderness  SKIN: no rash  TX KIDNEY: normal  DIALYSIS ACCESS: none    Data        Latest Ref Rng & Units 5/6/2024     7:01 AM 4/2/2024     7:02 AM 3/6/2024     7:01 AM   Renal   Sodium 135 - 145 mmol/L 139   139  141    K 3.4 - 5.3 mmol/L 4.4  4.5  5.2    Cl 98 - 107 mmol/L 107  106  105    Cl (external) 98 - 107 mmol/L 107  106  105    CO2 22 - 29 mmol/L 21  21  26    Urea Nitrogen 8.0 - 23.0 mg/dL 27.5  26.8  34.3    Creatinine 0.67 - 1.17 mg/dL 1.65  1.62  1.74    Glucose 70 - 99 mg/dL 114  116  115    Calcium 8.8 - 10.2 mg/dL 9.7  10.0  10.4          Latest Ref Rng & Units 3/6/2024     7:01 AM 11/6/2023     7:06 AM 5/17/2023     9:42 AM   Bone Health   Parathyroid Hormone Intact 15 - 65 pg/mL 135  160  259    Vit D Def 20 - 50 ng/mL 30   48          Latest Ref Rng & Units 5/6/2024     7:01 AM 4/2/2024     7:02 AM 3/6/2024     7:01 AM   Heme   WBC 4.0 - 11.0 10e3/uL 6.1  6.9  6.0    Hgb 13.3 - 17.7 g/dL 14.9  15.2  15.6    Plt 150 - 450 10e3/uL 176  175  182          Latest Ref Rng & Units 5/6/2024     7:01 AM 5/17/2023     9:42 AM 11/21/2022     7:00 AM   Liver   AP 40 - 150 U/L 74  91  105    TBili <=1.2 mg/dL 0.7  0.9  0.6    Bilirubin Direct 0.0 - 0.2 mg/dL   0.1    Bilirubin Direct 0.00 - 0.30 mg/dL  0.22     ALT 0 - 70 U/L 32  24  26    AST 0 - 45 U/L 27  24  16    Tot Protein 6.4 - 8.3 g/dL 6.7  7.3  7.2    Albumin 3.4 - 5.0 g/dL   4.0    Albumin 3.5 - 5.2 g/dL 4.1  4.4           Latest Ref Rng & Units 5/17/2023     9:42 AM 11/21/2022     7:00 AM 6/3/2022    10:48 AM   Pancreas   A1C <5.7 % 5.8  5.6  5.1          Latest Ref Rng & Units 11/7/2022     7:08 AM 10/10/2022     7:07 AM 9/16/2022     7:07 AM   Iron studies   Iron 35 - 180 ug/dL 81  102     Iron Saturation Index 15 - 46 % 26  31     Ferritin 26 - 388 ng/mL 494  542  602          Latest Ref Rng & Units 5/17/2022     1:35 PM 5/9/2022    10:00 AM 9/3/2020    11:31 AM   [facility] Txp Virology   CMV QUANT IU/ML Not Detected IU/mL Not Detected      EBV CAPSID ANTIBODY IGG No detectable antibody.  Positive  >8.0    Hep B Core NR^Nonreactive   Nonreactive      Failed to redirect to the Timeline version of the REVFS SmartLink.  Recent Labs   Lab Test 03/06/24  0701  04/02/24  0702 05/06/24  0702   DOSTAC 3/5/2024 4/1/2024 5/5/2024   TACROL 4.4* 4.5* 4.3*     Recent Labs   Lab Test 09/25/23  0714 10/02/23  0709 03/06/24  0701   DOSMPA 9/24/2023   8:00 PM 10/1/2023   8:00 PM 3/5/2024   8:00 PM   MPACID 1.33 2.19 2.06   MPAG 24.9* 24.8* 23.4*         Again, thank you for allowing me to participate in the care of your patient.        Sincerely,        Donte Duff MD

## 2024-05-16 NOTE — NURSING NOTE
Chief Complaint   Patient presents with    RECHECK       /76   Pulse 79   Temp 97.8  F (36.6  C) (Oral)   Wt 122.6 kg (270 lb 4.8 oz)   SpO2 95%   BMI 39.92 kg/m      Arnold Hughes on 5/16/2024 at 2:14 PM

## 2024-05-16 NOTE — PROGRESS NOTES
TRANSPLANT NEPHROLOGY EARLY POST TRANSPLANT VISIT     Assessment & Plan   # LDKT: stable   - Baseline Creatinine: ~ 1.7-2    - Proteinuria: Normal (<0.2 grams)   - Date DSA Last Checked: Mar/2023      Latest DSA: No   - BK Viremia: No   - Kidney Tx Biopsy: Jun 22, 2022; Result: No diagnostic evidence of acute rejection.  Acute tubular injury.  Mild interstitial fibrosis and tubular atrophy.             May 24, 2022; Result: No diagnostic evidence of acute rejection.  Acute tubular injury.   - Transplant Ureteral Stent: No    # Immunosuppression: Tacrolimus immediate release (goal 4-6) and Mycophenolate mofetil (dose 500 mg every 12 hours)   - Continue with intensive monitoring of immunosuppression for efficacy and toxicity.   - Induction with Recent Transplant:  High Intensity   - Continue with intensive monitoring of immunosuppression for efficacy and toxicity.   - Changes: yes on reduced dose MMF due to BK viremia    # BK viremia: resolved on lower IS    - peak viral load 13K copies/ml Sep 2023 , neg since 1/2024, 2/2024   - on reduced IS as above   - Igg-750 Sep 2023    # Infection Prophylaxis:   - PJP: none, CD4>200  - CMV: None    # Hypertension: Controlled;  Goal BP: < 130/80   - Volume status: Euvolemic   - Changes: Not at this time    # Elevated Blood Glucose:  Last HbA1c: 5.8%   - Management as per primary care.    # Mineral Bone Disorder:  - Secondary renal hyperparathyroidism; PTH level: Mildly elevated (151-300 pg/ml)        On treatment: sensipar 30 mg daily,  - Vitamin D; level: Normal        On supplement: Yes  - Calcium; level: normal        On supplement: no    # Electrolytes:   - Potassium; level: normal      On binder: Yes lokelma 10 g po MWF   - Magnesium; level: Normal        On supplement: Yes  - Bicarbonate; level: Normal        On supplement: Yes  baking soda 1/2 teasp daily -reduce to MWF and would stop if K stable and     # Recurrent Metastatic Prostate Ca:   -  Final pathology showed  Clark grade group 3 (4+3 equal 7) s/p radical prostatectomy 12/14/2020 s/p adjuvant radiotherapy completed 6/25/2021    - PSMA scan 1/23/2024. This showed PSMA avid bilateral internal iliac nodes c/w recurrence. Follows with    - Castrate sensitive oligometastatic disease after radical prostatectomy and adjuvant radiotherapy.    - close surveillance q3 months per discussions with oncologist ()    # Obesity, Class II (BMI = 39.92):    - Recommend weight loss for overall health by increasing exercise and watching caloric intake.   - patient prefers to work on lifestyle modifications and would prefer to hold off on weight management referral    # Medical Compliance: Yes      # Transplant History:  Etiology of Kidney Failure: IgA nephropathy  Tx: LDKT  Transplant: 5/17/2022 (Kidney)  Donor Type: Living Donor Class:   Crossmatch at time of Tx: negative  DSA at time of Tx: No  Significant changes in immunosuppression: None  CMV IgG Ab High Risk Discordance (D+/R-): No  EBV IgG Ab High Risk Discordance (D+/R-): No  Significant transplant-related complications: DGF    Transplant Office Phone Number: [phone]    Assessment and plan was discussed with the patient and he voiced his understanding and agreement.    Return visit: 6 months    Donte Duff MD      The longitudinal plan of care for kidney transplant as documented were addressed during this visit. Due to the added complexity in care, I will continue to support Pat in the subsequent management and with ongoing continuity of care.    Chief Complaint   Mr. Blanco is a 59 year old here for kidney transplant and immunosuppression management.     History of Present Illness      Mr. Blanco was diagnosed with recurrent metastatic prostate Ca Jan 2024 after a recent uptrend in PSA and CT a/p +internal iliac LN's consistent with metastatic disease. He was seen by  who discussed treatment options,discussed risks and benefits and the plan is to  continue conservative management.     Interval Hx  Mr. Blanco feels well overall. Most recent visit with Oncology was on May.9, PSA incerased from 0.39-->0.71 over 6 month,  Dr. Cortez recommended to continue conservative management at this time. He continues to work on lifestyle modifications, diet, and exercise to lose weight  Energy level is good. Denies any fevers, chills, night sweats. No nausea, vomiting, abdominal pain, diarrhea. No chest pain, sob, leg swelling. NO urinary symptoms. No recent hospitalization.     Current IS FK 1/1 /500   Ppx:none     Home BP: not monitored regularly     Problem List   Patient Active Problem List   Diagnosis    HTN, kidney transplant related    Gout    Dyslipidemia    Secondary renal hyperparathyroidism (H24)    IgA nephropathy    Class 2 obesity due to excess calories without serious comorbidity with body mass index (BMI) of 36.0 to 36.9 in adult    Prostate cancer (H)    Kidney replaced by transplant    Immunosuppression (H24)    Hyperkalemia    Hypomagnesemia    Metabolic acidosis    Aftercare following organ transplant    Morbid obesity (H)    Need for pneumocystis prophylaxis    Stage 3b chronic kidney disease (H)    Vitamin D deficiency       Allergies   No Known Allergies    Medications   Current Outpatient Medications   Medication Sig Dispense Refill    ACE/ARB/ARNI NOT PRESCRIBED (INTENTIONAL) Please choose reason not prescribed from choices below.      alprostadil (EDEX) 10 MCG kit 10 mcg by Intracavitary route as needed for erectile dysfunction use no more than 3 times per week 1 kit 3    aspirin 81 MG EC tablet Take 1 tablet (81 mg) by mouth daily      atorvastatin (LIPITOR) 10 MG tablet Take 1 tablet (10 mg) by mouth every evening 90 tablet 0    B Complex-C-Folic Acid (VIRT-CAPS) 1 MG CAPS Take 1 mg by mouth daily 90 capsule 3    cinacalcet (SENSIPAR) 30 MG tablet Take 1 tablet (30 mg) by mouth daily 90 tablet 0    COMPOUNDED NON-CONTROLLED SUBSTANCE (CMPD  RX) - PHARMACY TO MIX COMPOUNDED MEDICATION Alprostadil 50 mcg inject 0.2 mL intracavitary daily no more than three times weekly with twenty-four hours between injections 5 mL 4    furosemide (LASIX) 20 MG tablet Take 1 tablet (20 mg) by mouth 2 times daily 180 tablet 0    mycophenolate (GENERIC EQUIVALENT) 250 MG capsule Take 2 capsules (500 mg) by mouth 2 times daily 120 capsule 11    Sodium Bicarbonate, antacid, POWD Take 1/2 Teaspoon of baking soda MWF 1 g 0    sodium zirconium cyclosilicate (LOKELMA) 10 g PACK packet Take 1 packet (10 g) by mouth daily 30 packet 11    tacrolimus (GENERIC EQUIVALENT) 0.5 MG capsule HOLD FOR FUTURE DOSE CHANGES.  Profile Rx: patient will contact pharmacy when needed 180 capsule 3    tacrolimus (GENERIC EQUIVALENT) 1 MG capsule Take 1 capsule (1 mg) by mouth 2 times daily 180 capsule 3     No current facility-administered medications for this visit.     There are no discontinued medications.    Physical Exam   Vital Signs: There were no vitals taken for this visit.    GENERAL APPEARANCE: alert and no distress  HENT: mouth without ulcers or lesions  LYMPHATICS: no cervical or supraclavicular nodes  RESP: lungs clear to auscultation - no rales, rhonchi or wheezes  CV: regular rhythm, normal rate, no rub, no murmur  EDEMA: no LE edema bilaterally  ABDOMEN: soft, nondistended, nontender, bowel sounds normal  MS: extremities normal - no gross deformities noted, no evidence of inflammation in joints, no muscle tenderness  SKIN: no rash  TX KIDNEY: normal  DIALYSIS ACCESS: none    Data         Latest Ref Rng & Units 5/6/2024     7:01 AM 4/2/2024     7:02 AM 3/6/2024     7:01 AM   Renal   Sodium 135 - 145 mmol/L 139  139  141    K 3.4 - 5.3 mmol/L 4.4  4.5  5.2    Cl 98 - 107 mmol/L 107  106  105    Cl (external) 98 - 107 mmol/L 107  106  105    CO2 22 - 29 mmol/L 21  21  26    Urea Nitrogen 8.0 - 23.0 mg/dL 27.5  26.8  34.3    Creatinine 0.67 - 1.17 mg/dL 1.65  1.62  1.74    Glucose 70 -  99 mg/dL 114  116  115    Calcium 8.8 - 10.2 mg/dL 9.7  10.0  10.4          Latest Ref Rng & Units 3/6/2024     7:01 AM 11/6/2023     7:06 AM 5/17/2023     9:42 AM   Bone Health   Parathyroid Hormone Intact 15 - 65 pg/mL 135  160  259    Vit D Def 20 - 50 ng/mL 30   48          Latest Ref Rng & Units 5/6/2024     7:01 AM 4/2/2024     7:02 AM 3/6/2024     7:01 AM   Heme   WBC 4.0 - 11.0 10e3/uL 6.1  6.9  6.0    Hgb 13.3 - 17.7 g/dL 14.9  15.2  15.6    Plt 150 - 450 10e3/uL 176  175  182          Latest Ref Rng & Units 5/6/2024     7:01 AM 5/17/2023     9:42 AM 11/21/2022     7:00 AM   Liver   AP 40 - 150 U/L 74  91  105    TBili <=1.2 mg/dL 0.7  0.9  0.6    Bilirubin Direct 0.0 - 0.2 mg/dL   0.1    Bilirubin Direct 0.00 - 0.30 mg/dL  0.22     ALT 0 - 70 U/L 32  24  26    AST 0 - 45 U/L 27  24  16    Tot Protein 6.4 - 8.3 g/dL 6.7  7.3  7.2    Albumin 3.4 - 5.0 g/dL   4.0    Albumin 3.5 - 5.2 g/dL 4.1  4.4           Latest Ref Rng & Units 5/17/2023     9:42 AM 11/21/2022     7:00 AM 6/3/2022    10:48 AM   Pancreas   A1C <5.7 % 5.8  5.6  5.1          Latest Ref Rng & Units 11/7/2022     7:08 AM 10/10/2022     7:07 AM 9/16/2022     7:07 AM   Iron studies   Iron 35 - 180 ug/dL 81  102     Iron Saturation Index 15 - 46 % 26  31     Ferritin 26 - 388 ng/mL 494  542  602          Latest Ref Rng & Units 5/17/2022     1:35 PM 5/9/2022    10:00 AM 9/3/2020    11:31 AM   [facility] Txp Virology   CMV QUANT IU/ML Not Detected IU/mL Not Detected      EBV CAPSID ANTIBODY IGG No detectable antibody.  Positive  >8.0    Hep B Core NR^Nonreactive   Nonreactive      Failed to redirect to the Timeline version of the REVFS SmartLink.  Recent Labs   Lab Test 03/06/24  0701 04/02/24  0702 05/06/24  0702   DOSTAC 3/5/2024 4/1/2024 5/5/2024   TACROL 4.4* 4.5* 4.3*     Recent Labs   Lab Test 09/25/23  0714 10/02/23  0709 03/06/24  0701   DOSMPA 9/24/2023   8:00 PM 10/1/2023   8:00 PM 3/5/2024   8:00 PM   MPACID 1.33 2.19 2.06   MPAG 24.9* 24.8*  23.4*

## 2024-05-28 ENCOUNTER — TELEPHONE (OUTPATIENT)
Dept: PHARMACY | Facility: CLINIC | Age: 59
End: 2024-05-28
Payer: COMMERCIAL

## 2024-05-30 DIAGNOSIS — Z94.0 KIDNEY REPLACED BY TRANSPLANT: ICD-10-CM

## 2024-05-31 DIAGNOSIS — R79.89 ELEVATED PARATHYROID HORMONE: ICD-10-CM

## 2024-05-31 DIAGNOSIS — Z48.298 AFTERCARE FOLLOWING ORGAN TRANSPLANT: ICD-10-CM

## 2024-05-31 RX ORDER — ATORVASTATIN CALCIUM 10 MG/1
10 TABLET, FILM COATED ORAL EVERY MORNING
Qty: 90 TABLET | Refills: 0 | Status: SHIPPED | OUTPATIENT
Start: 2024-05-31 | End: 2024-09-11

## 2024-05-31 RX ORDER — CINACALCET 30 MG/1
30 TABLET, FILM COATED ORAL DAILY
Qty: 90 TABLET | Refills: 0 | Status: SHIPPED | OUTPATIENT
Start: 2024-05-31 | End: 2024-09-11

## 2024-05-31 RX ORDER — FUROSEMIDE 20 MG
20 TABLET ORAL 2 TIMES DAILY
Qty: 180 TABLET | Refills: 0 | Status: SHIPPED | OUTPATIENT
Start: 2024-05-31 | End: 2024-09-11

## 2024-06-05 ENCOUNTER — LAB (OUTPATIENT)
Dept: LAB | Facility: CLINIC | Age: 59
End: 2024-06-05
Payer: COMMERCIAL

## 2024-06-05 DIAGNOSIS — Z94.0 KIDNEY REPLACED BY TRANSPLANT: Chronic | ICD-10-CM

## 2024-06-05 DIAGNOSIS — Z94.0 KIDNEY TRANSPLANTED: ICD-10-CM

## 2024-06-05 DIAGNOSIS — Z94.0 KIDNEY REPLACED BY TRANSPLANT: ICD-10-CM

## 2024-06-05 LAB
ANION GAP SERPL CALCULATED.3IONS-SCNC: 11 MMOL/L (ref 7–15)
BK VIRUS DNA IU/ML, INSTRUMENT (6800): 22 IU/ML
BK VIRUS SPECIMEN TYPE: ABNORMAL
BKV DNA SPEC NAA+PROBE-LOG#: 1.3 {LOG_COPIES}/ML
BUN SERPL-MCNC: 24.6 MG/DL (ref 8–23)
CALCIUM SERPL-MCNC: 9.9 MG/DL (ref 8.8–10.2)
CHLORIDE SERPL-SCNC: 106 MMOL/L (ref 98–107)
CREAT SERPL-MCNC: 1.74 MG/DL (ref 0.67–1.17)
DEPRECATED HCO3 PLAS-SCNC: 23 MMOL/L (ref 22–29)
EGFRCR SERPLBLD CKD-EPI 2021: 44 ML/MIN/1.73M2
ERYTHROCYTE [DISTWIDTH] IN BLOOD BY AUTOMATED COUNT: 13 % (ref 10–15)
GLUCOSE SERPL-MCNC: 119 MG/DL (ref 70–99)
HCT VFR BLD AUTO: 46.6 % (ref 40–53)
HGB BLD-MCNC: 15.3 G/DL (ref 13.3–17.7)
MCH RBC QN AUTO: 30 PG (ref 26.5–33)
MCHC RBC AUTO-ENTMCNC: 32.8 G/DL (ref 31.5–36.5)
MCV RBC AUTO: 91 FL (ref 78–100)
PLATELET # BLD AUTO: 167 10E3/UL (ref 150–450)
POTASSIUM SERPL-SCNC: 4.7 MMOL/L (ref 3.4–5.3)
RBC # BLD AUTO: 5.1 10E6/UL (ref 4.4–5.9)
SODIUM SERPL-SCNC: 140 MMOL/L (ref 135–145)
TACROLIMUS BLD-MCNC: 5.1 UG/L (ref 5–15)
TME LAST DOSE: NORMAL H
TME LAST DOSE: NORMAL H
WBC # BLD AUTO: 6.1 10E3/UL (ref 4–11)

## 2024-06-05 PROCEDURE — 80048 BASIC METABOLIC PNL TOTAL CA: CPT

## 2024-06-05 PROCEDURE — 80197 ASSAY OF TACROLIMUS: CPT

## 2024-06-05 PROCEDURE — 36415 COLL VENOUS BLD VENIPUNCTURE: CPT

## 2024-06-05 PROCEDURE — 85027 COMPLETE CBC AUTOMATED: CPT

## 2024-06-05 PROCEDURE — 87799 DETECT AGENT NOS DNA QUANT: CPT

## 2024-06-05 RX ORDER — TACROLIMUS 1 MG/1
1 CAPSULE ORAL 2 TIMES DAILY
Qty: 180 CAPSULE | Refills: 3 | Status: SHIPPED | OUTPATIENT
Start: 2024-06-05

## 2024-07-03 ENCOUNTER — LAB (OUTPATIENT)
Dept: LAB | Facility: CLINIC | Age: 59
End: 2024-07-03
Payer: COMMERCIAL

## 2024-07-03 DIAGNOSIS — Z94.0 KIDNEY REPLACED BY TRANSPLANT: ICD-10-CM

## 2024-07-03 LAB
ANION GAP SERPL CALCULATED.3IONS-SCNC: 7 MMOL/L (ref 7–15)
BUN SERPL-MCNC: 23.7 MG/DL (ref 8–23)
CALCIUM SERPL-MCNC: 9.7 MG/DL (ref 8.8–10.2)
CHLORIDE SERPL-SCNC: 106 MMOL/L (ref 98–107)
CREAT SERPL-MCNC: 1.58 MG/DL (ref 0.67–1.17)
DEPRECATED HCO3 PLAS-SCNC: 26 MMOL/L (ref 22–29)
EGFRCR SERPLBLD CKD-EPI 2021: 49 ML/MIN/1.73M2
ERYTHROCYTE [DISTWIDTH] IN BLOOD BY AUTOMATED COUNT: 13.1 % (ref 10–15)
GLUCOSE SERPL-MCNC: 111 MG/DL (ref 70–99)
HCT VFR BLD AUTO: 45.6 % (ref 40–53)
HGB BLD-MCNC: 14.9 G/DL (ref 13.3–17.7)
MCH RBC QN AUTO: 29.4 PG (ref 26.5–33)
MCHC RBC AUTO-ENTMCNC: 32.7 G/DL (ref 31.5–36.5)
MCV RBC AUTO: 90 FL (ref 78–100)
PLATELET # BLD AUTO: 167 10E3/UL (ref 150–450)
POTASSIUM SERPL-SCNC: 4.7 MMOL/L (ref 3.4–5.3)
RBC # BLD AUTO: 5.06 10E6/UL (ref 4.4–5.9)
SODIUM SERPL-SCNC: 139 MMOL/L (ref 135–145)
TACROLIMUS BLD-MCNC: 5.7 UG/L (ref 5–15)
TME LAST DOSE: NORMAL H
TME LAST DOSE: NORMAL H
WBC # BLD AUTO: 6.8 10E3/UL (ref 4–11)

## 2024-07-03 PROCEDURE — 87799 DETECT AGENT NOS DNA QUANT: CPT

## 2024-07-03 PROCEDURE — 36415 COLL VENOUS BLD VENIPUNCTURE: CPT

## 2024-07-03 PROCEDURE — 80197 ASSAY OF TACROLIMUS: CPT

## 2024-07-03 PROCEDURE — 80048 BASIC METABOLIC PNL TOTAL CA: CPT

## 2024-07-03 PROCEDURE — 85027 COMPLETE CBC AUTOMATED: CPT

## 2024-07-04 LAB
BK VIRUS SPECIMEN TYPE: ABNORMAL
BKV DNA # SPEC NAA+PROBE: <22 IU/ML
BKV DNA SPEC NAA+PROBE-LOG#: <1.3 {LOG_COPIES}/ML

## 2024-07-24 ENCOUNTER — DOCUMENTATION ONLY (OUTPATIENT)
Dept: LAB | Facility: CLINIC | Age: 59
End: 2024-07-24
Payer: COMMERCIAL

## 2024-07-24 NOTE — PROGRESS NOTES
Please place new standing orders for this patient. He has upcoming monthly lab appts scheduled for [facility].    Thank you, Keila Torre MLT

## 2024-07-25 ENCOUNTER — TELEPHONE (OUTPATIENT)
Dept: TRANSPLANT | Facility: CLINIC | Age: 59
End: 2024-07-25
Payer: COMMERCIAL

## 2024-07-25 DIAGNOSIS — Z20.828 CONTACT WITH AND (SUSPECTED) EXPOSURE TO OTHER VIRAL COMMUNICABLE DISEASES: Primary | ICD-10-CM

## 2024-07-25 DIAGNOSIS — Z94.0 KIDNEY REPLACED BY TRANSPLANT: ICD-10-CM

## 2024-07-25 DIAGNOSIS — Z48.298 AFTERCARE FOLLOWING ORGAN TRANSPLANT: ICD-10-CM

## 2024-07-25 DIAGNOSIS — Z79.899 ENCOUNTER FOR LONG-TERM CURRENT USE OF MEDICATION: ICD-10-CM

## 2024-07-25 DIAGNOSIS — Z98.890 OTHER SPECIFIED POSTPROCEDURAL STATES: ICD-10-CM

## 2024-07-25 NOTE — TELEPHONE ENCOUNTER
Labs orders updated now that patient is 2 years since transplant. Lab orders will  in 1 year.  Rachell Ray LPN

## 2024-07-25 NOTE — LETTER
OUTPATIENT LABORATORY TEST ORDER     Patient Name: Donald Blanco   YOB: 1965     [facility] MR# [if applicable]: [id number]   Date & Time: July 25, 2024  1:21 PM  Expiration Date: 1 year after date issued      Diagnoses: Kidney Transplant (ICD-10 Z94.0)   Long term use of medications (ICD-10 Z79.899)    Other specified postprocedural states (Z98.890)     We ask your assistance in obtaining the following laboratory tests, which are part of our routine surveillance program for Solid Organ Transplant patients.     Please fax each result to [phone], same day as resulted/available    Critical lab results page [phone]      Years 2-5 post-transplant:   Every other month   CBC with platelets  Basic Metabolic Panel (Sodium, Potassium, Chloride, CO2, Creatinine, Urea Nitrogen, glucose, Calcium)  Tacrolimus drug level - 12-hour trough, please document time of last dose        Every 6 Months   Urine for protein/creatinine      At 2 1/2  years post-transplant (DATE range)  PRA/DSA level (mailers provided by the patient)    At 3 years post-transplant (DATE range)  PRA/DSA level (mailers provided by the patient)  T cell subset (CD4) if on PJP Prophylaxis other than Bactrim    At 3 1/2  years post-transplant (DATE range)  PRA/DSA level (mailers provided by the patient)    At 4 years post-transplant (DATE range)  PRA/DSA level (mailers provided by the patient)  T cell subset (CD4) if on PJP Prophylaxis other than Bactrim    At 5 years post-transplant (DATE range)  PRA/DSA level (mailers provided by the patient)  T cell subset (CD4) if on PJP Prophylaxis other than Bactrim    Years 5+ post-transplant:  Every 3 months  CBC with platelets  Basic Metabolic Panel (Sodium, Potassium, Chloride, Creatinine, CO2, Urea Nitrogen, glucose, Calcium)  Tacrolimus drug level - 12-hour trough, please document time of last dose        Yearly  Urine for protein/creatinine       If you have any questions, please  call [facility] [phone] or [phone], Fax [phone].      Donte Duff MD

## 2024-08-21 ENCOUNTER — TELEPHONE (OUTPATIENT)
Dept: TRANSPLANT | Facility: CLINIC | Age: 59
End: 2024-08-21
Payer: COMMERCIAL

## 2024-08-21 NOTE — TELEPHONE ENCOUNTER
Patient confirmed scheduled appointment:  Date: 01/10/2025  Time: 9:30AM  Visit type: RKT  Provider: RAMONA URIBE  Location: [facility]  Testing/imaging: N/A  Additional notes: N/A

## 2024-09-04 ENCOUNTER — LAB (OUTPATIENT)
Dept: LAB | Facility: CLINIC | Age: 59
End: 2024-09-04
Payer: COMMERCIAL

## 2024-09-04 DIAGNOSIS — Z94.0 KIDNEY REPLACED BY TRANSPLANT: ICD-10-CM

## 2024-09-04 LAB
ALBUMIN MFR UR ELPH: 20.6 MG/DL
ANION GAP SERPL CALCULATED.3IONS-SCNC: 10 MMOL/L (ref 7–15)
BUN SERPL-MCNC: 22.5 MG/DL (ref 8–23)
CALCIUM SERPL-MCNC: 9.8 MG/DL (ref 8.8–10.4)
CHLORIDE SERPL-SCNC: 107 MMOL/L (ref 98–107)
CREAT SERPL-MCNC: 1.5 MG/DL (ref 0.67–1.17)
CREAT UR-MCNC: 155 MG/DL
EGFRCR SERPLBLD CKD-EPI 2021: 53 ML/MIN/1.73M2
ERYTHROCYTE [DISTWIDTH] IN BLOOD BY AUTOMATED COUNT: 13.2 % (ref 10–15)
GLUCOSE SERPL-MCNC: 118 MG/DL (ref 70–99)
HCO3 SERPL-SCNC: 24 MMOL/L (ref 22–29)
HCT VFR BLD AUTO: 44.3 % (ref 40–53)
HGB BLD-MCNC: 14.3 G/DL (ref 13.3–17.7)
MCH RBC QN AUTO: 29.6 PG (ref 26.5–33)
MCHC RBC AUTO-ENTMCNC: 32.3 G/DL (ref 31.5–36.5)
MCV RBC AUTO: 92 FL (ref 78–100)
PLATELET # BLD AUTO: 167 10E3/UL (ref 150–450)
POTASSIUM SERPL-SCNC: 4.3 MMOL/L (ref 3.4–5.3)
PROT/CREAT 24H UR: 0.13 MG/MG CR (ref 0–0.2)
RBC # BLD AUTO: 4.83 10E6/UL (ref 4.4–5.9)
SODIUM SERPL-SCNC: 141 MMOL/L (ref 135–145)
TACROLIMUS BLD-MCNC: 4.7 UG/L (ref 5–15)
TME LAST DOSE: ABNORMAL H
TME LAST DOSE: ABNORMAL H
WBC # BLD AUTO: 6 10E3/UL (ref 4–11)

## 2024-09-04 PROCEDURE — 86832 HLA CLASS I HIGH DEFIN QUAL: CPT

## 2024-09-04 PROCEDURE — 84156 ASSAY OF PROTEIN URINE: CPT

## 2024-09-04 PROCEDURE — 36415 COLL VENOUS BLD VENIPUNCTURE: CPT

## 2024-09-04 PROCEDURE — 80197 ASSAY OF TACROLIMUS: CPT

## 2024-09-04 PROCEDURE — 85027 COMPLETE CBC AUTOMATED: CPT

## 2024-09-04 PROCEDURE — 86833 HLA CLASS II HIGH DEFIN QUAL: CPT

## 2024-09-04 PROCEDURE — 80048 BASIC METABOLIC PNL TOTAL CA: CPT

## 2024-09-10 DIAGNOSIS — R79.89 ELEVATED PARATHYROID HORMONE: ICD-10-CM

## 2024-09-10 DIAGNOSIS — Z48.298 AFTERCARE FOLLOWING ORGAN TRANSPLANT: ICD-10-CM

## 2024-09-10 DIAGNOSIS — Z94.0 KIDNEY REPLACED BY TRANSPLANT: ICD-10-CM

## 2024-09-11 RX ORDER — CINACALCET 30 MG/1
30 TABLET, FILM COATED ORAL DAILY
Qty: 30 TABLET | Refills: 0 | Status: SHIPPED | OUTPATIENT
Start: 2024-09-11

## 2024-09-11 RX ORDER — ATORVASTATIN CALCIUM 10 MG/1
10 TABLET, FILM COATED ORAL EVERY MORNING
Qty: 30 TABLET | Refills: 0 | Status: SHIPPED | OUTPATIENT
Start: 2024-09-11

## 2024-09-11 RX ORDER — FUROSEMIDE 20 MG
20 TABLET ORAL 2 TIMES DAILY
Qty: 60 TABLET | Refills: 0 | Status: SHIPPED | OUTPATIENT
Start: 2024-09-11

## 2024-09-12 DIAGNOSIS — Z94.0 KIDNEY REPLACED BY TRANSPLANT: Chronic | ICD-10-CM

## 2024-09-13 DIAGNOSIS — Z48.298 AFTERCARE FOLLOWING ORGAN TRANSPLANT: Primary | ICD-10-CM

## 2024-09-13 LAB
DONOR IDENTIFICATION: NORMAL
DSA COMMENTS: NORMAL
DSA PRESENT: NO
DSA TEST METHOD: NORMAL
ORGAN: NORMAL
SA 1  COMMENTS: NORMAL
SA 1 CELL: NORMAL
SA 1 TEST METHOD: NORMAL
SA 2 CELL: NORMAL
SA 2 COMMENTS: NORMAL
SA 2 TEST METHOD: NORMAL
SA1 HI RISK ABY: NORMAL
SA1 MOD RISK ABY: NORMAL
SA2 HI RISK ABY: NORMAL
SA2 MOD RISK ABY: NORMAL
UNACCEPTABLE ANTIGENS: NORMAL
UNOS CPRA: 24

## 2024-09-13 RX ORDER — NEPHROCAP 1 MG
1 CAP ORAL DAILY
Qty: 90 CAPSULE | Refills: 3 | Status: SHIPPED | OUTPATIENT
Start: 2024-09-13

## 2024-09-27 PROBLEM — N05.8 BK VIRUS NEPHROPATHY: Status: ACTIVE | Noted: 2023-09-07

## 2024-09-27 PROBLEM — B33.8 BK VIRUS NEPHROPATHY: Status: ACTIVE | Noted: 2023-09-07

## 2024-09-27 PROBLEM — C77.9 METASTASIS TO LYMPH NODES (H): Status: ACTIVE | Noted: 2024-01-23

## 2024-10-10 DIAGNOSIS — Z94.0 KIDNEY REPLACED BY TRANSPLANT: ICD-10-CM

## 2024-10-10 DIAGNOSIS — Z48.298 AFTERCARE FOLLOWING ORGAN TRANSPLANT: ICD-10-CM

## 2024-10-11 RX ORDER — ATORVASTATIN CALCIUM 10 MG/1
10 TABLET, FILM COATED ORAL EVERY MORNING
Qty: 30 TABLET | Refills: 0 | Status: SHIPPED | OUTPATIENT
Start: 2024-10-11

## 2024-10-11 RX ORDER — FUROSEMIDE 20 MG/1
20 TABLET ORAL 2 TIMES DAILY
Qty: 60 TABLET | Refills: 0 | Status: SHIPPED | OUTPATIENT
Start: 2024-10-11

## 2024-11-04 ENCOUNTER — LAB (OUTPATIENT)
Dept: LAB | Facility: CLINIC | Age: 59
End: 2024-11-04
Payer: COMMERCIAL

## 2024-11-04 DIAGNOSIS — Z48.298 AFTERCARE FOLLOWING ORGAN TRANSPLANT: ICD-10-CM

## 2024-11-04 DIAGNOSIS — C61 PROSTATE CANCER (H): ICD-10-CM

## 2024-11-04 DIAGNOSIS — Z94.0 KIDNEY REPLACED BY TRANSPLANT: ICD-10-CM

## 2024-11-04 LAB
ALBUMIN SERPL BCG-MCNC: 4.3 G/DL (ref 3.5–5.2)
ALP SERPL-CCNC: 79 U/L (ref 40–150)
ALT SERPL W P-5'-P-CCNC: 38 U/L (ref 0–70)
ANION GAP SERPL CALCULATED.3IONS-SCNC: 10 MMOL/L (ref 7–15)
AST SERPL W P-5'-P-CCNC: 27 U/L (ref 0–45)
BASOPHILS # BLD AUTO: 0 10E3/UL (ref 0–0.2)
BASOPHILS NFR BLD AUTO: 0 %
BILIRUB SERPL-MCNC: 0.8 MG/DL
BUN SERPL-MCNC: 34.5 MG/DL (ref 8–23)
CALCIUM SERPL-MCNC: 10.4 MG/DL (ref 8.8–10.4)
CHLORIDE SERPL-SCNC: 106 MMOL/L (ref 98–107)
CREAT SERPL-MCNC: 2.06 MG/DL (ref 0.67–1.17)
EGFRCR SERPLBLD CKD-EPI 2021: 36 ML/MIN/1.73M2
EOSINOPHIL # BLD AUTO: 0.1 10E3/UL (ref 0–0.7)
EOSINOPHIL NFR BLD AUTO: 2 %
ERYTHROCYTE [DISTWIDTH] IN BLOOD BY AUTOMATED COUNT: 13.3 % (ref 10–15)
GLUCOSE SERPL-MCNC: 102 MG/DL (ref 70–99)
HCO3 SERPL-SCNC: 25 MMOL/L (ref 22–29)
HCT VFR BLD AUTO: 49 % (ref 40–53)
HGB BLD-MCNC: 15.6 G/DL (ref 13.3–17.7)
IMM GRANULOCYTES # BLD: 0 10E3/UL
IMM GRANULOCYTES NFR BLD: 1 %
LYMPHOCYTES # BLD AUTO: 1 10E3/UL (ref 0.8–5.3)
LYMPHOCYTES NFR BLD AUTO: 14 %
MCH RBC QN AUTO: 29.6 PG (ref 26.5–33)
MCHC RBC AUTO-ENTMCNC: 31.8 G/DL (ref 31.5–36.5)
MCV RBC AUTO: 93 FL (ref 78–100)
MONOCYTES # BLD AUTO: 0.5 10E3/UL (ref 0–1.3)
MONOCYTES NFR BLD AUTO: 8 %
NEUTROPHILS # BLD AUTO: 5.1 10E3/UL (ref 1.6–8.3)
NEUTROPHILS NFR BLD AUTO: 76 %
PLATELET # BLD AUTO: 183 10E3/UL (ref 150–450)
POTASSIUM SERPL-SCNC: 4.7 MMOL/L (ref 3.4–5.3)
PROT SERPL-MCNC: 7.2 G/DL (ref 6.4–8.3)
PSA SERPL DL<=0.01 NG/ML-MCNC: 1.01 NG/ML (ref 0–4.5)
RBC # BLD AUTO: 5.27 10E6/UL (ref 4.4–5.9)
SODIUM SERPL-SCNC: 141 MMOL/L (ref 135–145)
TACROLIMUS BLD-MCNC: 6 UG/L (ref 5–15)
TME LAST DOSE: NORMAL H
TME LAST DOSE: NORMAL H
WBC # BLD AUTO: 6.8 10E3/UL (ref 4–11)

## 2024-11-04 PROCEDURE — 80197 ASSAY OF TACROLIMUS: CPT

## 2024-11-04 PROCEDURE — 87799 DETECT AGENT NOS DNA QUANT: CPT

## 2024-11-04 PROCEDURE — 36415 COLL VENOUS BLD VENIPUNCTURE: CPT

## 2024-11-04 PROCEDURE — 84153 ASSAY OF PSA TOTAL: CPT

## 2024-11-04 PROCEDURE — 84403 ASSAY OF TOTAL TESTOSTERONE: CPT

## 2024-11-04 PROCEDURE — 85025 COMPLETE CBC W/AUTO DIFF WBC: CPT

## 2024-11-04 PROCEDURE — 80053 COMPREHEN METABOLIC PANEL: CPT

## 2024-11-05 ENCOUNTER — TELEPHONE (OUTPATIENT)
Dept: TRANSPLANT | Facility: CLINIC | Age: 59
End: 2024-11-05
Payer: COMMERCIAL

## 2024-11-05 DIAGNOSIS — Z94.0 HTN, KIDNEY TRANSPLANT RELATED: ICD-10-CM

## 2024-11-05 DIAGNOSIS — I15.1 HTN, KIDNEY TRANSPLANT RELATED: ICD-10-CM

## 2024-11-05 DIAGNOSIS — Z48.298 AFTERCARE FOLLOWING ORGAN TRANSPLANT: Primary | ICD-10-CM

## 2024-11-05 LAB
BK VIRUS SPECIMEN TYPE: NORMAL
BKV DNA # SPEC NAA+PROBE: NOT DETECTED IU/ML

## 2024-11-05 ASSESSMENT — ENCOUNTER SYMPTOMS: NEW SYMPTOMS OF CORONARY ARTERY DISEASE: 0

## 2024-11-05 NOTE — TELEPHONE ENCOUNTER
"Patient has elevated creatinine. Message sent to patient regarding this and he states he was in [place] last week and thinks he \"drank too much\", or much more than he normally does. RNCC encouraged patient to push fluids and please repeat labs in 2-4 weeks. Orders placed for this.   "

## 2024-11-06 LAB — TESTOST SERPL-MCNC: 300 NG/DL (ref 240–950)

## 2024-11-13 DIAGNOSIS — Z94.0 HTN, KIDNEY TRANSPLANT RELATED: ICD-10-CM

## 2024-11-13 DIAGNOSIS — I15.1 HTN, KIDNEY TRANSPLANT RELATED: ICD-10-CM

## 2024-11-13 DIAGNOSIS — Z48.298 AFTERCARE FOLLOWING ORGAN TRANSPLANT: ICD-10-CM

## 2024-11-14 ENCOUNTER — MYC MEDICAL ADVICE (OUTPATIENT)
Dept: FAMILY MEDICINE | Facility: CLINIC | Age: 59
End: 2024-11-14
Payer: COMMERCIAL

## 2024-11-14 DIAGNOSIS — Z94.0 KIDNEY REPLACED BY TRANSPLANT: ICD-10-CM

## 2024-11-14 RX ORDER — FUROSEMIDE 20 MG/1
20 TABLET ORAL 2 TIMES DAILY
Qty: 60 TABLET | Refills: 0 | Status: SHIPPED | OUTPATIENT
Start: 2024-11-14 | End: 2024-11-15

## 2024-11-14 RX ORDER — ATORVASTATIN CALCIUM 10 MG/1
TABLET, FILM COATED ORAL
Qty: 30 TABLET | Refills: 0 | OUTPATIENT
Start: 2024-11-14

## 2024-11-15 DIAGNOSIS — Z94.0 HTN, KIDNEY TRANSPLANT RELATED: ICD-10-CM

## 2024-11-15 DIAGNOSIS — I15.1 HTN, KIDNEY TRANSPLANT RELATED: ICD-10-CM

## 2024-11-15 DIAGNOSIS — Z94.0 KIDNEY REPLACED BY TRANSPLANT: ICD-10-CM

## 2024-11-15 DIAGNOSIS — Z48.298 AFTERCARE FOLLOWING ORGAN TRANSPLANT: ICD-10-CM

## 2024-11-15 RX ORDER — ATORVASTATIN CALCIUM 10 MG/1
10 TABLET, FILM COATED ORAL EVERY MORNING
Qty: 30 TABLET | Refills: 0 | Status: SHIPPED | OUTPATIENT
Start: 2024-11-15

## 2024-11-15 RX ORDER — FUROSEMIDE 20 MG/1
20 TABLET ORAL 2 TIMES DAILY
Qty: 60 TABLET | Refills: 0 | Status: SHIPPED | OUTPATIENT
Start: 2024-11-15

## 2024-11-15 NOTE — TELEPHONE ENCOUNTER
Medication Refill  Route to [facility]    Pharmacy Name: [facility]  Pharmacy Location: [address] [place] [address]  Name of Medication: Fursemide   Quantity / Dose: 20 MG Tabs Qty 1

## 2024-11-25 ENCOUNTER — LAB (OUTPATIENT)
Dept: LAB | Facility: CLINIC | Age: 59
End: 2024-11-25
Payer: COMMERCIAL

## 2024-11-25 ENCOUNTER — ONCOLOGY VISIT (OUTPATIENT)
Dept: ONCOLOGY | Facility: CLINIC | Age: 59
End: 2024-11-25
Attending: INTERNAL MEDICINE
Payer: COMMERCIAL

## 2024-11-25 VITALS
OXYGEN SATURATION: 96 % | SYSTOLIC BLOOD PRESSURE: 160 MMHG | HEART RATE: 78 BPM | WEIGHT: 262.5 LBS | DIASTOLIC BLOOD PRESSURE: 84 MMHG | TEMPERATURE: 98.1 F | BODY MASS INDEX: 38.76 KG/M2 | RESPIRATION RATE: 16 BRPM

## 2024-11-25 DIAGNOSIS — Z48.298 AFTERCARE FOLLOWING ORGAN TRANSPLANT: ICD-10-CM

## 2024-11-25 DIAGNOSIS — C61 PROSTATE CANCER (H): Primary | ICD-10-CM

## 2024-11-25 LAB
ANION GAP SERPL CALCULATED.3IONS-SCNC: 13 MMOL/L (ref 7–15)
BUN SERPL-MCNC: 26.8 MG/DL (ref 8–23)
CALCIUM SERPL-MCNC: 10.5 MG/DL (ref 8.8–10.4)
CHLORIDE SERPL-SCNC: 108 MMOL/L (ref 98–107)
CREAT SERPL-MCNC: 1.52 MG/DL (ref 0.67–1.17)
EGFRCR SERPLBLD CKD-EPI 2021: 52 ML/MIN/1.73M2
ERYTHROCYTE [DISTWIDTH] IN BLOOD BY AUTOMATED COUNT: 13.1 % (ref 10–15)
GLUCOSE SERPL-MCNC: 116 MG/DL (ref 70–99)
HCO3 SERPL-SCNC: 22 MMOL/L (ref 22–29)
HCT VFR BLD AUTO: 46.7 % (ref 40–53)
HGB BLD-MCNC: 15.1 G/DL (ref 13.3–17.7)
MCH RBC QN AUTO: 29.3 PG (ref 26.5–33)
MCHC RBC AUTO-ENTMCNC: 32.3 G/DL (ref 31.5–36.5)
MCV RBC AUTO: 91 FL (ref 78–100)
PLATELET # BLD AUTO: 178 10E3/UL (ref 150–450)
POTASSIUM SERPL-SCNC: 4.7 MMOL/L (ref 3.4–5.3)
RBC # BLD AUTO: 5.16 10E6/UL (ref 4.4–5.9)
SODIUM SERPL-SCNC: 143 MMOL/L (ref 135–145)
TACROLIMUS BLD-MCNC: 5 UG/L (ref 5–15)
TME LAST DOSE: NORMAL H
TME LAST DOSE: NORMAL H
WBC # BLD AUTO: 6.6 10E3/UL (ref 4–11)

## 2024-11-25 PROCEDURE — 36415 COLL VENOUS BLD VENIPUNCTURE: CPT

## 2024-11-25 PROCEDURE — 80197 ASSAY OF TACROLIMUS: CPT

## 2024-11-25 PROCEDURE — 80048 BASIC METABOLIC PNL TOTAL CA: CPT

## 2024-11-25 PROCEDURE — 85027 COMPLETE CBC AUTOMATED: CPT

## 2024-11-25 PROCEDURE — 99214 OFFICE O/P EST MOD 30 MIN: CPT | Performed by: INTERNAL MEDICINE

## 2024-11-25 PROCEDURE — 99213 OFFICE O/P EST LOW 20 MIN: CPT | Performed by: INTERNAL MEDICINE

## 2024-11-25 PROCEDURE — G2211 COMPLEX E/M VISIT ADD ON: HCPCS | Performed by: INTERNAL MEDICINE

## 2024-11-25 ASSESSMENT — PAIN SCALES - GENERAL: PAINLEVEL_OUTOF10: NO PAIN (0)

## 2024-11-25 NOTE — LETTER
11/25/2024      Donald Blanco  [address] [facility]  [place] [address]      Dear Colleague,    Thank you for referring your patient, Donald Blanco, to the [facility]. Please see a copy of my visit note below.      [facility] Medical Oncology Note  Date of visit: November 25, 2024  New Outpatient Clinic Note        Assessment:     Castrate sensitive oligometastatic disease after radical prostatectomy and adjuvant radiotherapy.  There is no evidence of widespread distant metastatic disease and so in this setting often we still can go for cure if the foci of oligo metastases can be radiated. However, after discussion with radiation oncology, the nodes are in an area that was previously radiated. Therefore, RT is not possible.  In other words, Jayla now has a disease for which we do not have a cure.  Therefore the goals of all treatment moving forward are twofold: We want Jayla to live as long as possible, but also have the highest quality of life.  Return to his disease into a chronic managed one, not unlike diabetes.  As with other renal transplant patients it has been my privilege to care for, he wants to do everything possible to save his renal graft.  He does not want to go on dialysis and who can blame him?  There are multiple options for therapy here including androgen deprivation therapy, single agent bicalutamide or even abiraterone (which interact less with his immunosuppressant drugs than enzalutamide does).  But the truth is these therapies will work whenever we go to them, and I am not convinced going to treatment now will help him live longer or feel better than if we wait for his PSA to rise a bit more.  While his PSAs are rising, they are only doing so slowly.  His PSA went from 0.39 to 1.0 over a 12-month span.. I still suggest a conservative approach here. If his PSA rises to the 5 range, we could consider short course of androgen deprivation.  Or, we could try  enzalutamide or bicalutamide if his PSA hits the 2 range.  Other comorbidities including morbid obesity.  That said, his life expectancy is 10 years or more.    I had a long and involved conversation with Jayla and his wife Marcelo today in clinic.  Many questions were asked and hopefully answered to their satisfaction.  I wrote out a summary of our conversation for them to take home for further review.    Plan:     No intervention at the current point.  Return to clinic in 3 months with her usual labs drawn just prior.  Continue to follow-up with his renal transplant team     The longitudinal plan of care for the diagnosis(es)/condition(s) as documented were addressed during this visit. Due to the added complexity in care, I will continue to support Jayla in the subsequent management and with ongoing continuity of care.     Iván Cortez MD, MSc  Associate Professor of Medicine  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  Jayla had had a radical prostatectomy followed by adjuvant radiotherapy 3 years prior, but his PSA began to rise.  When it hit 0.46, PSMA scan was ordered.  This showed bilateral internal iliac nodes consistent with recurrence.  IgA nephropathy with Stage V CKD, s/p renal transplant. He is on tacrolimus and mycophenolate.    History of Present Illness/therapy to date:     Radical prostatectomy 12/14/2020.  Final pathology showed Sheldon grade group 3 (4+3 equal 7) acinar adenocarcinoma of the prostate with nonfocal present extraprostatic extension at the left posterior.  Seminal vesicle invasion was noted on the left side.  There was a positive margin, with less than 3 mm noted in the left posterior.  It was positive in the area of extraprostatic extension.   Pattern 4 was noted at the margin.  8 regional lymph nodes were resected and none were involved. PSA prior to surgery was 13.5.  Adjuvant radiotherapy completed 6/25/2021.  PSA dropped to unmeasurable post-op and remained there until 11/15/21 when it was 0.04, then 0.14 5/1/2023. It hit 0.46, 1/4/2024.  PSMA scan was ordered 1/23/2024. This showed PSMA avid bilateral internal iliac nodes c/w recurrence. This was more than what could be radiated.  Jayla has never been on any systemic therapy for his prostate cancer.  Most recent PSA from 11/2024 is 1.02      Interval history:     Little worried about the rise in his PSA.  Has chronic urinary incontinence and needs to wear a pad  Has lost a little weight.  Knows that his creatinine was elevated, but he did have some fun in [place].  He is hoping that a repeat will be improved.  Denies pain.  Denies stool issues.  Thinking about retiring.  Both he and Sana have long had plans to do so.      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     CKD (chronic kidney disease) stage 4, GFR 15-29 ml/min (H)      Elevated PSA      Essential hypertension 05/21/2020     Hyperlipidemia      IgA nephropathy      Metastasis to lymph nodes (H) 01/23/2024     Obesity      Prostate cancer (H) 10/15/2020     S/P radiation therapy     6,600 cGy to pelvis completed on 6/25/2021 [facility]          Past Surgical History:    I have reviewed this patient's past surgical history       Social History:   Tobacco, ETOH, and rec drugs reviewed and as noted below with the following exceptions:  Jayla grew up in [place]  "and graduated from high school in 1981.  He was a high school wrestler.  He then went to [employer] on a [employer] scholarship, and studied accounting.  This lasted for about a year and a half.  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  He is  to Sana, someone who he went to high school with.  However they were dating other people.  2 years after high school he was wearing his brother's [employer] jacket and had grown a beard, and feeling pretty cool.  He then went to a bar where he saw Sana's best friend Mary, who told him \"Sana just broke up and you should call her!.\"  He gave her a call and the rest of his history.  They have 2 children, Sonu, also wrestling fan, and Mayra, who is an RN and recently in the neurosurgery division at the [facility] [employer] in [place].          Family History:     Family History   Problem Relation Age of Onset     Kidney Disease Father      Hypertension Father      Cancer Mother      No Known Problems Brother      No Known Problems Sister      No Known Problems Son      No Known Problems Daughter      No Known Problems Brother      No Known Problems Brother      No Known Problems Brother      No Known Problems Sister      No Known Problems Sister      No Known Problems Sister             Medications:     Current Outpatient Medications   Medication Sig Dispense Refill     ACE/ARB/ARNI NOT PRESCRIBED (INTENTIONAL) Please choose reason not prescribed from choices below.       alprostadil (EDEX) 10 MCG kit 10 mcg by Intracavitary route as needed for erectile dysfunction use no more than 3 times per week 1 kit 3     aspirin 81 MG EC tablet Take 1 tablet (81 mg) by mouth daily       atorvastatin (LIPITOR) 10 MG tablet Take 1 tablet (10 mg) by mouth every morning. ++Due for visit for refills++ 30 tablet 0     B Complex-C-Folic Acid (VIRT-CAPS) 1 MG CAPS Take 1 mg by mouth daily. 90 capsule 3     " cinacalcet (SENSIPAR) 30 MG tablet TAKE ONE TABLET BY MOUTH ONCE DAILY. 30 tablet 0     COMPOUNDED NON-CONTROLLED SUBSTANCE (CMPD RX) - PHARMACY TO MIX COMPOUNDED MEDICATION Alprostadil 50 mcg inject 0.2 mL intracavitary daily no more than three times weekly with twenty-four hours between injections 5 mL 4     furosemide (LASIX) 20 MG tablet Take 1 tablet (20 mg) by mouth 2 times daily. 60 tablet 0     mycophenolate (GENERIC EQUIVALENT) 250 MG capsule Take 2 capsules (500 mg) by mouth 2 times daily 120 capsule 11     Sodium Bicarbonate, antacid, POWD Take 1/2 Teaspoon of baking soda MWF 1 g 0     sodium zirconium cyclosilicate (LOKELMA) 10 g PACK packet Take 1 packet (10 g) by mouth Every Mon, Wed, Fri Morning for 280 days 30 packet 3     tacrolimus (GENERIC EQUIVALENT) 0.5 MG capsule HOLD FOR FUTURE DOSE CHANGES.  Profile Rx: patient will contact pharmacy when needed 180 capsule 3     tacrolimus (GENERIC EQUIVALENT) 1 MG capsule Take 1 capsule (1 mg) by mouth 2 times daily 180 capsule 3              Physical Exam:   There were no vitals taken for this visit.    ECOG PS: 1  Constitutional: WDWN male in NAD, pleasant and appropriate  HEENT:  NC/AT, no icterus, OP clear, MMM  Skin: No jaundice nor ecchymoses  Lungs: CTAB, no w/r/r, nonlabored breathing  Cardiovascular: RRR, S1, S2, no m/r/g  Abdomen: +BS, soft, nontender, nondistended, no organomegaly nor masses though body habitus does obscure the exam somewhat  MSK/Extremities: Warm, well perfused. No edema  LN: no cervical, supraclavicular, axillary, nor inguinal lymphadenopathy  Neurologic: alert, answering questions appropriately, moving all extremities spontaneously. CN 2-12 grossly intact.  Psych: appropriate affect  Data:   PSA trend, including current 1.01      Other labs  Creatinine  2.06  Rest of CMP normal  CBC normal          No results found for this or any previous visit (from the past 24 hours).    Other Data     CT/PET 1/23/2024        IMPRESSION: In  this patient with history of prostate cancer with  prostatectomy and radiation now with rising prostate-specific antigen:  1. Estimated avid right and left internal iliac lymph nodes consistent  with disease recurrence. miPSMA score 1  2. No evidence of disease in chest or abdomen.  3. Right lower quadrant renal transplant without hydronephrosis or  nephrolithiasis.         Labs, imaging and treatment plan reviewed with patient. All questions answered.        30 minutes spent on the date of the encounter doing chart review, review of outside records, review of test results, interpretation of tests, patient visit, documentation, discussion with other provider(s), and discussion with family             Again, thank you for allowing me to participate in the care of your patient.        Sincerely,        Iván Cortez MD

## 2024-11-25 NOTE — NURSING NOTE
"Oncology Rooming Note    November 25, 2024 11:49 AM   Donald Blanco is a 59 year old male who presents for:    Chief Complaint   Patient presents with    Oncology Clinic Visit     Metastasis to lymph nodes     Initial Vitals: BP (!) 160/84 (BP Location: Right arm, Patient Position: Sitting, Cuff Size: Adult Regular)   Pulse 78   Temp 98.1  F (36.7  C) (Oral)   Resp 16   Wt 119.1 kg (262 lb 8 oz)   SpO2 96%   BMI 38.76 kg/m   Estimated body mass index is 38.76 kg/m  as calculated from the following:    Height as of 2/22/24: 1.753 m (5' 9\").    Weight as of this encounter: 119.1 kg (262 lb 8 oz). Body surface area is 2.41 meters squared.  No Pain (0) Comment: Data Unavailable   No LMP for male patient.  Allergies reviewed: Yes  Medications reviewed: Yes    Medications: Medication refills not needed today.  Pharmacy name entered into [employer]:    [facility] - [place], [place] - [facility] [address]  [facility] [place], [place] - [address], [address]  [facility] - [place], [place] - [facility] [address]    Frailty Screening:   Is the patient here for a new oncology consult visit in cancer care? 2. No      Clinical concerns: none.       Damir Cline"

## 2024-12-02 DIAGNOSIS — R79.89 ELEVATED PARATHYROID HORMONE: ICD-10-CM

## 2024-12-02 RX ORDER — CINACALCET 30 MG/1
30 TABLET, FILM COATED ORAL DAILY
Qty: 90 TABLET | Refills: 0 | Status: SHIPPED | OUTPATIENT
Start: 2024-12-02

## 2024-12-05 ENCOUNTER — TELEPHONE (OUTPATIENT)
Dept: TRANSPLANT | Facility: CLINIC | Age: 59
End: 2024-12-05
Payer: COMMERCIAL

## 2024-12-05 DIAGNOSIS — I15.1 HTN, KIDNEY TRANSPLANT RELATED: ICD-10-CM

## 2024-12-05 DIAGNOSIS — Z94.0 HTN, KIDNEY TRANSPLANT RELATED: ICD-10-CM

## 2024-12-05 NOTE — TELEPHONE ENCOUNTER
Patient Contacted for the patient to call back and schedule the following:    Appointment type: K/P post return   Provider: Dr. Erick Thomas   Return date: 1/16/25  Specialty phone number: [phone]  Additional appointment(s) needed: lab visit on 1/7  Additonal Notes: Writer contacted pt to r/s appt w/Dr. Erick Thomas on 1/10/25, pt r/s for 1/16/25, labs on 1/7.     AL (Noor) 12/5/24

## 2024-12-16 DIAGNOSIS — I15.1 HTN, KIDNEY TRANSPLANT RELATED: ICD-10-CM

## 2024-12-16 DIAGNOSIS — Z48.298 AFTERCARE FOLLOWING ORGAN TRANSPLANT: Primary | ICD-10-CM

## 2024-12-16 DIAGNOSIS — Z94.0 HTN, KIDNEY TRANSPLANT RELATED: Primary | ICD-10-CM

## 2024-12-16 DIAGNOSIS — I15.1 HTN, KIDNEY TRANSPLANT RELATED: Primary | ICD-10-CM

## 2024-12-16 DIAGNOSIS — Z48.298 AFTERCARE FOLLOWING ORGAN TRANSPLANT: ICD-10-CM

## 2024-12-16 DIAGNOSIS — Z94.0 KIDNEY REPLACED BY TRANSPLANT: ICD-10-CM

## 2024-12-16 DIAGNOSIS — Z94.0 HTN, KIDNEY TRANSPLANT RELATED: ICD-10-CM

## 2024-12-16 RX ORDER — FUROSEMIDE 20 MG/1
20 TABLET ORAL 2 TIMES DAILY
Qty: 60 TABLET | Refills: 0 | OUTPATIENT
Start: 2024-12-16

## 2024-12-16 RX ORDER — ATORVASTATIN CALCIUM 10 MG/1
10 TABLET, FILM COATED ORAL EVERY MORNING
Qty: 30 TABLET | Refills: 0 | Status: SHIPPED | OUTPATIENT
Start: 2024-12-16

## 2024-12-16 RX ORDER — FUROSEMIDE 20 MG/1
20 TABLET ORAL 2 TIMES DAILY
Qty: 60 TABLET | Refills: 0 | Status: SHIPPED | OUTPATIENT
Start: 2024-12-16

## 2025-01-07 ENCOUNTER — LAB (OUTPATIENT)
Dept: LAB | Facility: CLINIC | Age: 60
End: 2025-01-07
Payer: COMMERCIAL

## 2025-01-07 DIAGNOSIS — Z94.0 KIDNEY REPLACED BY TRANSPLANT: ICD-10-CM

## 2025-01-07 LAB
ANION GAP SERPL CALCULATED.3IONS-SCNC: 11 MMOL/L (ref 7–15)
BUN SERPL-MCNC: 25.7 MG/DL (ref 8–23)
CALCIUM SERPL-MCNC: 10.1 MG/DL (ref 8.8–10.4)
CHLORIDE SERPL-SCNC: 105 MMOL/L (ref 98–107)
CREAT SERPL-MCNC: 1.48 MG/DL (ref 0.67–1.17)
EGFRCR SERPLBLD CKD-EPI 2021: 53 ML/MIN/1.73M2
ERYTHROCYTE [DISTWIDTH] IN BLOOD BY AUTOMATED COUNT: 12.9 % (ref 10–15)
GLUCOSE SERPL-MCNC: 114 MG/DL (ref 70–99)
HCO3 SERPL-SCNC: 24 MMOL/L (ref 22–29)
HCT VFR BLD AUTO: 46.1 % (ref 40–53)
HGB BLD-MCNC: 14.8 G/DL (ref 13.3–17.7)
MCH RBC QN AUTO: 29.1 PG (ref 26.5–33)
MCHC RBC AUTO-ENTMCNC: 32.1 G/DL (ref 31.5–36.5)
MCV RBC AUTO: 91 FL (ref 78–100)
PLATELET # BLD AUTO: 198 10E3/UL (ref 150–450)
POTASSIUM SERPL-SCNC: 4.5 MMOL/L (ref 3.4–5.3)
RBC # BLD AUTO: 5.09 10E6/UL (ref 4.4–5.9)
SODIUM SERPL-SCNC: 140 MMOL/L (ref 135–145)
TACROLIMUS BLD-MCNC: 3.7 UG/L (ref 5–15)
TME LAST DOSE: ABNORMAL H
TME LAST DOSE: ABNORMAL H
WBC # BLD AUTO: 8.2 10E3/UL (ref 4–11)

## 2025-01-07 PROCEDURE — 85027 COMPLETE CBC AUTOMATED: CPT

## 2025-01-07 PROCEDURE — 80197 ASSAY OF TACROLIMUS: CPT

## 2025-01-07 PROCEDURE — 80048 BASIC METABOLIC PNL TOTAL CA: CPT

## 2025-01-07 PROCEDURE — 36415 COLL VENOUS BLD VENIPUNCTURE: CPT

## 2025-01-08 ENCOUNTER — TELEPHONE (OUTPATIENT)
Dept: TRANSPLANT | Facility: CLINIC | Age: 60
End: 2025-01-08
Payer: COMMERCIAL

## 2025-01-08 DIAGNOSIS — Z94.0 KIDNEY TRANSPLANTED: ICD-10-CM

## 2025-01-08 DIAGNOSIS — Z94.0 KIDNEY REPLACED BY TRANSPLANT: ICD-10-CM

## 2025-01-08 DIAGNOSIS — I15.1 HTN, KIDNEY TRANSPLANT RELATED: ICD-10-CM

## 2025-01-08 DIAGNOSIS — Z94.0 HTN, KIDNEY TRANSPLANT RELATED: ICD-10-CM

## 2025-01-08 DIAGNOSIS — Z94.0 KIDNEY REPLACED BY TRANSPLANT: Chronic | ICD-10-CM

## 2025-01-08 DIAGNOSIS — Z48.298 AFTERCARE FOLLOWING ORGAN TRANSPLANT: ICD-10-CM

## 2025-01-08 DIAGNOSIS — Z94.0 KIDNEY REPLACED BY TRANSPLANT: Primary | Chronic | ICD-10-CM

## 2025-01-08 RX ORDER — TACROLIMUS 1 MG/1
1 CAPSULE ORAL 2 TIMES DAILY
Qty: 180 CAPSULE | Refills: 3 | Status: SHIPPED | OUTPATIENT
Start: 2025-01-08

## 2025-01-08 RX ORDER — TACROLIMUS 0.5 MG/1
0.5 CAPSULE ORAL EVERY EVENING
Qty: 30 EACH | Refills: 11 | Status: SHIPPED | OUTPATIENT
Start: 2025-01-08 | End: 2025-01-08

## 2025-01-08 NOTE — TELEPHONE ENCOUNTER
Issue  tacrolimus  level 3.7  below goal level  4 to 6       PLAN:   Call Patient and confirm this was an accurate 12-hour trough.   Verify Tacrolimus IR dose 1.0  mg BID.   Confirm no new medications or or missed doses.   Confirm no new illness / infection / diarrhea.   If accurate trough and accurate dose, increase Tacrolimus IR dose to 1.0  mg and 1.5 mg in pm     Is this more than a 50% increase or decrease in current IS dose: No    Repeat transplant  labs in 1 to 2 weeks  after dose change

## 2025-01-08 NOTE — TELEPHONE ENCOUNTER
Spoke with patient and advised of lab results. Patient confirms he is taking Tacrolimus 1 mg bid and an accurate 12-hour trough was done.    Patient states no new prescription medications, but he has taken some mucinex for a cold recently. No missed doses.    No new infection or diarrhea. He has been fighting a cold for about a week  now.    Patient advised to increase his Tacrolimus dose to 1 mg in the morning and 1.5 mg in the evening and repeat labs in 1-2 weeks. Patient verbalized understanding of plan.

## 2025-01-09 RX ORDER — FUROSEMIDE 20 MG/1
20 TABLET ORAL 2 TIMES DAILY
Qty: 60 TABLET | Refills: 0 | Status: SHIPPED | OUTPATIENT
Start: 2025-01-09

## 2025-01-09 RX ORDER — TACROLIMUS 0.5 MG/1
0.5 CAPSULE ORAL EVERY EVENING
Qty: 30 CAPSULE | Refills: 11 | Status: SHIPPED | OUTPATIENT
Start: 2025-01-09

## 2025-01-15 ENCOUNTER — LAB (OUTPATIENT)
Dept: LAB | Facility: CLINIC | Age: 60
End: 2025-01-15
Payer: COMMERCIAL

## 2025-01-15 DIAGNOSIS — Z94.0 KIDNEY REPLACED BY TRANSPLANT: Chronic | ICD-10-CM

## 2025-01-15 LAB
ANION GAP SERPL CALCULATED.3IONS-SCNC: 10 MMOL/L (ref 7–15)
BUN SERPL-MCNC: 23.6 MG/DL (ref 8–23)
CALCIUM SERPL-MCNC: 10.1 MG/DL (ref 8.8–10.4)
CHLORIDE SERPL-SCNC: 107 MMOL/L (ref 98–107)
CREAT SERPL-MCNC: 1.53 MG/DL (ref 0.67–1.17)
EGFRCR SERPLBLD CKD-EPI 2021: 51 ML/MIN/1.73M2
ERYTHROCYTE [DISTWIDTH] IN BLOOD BY AUTOMATED COUNT: 13.1 % (ref 10–15)
GLUCOSE SERPL-MCNC: 117 MG/DL (ref 70–99)
HCO3 SERPL-SCNC: 25 MMOL/L (ref 22–29)
HCT VFR BLD AUTO: 47.1 % (ref 40–53)
HGB BLD-MCNC: 15.2 G/DL (ref 13.3–17.7)
MCH RBC QN AUTO: 29.1 PG (ref 26.5–33)
MCHC RBC AUTO-ENTMCNC: 32.3 G/DL (ref 31.5–36.5)
MCV RBC AUTO: 90 FL (ref 78–100)
PLATELET # BLD AUTO: 189 10E3/UL (ref 150–450)
POTASSIUM SERPL-SCNC: 5.1 MMOL/L (ref 3.4–5.3)
RBC # BLD AUTO: 5.23 10E6/UL (ref 4.4–5.9)
SODIUM SERPL-SCNC: 142 MMOL/L (ref 135–145)
TACROLIMUS BLD-MCNC: 5.6 UG/L (ref 5–15)
TME LAST DOSE: NORMAL H
TME LAST DOSE: NORMAL H
WBC # BLD AUTO: 7.5 10E3/UL (ref 4–11)

## 2025-01-15 PROCEDURE — 36415 COLL VENOUS BLD VENIPUNCTURE: CPT

## 2025-01-15 PROCEDURE — 85027 COMPLETE CBC AUTOMATED: CPT

## 2025-01-15 PROCEDURE — 80197 ASSAY OF TACROLIMUS: CPT

## 2025-01-15 PROCEDURE — 80048 BASIC METABOLIC PNL TOTAL CA: CPT

## 2025-01-16 ENCOUNTER — OFFICE VISIT (OUTPATIENT)
Dept: TRANSPLANT | Facility: CLINIC | Age: 60
End: 2025-01-16
Attending: INTERNAL MEDICINE
Payer: COMMERCIAL

## 2025-01-16 VITALS
WEIGHT: 260.7 LBS | BODY MASS INDEX: 38.5 KG/M2 | SYSTOLIC BLOOD PRESSURE: 135 MMHG | HEART RATE: 82 BPM | DIASTOLIC BLOOD PRESSURE: 83 MMHG | OXYGEN SATURATION: 97 %

## 2025-01-16 DIAGNOSIS — N02.B9 IGA NEPHROPATHY: ICD-10-CM

## 2025-01-16 DIAGNOSIS — N25.81 SECONDARY RENAL HYPERPARATHYROIDISM: ICD-10-CM

## 2025-01-16 DIAGNOSIS — Z23 INFLUENZA VACCINE NEEDED: ICD-10-CM

## 2025-01-16 DIAGNOSIS — Z94.0 HTN, KIDNEY TRANSPLANT RELATED: ICD-10-CM

## 2025-01-16 DIAGNOSIS — Z48.298 AFTERCARE FOLLOWING ORGAN TRANSPLANT: ICD-10-CM

## 2025-01-16 DIAGNOSIS — Z94.0 KIDNEY REPLACED BY TRANSPLANT: Primary | ICD-10-CM

## 2025-01-16 DIAGNOSIS — D84.9 IMMUNOSUPPRESSION: ICD-10-CM

## 2025-01-16 DIAGNOSIS — I15.1 HTN, KIDNEY TRANSPLANT RELATED: ICD-10-CM

## 2025-01-16 DIAGNOSIS — E87.5 HYPERKALEMIA: ICD-10-CM

## 2025-01-16 PROCEDURE — 90673 RIV3 VACCINE NO PRESERV IM: CPT | Performed by: INTERNAL MEDICINE

## 2025-01-16 PROCEDURE — G0008 ADMIN INFLUENZA VIRUS VAC: HCPCS | Performed by: INTERNAL MEDICINE

## 2025-01-16 PROCEDURE — 250N000011 HC RX IP 250 OP 636: Performed by: INTERNAL MEDICINE

## 2025-01-16 PROCEDURE — 99214 OFFICE O/P EST MOD 30 MIN: CPT | Performed by: INTERNAL MEDICINE

## 2025-01-16 RX ADMIN — INFLUENZA A VIRUS A/WEST VIRGINIA/30/2022 (H1N1) RECOMBINANT HEMAGGLUTININ ANTIGEN, INFLUENZA A VIRUS A/MASSACHUSETTS/18/2022 (H3N2) RECOMBINANT HEMAGGLUTININ ANTIGEN, AND INFLUENZA B VIRUS B/AUSTRIA/1359417/2021 RECOMBINANT HEMAGGLUTININ ANTIGEN 0.5 ML: 45; 45; 45 INJECTION INTRAMUSCULAR at 13:19

## 2025-01-16 ASSESSMENT — PAIN SCALES - GENERAL: PAINLEVEL_OUTOF10: NO PAIN (0)

## 2025-01-16 NOTE — PATIENT INSTRUCTIONS
Labs every other month      Transplant Patient Information  Your Post Transplant Coordinator is: Alicia Valentin  You and your care team can contact your transplant coordinator Monday - Friday, 8am - 5pm at [phone] (Option 2 to reach the coordinator or Option 4 to schedule an appointment).  You can also reach your care team online via [employer].  After hours for urgent matters, please call [facility] at [phone].

## 2025-01-16 NOTE — NURSING NOTE
Chief Complaint   Patient presents with    RECHECK      K/P POST RETURN TXP NEPH - follow up lab visit on 1/7/25 @[facility] location r/s from 1/8/25, & 1/10/25         /83 (BP Location: Left arm, Patient Position: Sitting, Cuff Size: Adult Large)   Pulse 82   Wt 118.3 kg (260 lb 11.2 oz)   SpO2 97%   BMI 38.50 kg/m      Price Rawls CMA on 1/16/2025 at 12:17 PM

## 2025-01-16 NOTE — PROGRESS NOTES
TRANSPLANT NEPHROLOGY EARLY POST TRANSPLANT VISIT     Assessment & Plan   # LDKT: stable   - Baseline Creatinine: ~ 1.7-2    - Proteinuria: Normal (<0.2 grams)   - Date DSA Last Checked: Mar/2023      Latest DSA: No   - BK Viremia: No   - Kidney Tx Biopsy: ATI, no rejection in 2022    - Transplant Ureteral Stent: No    # Immunosuppression: Tacrolimus immediate release (goal 4-6) and Mycophenolate mofetil (dose 500 mg every 12 hours)    - Induction with Recent Transplant:  High Intensity   - Continue with intensive monitoring of immunosuppression for efficacy and toxicity.   - Changes: yes on reduced dose MMF due to BK viremia    # BK viremia: resolved on lower IS    - peak viral load 13K copies/ml Sep 2023 , neg since 1/2024, last check Nov 2024   - on reduced IS as above   - Igg-750 Sep 2023    # Infection Prophylaxis:   - PJP: none, CD4>200  - CMV: None    # Hypertension: Controlled;  Goal BP: < 130/80   - Volume status: Euvolemic   - Changes: Not at this time    # Elevated Blood Glucose:  Last HbA1c: 5.8%   - Management as per primary care.    # Mineral Bone Disorder:  - Secondary renal hyperparathyroidism; PTH level: Mildly elevated (151-300 pg/ml)        On treatment: sensipar 30 mg daily,  - Vitamin D; level: Normal        On supplement: Yes  - Calcium; level: normal        On supplement: no    # Electrolytes:   - Potassium; level: normal      On binder: Yes lokelma 10 g po MWF   - Magnesium; level: Normal        On supplement: Yes  - Bicarbonate; level: Normal        On supplement: Yes  baking soda 1/2 teasp MWF, reduce dose if further uptrend in serum bicarbonate and K stable    # Recurrent Metastatic Prostate Ca:   -  Final pathology showed Clark grade group 3 (4+3 equal 7) s/p radical prostatectomy 12/14/2020 s/p adjuvant radiotherapy completed 6/25/2021    - PSMA scan 1/23/2024. This showed PSMA avid bilateral internal iliac nodes c/w recurrence.   - Castrate sensitive oligometastatic disease after  radical prostatectomy and adjuvant radiotherapy.    - close surveillance q3 months per discussions with oncologist ()    # Obesity, Class II (BMI = 38.5):    - Recommend weight loss for overall health by increasing exercise and watching caloric intake.   - Patient would like to defer referral to weight management clinic    # Transplant History:  Etiology of Kidney Failure: IgA nephropathy  Tx: LDKT  Transplant: 5/17/2022 (Kidney)  Donor Type: Living Donor Class:   Crossmatch at time of Tx: negative  DSA at time of Tx: No  Significant changes in immunosuppression: None  CMV IgG Ab High Risk Discordance (D+/R-): No  EBV IgG Ab High Risk Discordance (D+/R-): No  Significant transplant-related complications: DGF    Transplant Office Phone Number: [phone]    Assessment and plan was discussed with the patient and he voiced his understanding and agreement.    Return visit: 6 months    Donte Duff MD    The longitudinal plan of care for kidney transplant as documented were addressed during this visit. Due to the added complexity in care, I will continue to support Pat in the subsequent management and with ongoing continuity of care.    Chief Complaint   Mr. Blanco is a 59 year old here for kidney transplant and immunosuppression management.     History of Present Illness      Mr. Blanco was diagnosed with recurrent metastatic prostate Ca Jan 2024 after a recent uptrend in PSA and CT a/p +internal iliac LN's consistent with metastatic disease. He was seen by  who discussed treatment options,discussed risks and benefits and the plan is to continue conservative management.    Interval Hx    Mr. Blanco feels good overall, main issues discussed today:     - continues to follow regularly with Dr. Cortez for recurrent metastatic prostate cancer, managed conservatively so stable PSA  - denies any urinary symptoms or graft pain  - Energy level is good. Denies any fevers, chills, weight loss, night sweats. No  nausea, vomiting, abdominal pain, diarrhea. No chest pain, dyspnea, leg swelling.   - No recent illness or hospitalization.   - plans to retire soon    Current IS FK 1/1 /500   PPx: none     Home BP: not monitored regularly  Neph:      Problem List   Patient Active Problem List   Diagnosis    HTN, kidney transplant related    Gout    Dyslipidemia    Secondary renal hyperparathyroidism    IgA nephropathy    Class 2 obesity due to excess calories without serious comorbidity with body mass index (BMI) of 36.0 to 36.9 in adult    Prostate cancer (H)    Kidney replaced by transplant    Immunosuppression    Hyperkalemia    Hypomagnesemia    Metabolic acidosis    Aftercare following organ transplant    Morbid obesity (H)    Need for pneumocystis prophylaxis    Vitamin D deficiency    BK virus nephropathy    Metastasis to lymph nodes (H)       Allergies   No Known Allergies    Medications   Current Outpatient Medications   Medication Sig Dispense Refill    alprostadil (EDEX) 10 MCG kit 10 mcg by Intracavitary route as needed for erectile dysfunction use no more than 3 times per week 1 kit 3    aspirin 81 MG EC tablet Take 1 tablet (81 mg) by mouth daily      atorvastatin (LIPITOR) 10 MG tablet Take 1 tablet (10 mg) by mouth every morning. ++Due for visit for refills++ 30 tablet 0    B Complex-C-Folic Acid (VIRT-CAPS) 1 MG CAPS Take 1 mg by mouth daily. 90 capsule 3    cinacalcet (SENSIPAR) 30 MG tablet TAKE ONE TABLET BY MOUTH ONCE DAILY 90 tablet 0    COMPOUNDED NON-CONTROLLED SUBSTANCE (CMPD RX) - PHARMACY TO MIX COMPOUNDED MEDICATION Alprostadil 50 mcg inject 0.2 mL intracavitary daily no more than three times weekly with twenty-four hours between injections 5 mL 4    furosemide (LASIX) 20 MG tablet Take 1 tablet (20 mg) by mouth 2 times daily. 60 tablet 0    mycophenolate (GENERIC EQUIVALENT) 250 MG capsule Take 2 capsules (500 mg) by mouth 2 times daily 120 capsule 11    Sodium Bicarbonate, antacid, POWD  Take 1/2 Teaspoon of baking soda MWF 1 g 0    sodium zirconium cyclosilicate (LOKELMA) 10 g PACK packet Take 1 packet (10 g) by mouth Every Mon, Wed, Fri Morning for 280 days 30 packet 3    tacrolimus (GENERIC EQUIVALENT) 0.5 MG capsule Take 1 capsule (0.5 mg) by mouth every evening. Total dose= 1 mg in the morning ad 1.5 mg in the evening 30 capsule 11    tacrolimus (GENERIC EQUIVALENT) 1 MG capsule Take 1 capsule (1 mg) by mouth 2 times daily. Total dose= 1 mg in the morning and 1.5 mg in the evening 180 capsule 3    ACE/ARB/ARNI NOT PRESCRIBED (INTENTIONAL) Please choose reason not prescribed from choices below. (Patient not taking: Reported on 1/16/2025)       No current facility-administered medications for this visit.     There are no discontinued medications.    Physical Exam   Vital Signs: /83 (BP Location: Left arm, Patient Position: Sitting, Cuff Size: Adult Large)   Pulse 82   Wt 118.3 kg (260 lb 11.2 oz)   SpO2 97%   BMI 38.50 kg/m      GENERAL APPEARANCE: alert and no distress  HENT: mouth without ulcers or lesions  LYMPHATICS: no cervical or supraclavicular nodes  RESP: lungs clear to auscultation - no rales, rhonchi or wheezes  CV: regular rhythm, normal rate, no rub, no murmur  EDEMA: no LE edema bilaterally  ABDOMEN: soft, nondistended, nontender, bowel sounds normal  MS: extremities normal - no gross deformities noted, no evidence of inflammation in joints, no muscle tenderness  SKIN: no rash  TX KIDNEY: normal  DIALYSIS ACCESS: none    Data         Latest Ref Rng & Units 1/15/2025     7:14 AM 1/7/2025     7:11 AM 11/25/2024     7:01 AM   Renal   Sodium 135 - 145 mmol/L 142  140  143    K 3.4 - 5.3 mmol/L 5.1  4.5  4.7    Cl 98 - 107 mmol/L 107  105  108    Cl (external) 98 - 107 mmol/L 107  105  108    CO2 22 - 29 mmol/L 25  24  22    Urea Nitrogen 8.0 - 23.0 mg/dL 23.6  25.7  26.8    Creatinine 0.67 - 1.17 mg/dL 1.53  1.48  1.52    Glucose 70 - 99 mg/dL 117  114  116    Calcium 8.8 -  10.4 mg/dL 10.1  10.1  10.5          Latest Ref Rng & Units 3/6/2024     7:01 AM 11/6/2023     7:06 AM 5/17/2023     9:42 AM   Bone Health   Parathyroid Hormone Intact 15 - 65 pg/mL 135  160  259    Vit D Def 20 - 50 ng/mL 30   48          Latest Ref Rng & Units 1/15/2025     7:14 AM 1/7/2025     7:11 AM 11/25/2024     7:01 AM   Heme   WBC 4.0 - 11.0 10e3/uL 7.5  8.2  6.6    Hgb 13.3 - 17.7 g/dL 15.2  14.8  15.1    Plt 150 - 450 10e3/uL 189  198  178          Latest Ref Rng & Units 11/4/2024     7:11 AM 5/6/2024     7:01 AM 5/17/2023     9:42 AM   Liver   AP 40 - 150 U/L 79  74  91    TBili <=1.2 mg/dL 0.8  0.7  0.9    Bilirubin Direct 0.00 - 0.30 mg/dL   0.22    ALT 0 - 70 U/L 38  32  24    AST 0 - 45 U/L 27  27  24    Tot Protein 6.4 - 8.3 g/dL 7.2  6.7  7.3    Albumin 3.5 - 5.2 g/dL 4.3  4.1  4.4          Latest Ref Rng & Units 5/17/2023     9:42 AM 11/21/2022     7:00 AM 6/3/2022    10:48 AM   Pancreas   A1C <5.7 % 5.8  5.6  5.1          Latest Ref Rng & Units 11/7/2022     7:08 AM 10/10/2022     7:07 AM 9/16/2022     7:07 AM   Iron studies   Iron 35 - 180 ug/dL 81  102     Iron Saturation Index 15 - 46 % 26  31     Ferritin 26 - 388 ng/mL 494  542  602          Latest Ref Rng & Units 5/17/2022     1:35 PM 5/9/2022    10:00 AM 9/3/2020    11:31 AM   [facility] Txp Virology   CMV QUANT IU/ML Not Detected IU/mL Not Detected      EBV CAPSID ANTIBODY IGG No detectable antibody.  Positive  >8.0    Hep B Core NR^Nonreactive   Nonreactive      Failed to redirect to the Timeline version of the REVFS SmartLink.  Recent Labs   Lab Test 11/25/24  0701 01/07/25  0711 01/15/25  0714   DOSTAC 11/24/2024 1/6/2025 1/14/2025   TACROL 5.0 3.7* 5.6     Recent Labs   Lab Test 09/25/23  0714 10/02/23  0709 03/06/24  0701   DOSMPA 9/24/2023   8:00 PM 10/1/2023   8:00 PM 3/5/2024   8:00 PM   MPACID 1.33 2.19 2.06   MPAG 24.9* 24.8* 23.4*

## 2025-01-16 NOTE — LETTER
1/16/2025      Donald Blanco  [address] [place] [address]      Dear Colleague,    Thank you for referring your patient, Donald Blanco, to the [facility]ANSPLANT [facility]. Please see a copy of my visit note below.    TRANSPLANT NEPHROLOGY EARLY POST TRANSPLANT VISIT     Assessment & Plan  # LDKT: stable   - Baseline Creatinine: ~ 1.7-2    - Proteinuria: Normal (<0.2 grams)   - Date DSA Last Checked: Mar/2023      Latest DSA: No   - BK Viremia: No   - Kidney Tx Biopsy: ATI, no rejection in 2022    - Transplant Ureteral Stent: No    # Immunosuppression: Tacrolimus immediate release (goal 4-6) and Mycophenolate mofetil (dose 500 mg every 12 hours)    - Induction with Recent Transplant:  High Intensity   - Continue with intensive monitoring of immunosuppression for efficacy and toxicity.   - Changes: yes on reduced dose MMF due to BK viremia    # BK viremia: resolved on lower IS    - peak viral load 13K copies/ml Sep 2023 , neg since 1/2024, last check Nov 2024   - on reduced IS as above   - Igg-750 Sep 2023    # Infection Prophylaxis:   - PJP: none, CD4>200  - CMV: None    # Hypertension: Controlled;  Goal BP: < 130/80   - Volume status: Euvolemic   - Changes: Not at this time    # Elevated Blood Glucose:  Last HbA1c: 5.8%   - Management as per primary care.    # Mineral Bone Disorder:  - Secondary renal hyperparathyroidism; PTH level: Mildly elevated (151-300 pg/ml)        On treatment: sensipar 30 mg daily,  - Vitamin D; level: Normal        On supplement: Yes  - Calcium; level: normal        On supplement: no    # Electrolytes:   - Potassium; level: normal      On binder: Yes lokelma 10 g po MWF   - Magnesium; level: Normal        On supplement: Yes  - Bicarbonate; level: Normal        On supplement: Yes  baking soda 1/2 teasp MWF, reduce dose if further uptrend in serum bicarbonate and K stable    # Recurrent Metastatic Prostate Ca:   -  Final pathology showed Clark grade group 3 (4+3 equal  7) s/p radical prostatectomy 12/14/2020 s/p adjuvant radiotherapy completed 6/25/2021    - PSMA scan 1/23/2024. This showed PSMA avid bilateral internal iliac nodes c/w recurrence.   - Castrate sensitive oligometastatic disease after radical prostatectomy and adjuvant radiotherapy.    - close surveillance q3 months per discussions with oncologist ()    # Obesity, Class II (BMI = 38.5):    - Recommend weight loss for overall health by increasing exercise and watching caloric intake.   - Patient would like to defer referral to weight management clinic    # Transplant History:  Etiology of Kidney Failure: IgA nephropathy  Tx: LDKT  Transplant: 5/17/2022 (Kidney)  Donor Type: Living Donor Class:   Crossmatch at time of Tx: negative  DSA at time of Tx: No  Significant changes in immunosuppression: None  CMV IgG Ab High Risk Discordance (D+/R-): No  EBV IgG Ab High Risk Discordance (D+/R-): No  Significant transplant-related complications: DGF    Transplant Office Phone Number: [phone]    Assessment and plan was discussed with the patient and he voiced his understanding and agreement.    Return visit: 6 months    Donte Duff MD    The longitudinal plan of care for kidney transplant as documented were addressed during this visit. Due to the added complexity in care, I will continue to support Pat in the subsequent management and with ongoing continuity of care.    Chief Complaint  Mr. Blanco is a 59 year old here for kidney transplant and immunosuppression management.     History of Present Illness     Mr. Blanco was diagnosed with recurrent metastatic prostate Ca Jan 2024 after a recent uptrend in PSA and CT a/p +internal iliac LN's consistent with metastatic disease. He was seen by  who discussed treatment options,discussed risks and benefits and the plan is to continue conservative management.    Interval Hx    Mr. Blanco feels good overall, main issues discussed today:     - continues to follow  regularly with Dr. Cortez for recurrent metastatic prostate cancer, managed conservatively so stable PSA  - denies any urinary symptoms or graft pain  - Energy level is good. Denies any fevers, chills, weight loss, night sweats. No nausea, vomiting, abdominal pain, diarrhea. No chest pain, dyspnea, leg swelling.   - No recent illness or hospitalization.   - plans to retire soon    Current IS FK 1/1 /500   PPx: none     Home BP: not monitored regularly  Neph:      Problem List  Patient Active Problem List   Diagnosis     HTN, kidney transplant related     Gout     Dyslipidemia     Secondary renal hyperparathyroidism     IgA nephropathy     Class 2 obesity due to excess calories without serious comorbidity with body mass index (BMI) of 36.0 to 36.9 in adult     Prostate cancer (H)     Kidney replaced by transplant     Immunosuppression     Hyperkalemia     Hypomagnesemia     Metabolic acidosis     Aftercare following organ transplant     Morbid obesity (H)     Need for pneumocystis prophylaxis     Vitamin D deficiency     BK virus nephropathy     Metastasis to lymph nodes (H)       Allergies  No Known Allergies    Medications  Current Outpatient Medications   Medication Sig Dispense Refill     alprostadil (EDEX) 10 MCG kit 10 mcg by Intracavitary route as needed for erectile dysfunction use no more than 3 times per week 1 kit 3     aspirin 81 MG EC tablet Take 1 tablet (81 mg) by mouth daily       atorvastatin (LIPITOR) 10 MG tablet Take 1 tablet (10 mg) by mouth every morning. ++Due for visit for refills++ 30 tablet 0     B Complex-C-Folic Acid (VIRT-CAPS) 1 MG CAPS Take 1 mg by mouth daily. 90 capsule 3     cinacalcet (SENSIPAR) 30 MG tablet TAKE ONE TABLET BY MOUTH ONCE DAILY 90 tablet 0     COMPOUNDED NON-CONTROLLED SUBSTANCE (CMPD RX) - PHARMACY TO MIX COMPOUNDED MEDICATION Alprostadil 50 mcg inject 0.2 mL intracavitary daily no more than three times weekly with twenty-four hours between injections  5 mL 4     furosemide (LASIX) 20 MG tablet Take 1 tablet (20 mg) by mouth 2 times daily. 60 tablet 0     mycophenolate (GENERIC EQUIVALENT) 250 MG capsule Take 2 capsules (500 mg) by mouth 2 times daily 120 capsule 11     Sodium Bicarbonate, antacid, POWD Take 1/2 Teaspoon of baking soda MWF 1 g 0     sodium zirconium cyclosilicate (LOKELMA) 10 g PACK packet Take 1 packet (10 g) by mouth Every Mon, Wed, Fri Morning for 280 days 30 packet 3     tacrolimus (GENERIC EQUIVALENT) 0.5 MG capsule Take 1 capsule (0.5 mg) by mouth every evening. Total dose= 1 mg in the morning ad 1.5 mg in the evening 30 capsule 11     tacrolimus (GENERIC EQUIVALENT) 1 MG capsule Take 1 capsule (1 mg) by mouth 2 times daily. Total dose= 1 mg in the morning and 1.5 mg in the evening 180 capsule 3     ACE/ARB/ARNI NOT PRESCRIBED (INTENTIONAL) Please choose reason not prescribed from choices below. (Patient not taking: Reported on 1/16/2025)       No current facility-administered medications for this visit.     There are no discontinued medications.    Physical Exam  Vital Signs: /83 (BP Location: Left arm, Patient Position: Sitting, Cuff Size: Adult Large)   Pulse 82   Wt 118.3 kg (260 lb 11.2 oz)   SpO2 97%   BMI 38.50 kg/m      GENERAL APPEARANCE: alert and no distress  HENT: mouth without ulcers or lesions  LYMPHATICS: no cervical or supraclavicular nodes  RESP: lungs clear to auscultation - no rales, rhonchi or wheezes  CV: regular rhythm, normal rate, no rub, no murmur  EDEMA: no LE edema bilaterally  ABDOMEN: soft, nondistended, nontender, bowel sounds normal  MS: extremities normal - no gross deformities noted, no evidence of inflammation in joints, no muscle tenderness  SKIN: no rash  TX KIDNEY: normal  DIALYSIS ACCESS: none    Data        Latest Ref Rng & Units 1/15/2025     7:14 AM 1/7/2025     7:11 AM 11/25/2024     7:01 AM   Renal   Sodium 135 - 145 mmol/L 142  140  143    K 3.4 - 5.3 mmol/L 5.1  4.5  4.7    Cl 98 - 107  mmol/L 107  105  108    Cl (external) 98 - 107 mmol/L 107  105  108    CO2 22 - 29 mmol/L 25  24  22    Urea Nitrogen 8.0 - 23.0 mg/dL 23.6  25.7  26.8    Creatinine 0.67 - 1.17 mg/dL 1.53  1.48  1.52    Glucose 70 - 99 mg/dL 117  114  116    Calcium 8.8 - 10.4 mg/dL 10.1  10.1  10.5          Latest Ref Rng & Units 3/6/2024     7:01 AM 11/6/2023     7:06 AM 5/17/2023     9:42 AM   Bone Health   Parathyroid Hormone Intact 15 - 65 pg/mL 135  160  259    Vit D Def 20 - 50 ng/mL 30   48          Latest Ref Rng & Units 1/15/2025     7:14 AM 1/7/2025     7:11 AM 11/25/2024     7:01 AM   Heme   WBC 4.0 - 11.0 10e3/uL 7.5  8.2  6.6    Hgb 13.3 - 17.7 g/dL 15.2  14.8  15.1    Plt 150 - 450 10e3/uL 189  198  178          Latest Ref Rng & Units 11/4/2024     7:11 AM 5/6/2024     7:01 AM 5/17/2023     9:42 AM   Liver   AP 40 - 150 U/L 79  74  91    TBili <=1.2 mg/dL 0.8  0.7  0.9    Bilirubin Direct 0.00 - 0.30 mg/dL   0.22    ALT 0 - 70 U/L 38  32  24    AST 0 - 45 U/L 27  27  24    Tot Protein 6.4 - 8.3 g/dL 7.2  6.7  7.3    Albumin 3.5 - 5.2 g/dL 4.3  4.1  4.4          Latest Ref Rng & Units 5/17/2023     9:42 AM 11/21/2022     7:00 AM 6/3/2022    10:48 AM   Pancreas   A1C <5.7 % 5.8  5.6  5.1          Latest Ref Rng & Units 11/7/2022     7:08 AM 10/10/2022     7:07 AM 9/16/2022     7:07 AM   Iron studies   Iron 35 - 180 ug/dL 81  102     Iron Saturation Index 15 - 46 % 26  31     Ferritin 26 - 388 ng/mL 494  542  602          Latest Ref Rng & Units 5/17/2022     1:35 PM 5/9/2022    10:00 AM 9/3/2020    11:31 AM   [facility] Txp Virology   CMV QUANT IU/ML Not Detected IU/mL Not Detected      EBV CAPSID ANTIBODY IGG No detectable antibody.  Positive  >8.0    Hep B Core NR^Nonreactive   Nonreactive      Failed to redirect to the Timeline version of the REVFS SmartLink.  Recent Labs   Lab Test 11/25/24  0701 01/07/25  0711 01/15/25  0714   DOSTAC 11/24/2024 1/6/2025 1/14/2025   TACROL 5.0 3.7* 5.6     Recent Labs   Lab Test  09/25/23  0714 10/02/23  0709 03/06/24  0701   DOSMPA 9/24/2023   8:00 PM 10/1/2023   8:00 PM 3/5/2024   8:00 PM   MPACID 1.33 2.19 2.06   MPAG 24.9* 24.8* 23.4*         Again, thank you for allowing me to participate in the care of your patient.        Sincerely,        Donte Duff MD    Electronically signed

## 2025-01-16 NOTE — NURSING NOTE
Administrations This Visit       influenza trivalent vaccine for ages 50-64 years (PF) (FLUBLOK) injection 0.5 mL       Admin Date  01/16/2025 Action  $Given Dose  0.5 mL Route  Intramuscular Documented By  Price Rawls, DANIELA Rawls CMA on 1/16/2025 at 1:28 PM

## 2025-01-22 RX ORDER — ATORVASTATIN CALCIUM 10 MG/1
10 TABLET, FILM COATED ORAL EVERY MORNING
Qty: 30 TABLET | Refills: 0 | Status: SHIPPED | OUTPATIENT
Start: 2025-01-22

## 2025-01-27 DIAGNOSIS — Z94.0 HTN, KIDNEY TRANSPLANT RELATED: ICD-10-CM

## 2025-01-27 DIAGNOSIS — I15.1 HTN, KIDNEY TRANSPLANT RELATED: ICD-10-CM

## 2025-01-27 DIAGNOSIS — E87.5 HYPERKALEMIA: ICD-10-CM

## 2025-02-11 DIAGNOSIS — Z94.0 HTN, KIDNEY TRANSPLANT RELATED: ICD-10-CM

## 2025-02-11 DIAGNOSIS — I15.1 HTN, KIDNEY TRANSPLANT RELATED: ICD-10-CM

## 2025-02-11 DIAGNOSIS — Z48.298 AFTERCARE FOLLOWING ORGAN TRANSPLANT: ICD-10-CM

## 2025-02-11 DIAGNOSIS — Z94.0 KIDNEY REPLACED BY TRANSPLANT: ICD-10-CM

## 2025-02-11 RX ORDER — MYCOPHENOLATE MOFETIL 250 MG/1
500 CAPSULE ORAL 2 TIMES DAILY
Qty: 120 CAPSULE | Refills: 11 | Status: SHIPPED | OUTPATIENT
Start: 2025-02-11

## 2025-02-11 RX ORDER — FUROSEMIDE 20 MG/1
20 TABLET ORAL 2 TIMES DAILY
Qty: 60 TABLET | Refills: 0 | Status: SHIPPED | OUTPATIENT
Start: 2025-02-11

## 2025-02-26 DIAGNOSIS — Z94.0 HTN, KIDNEY TRANSPLANT RELATED: ICD-10-CM

## 2025-02-26 DIAGNOSIS — Z94.0 KIDNEY REPLACED BY TRANSPLANT: Primary | Chronic | ICD-10-CM

## 2025-02-26 DIAGNOSIS — I15.1 HTN, KIDNEY TRANSPLANT RELATED: Primary | ICD-10-CM

## 2025-02-26 DIAGNOSIS — R79.89 ELEVATED PARATHYROID HORMONE: ICD-10-CM

## 2025-02-26 DIAGNOSIS — Z94.0 KIDNEY REPLACED BY TRANSPLANT: ICD-10-CM

## 2025-02-26 DIAGNOSIS — I15.1 HTN, KIDNEY TRANSPLANT RELATED: ICD-10-CM

## 2025-02-26 DIAGNOSIS — Z94.0 HTN, KIDNEY TRANSPLANT RELATED: Primary | ICD-10-CM

## 2025-02-27 RX ORDER — CINACALCET 30 MG/1
30 TABLET, FILM COATED ORAL DAILY
Qty: 90 TABLET | Refills: 0 | Status: SHIPPED | OUTPATIENT
Start: 2025-02-27

## 2025-02-27 RX ORDER — NEPHROCAP 1 MG
1 CAP ORAL DAILY
Qty: 90 CAPSULE | Refills: 3 | Status: SHIPPED | OUTPATIENT
Start: 2025-02-27

## 2025-02-27 RX ORDER — ATORVASTATIN CALCIUM 10 MG/1
10 TABLET, FILM COATED ORAL EVERY MORNING
Qty: 30 TABLET | Refills: 0 | Status: SHIPPED | OUTPATIENT
Start: 2025-02-27

## 2025-03-04 ENCOUNTER — LAB (OUTPATIENT)
Dept: LAB | Facility: CLINIC | Age: 60
End: 2025-03-04
Payer: COMMERCIAL

## 2025-03-04 DIAGNOSIS — Z94.0 KIDNEY REPLACED BY TRANSPLANT: ICD-10-CM

## 2025-03-04 DIAGNOSIS — C61 PROSTATE CANCER (H): ICD-10-CM

## 2025-03-04 DIAGNOSIS — Z48.298 AFTERCARE FOLLOWING ORGAN TRANSPLANT: ICD-10-CM

## 2025-03-04 LAB
ALBUMIN MFR UR ELPH: 10.2 MG/DL
ALBUMIN SERPL BCG-MCNC: 4.1 G/DL (ref 3.5–5.2)
ALP SERPL-CCNC: 75 U/L (ref 40–150)
ALT SERPL W P-5'-P-CCNC: 31 U/L (ref 0–70)
ANION GAP SERPL CALCULATED.3IONS-SCNC: 10 MMOL/L (ref 7–15)
AST SERPL W P-5'-P-CCNC: 23 U/L (ref 0–45)
BASOPHILS # BLD AUTO: 0 10E3/UL (ref 0–0.2)
BASOPHILS NFR BLD AUTO: 0 %
BILIRUB SERPL-MCNC: 0.7 MG/DL
BUN SERPL-MCNC: 30.2 MG/DL (ref 8–23)
CALCIUM SERPL-MCNC: 9.9 MG/DL (ref 8.8–10.4)
CHLORIDE SERPL-SCNC: 108 MMOL/L (ref 98–107)
CREAT SERPL-MCNC: 1.52 MG/DL (ref 0.67–1.17)
CREAT UR-MCNC: 74.7 MG/DL
EGFRCR SERPLBLD CKD-EPI 2021: 51 ML/MIN/1.73M2
EOSINOPHIL # BLD AUTO: 0.1 10E3/UL (ref 0–0.7)
EOSINOPHIL NFR BLD AUTO: 2 %
ERYTHROCYTE [DISTWIDTH] IN BLOOD BY AUTOMATED COUNT: 13.4 % (ref 10–15)
GLUCOSE SERPL-MCNC: 119 MG/DL (ref 70–99)
HCO3 SERPL-SCNC: 23 MMOL/L (ref 22–29)
HCT VFR BLD AUTO: 46.5 % (ref 40–53)
HGB BLD-MCNC: 15.1 G/DL (ref 13.3–17.7)
IMM GRANULOCYTES # BLD: 0 10E3/UL
IMM GRANULOCYTES NFR BLD: 0 %
LYMPHOCYTES # BLD AUTO: 1 10E3/UL (ref 0.8–5.3)
LYMPHOCYTES NFR BLD AUTO: 15 %
MCH RBC QN AUTO: 29.1 PG (ref 26.5–33)
MCHC RBC AUTO-ENTMCNC: 32.5 G/DL (ref 31.5–36.5)
MCV RBC AUTO: 90 FL (ref 78–100)
MONOCYTES # BLD AUTO: 0.5 10E3/UL (ref 0–1.3)
MONOCYTES NFR BLD AUTO: 8 %
NEUTROPHILS # BLD AUTO: 5.2 10E3/UL (ref 1.6–8.3)
NEUTROPHILS NFR BLD AUTO: 76 %
PLATELET # BLD AUTO: 172 10E3/UL (ref 150–450)
POTASSIUM SERPL-SCNC: 4.5 MMOL/L (ref 3.4–5.3)
PROT SERPL-MCNC: 6.8 G/DL (ref 6.4–8.3)
PROT/CREAT 24H UR: 0.14 MG/MG CR (ref 0–0.2)
PSA SERPL DL<=0.01 NG/ML-MCNC: 0.62 NG/ML (ref 0–4.5)
RBC # BLD AUTO: 5.19 10E6/UL (ref 4.4–5.9)
SODIUM SERPL-SCNC: 141 MMOL/L (ref 135–145)
TACROLIMUS BLD-MCNC: 7.1 UG/L (ref 5–15)
TME LAST DOSE: NORMAL H
TME LAST DOSE: NORMAL H
WBC # BLD AUTO: 6.8 10E3/UL (ref 4–11)

## 2025-03-04 PROCEDURE — 80053 COMPREHEN METABOLIC PANEL: CPT

## 2025-03-04 PROCEDURE — 84403 ASSAY OF TOTAL TESTOSTERONE: CPT

## 2025-03-04 PROCEDURE — 80197 ASSAY OF TACROLIMUS: CPT

## 2025-03-04 PROCEDURE — 85025 COMPLETE CBC W/AUTO DIFF WBC: CPT

## 2025-03-04 PROCEDURE — 84153 ASSAY OF PSA TOTAL: CPT

## 2025-03-04 PROCEDURE — 87799 DETECT AGENT NOS DNA QUANT: CPT

## 2025-03-04 PROCEDURE — 84156 ASSAY OF PROTEIN URINE: CPT

## 2025-03-04 PROCEDURE — 36415 COLL VENOUS BLD VENIPUNCTURE: CPT

## 2025-03-05 LAB
BK VIRUS SPECIMEN TYPE: NORMAL
BKV DNA # SPEC NAA+PROBE: NOT DETECTED IU/ML

## 2025-03-06 LAB — TESTOST SERPL-MCNC: 292 NG/DL (ref 240–950)

## 2025-03-09 NOTE — PROGRESS NOTES
[facility] Medical Oncology Note  Date of visit: March 10, 2024  New Outpatient Clinic Note        Assessment:     Castrate sensitive oligometastatic disease after radical prostatectomy and adjuvant radiotherapy.  There is no evidence of widespread distant metastatic disease and so in this setting often we still can go for cure if the foci of oligometastases can be radiated. However, after discussion with radiation oncology, the nodes are in an area that was previously radiated. Therefore, RT is not possible.  Therefore, cure is not possible here. Therefore the goals of all treatment moving forward are twofold:  We want Jayla to live as long as possible, but also have the highest quality of life.  We turn his disease into a chronic managed one, not unlike diabetes.  As with other renal transplant patients it has been my privilege to care for, he wants to do everything possible to save his renal graft.  He does not want to go on dialysis and who can blame him?  Currently on no treatment at all, with his current PSA being <1, and a normal testosterone.  To my mind, there remains no indication to start therapy. If or when we do (if, say, his PSA hits the 5 or above range) , there are multiple options for therapy including androgen deprivation therapy (ADT), single agent bicalutamide or even abiraterone (which interact less with his immunosuppressant drugs than enzalutamide does). But for now, I recommend continued observation.  Other comorbidities including morbid obesity.  That said, his life expectancy is 10 years or more.    I had a long and involved conversation with Jayla and his wife Marcelo today in clinic.  Many questions were asked and hopefully answered to their satisfaction.  I wrote out a summary of our conversation for them to take home for further review.    Plan:     No intervention at the current point.  Because things have been stable for so long, I think we can go to q 6 month assessments. In fact, the  next time I see him, he will have retired from his job. We will get the usual labs just prior.  Continue to follow-up with his renal transplant team       The longitudinal plan of care for the diagnosis(es)/condition(s) as documented were addressed during this visit. Due to the added complexity in care, I will continue to support Pat in the subsequent management and with ongoing continuity of care.     Iván Cortez MD, MSc  Associate Professor of Medicine  [facility] [place] [address]  [phone]    __________________________________________________________________    DIANGOSIS       Metastatic castrate sensitive prostate cancer, diagnosed at PSMA scan 1/2024.  Pat had had a radical prostatectomy followed by adjuvant radiotherapy 3 years prior, but his PSA began to rise.  When it hit 0.46, PSMA scan was ordered.  This showed bilateral internal iliac nodes consistent with recurrence.  IgA nephropathy with Stage V CKD, s/p renal transplant. He is on tacrolimus and mycophenolate. When last seen by renal 1/16/2025, all was stable with no changes recommended.    History of Present Illness/therapy to date:     Radical prostatectomy 12/14/2020.  Final pathology showed Schaumb[place] grade group 3 (4+3 equal 7) acinar adenocarcinoma of the prostate with nonfocal present extraprostatic extension at the left posterior.  Seminal vesicle invasion was noted on the left side.  There was a positive margin, with less than 3 mm noted in the left posterior.  It was positive in the area of extraprostatic extension.  Pattern 4 was noted at the margin.  8 regional lymph nodes were resected and none were involved. PSA prior to surgery was 13.5.  Adjuvant radiotherapy completed 6/25/2021.  PSA dropped to unmeasurable post-op and remained there until 11/15/21 when it was 0.04, then 0.14 5/1/2023. It hit 0.46, 1/4/2024.  PSMA scan was ordered 1/23/2024. This showed  PSMA avid bilateral internal iliac nodes c/w recurrence. This was more than what could be radiated.  Jayla has never been on any systemic therapy for his prostate cancer.  Most recent PSA from 11/2024 is 1.02      Interval history:     Jayla is back with the Sana  Confused about the drop in his PSA. Will happily take it.  Has chronic urinary incontinence and needs to wear a pad.  Weight stable-jostin.  Creatinine as low as it has been for a while.  Denies pain.  Denies stool issues.  Thinking about retiring.  Both he and Sana have long had plans to do so.  Has set a jail date of 8/29/2025.. Excited about it.  About to go to [facility] for a few days.      On ROS in addition to the above      Denies  fevers, chills, NS, HA, dysphagia/odynophagia, change in weight, change in appetite, cough, SOB, CP, n/v, abd pain, constipation/diarrhea, hematochezia, dysuria, hematuria, swelling, rashes, lymphadenopathy      Past Medical History:   I have reviewed this patient's past medical history   Past Medical History:   Diagnosis Date    BK virus nephropathy 09/07/2023    CKD (chronic kidney disease) stage 4, GFR 15-29 ml/min (H)     Elevated PSA     Essential hypertension 05/21/2020    Hyperlipidemia     IgA nephropathy     Metastasis to lymph nodes (H) 01/23/2024    Obesity     Prostate cancer (H) 10/15/2020    S/P radiation therapy     6,600 cGy to pelvis completed on 6/25/2021 - [facility]          Past Surgical History:    I have reviewed this patient's past surgical history       Social History:   Tobacco, ETOH, and rec drugs reviewed and as noted below with the following exceptions:  Jayla grew up in [place] and graduated from high school in 1981.  He was a high school wrestler.  He then went to [employer] on a wrestling scholarship, and studied accounting.  This lasted for about a year and a half.  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  He is hoping to  "retire in the fall 2025 (August 29!).  He is  to Sana, someone who he went to high school with.  However they were dating other people.  2 years after high school he was wearing his brother's [employer] jacket and had grown a beard, and feeling pretty cool.  He then went to a bar where he saw Sana's best friend Mary, who told him \"Sana just broke up and you should call her!.\"  He gave her a call and the rest of his history.  They have 2 children, Sonu, also wrestling fan, and Mayra, who is an RN and recently in the neurosurgery division at the [employer] [employer] in [place].          Family History:     Family History   Problem Relation Age of Onset    Kidney Disease Father     Hypertension Father     Cancer Mother     No Known Problems Brother     No Known Problems Sister     No Known Problems Son     No Known Problems Daughter     No Known Problems Brother     No Known Problems Brother     No Known Problems Brother     No Known Problems Sister     No Known Problems Sister     No Known Problems Sister             Medications:     Current Outpatient Medications   Medication Sig Dispense Refill    ACE/ARB/ARNI NOT PRESCRIBED (INTENTIONAL) Please choose reason not prescribed from choices below. (Patient not taking: Reported on 1/16/2025)      alprostadil (EDEX) 10 MCG kit 10 mcg by Intracavitary route as needed for erectile dysfunction use no more than 3 times per week 1 kit 3    aspirin 81 MG EC tablet Take 1 tablet (81 mg) by mouth daily      atorvastatin (LIPITOR) 10 MG tablet Take 1 tablet (10 mg) by mouth every morning. ++Due for visit for refills++ 30 tablet 0    B Complex-C-Folic Acid (VIRT-CAPS) 1 MG CAPS Take 1 mg by mouth daily. 90 capsule 3    cinacalcet (SENSIPAR) 30 MG tablet TAKE ONE TABLET BY MOUTH ONCE DAILY 90 tablet 0    COMPOUNDED NON-CONTROLLED SUBSTANCE (CMPD RX) - PHARMACY TO MIX COMPOUNDED MEDICATION Alprostadil 50 mcg inject 0.2 mL intracavitary daily no more than three times weekly with " twenty-four hours between injections 5 mL 4    furosemide (LASIX) 20 MG tablet Take 1 tablet (20 mg) by mouth 2 times daily. 60 tablet 0    mycophenolate (GENERIC EQUIVALENT) 250 MG capsule Take 2 capsules (500 mg) by mouth 2 times daily. 120 capsule 11    Sodium Bicarbonate, antacid, POWD Take 1/2 Teaspoon of baking soda MWF 1 g 0    sodium zirconium cyclosilicate (LOKELMA) 10 g PACK packet Take 1 packet (10 g) by mouth Every Mon, Wed, Fri Morning. 30 packet 3    tacrolimus (GENERIC EQUIVALENT) 0.5 MG capsule Take 1 capsule (0.5 mg) by mouth every evening. Total dose= 1 mg in the morning ad 1.5 mg in the evening 30 capsule 11    tacrolimus (GENERIC EQUIVALENT) 1 MG capsule Take 1 capsule (1 mg) by mouth 2 times daily. Total dose= 1 mg in the morning and 1.5 mg in the evening 180 capsule 3              Physical Exam:   There were no vitals taken for this visit.    ECOG PS: 1  Constitutional: WDWN male in NAD, pleasant and appropriate  HEENT:  NC/AT, no icterus, OP clear, MMM  Skin: No jaundice nor ecchymoses  Lungs: CTAB, no w/r/r, nonlabored breathing  Cardiovascular: RRR, S1, S2, no m/r/g  Abdomen: +BS, soft, nontender, nondistended, no organomegaly nor masses though body habitus does obscure the exam somewhat  MSK/Extremities: Warm, well perfused. No edema  LN: no cervical, supraclavicular, axillary, nor inguinal lymphadenopathy  Neurologic: alert, answering questions appropriately, moving all extremities spontaneously. CN 2-12 grossly intact.  Psych: appropriate affect  Data:   PSA trend, including current 0.62      Other labs (3/4/2025)  Creatinine  1.52 <== 2.06  Rest of CMP normal  CBC normal    No results found for this or any previous visit (from the past 24 hours).    Other Data     CT/PET 1/23/2024        IMPRESSION: In this patient with history of prostate cancer with  prostatectomy and radiation now with rising prostate-specific antigen:  1. Estimated avid right and left internal iliac lymph nodes  consistent  with disease recurrence. miPSMA score 1  2. No evidence of disease in chest or abdomen.  3. Right lower quadrant renal transplant without hydronephrosis or  nephrolithiasis.         Labs, imaging and treatment plan reviewed with patient. All questions answered.        30 minutes spent on the date of the encounter doing chart review, review of outside records, review of test results, interpretation of tests, patient visit, documentation, discussion with other provider(s), and discussion with family

## 2025-03-10 ENCOUNTER — ONCOLOGY VISIT (OUTPATIENT)
Dept: ONCOLOGY | Facility: CLINIC | Age: 60
End: 2025-03-10
Attending: INTERNAL MEDICINE
Payer: COMMERCIAL

## 2025-03-10 VITALS
RESPIRATION RATE: 16 BRPM | SYSTOLIC BLOOD PRESSURE: 142 MMHG | HEART RATE: 87 BPM | BODY MASS INDEX: 39.36 KG/M2 | WEIGHT: 266.5 LBS | OXYGEN SATURATION: 95 % | DIASTOLIC BLOOD PRESSURE: 80 MMHG | TEMPERATURE: 98.1 F

## 2025-03-10 DIAGNOSIS — C61 PROSTATE CANCER (H): Primary | ICD-10-CM

## 2025-03-10 PROCEDURE — 99214 OFFICE O/P EST MOD 30 MIN: CPT | Performed by: INTERNAL MEDICINE

## 2025-03-10 PROCEDURE — G2211 COMPLEX E/M VISIT ADD ON: HCPCS | Performed by: INTERNAL MEDICINE

## 2025-03-10 PROCEDURE — 99213 OFFICE O/P EST LOW 20 MIN: CPT | Performed by: INTERNAL MEDICINE

## 2025-03-10 ASSESSMENT — PAIN SCALES - GENERAL: PAINLEVEL_OUTOF10: NO PAIN (0)

## 2025-03-10 NOTE — NURSING NOTE
"Oncology Rooming Note    March 10, 2025 1:20 PM   Donald Blanco is a 60 year old male who presents for:    Chief Complaint   Patient presents with    Oncology Clinic Visit     Prostate cancer     Initial Vitals: BP (!) 166/77 (BP Location: Right arm, Patient Position: Sitting, Cuff Size: Adult Large)   Pulse 87   Temp 98.1  F (36.7  C) (Oral)   Resp 16   Wt 120.9 kg (266 lb 8 oz)   SpO2 95%   BMI 39.36 kg/m   Estimated body mass index is 39.36 kg/m  as calculated from the following:    Height as of 24: 1.753 m (5' 9\").    Weight as of this encounter: 120.9 kg (266 lb 8 oz). Body surface area is 2.43 meters squared.  No Pain (0) Comment: Data Unavailable   No LMP for male patient.  Allergies reviewed: Yes  Medications reviewed: Yes    Medications: Medication refills not needed today.  Pharmacy name entered into [employer]:    [facility] - [place], [place] - [facility] [address]  [facility] [place], [place] - [address] [facility], [address]  [facility] - [place], [place] - [facility] [address]    Frailty Screening:   Is the patient here for a new oncology consult visit in cancer care? 2. No    PHQ9:  Did this patient require a PHQ9?: No      Clinical concerns: BP: 166/77, 2nd readin/80, asymptomatic      Virginia Burkett            "

## 2025-03-10 NOTE — LETTER
3/10/2025      Donald Blanco  [address]   [place] [address]      Dear Colleague,    Thank you for referring your patient, Donald Blanco, to the [facility] CANCER [facility]. Please see a copy of my visit note below.      [facility] Medical Oncology Note  Date of visit: March 10, 2024  New Outpatient Clinic Note        Assessment:     Castrate sensitive oligometastatic disease after radical prostatectomy and adjuvant radiotherapy.  There is no evidence of widespread distant metastatic disease and so in this setting often we still can go for cure if the foci of oligometastases can be radiated. However, after discussion with radiation oncology, the nodes are in an area that was previously radiated. Therefore, RT is not possible.  Therefore, cure is not possible here. Therefore the goals of all treatment moving forward are twofold:  We want Jayla to live as long as possible, but also have the highest quality of life.  We turn his disease into a chronic managed one, not unlike diabetes.  As with other renal transplant patients it has been my privilege to care for, he wants to do everything possible to save his renal graft.  He does not want to go on dialysis and who can blame him?  Currently on no treatment at all, with his current PSA being <1, and a normal testosterone.  To my mind, there remains no indication to start therapy. If or when we do (if, say, his PSA hits the 5 or above range) , there are multiple options for therapy including androgen deprivation therapy (ADT), single agent bicalutamide or even abiraterone (which interact less with his immunosuppressant drugs than enzalutamide does). But for now, I recommend continued observation.  Other comorbidities including morbid obesity.  That said, his life expectancy is 10 years or more.    I had a long and involved conversation with Jayla and his wife Marcelo today in clinic.  Many questions were asked and hopefully answered to their  satisfaction.  I wrote out a summary of our conversation for them to take home for further review.    Plan:     No intervention at the current point.  Because things have been stable for so long, I think we can go to q 6 month assessments. In fact, the next time I see him, he will have retired from his job. We will get the usual labs just prior.  Continue to follow-up with his renal transplant team       The longitudinal plan of care for the diagnosis(es)/condition(s) as documented were addressed during this visit. Due to the added complexity in care, I will continue to support Pat in the subsequent management and with ongoing continuity of care.     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  Pat had had a radical prostatectomy followed by adjuvant radiotherapy 3 years prior, but his PSA began to rise.  When it hit 0.46, PSMA scan was ordered.  This showed bilateral internal iliac nodes consistent with recurrence.  IgA nephropathy with Stage V CKD, s/p renal transplant. He is on tacrolimus and mycophenolate. When last seen by renal 1/16/2025, all was stable with no changes recommended.    History of Present Illness/therapy to date:     Radical prostatectomy 12/14/2020.  Final pathology showed Orange grade group 3 (4+3 equal 7) acinar adenocarcinoma of the prostate with nonfocal present extraprostatic extension at the left posterior.  Seminal vesicle invasion was noted on the left side.  There was a positive margin, with less than 3 mm noted in the left posterior.  It was positive in the area of extraprostatic extension.  Pattern 4 was noted at the margin.  8 regional lymph nodes were resected and none were involved.  PSA prior to surgery was 13.5.  Adjuvant radiotherapy completed 6/25/2021.  PSA dropped to unmeasurable post-op and remained there until 11/15/21 when it was 0.04, then 0.14 5/1/2023. It hit 0.46, 1/4/2024.  PSMA scan was ordered 1/23/2024. This showed PSMA avid bilateral internal iliac nodes c/w recurrence. This was more than what could be radiated.  Jayla has never been on any systemic therapy for his prostate cancer.  Most recent PSA from 11/2024 is 1.02      Interval history:     Jayla is back with the Sana  Confused about the drop in his PSA. Will happily take it.  Has chronic urinary incontinence and needs to wear a pad.  Weight stable-jostin.  Creatinine as low as it has been for a while.  Denies pain.  Denies stool issues.  Thinking about retiring.  Both he and Sana have long had plans to do so.  Has set a nursing home date of 8/29/2025.. Excited about it.  About to go to [facility] for a few days.      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     CKD (chronic kidney disease) stage 4, GFR 15-29 ml/min (H)      Elevated PSA      Essential hypertension 05/21/2020     Hyperlipidemia      IgA nephropathy      Metastasis to lymph nodes (H) 01/23/2024     Obesity      Prostate cancer (H) 10/15/2020     S/P radiation therapy     6,600 cGy to pelvis completed on 6/25/2021 - [facility]          Past Surgical History:    I have reviewed this patient's past surgical history       Social History:   Tobacco, ETOH, and rec drugs reviewed and as noted below with the following exceptions:  Jayla grew up in [place] and graduated from high school in  "1981.  He was a high school wrestler.  He then went to [employer] on a [employer] scholarship, and studied accounting.  This lasted for about a year and a half.  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  He is hoping to retire in the fall 2025 (August 29!).  He is  to Sana, someone who he went to high school with.  However they were dating other people.  2 years after high school he was wearing his brother's [employer] jacket and had grown a beard, and feeling pretty cool.  He then went to a bar where he saw Sana's best friend Mary, who told him \"Sana just broke up and you should call her!.\"  He gave her a call and the rest of his history.  They have 2 children, Sonu, also wrestling fan, and Mayra, who is an RN and recently in the neurosurgery division at the [employer] [facility] [employer] in [place].          Family History:     Family History   Problem Relation Age of Onset     Kidney Disease Father      Hypertension Father      Cancer Mother      No Known Problems Brother      No Known Problems Sister      No Known Problems Son      No Known Problems Daughter      No Known Problems Brother      No Known Problems Brother      No Known Problems Brother      No Known Problems Sister      No Known Problems Sister      No Known Problems Sister             Medications:     Current Outpatient Medications   Medication Sig Dispense Refill     ACE/ARB/ARNI NOT PRESCRIBED (INTENTIONAL) Please choose reason not prescribed from choices below. (Patient not taking: Reported on 1/16/2025)       alprostadil (EDEX) 10 MCG kit 10 mcg by Intracavitary route as needed for erectile dysfunction use no more than 3 times per week 1 kit 3     aspirin 81 MG EC tablet Take 1 tablet (81 mg) by mouth daily       atorvastatin (LIPITOR) 10 MG tablet Take 1 tablet (10 mg) by mouth every morning. ++Due for visit for refills++ 30 tablet 0     B Complex-C-Folic Acid (VIRT-CAPS) 1 MG CAPS Take 1 mg by mouth daily. 90 " capsule 3     cinacalcet (SENSIPAR) 30 MG tablet TAKE ONE TABLET BY MOUTH ONCE DAILY 90 tablet 0     COMPOUNDED NON-CONTROLLED SUBSTANCE (CMPD RX) - PHARMACY TO MIX COMPOUNDED MEDICATION Alprostadil 50 mcg inject 0.2 mL intracavitary daily no more than three times weekly with twenty-four hours between injections 5 mL 4     furosemide (LASIX) 20 MG tablet Take 1 tablet (20 mg) by mouth 2 times daily. 60 tablet 0     mycophenolate (GENERIC EQUIVALENT) 250 MG capsule Take 2 capsules (500 mg) by mouth 2 times daily. 120 capsule 11     Sodium Bicarbonate, antacid, POWD Take 1/2 Teaspoon of baking soda MWF 1 g 0     sodium zirconium cyclosilicate (LOKELMA) 10 g PACK packet Take 1 packet (10 g) by mouth Every Mon, Wed, Fri Morning. 30 packet 3     tacrolimus (GENERIC EQUIVALENT) 0.5 MG capsule Take 1 capsule (0.5 mg) by mouth every evening. Total dose= 1 mg in the morning ad 1.5 mg in the evening 30 capsule 11     tacrolimus (GENERIC EQUIVALENT) 1 MG capsule Take 1 capsule (1 mg) by mouth 2 times daily. Total dose= 1 mg in the morning and 1.5 mg in the evening 180 capsule 3              Physical Exam:   There were no vitals taken for this visit.    ECOG PS: 1  Constitutional: WDWN male in NAD, pleasant and appropriate  HEENT:  NC/AT, no icterus, OP clear, MMM  Skin: No jaundice nor ecchymoses  Lungs: CTAB, no w/r/r, nonlabored breathing  Cardiovascular: RRR, S1, S2, no m/r/g  Abdomen: +BS, soft, nontender, nondistended, no organomegaly nor masses though body habitus does obscure the exam somewhat  MSK/Extremities: Warm, well perfused. No edema  LN: no cervical, supraclavicular, axillary, nor inguinal lymphadenopathy  Neurologic: alert, answering questions appropriately, moving all extremities spontaneously. CN 2-12 grossly intact.  Psych: appropriate affect  Data:   PSA trend, including current 0.62      Other labs (3/4/2025)  Creatinine  1.52 <== 2.06  Rest of CMP normal  CBC normal    No results found for this or any  previous visit (from the past 24 hours).    Other Data     CT/PET 1/23/2024        IMPRESSION: In this patient with history of prostate cancer with  prostatectomy and radiation now with rising prostate-specific antigen:  1. Estimated avid right and left internal iliac lymph nodes consistent  with disease recurrence. miPSMA score 1  2. No evidence of disease in chest or abdomen.  3. Right lower quadrant renal transplant without hydronephrosis or  nephrolithiasis.         Labs, imaging and treatment plan reviewed with patient. All questions answered.        30 minutes spent on the date of the encounter doing chart review, review of outside records, review of test results, interpretation of tests, patient visit, documentation, discussion with other provider(s), and discussion with family             Again, thank you for allowing me to participate in the care of your patient.        Sincerely,        Iván Cortez MD    Electronically signed

## 2025-03-25 DIAGNOSIS — I15.1 HTN, KIDNEY TRANSPLANT RELATED: Primary | ICD-10-CM

## 2025-03-25 DIAGNOSIS — Z94.0 HTN, KIDNEY TRANSPLANT RELATED: Primary | ICD-10-CM

## 2025-03-25 DIAGNOSIS — Z94.0 KIDNEY REPLACED BY TRANSPLANT: ICD-10-CM

## 2025-03-25 DIAGNOSIS — Z48.298 AFTERCARE FOLLOWING ORGAN TRANSPLANT: ICD-10-CM

## 2025-03-26 RX ORDER — ATORVASTATIN CALCIUM 10 MG/1
10 TABLET, FILM COATED ORAL EVERY MORNING
Qty: 30 TABLET | Refills: 0 | Status: SHIPPED | OUTPATIENT
Start: 2025-03-26

## 2025-03-26 RX ORDER — FUROSEMIDE 20 MG/1
20 TABLET ORAL 2 TIMES DAILY
Qty: 60 TABLET | Refills: 0 | Status: SHIPPED | OUTPATIENT
Start: 2025-03-26

## 2025-04-10 ENCOUNTER — PATIENT OUTREACH (OUTPATIENT)
Dept: ONCOLOGY | Facility: CLINIC | Age: 60
End: 2025-04-10
Payer: COMMERCIAL

## 2025-04-10 NOTE — PROGRESS NOTES
[facility]: Cancer Care                                                                                         Completed chart audit to assign Oncology Care Coordination enrollment status.    Anjana YAPN, RN, OCN  Care Coordinator  [facility]

## 2025-04-23 DIAGNOSIS — Z94.0 KIDNEY REPLACED BY TRANSPLANT: ICD-10-CM

## 2025-04-23 DIAGNOSIS — I15.1 HTN, KIDNEY TRANSPLANT RELATED: ICD-10-CM

## 2025-04-23 DIAGNOSIS — Z94.0 HTN, KIDNEY TRANSPLANT RELATED: ICD-10-CM

## 2025-04-23 DIAGNOSIS — Z48.298 AFTERCARE FOLLOWING ORGAN TRANSPLANT: ICD-10-CM

## 2025-04-24 RX ORDER — ATORVASTATIN CALCIUM 10 MG/1
10 TABLET, FILM COATED ORAL EVERY MORNING
Qty: 30 TABLET | Refills: 0 | Status: SHIPPED | OUTPATIENT
Start: 2025-04-24

## 2025-04-24 RX ORDER — FUROSEMIDE 20 MG/1
20 TABLET ORAL 2 TIMES DAILY
Qty: 60 TABLET | Refills: 0 | Status: SHIPPED | OUTPATIENT
Start: 2025-04-24

## 2025-04-30 ENCOUNTER — OFFICE VISIT (OUTPATIENT)
Dept: DERMATOLOGY | Facility: CLINIC | Age: 60
End: 2025-04-30
Attending: DERMATOLOGY
Payer: COMMERCIAL

## 2025-04-30 DIAGNOSIS — D18.01 CHERRY ANGIOMA: Primary | ICD-10-CM

## 2025-04-30 DIAGNOSIS — L82.1 SEBORRHEIC KERATOSIS: ICD-10-CM

## 2025-04-30 DIAGNOSIS — D22.9 MULTIPLE MELANOCYTIC NEVI: ICD-10-CM

## 2025-04-30 DIAGNOSIS — L81.4 SOLAR LENTIGO: ICD-10-CM

## 2025-04-30 DIAGNOSIS — D84.9 IMMUNOSUPPRESSION: ICD-10-CM

## 2025-04-30 DIAGNOSIS — Z94.0 HISTORY OF KIDNEY TRANSPLANT: ICD-10-CM

## 2025-04-30 NOTE — NURSING NOTE
Donald Blanco's goals for this visit include:   Chief Complaint   Patient presents with    Derm Problem     Annual skin check, no areas of concern       He requests these members of his care team be copied on today's visit information: no    PCP: Emerson Grayson    Referring Provider:  Raul Montilla MD  [address] [place] [id number]    There were no vitals taken for this visit.    Do you need any medication refills at today's visit? No    Olimpia Michael LPN

## 2025-04-30 NOTE — PROGRESS NOTES
[facility] Dermatology Note    Encounter Date: Apr 30, 2025    Dermatology Problem List:  Last FSE: 4/30/25  1. Renal transplant on mycophenolate and tacrolimus  2. Actinic keratosis s/p cryotherapy  3. ISKs, s/p cryo    ______________________________________    Impression/Plan:  1. Hx of renal transplant, on mycophenolate and tacrolimus  - continue annual skin checks    2. Reassurance provided for benign lesions not treated today including cherry angiomata, solar lentigines, seborrheic keratoses, and banal-appearing melanocytic nevi.       Follow-up in 1 year.       Staff Involved:  Staff/Scribe    Scribe Disclosure:   I, Ashia Winn, am serving as a scribe; to document services personally performed by Raul Montilla MD -based on data collection and the provider's statements to me.     Provider Disclosure:   The documentation recorded by the scribe accurately reflects the services I personally performed and the decisions made by me.    Raul Montilla MD   of Dermatology  Department of Dermatology  [facility]        CC:   Chief Complaint   Patient presents with    Derm Problem     Annual skin check, no areas of concern       History of Present Illness:  Mr. Donald Blanco is a 60 year old male who presents as a return patient.    Here for skin check. Today, he has no areas of concern, but has a spot on the nose, that's not bothersome.    Labs:  N/a    Physical exam:  Vitals: There were no vitals taken for this visit.  GEN: This is a well developed, well-nourished male in no acute distress, in a pleasant mood.    SKIN: Good phototype II  - Full skin, which includes the head/face, both arms, chest, back, abdomen,both legs, genitalia and/or groin buttocks, digits and/or nails, was examined.  - There are dome shaped bright red papules on the head/neck, trunk, extremities.   - Multiple regular brown pigmented macules and papules are  identified on the head/neck, trunk, extremities.   - Scattered brown macules on sun exposed areas.  - There are waxy stuck on tan to brown papules on the head/neck, trunk, extremities.   - No other lesions of concern on areas examined.     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    CKD (chronic kidney disease) stage 4, GFR 15-29 ml/min (H)     Elevated PSA     Essential hypertension 05/21/2020    Hyperlipidemia     IgA nephropathy     Metastasis to lymph nodes (H) 01/23/2024    Obesity     Prostate cancer (H) 10/15/2020    S/P radiation therapy     6,600 cGy to pelvis completed on 6/25/2021 [facility]     Past Surgical History:   Procedure Laterality Date    BACK SURGERY      Back Surgey 20+ Years Ago     BIOPSY      [facility] 2010    COLONOSCOPY      10+ Years ago at [facility]     DAVINCI PROSTATECTOMY, LYMPHADENECTOMY N/A 12/14/2020    Procedure: PROSTATECTOMY, ROBOT-ASSISTED, WITH PELVIC LYMPHADENECTOMY;  Surgeon: Nabil Vásquez MD;  Location: [facility] OR    IR CVC TUNNEL REMOVAL RIGHT  8/10/2022    IR CVC TUNNEL REVISION RIGHT  6/22/2022    IR RENAL BIOPSY RIGHT  5/24/2022    IR RENAL BIOPSY RIGHT  6/22/2022       Social History:   reports that he has never smoked. He has never been exposed to tobacco smoke. He has never used smokeless tobacco. He reports current alcohol use. He reports that he does not currently use drugs after having used the following drugs: Marijuana.    Family History:  Family History   Problem Relation Age of Onset    Kidney Disease Father     Hypertension Father     Cancer Mother     No Known Problems Brother     No Known Problems Sister     No Known Problems Son     No Known Problems Daughter     No Known Problems Brother     No Known Problems Brother     No Known Problems Brother     No Known Problems Sister     No Known Problems Sister     No Known Problems Sister         Medications:  Current Outpatient Medications   Medication Sig Dispense Refill    ACE/ARB/ARNI NOT PRESCRIBED (INTENTIONAL) Please choose reason not prescribed from choices below.      alprostadil (EDEX) 10 MCG kit 10 mcg by Intracavitary route as needed for erectile dysfunction use no more than 3 times per week 1 kit 3    aspirin 81 MG EC tablet Take 1 tablet (81 mg) by mouth daily      atorvastatin (LIPITOR) 10 MG tablet Take 1 tablet (10 mg) by mouth every morning. ++Due for visit for refills++ 30 tablet 0    B Complex-C-Folic Acid (VIRT-CAPS) 1 MG CAPS Take 1 mg by mouth daily. 90 capsule 3    cinacalcet (SENSIPAR) 30 MG tablet TAKE ONE TABLET BY MOUTH ONCE DAILY 90 tablet 0    COMPOUNDED NON-CONTROLLED SUBSTANCE (CMPD RX) - PHARMACY TO MIX COMPOUNDED MEDICATION Alprostadil 50 mcg inject 0.2 mL intracavitary daily no more than three times weekly with twenty-four hours between injections 5 mL 4    furosemide (LASIX) 20 MG tablet Take 1 tablet (20 mg) by mouth 2 times daily. 60 tablet 0    mycophenolate (GENERIC EQUIVALENT) 250 MG capsule Take 2 capsules (500 mg) by mouth 2 times daily. 120 capsule 11    Sodium Bicarbonate, antacid, POWD Take 1/2 Teaspoon of baking soda MWF 1 g 0    sodium zirconium cyclosilicate (LOKELMA) 10 g PACK packet Take 1 packet (10 g) by mouth Every Mon, Wed, Fri Morning. 30 packet 3    tacrolimus (GENERIC EQUIVALENT) 0.5 MG capsule Take 1 capsule (0.5 mg) by mouth every evening. Total dose= 1 mg in the morning ad 1.5 mg in the evening 30 capsule 11    tacrolimus (GENERIC EQUIVALENT) 1 MG capsule Take 1 capsule (1 mg) by mouth 2 times daily. Total dose= 1 mg in the morning and 1.5 mg in the evening 180 capsule 3     No Known Allergies

## 2025-04-30 NOTE — LETTER
4/30/2025      Donald Blanco  [address] [facility]  [place] [address]      Dear Colleague,    Thank you for referring your patient, Donald Blanco, to the [facility]. Please see a copy of my visit note below.    [facility] Dermatology Note    Encounter Date: Apr 30, 2025    Dermatology Problem List:  Last FSE: 4/30/25  1. Renal transplant on mycophenolate and tacrolimus  2. Actinic keratosis s/p cryotherapy  3. ISKs, s/p cryo    ______________________________________    Impression/Plan:  1. Hx of renal transplant, on mycophenolate and tacrolimus  - continue annual skin checks    2. Reassurance provided for benign lesions not treated today including cherry angiomata, solar lentigines, seborrheic keratoses, and banal-appearing melanocytic nevi.       Follow-up in 1 year.       Staff Involved:  Staff/Scribe    Scribe Disclosure:   I, Ashia Winn, am serving as a scribe; to document services personally performed by Raul Montilla MD -based on data collection and the provider's statements to me.     Provider Disclosure:   The documentation recorded by the scribe accurately reflects the services I personally performed and the decisions made by me.    Raul Montilla MD   of Dermatology  Department of Dermatology  [facility]        CC:   Chief Complaint   Patient presents with     Derm Problem     Annual skin check, no areas of concern       History of Present Illness:  Mr. Donald Blanco is a 60 year old male who presents as a return patient.    Here for skin check. Today, he has no areas of concern, but has a spot on the nose, that's not bothersome.    Labs:  N/a    Physical exam:  Vitals: There were no vitals taken for this visit.  GEN: This is a well developed, well-nourished male in no acute distress, in a pleasant mood.    SKIN: Good phototype II  - Full skin, which includes the head/face, both arms,  chest, back, abdomen,both legs, genitalia and/or groin buttocks, digits and/or nails, was examined.  - There are dome shaped bright red papules on the head/neck, trunk, extremities.   - Multiple regular brown pigmented macules and papules are identified on the head/neck, trunk, extremities.   - Scattered brown macules on sun exposed areas.  - There are waxy stuck on tan to brown papules on the head/neck, trunk, extremities.   - No other lesions of concern on areas examined.     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     CKD (chronic kidney disease) stage 4, GFR 15-29 ml/min (H)      Elevated PSA      Essential hypertension 05/21/2020     Hyperlipidemia      IgA nephropathy      Metastasis to lymph nodes (H) 01/23/2024     Obesity      Prostate cancer (H) 10/15/2020     S/P radiation therapy     6,600 cGy to pelvis completed on 6/25/2021 - [facility]     Past Surgical History:   Procedure Laterality Date     BACK SURGERY      Back Surgey 20+ Years Ago      BIOPSY      [facility] 2010     COLONOSCOPY      10+ Years ago at [facility]      DAVINCI PROSTATECTOMY, LYMPHADENECTOMY N/A 12/14/2020    Procedure: PROSTATECTOMY, ROBOT-ASSISTED, WITH PELVIC LYMPHADENECTOMY;  Surgeon: Nabil Vásquez MD;  Location: [facility] OR     IR CVC TUNNEL REMOVAL RIGHT  8/10/2022     IR CVC TUNNEL REVISION RIGHT  6/22/2022     IR RENAL BIOPSY RIGHT  5/24/2022     IR RENAL BIOPSY RIGHT  6/22/2022       Social History:   reports that he has never smoked. He has never been exposed to tobacco smoke. He has never used smokeless tobacco. He reports current alcohol use. He reports that he does not currently use drugs after having used the following drugs: Marijuana.    Family History:  Family History   Problem Relation Age of Onset     Kidney Disease Father      Hypertension Father      Cancer Mother      No Known Problems Brother      No Known  Problems Sister      No Known Problems Son      No Known Problems Daughter      No Known Problems Brother      No Known Problems Brother      No Known Problems Brother      No Known Problems Sister      No Known Problems Sister      No Known Problems Sister        Medications:  Current Outpatient Medications   Medication Sig Dispense Refill     ACE/ARB/ARNI NOT PRESCRIBED (INTENTIONAL) Please choose reason not prescribed from choices below.       alprostadil (EDEX) 10 MCG kit 10 mcg by Intracavitary route as needed for erectile dysfunction use no more than 3 times per week 1 kit 3     aspirin 81 MG EC tablet Take 1 tablet (81 mg) by mouth daily       atorvastatin (LIPITOR) 10 MG tablet Take 1 tablet (10 mg) by mouth every morning. ++Due for visit for refills++ 30 tablet 0     B Complex-C-Folic Acid (VIRT-CAPS) 1 MG CAPS Take 1 mg by mouth daily. 90 capsule 3     cinacalcet (SENSIPAR) 30 MG tablet TAKE ONE TABLET BY MOUTH ONCE DAILY 90 tablet 0     COMPOUNDED NON-CONTROLLED SUBSTANCE (CMPD RX) - PHARMACY TO MIX COMPOUNDED MEDICATION Alprostadil 50 mcg inject 0.2 mL intracavitary daily no more than three times weekly with twenty-four hours between injections 5 mL 4     furosemide (LASIX) 20 MG tablet Take 1 tablet (20 mg) by mouth 2 times daily. 60 tablet 0     mycophenolate (GENERIC EQUIVALENT) 250 MG capsule Take 2 capsules (500 mg) by mouth 2 times daily. 120 capsule 11     Sodium Bicarbonate, antacid, POWD Take 1/2 Teaspoon of baking soda MWF 1 g 0     sodium zirconium cyclosilicate (LOKELMA) 10 g PACK packet Take 1 packet (10 g) by mouth Every Mon, Wed, Fri Morning. 30 packet 3     tacrolimus (GENERIC EQUIVALENT) 0.5 MG capsule Take 1 capsule (0.5 mg) by mouth every evening. Total dose= 1 mg in the morning ad 1.5 mg in the evening 30 capsule 11     tacrolimus (GENERIC EQUIVALENT) 1 MG capsule Take 1 capsule (1 mg) by mouth 2 times daily. Total dose= 1 mg in the morning and 1.5 mg in the evening 180 capsule 3      No Known Allergies      Again, thank you for allowing me to participate in the care of your patient.        Sincerely,        Raul Montilla MD    Electronically signed

## 2025-05-06 ENCOUNTER — LAB (OUTPATIENT)
Dept: LAB | Facility: CLINIC | Age: 60
End: 2025-05-06
Payer: COMMERCIAL

## 2025-05-06 DIAGNOSIS — Z94.0 KIDNEY REPLACED BY TRANSPLANT: ICD-10-CM

## 2025-05-06 LAB
ANION GAP SERPL CALCULATED.3IONS-SCNC: 11 MMOL/L (ref 7–15)
BUN SERPL-MCNC: 22.5 MG/DL (ref 8–23)
CALCIUM SERPL-MCNC: 10 MG/DL (ref 8.8–10.4)
CHLORIDE SERPL-SCNC: 105 MMOL/L (ref 98–107)
CREAT SERPL-MCNC: 1.46 MG/DL (ref 0.67–1.17)
EGFRCR SERPLBLD CKD-EPI 2021: 54 ML/MIN/1.73M2
ERYTHROCYTE [DISTWIDTH] IN BLOOD BY AUTOMATED COUNT: 13.4 % (ref 10–15)
GLUCOSE SERPL-MCNC: 128 MG/DL (ref 70–99)
HCO3 SERPL-SCNC: 24 MMOL/L (ref 22–29)
HCT VFR BLD AUTO: 45.9 % (ref 40–53)
HGB BLD-MCNC: 15.1 G/DL (ref 13.3–17.7)
MCH RBC QN AUTO: 29.4 PG (ref 26.5–33)
MCHC RBC AUTO-ENTMCNC: 32.9 G/DL (ref 31.5–36.5)
MCV RBC AUTO: 89 FL (ref 78–100)
PLATELET # BLD AUTO: 164 10E3/UL (ref 150–450)
POTASSIUM SERPL-SCNC: 4.6 MMOL/L (ref 3.4–5.3)
RBC # BLD AUTO: 5.14 10E6/UL (ref 4.4–5.9)
SODIUM SERPL-SCNC: 140 MMOL/L (ref 135–145)
TACROLIMUS BLD-MCNC: 5.8 UG/L (ref 5–15)
TME LAST DOSE: NORMAL H
TME LAST DOSE: NORMAL H
WBC # BLD AUTO: 7.1 10E3/UL (ref 4–11)

## 2025-05-06 PROCEDURE — 85027 COMPLETE CBC AUTOMATED: CPT

## 2025-05-06 PROCEDURE — 80048 BASIC METABOLIC PNL TOTAL CA: CPT

## 2025-05-06 PROCEDURE — 80197 ASSAY OF TACROLIMUS: CPT

## 2025-05-06 PROCEDURE — 36415 COLL VENOUS BLD VENIPUNCTURE: CPT

## 2025-05-11 ENCOUNTER — HEALTH MAINTENANCE LETTER (OUTPATIENT)
Age: 60
End: 2025-05-11

## 2025-05-15 DIAGNOSIS — R79.89 ELEVATED PARATHYROID HORMONE: ICD-10-CM

## 2025-05-15 RX ORDER — CINACALCET 30 MG/1
30 TABLET, FILM COATED ORAL DAILY
Qty: 90 TABLET | Refills: 0 | Status: SHIPPED | OUTPATIENT
Start: 2025-05-15

## 2025-05-20 DIAGNOSIS — Z94.0 HTN, KIDNEY TRANSPLANT RELATED: Primary | ICD-10-CM

## 2025-05-20 DIAGNOSIS — Z94.0 KIDNEY REPLACED BY TRANSPLANT: ICD-10-CM

## 2025-05-20 DIAGNOSIS — I15.1 HTN, KIDNEY TRANSPLANT RELATED: Primary | ICD-10-CM

## 2025-05-20 DIAGNOSIS — Z48.298 AFTERCARE FOLLOWING ORGAN TRANSPLANT: ICD-10-CM

## 2025-05-20 RX ORDER — FUROSEMIDE 20 MG/1
20 TABLET ORAL 2 TIMES DAILY
Qty: 60 TABLET | Refills: 0 | Status: SHIPPED | OUTPATIENT
Start: 2025-05-20

## 2025-05-20 RX ORDER — ATORVASTATIN CALCIUM 10 MG/1
10 TABLET, FILM COATED ORAL EVERY MORNING
Qty: 30 TABLET | Refills: 0 | Status: SHIPPED | OUTPATIENT
Start: 2025-05-20

## 2025-05-29 ENCOUNTER — TELEPHONE (OUTPATIENT)
Dept: PHARMACY | Facility: CLINIC | Age: 60
End: 2025-05-29
Payer: COMMERCIAL

## 2025-05-29 NOTE — TELEPHONE ENCOUNTER
Clinical Pharmacy Consult:                                                      Transplant Specific: 36 Month Post Transplant medication review  Date of Transplant: 05/17/2022  Type of Transplant: kidney  First Transplant: yes  History of rejection: no    Immunosuppression Regimen   TAC 1.5mg qAM & 1.5mg qPM and MMF 500mg qAM & 500mg qPM  Most recent level: 5.8, date 5/6/25  Pt adherent to lab draws: yes  Scr:   Lab Results   Component Value Date    CR 2.71 06/27/2022    CR 6.13 12/15/2020     Side effects: no side effects    Prophylactic Medications  Antibacterial:  Bactrim ss 3 times a week, no longer on chart or profile    Antifungal: Not needed thus far  Scheduled Discontinue Date: N/A    Antiviral: CrCl 10 to 24 mL/minute: Valcyte 450 - completed    Acid Reducer: Protonix (pantoprazole), no longer on profile/chart    Thrombosis Prevention: Aspirin 81 mg PO daily    Blood Pressure Management  Most recent  BP: 142/80 at OV 3/10    Hospitalizations/ER visits since last assessment: 0    Med rec/DUR performed: yes  Med Rec Discrepancies: no    Unable to reach patient for 36 month post transplant discharge medication revciew.  Profile and chart review completed.  Patient is adherent with refills and labs.    No  questions or concerns today. Follow up in 1 year.    Albert Peterson, Pharm D  [facility]

## 2025-06-17 DIAGNOSIS — Z94.0 KIDNEY REPLACED BY TRANSPLANT: ICD-10-CM

## 2025-06-17 DIAGNOSIS — Z94.0 HTN, KIDNEY TRANSPLANT RELATED: ICD-10-CM

## 2025-06-17 DIAGNOSIS — I15.1 HTN, KIDNEY TRANSPLANT RELATED: ICD-10-CM

## 2025-06-17 RX ORDER — ATORVASTATIN CALCIUM 10 MG/1
10 TABLET, FILM COATED ORAL EVERY MORNING
Qty: 30 TABLET | Refills: 1 | Status: SHIPPED | OUTPATIENT
Start: 2025-06-17

## 2025-06-18 DIAGNOSIS — Z48.298 AFTERCARE FOLLOWING ORGAN TRANSPLANT: ICD-10-CM

## 2025-06-18 DIAGNOSIS — I15.1 HTN, KIDNEY TRANSPLANT RELATED: ICD-10-CM

## 2025-06-18 DIAGNOSIS — Z94.0 HTN, KIDNEY TRANSPLANT RELATED: ICD-10-CM

## 2025-06-18 RX ORDER — FUROSEMIDE 20 MG/1
20 TABLET ORAL 2 TIMES DAILY
Qty: 60 TABLET | Refills: 0 | Status: SHIPPED | OUTPATIENT
Start: 2025-06-18

## 2025-07-02 ENCOUNTER — LAB (OUTPATIENT)
Dept: LAB | Facility: CLINIC | Age: 60
End: 2025-07-02
Payer: COMMERCIAL

## 2025-07-02 ENCOUNTER — RESULTS FOLLOW-UP (OUTPATIENT)
Dept: TRANSPLANT | Facility: CLINIC | Age: 60
End: 2025-07-02

## 2025-07-02 DIAGNOSIS — I15.1 HTN, KIDNEY TRANSPLANT RELATED: ICD-10-CM

## 2025-07-02 DIAGNOSIS — Z94.0 KIDNEY REPLACED BY TRANSPLANT: ICD-10-CM

## 2025-07-02 DIAGNOSIS — Z48.298 AFTERCARE FOLLOWING ORGAN TRANSPLANT: ICD-10-CM

## 2025-07-02 DIAGNOSIS — Z94.0 HTN, KIDNEY TRANSPLANT RELATED: ICD-10-CM

## 2025-07-02 LAB
ANION GAP SERPL CALCULATED.3IONS-SCNC: 12 MMOL/L (ref 7–15)
BUN SERPL-MCNC: 21.4 MG/DL (ref 8–23)
CALCIUM SERPL-MCNC: 10 MG/DL (ref 8.8–10.4)
CHLORIDE SERPL-SCNC: 103 MMOL/L (ref 98–107)
CREAT SERPL-MCNC: 1.4 MG/DL (ref 0.67–1.17)
EGFRCR SERPLBLD CKD-EPI 2021: 57 ML/MIN/1.73M2
ERYTHROCYTE [DISTWIDTH] IN BLOOD BY AUTOMATED COUNT: 13.1 % (ref 10–15)
GLUCOSE SERPL-MCNC: 119 MG/DL (ref 70–99)
HCO3 SERPL-SCNC: 23 MMOL/L (ref 22–29)
HCT VFR BLD AUTO: 43.6 % (ref 40–53)
HGB BLD-MCNC: 14.5 G/DL (ref 13.3–17.7)
MCH RBC QN AUTO: 29.9 PG (ref 26.5–33)
MCHC RBC AUTO-ENTMCNC: 33.3 G/DL (ref 31.5–36.5)
MCV RBC AUTO: 90 FL (ref 78–100)
PLATELET # BLD AUTO: 168 10E3/UL (ref 150–450)
POTASSIUM SERPL-SCNC: 4.3 MMOL/L (ref 3.4–5.3)
RBC # BLD AUTO: 4.85 10E6/UL (ref 4.4–5.9)
SODIUM SERPL-SCNC: 138 MMOL/L (ref 135–145)
TACROLIMUS BLD-MCNC: 6 UG/L (ref 5–15)
TME LAST DOSE: NORMAL H
TME LAST DOSE: NORMAL H
WBC # BLD AUTO: 7.5 10E3/UL (ref 4–11)

## 2025-07-02 PROCEDURE — 80048 BASIC METABOLIC PNL TOTAL CA: CPT

## 2025-07-02 PROCEDURE — 80197 ASSAY OF TACROLIMUS: CPT

## 2025-07-02 PROCEDURE — 85027 COMPLETE CBC AUTOMATED: CPT

## 2025-07-02 PROCEDURE — 87799 DETECT AGENT NOS DNA QUANT: CPT

## 2025-07-02 PROCEDURE — 36415 COLL VENOUS BLD VENIPUNCTURE: CPT

## 2025-07-03 LAB
BK VIRUS SPECIMEN TYPE: NORMAL
BKV DNA # SPEC NAA+PROBE: NOT DETECTED IU/ML

## 2025-07-22 DIAGNOSIS — Z48.298 AFTERCARE FOLLOWING ORGAN TRANSPLANT: ICD-10-CM

## 2025-07-22 DIAGNOSIS — Z94.0 HTN, KIDNEY TRANSPLANT RELATED: Primary | ICD-10-CM

## 2025-07-22 DIAGNOSIS — I15.1 HTN, KIDNEY TRANSPLANT RELATED: Primary | ICD-10-CM

## 2025-07-23 RX ORDER — FUROSEMIDE 20 MG/1
20 TABLET ORAL 2 TIMES DAILY
Qty: 60 TABLET | Refills: 0 | Status: SHIPPED | OUTPATIENT
Start: 2025-07-23

## 2025-07-30 ENCOUNTER — TELEPHONE (OUTPATIENT)
Dept: TRANSPLANT | Facility: CLINIC | Age: 60
End: 2025-07-30
Payer: COMMERCIAL

## 2025-07-30 DIAGNOSIS — Z48.298 AFTERCARE FOLLOWING ORGAN TRANSPLANT: Primary | ICD-10-CM

## 2025-07-30 DIAGNOSIS — I15.1 HTN, KIDNEY TRANSPLANT RELATED: ICD-10-CM

## 2025-07-30 DIAGNOSIS — Z94.0 HTN, KIDNEY TRANSPLANT RELATED: ICD-10-CM

## 2025-07-30 NOTE — PROGRESS NOTES
TRANSPLANT NEPHROLOGY VISIT    Virtual Visit Details    Type of service:  Video Visit     Originating Location (pt. Location): Home    Distant Location (provider location):  Off-site  Platform used for Video Visit: [employer]    Start: 1:56 pm  Stop 2:10 pm     Assessment & Plan   # LDKT: stable   - CKD stage 3a: Baseline Creatinine: ~ 1.4-1.6 down from prior baseline 1.7-2 since 11/2024   - Proteinuria: Normal (<0.2 grams)   - DSA: No   - BK Viremia: No   - Kidney Tx Biopsy: ATI, no rejection in 2022    - Transplant Ureteral Stent: No    # Immunosuppression: Tacrolimus immediate release (goal 4-6) and Mycophenolate mofetil (dose 500 mg every 12 hours)   - Induction with Recent Transplant:  High Intensity   - Continue with intensive monitoring of immunosuppression for efficacy and toxicity.   - Changes: yes on reduced dose MMF due to BK viremia    # BK viremia: resolved on lower IS    - peak viral load 13K copies/ml Sep 2023 , neg since 1/2024, last check Jul 2025   - on reduced IS as above   - Igg-750 Sep 2023    # Infection Prophylaxis:   - PJP: none, CD4>200  - CMV: None    # Hypertension: Controlled;  Goal BP: < 130/80   - Changes: Not at this time    # Elevated Blood Glucose:  Last HbA1c: 5.8%   - Management as per primary care.    # Mineral Bone Disorder:  - Secondary renal hyperparathyroidism; PTH level: Mildly elevated (151-300 pg/ml)        On treatment: sensipar 30 mg daily  - Vitamin D; level: Normal        On supplement: No  - Calcium; level: normal        On supplement: no    # Electrolytes:   - Potassium; level: normal      On binder: Yes lokelma 10 g po MWF   - Magnesium; level: Normal        On supplement: Yes  - Bicarbonate; level: Normal        On supplement: Yes  reduce baking soda to 1/2 teasp MW    # Recurrent Metastatic Prostate Ca:   -  Final pathology showed [place] grade group 3 (4+3 equal 7) s/p radical prostatectomy 12/14/2020 s/p adjuvant radiotherapy completed 6/25/2021    - PSMA scan  1/23/2024. This showed PSMA avid bilateral internal iliac nodes c/w recurrence.   - Castrate sensitive oligometastatic disease after radical prostatectomy and adjuvant radiotherapy.    - close surveillance q3 months per discussions with oncologist ()    # Obesity, Class II (BMI = 38.5):    - Recommend weight loss for overall health by increasing exercise and watching caloric intake.   - Patient would like to defer referral to weight management clinic    # Transplant History:  Etiology of Kidney Failure: IgA nephropathy  Tx: LDKT  Transplant: 5/17/2022 (Kidney)  Donor Type: Living Donor Class:   Crossmatch at time of Tx: negative  DSA at time of Tx: No  Significant changes in immunosuppression: None  CMV IgG Ab High Risk Discordance (D+/R-): No  EBV IgG Ab High Risk Discordance (D+/R-): No  Significant transplant-related complications: DGF    Transplant Office Phone Number: [phone]    Assessment and plan was discussed with the patient and he voiced his understanding and agreement.    Return visit: 6 months  Donte Duff MD    The longitudinal plan of care for kidney transplant as documented were addressed during this visit. Due to the added complexity in care, I will continue to support Pat in the subsequent management and with ongoing continuity of care.    Chief Complaint   Mr. Blanco is a 60 year old here for kidney transplant and immunosuppression management.     History of Present Illness      Mr. Blanco was diagnosed with recurrent metastatic prostate Ca Jan 2024 after a recent uptrend in PSA and CT a/p +internal iliac LN's consistent with metastatic disease. He was seen by  who discussed treatment options,discussed risks and benefits and the plan is to continue conservative management.    Interval Hx    Feels good overall, reports no new concerns. Energy level is good. Denies any fevers, chills, weight loss, night sweats. No nausea, vomiting, abdominal pain, diarrhea.   No chest pain,  dyspnea, leg swelling. No recent illness or hospitalization.   Plans to retire in Aug 2025. His daughter is getting  in October    Current IS FK 1/1.5 /500   PPx: none     Home BP:130s/.70s  Neph:    Onc:     Problem List   Patient Active Problem List   Diagnosis    HTN, kidney transplant related    Gout    Dyslipidemia    Secondary renal hyperparathyroidism    IgA nephropathy    Class 2 obesity due to excess calories without serious comorbidity with body mass index (BMI) of 36.0 to 36.9 in adult    Prostate cancer (H)    Kidney replaced by transplant    Immunosuppression    Hyperkalemia    Hypomagnesemia    Metabolic acidosis    Aftercare following organ transplant    Morbid obesity (H)    Need for pneumocystis prophylaxis    Vitamin D deficiency    BK virus nephropathy    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      alprostadil (EDEX) 10 MCG kit 10 mcg by Intracavitary route as needed for erectile dysfunction use no more than 3 times per week 1 kit 3    aspirin 81 MG EC tablet Take 1 tablet (81 mg) by mouth daily      atorvastatin (LIPITOR) 10 MG tablet Take 1 tablet (10 mg) by mouth every morning. ++Due for visit for refills++ 30 tablet 1    B Complex-C-Folic Acid (VIRT-CAPS) 1 MG CAPS Take 1 mg by mouth daily. 90 capsule 3    cinacalcet (SENSIPAR) 30 MG tablet TAKE ONE TABLET BY MOUTH ONCE DAILY 90 tablet 0    COMPOUNDED NON-CONTROLLED SUBSTANCE (CMPD RX) - PHARMACY TO MIX COMPOUNDED MEDICATION Alprostadil 50 mcg inject 0.2 mL intracavitary daily no more than three times weekly with twenty-four hours between injections 5 mL 4    furosemide (LASIX) 20 MG tablet Take 1 tablet (20 mg) by mouth 2 times daily. 60 tablet 0    mycophenolate (GENERIC EQUIVALENT) 250 MG capsule Take 2  capsules (500 mg) by mouth 2 times daily. 120 capsule 11    Sodium Bicarbonate, antacid, POWD Take 1/2 Teaspoon of baking soda MWF 1 g 0    sodium zirconium cyclosilicate (LOKELMA) 10 g PACK packet Take 1 packet (10 g) by mouth Every Mon, Wed, Fri Morning. 30 packet 3    tacrolimus (GENERIC EQUIVALENT) 0.5 MG capsule Take 1 capsule (0.5 mg) by mouth every evening. Total dose= 1 mg in the morning ad 1.5 mg in the evening 30 capsule 11    tacrolimus (GENERIC EQUIVALENT) 1 MG capsule Take 1 capsule (1 mg) by mouth 2 times daily. Total dose= 1 mg in the morning and 1.5 mg in the evening 180 capsule 3     No current facility-administered medications for this visit.     There are no discontinued medications.    Physical Exam   Vital Signs: There were no vitals taken for this visit.    GENERAL: alert and no distress  EYES: Eyes grossly normal to inspection.  No discharge or erythema, or obvious scleral/conjunctival abnormalities.  RESP: No audible wheeze, cough, or visible cyanosis.    SKIN: Visible skin clear. No significant rash, abnormal pigmentation or lesions.  NEURO: Cranial nerves grossly intact.  Mentation and speech appropriate for age.  PSYCH: Appropriate affect, tone, and pace of words    Data         Latest Ref Rng & Units 7/2/2025     7:02 AM 5/6/2025     6:59 AM 3/4/2025     7:10 AM   Renal   Sodium 135 - 145 mmol/L 138  140  141    K 3.4 - 5.3 mmol/L 4.3  4.6  4.5    Cl 98 - 107 mmol/L 103  105  108    Cl (external) 98 - 107 mmol/L 103  105  108    CO2 22 - 29 mmol/L 23  24  23    Urea Nitrogen 8.0 - 23.0 mg/dL 21.4  22.5  30.2    Creatinine 0.67 - 1.17 mg/dL 1.40  1.46  1.52    Glucose 70 - 99 mg/dL 119  128  119    Calcium 8.8 - 10.4 mg/dL 10.0  10.0  9.9          Latest Ref Rng & Units 3/6/2024     7:01 AM 11/6/2023     7:06 AM 5/17/2023     9:42 AM   Bone Health   Parathyroid Hormone Intact 15 - 65 pg/mL 135  160  259    Vit D Def 20 - 50 ng/mL 30   48          Latest Ref Rng & Units 7/2/2025     7:02 AM  5/6/2025     6:59 AM 3/4/2025     7:10 AM   Heme   WBC 4.0 - 11.0 10e3/uL 7.5  7.1  6.8    Hgb 13.3 - 17.7 g/dL 14.5  15.1  15.1    Plt 150 - 450 10e3/uL 168  164  172    ABSOLUTE NEUTROPHIL 1.6 - 8.3 10e3/uL   5.2    ABSOLUTE LYMPHOCYTES 0.8 - 5.3 10e3/uL   1.0    ABSOLUTE MONOCYTES 0.0 - 1.3 10e3/uL   0.5    ABSOLUTE EOSINOPHILS 0.0 - 0.7 10e3/uL   0.1    ABSOLUTE BASOPHILS 0.0 - 0.2 10e3/uL   0.0          Latest Ref Rng & Units 3/4/2025     7:10 AM 11/4/2024     7:11 AM 5/6/2024     7:01 AM   Liver   AP 40 - 150 U/L 75  79  74    TBili <=1.2 mg/dL 0.7  0.8  0.7    ALT 0 - 70 U/L 31  38  32    AST 0 - 45 U/L 23  27  27    Tot Protein 6.4 - 8.3 g/dL 6.8  7.2  6.7    Albumin 3.5 - 5.2 g/dL 4.1  4.3  4.1          Latest Ref Rng & Units 5/17/2023     9:42 AM 11/21/2022     7:00 AM 6/3/2022    10:48 AM   Pancreas   A1C <5.7 % 5.8  5.6  5.1          Latest Ref Rng & Units 11/7/2022     7:08 AM 10/10/2022     7:07 AM 9/16/2022     7:07 AM   Iron studies   Iron 35 - 180 ug/dL 81  102     Iron Saturation Index 15 - 46 % 26  31     Ferritin 26 - 388 ng/mL 494  542  602          Latest Ref Rng & Units 5/17/2022     1:35 PM 5/9/2022    10:00 AM 9/3/2020    11:31 AM   [facility]   CMV QUANT IU/ML Not Detected IU/mL Not Detected      EBV CAPSID ANTIBODY IGG No detectable antibody.  Positive  >8.0    Hep B Core NR^Nonreactive   Nonreactive      Failed to redirect to the Timeline version of the REVFS SmartLink.  Recent Labs   Lab Test 03/04/25  0710 05/06/25  0659 07/02/25  0702   DOSTAC 3/3/2025 5/5/2025 7/1/2025   TACROL 7.1 5.8 6.0     Recent Labs   Lab Test 09/25/23  0714 10/02/23  0709 03/06/24  0701   DOSMPA 9/24/2023   8:00 PM 10/1/2023   8:00 PM 3/5/2024   8:00 PM   MPACID 1.33 2.19 2.06   MPAG 24.9* 24.8* 23.4*

## 2025-07-30 NOTE — TELEPHONE ENCOUNTER
3 year patient hand-off  Lab orders updated  Patient notified of change  Patient sees Dr. Erick Thomas tomorrow   Active order to obtain bladder scan? Yes:    Name of ordering provider:  Dr. Elizabeth  Bladder scan preformed post void Yes: .  Bladder scan reveled 476ML.  Provider notified?  Yes:   Urine in hat 476ml    Keysha Bush MA

## 2025-07-31 ENCOUNTER — VIRTUAL VISIT (OUTPATIENT)
Dept: TRANSPLANT | Facility: CLINIC | Age: 60
End: 2025-07-31
Attending: INTERNAL MEDICINE
Payer: COMMERCIAL

## 2025-07-31 DIAGNOSIS — D84.9 IMMUNOSUPPRESSION: ICD-10-CM

## 2025-07-31 DIAGNOSIS — N02.B9 IGA NEPHROPATHY: ICD-10-CM

## 2025-07-31 DIAGNOSIS — Z48.298 AFTERCARE FOLLOWING ORGAN TRANSPLANT: ICD-10-CM

## 2025-07-31 DIAGNOSIS — Z94.0 HTN, KIDNEY TRANSPLANT RELATED: ICD-10-CM

## 2025-07-31 DIAGNOSIS — Z94.0 KIDNEY REPLACED BY TRANSPLANT: Primary | ICD-10-CM

## 2025-07-31 DIAGNOSIS — I15.1 HTN, KIDNEY TRANSPLANT RELATED: ICD-10-CM

## 2025-07-31 DIAGNOSIS — E87.20 METABOLIC ACIDOSIS: ICD-10-CM

## 2025-07-31 DIAGNOSIS — N25.81 SECONDARY RENAL HYPERPARATHYROIDISM: ICD-10-CM

## 2025-07-31 NOTE — LETTER
7/31/2025      Donald Blanco  [address]  [place] [address]      Dear Colleague,    Thank you for referring your patient, Donald Blanco, to the [facility]. Please see a copy of my visit note below.    TRANSPLANT NEPHROLOGY VISIT    Virtual Visit Details    Type of service:  Video Visit     Originating Location (pt. Location): Home    Distant Location (provider location):  Off-site  Platform used for Video Visit: [employer]    Start: 1:56 pm  Stop 2:10 pm     Assessment & Plan  # LDKT: stable   - CKD stage 3a: Baseline Creatinine: ~ 1.4-1.6 down from prior baseline 1.7-2 since 11/2024   - Proteinuria: Normal (<0.2 grams)   - DSA: No   - BK Viremia: No   - Kidney Tx Biopsy: ATI, no rejection in 2022    - Transplant Ureteral Stent: No    # Immunosuppression: Tacrolimus immediate release (goal 4-6) and Mycophenolate mofetil (dose 500 mg every 12 hours)   - Induction with Recent Transplant:  High Intensity   - Continue with intensive monitoring of immunosuppression for efficacy and toxicity.   - Changes: yes on reduced dose MMF due to BK viremia    # BK viremia: resolved on lower IS    - peak viral load 13K copies/ml Sep 2023 , neg since 1/2024, last check Jul 2025   - on reduced IS as above   - Igg-750 Sep 2023    # Infection Prophylaxis:   - PJP: none, CD4>200  - CMV: None    # Hypertension: Controlled;  Goal BP: < 130/80   - Changes: Not at this time    # Elevated Blood Glucose:  Last HbA1c: 5.8%   - Management as per primary care.    # Mineral Bone Disorder:  - Secondary renal hyperparathyroidism; PTH level: Mildly elevated (151-300 pg/ml)        On treatment: sensipar 30 mg daily  - Vitamin D; level: Normal        On supplement: No  - Calcium; level: normal        On supplement: no    # Electrolytes:   - Potassium; level: normal      On binder: Yes lokelma 10 g po MWF   - Magnesium; level: Normal        On supplement: Yes  - Bicarbonate; level: Normal        On supplement: Yes  reduce  baking soda to 1/2 [facility]    # Recurrent Metastatic Prostate Ca:   -  Final pathology showed [place] grade group 3 (4+3 equal 7) s/p radical prostatectomy 12/14/2020 s/p adjuvant radiotherapy completed 6/25/2021    - PSMA scan 1/23/2024. This showed PSMA avid bilateral internal iliac nodes c/w recurrence.   - Castrate sensitive oligometastatic disease after radical prostatectomy and adjuvant radiotherapy.    - close surveillance q3 months per discussions with oncologist ()    # Obesity, Class II (BMI = 38.5):    - Recommend weight loss for overall health by increasing exercise and watching caloric intake.   - Patient would like to defer referral to weight management clinic    # Transplant History:  Etiology of Kidney Failure: IgA nephropathy  Tx: LDKT  Transplant: 5/17/2022 (Kidney)  Donor Type: Living Donor Class:   Crossmatch at time of Tx: negative  DSA at time of Tx: No  Significant changes in immunosuppression: None  CMV IgG Ab High Risk Discordance (D+/R-): No  EBV IgG Ab High Risk Discordance (D+/R-): No  Significant transplant-related complications: DGF    Transplant Office Phone Number: [phone]    Assessment and plan was discussed with the patient and he voiced his understanding and agreement.    Return visit: 6 months  Donte Duff MD    The longitudinal plan of care for kidney transplant as documented were addressed during this visit. Due to the added complexity in care, I will continue to support Pat in the subsequent management and with ongoing continuity of care.    Chief Complaint  Mr. Blanco is a 60 year old here for kidney transplant and immunosuppression management.     History of Present Illness     Mr. Blanco was diagnosed with recurrent metastatic prostate Ca Jan 2024 after a recent uptrend in PSA and CT a/p +internal iliac LN's consistent with metastatic disease. He was seen by  who discussed treatment options,discussed risks and benefits and the plan is to continue  conservative management.    Interval Hx    Feels good overall, reports no new concerns. Energy level is good. Denies any fevers, chills, weight loss, night sweats. No nausea, vomiting, abdominal pain, diarrhea.   No chest pain, dyspnea, leg swelling. No recent illness or hospitalization.   Plans to retire in Aug 2025. His daughter is getting  in October    Current IS FK 1/1.5 /500   PPx: none     Home BP:130s/.70s  Neph:    Onc:     Problem List  Patient Active Problem List   Diagnosis     HTN, kidney transplant related     Gout     Dyslipidemia     Secondary renal hyperparathyroidism     IgA nephropathy     Class 2 obesity due to excess calories without serious comorbidity with body mass index (BMI) of 36.0 to 36.9 in adult     Prostate cancer (H)     Kidney replaced by transplant     Immunosuppression     Hyperkalemia     Hypomagnesemia     Metabolic acidosis     Aftercare following organ transplant     Morbid obesity (H)     Need for pneumocystis prophylaxis     Vitamin D deficiency     BK virus nephropathy     Metastasis to lymph nodes (H)       Allergies  No Known Allergies    Medications  Current Outpatient Medications   Medication Sig Dispense Refill     ACE/ARB/ARNI NOT PRESCRIBED (INTENTIONAL) Please choose reason not prescribed from choices below.       alprostadil (EDEX) 10 MCG kit 10 mcg by Intracavitary route as needed for erectile dysfunction use no more than 3 times per week 1 kit 3     aspirin 81 MG EC tablet Take 1 tablet (81 mg) by mouth daily       atorvastatin (LIPITOR) 10 MG tablet Take 1 tablet (10 mg) by mouth every morning. ++Due for visit for refills++ 30 tablet 1     B Complex-C-Folic Acid (VIRT-CAPS) 1 MG CAPS Take 1 mg by mouth daily. 90 capsule 3     cinacalcet (SENSIPAR) 30 MG tablet TAKE ONE TABLET BY MOUTH ONCE DAILY 90 tablet 0     COMPOUNDED NON-CONTROLLED SUBSTANCE (CMPD RX) - PHARMACY TO MIX COMPOUNDED MEDICATION Alprostadil 50 mcg inject 0.2 mL  intracavitary daily no more than three times weekly with twenty-four hours between injections 5 mL 4     furosemide (LASIX) 20 MG tablet Take 1 tablet (20 mg) by mouth 2 times daily. 60 tablet 0     mycophenolate (GENERIC EQUIVALENT) 250 MG capsule Take 2 capsules (500 mg) by mouth 2 times daily. 120 capsule 11     Sodium Bicarbonate, antacid, POWD Take 1/2 Teaspoon of baking soda MWF 1 g 0     sodium zirconium cyclosilicate (LOKELMA) 10 g PACK packet Take 1 packet (10 g) by mouth Every Mon, Wed, Fri Morning. 30 packet 3     tacrolimus (GENERIC EQUIVALENT) 0.5 MG capsule Take 1 capsule (0.5 mg) by mouth every evening. Total dose= 1 mg in the morning ad 1.5 mg in the evening 30 capsule 11     tacrolimus (GENERIC EQUIVALENT) 1 MG capsule Take 1 capsule (1 mg) by mouth 2 times daily. Total dose= 1 mg in the morning and 1.5 mg in the evening 180 capsule 3     No current facility-administered medications for this visit.     There are no discontinued medications.    Physical Exam  Vital Signs: There were no vitals taken for this visit.    GENERAL: alert and no distress  EYES: Eyes grossly normal to inspection.  No discharge or erythema, or obvious scleral/conjunctival abnormalities.  RESP: No audible wheeze, cough, or visible cyanosis.    SKIN: Visible skin clear. No significant rash, abnormal pigmentation or lesions.  NEURO: Cranial nerves grossly intact.  Mentation and speech appropriate for age.  PSYCH: Appropriate affect, tone, and pace of words    Data        Latest Ref Rng & Units 7/2/2025     7:02 AM 5/6/2025     6:59 AM 3/4/2025     7:10 AM   Renal   Sodium 135 - 145 mmol/L 138  140  141    K 3.4 - 5.3 mmol/L 4.3  4.6  4.5    Cl 98 - 107 mmol/L 103  105  108    Cl (external) 98 - 107 mmol/L 103  105  108    CO2 22 - 29 mmol/L 23  24  23    Urea Nitrogen 8.0 - 23.0 mg/dL 21.4  22.5  30.2    Creatinine 0.67 - 1.17 mg/dL 1.40  1.46  1.52    Glucose 70 - 99 mg/dL 119  128  119    Calcium 8.8 - 10.4 mg/dL 10.0  10.0   9.9          Latest Ref Rng & Units 3/6/2024     7:01 AM 11/6/2023     7:06 AM 5/17/2023     9:42 AM   Bone Health   Parathyroid Hormone Intact 15 - 65 pg/mL 135  160  259    Vit D Def 20 - 50 ng/mL 30   48          Latest Ref Rng & Units 7/2/2025     7:02 AM 5/6/2025     6:59 AM 3/4/2025     7:10 AM   Heme   WBC 4.0 - 11.0 10e3/uL 7.5  7.1  6.8    Hgb 13.3 - 17.7 g/dL 14.5  15.1  15.1    Plt 150 - 450 10e3/uL 168  164  172    ABSOLUTE NEUTROPHIL 1.6 - 8.3 10e3/uL   5.2    ABSOLUTE LYMPHOCYTES 0.8 - 5.3 10e3/uL   1.0    ABSOLUTE MONOCYTES 0.0 - 1.3 10e3/uL   0.5    ABSOLUTE EOSINOPHILS 0.0 - 0.7 10e3/uL   0.1    ABSOLUTE BASOPHILS 0.0 - 0.2 10e3/uL   0.0          Latest Ref Rng & Units 3/4/2025     7:10 AM 11/4/2024     7:11 AM 5/6/2024     7:01 AM   Liver   AP 40 - 150 U/L 75  79  74    TBili <=1.2 mg/dL 0.7  0.8  0.7    ALT 0 - 70 U/L 31  38  32    AST 0 - 45 U/L 23  27  27    Tot Protein 6.4 - 8.3 g/dL 6.8  7.2  6.7    Albumin 3.5 - 5.2 g/dL 4.1  4.3  4.1          Latest Ref Rng & Units 5/17/2023     9:42 AM 11/21/2022     7:00 AM 6/3/2022    10:48 AM   Pancreas   A1C <5.7 % 5.8  5.6  5.1          Latest Ref Rng & Units 11/7/2022     7:08 AM 10/10/2022     7:07 AM 9/16/2022     7:07 AM   Iron studies   Iron 35 - 180 ug/dL 81  102     Iron Saturation Index 15 - 46 % 26  31     Ferritin 26 - 388 ng/mL 494  542  602          Latest Ref Rng & Units 5/17/2022     1:35 PM 5/9/2022    10:00 AM 9/3/2020    11:31 AM   [facility] T[facility] Virology   CMV QUANT IU/ML Not Detected IU/mL Not Detected      EBV CAPSID ANTIBODY IGG No detectable antibody.  Positive  >8.0    Hep B Core NR^Nonreactive   Nonreactive      Failed to redirect to the Timeline version of the REVFS SmartLink.  Recent Labs   Lab Test 03/04/25  0710 05/06/25  0659 07/02/25  0702   DOSTAC 3/3/2025 5/5/2025 7/1/2025   TACROL 7.1 5.8 6.0     Recent Labs   Lab Test 09/25/23  0714 10/02/23  0709 03/06/24  0701   DOSMPA 9/24/2023   8:00 PM 10/1/2023   8:00 PM 3/5/2024    8:00 PM   MPACID 1.33 2.19 2.06   MPAG 24.9* 24.8* 23.4*         Again, thank you for allowing me to participate in the care of your patient.        Sincerely,        Donte Duff MD    Electronically signed

## 2025-07-31 NOTE — NURSING NOTE
Current patient location: [address] [facility] [address]    Is the patient currently in the state of [place]? YES    Visit mode: VIDEO    If the visit is dropped, the patient can be reconnected by:VIDEO VISIT: Text to cell phone:   Telephone Information:   Mobile [phone]       Will anyone else be joining the visit? NO  (If patient encounters technical issues they should call [phone]    Are changes needed to the allergy or medication list? No    Are refills needed on medications prescribed by this physician? NO    Rooming Documentation:  Questionnaire(s) completed    Reason for visit: RECHECK    Emerson WATTS

## 2025-08-14 DIAGNOSIS — Z94.0 HTN, KIDNEY TRANSPLANT RELATED: ICD-10-CM

## 2025-08-14 DIAGNOSIS — Z48.298 AFTERCARE FOLLOWING ORGAN TRANSPLANT: ICD-10-CM

## 2025-08-14 DIAGNOSIS — I15.1 HTN, KIDNEY TRANSPLANT RELATED: ICD-10-CM

## 2025-08-14 DIAGNOSIS — Z94.0 KIDNEY REPLACED BY TRANSPLANT: Primary | ICD-10-CM

## 2025-08-14 DIAGNOSIS — R79.89 ELEVATED PARATHYROID HORMONE: ICD-10-CM

## 2025-08-14 RX ORDER — ATORVASTATIN CALCIUM 10 MG/1
10 TABLET, FILM COATED ORAL EVERY MORNING
Qty: 90 TABLET | Refills: 3 | Status: SHIPPED | OUTPATIENT
Start: 2025-08-14

## 2025-08-14 RX ORDER — CINACALCET 30 MG/1
30 TABLET, FILM COATED ORAL DAILY
Qty: 90 TABLET | Refills: 0 | OUTPATIENT
Start: 2025-08-14

## 2025-08-14 RX ORDER — FUROSEMIDE 20 MG/1
20 TABLET ORAL 2 TIMES DAILY
Qty: 60 TABLET | Refills: 5 | Status: SHIPPED | OUTPATIENT
Start: 2025-08-14

## 2025-08-18 DIAGNOSIS — R79.89 ELEVATED PARATHYROID HORMONE: ICD-10-CM

## 2025-08-19 ENCOUNTER — VIRTUAL VISIT (OUTPATIENT)
Dept: TRANSPLANT | Facility: CLINIC | Age: 60
End: 2025-08-19
Attending: INTERNAL MEDICINE
Payer: COMMERCIAL

## 2025-08-19 DIAGNOSIS — D84.9 IMMUNOSUPPRESSED STATUS: ICD-10-CM

## 2025-08-19 DIAGNOSIS — I15.1 HTN, KIDNEY TRANSPLANT RELATED: ICD-10-CM

## 2025-08-19 DIAGNOSIS — Z94.0 KIDNEY TRANSPLANT RECIPIENT: ICD-10-CM

## 2025-08-19 DIAGNOSIS — Z94.0 HTN, KIDNEY TRANSPLANT RELATED: ICD-10-CM

## 2025-08-19 PROCEDURE — 99207 PR NO CHARGE COORDINATED CARE PS: CPT

## 2025-08-19 RX ORDER — CINACALCET 30 MG/1
30 TABLET, FILM COATED ORAL DAILY
Qty: 90 TABLET | Refills: 0 | Status: SHIPPED | OUTPATIENT
Start: 2025-08-19

## 2025-08-26 ENCOUNTER — LAB (OUTPATIENT)
Dept: LAB | Facility: CLINIC | Age: 60
End: 2025-08-26
Payer: COMMERCIAL

## 2025-08-26 ENCOUNTER — RESULTS FOLLOW-UP (OUTPATIENT)
Dept: TRANSPLANT | Facility: CLINIC | Age: 60
End: 2025-08-26

## 2025-08-26 DIAGNOSIS — C61 PROSTATE CANCER (H): ICD-10-CM

## 2025-08-26 DIAGNOSIS — Z48.298 AFTERCARE FOLLOWING ORGAN TRANSPLANT: ICD-10-CM

## 2025-08-26 DIAGNOSIS — I15.1 HTN, KIDNEY TRANSPLANT RELATED: ICD-10-CM

## 2025-08-26 DIAGNOSIS — Z94.0 HTN, KIDNEY TRANSPLANT RELATED: ICD-10-CM

## 2025-08-26 LAB
ALBUMIN MFR UR ELPH: 25.4 MG/DL
ALBUMIN SERPL BCG-MCNC: 4.3 G/DL (ref 3.5–5.2)
ALBUMIN UR-MCNC: 30 MG/DL
ALP SERPL-CCNC: 65 U/L (ref 40–150)
ALT SERPL W P-5'-P-CCNC: 34 U/L (ref 0–70)
ANION GAP SERPL CALCULATED.3IONS-SCNC: 16 MMOL/L (ref 7–15)
APPEARANCE UR: CLEAR
AST SERPL W P-5'-P-CCNC: 32 U/L (ref 0–45)
BASOPHILS # BLD AUTO: <0.04 10E3/UL (ref 0–0.2)
BASOPHILS NFR BLD AUTO: 0.3 %
BILIRUB SERPL-MCNC: 0.8 MG/DL
BILIRUB UR QL STRIP: NEGATIVE
BUN SERPL-MCNC: 20.8 MG/DL (ref 8–23)
CALCIUM SERPL-MCNC: 10.1 MG/DL (ref 8.8–10.4)
CHLORIDE SERPL-SCNC: 103 MMOL/L (ref 98–107)
COLOR UR AUTO: YELLOW
CREAT SERPL-MCNC: 1.54 MG/DL (ref 0.67–1.17)
CREAT UR-MCNC: 143 MG/DL
EGFRCR SERPLBLD CKD-EPI 2021: 51 ML/MIN/1.73M2
EOSINOPHIL # BLD AUTO: 0.09 10E3/UL (ref 0–0.7)
EOSINOPHIL NFR BLD AUTO: 1.5 %
ERYTHROCYTE [DISTWIDTH] IN BLOOD BY AUTOMATED COUNT: 13.4 % (ref 10–15)
GLUCOSE SERPL-MCNC: 120 MG/DL (ref 70–99)
GLUCOSE UR STRIP-MCNC: NEGATIVE MG/DL
HCO3 SERPL-SCNC: 22 MMOL/L (ref 22–29)
HCT VFR BLD AUTO: 45.7 % (ref 40–53)
HGB BLD-MCNC: 14.8 G/DL (ref 13.3–17.7)
HGB UR QL STRIP: NEGATIVE
IMM GRANULOCYTES # BLD: <0.04 10E3/UL
IMM GRANULOCYTES NFR BLD: 0.3 %
KETONES UR STRIP-MCNC: NEGATIVE MG/DL
LEUKOCYTE ESTERASE UR QL STRIP: NEGATIVE
LYMPHOCYTES # BLD AUTO: 0.84 10E3/UL (ref 0.8–5.3)
LYMPHOCYTES NFR BLD AUTO: 14.4 %
MCH RBC QN AUTO: 29.6 PG (ref 26.5–33)
MCHC RBC AUTO-ENTMCNC: 32.4 G/DL (ref 31.5–36.5)
MCV RBC AUTO: 91.4 FL (ref 78–100)
MONOCYTES # BLD AUTO: 0.47 10E3/UL (ref 0–1.3)
MONOCYTES NFR BLD AUTO: 8.1 %
NEUTROPHILS # BLD AUTO: 4.39 10E3/UL (ref 1.6–8.3)
NEUTROPHILS NFR BLD AUTO: 75.4 %
NITRATE UR QL: NEGATIVE
PH UR STRIP: 6.5 [PH] (ref 5–7)
PLATELET # BLD AUTO: 156 10E3/UL (ref 150–450)
POTASSIUM SERPL-SCNC: 4.2 MMOL/L (ref 3.4–5.3)
PROT SERPL-MCNC: 6.9 G/DL (ref 6.4–8.3)
PROT/CREAT 24H UR: 0.18 MG/MG CR (ref 0–0.2)
PSA SERPL DL<=0.01 NG/ML-MCNC: 0.65 NG/ML (ref 0–4.5)
RBC # BLD AUTO: 5 10E6/UL (ref 4.4–5.9)
RBC #/AREA URNS AUTO: NORMAL /HPF
SODIUM SERPL-SCNC: 141 MMOL/L (ref 135–145)
SP GR UR STRIP: 1.02 (ref 1–1.03)
TACROLIMUS BLD-MCNC: 6.1 UG/L (ref 5–15)
TME LAST DOSE: NORMAL H
TME LAST DOSE: NORMAL H
UROBILINOGEN UR STRIP-ACNC: 0.2 E.U./DL
WBC # BLD AUTO: 5.83 10E3/UL (ref 4–11)
WBC #/AREA URNS AUTO: NORMAL /HPF

## 2025-08-26 PROCEDURE — 80053 COMPREHEN METABOLIC PANEL: CPT

## 2025-08-26 PROCEDURE — 80197 ASSAY OF TACROLIMUS: CPT

## 2025-08-26 PROCEDURE — 81001 URINALYSIS AUTO W/SCOPE: CPT

## 2025-08-26 PROCEDURE — 84156 ASSAY OF PROTEIN URINE: CPT

## 2025-08-26 PROCEDURE — 85025 COMPLETE CBC W/AUTO DIFF WBC: CPT

## 2025-08-26 PROCEDURE — 84153 ASSAY OF PSA TOTAL: CPT

## 2025-08-26 PROCEDURE — 36415 COLL VENOUS BLD VENIPUNCTURE: CPT

## 2025-08-26 PROCEDURE — 84403 ASSAY OF TOTAL TESTOSTERONE: CPT

## 2025-08-28 LAB — TESTOST SERPL-MCNC: 255 NG/DL (ref 240–950)

## 2025-09-03 DIAGNOSIS — R79.89 ELEVATED PARATHYROID HORMONE: ICD-10-CM

## 2025-09-03 DIAGNOSIS — E87.5 HYPERKALEMIA: ICD-10-CM

## 2025-09-03 RX ORDER — CINACALCET 30 MG/1
30 TABLET, FILM COATED ORAL DAILY
Qty: 90 TABLET | Refills: 0 | Status: SHIPPED | OUTPATIENT
Start: 2025-09-03

## 2025-09-03 RX ORDER — SODIUM ZIRCONIUM CYCLOSILICATE 10 G/10G
POWDER, FOR SUSPENSION ORAL
Qty: 30 PACKET | Refills: 3 | Status: SHIPPED | OUTPATIENT
Start: 2025-09-03

## (undated) DEVICE — ESU GROUND PAD ADULT W/CORD E7507

## (undated) DEVICE — PREP POVIDONE IODINE SOLUTION 10% 4OZ

## (undated) DEVICE — GLOVE PROTEXIS BLUE W/NEU-THERA 8.0  2D73EB80

## (undated) DEVICE — PACK GOWN 3/PK DISP XL SBA32GPFCB

## (undated) DEVICE — DAVINCI HOT SHEARS TIP COVER  400180

## (undated) DEVICE — SU ETHILON 2-0 FS 18" 664H

## (undated) DEVICE — DAVINCI XI NDL DRIVER LARGE 470006

## (undated) DEVICE — CLIP ENDO HEMO-LOC PURPLE LG 544240

## (undated) DEVICE — SU SILK 0 TIE 6X30" A306H

## (undated) DEVICE — CATH FOLEY 3WAY 18FR 30ML LATEX 0167SI18

## (undated) DEVICE — SU SILK 3-0 TIE 12X30" A304H

## (undated) DEVICE — TUBING CONMED AIRSEAL SMOKE EVAC INSUFFLATION ASM-EVAC

## (undated) DEVICE — SU PDS II 4-0 SH 27" Z315H

## (undated) DEVICE — GLOVE PROTEXIS W/NEU-THERA 7.5  2D73TE75

## (undated) DEVICE — SU MONOCRYL 4-0 PS-2 27" UND Y426H

## (undated) DEVICE — SOL NACL 0.9% IRRIG 1000ML BOTTLE 2F7124

## (undated) DEVICE — DAVINCI XI DRAPE ARM 470015

## (undated) DEVICE — DRAIN JACKSON PRATT ROUND SIL 19FR W/TROCAR LF JP-2232

## (undated) DEVICE — SU SILK 4-0 TIE 12X30" A303H

## (undated) DEVICE — DEVICE CATH STABILIZATION STATLOCK FOLEY 3-WAY FOL0105

## (undated) DEVICE — SU STRATAFIX PDS PLUS 3-0 SPIRAL SH 15CM SXPP1B420

## (undated) DEVICE — BLADE SWITCH SCISSORS TIP 5MM 89-5100B

## (undated) DEVICE — STPL SKIN 35W ROTATING HEAD PRW35

## (undated) DEVICE — SU WND CLOSURE VLOC 90 ABS 3-0 VIOLET 6" CV-23 VLOCM0804

## (undated) DEVICE — HEMOSTAT HEMOBLAST AGENT L10 SH CANNULA BQF02-US

## (undated) DEVICE — DRAPE U SPLIT 74X120" 29440

## (undated) DEVICE — DRSG DRAIN 2X2" 7087

## (undated) DEVICE — DRAPE SLEEVE 599

## (undated) DEVICE — PREP POVIDONE IODINE SCRUB 7.5% 4OZ APL82212

## (undated) DEVICE — ENDO TROCAR CONMED AIRSEAL BLADELESS 12X120MM IAS12-120LP

## (undated) DEVICE — PACK DAVINCI UROL

## (undated) DEVICE — BLADE KNIFE SURG 10 371110

## (undated) DEVICE — DRAIN JACKSON PRATT RESERVOIR 100ML SU130-1305

## (undated) DEVICE — SU VICRYL 4-0 RB-1 27" UND J214H

## (undated) DEVICE — DRAPE SLUSH/WARMER 66X44" ORS-320

## (undated) DEVICE — SOL NACL 0.9% INJ 1000ML BAG 2B1324X

## (undated) DEVICE — DRAPE IOBAN INCISE 23X17" 6650EZ

## (undated) DEVICE — SU SILK 1 TIE 6X30" A307H

## (undated) DEVICE — CATH FOLEY 20FR 5ML SILVER COAT LATEX 0165SI20

## (undated) DEVICE — SU PROLENE 6-0 RB-2DA 30" 8711H

## (undated) DEVICE — DRAPE SHEET REV FOLD 3/4 9349

## (undated) DEVICE — Device

## (undated) DEVICE — INSERT FOGARTY 33MM TRACTION HYDRAJAW HYDRA33

## (undated) DEVICE — SU SILK 2-0 TIE 12X30" A305H

## (undated) DEVICE — ENDO POUCH UNIV RETRIEVAL SYSTEM INZII 10MM CD001

## (undated) DEVICE — SU PDS II 6-0 RB-2DA 30" Z149H

## (undated) DEVICE — SUCTION MANIFOLD NEPTUNE 2 SYS 4 PORT 0702-020-000

## (undated) DEVICE — SU VICRYL 3-0 SH 27" UND J416H

## (undated) DEVICE — DAVINCI XI DRAPE COLUMN 470341

## (undated) DEVICE — DAVINCI XI MONOPOLAR SCISSORS HOT SHEARS 8MM 470179

## (undated) DEVICE — SYR 03ML LL W/O NDL 309657

## (undated) DEVICE — SU PROLENE 5-0 RB-1DA 36"  8556H

## (undated) DEVICE — SURGICEL HEMOSTAT 4X8" 1952

## (undated) DEVICE — LINEN TOWEL PACK X6 WHITE 5487

## (undated) DEVICE — PUNCH AORTIC 4MM SINGLE USE 1001-602

## (undated) DEVICE — LINEN TOWEL PACK X30 5481

## (undated) DEVICE — DAVINCI XI SEAL UNIVERSAL 5-8MM 470361

## (undated) DEVICE — WIPES FOLEY CARE SURESTEP PROVON DFC100

## (undated) DEVICE — SU PDS II 5-0 RB-1 27" Z303H

## (undated) DEVICE — BLADE CLIPPER SGL USE 9680

## (undated) DEVICE — PROTECTOR ARM ONE-STEP TRENDELENBURG 40418

## (undated) DEVICE — SOL WATER IRRIG 1000ML BOTTLE 2F7114

## (undated) DEVICE — DAVINCI XI GRASPER ENDOWRIST PROGRASP 470093

## (undated) DEVICE — SU SILK 3-0 SH CR 8X18" C013D

## (undated) DEVICE — SU PDS II 0 TP-1 60" Z991G

## (undated) DEVICE — PREP CHLORAPREP 26ML TINTED HI-LITE ORANGE 930815

## (undated) DEVICE — SU WND CLOSURE V-LOC 3-0 CV-23 6" VLOCM1904

## (undated) DEVICE — SURGICEL ABSORBABLE HEMOSTAT SNOW 4"X4" 2083

## (undated) DEVICE — NDL COUNTER 20CT 31142493

## (undated) DEVICE — SU MONOCRYL 4-0 PS-2 18" UND Y496G

## (undated) DEVICE — SU VICRYL 0 UR-6 27" J603H

## (undated) DEVICE — SU VICRYL 3-0 SH 27" J316H

## (undated) DEVICE — SU ETHILON 3-0 PS-1 18" 1663H

## (undated) DEVICE — DAVINCI XI ESU FCP BIPOLAR MARYLAND 470172

## (undated) DEVICE — CATH PLUG W/CAP 000076

## (undated) DEVICE — SYR 10ML FINGER CONTROL W/O NDL 309695

## (undated) DEVICE — DRSG 10X3.5IN MDPR POR CLTH NADH ADH WHT STRL LF 8X1.75IN

## (undated) DEVICE — CLIP HORIZON LG ORANGE 004200

## (undated) DEVICE — DRSG PRIMAPORE 03 1/8X6" 66000318

## (undated) DEVICE — SU VICRYL 0 CT-1 36" J346H

## (undated) DEVICE — ADH SKIN CLOSURE PREMIERPRO EXOFIN 1.0ML 3470

## (undated) DEVICE — PREP CHLORAPREP 26ML TINTED ORANGE  260815

## (undated) DEVICE — NDL BLUNT 18GA 1.5" FILTER 305211

## (undated) DEVICE — TUBING IRRIG CYSTO/BLADDER SET 81" LF 2C4040

## (undated) DEVICE — KIT PATIENT POSITIONING PIGAZZI LATEX FREE 40580

## (undated) RX ORDER — HEPARIN SODIUM 1000 [USP'U]/ML
INJECTION, SOLUTION INTRAVENOUS; SUBCUTANEOUS
Status: DISPENSED
Start: 2022-06-21

## (undated) RX ORDER — IVABRADINE 5 MG/1
TABLET, FILM COATED ORAL
Status: DISPENSED
Start: 2021-11-15

## (undated) RX ORDER — FENTANYL CITRATE 50 UG/ML
INJECTION, SOLUTION INTRAMUSCULAR; INTRAVENOUS
Status: DISPENSED
Start: 2020-12-14

## (undated) RX ORDER — CEFAZOLIN SODIUM 2 G/100ML
INJECTION, SOLUTION INTRAVENOUS
Status: DISPENSED
Start: 2020-12-14

## (undated) RX ORDER — HEPARIN SODIUM 1000 [USP'U]/ML
INJECTION, SOLUTION INTRAVENOUS; SUBCUTANEOUS
Status: DISPENSED
Start: 2022-07-26

## (undated) RX ORDER — LIDOCAINE HYDROCHLORIDE 20 MG/ML
INJECTION, SOLUTION EPIDURAL; INFILTRATION; INTRACAUDAL; PERINEURAL
Status: DISPENSED
Start: 2020-12-14

## (undated) RX ORDER — ONDANSETRON 2 MG/ML
INJECTION INTRAMUSCULAR; INTRAVENOUS
Status: DISPENSED
Start: 2020-12-14

## (undated) RX ORDER — HEPARIN SODIUM 1000 [USP'U]/ML
INJECTION, SOLUTION INTRAVENOUS; SUBCUTANEOUS
Status: DISPENSED
Start: 2022-06-29

## (undated) RX ORDER — METOPROLOL TARTRATE 1 MG/ML
INJECTION, SOLUTION INTRAVENOUS
Status: DISPENSED
Start: 2020-10-09

## (undated) RX ORDER — FUROSEMIDE 10 MG/ML
INJECTION INTRAMUSCULAR; INTRAVENOUS
Status: DISPENSED
Start: 2022-05-17

## (undated) RX ORDER — CEFAZOLIN SODIUM 2 G/100ML
INJECTION, SOLUTION INTRAVENOUS
Status: DISPENSED
Start: 2022-06-22

## (undated) RX ORDER — LIDOCAINE HYDROCHLORIDE 10 MG/ML
INJECTION, SOLUTION EPIDURAL; INFILTRATION; INTRACAUDAL; PERINEURAL
Status: DISPENSED
Start: 2020-10-07

## (undated) RX ORDER — HEPARIN SODIUM 1000 [USP'U]/ML
INJECTION, SOLUTION INTRAVENOUS; SUBCUTANEOUS
Status: DISPENSED
Start: 2022-07-07

## (undated) RX ORDER — VERAPAMIL HYDROCHLORIDE 2.5 MG/ML
INJECTION, SOLUTION INTRAVENOUS
Status: DISPENSED
Start: 2022-05-17

## (undated) RX ORDER — HYDROMORPHONE HCL IN WATER/PF 6 MG/30 ML
PATIENT CONTROLLED ANALGESIA SYRINGE INTRAVENOUS
Status: DISPENSED
Start: 2022-05-17

## (undated) RX ORDER — DOBUTAMINE HYDROCHLORIDE 200 MG/100ML
INJECTION INTRAVENOUS
Status: DISPENSED
Start: 2020-10-09

## (undated) RX ORDER — METOPROLOL TARTRATE 50 MG
TABLET ORAL
Status: DISPENSED
Start: 2021-11-15

## (undated) RX ORDER — DEXAMETHASONE SODIUM PHOSPHATE 4 MG/ML
INJECTION, SOLUTION INTRA-ARTICULAR; INTRALESIONAL; INTRAMUSCULAR; INTRAVENOUS; SOFT TISSUE
Status: DISPENSED
Start: 2022-05-17

## (undated) RX ORDER — FENTANYL CITRATE 50 UG/ML
INJECTION, SOLUTION INTRAMUSCULAR; INTRAVENOUS
Status: DISPENSED
Start: 2022-06-22

## (undated) RX ORDER — FENTANYL CITRATE 50 UG/ML
INJECTION, SOLUTION INTRAMUSCULAR; INTRAVENOUS
Status: DISPENSED
Start: 2022-05-17

## (undated) RX ORDER — ONDANSETRON 2 MG/ML
INJECTION INTRAMUSCULAR; INTRAVENOUS
Status: DISPENSED
Start: 2022-05-17

## (undated) RX ORDER — GABAPENTIN 300 MG/1
CAPSULE ORAL
Status: DISPENSED
Start: 2020-12-14

## (undated) RX ORDER — PAPAVERINE HYDROCHLORIDE 30 MG/ML
INJECTION INTRAMUSCULAR; INTRAVENOUS
Status: DISPENSED
Start: 2022-05-17

## (undated) RX ORDER — LIDOCAINE HYDROCHLORIDE 10 MG/ML
INJECTION, SOLUTION EPIDURAL; INFILTRATION; INTRACAUDAL; PERINEURAL
Status: DISPENSED
Start: 2022-06-22

## (undated) RX ORDER — HYDROMORPHONE HYDROCHLORIDE 1 MG/ML
INJECTION, SOLUTION INTRAMUSCULAR; INTRAVENOUS; SUBCUTANEOUS
Status: DISPENSED
Start: 2020-12-14

## (undated) RX ORDER — PROPOFOL 10 MG/ML
INJECTION, EMULSION INTRAVENOUS
Status: DISPENSED
Start: 2020-12-14

## (undated) RX ORDER — GENTAMICIN 40 MG/ML
INJECTION, SOLUTION INTRAMUSCULAR; INTRAVENOUS
Status: DISPENSED
Start: 2020-10-07

## (undated) RX ORDER — LIDOCAINE HYDROCHLORIDE 10 MG/ML
INJECTION, SOLUTION EPIDURAL; INFILTRATION; INTRACAUDAL; PERINEURAL
Status: DISPENSED
Start: 2022-08-10

## (undated) RX ORDER — LIDOCAINE HYDROCHLORIDE 20 MG/ML
INJECTION, SOLUTION EPIDURAL; INFILTRATION; INTRACAUDAL; PERINEURAL
Status: DISPENSED
Start: 2022-05-17

## (undated) RX ORDER — DEXTROSE, SODIUM CHLORIDE, SODIUM LACTATE, POTASSIUM CHLORIDE, AND CALCIUM CHLORIDE 5; .6; .31; .03; .02 G/100ML; G/100ML; G/100ML; G/100ML; G/100ML
INJECTION, SOLUTION INTRAVENOUS
Status: DISPENSED
Start: 2022-05-17

## (undated) RX ORDER — FENTANYL CITRATE-0.9 % NACL/PF 10 MCG/ML
PLASTIC BAG, INJECTION (ML) INTRAVENOUS
Status: DISPENSED
Start: 2020-12-14

## (undated) RX ORDER — MANNITOL 20 G/100ML
INJECTION, SOLUTION INTRAVENOUS
Status: DISPENSED
Start: 2022-05-17

## (undated) RX ORDER — NITROGLYCERIN 0.4 MG/1
TABLET SUBLINGUAL
Status: DISPENSED
Start: 2021-11-15

## (undated) RX ORDER — HEPARIN SODIUM 1000 [USP'U]/ML
INJECTION, SOLUTION INTRAVENOUS; SUBCUTANEOUS
Status: DISPENSED
Start: 2022-05-17

## (undated) RX ORDER — CEFUROXIME SODIUM 1.5 G/16ML
INJECTION, POWDER, FOR SOLUTION INTRAVENOUS
Status: DISPENSED
Start: 2022-05-17

## (undated) RX ORDER — ACETAMINOPHEN 500 MG
TABLET ORAL
Status: DISPENSED
Start: 2020-12-14

## (undated) RX ORDER — SODIUM CHLORIDE 9 MG/ML
INJECTION, SOLUTION INTRAVENOUS
Status: DISPENSED
Start: 2020-12-14

## (undated) RX ORDER — PROPOFOL 10 MG/ML
INJECTION, EMULSION INTRAVENOUS
Status: DISPENSED
Start: 2022-05-17

## (undated) RX ORDER — ATROPINE SULFATE 0.4 MG/ML
AMPUL (ML) INJECTION
Status: DISPENSED
Start: 2020-10-09

## (undated) RX ORDER — FENTANYL CITRATE 50 UG/ML
INJECTION, SOLUTION INTRAMUSCULAR; INTRAVENOUS
Status: DISPENSED
Start: 2022-05-24

## (undated) RX ORDER — HEPARIN SODIUM 1000 [USP'U]/ML
INJECTION, SOLUTION INTRAVENOUS; SUBCUTANEOUS
Status: DISPENSED
Start: 2022-06-22

## (undated) RX ORDER — ROCURONIUM BROMIDE 50 MG/5 ML
SYRINGE (ML) INTRAVENOUS
Status: DISPENSED
Start: 2022-05-17

## (undated) RX ORDER — LIDOCAINE HYDROCHLORIDE 10 MG/ML
INJECTION, SOLUTION EPIDURAL; INFILTRATION; INTRACAUDAL; PERINEURAL
Status: DISPENSED
Start: 2022-05-24

## (undated) RX ORDER — HEPARIN SODIUM 1000 [USP'U]/ML
INJECTION, SOLUTION INTRAVENOUS; SUBCUTANEOUS
Status: DISPENSED
Start: 2022-07-18

## (undated) RX ORDER — HEPARIN SODIUM 1000 [USP'U]/ML
INJECTION, SOLUTION INTRAVENOUS; SUBCUTANEOUS
Status: DISPENSED
Start: 2022-07-27

## (undated) RX ORDER — HEPARIN SODIUM 5000 [USP'U]/.5ML
INJECTION, SOLUTION INTRAVENOUS; SUBCUTANEOUS
Status: DISPENSED
Start: 2020-12-14